# Patient Record
Sex: FEMALE | Race: WHITE | Employment: OTHER | URBAN - METROPOLITAN AREA
[De-identification: names, ages, dates, MRNs, and addresses within clinical notes are randomized per-mention and may not be internally consistent; named-entity substitution may affect disease eponyms.]

---

## 2017-01-16 PROBLEM — J02.9 PHARYNGITIS: Status: ACTIVE | Noted: 2017-01-16

## 2017-01-16 PROBLEM — J06.9 UPPER RESPIRATORY TRACT INFECTION: Status: ACTIVE | Noted: 2017-01-16

## 2017-02-13 PROBLEM — M65.20 CALCIFIC TENDINITIS: Status: ACTIVE | Noted: 2017-02-13

## 2017-02-13 PROBLEM — E11.9 TYPE 2 DIABETES MELLITUS WITHOUT COMPLICATION (HCC): Status: ACTIVE | Noted: 2017-02-13

## 2017-04-18 ENCOUNTER — OFFICE VISIT (OUTPATIENT)
Dept: CARDIOLOGY | Age: 77
End: 2017-04-18
Payer: MEDICARE

## 2017-04-18 VITALS
BODY MASS INDEX: 33.72 KG/M2 | SYSTOLIC BLOOD PRESSURE: 112 MMHG | WEIGHT: 209.8 LBS | HEART RATE: 62 BPM | OXYGEN SATURATION: 97 % | HEIGHT: 66 IN | DIASTOLIC BLOOD PRESSURE: 68 MMHG

## 2017-04-18 DIAGNOSIS — I27.20 PULMONARY HTN (HCC): Chronic | ICD-10-CM

## 2017-04-18 DIAGNOSIS — G47.33 OBSTRUCTIVE SLEEP APNEA: ICD-10-CM

## 2017-04-18 DIAGNOSIS — Z79.01 LONG TERM CURRENT USE OF ANTICOAGULANT THERAPY: ICD-10-CM

## 2017-04-18 DIAGNOSIS — I48.20 CHRONIC ATRIAL FIBRILLATION (HCC): Primary | ICD-10-CM

## 2017-04-18 DIAGNOSIS — E78.5 DYSLIPIDEMIA: ICD-10-CM

## 2017-04-18 DIAGNOSIS — I34.0 NON-RHEUMATIC MITRAL REGURGITATION: Chronic | ICD-10-CM

## 2017-04-18 DIAGNOSIS — I10 ESSENTIAL HYPERTENSION: ICD-10-CM

## 2017-04-18 DIAGNOSIS — I49.5 TACHYCARDIA-BRADYCARDIA SYNDROME (HCC): ICD-10-CM

## 2017-04-18 PROCEDURE — 99214 OFFICE O/P EST MOD 30 MIN: CPT | Performed by: INTERNAL MEDICINE

## 2017-04-24 RX ORDER — CITALOPRAM 20 MG/1
TABLET ORAL
Qty: 90 TABLET | Refills: 3 | Status: SHIPPED | OUTPATIENT
Start: 2017-04-24 | End: 2017-08-04 | Stop reason: ALTCHOICE

## 2017-05-30 ENCOUNTER — APPOINTMENT (OUTPATIENT)
Dept: GENERAL RADIOLOGY | Age: 77
DRG: 683 | End: 2017-05-30
Payer: MEDICARE

## 2017-05-30 ENCOUNTER — HOSPITAL ENCOUNTER (INPATIENT)
Age: 77
LOS: 3 days | Discharge: HOME | DRG: 683 | End: 2017-06-02
Attending: EMERGENCY MEDICINE | Admitting: INTERNAL MEDICINE
Payer: MEDICARE

## 2017-05-30 DIAGNOSIS — I95.1 ORTHOSTATIC HYPOTENSION: Primary | ICD-10-CM

## 2017-05-30 DIAGNOSIS — R55 NEAR SYNCOPE: ICD-10-CM

## 2017-05-30 DIAGNOSIS — I48.20 CHRONIC ATRIAL FIBRILLATION (HCC): ICD-10-CM

## 2017-05-30 DIAGNOSIS — N17.9 ACUTE KIDNEY INJURY (HCC): ICD-10-CM

## 2017-05-30 DIAGNOSIS — R94.31 ABNORMAL EKG: ICD-10-CM

## 2017-05-30 LAB
ABSOLUTE EOS #: 0.1 K/UL (ref 0–0.4)
ABSOLUTE LYMPH #: 1.2 K/UL (ref 1–4.8)
ABSOLUTE MONO #: 0.7 K/UL (ref 0–1)
ALBUMIN SERPL-MCNC: 4.1 G/DL (ref 3.5–5.2)
ALBUMIN/GLOBULIN RATIO: 1.4 (ref 1–2.5)
ALP BLD-CCNC: 85 U/L (ref 35–104)
ALT SERPL-CCNC: 14 U/L (ref 5–33)
ANION GAP SERPL CALCULATED.3IONS-SCNC: 24 MMOL/L (ref 9–17)
AST SERPL-CCNC: 19 U/L
BASOPHILS # BLD: 0 %
BASOPHILS ABSOLUTE: 0 K/UL (ref 0–0.2)
BILIRUB SERPL-MCNC: 0.74 MG/DL (ref 0.3–1.2)
BUN BLDV-MCNC: 21 MG/DL (ref 8–23)
BUN/CREAT BLD: 13 (ref 9–20)
CALCIUM SERPL-MCNC: 8.8 MG/DL (ref 8.6–10.4)
CHLORIDE BLD-SCNC: 96 MMOL/L (ref 98–107)
CO2: 18 MMOL/L (ref 20–31)
CREAT SERPL-MCNC: 1.68 MG/DL (ref 0.5–0.9)
DIFFERENTIAL TYPE: ABNORMAL
EOSINOPHILS RELATIVE PERCENT: 1 %
GFR AFRICAN AMERICAN: 36 ML/MIN
GFR NON-AFRICAN AMERICAN: 30 ML/MIN
GFR SERPL CREATININE-BSD FRML MDRD: ABNORMAL ML/MIN/{1.73_M2}
GFR SERPL CREATININE-BSD FRML MDRD: ABNORMAL ML/MIN/{1.73_M2}
GLUCOSE BLD-MCNC: 144 MG/DL (ref 70–99)
HCT VFR BLD CALC: 45.8 % (ref 36–46)
HEMOGLOBIN: 15 G/DL (ref 12–16)
INR BLD: 1.4 (ref 0.9–1.2)
LYMPHOCYTES # BLD: 18 %
MCH RBC QN AUTO: 29.9 PG (ref 26–34)
MCHC RBC AUTO-ENTMCNC: 32.7 G/DL (ref 31–37)
MCV RBC AUTO: 91.3 FL (ref 80–100)
MONOCYTES # BLD: 10 %
PARTIAL THROMBOPLASTIN TIME: 33.6 SEC (ref 23.2–34.4)
PDW BLD-RTO: 15.4 % (ref 12.1–15.2)
PLATELET # BLD: 184 K/UL (ref 140–450)
PLATELET ESTIMATE: ABNORMAL
PMV BLD AUTO: ABNORMAL FL (ref 6–12)
POTASSIUM SERPL-SCNC: 4.1 MMOL/L (ref 3.7–5.3)
PROTHROMBIN TIME: 14.7 SEC (ref 9.7–12.2)
RBC # BLD: 5.01 M/UL (ref 4–5.2)
RBC # BLD: ABNORMAL 10*6/UL
SEG NEUTROPHILS: 71 %
SEGMENTED NEUTROPHILS ABSOLUTE COUNT: 4.9 K/UL (ref 1.8–7.7)
SODIUM BLD-SCNC: 138 MMOL/L (ref 135–144)
TOTAL PROTEIN: 7.1 G/DL (ref 6.4–8.3)
TROPONIN INTERP: NORMAL
TROPONIN T: <0.03 NG/ML
WBC # BLD: 6.9 K/UL (ref 3.5–11)
WBC # BLD: ABNORMAL 10*3/UL

## 2017-05-30 PROCEDURE — 6370000000 HC RX 637 (ALT 250 FOR IP): Performed by: FAMILY MEDICINE

## 2017-05-30 PROCEDURE — 71020 XR CHEST STANDARD TWO VW: CPT

## 2017-05-30 PROCEDURE — 85610 PROTHROMBIN TIME: CPT

## 2017-05-30 PROCEDURE — 80053 COMPREHEN METABOLIC PANEL: CPT

## 2017-05-30 PROCEDURE — 85730 THROMBOPLASTIN TIME PARTIAL: CPT

## 2017-05-30 PROCEDURE — 36415 COLL VENOUS BLD VENIPUNCTURE: CPT

## 2017-05-30 PROCEDURE — 85025 COMPLETE CBC W/AUTO DIFF WBC: CPT

## 2017-05-30 PROCEDURE — 2580000003 HC RX 258: Performed by: EMERGENCY MEDICINE

## 2017-05-30 PROCEDURE — 87086 URINE CULTURE/COLONY COUNT: CPT

## 2017-05-30 PROCEDURE — 93005 ELECTROCARDIOGRAM TRACING: CPT

## 2017-05-30 PROCEDURE — 2580000003 HC RX 258: Performed by: FAMILY MEDICINE

## 2017-05-30 PROCEDURE — 99284 EMERGENCY DEPT VISIT MOD MDM: CPT

## 2017-05-30 PROCEDURE — 81001 URINALYSIS AUTO W/SCOPE: CPT

## 2017-05-30 PROCEDURE — 1200000000 HC SEMI PRIVATE

## 2017-05-30 PROCEDURE — 99284 EMERGENCY DEPT VISIT MOD MDM: CPT | Performed by: INTERNAL MEDICINE

## 2017-05-30 PROCEDURE — 84484 ASSAY OF TROPONIN QUANT: CPT

## 2017-05-30 RX ORDER — SODIUM CHLORIDE 0.9 % (FLUSH) 0.9 %
10 SYRINGE (ML) INJECTION PRN
Status: DISCONTINUED | OUTPATIENT
Start: 2017-05-30 | End: 2017-06-02 | Stop reason: HOSPADM

## 2017-05-30 RX ORDER — CITALOPRAM 20 MG/1
20 TABLET ORAL DAILY
Status: DISCONTINUED | OUTPATIENT
Start: 2017-05-31 | End: 2017-06-02 | Stop reason: HOSPADM

## 2017-05-30 RX ORDER — 0.9 % SODIUM CHLORIDE 0.9 %
1000 INTRAVENOUS SOLUTION INTRAVENOUS ONCE
Status: COMPLETED | OUTPATIENT
Start: 2017-05-30 | End: 2017-05-30

## 2017-05-30 RX ORDER — ISOSORBIDE MONONITRATE 30 MG/1
30 TABLET, EXTENDED RELEASE ORAL DAILY
Status: DISCONTINUED | OUTPATIENT
Start: 2017-05-31 | End: 2017-06-02 | Stop reason: HOSPADM

## 2017-05-30 RX ORDER — ALLOPURINOL 300 MG/1
300 TABLET ORAL DAILY
Status: DISCONTINUED | OUTPATIENT
Start: 2017-05-31 | End: 2017-06-02 | Stop reason: HOSPADM

## 2017-05-30 RX ORDER — WARFARIN SODIUM 7.5 MG/1
7.5 TABLET ORAL DAILY
Status: DISCONTINUED | OUTPATIENT
Start: 2017-05-31 | End: 2017-05-30

## 2017-05-30 RX ORDER — ATORVASTATIN CALCIUM 40 MG/1
40 TABLET, FILM COATED ORAL DAILY
Status: DISCONTINUED | OUTPATIENT
Start: 2017-05-31 | End: 2017-06-02 | Stop reason: HOSPADM

## 2017-05-30 RX ORDER — ONDANSETRON 2 MG/ML
4 INJECTION INTRAMUSCULAR; INTRAVENOUS EVERY 6 HOURS PRN
Status: DISCONTINUED | OUTPATIENT
Start: 2017-05-30 | End: 2017-06-02 | Stop reason: HOSPADM

## 2017-05-30 RX ORDER — SODIUM CHLORIDE 0.9 % (FLUSH) 0.9 %
10 SYRINGE (ML) INJECTION EVERY 12 HOURS SCHEDULED
Status: DISCONTINUED | OUTPATIENT
Start: 2017-05-30 | End: 2017-06-02 | Stop reason: HOSPADM

## 2017-05-30 RX ORDER — ACETAMINOPHEN 325 MG/1
650 TABLET ORAL EVERY 4 HOURS PRN
Status: DISCONTINUED | OUTPATIENT
Start: 2017-05-30 | End: 2017-06-02 | Stop reason: HOSPADM

## 2017-05-30 RX ORDER — RANOLAZINE 500 MG/1
500 TABLET, EXTENDED RELEASE ORAL 2 TIMES DAILY
Status: DISCONTINUED | OUTPATIENT
Start: 2017-05-30 | End: 2017-06-02 | Stop reason: HOSPADM

## 2017-05-30 RX ORDER — WARFARIN SODIUM 7.5 MG/1
7.5 TABLET ORAL DAILY
Status: DISCONTINUED | OUTPATIENT
Start: 2017-05-30 | End: 2017-05-31 | Stop reason: DRUGHIGH

## 2017-05-30 RX ORDER — SODIUM CHLORIDE 9 MG/ML
INJECTION, SOLUTION INTRAVENOUS CONTINUOUS
Status: DISCONTINUED | OUTPATIENT
Start: 2017-05-30 | End: 2017-05-31

## 2017-05-30 RX ADMIN — SODIUM CHLORIDE: 9 INJECTION, SOLUTION INTRAVENOUS at 19:58

## 2017-05-30 RX ADMIN — METOPROLOL TARTRATE 25 MG: 25 TABLET ORAL at 21:11

## 2017-05-30 RX ADMIN — SODIUM CHLORIDE 1000 ML: 9 INJECTION, SOLUTION INTRAVENOUS at 16:07

## 2017-05-30 RX ADMIN — WARFARIN SODIUM 7.5 MG: 7.5 TABLET ORAL at 21:11

## 2017-05-30 ASSESSMENT — ENCOUNTER SYMPTOMS
TROUBLE SWALLOWING: 0
PHOTOPHOBIA: 0
BACK PAIN: 0
BLOOD IN STOOL: 0
VOICE CHANGE: 0
COUGH: 1
DIARRHEA: 0
WHEEZING: 0
NAUSEA: 0
FACIAL SWELLING: 0
SHORTNESS OF BREATH: 0
VOMITING: 0
ABDOMINAL PAIN: 0
SORE THROAT: 0
CHEST TIGHTNESS: 0

## 2017-05-30 ASSESSMENT — PAIN DESCRIPTION - LOCATION: LOCATION: NECK

## 2017-05-30 ASSESSMENT — PAIN DESCRIPTION - PAIN TYPE: TYPE: CHRONIC PAIN

## 2017-05-30 ASSESSMENT — PAIN DESCRIPTION - DESCRIPTORS: DESCRIPTORS: ACHING

## 2017-05-30 ASSESSMENT — PAIN SCALES - GENERAL: PAINLEVEL_OUTOF10: 1

## 2017-05-31 PROBLEM — I95.9 HYPOTENSION: Status: ACTIVE | Noted: 2017-05-31

## 2017-05-31 PROBLEM — N30.00 ACUTE CYSTITIS: Status: ACTIVE | Noted: 2017-05-31

## 2017-05-31 LAB
-: ABNORMAL
ABSOLUTE EOS #: 0.1 K/UL (ref 0–0.4)
ABSOLUTE LYMPH #: 1.4 K/UL (ref 1–4.8)
ABSOLUTE MONO #: 0.5 K/UL (ref 0–1)
ALBUMIN SERPL-MCNC: 3.9 G/DL (ref 3.5–5.2)
ALBUMIN/GLOBULIN RATIO: 1.9 (ref 1–2.5)
ALP BLD-CCNC: 70 U/L (ref 35–104)
ALT SERPL-CCNC: 12 U/L (ref 5–33)
AMORPHOUS: ABNORMAL
ANION GAP SERPL CALCULATED.3IONS-SCNC: 13 MMOL/L (ref 9–17)
AST SERPL-CCNC: 20 U/L
BACTERIA: ABNORMAL
BASOPHILS # BLD: 0 %
BASOPHILS ABSOLUTE: 0 K/UL (ref 0–0.2)
BILIRUB SERPL-MCNC: 0.74 MG/DL (ref 0.3–1.2)
BILIRUBIN URINE: NEGATIVE
BUN BLDV-MCNC: 16 MG/DL (ref 8–23)
BUN/CREAT BLD: 20 (ref 9–20)
CALCIUM SERPL-MCNC: 8.4 MG/DL (ref 8.6–10.4)
CASTS UA: ABNORMAL /LPF
CHLORIDE BLD-SCNC: 104 MMOL/L (ref 98–107)
CO2: 23 MMOL/L (ref 20–31)
COLOR: YELLOW
COMMENT UA: ABNORMAL
CREAT SERPL-MCNC: 0.82 MG/DL (ref 0.5–0.9)
CRYSTALS, UA: ABNORMAL /HPF
DIFFERENTIAL TYPE: ABNORMAL
EOSINOPHILS RELATIVE PERCENT: 2 %
EPITHELIAL CELLS UA: ABNORMAL /HPF (ref 0–25)
GFR AFRICAN AMERICAN: >60 ML/MIN
GFR NON-AFRICAN AMERICAN: >60 ML/MIN
GFR SERPL CREATININE-BSD FRML MDRD: ABNORMAL ML/MIN/{1.73_M2}
GFR SERPL CREATININE-BSD FRML MDRD: ABNORMAL ML/MIN/{1.73_M2}
GLUCOSE BLD-MCNC: 122 MG/DL (ref 74–100)
GLUCOSE BLD-MCNC: 132 MG/DL (ref 74–100)
GLUCOSE BLD-MCNC: 213 MG/DL (ref 74–100)
GLUCOSE BLD-MCNC: 98 MG/DL (ref 70–99)
GLUCOSE URINE: NEGATIVE
HCT VFR BLD CALC: 39.9 % (ref 36–46)
HEMOGLOBIN: 13.3 G/DL (ref 12–16)
INR BLD: 1.4 (ref 0.9–1.2)
KETONES, URINE: NEGATIVE
LEUKOCYTE ESTERASE, URINE: ABNORMAL
LYMPHOCYTES # BLD: 23 %
MCH RBC QN AUTO: 30.1 PG (ref 26–34)
MCHC RBC AUTO-ENTMCNC: 33.2 G/DL (ref 31–37)
MCV RBC AUTO: 90.7 FL (ref 80–100)
MONOCYTES # BLD: 8 %
MUCUS: ABNORMAL
NITRITE, URINE: NEGATIVE
OTHER OBSERVATIONS UA: ABNORMAL
PDW BLD-RTO: 15.8 % (ref 12.1–15.2)
PH UA: 6 (ref 5–9)
PLATELET # BLD: 168 K/UL (ref 140–450)
PLATELET ESTIMATE: ABNORMAL
PMV BLD AUTO: ABNORMAL FL (ref 6–12)
POTASSIUM SERPL-SCNC: 3.9 MMOL/L (ref 3.7–5.3)
PROTEIN UA: NEGATIVE
PROTHROMBIN TIME: 15.2 SEC (ref 9.7–12.2)
RBC # BLD: 4.41 M/UL (ref 4–5.2)
RBC # BLD: ABNORMAL 10*6/UL
RBC UA: ABNORMAL /HPF (ref 0–2)
RENAL EPITHELIAL, UA: ABNORMAL /HPF
SEG NEUTROPHILS: 67 %
SEGMENTED NEUTROPHILS ABSOLUTE COUNT: 4.2 K/UL (ref 1.8–7.7)
SODIUM BLD-SCNC: 140 MMOL/L (ref 135–144)
SPECIFIC GRAVITY UA: 1.01 (ref 1.01–1.02)
TOTAL PROTEIN: 6 G/DL (ref 6.4–8.3)
TRICHOMONAS: ABNORMAL
TURBIDITY: CLEAR
URINE HGB: NEGATIVE
UROBILINOGEN, URINE: NORMAL
WBC # BLD: 6.3 K/UL (ref 3.5–11)
WBC # BLD: ABNORMAL 10*3/UL
WBC UA: ABNORMAL /HPF (ref 0–5)
YEAST: ABNORMAL

## 2017-05-31 PROCEDURE — 2580000003 HC RX 258: Performed by: FAMILY MEDICINE

## 2017-05-31 PROCEDURE — 36415 COLL VENOUS BLD VENIPUNCTURE: CPT

## 2017-05-31 PROCEDURE — 6360000002 HC RX W HCPCS: Performed by: PHYSICIAN ASSISTANT

## 2017-05-31 PROCEDURE — 85610 PROTHROMBIN TIME: CPT

## 2017-05-31 PROCEDURE — 94664 DEMO&/EVAL PT USE INHALER: CPT

## 2017-05-31 PROCEDURE — 6370000000 HC RX 637 (ALT 250 FOR IP): Performed by: INTERNAL MEDICINE

## 2017-05-31 PROCEDURE — 80053 COMPREHEN METABOLIC PANEL: CPT

## 2017-05-31 PROCEDURE — 82947 ASSAY GLUCOSE BLOOD QUANT: CPT

## 2017-05-31 PROCEDURE — 94667 MNPJ CHEST WALL 1ST: CPT

## 2017-05-31 PROCEDURE — 6360000002 HC RX W HCPCS: Performed by: FAMILY MEDICINE

## 2017-05-31 PROCEDURE — 6370000000 HC RX 637 (ALT 250 FOR IP): Performed by: PHYSICIAN ASSISTANT

## 2017-05-31 PROCEDURE — 99232 SBSQ HOSP IP/OBS MODERATE 35: CPT | Performed by: INTERNAL MEDICINE

## 2017-05-31 PROCEDURE — 94640 AIRWAY INHALATION TREATMENT: CPT

## 2017-05-31 PROCEDURE — 6370000000 HC RX 637 (ALT 250 FOR IP): Performed by: FAMILY MEDICINE

## 2017-05-31 PROCEDURE — 85025 COMPLETE CBC W/AUTO DIFF WBC: CPT

## 2017-05-31 PROCEDURE — 1200000000 HC SEMI PRIVATE

## 2017-05-31 RX ORDER — METOPROLOL TARTRATE 50 MG/1
50 TABLET, FILM COATED ORAL 2 TIMES DAILY
Status: DISCONTINUED | OUTPATIENT
Start: 2017-05-31 | End: 2017-06-01

## 2017-05-31 RX ORDER — DEXTROSE MONOHYDRATE 25 G/50ML
12.5 INJECTION, SOLUTION INTRAVENOUS PRN
Status: DISCONTINUED | OUTPATIENT
Start: 2017-05-31 | End: 2017-06-02 | Stop reason: HOSPADM

## 2017-05-31 RX ORDER — NICOTINE POLACRILEX 4 MG
15 LOZENGE BUCCAL PRN
Status: DISCONTINUED | OUTPATIENT
Start: 2017-05-31 | End: 2017-06-02 | Stop reason: HOSPADM

## 2017-05-31 RX ORDER — CEPHALEXIN 250 MG/1
250 CAPSULE ORAL EVERY 6 HOURS SCHEDULED
Status: DISCONTINUED | OUTPATIENT
Start: 2017-05-31 | End: 2017-06-02 | Stop reason: HOSPADM

## 2017-05-31 RX ORDER — LEVALBUTEROL INHALATION SOLUTION 0.63 MG/3ML
0.63 SOLUTION RESPIRATORY (INHALATION) EVERY 8 HOURS PRN
Status: DISCONTINUED | OUTPATIENT
Start: 2017-05-31 | End: 2017-05-31

## 2017-05-31 RX ORDER — DEXTROSE MONOHYDRATE 50 MG/ML
100 INJECTION, SOLUTION INTRAVENOUS PRN
Status: DISCONTINUED | OUTPATIENT
Start: 2017-05-31 | End: 2017-06-02 | Stop reason: HOSPADM

## 2017-05-31 RX ORDER — DIGOXIN 250 MCG
0.25 TABLET ORAL ONCE
Status: COMPLETED | OUTPATIENT
Start: 2017-05-31 | End: 2017-05-31

## 2017-05-31 RX ORDER — LEVALBUTEROL INHALATION SOLUTION 0.63 MG/3ML
0.63 SOLUTION RESPIRATORY (INHALATION) 3 TIMES DAILY
Status: DISCONTINUED | OUTPATIENT
Start: 2017-05-31 | End: 2017-06-02 | Stop reason: HOSPADM

## 2017-05-31 RX ORDER — METHYLPREDNISOLONE SODIUM SUCCINATE 40 MG/ML
40 INJECTION, POWDER, LYOPHILIZED, FOR SOLUTION INTRAMUSCULAR; INTRAVENOUS EVERY 12 HOURS
Status: DISCONTINUED | OUTPATIENT
Start: 2017-05-31 | End: 2017-06-02

## 2017-05-31 RX ORDER — BENZONATATE 100 MG/1
100 CAPSULE ORAL 3 TIMES DAILY PRN
Status: DISCONTINUED | OUTPATIENT
Start: 2017-05-31 | End: 2017-06-02 | Stop reason: HOSPADM

## 2017-05-31 RX ORDER — WARFARIN SODIUM 5 MG/1
10 TABLET ORAL
Status: COMPLETED | OUTPATIENT
Start: 2017-05-31 | End: 2017-05-31

## 2017-05-31 RX ADMIN — METOPROLOL TARTRATE 25 MG: 25 TABLET ORAL at 16:41

## 2017-05-31 RX ADMIN — CEPHALEXIN 250 MG: 250 CAPSULE ORAL at 09:42

## 2017-05-31 RX ADMIN — Medication 10 ML: at 20:25

## 2017-05-31 RX ADMIN — ALLOPURINOL 300 MG: 300 TABLET ORAL at 09:42

## 2017-05-31 RX ADMIN — CEPHALEXIN 250 MG: 250 CAPSULE ORAL at 17:10

## 2017-05-31 RX ADMIN — SODIUM CHLORIDE: 9 INJECTION, SOLUTION INTRAVENOUS at 05:42

## 2017-05-31 RX ADMIN — LEVALBUTEROL HYDROCHLORIDE 0.63 MG: 0.63 SOLUTION RESPIRATORY (INHALATION) at 08:26

## 2017-05-31 RX ADMIN — DIGOXIN 0.25 MG: 250 TABLET ORAL at 22:54

## 2017-05-31 RX ADMIN — METHYLPREDNISOLONE SODIUM SUCCINATE 40 MG: 40 INJECTION, POWDER, FOR SOLUTION INTRAMUSCULAR; INTRAVENOUS at 09:43

## 2017-05-31 RX ADMIN — BENZONATATE 100 MG: 100 CAPSULE ORAL at 10:07

## 2017-05-31 RX ADMIN — ISOSORBIDE MONONITRATE 30 MG: 30 TABLET, EXTENDED RELEASE ORAL at 09:42

## 2017-05-31 RX ADMIN — WARFARIN SODIUM 10 MG: 5 TABLET ORAL at 17:10

## 2017-05-31 RX ADMIN — LEVALBUTEROL HYDROCHLORIDE 0.63 MG: 0.63 SOLUTION RESPIRATORY (INHALATION) at 15:28

## 2017-05-31 RX ADMIN — METOPROLOL TARTRATE 50 MG: 50 TABLET ORAL at 20:17

## 2017-05-31 RX ADMIN — INSULIN LISPRO 2 UNITS: 100 INJECTION, SOLUTION INTRAVENOUS; SUBCUTANEOUS at 17:10

## 2017-05-31 RX ADMIN — CEPHALEXIN 250 MG: 250 CAPSULE ORAL at 12:39

## 2017-05-31 RX ADMIN — METOPROLOL TARTRATE 25 MG: 25 TABLET ORAL at 09:43

## 2017-05-31 RX ADMIN — METHYLPREDNISOLONE SODIUM SUCCINATE 40 MG: 40 INJECTION, POWDER, FOR SOLUTION INTRAMUSCULAR; INTRAVENOUS at 20:17

## 2017-05-31 RX ADMIN — LEVALBUTEROL HYDROCHLORIDE 0.63 MG: 0.63 SOLUTION RESPIRATORY (INHALATION) at 22:23

## 2017-05-31 RX ADMIN — BENZONATATE 100 MG: 100 CAPSULE ORAL at 22:54

## 2017-05-31 RX ADMIN — CITALOPRAM HYDROBROMIDE 20 MG: 20 TABLET ORAL at 09:43

## 2017-05-31 RX ADMIN — ATORVASTATIN CALCIUM 40 MG: 40 TABLET, FILM COATED ORAL at 09:42

## 2017-05-31 RX ADMIN — Medication 10 ML: at 09:45

## 2017-05-31 RX ADMIN — DIGOXIN 0.25 MG: 0.25 TABLET ORAL at 18:54

## 2017-05-31 ASSESSMENT — PAIN SCALES - GENERAL
PAINLEVEL_OUTOF10: 0

## 2017-06-01 LAB
ANION GAP SERPL CALCULATED.3IONS-SCNC: 16 MMOL/L (ref 9–17)
BUN BLDV-MCNC: 12 MG/DL (ref 8–23)
BUN/CREAT BLD: 20 (ref 9–20)
CALCIUM SERPL-MCNC: 8.9 MG/DL (ref 8.6–10.4)
CHLORIDE BLD-SCNC: 105 MMOL/L (ref 98–107)
CO2: 19 MMOL/L (ref 20–31)
CREAT SERPL-MCNC: 0.6 MG/DL (ref 0.5–0.9)
CULTURE: NORMAL
CULTURE: NORMAL
GFR AFRICAN AMERICAN: >60 ML/MIN
GFR NON-AFRICAN AMERICAN: >60 ML/MIN
GFR SERPL CREATININE-BSD FRML MDRD: ABNORMAL ML/MIN/{1.73_M2}
GFR SERPL CREATININE-BSD FRML MDRD: ABNORMAL ML/MIN/{1.73_M2}
GLUCOSE BLD-MCNC: 122 MG/DL (ref 74–100)
GLUCOSE BLD-MCNC: 155 MG/DL (ref 70–99)
GLUCOSE BLD-MCNC: 159 MG/DL (ref 74–100)
GLUCOSE BLD-MCNC: 163 MG/DL (ref 74–100)
GLUCOSE BLD-MCNC: 164 MG/DL (ref 74–100)
INR BLD: 1.8 (ref 0.9–1.2)
Lab: NORMAL
Lab: NORMAL
POTASSIUM SERPL-SCNC: 4.2 MMOL/L (ref 3.7–5.3)
PROTHROMBIN TIME: 19.7 SEC (ref 9.7–12.2)
SODIUM BLD-SCNC: 140 MMOL/L (ref 135–144)
SPECIMEN DESCRIPTION: NORMAL
SPECIMEN DESCRIPTION: NORMAL
STATUS: NORMAL

## 2017-06-01 PROCEDURE — 94668 MNPJ CHEST WALL SBSQ: CPT

## 2017-06-01 PROCEDURE — 94640 AIRWAY INHALATION TREATMENT: CPT

## 2017-06-01 PROCEDURE — 97166 OT EVAL MOD COMPLEX 45 MIN: CPT

## 2017-06-01 PROCEDURE — 1200000000 HC SEMI PRIVATE

## 2017-06-01 PROCEDURE — 97116 GAIT TRAINING THERAPY: CPT

## 2017-06-01 PROCEDURE — 97110 THERAPEUTIC EXERCISES: CPT

## 2017-06-01 PROCEDURE — G8978 MOBILITY CURRENT STATUS: HCPCS

## 2017-06-01 PROCEDURE — 6370000000 HC RX 637 (ALT 250 FOR IP): Performed by: FAMILY MEDICINE

## 2017-06-01 PROCEDURE — 6370000000 HC RX 637 (ALT 250 FOR IP)

## 2017-06-01 PROCEDURE — 6360000002 HC RX W HCPCS: Performed by: PHYSICIAN ASSISTANT

## 2017-06-01 PROCEDURE — 97162 PT EVAL MOD COMPLEX 30 MIN: CPT

## 2017-06-01 PROCEDURE — 99232 SBSQ HOSP IP/OBS MODERATE 35: CPT | Performed by: INTERNAL MEDICINE

## 2017-06-01 PROCEDURE — 6360000002 HC RX W HCPCS: Performed by: FAMILY MEDICINE

## 2017-06-01 PROCEDURE — G8987 SELF CARE CURRENT STATUS: HCPCS

## 2017-06-01 PROCEDURE — 2580000003 HC RX 258: Performed by: FAMILY MEDICINE

## 2017-06-01 PROCEDURE — G8979 MOBILITY GOAL STATUS: HCPCS

## 2017-06-01 PROCEDURE — 36415 COLL VENOUS BLD VENIPUNCTURE: CPT

## 2017-06-01 PROCEDURE — 82947 ASSAY GLUCOSE BLOOD QUANT: CPT

## 2017-06-01 PROCEDURE — 6370000000 HC RX 637 (ALT 250 FOR IP): Performed by: PHYSICIAN ASSISTANT

## 2017-06-01 PROCEDURE — G8988 SELF CARE GOAL STATUS: HCPCS

## 2017-06-01 PROCEDURE — 80048 BASIC METABOLIC PNL TOTAL CA: CPT

## 2017-06-01 PROCEDURE — 6370000000 HC RX 637 (ALT 250 FOR IP): Performed by: INTERNAL MEDICINE

## 2017-06-01 PROCEDURE — 85610 PROTHROMBIN TIME: CPT

## 2017-06-01 RX ORDER — DILTIAZEM HYDROCHLORIDE 60 MG/1
60 TABLET, FILM COATED ORAL EVERY 8 HOURS SCHEDULED
Status: DISCONTINUED | OUTPATIENT
Start: 2017-06-01 | End: 2017-06-02

## 2017-06-01 RX ORDER — WARFARIN SODIUM 5 MG/1
10 TABLET ORAL
Status: COMPLETED | OUTPATIENT
Start: 2017-06-01 | End: 2017-06-01

## 2017-06-01 RX ORDER — METOPROLOL TARTRATE 50 MG/1
50 TABLET, FILM COATED ORAL 2 TIMES DAILY
Status: DISCONTINUED | OUTPATIENT
Start: 2017-06-01 | End: 2017-06-02 | Stop reason: HOSPADM

## 2017-06-01 RX ORDER — DIGOXIN 250 MCG
250 TABLET ORAL DAILY
Status: DISCONTINUED | OUTPATIENT
Start: 2017-06-01 | End: 2017-06-02 | Stop reason: HOSPADM

## 2017-06-01 RX ORDER — WARFARIN SODIUM 5 MG/1
10 TABLET ORAL
Status: DISCONTINUED | OUTPATIENT
Start: 2017-06-01 | End: 2017-06-01

## 2017-06-01 RX ORDER — SODIUM CHLORIDE FOR INHALATION 0.9 %
VIAL, NEBULIZER (ML) INHALATION
Status: COMPLETED
Start: 2017-06-01 | End: 2017-06-01

## 2017-06-01 RX ORDER — DILTIAZEM HYDROCHLORIDE 180 MG/1
360 CAPSULE, COATED, EXTENDED RELEASE ORAL DAILY
Status: DISCONTINUED | OUTPATIENT
Start: 2017-06-01 | End: 2017-06-01

## 2017-06-01 RX ADMIN — Medication: at 22:45

## 2017-06-01 RX ADMIN — LEVALBUTEROL HYDROCHLORIDE 0.63 MG: 0.63 SOLUTION RESPIRATORY (INHALATION) at 09:57

## 2017-06-01 RX ADMIN — RANOLAZINE 500 MG: 500 TABLET, EXTENDED RELEASE ORAL at 20:36

## 2017-06-01 RX ADMIN — INSULIN LISPRO 1 UNITS: 100 INJECTION, SOLUTION INTRAVENOUS; SUBCUTANEOUS at 20:36

## 2017-06-01 RX ADMIN — DILTIAZEM HYDROCHLORIDE 60 MG: 60 TABLET, FILM COATED ORAL at 22:57

## 2017-06-01 RX ADMIN — RANOLAZINE 500 MG: 500 TABLET, EXTENDED RELEASE ORAL at 11:25

## 2017-06-01 RX ADMIN — ATORVASTATIN CALCIUM 40 MG: 40 TABLET, FILM COATED ORAL at 09:49

## 2017-06-01 RX ADMIN — CEPHALEXIN 250 MG: 250 CAPSULE ORAL at 00:00

## 2017-06-01 RX ADMIN — CEPHALEXIN 250 MG: 250 CAPSULE ORAL at 17:08

## 2017-06-01 RX ADMIN — CEPHALEXIN 250 MG: 250 CAPSULE ORAL at 22:57

## 2017-06-01 RX ADMIN — Medication 10 ML: at 20:35

## 2017-06-01 RX ADMIN — LEVALBUTEROL HYDROCHLORIDE 0.63 MG: 0.63 SOLUTION RESPIRATORY (INHALATION) at 16:20

## 2017-06-01 RX ADMIN — ALLOPURINOL 300 MG: 300 TABLET ORAL at 09:49

## 2017-06-01 RX ADMIN — METOPROLOL TARTRATE 50 MG: 50 TABLET ORAL at 09:49

## 2017-06-01 RX ADMIN — INSULIN LISPRO 1 UNITS: 100 INJECTION, SOLUTION INTRAVENOUS; SUBCUTANEOUS at 09:54

## 2017-06-01 RX ADMIN — CEPHALEXIN 250 MG: 250 CAPSULE ORAL at 11:30

## 2017-06-01 RX ADMIN — WARFARIN SODIUM 10 MG: 5 TABLET ORAL at 17:33

## 2017-06-01 RX ADMIN — ISOSORBIDE MONONITRATE 30 MG: 30 TABLET, EXTENDED RELEASE ORAL at 09:49

## 2017-06-01 RX ADMIN — CITALOPRAM HYDROBROMIDE 20 MG: 20 TABLET ORAL at 09:49

## 2017-06-01 RX ADMIN — METHYLPREDNISOLONE SODIUM SUCCINATE 40 MG: 40 INJECTION, POWDER, FOR SOLUTION INTRAMUSCULAR; INTRAVENOUS at 20:35

## 2017-06-01 RX ADMIN — DIGOXIN 250 MCG: 0.25 TABLET ORAL at 12:12

## 2017-06-01 RX ADMIN — CEPHALEXIN 250 MG: 250 CAPSULE ORAL at 06:38

## 2017-06-01 RX ADMIN — METOPROLOL TARTRATE 50 MG: 50 TABLET ORAL at 20:35

## 2017-06-01 RX ADMIN — INSULIN LISPRO 1 UNITS: 100 INJECTION, SOLUTION INTRAVENOUS; SUBCUTANEOUS at 17:11

## 2017-06-01 RX ADMIN — DILTIAZEM HYDROCHLORIDE 60 MG: 60 TABLET, FILM COATED ORAL at 14:28

## 2017-06-01 RX ADMIN — Medication 10 ML: at 09:50

## 2017-06-01 RX ADMIN — METHYLPREDNISOLONE SODIUM SUCCINATE 40 MG: 40 INJECTION, POWDER, FOR SOLUTION INTRAMUSCULAR; INTRAVENOUS at 09:49

## 2017-06-01 RX ADMIN — LEVALBUTEROL HYDROCHLORIDE 0.63 MG: 0.63 SOLUTION RESPIRATORY (INHALATION) at 22:45

## 2017-06-01 ASSESSMENT — PAIN SCALES - GENERAL
PAINLEVEL_OUTOF10: 0

## 2017-06-02 ENCOUNTER — APPOINTMENT (OUTPATIENT)
Dept: GENERAL RADIOLOGY | Age: 77
DRG: 683 | End: 2017-06-02
Payer: MEDICARE

## 2017-06-02 VITALS
HEIGHT: 66 IN | RESPIRATION RATE: 16 BRPM | OXYGEN SATURATION: 94 % | TEMPERATURE: 98.1 F | HEART RATE: 69 BPM | WEIGHT: 206.2 LBS | BODY MASS INDEX: 33.14 KG/M2 | DIASTOLIC BLOOD PRESSURE: 75 MMHG | SYSTOLIC BLOOD PRESSURE: 123 MMHG

## 2017-06-02 DIAGNOSIS — I48.20 CHRONIC ATRIAL FIBRILLATION (HCC): Primary | ICD-10-CM

## 2017-06-02 LAB
ANION GAP SERPL CALCULATED.3IONS-SCNC: 17 MMOL/L (ref 9–17)
BUN BLDV-MCNC: 16 MG/DL (ref 8–23)
BUN/CREAT BLD: 25 (ref 9–20)
CALCIUM SERPL-MCNC: 8.9 MG/DL (ref 8.6–10.4)
CHLORIDE BLD-SCNC: 103 MMOL/L (ref 98–107)
CO2: 20 MMOL/L (ref 20–31)
CREAT SERPL-MCNC: 0.64 MG/DL (ref 0.5–0.9)
GFR AFRICAN AMERICAN: >60 ML/MIN
GFR NON-AFRICAN AMERICAN: >60 ML/MIN
GFR SERPL CREATININE-BSD FRML MDRD: ABNORMAL ML/MIN/{1.73_M2}
GFR SERPL CREATININE-BSD FRML MDRD: ABNORMAL ML/MIN/{1.73_M2}
GLUCOSE BLD-MCNC: 105 MG/DL (ref 74–100)
GLUCOSE BLD-MCNC: 146 MG/DL (ref 74–100)
GLUCOSE BLD-MCNC: 155 MG/DL (ref 70–99)
INR BLD: 2.2 (ref 0.9–1.2)
POTASSIUM SERPL-SCNC: 4.4 MMOL/L (ref 3.7–5.3)
PROTHROMBIN TIME: 23.5 SEC (ref 9.7–12.2)
SODIUM BLD-SCNC: 140 MMOL/L (ref 135–144)

## 2017-06-02 PROCEDURE — 82947 ASSAY GLUCOSE BLOOD QUANT: CPT

## 2017-06-02 PROCEDURE — 36415 COLL VENOUS BLD VENIPUNCTURE: CPT

## 2017-06-02 PROCEDURE — 94640 AIRWAY INHALATION TREATMENT: CPT

## 2017-06-02 PROCEDURE — 2580000003 HC RX 258: Performed by: FAMILY MEDICINE

## 2017-06-02 PROCEDURE — 71020 XR CHEST STANDARD TWO VW: CPT

## 2017-06-02 PROCEDURE — 6370000000 HC RX 637 (ALT 250 FOR IP): Performed by: INTERNAL MEDICINE

## 2017-06-02 PROCEDURE — 97110 THERAPEUTIC EXERCISES: CPT

## 2017-06-02 PROCEDURE — 6360000002 HC RX W HCPCS: Performed by: FAMILY MEDICINE

## 2017-06-02 PROCEDURE — 6370000000 HC RX 637 (ALT 250 FOR IP): Performed by: FAMILY MEDICINE

## 2017-06-02 PROCEDURE — 80048 BASIC METABOLIC PNL TOTAL CA: CPT

## 2017-06-02 PROCEDURE — 97535 SELF CARE MNGMENT TRAINING: CPT

## 2017-06-02 PROCEDURE — 85610 PROTHROMBIN TIME: CPT

## 2017-06-02 PROCEDURE — 97116 GAIT TRAINING THERAPY: CPT

## 2017-06-02 PROCEDURE — 6370000000 HC RX 637 (ALT 250 FOR IP): Performed by: PHYSICIAN ASSISTANT

## 2017-06-02 PROCEDURE — 99232 SBSQ HOSP IP/OBS MODERATE 35: CPT | Performed by: INTERNAL MEDICINE

## 2017-06-02 RX ORDER — PREDNISONE 10 MG/1
TABLET ORAL
Qty: 15 TABLET | Refills: 0 | Status: ON HOLD | OUTPATIENT
Start: 2017-06-02 | End: 2017-06-09 | Stop reason: HOSPADM

## 2017-06-02 RX ORDER — PREDNISONE 10 MG/1
10 TABLET ORAL 2 TIMES DAILY
Status: DISCONTINUED | OUTPATIENT
Start: 2017-06-02 | End: 2017-06-02 | Stop reason: HOSPADM

## 2017-06-02 RX ORDER — CEPHALEXIN 250 MG/1
250 CAPSULE ORAL 4 TIMES DAILY
Qty: 20 CAPSULE | Refills: 0 | Status: ON HOLD | OUTPATIENT
Start: 2017-06-02 | End: 2017-06-09 | Stop reason: HOSPADM

## 2017-06-02 RX ORDER — DILTIAZEM HYDROCHLORIDE 180 MG/1
180 CAPSULE, COATED, EXTENDED RELEASE ORAL DAILY
Qty: 30 CAPSULE | Refills: 0 | Status: ON HOLD | OUTPATIENT
Start: 2017-06-02 | End: 2017-06-15 | Stop reason: HOSPADM

## 2017-06-02 RX ORDER — DIGOXIN 250 MCG
250 TABLET ORAL DAILY
Qty: 30 TABLET | Refills: 0 | Status: ON HOLD | OUTPATIENT
Start: 2017-06-02 | End: 2017-06-15 | Stop reason: HOSPADM

## 2017-06-02 RX ORDER — ALBUTEROL SULFATE 90 UG/1
2 AEROSOL, METERED RESPIRATORY (INHALATION) EVERY 6 HOURS PRN
Qty: 1 INHALER | Refills: 0 | Status: SHIPPED | OUTPATIENT
Start: 2017-06-02 | End: 2017-06-19 | Stop reason: ALTCHOICE

## 2017-06-02 RX ORDER — BENZONATATE 100 MG/1
100 CAPSULE ORAL 3 TIMES DAILY PRN
Qty: 60 CAPSULE | Refills: 0 | Status: ON HOLD | OUTPATIENT
Start: 2017-06-02 | End: 2017-06-15 | Stop reason: HOSPADM

## 2017-06-02 RX ORDER — WARFARIN SODIUM 5 MG/1
5 TABLET ORAL
Status: DISCONTINUED | OUTPATIENT
Start: 2017-06-02 | End: 2017-06-02 | Stop reason: HOSPADM

## 2017-06-02 RX ORDER — DILTIAZEM HYDROCHLORIDE 180 MG/1
180 CAPSULE, COATED, EXTENDED RELEASE ORAL DAILY
Status: DISCONTINUED | OUTPATIENT
Start: 2017-06-02 | End: 2017-06-02 | Stop reason: HOSPADM

## 2017-06-02 RX ADMIN — CEPHALEXIN 250 MG: 250 CAPSULE ORAL at 12:04

## 2017-06-02 RX ADMIN — DILTIAZEM HYDROCHLORIDE 60 MG: 60 TABLET, FILM COATED ORAL at 05:18

## 2017-06-02 RX ADMIN — METOPROLOL TARTRATE 50 MG: 50 TABLET ORAL at 08:27

## 2017-06-02 RX ADMIN — ISOSORBIDE MONONITRATE 30 MG: 30 TABLET, EXTENDED RELEASE ORAL at 08:27

## 2017-06-02 RX ADMIN — PREDNISONE 10 MG: 10 TABLET ORAL at 08:27

## 2017-06-02 RX ADMIN — DILTIAZEM HYDROCHLORIDE 180 MG: 180 CAPSULE, COATED, EXTENDED RELEASE ORAL at 13:06

## 2017-06-02 RX ADMIN — Medication 10 ML: at 08:29

## 2017-06-02 RX ADMIN — CEPHALEXIN 250 MG: 250 CAPSULE ORAL at 05:18

## 2017-06-02 RX ADMIN — BENZONATATE 100 MG: 100 CAPSULE ORAL at 13:51

## 2017-06-02 RX ADMIN — ALLOPURINOL 300 MG: 300 TABLET ORAL at 08:27

## 2017-06-02 RX ADMIN — ATORVASTATIN CALCIUM 40 MG: 40 TABLET, FILM COATED ORAL at 08:27

## 2017-06-02 RX ADMIN — RANOLAZINE 500 MG: 500 TABLET, EXTENDED RELEASE ORAL at 08:28

## 2017-06-02 RX ADMIN — CITALOPRAM HYDROBROMIDE 20 MG: 20 TABLET ORAL at 08:27

## 2017-06-02 RX ADMIN — INSULIN LISPRO 1 UNITS: 100 INJECTION, SOLUTION INTRAVENOUS; SUBCUTANEOUS at 08:18

## 2017-06-02 RX ADMIN — LEVALBUTEROL HYDROCHLORIDE 0.63 MG: 0.63 SOLUTION RESPIRATORY (INHALATION) at 09:40

## 2017-06-02 RX ADMIN — DIGOXIN 250 MCG: 0.25 TABLET ORAL at 08:27

## 2017-06-02 ASSESSMENT — PAIN SCALES - GENERAL
PAINLEVEL_OUTOF10: 0

## 2017-06-03 ENCOUNTER — HOSPITAL ENCOUNTER (EMERGENCY)
Age: 77
Discharge: OTHER INSTITUTION | End: 2017-06-03
Attending: EMERGENCY MEDICINE
Payer: MEDICARE

## 2017-06-03 ENCOUNTER — HOSPITAL ENCOUNTER (INPATIENT)
Age: 77
LOS: 12 days | Discharge: HOME HEALTH | DRG: 163 | End: 2017-06-15
Attending: INTERNAL MEDICINE | Admitting: INTERNAL MEDICINE
Payer: MEDICARE

## 2017-06-03 ENCOUNTER — APPOINTMENT (OUTPATIENT)
Dept: GENERAL RADIOLOGY | Age: 77
End: 2017-06-03
Payer: MEDICARE

## 2017-06-03 VITALS
HEART RATE: 108 BPM | BODY MASS INDEX: 33.1 KG/M2 | OXYGEN SATURATION: 96 % | DIASTOLIC BLOOD PRESSURE: 59 MMHG | SYSTOLIC BLOOD PRESSURE: 107 MMHG | RESPIRATION RATE: 28 BRPM | TEMPERATURE: 100.7 F | WEIGHT: 202 LBS

## 2017-06-03 DIAGNOSIS — J96.01 ACUTE RESPIRATORY FAILURE WITH HYPOXIA (HCC): ICD-10-CM

## 2017-06-03 DIAGNOSIS — I50.21 ACUTE SYSTOLIC CONGESTIVE HEART FAILURE (HCC): Primary | ICD-10-CM

## 2017-06-03 DIAGNOSIS — J11.1 INFLUENZA WITH RESPIRATORY MANIFESTATION OTHER THAN PNEUMONIA: ICD-10-CM

## 2017-06-03 LAB
-: ABNORMAL
ABSOLUTE EOS #: 0 K/UL (ref 0–0.4)
ABSOLUTE EOS #: 0 K/UL (ref 0–0.4)
ABSOLUTE LYMPH #: 0.9 K/UL (ref 1–4.8)
ABSOLUTE LYMPH #: 1.1 K/UL (ref 1–4.8)
ABSOLUTE MONO #: 0.2 K/UL (ref 0.1–1.2)
ABSOLUTE MONO #: 0.2 K/UL (ref 0–1)
ALBUMIN SERPL-MCNC: 3.3 G/DL (ref 3.5–5.2)
ALBUMIN SERPL-MCNC: 3.8 G/DL (ref 3.5–5.2)
ALBUMIN/GLOBULIN RATIO: 1.2 (ref 1–2.5)
ALBUMIN/GLOBULIN RATIO: 1.3 (ref 1–2.5)
ALLEN TEST: ABNORMAL
ALP BLD-CCNC: 58 U/L (ref 35–104)
ALP BLD-CCNC: 74 U/L (ref 35–104)
ALT SERPL-CCNC: 25 U/L (ref 5–33)
ALT SERPL-CCNC: 31 U/L (ref 5–33)
AMORPHOUS: ABNORMAL
ANION GAP SERPL CALCULATED.3IONS-SCNC: 16 MMOL/L (ref 9–17)
ANION GAP SERPL CALCULATED.3IONS-SCNC: 19 MMOL/L (ref 9–17)
AST SERPL-CCNC: 22 U/L
AST SERPL-CCNC: 34 U/L
BACTERIA: ABNORMAL
BASOPHILS # BLD: 0 %
BASOPHILS # BLD: 0 %
BASOPHILS ABSOLUTE: 0 K/UL (ref 0–0.2)
BASOPHILS ABSOLUTE: 0 K/UL (ref 0–0.2)
BILIRUB SERPL-MCNC: 0.75 MG/DL (ref 0.3–1.2)
BILIRUB SERPL-MCNC: 1 MG/DL (ref 0.3–1.2)
BILIRUBIN URINE: NEGATIVE
BNP INTERPRETATION: ABNORMAL
BNP INTERPRETATION: ABNORMAL
BUN BLDV-MCNC: 14 MG/DL (ref 8–23)
BUN BLDV-MCNC: 15 MG/DL (ref 8–23)
BUN/CREAT BLD: 16 (ref 9–20)
BUN/CREAT BLD: ABNORMAL (ref 9–20)
CALCIUM SERPL-MCNC: 8.3 MG/DL (ref 8.6–10.4)
CALCIUM SERPL-MCNC: 8.5 MG/DL (ref 8.6–10.4)
CARBOXYHEMOGLOBIN: ABNORMAL % (ref 0–5)
CASTS UA: ABNORMAL /LPF
CHLORIDE BLD-SCNC: 96 MMOL/L (ref 98–107)
CHLORIDE BLD-SCNC: 96 MMOL/L (ref 98–107)
CK MB: 1.7 NG/ML
CO2: 18 MMOL/L (ref 20–31)
CO2: 22 MMOL/L (ref 20–31)
COLOR: YELLOW
COMMENT UA: ABNORMAL
CREAT SERPL-MCNC: 0.85 MG/DL (ref 0.5–0.9)
CREAT SERPL-MCNC: 0.86 MG/DL (ref 0.5–0.9)
CRYSTALS, UA: ABNORMAL /HPF
DIFFERENTIAL TYPE: ABNORMAL
DIFFERENTIAL TYPE: ABNORMAL
EOSINOPHILS RELATIVE PERCENT: 0 %
EOSINOPHILS RELATIVE PERCENT: 0 %
EPITHELIAL CELLS UA: ABNORMAL /HPF (ref 0–25)
FIO2: ABNORMAL
GFR AFRICAN AMERICAN: >60 ML/MIN
GFR AFRICAN AMERICAN: >60 ML/MIN
GFR NON-AFRICAN AMERICAN: >60 ML/MIN
GFR NON-AFRICAN AMERICAN: >60 ML/MIN
GFR SERPL CREATININE-BSD FRML MDRD: ABNORMAL ML/MIN/{1.73_M2}
GLUCOSE BLD-MCNC: 141 MG/DL (ref 70–99)
GLUCOSE BLD-MCNC: 191 MG/DL (ref 70–99)
GLUCOSE URINE: NEGATIVE
HCO3 ARTERIAL: 20.4 MMOL/L (ref 22–26)
HCT VFR BLD CALC: 39.3 % (ref 36–46)
HCT VFR BLD CALC: 44 % (ref 36–46)
HEMOGLOBIN: 13.1 G/DL (ref 12–16)
HEMOGLOBIN: 14.6 G/DL (ref 12–16)
INR BLD: 2.9
KETONES, URINE: NEGATIVE
LACTIC ACID, WHOLE BLOOD: 1.6 MMOL/L (ref 0.7–2.1)
LEUKOCYTE ESTERASE, URINE: NEGATIVE
LYMPHOCYTES # BLD: 10 %
LYMPHOCYTES # BLD: 13 %
MAGNESIUM: 2.1 MG/DL (ref 1.6–2.6)
MCH RBC QN AUTO: 29.9 PG (ref 26–34)
MCH RBC QN AUTO: 30.1 PG (ref 26–34)
MCHC RBC AUTO-ENTMCNC: 33.1 G/DL (ref 31–37)
MCHC RBC AUTO-ENTMCNC: 33.2 G/DL (ref 31–37)
MCV RBC AUTO: 90.1 FL (ref 80–100)
MCV RBC AUTO: 90.8 FL (ref 80–100)
METHEMOGLOBIN: ABNORMAL % (ref 0–1.9)
MODE: ABNORMAL
MONOCYTES # BLD: 3 %
MONOCYTES # BLD: 3 %
MUCUS: ABNORMAL
MYOGLOBIN: 74 NG/ML (ref 25–58)
NEGATIVE BASE EXCESS, ART: 2.1 MMOL/L (ref 0–2)
NITRITE, URINE: NEGATIVE
NOTIFICATION TIME: ABNORMAL
NOTIFICATION: ABNORMAL
O2 DEVICE/FLOW/%: ABNORMAL
O2 SAT, ARTERIAL: 90.1 % (ref 95–98)
OTHER OBSERVATIONS UA: ABNORMAL
OXYHEMOGLOBIN: ABNORMAL % (ref 95–98)
PATIENT TEMP: ABNORMAL
PCO2 ARTERIAL: 29.5 MMHG (ref 35–45)
PCO2, ART, TEMP ADJ: ABNORMAL
PDW BLD-RTO: 15.6 % (ref 12.1–15.2)
PDW BLD-RTO: 16.4 % (ref 12.5–15.4)
PEEP/CPAP: ABNORMAL
PH ARTERIAL: 7.46 (ref 7.35–7.45)
PH UA: 6 (ref 5–9)
PH, ART, TEMP ADJ: ABNORMAL (ref 7.35–7.45)
PLATELET # BLD: 203 K/UL (ref 140–450)
PLATELET # BLD: 288 K/UL (ref 140–450)
PLATELET ESTIMATE: ABNORMAL
PLATELET ESTIMATE: ABNORMAL
PMV BLD AUTO: 8 FL (ref 6–12)
PMV BLD AUTO: ABNORMAL FL (ref 6–12)
PO2 ARTERIAL: 53.9 MMHG (ref 80–100)
PO2, ART, TEMP ADJ: ABNORMAL MMHG (ref 80–100)
POSITIVE BASE EXCESS, ART: ABNORMAL MMOL/L (ref 0–2)
POTASSIUM SERPL-SCNC: 3.6 MMOL/L (ref 3.7–5.3)
POTASSIUM SERPL-SCNC: 4.2 MMOL/L (ref 3.7–5.3)
PRO-BNP: 4169 PG/ML
PRO-BNP: 5532 PG/ML
PROTEIN UA: ABNORMAL
PROTHROMBIN TIME: 31.4 SEC (ref 9.4–12.6)
PSV: ABNORMAL
PT. POSITION: ABNORMAL
RBC # BLD: 4.36 M/UL (ref 4–5.2)
RBC # BLD: 4.84 M/UL (ref 4–5.2)
RBC # BLD: ABNORMAL 10*6/UL
RBC # BLD: ABNORMAL 10*6/UL
RBC UA: ABNORMAL /HPF (ref 0–2)
RENAL EPITHELIAL, UA: ABNORMAL /HPF
RESPIRATORY RATE: ABNORMAL
SAMPLE SITE: ABNORMAL
SEG NEUTROPHILS: 84 %
SEG NEUTROPHILS: 87 %
SEGMENTED NEUTROPHILS ABSOLUTE COUNT: 7.3 K/UL (ref 1.8–7.7)
SEGMENTED NEUTROPHILS ABSOLUTE COUNT: 7.4 K/UL (ref 1.8–7.7)
SET RATE: ABNORMAL
SODIUM BLD-SCNC: 133 MMOL/L (ref 135–144)
SODIUM BLD-SCNC: 134 MMOL/L (ref 135–144)
SPECIFIC GRAVITY UA: 1.02 (ref 1.01–1.02)
TEXT FOR RESPIRATORY: ABNORMAL
TOTAL HB: ABNORMAL G/DL (ref 12–16)
TOTAL PROTEIN: 5.9 G/DL (ref 6.4–8.3)
TOTAL PROTEIN: 6.9 G/DL (ref 6.4–8.3)
TOTAL RATE: ABNORMAL
TRICHOMONAS: ABNORMAL
TROPONIN INTERP: NORMAL
TROPONIN INTERP: NORMAL
TROPONIN T: <0.03 NG/ML
TROPONIN T: <0.03 NG/ML
TSH SERPL DL<=0.05 MIU/L-ACNC: 0.99 MIU/L (ref 0.3–5)
TURBIDITY: CLEAR
URINE HGB: ABNORMAL
UROBILINOGEN, URINE: NORMAL
VT: ABNORMAL
WBC # BLD: 8.4 K/UL (ref 3.5–11)
WBC # BLD: 8.8 K/UL (ref 3.5–11)
WBC # BLD: ABNORMAL 10*3/UL
WBC # BLD: ABNORMAL 10*3/UL
WBC UA: ABNORMAL /HPF (ref 0–5)
YEAST: ABNORMAL

## 2017-06-03 PROCEDURE — 84484 ASSAY OF TROPONIN QUANT: CPT

## 2017-06-03 PROCEDURE — 99285 EMERGENCY DEPT VISIT HI MDM: CPT

## 2017-06-03 PROCEDURE — 87040 BLOOD CULTURE FOR BACTERIA: CPT

## 2017-06-03 PROCEDURE — 2060000000 HC ICU INTERMEDIATE R&B

## 2017-06-03 PROCEDURE — 2580000003 HC RX 258: Performed by: INTERNAL MEDICINE

## 2017-06-03 PROCEDURE — 6370000000 HC RX 637 (ALT 250 FOR IP): Performed by: INTERNAL MEDICINE

## 2017-06-03 PROCEDURE — 85610 PROTHROMBIN TIME: CPT

## 2017-06-03 PROCEDURE — 96375 TX/PRO/DX INJ NEW DRUG ADDON: CPT

## 2017-06-03 PROCEDURE — 6360000002 HC RX W HCPCS: Performed by: INTERNAL MEDICINE

## 2017-06-03 PROCEDURE — 84443 ASSAY THYROID STIM HORMONE: CPT

## 2017-06-03 PROCEDURE — 87205 SMEAR GRAM STAIN: CPT

## 2017-06-03 PROCEDURE — 83735 ASSAY OF MAGNESIUM: CPT

## 2017-06-03 PROCEDURE — 81001 URINALYSIS AUTO W/SCOPE: CPT

## 2017-06-03 PROCEDURE — 80053 COMPREHEN METABOLIC PANEL: CPT

## 2017-06-03 PROCEDURE — 96365 THER/PROPH/DIAG IV INF INIT: CPT

## 2017-06-03 PROCEDURE — 87070 CULTURE OTHR SPECIMN AEROBIC: CPT

## 2017-06-03 PROCEDURE — 87631 RESP VIRUS 3-5 TARGETS: CPT

## 2017-06-03 PROCEDURE — 83036 HEMOGLOBIN GLYCOSYLATED A1C: CPT

## 2017-06-03 PROCEDURE — 83874 ASSAY OF MYOGLOBIN: CPT

## 2017-06-03 PROCEDURE — 83880 ASSAY OF NATRIURETIC PEPTIDE: CPT

## 2017-06-03 PROCEDURE — 94762 N-INVAS EAR/PLS OXIMTRY CONT: CPT

## 2017-06-03 PROCEDURE — 36415 COLL VENOUS BLD VENIPUNCTURE: CPT

## 2017-06-03 PROCEDURE — 99223 1ST HOSP IP/OBS HIGH 75: CPT | Performed by: INTERNAL MEDICINE

## 2017-06-03 PROCEDURE — 36600 WITHDRAWAL OF ARTERIAL BLOOD: CPT

## 2017-06-03 PROCEDURE — 82947 ASSAY GLUCOSE BLOOD QUANT: CPT

## 2017-06-03 PROCEDURE — 71010 XR CHEST LIMITED ONE VIEW: CPT

## 2017-06-03 PROCEDURE — 82553 CREATINE MB FRACTION: CPT

## 2017-06-03 PROCEDURE — 93005 ELECTROCARDIOGRAM TRACING: CPT

## 2017-06-03 PROCEDURE — 82805 BLOOD GASES W/O2 SATURATION: CPT

## 2017-06-03 PROCEDURE — 94660 CPAP INITIATION&MGMT: CPT

## 2017-06-03 PROCEDURE — 83605 ASSAY OF LACTIC ACID: CPT

## 2017-06-03 PROCEDURE — 85025 COMPLETE CBC W/AUTO DIFF WBC: CPT

## 2017-06-03 PROCEDURE — 87086 URINE CULTURE/COLONY COUNT: CPT

## 2017-06-03 RX ORDER — MORPHINE SULFATE 4 MG/ML
4 INJECTION, SOLUTION INTRAMUSCULAR; INTRAVENOUS ONCE
Status: COMPLETED | OUTPATIENT
Start: 2017-06-03 | End: 2017-06-03

## 2017-06-03 RX ORDER — POTASSIUM CHLORIDE 20MEQ/15ML
40 LIQUID (ML) ORAL PRN
Status: DISCONTINUED | OUTPATIENT
Start: 2017-06-03 | End: 2017-06-14

## 2017-06-03 RX ORDER — WARFARIN SODIUM 7.5 MG/1
7.5 TABLET ORAL DAILY
Status: DISCONTINUED | OUTPATIENT
Start: 2017-06-03 | End: 2017-06-04 | Stop reason: ALTCHOICE

## 2017-06-03 RX ORDER — ONDANSETRON 2 MG/ML
4 INJECTION INTRAMUSCULAR; INTRAVENOUS EVERY 6 HOURS PRN
Status: DISCONTINUED | OUTPATIENT
Start: 2017-06-03 | End: 2017-06-14

## 2017-06-03 RX ORDER — FUROSEMIDE 10 MG/ML
60 INJECTION INTRAMUSCULAR; INTRAVENOUS ONCE
Status: COMPLETED | OUTPATIENT
Start: 2017-06-03 | End: 2017-06-03

## 2017-06-03 RX ORDER — CITALOPRAM 20 MG/1
20 TABLET ORAL DAILY
Status: DISCONTINUED | OUTPATIENT
Start: 2017-06-03 | End: 2017-06-15 | Stop reason: HOSPADM

## 2017-06-03 RX ORDER — DIPHENHYDRAMINE HYDROCHLORIDE 50 MG/ML
25 INJECTION INTRAMUSCULAR; INTRAVENOUS EVERY 6 HOURS PRN
Status: DISCONTINUED | OUTPATIENT
Start: 2017-06-03 | End: 2017-06-09

## 2017-06-03 RX ORDER — DEXTROSE MONOHYDRATE 25 G/50ML
12.5 INJECTION, SOLUTION INTRAVENOUS PRN
Status: DISCONTINUED | OUTPATIENT
Start: 2017-06-03 | End: 2017-06-14

## 2017-06-03 RX ORDER — RANOLAZINE 500 MG/1
500 TABLET, EXTENDED RELEASE ORAL 2 TIMES DAILY
Status: DISCONTINUED | OUTPATIENT
Start: 2017-06-03 | End: 2017-06-15 | Stop reason: HOSPADM

## 2017-06-03 RX ORDER — ISOSORBIDE MONONITRATE 30 MG/1
30 TABLET, EXTENDED RELEASE ORAL DAILY
Status: DISCONTINUED | OUTPATIENT
Start: 2017-06-03 | End: 2017-06-15 | Stop reason: HOSPADM

## 2017-06-03 RX ORDER — FUROSEMIDE 10 MG/ML
20 INJECTION INTRAMUSCULAR; INTRAVENOUS ONCE
Status: COMPLETED | OUTPATIENT
Start: 2017-06-03 | End: 2017-06-03

## 2017-06-03 RX ORDER — ALBUTEROL SULFATE 90 UG/1
2 AEROSOL, METERED RESPIRATORY (INHALATION) EVERY 6 HOURS PRN
Status: DISCONTINUED | OUTPATIENT
Start: 2017-06-03 | End: 2017-06-14

## 2017-06-03 RX ORDER — DIGOXIN 250 MCG
250 TABLET ORAL DAILY
Status: DISCONTINUED | OUTPATIENT
Start: 2017-06-03 | End: 2017-06-11

## 2017-06-03 RX ORDER — ATORVASTATIN CALCIUM 40 MG/1
40 TABLET, FILM COATED ORAL DAILY
Status: DISCONTINUED | OUTPATIENT
Start: 2017-06-03 | End: 2017-06-03

## 2017-06-03 RX ORDER — POTASSIUM CHLORIDE 7.45 MG/ML
10 INJECTION INTRAVENOUS PRN
Status: DISCONTINUED | OUTPATIENT
Start: 2017-06-03 | End: 2017-06-14

## 2017-06-03 RX ORDER — BENZONATATE 100 MG/1
100 CAPSULE ORAL 3 TIMES DAILY PRN
Status: DISCONTINUED | OUTPATIENT
Start: 2017-06-03 | End: 2017-06-14

## 2017-06-03 RX ORDER — METOPROLOL TARTRATE 50 MG/1
50 TABLET, FILM COATED ORAL 2 TIMES DAILY
Status: DISCONTINUED | OUTPATIENT
Start: 2017-06-03 | End: 2017-06-11

## 2017-06-03 RX ORDER — ONDANSETRON 2 MG/ML
4 INJECTION INTRAMUSCULAR; INTRAVENOUS ONCE
Status: COMPLETED | OUTPATIENT
Start: 2017-06-03 | End: 2017-06-03

## 2017-06-03 RX ORDER — ASPIRIN 81 MG/1
81 TABLET, CHEWABLE ORAL DAILY
Status: DISCONTINUED | OUTPATIENT
Start: 2017-06-04 | End: 2017-06-15

## 2017-06-03 RX ORDER — ATORVASTATIN CALCIUM 40 MG/1
40 TABLET, FILM COATED ORAL NIGHTLY
Status: DISCONTINUED | OUTPATIENT
Start: 2017-06-04 | End: 2017-06-15 | Stop reason: HOSPADM

## 2017-06-03 RX ORDER — DEXTROSE MONOHYDRATE 50 MG/ML
100 INJECTION, SOLUTION INTRAVENOUS PRN
Status: DISCONTINUED | OUTPATIENT
Start: 2017-06-03 | End: 2017-06-14

## 2017-06-03 RX ORDER — SODIUM CHLORIDE 0.9 % (FLUSH) 0.9 %
10 SYRINGE (ML) INJECTION EVERY 12 HOURS SCHEDULED
Status: DISCONTINUED | OUTPATIENT
Start: 2017-06-03 | End: 2017-06-14

## 2017-06-03 RX ORDER — NICOTINE POLACRILEX 4 MG
15 LOZENGE BUCCAL PRN
Status: DISCONTINUED | OUTPATIENT
Start: 2017-06-03 | End: 2017-06-14

## 2017-06-03 RX ORDER — POTASSIUM CHLORIDE 20 MEQ/1
40 TABLET, EXTENDED RELEASE ORAL PRN
Status: DISCONTINUED | OUTPATIENT
Start: 2017-06-03 | End: 2017-06-14

## 2017-06-03 RX ORDER — SODIUM CHLORIDE 0.9 % (FLUSH) 0.9 %
10 SYRINGE (ML) INJECTION PRN
Status: DISCONTINUED | OUTPATIENT
Start: 2017-06-03 | End: 2017-06-14

## 2017-06-03 RX ORDER — VANCOMYCIN HYDROCHLORIDE 1 G/200ML
1000 INJECTION, SOLUTION INTRAVENOUS ONCE
Status: COMPLETED | OUTPATIENT
Start: 2017-06-03 | End: 2017-06-03

## 2017-06-03 RX ORDER — DILTIAZEM HYDROCHLORIDE 180 MG/1
180 CAPSULE, COATED, EXTENDED RELEASE ORAL DAILY
Status: DISCONTINUED | OUTPATIENT
Start: 2017-06-03 | End: 2017-06-12

## 2017-06-03 RX ORDER — ACETAMINOPHEN 325 MG/1
650 TABLET ORAL EVERY 4 HOURS PRN
Status: DISCONTINUED | OUTPATIENT
Start: 2017-06-03 | End: 2017-06-14

## 2017-06-03 RX ORDER — ATORVASTATIN CALCIUM 80 MG/1
80 TABLET, FILM COATED ORAL NIGHTLY
Status: DISCONTINUED | OUTPATIENT
Start: 2017-06-03 | End: 2017-06-03

## 2017-06-03 RX ORDER — PREDNISONE 20 MG/1
20 TABLET ORAL 2 TIMES DAILY
Status: DISCONTINUED | OUTPATIENT
Start: 2017-06-03 | End: 2017-06-04

## 2017-06-03 RX ADMIN — RANOLAZINE 500 MG: 500 TABLET, FILM COATED, EXTENDED RELEASE ORAL at 23:00

## 2017-06-03 RX ADMIN — WARFARIN SODIUM 7.5 MG: 7.5 TABLET ORAL at 23:00

## 2017-06-03 RX ADMIN — CEFTRIAXONE 2 G: 2 INJECTION, POWDER, FOR SOLUTION INTRAMUSCULAR; INTRAVENOUS at 16:22

## 2017-06-03 RX ADMIN — ONDANSETRON 4 MG: 2 INJECTION INTRAMUSCULAR; INTRAVENOUS at 14:48

## 2017-06-03 RX ADMIN — Medication 10 ML: at 23:02

## 2017-06-03 RX ADMIN — ACETAMINOPHEN 650 MG: 325 TABLET ORAL at 23:00

## 2017-06-03 RX ADMIN — ENOXAPARIN SODIUM 40 MG: 40 INJECTION SUBCUTANEOUS at 23:01

## 2017-06-03 RX ADMIN — VANCOMYCIN HYDROCHLORIDE 1000 MG: 1 INJECTION, SOLUTION INTRAVENOUS at 22:52

## 2017-06-03 RX ADMIN — FUROSEMIDE 60 MG: 10 INJECTION, SOLUTION INTRAMUSCULAR; INTRAVENOUS at 14:52

## 2017-06-03 RX ADMIN — ATORVASTATIN CALCIUM 80 MG: 80 TABLET, FILM COATED ORAL at 23:01

## 2017-06-03 RX ADMIN — PREDNISONE 20 MG: 20 TABLET ORAL at 23:01

## 2017-06-03 RX ADMIN — FUROSEMIDE 20 MG: 10 INJECTION, SOLUTION INTRAMUSCULAR; INTRAVENOUS at 23:06

## 2017-06-03 RX ADMIN — MORPHINE SULFATE 4 MG: 4 INJECTION, SOLUTION INTRAMUSCULAR; INTRAVENOUS at 14:50

## 2017-06-03 ASSESSMENT — ENCOUNTER SYMPTOMS
DIARRHEA: 0
EYE DISCHARGE: 0
BACK PAIN: 0
NAUSEA: 0
VOMITING: 0
EYE PAIN: 0
ABDOMINAL PAIN: 0
SHORTNESS OF BREATH: 1
WHEEZING: 1
CHEST TIGHTNESS: 0
COUGH: 0
SORE THROAT: 0

## 2017-06-03 ASSESSMENT — PAIN SCALES - GENERAL: PAINLEVEL_OUTOF10: 0

## 2017-06-03 NOTE — IP AVS SNAPSHOT
Patient Information     Patient Name Kimberly [address] A 1940      WARFARIN INFORMATION       What is the most important information you should know about warfarin? Warfarin is a medicine that you take to prevent blood clots. It is often called a blood thinner. Doctors give warfarin (such as Coumadin) to reduce the risk of blood clots. Warfarin/Coumadin Instructions:  ? It is important to take your anticoagulant medication as instructed, and notify your physician if you are unable to for any reason. ? Complete any blood tests ordered for monitoring your medication; such as PT/INR, so your physician can adjust the dose if necessary. ? Eat a consistent amount of foods with Vitamin K, such as leafy greens. ? Avoid major changes in your dietary habits, or notify your health professional before changing habits. ? Follow up with your health care provider or clinic as directed by your physician for monitoring your anticoagulant medication. ? Diet and medication can affect your anticoagulant and PT/INR blood test.   ? Do not take or discontinue any medication or over-the-counter medication except on the advice of your physician or pharmacist.   ? Anticoagulants can increase the risk of bleeding.

## 2017-06-03 NOTE — IP AVS SNAPSHOT
Commonly known as:  LOTRISONE   Apply topically 2 times daily. What changed:  additional instructions                                         isosorbide mononitrate 30 MG extended release tablet   Commonly known as:  IMDUR   Take 1 tablet by mouth daily   What changed:  See the new instructions. 30 mg on 6/15/2017  9:32 AM                            metoprolol tartrate 25 MG tablet   Commonly known as:  LOPRESSOR   Take 0.5 tablets by mouth nightly   What changed:    - how much to take  - when to take this                12.5 mg on 6/15/2017  9:32 AM                            metoprolol tartrate 25 MG tablet   Commonly known as:  LOPRESSOR   Take 1 tablet by mouth every morning   What changed: You were already taking a medication with the same name, and this prescription was added. Make sure you understand how and when to take each.                12.5 mg on 6/15/2017  9:32 AM                            predniSONE 20 MG tablet   Commonly known as:  DELTASONE   Take 2 tablets by mouth daily   What changed:    - medication strength  - how much to take  - how to take this  - when to take this  - additional instructions                40 mg on 6/15/2017  9:33 AM                              These are medications you told us you were taking at home, CONTINUE taking them after you leave the hospital        Last Dose    Next Dose Due AM NOON PM NIGHT    albuterol sulfate  (90 Base) MCG/ACT inhaler   Commonly known as:  PROAIR HFA   Inhale 2 puffs into the lungs every 6 hours as needed for Wheezing                                         allopurinol 300 MG tablet   Commonly known as:  ZYLOPRIM   TAKE ONE TABLET BY MOUTH ONCE DAILY                                         betamethasone dipropionate 0.05 % cream   Commonly known as:  DIPROLENE   Apply  topically 2 times daily.  Apply topically 2 times daily prn                                         citalopram 20 MG tablet Commonly known as:  CELEXA   TAKE ONE TABLET BY MOUTH ONCE DAILY                20 mg on 6/15/2017  9:31 AM                            ranolazine 500 MG extended release tablet   Commonly known as:  RANEXA   Take 1 tablet by mouth 2 times daily                500 mg on 6/15/2017  9:33 AM                            warfarin 7.5 MG tablet   Commonly known as:  COUMADIN   TAKE ONE TABLET BY MOUTH ONCE DAILY AS DIRECTED PER PHYSICIAN                7.5 mg on 6/14/2017  6:28 PM                              These are the medications you have told us you were taking at home, STOP taking them after you leave the hospital     benzonatate 100 MG capsule   Commonly known as:  TESSALON       cephALEXin 250 MG capsule   Commonly known as:  KEFLEX       digoxin 250 MCG tablet   Commonly known as:  LANOXIN       diltiazem 180 MG extended release capsule   Commonly known as:  CARDIZEM CD            Where to Get Your Medications      These medications were sent to [address]  [address] [facility], [place] [address]     Phone:  [phone]     aspirin 81 MG chewable tablet    atorvastatin 40 MG tablet    docusate 100 MG Caps    isosorbide mononitrate 30 MG extended release tablet    melatonin 1 MG tablet    metoprolol tartrate 25 MG tablet    metoprolol tartrate 25 MG tablet    mometasone-formoterol 200-5 MCG/ACT inhaler    predniSONE 20 MG tablet         You can get these medications from any pharmacy     Bring a paper prescription for each of these medications     HYDROcodone-acetaminophen 5-325 MG per tablet               Allergies as of 6/15/2017        Reactions    Adhesive Tape     Aspercreme [Trolamine Salicylate]     Augmentin [Amoxicillin-pot Clavulanate]     Erythromycin Other (See Comments)    \"whole in stomach\"    Guaifenesin & Derivatives     Niacin And Related       Immunizations as of 6/15/2017     Name Date Dose VIS Date Route your visit. Please take this sheet with you when you visit your doctor or other health care provider in the future. It will help determine the best possible medical care for you at that time. If you have any questions once you leave the hospital, please call the department phone number listed below. Why you were here     Your primary diagnosis was:  Hcap (Healthcare-Associated Pneumonia)    Your diagnoses also included:  Upper Respiratory Tract Infection, Type 2 Diabetes Mellitus Without Complication (Hcc), Pulmonary Htn (Hcc), Obstructive Sleep Apnea, Non-Rheumatic Mitral Regurgitation, Long Term Current Use Of Anticoagulant Therapy, Gout, Essential Hypertension, Atrial Fibrillation (Hcc), Coronary Artery Disease Involving Native Coronary Artery, Ischemic Cardiomyopathy, Hypokalemia, Acute Pulmonary Edema (Hcc), Ground Glass Opacity Present On Imaging Of Lung      Visit Information     Date & Time Department Dept. Phone    6/3/2017 JANET CAR 1 [phone]       Follow-up Appointments    Below is a list of your follow-up and future appointments. This may not be a complete list as you may have made appointments directly with providers that we are not aware of or your providers may have made some for you. Please call your providers to confirm appointments. It is important to keep your appointments. Please bring your current insurance card, photo ID, co-pay, and all medication bottles to your appointment. If self-pay, payment is expected at the time of service. Follow-up Information     Schedule an appointment as soon as possible for a visit with Haritha López MD.    Specialty:  Family Medicine    Why:  COUMADIN MGMT    Contact information:    [facility]   [address] [facility]  [phone]          Follow up with Tammi Bustos DO. Schedule an appointment as soon as possible for a visit in 2 weeks.     Specialties:  Pulmonology, Pulmonary Disease    Why:  hospital follow up · Wash the area daily with warm, soapy water, and pat it dry. Don't use hydrogen peroxide or alcohol, which can slow healing. You may cover the area with a gauze bandage if it weeps or rubs against clothing. Change the bandage every day. · Keep the area clean and dry. Follow-up care is a key part of your treatment and safety. Be sure to make and go to all appointments, and call your doctor if you are having problems. It's also a good idea to know your test results and keep a list of the medicines you take. When should you call for help? Call 911 anytime you think you may need emergency care. For example, call if:  · You passed out (lost consciousness). · You have severe trouble breathing. · You have sudden chest pain and shortness of breath, or you cough up blood. Call your doctor now or seek immediate medical care if:  · You are sick to your stomach or cannot keep fluids down. · You have pain that does not get better after you take pain medicine. · You have a fever over 100°F.  · You have signs of infection, such as:  ¨ Increased pain, swelling, warmth, or redness. ¨ Red streaks leading from the incision. ¨ Pus draining from the incision. ¨ Swollen lymph nodes in your neck, armpits, or groin. ¨ A fever. · You have loose stitches, or your incisions come open. · Bright red blood has soaked through the bandage over your incision. Watch closely for changes in your health, and be sure to contact your doctor if you have any problems. Where can you learn more? Go to [email or web address]://AnimailgradyTubaloo.Rapid Pathogen Screening. org and sign in to your Nubefy account. Enter B523 in the [facility] box to learn more about \"Video-Assisted Thoracoscopic Surgery (VATS): What to Expect at Home. \"     If you do not have an account, please click on the \"Sign Up Now\" link. Current as of: May 23, 2016  Content Version: 11.2  © 7161-1153 [employer].  Care instructions adapted under account. Enter D336 in the [facility] box to learn more about \"Pneumonia: Care Instructions. \"     If you do not have an account, please click on the \"Sign Up Now\" link. Current as of: May 23, 2016  Content Version: 11.2  © 3727-8674 [employer]. Care instructions adapted under license by [facility]). If you have questions about a medical condition or this instruction, always ask your healthcare professional. [place] [address] any warranty or liability for your use of this information.     [facility] ([facility]) Continuity of Care Form    Patient Name: Olamide Garcia   :  1940  MRN:  [id number]    Admit date:  6/3/2017  Discharge date:      Code Status Order: Full Code   Advance Directives: No    Admitting Physician:  Jade Blue MD  PCP: Chance Bass MD    Discharging Nurse: [facility] Unit/Room#: 8228/6005-91      Emergency Contact:   Contact 1: Name: Je Ying  Contact 1: Number: [phone]  Contact 1: Relationship:     Past Surgical History:  Past Surgical History:   Procedure Laterality Date    BRONCHOSCOPY  2017    BRONCHOSCOPY BRUSHINGS performed by Magalys Elizabeth DO at [place] [address]  2017    BRONCHOSCOPY/TRANSBRONCHIAL LUNG BIOPSY performed by Magalys Elizabeth DO at [place] [address]  2017    BRONCHOSCOPY FLUOROSCOPY performed by Magalys Elizabeth DO at [facility]   [address] Right 2015    COLONOSCOPY  2005    DIAGNOSTIC CARDIAC CATH LAB PROCEDURE  06/17/15    EYE SURGERY      FRACTURE SURGERY  2004    Distal Radius Ulna    OTHER SURGICAL HISTORY  2years old    VOLVAR-ULCERATIVE LESION-CAUTERIZED    SKIN BIOPSY      TONSILLECTOMY AND ADENOIDECTOMY         Immunization History:   Immunization History   Administered Date(s) Administered    Influenza Vaccine, unspecified formulation 2012, 2013, 2014  Influenza Virus Vaccine 10/05/2015    Influenza, High dose, IM, Preservative-free 10/10/2016    Pneumococcal 13-valent Conjugate ([id number]) 11/16/2015    Pneumococcal Conjugate 7-valent 01/09/2012    Zoster 06/20/2016       Active Problems:  Patient Active Problem List   Diagnosis    Pulmonary HTN ([facility]    Mitral regurgitation    Atrial fibrillation ([facility]    Long term current use of anticoagulant therapy    Essential hypertension    Obstructive sleep apnea    Gout    Upper respiratory tract infection    Type 2 diabetes mellitus without complication ([facility]    Coronary artery disease involving native coronary artery    Ischemic cardiomyopathy    HCAP (healthcare-associated pneumonia)    Hypokalemia    Acute pulmonary edema (HCC)    Ground glass opacity present on imaging of lung       Isolation/Infection:   Isolation     No Isolation            Nurse Assessment:  Last Vital Signs: BP (!) 135/54  Pulse 72  Temp 98 °F (36.7 °C) (Oral)   Resp 17  Ht 5' 5\" (1.651 m)  Wt 199 lb (90.3 kg)  SpO2 96%  BMI 33.12 kg/m2    Last documented pain score (0-10 scale): Pain Level: 0  Last Weight:   Wt Readings from Last 1 Encounters:   06/08/17 199 lb (90.3 kg)     Mental Status:  oriented and alert     IV Access:  - None    Nursing Mobility/ADLs:  Walking   Independent  Transfer  Independent  Bathing  Independent  Dressing  Independent  Toileting  Independent  Feeding  Independent  Med Admin  Independent  Med Delivery   whole    Wound Care Documentation and Therapy:        Elimination:  Continence:   · Bowel: Yes  · Bladder: Yes  Urinary Catheter: None   Colostomy/Ileostomy/Ileal Conduit: No    Intake/Output Summary (Last 24 hours) at 06/08/17 1537  Last data filed at 06/08/17 0940   Gross per 24 hour   Intake              770 ml   Output              665 ml   Net              105 ml     I/O last 3 completed shifts: In: 200 [P.O.:320;  I.V.:450]  Out: 665 [Urine:650; Blood:15]    Safety Concerns:     None

## 2017-06-03 NOTE — IP AVS SNAPSHOT
Patient Information     Patient Name Kimberly [address] A 1940         This is your updated medication list to keep with you all times      TAKE these medications     albuterol sulfate  (90 Base) MCG/ACT inhaler   Commonly known as:  PROAIR HFA   Inhale 2 puffs into the lungs every 6 hours as needed for Wheezing       allopurinol 300 MG tablet   Commonly known as:  ZYLOPRIM   TAKE ONE TABLET BY MOUTH ONCE DAILY       aspirin 81 MG chewable tablet   Take 1 tablet by mouth daily       atorvastatin 40 MG tablet   Commonly known as:  LIPITOR   Take 1 tablet by mouth nightly       betamethasone dipropionate 0.05 % cream   Commonly known as:  DIPROLENE       citalopram 20 MG tablet   Commonly known as:  CELEXA   TAKE ONE TABLET BY MOUTH ONCE DAILY       clotrimazole-betamethasone 1-0.05 % cream   Commonly known as:  LOTRISONE   Apply topically 2 times daily. docusate 100 MG Caps   Commonly known as:  COLACE, DULCOLAX   Take 100 mg by mouth 2 times daily       HYDROcodone-acetaminophen 5-325 MG per tablet   Commonly known as:  NORCO   Take 1 tablet by mouth every 4 hours as needed for Pain .  Earliest Fill Date: 6/15/17       isosorbide mononitrate 30 MG extended release tablet   Commonly known as:  IMDUR   Take 1 tablet by mouth daily       melatonin 1 MG tablet   Take 3 tablets by mouth nightly       * metoprolol tartrate 25 MG tablet   Commonly known as:  LOPRESSOR   Take 0.5 tablets by mouth nightly       * metoprolol tartrate 25 MG tablet   Commonly known as:  LOPRESSOR   Take 1 tablet by mouth every morning       mometasone-formoterol 200-5 MCG/ACT inhaler   Commonly known as:  DULERA   Inhale 2 puffs into the lungs 2 times daily       predniSONE 20 MG tablet   Commonly known as:  DELTASONE   Take 2 tablets by mouth daily       ranolazine 500 MG extended release tablet   Commonly known as:  RANEXA   Take 1 tablet by mouth 2 times daily       warfarin 7.5 MG tablet Commonly known as:  COUMADIN   TAKE ONE TABLET BY MOUTH ONCE DAILY AS DIRECTED PER PHYSICIAN       * Notice: This list has 2 medication(s) that are the same as other medications prescribed for you. Read the directions carefully, and ask your doctor or other care provider to review them with you.

## 2017-06-04 ENCOUNTER — APPOINTMENT (OUTPATIENT)
Dept: GENERAL RADIOLOGY | Age: 77
DRG: 163 | End: 2017-06-04
Attending: INTERNAL MEDICINE
Payer: MEDICARE

## 2017-06-04 PROBLEM — N17.9 ACUTE RENAL FAILURE (ARF) (HCC): Status: RESOLVED | Noted: 2017-05-30 | Resolved: 2017-06-04

## 2017-06-04 PROBLEM — I95.9 HYPOTENSION: Status: RESOLVED | Noted: 2017-05-31 | Resolved: 2017-06-04

## 2017-06-04 PROBLEM — I25.10 CORONARY ARTERY DISEASE INVOLVING NATIVE CORONARY ARTERY: Chronic | Status: ACTIVE | Noted: 2017-06-04

## 2017-06-04 PROBLEM — I25.5 ISCHEMIC CARDIOMYOPATHY: Chronic | Status: ACTIVE | Noted: 2017-06-04

## 2017-06-04 PROBLEM — J02.9 PHARYNGITIS: Status: RESOLVED | Noted: 2017-01-16 | Resolved: 2017-06-04

## 2017-06-04 PROBLEM — M65.20 CALCIFIC TENDINITIS: Status: RESOLVED | Noted: 2017-02-13 | Resolved: 2017-06-04

## 2017-06-04 PROBLEM — J18.9 HCAP (HEALTHCARE-ASSOCIATED PNEUMONIA): Status: ACTIVE | Noted: 2017-06-04

## 2017-06-04 PROBLEM — N30.00 ACUTE CYSTITIS: Status: RESOLVED | Noted: 2017-05-31 | Resolved: 2017-06-04

## 2017-06-04 PROBLEM — E87.6 HYPOKALEMIA: Status: ACTIVE | Noted: 2017-06-04

## 2017-06-04 LAB
C-REACTIVE PROTEIN: 248.9 MG/L (ref 0–5)
CULTURE: NO GROWTH
CULTURE: NORMAL
DIGOXIN DATE LAST DOSE: NORMAL
DIGOXIN DOSE AMOUNT: NORMAL
DIGOXIN DOSE TIME: NORMAL
DIGOXIN LEVEL: 1.2 NG/ML (ref 0.5–2)
DIRECT EXAM: NORMAL
ESTIMATED AVERAGE GLUCOSE: 140 MG/DL
GLUCOSE BLD-MCNC: 145 MG/DL (ref 65–105)
GLUCOSE BLD-MCNC: 161 MG/DL (ref 65–105)
GLUCOSE BLD-MCNC: 167 MG/DL (ref 65–105)
HBA1C MFR BLD: 6.5 % (ref 4–6)
INR BLD: 3
Lab: NORMAL
PROCALCITONIN: 1.44 NG/ML
PROTHROMBIN TIME: 32 SEC (ref 9.4–12.6)
SEDIMENTATION RATE, ERYTHROCYTE: 46 MM (ref 0–20)
SPECIMEN DESCRIPTION: NORMAL
STATUS: NORMAL
TROPONIN INTERP: NORMAL
TROPONIN INTERP: NORMAL
TROPONIN T: <0.03 NG/ML
TROPONIN T: <0.03 NG/ML

## 2017-06-04 PROCEDURE — 87899 AGENT NOS ASSAY W/OPTIC: CPT

## 2017-06-04 PROCEDURE — 6360000002 HC RX W HCPCS: Performed by: INTERNAL MEDICINE

## 2017-06-04 PROCEDURE — 1200000000 HC SEMI PRIVATE

## 2017-06-04 PROCEDURE — 6370000000 HC RX 637 (ALT 250 FOR IP): Performed by: INTERNAL MEDICINE

## 2017-06-04 PROCEDURE — 87449 NOS EACH ORGANISM AG IA: CPT

## 2017-06-04 PROCEDURE — 6370000000 HC RX 637 (ALT 250 FOR IP): Performed by: HOSPITALIST

## 2017-06-04 PROCEDURE — 84145 PROCALCITONIN (PCT): CPT

## 2017-06-04 PROCEDURE — 84484 ASSAY OF TROPONIN QUANT: CPT

## 2017-06-04 PROCEDURE — 86738 MYCOPLASMA ANTIBODY: CPT

## 2017-06-04 PROCEDURE — 99233 SBSQ HOSP IP/OBS HIGH 50: CPT | Performed by: INTERNAL MEDICINE

## 2017-06-04 PROCEDURE — 6360000002 HC RX W HCPCS: Performed by: HOSPITALIST

## 2017-06-04 PROCEDURE — 36415 COLL VENOUS BLD VENIPUNCTURE: CPT

## 2017-06-04 PROCEDURE — 2580000003 HC RX 258: Performed by: INTERNAL MEDICINE

## 2017-06-04 PROCEDURE — 2580000003 HC RX 258: Performed by: HOSPITALIST

## 2017-06-04 PROCEDURE — 82947 ASSAY GLUCOSE BLOOD QUANT: CPT

## 2017-06-04 PROCEDURE — 94640 AIRWAY INHALATION TREATMENT: CPT

## 2017-06-04 PROCEDURE — 85610 PROTHROMBIN TIME: CPT

## 2017-06-04 PROCEDURE — 71020 XR CHEST STANDARD TWO VW: CPT

## 2017-06-04 PROCEDURE — 80162 ASSAY OF DIGOXIN TOTAL: CPT

## 2017-06-04 PROCEDURE — 86140 C-REACTIVE PROTEIN: CPT

## 2017-06-04 PROCEDURE — 85651 RBC SED RATE NONAUTOMATED: CPT

## 2017-06-04 RX ORDER — ALBUTEROL SULFATE 2.5 MG/3ML
2.5 SOLUTION RESPIRATORY (INHALATION) EVERY 6 HOURS PRN
Status: DISCONTINUED | OUTPATIENT
Start: 2017-06-04 | End: 2017-06-14

## 2017-06-04 RX ORDER — ALBUTEROL SULFATE 2.5 MG/3ML
2.5 SOLUTION RESPIRATORY (INHALATION) 3 TIMES DAILY
Status: DISCONTINUED | OUTPATIENT
Start: 2017-06-04 | End: 2017-06-08

## 2017-06-04 RX ORDER — ALBUTEROL SULFATE 2.5 MG/3ML
2.5 SOLUTION RESPIRATORY (INHALATION)
Status: DISCONTINUED | OUTPATIENT
Start: 2017-06-04 | End: 2017-06-04

## 2017-06-04 RX ORDER — DEXTROMETHORPHAN POLISTIREX 30 MG/5ML
30 SUSPENSION ORAL EVERY 12 HOURS SCHEDULED
Status: DISCONTINUED | OUTPATIENT
Start: 2017-06-04 | End: 2017-06-09

## 2017-06-04 RX ORDER — VANCOMYCIN HYDROCHLORIDE 1 G/200ML
1000 INJECTION, SOLUTION INTRAVENOUS ONCE
Status: DISCONTINUED | OUTPATIENT
Start: 2017-06-04 | End: 2017-06-04 | Stop reason: ALTCHOICE

## 2017-06-04 RX ORDER — SODIUM CHLORIDE 9 MG/ML
INJECTION, SOLUTION INTRAVENOUS CONTINUOUS
Status: DISCONTINUED | OUTPATIENT
Start: 2017-06-04 | End: 2017-06-04

## 2017-06-04 RX ORDER — WARFARIN SODIUM 1 MG/1
4 TABLET ORAL ONCE
Status: COMPLETED | OUTPATIENT
Start: 2017-06-04 | End: 2017-06-04

## 2017-06-04 RX ORDER — BENZONATATE 100 MG/1
100 CAPSULE ORAL 3 TIMES DAILY PRN
Status: DISCONTINUED | OUTPATIENT
Start: 2017-06-04 | End: 2017-06-04 | Stop reason: SDUPTHER

## 2017-06-04 RX ADMIN — CEFEPIME 1 G: 1 INJECTION, POWDER, FOR SOLUTION INTRAMUSCULAR; INTRAVENOUS at 00:03

## 2017-06-04 RX ADMIN — ASPIRIN 81 MG: 81 TABLET, CHEWABLE ORAL at 09:29

## 2017-06-04 RX ADMIN — DIGOXIN 250 MCG: 0.25 TABLET ORAL at 12:50

## 2017-06-04 RX ADMIN — PIPERACILLIN SODIUM,TAZOBACTAM SODIUM 3.38 G: 3; .375 INJECTION, POWDER, FOR SOLUTION INTRAVENOUS at 01:12

## 2017-06-04 RX ADMIN — Medication 30 MG: at 23:05

## 2017-06-04 RX ADMIN — RANOLAZINE 500 MG: 500 TABLET, FILM COATED, EXTENDED RELEASE ORAL at 20:12

## 2017-06-04 RX ADMIN — VANCOMYCIN HYDROCHLORIDE 1250 MG: 10 INJECTION, POWDER, LYOPHILIZED, FOR SOLUTION INTRAVENOUS at 20:12

## 2017-06-04 RX ADMIN — PREDNISONE 20 MG: 20 TABLET ORAL at 09:29

## 2017-06-04 RX ADMIN — RANOLAZINE 500 MG: 500 TABLET, FILM COATED, EXTENDED RELEASE ORAL at 09:29

## 2017-06-04 RX ADMIN — CITALOPRAM HYDROBROMIDE 20 MG: 20 TABLET ORAL at 09:29

## 2017-06-04 RX ADMIN — MOMETASONE FUROATE AND FORMOTEROL FUMARATE DIHYDRATE 2 PUFF: 200; 5 AEROSOL RESPIRATORY (INHALATION) at 20:52

## 2017-06-04 RX ADMIN — ALBUTEROL SULFATE 2.5 MG: 2.5 SOLUTION RESPIRATORY (INHALATION) at 20:51

## 2017-06-04 RX ADMIN — BENZONATATE 100 MG: 100 CAPSULE ORAL at 01:35

## 2017-06-04 RX ADMIN — METOPROLOL TARTRATE 50 MG: 50 TABLET, FILM COATED ORAL at 09:28

## 2017-06-04 RX ADMIN — METOPROLOL TARTRATE 50 MG: 50 TABLET, FILM COATED ORAL at 20:12

## 2017-06-04 RX ADMIN — Medication 30 MG: at 09:27

## 2017-06-04 RX ADMIN — DILTIAZEM HYDROCHLORIDE 180 MG: 180 CAPSULE, COATED, EXTENDED RELEASE ORAL at 09:27

## 2017-06-04 RX ADMIN — PIPERACILLIN SODIUM,TAZOBACTAM SODIUM 3.38 G: 3; .375 INJECTION, POWDER, FOR SOLUTION INTRAVENOUS at 23:06

## 2017-06-04 RX ADMIN — ATORVASTATIN CALCIUM 40 MG: 40 TABLET, FILM COATED ORAL at 20:12

## 2017-06-04 RX ADMIN — CEFEPIME 2 G: 2 INJECTION, POWDER, FOR SOLUTION INTRAVENOUS at 14:30

## 2017-06-04 RX ADMIN — INSULIN LISPRO 1 UNITS: 100 INJECTION, SOLUTION INTRAVENOUS; SUBCUTANEOUS at 21:15

## 2017-06-04 RX ADMIN — ENOXAPARIN SODIUM 40 MG: 40 INJECTION SUBCUTANEOUS at 09:27

## 2017-06-04 RX ADMIN — WARFARIN SODIUM 4 MG: 1 TABLET ORAL at 18:21

## 2017-06-04 RX ADMIN — Medication 10 ML: at 12:53

## 2017-06-04 RX ADMIN — PIPERACILLIN SODIUM,TAZOBACTAM SODIUM 3.38 G: 3; .375 INJECTION, POWDER, FOR SOLUTION INTRAVENOUS at 15:51

## 2017-06-04 RX ADMIN — SODIUM CHLORIDE: 9 INJECTION, SOLUTION INTRAVENOUS at 14:13

## 2017-06-04 RX ADMIN — ISOSORBIDE MONONITRATE 30 MG: 30 TABLET ORAL at 12:51

## 2017-06-04 NOTE — H&P
tablet, Take 2 tabs po bid X 5 days then 1 tab daily X 5 days  benzonatate (TESSALON) 100 MG capsule, Take 1 capsule by mouth 3 times daily as needed for Cough  albuterol sulfate HFA (PROAIR HFA) 108 (90 BASE) MCG/ACT inhaler, Inhale 2 puffs into the lungs every 6 hours as needed for Wheezing  citalopram (CELEXA) 20 MG tablet, TAKE ONE TABLET BY MOUTH ONCE DAILY  atorvastatin (LIPITOR) 80 MG tablet, Take 0.5 tablets by mouth daily  warfarin (COUMADIN) 7.5 MG tablet, TAKE ONE TABLET BY MOUTH ONCE DAILY AS DIRECTED PER PHYSICIAN  ranolazine (RANEXA) 500 MG extended release tablet, Take 1 tablet by mouth 2 times daily  isosorbide mononitrate (IMDUR) 30 MG CR tablet, TAKE ONE TABLET BY MOUTH ONCE DAILY  allopurinol (ZYLOPRIM) 300 MG tablet, TAKE ONE TABLET BY MOUTH ONCE DAILY  clotrimazole-betamethasone (LOTRISONE) 1-0.05 % cream, Apply topically 2 times daily. (Patient taking differently: Apply topically prn)  betamethasone dipropionate (DIPROLENE) 0.05 % cream, Apply  topically 2 times daily. Apply topically 2 times daily prn    Allergies:  Adhesive tape; Aspercreme [trolamine salicylate]; Augmentin [amoxicillin-pot clavulanate]; Carrot [daucus carota]; Erythromycin; Guaifenesin & derivatives; Milk-related compounds; and Niacin and related    SOCIAL HISTORY:   TOBACCO:   reports that she has never smoked. She has never used smokeless tobacco.  ETOH:   reports that she does not drink alcohol. DRUGS:   reports that she does not use illicit drugs.     FAMILY HISTORY:       Problem Relation Age of Onset    Heart Disease Mother     Stroke Mother     High Blood Pressure Mother     Cancer Father      lung    Stroke Brother     Heart Disease Maternal Grandmother        REVIEW OF SYSTEMS:  Shortness of breath and fatigue    PHYSICAL EXAM:  BP 99/61  Pulse 95  Temp 101 °F (38.3 °C) (Axillary)   Resp 20  Ht 5' 5\" (1.651 m)  Wt 199 lb 1.6 oz (90.3 kg)  BMI 33.13 kg/m2    Physical Exam   Constitutional: She is oriented to person, place, and time. She appears well-developed and well-nourished. HENT:   Head: Normocephalic and atraumatic. Eyes: EOM are normal. Pupils are equal, round, and reactive to light. Neck: Normal range of motion. Neck supple. Cardiovascular: Exam reveals no gallop and no friction rub. No murmur heard. Pulse and heartbeat irregularly irregular   Pulmonary/Chest: She has wheezes. She has rales. She exhibits no tenderness. Abdominal: Soft. Bowel sounds are normal. She exhibits no distension. There is no tenderness. Musculoskeletal: Normal range of motion. Neurological: She is alert and oriented to person, place, and time. Skin: Skin is warm and dry. No rash noted. No erythema. No pallor. Psychiatric: She has a normal mood and affect. Her behavior is normal. Judgment and thought content normal.   Nursing note and vitals reviewed. DATA:  Xr Chest Standard Two Vw    Result Date: 6/2/2017  REPORT: Chest PA and lateral INDICATION: Shortness of breath FINDINGS: Compared to 5/30/2017. Stable chronic changes in both lungs. No focal consolidation, pleural effusion or pneumothorax seen. Stable marked cardiomegaly. No free intraperitoneal air. Hypertrophic degenerative changes thoracic spine. Final report electronically signed by Radha Boothe on 6/2/2017 10:18 AM    Stable chest    Xr Chest Standard Two Vw    Result Date: 5/30/2017  FINAL REPORT EXAM:  XR CHEST STANDARD TWO VW HISTORY:  cough  near syncope TECHNIQUE:  Frontal and lateral views of the chest PRIORS:  05/18/2010. FINDINGS:  Moderate cardiomegaly. Pulmonary vasculature within normal limits. Atheromatous calcifications of the aorta. Lung parenchyma is grossly clear. No visible pneumothorax or pleural effusion. Degenerate change of the spine. Impression:  Moderate cardiomegaly without radiographic evidence of pulmonary edema. No focal consolidation, pneumothorax or pleural effusion.  Electronically Signed By: Rashaun Gonzalez   on troponins, resume home 80 mg Lipitor, start 81 mg ASA  9. PT/OT  10. DVT prophylaxis - Lovenox 40 mg  11. Discharge planning    Jim Martinez MD   PGY-3, Internal Medicine Resident  [facility]  6/4/2017, 1:30 AM  Attending Physician Statement  I have discussed the care of [place] , including pertinent history and exam findings,  with the resident. I have reviewed the key elements of all parts of the encounter with the resident. I agree with the assessment, plan and orders as documented by the resident.       Electronically signed by Kathy López MD

## 2017-06-04 NOTE — CARE COORDINATION
Case Management Initial Discharge Plan  Yina Kirk,         Readmission Risk              Readmission Risk:        9.5       Age 71 or Greater:  1    Admitted from SNF or Requires Paid or Family Care:  0    Currently has CHF,COPD,ARF,CRI,or is on dialysis:  0    Takes more than 5 Prescription Medications:  4    Takes Digoxin,Insulin,Anticoagulants,Narcotics or ASA/Plavix:  [address] in Past 12 Months:  0    On Disability:  0    Patient Considers own Health:  2.5            Met with:patient to discuss discharge plans.    Information verified: address, contacts, phone number, , insurance Yes  PCP: Juana Martinez MD  Date of last visit: May 2017    Insurance Provider: [employer]    Discharge Planning  Current Residence:  Private Residence  Living Arrangements:  Spouse/Significant Other, Family Members, Children   Home has 3 stories  Support Systems:  Spouse/Significant Other, Children, Family Members  Current Services PTA:  Durable Medical Equipment, C-pap   Patient able to perform ADL's:Independent  DME used to aid ambulation prior to admission: walker    Potential Assistance Needed:  N/A    Pharmacy: [facility] in [address] Medications:  No  Does patient want to participate in local refill/ meds to beds program?  No    Patient agreeable to home care: No  [place] of choice provided:  n/a      Type of Home Care Services:  None  Patient expects to be discharged to:  home    Prior SNF/Rehab Placement and Facility: N/A  Agreeable to SNF/Rehab: No  [place] of choice provided: n/a   Evaluation: n/a    Expected Discharge date:  17  Follow Up Appointment: Best Day/ Time:      Transportation provider: daughter  Transportation arrangements needed for discharge: No    Discharge Plan: home        Electronically signed by Kenneth Kc RN on 17 at 10:01 AM

## 2017-06-04 NOTE — PROGRESS NOTES
Pharmacy Note  Warfarin Consult    Emperatriz Arambula is a 67 y.o. female for whom pharmacy has been consulted to manage warfarin therapy. Consulting Physician: Dr. Maeve Burden  Reason for Admission: Shortness of Breath    Warfarin dose prior to admission: 7.5 mg daily  Warfarin indication: Afib  Target INR range: 2-3     Past Medical History:   Diagnosis Date    Allergic rhinitis 10/1994    Asthma     Atrial fibrillation ([address] 12/2008    CAD (coronary artery disease)     Clotting disorder (HCC)     plebitis in L leg    DDD (degenerative disc disease), cervical     Degeneration of cervical intervertebral disc     Diverticulosis 2005    Gout     Gout, unspecified     H/O cardiac catheterization 6/17/15    LMCA: Normal 0% stenosis. LAD: Lesion on 1st diag: Proximal subsection. 80% stenosis. Lesion plaque is ruptured. Monae German LCx: Lesion on 1st Ob Leslie: Mid subsection. 85% stenosis. RCA:Lesion on R PDA: Mid subsection. 85% stenosis. Lesion on R PDA: Distal subsection. 70% stenosis. Lesion on Prox RCA: Mid subsection. 50% stenosis. EF 50%.  H/O echocardiogram 11/09/2016    EF 40-45%. Mild LV hypertrophy normal LV cavity size. Sigmoid interventricular septum without evidence of outflow tract obstruction. Left atrium is moderately dilated (34-39) left atrial volume index of 36 ml/m2. Mild mitral regurg. Diastology cannot be assessed due to A-fib.  History of Holter monitoring 3/24/16    Atrial Fibrillation throughout,fairly controlled, HR  bpm 79% of the time. Occasional high ventricular rate episodes and multiple pauses suggestive of tachycardia/bradycardia syndrome  Msximum R-R interval 2.68 seconds.    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

## 2017-06-04 NOTE — PROGRESS NOTES
Apply topically prn 6/8/15   Nohemi Cabrera MD   betamethasone dipropionate (DIPROLENE) 0.05 % cream Apply  topically 2 times daily.  Apply topically 2 times daily prn    Donald Uriostegui MD   .  Recent Surgical History: None = 0     Assessment     Peak Flow (asthma only)    Predicted: 0  Personal Best: 0  PEF 0  % Predicted 0  Peak Flow : not applicable = 0    OPY0/OFH    FEV1 Predicted 0      FEV1 0    FEV1 % Predicted 0  FVC 0  IS volume 0  IBW 0    RR 18  Breath Sounds: rhonchi, scatt whzs      · Bronchodilator assessment at level  2  · Hyperinflation assessment at level   · Secretion Management assessment at level    ·   · [x]    Bronchodilator Assessment  BRONCHODILATOR ASSESSMENT SCORE  Score 0 1 2 3 4 5   Breath Sounds   []  Patient Baseline []  No Wheeze good aeration [x]  Faint, scattered wheezing, good aeration []  Expiratory Wheezing and or moderately diminished []  Insp/Exp wheeze and/or very diminished []  Insp/Exp and/ or marked distress   Respiratory Rate   []  Patient Baseline [x]  Less than 20 [x]  Less than 20 []  20-25 []  Greater than 25 []  Greater than 25   Peak flow % of Pred or PB [x]  NA   []  Greater than 90%  []  81-90% []  71-80% []  Less than or equal to 70%  or unable to perform []  Unable due to Respiratory Distress   Dyspnea re []  Patient Baseline [x]  No SOB [x]  No SOB []  SOB on exertion []  SOB min activity []  At rest/acute   e FEV% Predicted       [x]  NA []  Above 69%  []  Unable []  Above 60-69%  []  Unable []  Above 50-59%  []  Unable []  Above 35-49%  []  Unable []  Less than 35%  []  Unable                 []  Hyperinflation Assessment  Score 1 2 3   CXR and Breath Sounds   []  Clear []  No atelectasis  Basilar aeration []  Atelectasis or absent basilar breath sounds   Incentive Spirometry Volume  (Per IBW)   []  Greater than or equal to 15ml/Kg []  less than 15ml/Kg []  less than 15ml/Kg   Surgery within last 2 weeks []  None or general   []  Abdominal or thoracic

## 2017-06-04 NOTE — PROGRESS NOTES
Physical Therapy  DATE: 2017    NAME: Eric Joe  MRN: [id number]   : 1940    Patient not seen this date for Physical Therapy due to:  [] Blood transfusion in progress  [] Hemodialysis  []  Patient Declined  [] Spine Precautions   [] Strict Bedrest  [] Surgery/ Procedure  [x] Testing- pt preparing to leave with transport for chest x-ray. PT will check back as time allows. [] Other        [] PT being discontinued at this time. Patient independent. No further needs. [] PT being discontinued at this time as the patient has been transferred to palliative care. No further needs.     Alex Ramos, PT

## 2017-06-04 NOTE — PROGRESS NOTES
BRONCHOSPASM/BRONCHOCONSTRICTION   [x]  IMPROVE AERATION/BREATH SOUNDS  [x]   ADMINISTER BRONCHODILATOR THERAPY AS APPROPRIATE  [x]   ASSESS BREATH SOUNDS  []   IMPLEMENT AEROSOL/MDI PROTOCOL  [x]   PATIENT EDUCATION AS NEEDED    NON INVASIVE VENTILATION  PROVIDE OPTIMAL VENTILATION/ACCEPTABLE SP02  IMPLEMENT NON INVASIVE VENTILATION PROTOCOL  ASSESSMENT SKIN INTEGRITY  PATIENT EDUCATION AS NEEDED  BIPAP AS NEEDED

## 2017-06-04 NOTE — PROGRESS NOTES
Cardiac Testing     ECHO 11/9/16: EF 40-45%, mild LVH/MR. CATH 6/17/15: Moderate to severe three vessel disease involving fairly small branches of the LCx, LCx and RCA. Normal LVEDP. EF 50%. STRESS 2/16/15: Small-moderate perfusion defect of moderate intensity in the anteroseptal region during stress imaging, which is most consistent with ischemia, but may be artifact. EF 56%. TILT 7/9/10: Tachycardia response with underlying AFib. No change in BP. Carotid massage failed to elicit any significant pause. 12/16/2014 - [facility]   1. Syncope, suspected vasovagal mechanism. Tilt table testing showed no vasodepressor responses with 30 minutes of upright tilt, however gradual rise in heart rate during atrial fibrillation to 115 bpm. Negative response to carotid-sinus massage. Nitroglycerin was not used. 2. Chronic atrial fibrillation with history of paroxysmal atrial fibrillation, brief hospitalization at [facility] in May 2010 with metoprolol added to digoxin and Cardizem. Coumadin started in May 2010. 3. Preserved ventricular function by 2D echo at [facility]. 4. History of hypertension. 5. History of hyperlipidemia. 6. History of gestational diabetes. 7. History of osteoarthritis. 8. Multiple allergies to erythromycin, azithromycin and Biaxin. 9. 24-hour Holter monitor from [facility] on 5/24/10 showed average heart rate during atrial fibrillation of 74 bpm, minimum rate of 55 and maximum heart rate 119 bpm. All pauses were noted in the overnight hours with the longest pause of 2.3 seconds. No significant ventricular ectopy was noted.

## 2017-06-04 NOTE — PROGRESS NOTES
Pharmacy Note  Vancomycin Consult    Rukhsana Sanderson is a 67 y.o. female started on Vancomycin for pneumonia; consult received from Dr. Aren Hodgson to manage therapy. Also receiving the following antibiotics: Zosyn. Patient Active Problem List   Diagnosis    Pulmonary HTN ([facility] [address] [facility]    Mitral regurgitation    Atrial fibrillation ([facility]    Long term current use of anticoagulant therapy    Essential hypertension    Obstructive sleep apnea    Gout    Upper respiratory tract infection    Type 2 diabetes mellitus without complication ([facility]    Coronary artery disease involving native coronary artery    Ischemic cardiomyopathy    HCAP (healthcare-associated pneumonia)    Hypokalemia       Allergies:  Adhesive tape; Aspercreme [trolamine salicylate]; Augmentin [amoxicillin-pot clavulanate]; Carrot [daucus carota]; Erythromycin; Guaifenesin & derivatives; Milk-related compounds; and Niacin and related     Temp max: 101    Recent Labs      06/03/17   1400  06/03/17   2120   BUN  14  15       Recent Labs      06/03/17   1400  06/03/17   2120   CREATININE  0.85  0.86       Recent Labs      06/03/17   1400  06/03/17   2120   WBC  8.8  8.4         Intake/Output Summary (Last 24 hours) at 06/04/17 1317  Last data filed at 06/04/17 0616   Gross per 24 hour   Intake 400 ml   Output 700 ml   Net -300 ml       Culture Date      Source                       Results  6/3                       Urine                          NGTD  6/3                       Sputum                      NGTD  6/3                       Blood                         NGTD    Ht Readings from Last 1 Encounters:   06/03/17 5' 5\" (1.651 m)        Wt Readings from Last 1 Encounters:   06/03/17 199 lb 1.6 oz (90.3 kg)         Body mass index is 33.13 kg/(m^2). Estimated Creatinine Clearance: 52 mL/min (based on Cr of 0.86). Assessment/Plan:  Will initiate vancomycin 1250 mg IV every 24 hours.  Patient received a dose of 1000mg last night at 2300 so will

## 2017-06-05 ENCOUNTER — APPOINTMENT (OUTPATIENT)
Dept: CT IMAGING | Age: 77
DRG: 163 | End: 2017-06-05
Attending: INTERNAL MEDICINE
Payer: MEDICARE

## 2017-06-05 LAB
ABSOLUTE EOS #: 0 K/UL (ref 0–0.4)
ABSOLUTE LYMPH #: 1 K/UL (ref 1–4.8)
ABSOLUTE MONO #: 0 K/UL (ref 0.1–1.2)
ANION GAP SERPL CALCULATED.3IONS-SCNC: 13 MMOL/L (ref 9–17)
BASOPHILS # BLD: 0 %
BASOPHILS ABSOLUTE: 0 K/UL (ref 0–0.2)
BUN BLDV-MCNC: 15 MG/DL (ref 8–23)
BUN/CREAT BLD: ABNORMAL (ref 9–20)
CALCIUM SERPL-MCNC: 8.3 MG/DL (ref 8.6–10.4)
CHLORIDE BLD-SCNC: 100 MMOL/L (ref 98–107)
CO2: 26 MMOL/L (ref 20–31)
CREAT SERPL-MCNC: 0.73 MG/DL (ref 0.5–0.9)
DIFFERENTIAL TYPE: ABNORMAL
EOSINOPHILS RELATIVE PERCENT: 0 %
GFR AFRICAN AMERICAN: >60 ML/MIN
GFR NON-AFRICAN AMERICAN: >60 ML/MIN
GFR SERPL CREATININE-BSD FRML MDRD: ABNORMAL ML/MIN/{1.73_M2}
GFR SERPL CREATININE-BSD FRML MDRD: ABNORMAL ML/MIN/{1.73_M2}
GLUCOSE BLD-MCNC: 107 MG/DL (ref 65–105)
GLUCOSE BLD-MCNC: 107 MG/DL (ref 70–99)
GLUCOSE BLD-MCNC: 108 MG/DL (ref 65–105)
GLUCOSE BLD-MCNC: 139 MG/DL (ref 65–105)
GLUCOSE BLD-MCNC: 166 MG/DL (ref 65–105)
GLUCOSE BLD-MCNC: 170 MG/DL (ref 65–105)
HCT VFR BLD CALC: 36.6 % (ref 36–46)
HEMOGLOBIN: 12.2 G/DL (ref 12–16)
INR BLD: 3.7
LV EF: 45 %
LVEF MODALITY: NORMAL
LYMPHOCYTES # BLD: 9 %
MCH RBC QN AUTO: 29.8 PG (ref 26–34)
MCHC RBC AUTO-ENTMCNC: 33.3 G/DL (ref 31–37)
MCV RBC AUTO: 89.7 FL (ref 80–100)
MONOCYTES # BLD: 0 %
MYCOPLASMA PNEUMONIAE IGG: 0.9
MYCOPLASMA PNEUMONIAE IGM: 0.1
PDW BLD-RTO: 15.8 % (ref 12.5–15.4)
PLATELET # BLD: 244 K/UL (ref 140–450)
PLATELET ESTIMATE: ABNORMAL
PMV BLD AUTO: 8 FL (ref 6–12)
POTASSIUM SERPL-SCNC: 3.8 MMOL/L (ref 3.7–5.3)
PROTHROMBIN TIME: 40 SEC (ref 9.4–12.6)
RBC # BLD: 4.08 M/UL (ref 4–5.2)
RBC # BLD: ABNORMAL 10*6/UL
SEG NEUTROPHILS: 91 %
SEGMENTED NEUTROPHILS ABSOLUTE COUNT: 9.8 K/UL (ref 1.8–7.7)
SODIUM BLD-SCNC: 139 MMOL/L (ref 135–144)
WBC # BLD: 10.9 K/UL (ref 3.5–11)
WBC # BLD: ABNORMAL 10*3/UL

## 2017-06-05 PROCEDURE — 97110 THERAPEUTIC EXERCISES: CPT

## 2017-06-05 PROCEDURE — 99233 SBSQ HOSP IP/OBS HIGH 50: CPT | Performed by: INTERNAL MEDICINE

## 2017-06-05 PROCEDURE — 6370000000 HC RX 637 (ALT 250 FOR IP): Performed by: INTERNAL MEDICINE

## 2017-06-05 PROCEDURE — 2580000003 HC RX 258: Performed by: HOSPITALIST

## 2017-06-05 PROCEDURE — 85610 PROTHROMBIN TIME: CPT

## 2017-06-05 PROCEDURE — 6360000002 HC RX W HCPCS: Performed by: HOSPITALIST

## 2017-06-05 PROCEDURE — 6360000004 HC RX CONTRAST MEDICATION: Performed by: INTERNAL MEDICINE

## 2017-06-05 PROCEDURE — 2580000003 HC RX 258: Performed by: INTERNAL MEDICINE

## 2017-06-05 PROCEDURE — 36415 COLL VENOUS BLD VENIPUNCTURE: CPT

## 2017-06-05 PROCEDURE — 85025 COMPLETE CBC W/AUTO DIFF WBC: CPT

## 2017-06-05 PROCEDURE — 71260 CT THORAX DX C+: CPT

## 2017-06-05 PROCEDURE — 97116 GAIT TRAINING THERAPY: CPT

## 2017-06-05 PROCEDURE — 94762 N-INVAS EAR/PLS OXIMTRY CONT: CPT

## 2017-06-05 PROCEDURE — 82947 ASSAY GLUCOSE BLOOD QUANT: CPT

## 2017-06-05 PROCEDURE — 94640 AIRWAY INHALATION TREATMENT: CPT

## 2017-06-05 PROCEDURE — 93306 TTE W/DOPPLER COMPLETE: CPT

## 2017-06-05 PROCEDURE — 94660 CPAP INITIATION&MGMT: CPT

## 2017-06-05 PROCEDURE — 97162 PT EVAL MOD COMPLEX 30 MIN: CPT

## 2017-06-05 PROCEDURE — 99223 1ST HOSP IP/OBS HIGH 75: CPT | Performed by: INTERNAL MEDICINE

## 2017-06-05 PROCEDURE — 80048 BASIC METABOLIC PNL TOTAL CA: CPT

## 2017-06-05 PROCEDURE — 1200000000 HC SEMI PRIVATE

## 2017-06-05 RX ADMIN — ISOSORBIDE MONONITRATE 30 MG: 30 TABLET ORAL at 08:20

## 2017-06-05 RX ADMIN — IOVERSOL 75 ML: 741 INJECTION INTRA-ARTERIAL; INTRAVENOUS at 10:08

## 2017-06-05 RX ADMIN — Medication 10 ML: at 08:19

## 2017-06-05 RX ADMIN — BENZONATATE 100 MG: 100 CAPSULE ORAL at 01:53

## 2017-06-05 RX ADMIN — Medication 30 MG: at 19:46

## 2017-06-05 RX ADMIN — DILTIAZEM HYDROCHLORIDE 180 MG: 180 CAPSULE, COATED, EXTENDED RELEASE ORAL at 08:19

## 2017-06-05 RX ADMIN — CEFEPIME 2 G: 2 INJECTION, POWDER, FOR SOLUTION INTRAVENOUS at 14:39

## 2017-06-05 RX ADMIN — ALBUTEROL SULFATE 2.5 MG: 2.5 SOLUTION RESPIRATORY (INHALATION) at 08:27

## 2017-06-05 RX ADMIN — CEFEPIME 2 G: 2 INJECTION, POWDER, FOR SOLUTION INTRAVENOUS at 01:53

## 2017-06-05 RX ADMIN — MOMETASONE FUROATE AND FORMOTEROL FUMARATE DIHYDRATE 2 PUFF: 200; 5 AEROSOL RESPIRATORY (INHALATION) at 08:29

## 2017-06-05 RX ADMIN — INSULIN LISPRO 1 UNITS: 100 INJECTION, SOLUTION INTRAVENOUS; SUBCUTANEOUS at 13:07

## 2017-06-05 RX ADMIN — Medication 30 MG: at 08:19

## 2017-06-05 RX ADMIN — METOPROLOL TARTRATE 50 MG: 50 TABLET, FILM COATED ORAL at 19:46

## 2017-06-05 RX ADMIN — PIPERACILLIN SODIUM,TAZOBACTAM SODIUM 3.38 G: 3; .375 INJECTION, POWDER, FOR SOLUTION INTRAVENOUS at 04:43

## 2017-06-05 RX ADMIN — RANOLAZINE 500 MG: 500 TABLET, FILM COATED, EXTENDED RELEASE ORAL at 19:46

## 2017-06-05 RX ADMIN — METOPROLOL TARTRATE 50 MG: 50 TABLET, FILM COATED ORAL at 08:18

## 2017-06-05 RX ADMIN — RANOLAZINE 500 MG: 500 TABLET, FILM COATED, EXTENDED RELEASE ORAL at 08:19

## 2017-06-05 RX ADMIN — ALBUTEROL SULFATE 2.5 MG: 2.5 SOLUTION RESPIRATORY (INHALATION) at 22:45

## 2017-06-05 RX ADMIN — CITALOPRAM HYDROBROMIDE 20 MG: 20 TABLET ORAL at 08:19

## 2017-06-05 RX ADMIN — MOMETASONE FUROATE AND FORMOTEROL FUMARATE DIHYDRATE 2 PUFF: 200; 5 AEROSOL RESPIRATORY (INHALATION) at 22:45

## 2017-06-05 RX ADMIN — ASPIRIN 81 MG: 81 TABLET, CHEWABLE ORAL at 08:18

## 2017-06-05 RX ADMIN — BENZONATATE 100 MG: 100 CAPSULE ORAL at 18:07

## 2017-06-05 RX ADMIN — DIGOXIN 250 MCG: 0.25 TABLET ORAL at 08:19

## 2017-06-05 RX ADMIN — ATORVASTATIN CALCIUM 40 MG: 40 TABLET, FILM COATED ORAL at 19:46

## 2017-06-05 RX ADMIN — VANCOMYCIN HYDROCHLORIDE 1250 MG: 10 INJECTION, POWDER, LYOPHILIZED, FOR SOLUTION INTRAVENOUS at 19:50

## 2017-06-05 NOTE — PROGRESS NOTES
Pharmacy Note  Warfarin Consult follow-up      Recent Labs      06/05/17   0700   INR  3.7     Recent Labs      06/03/17   1400  06/03/17   2120  06/05/17   0700   HGB  14.6  13.1  12.2   HCT  44.0  39.3  36.6   PLT  288  203  244       Significant Drug-Drug Interactions:  New warfarin drug-drug interactions: cefepime, Celexa, Zosyn  Discontinued drug-drug interactions: Lovenox      Notes:         Plan to hold warfarin for supra-therapeutic inr today. Daily PT/INR while inpatient. [address] [place] [address]  Ph., Clinical Pharmacist  Anticoagulation [facility], [facility]  6/5/2017  8:31 AM

## 2017-06-05 NOTE — PROGRESS NOTES
[address]    Date:   6/5/2017  Patient name:  Samantha Ramirez  Date of admission:  6/3/2017  8:05 PM  MRN:   [id number]  YOB: 1940    CC- SOB     SUBJECTIVE  Afebrile  No hypotension. She didn't wear bipap overnight. Reports improved breathing. No increased WOB on 4L,   She has mostly dry cough and ocassionally brownish productive phlegm. I/O is inacurate. She got the ECHO and CT chest done his am    REVIEW OF SYSTEMS:    · General----negative for fatigue, weight loss  · Cardio---negative for chest pain, orthopnea and PND  · Resp  Cough and wheeze   · GI negative for nausea and vomiting, no dysphagia         Past Medical History:   Diagnosis Date    Allergic rhinitis 10/1994    Asthma     Atrial fibrillation ([facility] 12/2008    CAD (coronary artery disease)     Clotting disorder (HCC)     plebitis in L leg    DDD (degenerative disc disease), cervical     Degeneration of cervical intervertebral disc     Diverticulosis 2005    Gout     Gout, unspecified     H/O cardiac catheterization 6/17/15    LMCA: Normal 0% stenosis. LAD: Lesion on 1st diag: Proximal subsection. 80% stenosis. Lesion plaque is ruptured. Mickeal Rink LCx: Lesion on 1st Ob Leslie: Mid subsection. 85% stenosis. RCA:Lesion on R PDA: Mid subsection. 85% stenosis. Lesion on R PDA: Distal subsection. 70% stenosis. Lesion on Prox RCA: Mid subsection. 50% stenosis. EF 50%.  H/O echocardiogram 11/09/2016    EF 40-45%. Mild LV hypertrophy normal LV cavity size. Sigmoid interventricular septum without evidence of outflow tract obstruction. Left atrium is moderately dilated (34-39) left atrial volume index of 36 ml/m2. Mild mitral regurg. Diastology cannot be assessed due to A-fib.  History of Holter monitoring 3/24/16    Atrial Fibrillation throughout,fairly controlled, HR  bpm 79% of the time.  Occasional high ventricular rate episodes and multiple 06/05/17   0700   NA  133*  134*  139   K  3.6*  4.2  3.8   CL  96*  96*  100   CO2  18*  22  26   BUN  14  15  15   CREATININE  0.85  0.86  0.73   GLUCOSE  191*  141*  107*         ASSESSMENT:    Patient Active Problem List   Diagnosis    Pulmonary HTN ([facility] [facility]    Mitral regurgitation    Atrial fibrillation (HCC)    Long term current use of anticoagulant therapy    Essential hypertension    Obstructive sleep apnea    Gout    Upper respiratory tract infection    Type 2 diabetes mellitus without complication (HCC)    Coronary artery disease involving native coronary artery    Ischemic cardiomyopathy    HCAP (healthcare-associated pneumonia)    Hypokalemia       PLAN:  1. Check procalcitonin - 1.4 ,   2. On vanc and cefipime   3. urine legionella, mycoplasma and strep - negative   4. CT with contrast done   5. Pulm consult  6. On dulera for asthma  7. DVT ppx on coumadin      MD SONYA Bowens[address], [address] [place] [address]. Phone [phone]   Fax: [phone]  Answering Service: [phone]    Attending Physician Statement  I have discussed the care of Jordan Ferguson , including pertinent history and exam findings,  with the resident. I have reviewed the key elements of all parts of the encounter with the resident. I agree with the assessment, plan and orders as documented by the resident.   ana cristina soft infilatrea  And basal hard vs masses  pulm conslt  May need bronch     Electronically signed by Bee Harris MD on 6/5/2017 at 1:23 PM

## 2017-06-05 NOTE — PROGRESS NOTES
Smoking Cessation - topics covered   []  Health Risks  []  Benefits of Quitting   []  Smoking Cessation  [x]  Patient has no history of tobacco use  []  Patient is former smoker. [x]  No need for tobacco cessation education. []  Booklet given  []  Patient verbalizes understanding. []  Patient denies need for tobacco cessation education.   Kt Willams [address]  10:00 AM

## 2017-06-05 NOTE — PROGRESS NOTES
BRONCHOSPASM/BRONCHOCONSTRICTION   [x]  IMPROVE AERATION/BREATH SOUNDS  [x]   ADMINISTER BRONCHODILATOR THERAPY AS APPROPRIATE  [x]   ASSESS BREATH SOUNDS  [x]   IMPLEMENT AEROSOL/MDI PROTOCOL  [x]   PATIENT EDUCATION AS NEEDED    PATIENT REFUSES TO WEAR BIPAP     [x] Risks and benefits explained to patient   [x] Patient refuses to wear Bipap stating I dont want that machine tonight  [x] Patient verbalizes understanding of information presented.

## 2017-06-05 NOTE — CONSULTS
of cervical intervertebral disc     Diverticulosis 2005    Gout     Gout, unspecified     H/O cardiac catheterization 6/17/15    LMCA: Normal 0% stenosis. LAD: Lesion on 1st diag: Proximal subsection. 80% stenosis. Lesion plaque is ruptured. Linda Yuen LCx: Lesion on 1st Ob Leslie: Mid subsection. 85% stenosis. RCA:Lesion on R PDA: Mid subsection. 85% stenosis. Lesion on R PDA: Distal subsection. 70% stenosis. Lesion on Prox RCA: Mid subsection. 50% stenosis. EF 50%.  H/O echocardiogram 11/09/2016    EF 40-45%. Mild LV hypertrophy normal LV cavity size. Sigmoid interventricular septum without evidence of outflow tract obstruction. Left atrium is moderately dilated (34-39) left atrial volume index of 36 ml/m2. Mild mitral regurg. Diastology cannot be assessed due to A-fib.  History of Holter monitoring 3/24/16    Atrial Fibrillation throughout,fairly controlled, HR  bpm 79% of the time. Occasional high ventricular rate episodes and multiple pauses suggestive of tachycardia/bradycardia syndrome  Msximum R-R interval 2.68 seconds.    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    Long term (current) use of anticoagulants 8/31/2015    Other abnormal glucose 2004    Phlebitis and thrombophlebitis of lower extremities, unspecified ([facility] [address] 1961    L leg    Senile osteoporosis 2006    L/S    Symptomatic menopausal or female climacteric states 06/1995    Syncope and collapse     Type II or unspecified type diabetes mellitus without mention of complication, not stated as uncontrolled 03/2009    Unspecified hereditary and idiopathic peripheral neuropathy 2012    Unspecified sleep apnea 11/2009         Family History:       Problem Relation Age of Onset    Heart Disease Mother     Stroke Mother     High Blood Pressure Mother     Cancer Father      lung    Stroke Brother     Heart Disease Maternal Grandmother        SURGICAL HISTORY:   Past Surgical History:   Procedure Laterality Date    CARDIAC CATHETERIZATION Right 06/17/2015    COLONOSCOPY  1/2005    DIAGNOSTIC CARDIAC CATH LAB PROCEDURE  06/17/15    EYE SURGERY      FRACTURE SURGERY  2004    Distal Radius Ulna    OTHER SURGICAL HISTORY  2years old    VOLVAR-ULCERATIVE LESION-CAUTERIZED    SKIN BIOPSY      TONSILLECTOMY AND ADENOIDECTOMY             SOCIAL AND OCCUPATIONAL HEALTH:  Never smoked. 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 She has never used smokeless tobacco.  ETOH:   reports that she does not drink alcohol. ALLERGIES:    Allergies   Allergen Reactions    Adhesive Tape     Aspercreme [Trolamine Salicylate]     Augmentin [Amoxicillin-Pot Clavulanate]     Carrot [Daucus Carota]     Erythromycin Other (See Comments)     \"whole in stomach\"    Guaifenesin & Derivatives     Milk-Related Compounds     Niacin And Related        Home Meds:   Prior to Admission medications    Medication Sig Start Date End Date Taking?  Authorizing Provider   metoprolol tartrate (LOPRESSOR) 25 MG tablet Take 2 tablets by mouth 2 times daily 6/2/17   Sharon Disla PA-C   diltiazem (CARDIZEM CD) 180 MG extended release capsule Take 1 capsule by mouth daily 6/2/17   Jose Disla PA-C   digoxin (LANOXIN) 250 MCG tablet Take 1 tablet by mouth daily 6/2/17   Sharon Disla PA-C   cephALEXin (KEFLEX) 250 MG capsule Take 1 capsule by mouth 4 times daily for 5 days 6/2/17 6/7/17  Sharon Disla PA-C   predniSONE (DELTASONE) 10 MG tablet Take 2 tabs po bid X 5 days then 1 tab daily X 5 days 6/2/17   Jose Disla PA-C   benzonatate (TESSALON) 100 MG capsule Take 1 capsule by mouth 3 times daily as needed for Cough 6/2/17 6/9/17  Sharon Disla PA-C   albuterol sulfate HFA (PROAIR HFA) 108 (90 BASE) MCG/ACT inhaler Inhale 2 puffs into the lungs every 6 hours as needed for Wheezing 6/2/17   Jose Disla PA-C   citalopram normal, no drainage        or sinus tenderness   Throat:   Lips, mucosa, and tongue normal; teeth and gums normal   Neck:   Supple, symmetrical, trachea midline, no adenopathy;     thyroid:  no enlargement/tenderness/nodules;    Back:     Symmetric, no curvature, ROM normal   Lungs:      Coarse breath sounds bilaterally, no wheezing   Chest Wall:    No tenderness or deformity      Heart:    Regular rate and rhythm, S1 and S2 normal, no murmur, rub        or gallop no rvh                           Abdomen:                                                 Pulses:                              Skin:                  Lymph nodes:                    Neurologic:                  Soft, non-tender, bowel sounds active all four quadrants,     no masses, no organomegaly         2+ and symmetric all extremities     Skin color, texture, turgor normal, no rashes or lesions       Cervical, supraclavicular not enlarged or matted or tender      No focal neurological deficits or movement disorder     Clubbing No  Lower ext edema No1+   [] , 2 +  [] , 3+   []  Upper ext edema No       Musculoskeletal - no joint swelling or tenderness or synovitis          CBC:   Recent Labs      06/03/17   1400  06/03/17 2120 06/05/17   0700   WBC  8.8  8.4  10.9   HGB  14.6  13.1  12.2   PLT  288  203  244     BMP:    Recent Labs      06/03/17   1400  06/03/17 2120 06/05/17   0700   NA  133*  134*  139   K  3.6*  4.2  3.8   CL  96*  96*  100   CO2  18*  22  26   BUN  14  15  15   CREATININE  0.85  0.86  0.73   GLUCOSE  191*  141*  107*     Hepatic:   Recent Labs      06/03/17   1400  06/03/17 2120   AST  34*  22   ALT  31  25   BILITOT  1.00  0.75   ALKPHOS  74  58     Amylase: No results found for: AMYLASE  Lipase: No results found for: LIPASE  Troponin: No results for input(s): TROPONINI in the last 72 hours. BNP: No results for input(s): BNP in the last 72 hours. Lipids: No results for input(s): CHOL, HDL in the last 72 hours.     Invalid

## 2017-06-06 PROBLEM — R91.8 GROUND GLASS OPACITY PRESENT ON IMAGING OF LUNG: Status: ACTIVE | Noted: 2017-06-06

## 2017-06-06 PROBLEM — J81.0 ACUTE PULMONARY EDEMA (HCC): Status: ACTIVE | Noted: 2017-06-06

## 2017-06-06 LAB
CULTURE: NORMAL
CULTURE: NORMAL
DIRECT EXAM: NORMAL
GLUCOSE BLD-MCNC: 101 MG/DL (ref 65–105)
GLUCOSE BLD-MCNC: 106 MG/DL (ref 65–105)
GLUCOSE BLD-MCNC: 108 MG/DL (ref 65–105)
GLUCOSE BLD-MCNC: 183 MG/DL (ref 65–105)
INR BLD: 3.3
Lab: NORMAL
PROTHROMBIN TIME: 35.7 SEC (ref 9.4–12.6)
SPECIMEN DESCRIPTION: NORMAL
STATUS: NORMAL

## 2017-06-06 PROCEDURE — G8978 MOBILITY CURRENT STATUS: HCPCS

## 2017-06-06 PROCEDURE — G8979 MOBILITY GOAL STATUS: HCPCS

## 2017-06-06 PROCEDURE — 86606 ASPERGILLUS ANTIBODY: CPT

## 2017-06-06 PROCEDURE — 94762 N-INVAS EAR/PLS OXIMTRY CONT: CPT

## 2017-06-06 PROCEDURE — 2580000003 HC RX 258: Performed by: INTERNAL MEDICINE

## 2017-06-06 PROCEDURE — 36415 COLL VENOUS BLD VENIPUNCTURE: CPT

## 2017-06-06 PROCEDURE — 99233 SBSQ HOSP IP/OBS HIGH 50: CPT | Performed by: INTERNAL MEDICINE

## 2017-06-06 PROCEDURE — 82947 ASSAY GLUCOSE BLOOD QUANT: CPT

## 2017-06-06 PROCEDURE — 87205 SMEAR GRAM STAIN: CPT

## 2017-06-06 PROCEDURE — 97162 PT EVAL MOD COMPLEX 30 MIN: CPT

## 2017-06-06 PROCEDURE — 6360000002 HC RX W HCPCS: Performed by: HOSPITALIST

## 2017-06-06 PROCEDURE — 83516 IMMUNOASSAY NONANTIBODY: CPT

## 2017-06-06 PROCEDURE — 2580000003 HC RX 258: Performed by: HOSPITALIST

## 2017-06-06 PROCEDURE — 94640 AIRWAY INHALATION TREATMENT: CPT

## 2017-06-06 PROCEDURE — 6370000000 HC RX 637 (ALT 250 FOR IP): Performed by: HOSPITALIST

## 2017-06-06 PROCEDURE — 6370000000 HC RX 637 (ALT 250 FOR IP): Performed by: INTERNAL MEDICINE

## 2017-06-06 PROCEDURE — 99232 SBSQ HOSP IP/OBS MODERATE 35: CPT | Performed by: INTERNAL MEDICINE

## 2017-06-06 PROCEDURE — 86698 HISTOPLASMA ANTIBODY: CPT

## 2017-06-06 PROCEDURE — 1200000000 HC SEMI PRIVATE

## 2017-06-06 PROCEDURE — 87385 HISTOPLASMA CAPSUL AG IA: CPT

## 2017-06-06 PROCEDURE — 97530 THERAPEUTIC ACTIVITIES: CPT

## 2017-06-06 PROCEDURE — 85610 PROTHROMBIN TIME: CPT

## 2017-06-06 PROCEDURE — 87070 CULTURE OTHR SPECIMN AEROBIC: CPT

## 2017-06-06 RX ORDER — WARFARIN SODIUM 2 MG/1
2 TABLET ORAL
Status: COMPLETED | OUTPATIENT
Start: 2017-06-06 | End: 2017-06-06

## 2017-06-06 RX ORDER — FUROSEMIDE 10 MG/ML
20 INJECTION INTRAMUSCULAR; INTRAVENOUS ONCE
Status: DISCONTINUED | OUTPATIENT
Start: 2017-06-06 | End: 2017-06-06

## 2017-06-06 RX ORDER — PHYTONADIONE 5 MG/1
5 TABLET ORAL ONCE
Status: COMPLETED | OUTPATIENT
Start: 2017-06-06 | End: 2017-06-06

## 2017-06-06 RX ADMIN — CEFEPIME 2 G: 2 INJECTION, POWDER, FOR SOLUTION INTRAVENOUS at 12:37

## 2017-06-06 RX ADMIN — RANOLAZINE 500 MG: 500 TABLET, FILM COATED, EXTENDED RELEASE ORAL at 20:43

## 2017-06-06 RX ADMIN — Medication 10 ML: at 20:43

## 2017-06-06 RX ADMIN — ISOSORBIDE MONONITRATE 30 MG: 30 TABLET ORAL at 09:41

## 2017-06-06 RX ADMIN — RANOLAZINE 500 MG: 500 TABLET, FILM COATED, EXTENDED RELEASE ORAL at 09:41

## 2017-06-06 RX ADMIN — ASPIRIN 81 MG: 81 TABLET, CHEWABLE ORAL at 09:41

## 2017-06-06 RX ADMIN — Medication 30 MG: at 20:43

## 2017-06-06 RX ADMIN — PHYTONADIONE 5 MG: 5 TABLET ORAL at 20:43

## 2017-06-06 RX ADMIN — METOPROLOL TARTRATE 50 MG: 50 TABLET, FILM COATED ORAL at 20:43

## 2017-06-06 RX ADMIN — DIGOXIN 250 MCG: 0.25 TABLET ORAL at 09:41

## 2017-06-06 RX ADMIN — ALBUTEROL SULFATE 2.5 MG: 2.5 SOLUTION RESPIRATORY (INHALATION) at 09:11

## 2017-06-06 RX ADMIN — ALBUTEROL SULFATE 2.5 MG: 2.5 SOLUTION RESPIRATORY (INHALATION) at 15:17

## 2017-06-06 RX ADMIN — DILTIAZEM HYDROCHLORIDE 180 MG: 180 CAPSULE, COATED, EXTENDED RELEASE ORAL at 09:41

## 2017-06-06 RX ADMIN — CEFEPIME 2 G: 2 INJECTION, POWDER, FOR SOLUTION INTRAVENOUS at 02:43

## 2017-06-06 RX ADMIN — WARFARIN SODIUM 2 MG: 2 TABLET ORAL at 17:35

## 2017-06-06 RX ADMIN — Medication 10 ML: at 09:42

## 2017-06-06 RX ADMIN — ATORVASTATIN CALCIUM 40 MG: 40 TABLET, FILM COATED ORAL at 20:43

## 2017-06-06 RX ADMIN — METOPROLOL TARTRATE 50 MG: 50 TABLET, FILM COATED ORAL at 09:41

## 2017-06-06 RX ADMIN — INSULIN LISPRO 1 UNITS: 100 INJECTION, SOLUTION INTRAVENOUS; SUBCUTANEOUS at 17:35

## 2017-06-06 RX ADMIN — CITALOPRAM HYDROBROMIDE 20 MG: 20 TABLET ORAL at 09:41

## 2017-06-06 RX ADMIN — MOMETASONE FUROATE AND FORMOTEROL FUMARATE DIHYDRATE 2 PUFF: 200; 5 AEROSOL RESPIRATORY (INHALATION) at 09:11

## 2017-06-06 NOTE — PROGRESS NOTES
[address] [facility].    Date:   6/6/2017  Patient name:  Kristal Lacey  Date of admission:  6/3/2017  8:05 PM  MRN:   [id number]  YOB: 1940    CC- SOB     SUBJECTIVE  T max of 99.1 overnight. BP stable. REVIEW OF SYSTEMS:    · General----negative for fatigue, weight loss  · Cardio---negative for chest pain, orthopnea and PND  · Resp  Cough and wheeze   · GI negative for nausea and vomiting, no dysphagia         Past Medical History:   Diagnosis Date    Allergic rhinitis 10/1994    Asthma     Atrial fibrillation ([facility] [facility] 12/2008    CAD (coronary artery disease)     Clotting disorder (HCC)     plebitis in L leg    DDD (degenerative disc disease), cervical     Degeneration of cervical intervertebral disc     Diverticulosis 2005    Gout     Gout, unspecified     H/O cardiac catheterization 6/17/15    LMCA: Normal 0% stenosis. LAD: Lesion on 1st diag: Proximal subsection. 80% stenosis. Lesion plaque is ruptured. Clerance Riis LCx: Lesion on 1st Ob Leslie: Mid subsection. 85% stenosis. RCA:Lesion on R PDA: Mid subsection. 85% stenosis. Lesion on R PDA: Distal subsection. 70% stenosis. Lesion on Prox RCA: Mid subsection. 50% stenosis. EF 50%.  H/O echocardiogram 11/09/2016    EF 40-45%. Mild LV hypertrophy normal LV cavity size. Sigmoid interventricular septum without evidence of outflow tract obstruction. Left atrium is moderately dilated (34-39) left atrial volume index of 36 ml/m2. Mild mitral regurg. Diastology cannot be assessed due to A-fib.  History of Holter monitoring 3/24/16    Atrial Fibrillation throughout,fairly controlled, HR  bpm 79% of the time. Occasional high ventricular rate episodes and multiple pauses suggestive of tachycardia/bradycardia syndrome  Msximum R-R interval 2.68 seconds.    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

## 2017-06-06 NOTE — PROGRESS NOTES
Exam     < 10 EPITHELIAL CELLS/LPF     Direct Exam     >25 NEUTROPHILS/LPF     Direct Exam     NO BACTERIA SEEN     Direct Exam     [facility] [address] [facility], [address] [place] [address] [phone]     Culture     Pending     Status     Pending         TUR      RADIOLOGY  [employer]     (See actual reports for details)    ASSESSMENT  Patient Active Problem List   Diagnosis    Pulmonary HTN ([address]    Mitral regurgitation    Atrial fibrillation ([address]    Long term current use of anticoagulant therapy    Essential hypertension    Obstructive sleep apnea    Gout    Upper respiratory tract infection    Type 2 diabetes mellitus without complication ([address]    Coronary artery disease involving native coronary artery    Ischemic cardiomyopathy    HCAP (healthcare-associated pneumonia)    Hypokalemia    Acute pulmonary edema (HCC)    Ground glass opacity present on imaging of lung       PLAN  1. Bacterial pneumonia not likely. Would d/c antibiotics and observe. 2. Hold coumadin and reverse with small dose of Vit K  3. Schedule for bronch and transbronchial biopsy. History suggests  but CT not diagnostic. Discussed with patient incl risks benefits and rationale and she accepts. Proceed tomorrow.        Electronically signed by Kenneth Almazan DO on 6/6/2017 at 6:39 PM

## 2017-06-06 NOTE — PROGRESS NOTES
PATIENT REFUSES TO WEAR BIPAP     [x] Risks and benefits explained to patient   [x] Patient refuses to wear Bipap stating no. [x] Patient verbalizes understanding of information presented.

## 2017-06-06 NOTE — PROGRESS NOTES
Pharmacy Note  Warfarin Consult follow-up      Recent Labs      06/06/17   0544   INR  3.3     Recent Labs      06/03/17   1400  06/03/17   2120  06/05/17   0700   HGB  14.6  13.1  12.2   HCT  44.0  39.3  36.6   PLT  288  203  244       Significant Drug-Drug Interactions:  New warfarin drug-drug interactions: none  Discontinued drug-drug interactions: Zosyn    Date:     INR:     Dose:  6/4/17     3.0       4 mg  6/5/17      3.7      Hold   6/6/17      3.3      2 mg      Notes:        Plan to order a reduced dose of warfarin 2 mg for this evening to prevent the inr from dropping below therapeutic. Daily PT/INR while inpatient. [address] [place] [address]  Ph., Clinical Pharmacist  Anticoagulation Services, [facility]  6/6/2017  8:19 AM

## 2017-06-06 NOTE — PROGRESS NOTES
Jossy  Occupational Therapy Not Seen Note    Patient not available for Occupational Therapy due to:    [x] Testing: Pt. Off the unit at Echo lab. [] Hemodialysis    [] Blood Transfusion in Progress    []Refusal by Patient:    [] Surgery/Procedure:    [] Strict Bedrest    [] Sedation    [] Spine Precautions     [] Pt being transferred to palliative care at this time. Spoke with pt/family and OT services to be defered. [] Pt independent with functional mobility and functional tasks. Pt with no OT acute care needs at this time, will defer OT eval.    [] Other    Next Scheduled Treatment: Attempt eval again 6/6/17    Signature:  Bambi Garcia OTPATRICA/STEVIE

## 2017-06-07 ENCOUNTER — APPOINTMENT (OUTPATIENT)
Dept: GENERAL RADIOLOGY | Age: 77
DRG: 163 | End: 2017-06-07
Attending: INTERNAL MEDICINE
Payer: MEDICARE

## 2017-06-07 ENCOUNTER — ANESTHESIA EVENT (OUTPATIENT)
Dept: ENDOSCOPY | Age: 77
DRG: 163 | End: 2017-06-07
Payer: MEDICARE

## 2017-06-07 ENCOUNTER — ANESTHESIA (OUTPATIENT)
Dept: ENDOSCOPY | Age: 77
DRG: 163 | End: 2017-06-07
Payer: MEDICARE

## 2017-06-07 VITALS
DIASTOLIC BLOOD PRESSURE: 73 MMHG | RESPIRATION RATE: 21 BRPM | SYSTOLIC BLOOD PRESSURE: 95 MMHG | OXYGEN SATURATION: 95 %

## 2017-06-07 LAB
ABSOLUTE EOS #: 0.34 K/UL (ref 0–0.4)
ABSOLUTE LYMPH #: 1.89 K/UL (ref 1–4.8)
ABSOLUTE MONO #: 1.03 K/UL (ref 0.1–0.8)
ANION GAP SERPL CALCULATED.3IONS-SCNC: 15 MMOL/L (ref 9–17)
BASOPHILS # BLD: 0 %
BASOPHILS ABSOLUTE: 0 K/UL (ref 0–0.2)
BUN BLDV-MCNC: 9 MG/DL (ref 8–23)
BUN/CREAT BLD: ABNORMAL (ref 9–20)
C-REACTIVE PROTEIN: 212.3 MG/L (ref 0–5)
CALCIUM SERPL-MCNC: 8.2 MG/DL (ref 8.6–10.4)
CHLORIDE BLD-SCNC: 97 MMOL/L (ref 98–107)
CO2: 25 MMOL/L (ref 20–31)
CREAT SERPL-MCNC: 0.56 MG/DL (ref 0.5–0.9)
CULTURE: NORMAL
DIFFERENTIAL TYPE: ABNORMAL
DIRECT EXAM: NORMAL
EOSINOPHILS RELATIVE PERCENT: 2 %
GFR AFRICAN AMERICAN: >60 ML/MIN
GFR NON-AFRICAN AMERICAN: >60 ML/MIN
GFR SERPL CREATININE-BSD FRML MDRD: ABNORMAL ML/MIN/{1.73_M2}
GFR SERPL CREATININE-BSD FRML MDRD: ABNORMAL ML/MIN/{1.73_M2}
GLUCOSE BLD-MCNC: 108 MG/DL (ref 65–105)
GLUCOSE BLD-MCNC: 111 MG/DL (ref 65–105)
GLUCOSE BLD-MCNC: 114 MG/DL (ref 70–99)
GLUCOSE BLD-MCNC: 98 MG/DL (ref 65–105)
HCT VFR BLD CALC: 36.7 % (ref 36–46)
HEMOGLOBIN: 12.2 G/DL (ref 12–16)
HISTOPLASMA AG, S: <2 U/ML
HISTOPLASMA ANTIGEN, SERUM: NEGATIVE
INR BLD: 1.4
INR BLD: 1.8
LYMPHOCYTES # BLD: 11 %
Lab: NORMAL
MCH RBC QN AUTO: 29.9 PG (ref 26–34)
MCHC RBC AUTO-ENTMCNC: 33.2 G/DL (ref 31–37)
MCV RBC AUTO: 90 FL (ref 80–100)
MONOCYTES # BLD: 6 %
MORPHOLOGY: ABNORMAL
PDW BLD-RTO: 16.3 % (ref 12.5–15.4)
PLATELET # BLD: 234 K/UL (ref 140–450)
PLATELET ESTIMATE: ABNORMAL
PMV BLD AUTO: 8.2 FL (ref 6–12)
POTASSIUM SERPL-SCNC: 3.8 MMOL/L (ref 3.7–5.3)
PROTHROMBIN TIME: 14.9 SEC (ref 9.4–12.6)
PROTHROMBIN TIME: 19 SEC (ref 9.4–12.6)
RBC # BLD: 4.08 M/UL (ref 4–5.2)
RBC # BLD: ABNORMAL 10*6/UL
SEG NEUTROPHILS: 81 %
SEGMENTED NEUTROPHILS ABSOLUTE COUNT: 13.94 K/UL (ref 1.8–7.7)
SODIUM BLD-SCNC: 137 MMOL/L (ref 135–144)
SPECIMEN DESCRIPTION: NORMAL
STATUS: NORMAL
WBC # BLD: 17.2 K/UL (ref 3.5–11)
WBC # BLD: ABNORMAL 10*3/UL

## 2017-06-07 PROCEDURE — 3609011800 HC BRONCHOSCOPY/TRANSBRONCHIAL LUNG BIOPSY: Performed by: INTERNAL MEDICINE

## 2017-06-07 PROCEDURE — 94640 AIRWAY INHALATION TREATMENT: CPT

## 2017-06-07 PROCEDURE — 31628 BRONCHOSCOPY/LUNG BX EACH: CPT | Performed by: INTERNAL MEDICINE

## 2017-06-07 PROCEDURE — 85610 PROTHROMBIN TIME: CPT

## 2017-06-07 PROCEDURE — 3609011100 HC BRONCHOSCOPY BRUSHINGS: Performed by: INTERNAL MEDICINE

## 2017-06-07 PROCEDURE — 2500000003 HC RX 250 WO HCPCS: Performed by: SPECIALIST

## 2017-06-07 PROCEDURE — 87106 FUNGI IDENTIFICATION YEAST: CPT

## 2017-06-07 PROCEDURE — 88305 TISSUE EXAM BY PATHOLOGIST: CPT

## 2017-06-07 PROCEDURE — 2580000003 HC RX 258: Performed by: INTERNAL MEDICINE

## 2017-06-07 PROCEDURE — 3609027000 HC BRONCHOSCOPY FLUOROSCOPY: Performed by: INTERNAL MEDICINE

## 2017-06-07 PROCEDURE — 6370000000 HC RX 637 (ALT 250 FOR IP): Performed by: INTERNAL MEDICINE

## 2017-06-07 PROCEDURE — 36415 COLL VENOUS BLD VENIPUNCTURE: CPT

## 2017-06-07 PROCEDURE — 7100000011 HC PHASE II RECOVERY - ADDTL 15 MIN: Performed by: INTERNAL MEDICINE

## 2017-06-07 PROCEDURE — 3700000000 HC ANESTHESIA ATTENDED CARE: Performed by: INTERNAL MEDICINE

## 2017-06-07 PROCEDURE — 87075 CULTR BACTERIA EXCEPT BLOOD: CPT

## 2017-06-07 PROCEDURE — 82947 ASSAY GLUCOSE BLOOD QUANT: CPT

## 2017-06-07 PROCEDURE — 85025 COMPLETE CBC W/AUTO DIFF WBC: CPT

## 2017-06-07 PROCEDURE — 2500000003 HC RX 250 WO HCPCS: Performed by: INTERNAL MEDICINE

## 2017-06-07 PROCEDURE — 31622 DX BRONCHOSCOPE/WASH: CPT

## 2017-06-07 PROCEDURE — 87070 CULTURE OTHR SPECIMN AEROBIC: CPT

## 2017-06-07 PROCEDURE — 0BBF8ZX EXCISION OF RIGHT LOWER LUNG LOBE, VIA NATURAL OR ARTIFICIAL OPENING ENDOSCOPIC, DIAGNOSTIC: ICD-10-PCS | Performed by: INTERNAL MEDICINE

## 2017-06-07 PROCEDURE — 2580000003 HC RX 258: Performed by: SPECIALIST

## 2017-06-07 PROCEDURE — 87015 SPECIMEN INFECT AGNT CONCNTJ: CPT

## 2017-06-07 PROCEDURE — 87116 MYCOBACTERIA CULTURE: CPT

## 2017-06-07 PROCEDURE — 6360000002 HC RX W HCPCS: Performed by: SPECIALIST

## 2017-06-07 PROCEDURE — 3700000001 HC ADD 15 MINUTES (ANESTHESIA): Performed by: INTERNAL MEDICINE

## 2017-06-07 PROCEDURE — 87102 FUNGUS ISOLATION CULTURE: CPT

## 2017-06-07 PROCEDURE — 7100000010 HC PHASE II RECOVERY - FIRST 15 MIN: Performed by: INTERNAL MEDICINE

## 2017-06-07 PROCEDURE — 94762 N-INVAS EAR/PLS OXIMTRY CONT: CPT

## 2017-06-07 PROCEDURE — 99233 SBSQ HOSP IP/OBS HIGH 50: CPT | Performed by: INTERNAL MEDICINE

## 2017-06-07 PROCEDURE — 6360000002 HC RX W HCPCS: Performed by: HOSPITALIST

## 2017-06-07 PROCEDURE — 0BB68ZX EXCISION OF RIGHT LOWER LOBE BRONCHUS, VIA NATURAL OR ARTIFICIAL OPENING ENDOSCOPIC, DIAGNOSTIC: ICD-10-PCS | Performed by: INTERNAL MEDICINE

## 2017-06-07 PROCEDURE — 86140 C-REACTIVE PROTEIN: CPT

## 2017-06-07 PROCEDURE — 88112 CYTOPATH CELL ENHANCE TECH: CPT

## 2017-06-07 PROCEDURE — 71020 XR CHEST STANDARD TWO VW: CPT

## 2017-06-07 PROCEDURE — 80048 BASIC METABOLIC PNL TOTAL CA: CPT

## 2017-06-07 PROCEDURE — 87205 SMEAR GRAM STAIN: CPT

## 2017-06-07 PROCEDURE — 87206 SMEAR FLUORESCENT/ACID STAI: CPT

## 2017-06-07 PROCEDURE — 1200000000 HC SEMI PRIVATE

## 2017-06-07 PROCEDURE — G8978 MOBILITY CURRENT STATUS: HCPCS

## 2017-06-07 PROCEDURE — G8979 MOBILITY GOAL STATUS: HCPCS

## 2017-06-07 RX ORDER — PROPOFOL 10 MG/ML
INJECTION, EMULSION INTRAVENOUS CONTINUOUS PRN
Status: DISCONTINUED | OUTPATIENT
Start: 2017-06-07 | End: 2017-06-07 | Stop reason: SDUPTHER

## 2017-06-07 RX ORDER — LIDOCAINE HYDROCHLORIDE 10 MG/ML
INJECTION, SOLUTION EPIDURAL; INFILTRATION; INTRACAUDAL; PERINEURAL PRN
Status: DISCONTINUED | OUTPATIENT
Start: 2017-06-07 | End: 2017-06-07 | Stop reason: SDUPTHER

## 2017-06-07 RX ORDER — SODIUM CHLORIDE 9 MG/ML
INJECTION, SOLUTION INTRAVENOUS CONTINUOUS PRN
Status: DISCONTINUED | OUTPATIENT
Start: 2017-06-07 | End: 2017-06-07 | Stop reason: SDUPTHER

## 2017-06-07 RX ORDER — LIDOCAINE HYDROCHLORIDE 20 MG/ML
8 INJECTION, SOLUTION EPIDURAL; INFILTRATION; INTRACAUDAL; PERINEURAL ONCE
Status: COMPLETED | OUTPATIENT
Start: 2017-06-07 | End: 2017-06-07

## 2017-06-07 RX ORDER — GLYCOPYRROLATE 0.2 MG/ML
INJECTION INTRAMUSCULAR; INTRAVENOUS PRN
Status: DISCONTINUED | OUTPATIENT
Start: 2017-06-07 | End: 2017-06-07 | Stop reason: SDUPTHER

## 2017-06-07 RX ORDER — FENTANYL CITRATE 50 UG/ML
25 INJECTION, SOLUTION INTRAMUSCULAR; INTRAVENOUS EVERY 5 MIN PRN
Status: DISCONTINUED | OUTPATIENT
Start: 2017-06-07 | End: 2017-06-07 | Stop reason: HOSPADM

## 2017-06-07 RX ORDER — ONDANSETRON 2 MG/ML
4 INJECTION INTRAMUSCULAR; INTRAVENOUS
Status: DISCONTINUED | OUTPATIENT
Start: 2017-06-07 | End: 2017-06-07 | Stop reason: HOSPADM

## 2017-06-07 RX ORDER — SODIUM CHLORIDE 9 MG/ML
INJECTION, SOLUTION INTRAVENOUS CONTINUOUS
Status: DISCONTINUED | OUTPATIENT
Start: 2017-06-07 | End: 2017-06-14

## 2017-06-07 RX ORDER — LABETALOL HYDROCHLORIDE 5 MG/ML
5 INJECTION, SOLUTION INTRAVENOUS EVERY 10 MIN PRN
Status: DISCONTINUED | OUTPATIENT
Start: 2017-06-07 | End: 2017-06-07 | Stop reason: HOSPADM

## 2017-06-07 RX ORDER — LIDOCAINE HYDROCHLORIDE 10 MG/ML
INJECTION, SOLUTION INFILTRATION; PERINEURAL PRN
Status: DISCONTINUED | OUTPATIENT
Start: 2017-06-07 | End: 2017-06-07 | Stop reason: HOSPADM

## 2017-06-07 RX ORDER — PROPOFOL 10 MG/ML
INJECTION, EMULSION INTRAVENOUS PRN
Status: DISCONTINUED | OUTPATIENT
Start: 2017-06-07 | End: 2017-06-07 | Stop reason: SDUPTHER

## 2017-06-07 RX ADMIN — GLYCOPYRROLATE 0.2 MG: 0.2 INJECTION INTRAMUSCULAR; INTRAVENOUS at 16:51

## 2017-06-07 RX ADMIN — METOPROLOL TARTRATE 50 MG: 50 TABLET, FILM COATED ORAL at 21:01

## 2017-06-07 RX ADMIN — DILTIAZEM HYDROCHLORIDE 180 MG: 180 CAPSULE, COATED, EXTENDED RELEASE ORAL at 08:41

## 2017-06-07 RX ADMIN — Medication 10 ML: at 21:01

## 2017-06-07 RX ADMIN — SODIUM CHLORIDE: 9 INJECTION, SOLUTION INTRAVENOUS at 16:22

## 2017-06-07 RX ADMIN — RANOLAZINE 500 MG: 500 TABLET, FILM COATED, EXTENDED RELEASE ORAL at 21:01

## 2017-06-07 RX ADMIN — LIDOCAINE HYDROCHLORIDE 40 MG: 10 INJECTION, SOLUTION EPIDURAL; INFILTRATION; INTRACAUDAL; PERINEURAL at 16:51

## 2017-06-07 RX ADMIN — DIGOXIN 250 MCG: 0.25 TABLET ORAL at 08:41

## 2017-06-07 RX ADMIN — Medication 10 ML: at 08:41

## 2017-06-07 RX ADMIN — ATORVASTATIN CALCIUM 40 MG: 40 TABLET, FILM COATED ORAL at 21:01

## 2017-06-07 RX ADMIN — MOMETASONE FUROATE AND FORMOTEROL FUMARATE DIHYDRATE 2 PUFF: 200; 5 AEROSOL RESPIRATORY (INHALATION) at 08:06

## 2017-06-07 RX ADMIN — ALBUTEROL SULFATE 2.5 MG: 2.5 SOLUTION RESPIRATORY (INHALATION) at 08:06

## 2017-06-07 RX ADMIN — PROPOFOL 75 MCG/KG/MIN: 10 INJECTION, EMULSION INTRAVENOUS at 17:16

## 2017-06-07 RX ADMIN — Medication 30 MG: at 08:41

## 2017-06-07 RX ADMIN — CITALOPRAM HYDROBROMIDE 20 MG: 20 TABLET ORAL at 08:41

## 2017-06-07 RX ADMIN — LIDOCAINE HYDROCHLORIDE 8 ML: 20 INJECTION, SOLUTION INFILTRATION; PERINEURAL at 16:22

## 2017-06-07 RX ADMIN — METOPROLOL TARTRATE 50 MG: 50 TABLET, FILM COATED ORAL at 08:41

## 2017-06-07 RX ADMIN — ALBUTEROL SULFATE 2.5 MG: 2.5 SOLUTION RESPIRATORY (INHALATION) at 14:13

## 2017-06-07 RX ADMIN — Medication 30 MG: at 21:01

## 2017-06-07 RX ADMIN — ISOSORBIDE MONONITRATE 30 MG: 30 TABLET ORAL at 08:41

## 2017-06-07 RX ADMIN — PROPOFOL 30 MG: 10 INJECTION, EMULSION INTRAVENOUS at 17:17

## 2017-06-07 RX ADMIN — RANOLAZINE 500 MG: 500 TABLET, FILM COATED, EXTENDED RELEASE ORAL at 08:41

## 2017-06-07 RX ADMIN — SODIUM CHLORIDE: 9 INJECTION, SOLUTION INTRAVENOUS at 16:44

## 2017-06-07 ASSESSMENT — PAIN SCALES - GENERAL
PAINLEVEL_OUTOF10: 0

## 2017-06-07 ASSESSMENT — PAIN SCALES - WONG BAKER: WONGBAKER_NUMERICALRESPONSE: 0

## 2017-06-07 ASSESSMENT — ENCOUNTER SYMPTOMS: SHORTNESS OF BREATH: 1

## 2017-06-07 NOTE — PROGRESS NOTES
obstruction. Left atrium is moderately dilated (34-39) left atrial volume index of 36 ml/m2. Mild mitral regurg. Diastology cannot be assessed due to A-fib.  History of Holter monitoring 3/24/16    Atrial Fibrillation throughout,fairly controlled, HR  bpm 79% of the time. Occasional high ventricular rate episodes and multiple pauses suggestive of tachycardia/bradycardia syndrome  Msximum R-R interval 2.68 seconds. 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    Long term (current) use of anticoagulants 8/31/2015    Other abnormal glucose 2004    Phlebitis and thrombophlebitis of lower extremities, unspecified ([facility] [address] 1961    L leg    Senile osteoporosis 2006    L/S    Symptomatic menopausal or female climacteric states 06/1995    Syncope and collapse     Type II or unspecified type diabetes mellitus without mention of complication, not stated as uncontrolled 03/2009    Unspecified hereditary and idiopathic peripheral neuropathy 2012    Unspecified sleep apnea 11/2009       Social History     Social History    Marital status:      Spouse name: N/A    Number of children: N/A    Years of education: N/A     Occupational History    Not on file. Social History Main Topics    Smoking status: Never Smoker    Smokeless tobacco: Never Used    Alcohol use No    Drug use: No    Sexual activity: Not on file     Other Topics Concern    Not on file     Social History Narrative       REVIEW OF SYSTEMS:    · Constitutional: Negative for weight loss  · Eyes: Negative for visual changes, diplopia, scleral icterus. · ENT: Negative for Headaches, hearing loss, vertigo, mouth sores, sore throat. · Cardiovascular: Negative for lightheadedness/orthostatic symptoms ,chest pain, positive fr dyspnea on exertion, negative for palpitations or loss of consciousness.    · Respiratory: Positive for , sob with exertion, cough and 3.8   CL  100  97*   CO2  26  25   BUN  15  9   CREATININE  0.73  0.56   GLUCOSE  107*  114*     CT Scans  Impression   1. Extensive patchy airspace opacity throughout the lungs bilaterally with   confluent segmental pulmonary consolidation in both lower lobes.  Correlate   clinically for pneumonia.        2. Cardiomegaly.        3. Atherosclerotic disease including coronary arterial disease.        4. Precarinal lymphadenopathy, possibly reactive.            Echo     11/09/2016     CONCLUSIONS    Summary  Global left ventricular function is difficult to assess but appears mildly  reduced with an estimated EF of 40-45%. Mild left ventricular hypertrophy and with normal left ventricular cavity  size. Unable to assess specific wall motion abnormalities due to image quality. The patient has a sigmoid interventricular septum without evidence of  outflow tract obstruction. The left atrium is moderately dilated (34-39) with a left atrial volume  index of 36 ml/m2. Mild mitral regurgitation. Diastology cannot be assessed due to atrial fibrillation. Compared to the previous study of 2/12/2015, EF appear to be reduced from 55  to about 45%. Ventricular rate appear to be uncontrolled on ECG gating. Clinical correlation indicated.       Assessment:  Patient Active Problem List   Diagnosis    Pulmonary HTN ([address]    Mitral regurgitation    Atrial fibrillation ([address]    Long term current use of anticoagulant therapy    Essential hypertension    Obstructive sleep apnea    Gout    Upper respiratory tract infection    Type 2 diabetes mellitus without complication ([address]    Coronary artery disease involving native coronary artery    Ischemic cardiomyopathy    HCAP (healthcare-associated pneumonia)    Hypokalemia    Acute pulmonary edema (HCC)    Ground glass opacity present on imaging of lung       ASSESSMENT/PLAN:    1. Pulmonology consulted: Bronchoscopy and transbronchial biopsy scheduled for today at 1430  2. Coumadin held and reversed with small dose of Vit K  3. Continue BIPAP  4. Antibiotics D/C; Bacterial pneumonia unlikely  5. Sputum Cx: No bacteria seen on 6/6  6. Histoplasma, ANCA, Aspergillus AB pending  7. Legionella antigen negative  8. Mycoplasma AB negative  9. Blood Cx negative to date  8. Respiratory: Continue albuterol nebulizer, dulera, delsym  11. ECHO: 40-45%, WNL  12. Cardiovascular: Continue digoxin 250mcg daily, cardizem 180mg QD, lopressor 50mg BID  13. Type 2 DM: ISS low dose  14. CXR pending this AM    Peggy Rawls, DO  OB Resident on Service of IM            Attending Physician Statement  I have discussed the care of [place] , including pertinent history and exam findings,  with the resident. I have reviewed the key elements of all parts of the encounter with the resident. I agree with the assessment, plan and orders as documented by the resident.       Electronically signed by Kayla Robles MD on 6/7/2017 at 5:37 PM

## 2017-06-07 NOTE — PROGRESS NOTES
[address]  Occupational Therapy Not Seen Note    Patient not available for Occupational Therapy due to:    [] Testing:    [] Hemodialysis    [] Blood Transfusion in Progress    []Refusal by Patient:    [] Surgery/Procedure:    [] Strict Bedrest    [] Sedation    [] Spine Precautions     [] Pt being transferred to palliative care at this time. Spoke with pt/family and OT services to be defered. [] Pt independent with functional mobility and functional tasks. Pt with no OT acute care needs at this time, will defer OT eval.     [x] Other RN C. Pt. Waiting to go to bronchoscopy soon this pm.     Next Scheduled Treatment: 6/8/17    Signature:  Bambi MEMBRENO/STEVIE

## 2017-06-07 NOTE — PROGRESS NOTES
stenosis. Lesion plaque is ruptured. Allen Boucher LCx: Lesion on 1st Ob Leslie: Mid subsection. 85% stenosis. RCA:Lesion on R PDA: Mid subsection. 85% stenosis. Lesion on R PDA: Distal subsection. 70% stenosis. Lesion on Prox RCA: Mid subsection. 50% stenosis. EF 50%.  H/O echocardiogram 11/09/2016    EF 40-45%. Mild LV hypertrophy normal LV cavity size. Sigmoid interventricular septum without evidence of outflow tract obstruction. Left atrium is moderately dilated (34-39) left atrial volume index of 36 ml/m2. Mild mitral regurg. Diastology cannot be assessed due to A-fib.  History of Holter monitoring 3/24/16    Atrial Fibrillation throughout,fairly controlled, HR  bpm 79% of the time. Occasional high ventricular rate episodes and multiple pauses suggestive of tachycardia/bradycardia syndrome  Msximum R-R interval 2.68 seconds.    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    Long term (current) use of anticoagulants 8/31/2015    Other abnormal glucose 2004    Phlebitis and thrombophlebitis of lower extremities, unspecified ([facility] [address] 1961    L leg    Senile osteoporosis 2006    L/S    Symptomatic menopausal or female climacteric states 06/1995    Syncope and collapse     Type II or unspecified type diabetes mellitus without mention of complication, not stated as uncontrolled 03/2009    Unspecified hereditary and idiopathic peripheral neuropathy 2012    Unspecified sleep apnea 11/2009     Past Surgical History:   Procedure Laterality Date    CARDIAC CATHETERIZATION Right 06/17/2015    COLONOSCOPY  1/2005    DIAGNOSTIC CARDIAC CATH LAB PROCEDURE  06/17/15    EYE SURGERY      FRACTURE SURGERY  2004    Distal Radius Ulna    OTHER SURGICAL HISTORY  2years old    VOLVAR-ULCERATIVE LESION-CAUTERIZED    SKIN BIOPSY      TONSILLECTOMY AND ADENOIDECTOMY           Restrictions  Restrictions/Precautions  Restrictions/Precautions: General Precautions, Fall Risk  Position Activity Restriction  Other position/activity restrictions:  Up with assist     Vision/Hearing  Vision: Within Functional Limits  Hearing: Within functional limits       Subjective  General  Patient assessed for rehabilitation services?: Yes  Response To Previous Treatment: Not applicable  Family / Caregiver Present: No  Follows Commands: Within Functional Limits         Orientation  Orientation  Overall Orientation Status: Within Functional Limits    Social/Functional History  Social/Functional History  Lives With: Family (spouse, daughter and grandaughter)  Type of Home: House  Home Layout: Two level, Able to Live on Main level with bedroom/bathroom  Home Access: Stairs to enter without rails  Entrance Stairs - Number of Steps: 2 (\"small steps\")  Bathroom Shower/Tub: Tub/Shower unit  Bathroom Equipment:  (only towel rack in shower)  Bathroom Accessibility: Accessible  Home Equipment:  (rollator, pt reports using it for grocery shopping but not at home.)  Receives Help From: Family  ADL Assistance: Independent  Homemaking Assistance: Needs assistance  Homemaking Responsibilities: Yes  Ambulation Assistance: Independent  Transfer Assistance: Independent  Active : No  Patient's  Info: , daughter     Objective   AROM RLE (degrees)  RLE AROM: WFL  AROM LLE (degrees)  LLE AROM : WFL  AROM RUE (degrees)  RUE AROM : WFL  AROM LUE (degrees)  LUE AROM : WFL  Strength RLE  Strength RLE: WFL  Comment: 4/5  Strength LLE  Strength LLE: WFL  Comment: 4/5  Strength RUE  Strength RUE: WFL  Comment: 4/5  Strength LUE  Strength LUE: WFL  Comment: 4/5  Tone RLE  RLE Tone: Normotonic  Tone LLE  LLE Tone: Normotonic  Motor Control  Gross Motor?: WFL  Sensation  Overall Sensation Status: WFL     Bed mobility  Supine to Sit: Moderate assistance  Sit to Supine: Minimal assistance  Scooting: Minimal assistance  Transfers  Sit to Stand: Minimal Assistance  Stand to sit: Minimal

## 2017-06-07 NOTE — OP NOTE
Bronchoscopy Outpatient Procedure Note    Date of Procedure: 6/7/2017    Pre-op Diagnosis: Interstitial Pneumonitis    Post-op Diagnosis: No diagnostic abnormality    Bronchoscopist[de-identified] Wale Pennington    Anesthesia: MAC per CRNA    Procedure: Flexible fiberoptic bronchoscopy + brush, washings and TBB RLL with fluro    Estimated Blood Loss: 50ml. Controlled with iced saline    Complications: Bleeding    Indications and History  The patient is a 67 y.o. female with a history of progressive SOB and recurrent cough, low grade fever and progress pulm infil (patchy GGO bilat). Not responsive to mult courses of Ab. Bronch deemed necessary to exclude ILD. Coumadin held and reversed with vit K. PT INR pre-op 1.4    ASA Grade: ASA 3 - Patient with moderate systemic disease with functional limitations    PMH:  Past Medical History:   Diagnosis Date    Allergic rhinitis 10/1994    Asthma     Atrial fibrillation ([facility] [address] 12/2008    CAD (coronary artery disease)     Clotting disorder (HCC)     plebitis in L leg    DDD (degenerative disc disease), cervical     Degeneration of cervical intervertebral disc     Diverticulosis 2005    Gout     Gout, unspecified     H/O cardiac catheterization 6/17/15    LMCA: Normal 0% stenosis. LAD: Lesion on 1st diag: Proximal subsection. 80% stenosis. Lesion plaque is ruptured. Ophelia Centeno LCx: Lesion on 1st Ob Leslie: Mid subsection. 85% stenosis. RCA:Lesion on R PDA: Mid subsection. 85% stenosis. Lesion on R PDA: Distal subsection. 70% stenosis. Lesion on Prox RCA: Mid subsection. 50% stenosis. EF 50%.  H/O echocardiogram 11/09/2016    EF 40-45%. Mild LV hypertrophy normal LV cavity size. Sigmoid interventricular septum without evidence of outflow tract obstruction. Left atrium is moderately dilated (34-39) left atrial volume index of 36 ml/m2. Mild mitral regurg. Diastology cannot be assessed due to A-fib.     History of Holter monitoring 3/24/16    Atrial Fibrillation throughout,fairly controlled, HR  bpm 79% of the time. Occasional high ventricular rate episodes and multiple pauses suggestive of tachycardia/bradycardia syndrome  Msximum R-R interval 2.68 seconds.    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    Long term (current) use of anticoagulants 8/31/2015    Other abnormal glucose 2004    Phlebitis and thrombophlebitis of lower extremities, unspecified ([facility] [address] 1961    L leg    Senile osteoporosis 2006    L/S    Symptomatic menopausal or female climacteric states 06/1995    Syncope and collapse     Type II or unspecified type diabetes mellitus without mention of complication, not stated as uncontrolled 03/2009    Unspecified hereditary and idiopathic peripheral neuropathy 2012    Unspecified sleep apnea 11/2009     SURGICAL HISTORY:  Past Surgical History:   Procedure Laterality Date    CARDIAC CATHETERIZATION Right 06/17/2015    COLONOSCOPY  1/2005    DIAGNOSTIC CARDIAC CATH LAB PROCEDURE  06/17/15    EYE SURGERY      FRACTURE SURGERY  2004    Distal Radius Ulna    OTHER SURGICAL HISTORY  2years old    VOLVAR-ULCERATIVE LESION-CAUTERIZED    SKIN BIOPSY      TONSILLECTOMY AND ADENOIDECTOMY       SOCIAL HISTORY:  Social History   Substance Use Topics    Smoking status: Never Smoker    Smokeless tobacco: Never Used    Alcohol use No     ALLERGIES:  Allergies   Allergen Reactions    Adhesive Tape     Aspercreme [Trolamine Salicylate]     Augmentin [Amoxicillin-Pot Clavulanate]     Carrot [Daucus Carota]     Erythromycin Other (See Comments)     \"whole in stomach\"    Guaifenesin & Derivatives     Milk-Related Compounds     Niacin And Related      FAMILY HISTORY:  Family History   Problem Relation Age of Onset    Heart Disease Mother     Stroke Mother     High Blood Pressure Mother     Cancer Father      lung    Stroke Brother     Heart Disease Maternal Grandmother      CURRENT symmetrical, trachea midline and Soft, trachea midline and straight  Lungs - positive findings: rales bibasilar, Chest expands equally bilaterally upon respiration, no accessory muscle used. Ausculation reveals no wheezes, rales or rhonchi. Cardiovascular - Heart sounds are normal.  Regular rhythm normal rate without murmur, gallop or rub. Abdomen - Soft, nontender, nondistended, no masses or organomegaly  Neurologic - CN II-XII are grossly intact. There are no focal motor or sensory deficits  Skin - No bruising or bleeding  Extremities - No cyanosis, clubbing or edema    Consent to Procedure  The risks, benefits, complications, treatment options and expected outcomes were discussed with the patient. The possibilities of reaction to medication, pulmonary aspiration, perforation of a viscus, bleeding, failure to diagnose a condition and creating a complication requiring transfusion or operation were discussed with the patient who freely signed the consent. Description of Procedure  The patient was brought to the endoscopy suite, identified as [place] and the procedure verified as Flexible Fiberoptic Bronchoscopy. A Time Out was held and the above information confirmed. The patient was monitored with non-invasive blood pressure monitoring, pulse oximetry, and continuoius ECG. After induction of topical nasopharyngeal anesthesia using a 2% Lidocaine solution gargle and Afrin nasal spray, the patient was placed in appropriate position. When the patient was properly sedated using MAC per CRNA the bronchoscope was passed through the  right after lubrication with 1% lidocaine gel. The larynx and glottis were visualized and anesthetized using 2% Lidocaine solution topically placed onto the vocal cords. The bronchoscope was then passed into the trachea. Lidocaine 2% solution 2 ml at a time was applied topically to the sandra.  After careful inspection of the tracheal, the bronchoscope was sequentially passed into all segments of the left and right endobronchial trees to the second and/or third divisions. Endobronchial findings    Vocal Cords Normal  Trachea  Normal mucosa  Daysi  Normal mucosa    Right Main Stem Bronchus  Normal mucosa  Right Upper Lobe Bronchi Normal mucosa  Right Middle Lobe Bronchi  Normal mucosa  Right Lower Lobe Bronchi (including the Superior segment)  Normal mucosa    Left Main Stem Bronchus Normal mucosa  Left Upper Lobe Bronchus, Upper Division Normal mucosa  Left Upper Lobe Bronchus, Lingula  Normal mucosa  Left Lower Lobe Bronchus (including the Superior segment)  Normal mucosa  Generalized erythema and copious, thick yellowish secretions throughout. Moderate exp airway collapse. No endobronchial lesion. Mult TBB under fluro RLL. Moderate bleeding encountered controlled with scope tamponade and iced saline. Spont resolved. No PTX per fluro at end of case  The patient was taken to the Endoscopy Recovery area in satisfactory condition. Specimens Taken  Sent for C&S, AFB,Fungi, cytology, histopath    Washings -  Amount - 40   Location - bronchial  Brushings - Location - RLL  Transbronchial Biopsies - Number of Biopsies -  9  Location(s)  - RLL      Recommendations  Resume coumadin tomorrow.      Electronically signed by Haley White DO on 6/7/2017 at 5:46 PM

## 2017-06-07 NOTE — PLAN OF CARE
BRONCHOSPASM/BRONCHOCONSTRICTION   [x]  IMPROVE AERATION/BREATH SOUNDS  [x]   ADMINISTER BRONCHODILATOR THERAPY AS APPROPRIATE  [x]   ASSESS BREATH SOUNDS  [x]   IMPLEMENT AEROSOL/MDI PROTOCOL  [x]   PATIENT EDUCATION AS NEEDED

## 2017-06-07 NOTE — PROGRESS NOTES
Physical Therapy  DATE: 2017    NAME: Gordon Freedman  MRN: [id number]   : 1940    Patient not seen this date for Physical Therapy due to:  [] Blood transfusion in progress  [] Hemodialysis  []  Patient Declined  [] Spine Precautions   [] Strict Bedrest  [] Surgery/ Procedure  [x] Testing: PT going for testing this afternoon, RN advised to hold PT for now and check back 17. [] Other        [] PT being discontinued at this time. Patient independent. No further needs. [] PT being discontinued at this time as the patient has been transferred to palliative care. No further needs.     Gely Carl, PTA

## 2017-06-08 LAB
ANCA MYELOPEROXIDASE: 2 AU/ML
ANCA PROTEINASE 3: 3 AU/ML
CASE NUMBER:: NORMAL
CULTURE: ABNORMAL
CULTURE: NO GROWTH
CULTURE: NORMAL
DIRECT EXAM: NORMAL
EKG ATRIAL RATE: 97 BPM
EKG Q-T INTERVAL: 312 MS
EKG QRS DURATION: 142 MS
EKG QTC CALCULATION (BAZETT): 424 MS
EKG R AXIS: -4 DEGREES
EKG T AXIS: 143 DEGREES
EKG VENTRICULAR RATE: 111 BPM
GLUCOSE BLD-MCNC: 120 MG/DL (ref 65–105)
GLUCOSE BLD-MCNC: 124 MG/DL (ref 65–105)
GLUCOSE BLD-MCNC: 85 MG/DL (ref 65–105)
Lab: ABNORMAL
Lab: ABNORMAL
Lab: NORMAL
Lab: NORMAL
SPECIMEN DESCRIPTION: ABNORMAL
SPECIMEN DESCRIPTION: NORMAL
STATUS: ABNORMAL
STATUS: NORMAL
STATUS: NORMAL

## 2017-06-08 PROCEDURE — G8988 SELF CARE GOAL STATUS: HCPCS

## 2017-06-08 PROCEDURE — 99232 SBSQ HOSP IP/OBS MODERATE 35: CPT | Performed by: INTERNAL MEDICINE

## 2017-06-08 PROCEDURE — 97165 OT EVAL LOW COMPLEX 30 MIN: CPT

## 2017-06-08 PROCEDURE — G8987 SELF CARE CURRENT STATUS: HCPCS

## 2017-06-08 PROCEDURE — 94762 N-INVAS EAR/PLS OXIMTRY CONT: CPT

## 2017-06-08 PROCEDURE — 2580000003 HC RX 258: Performed by: INTERNAL MEDICINE

## 2017-06-08 PROCEDURE — 82947 ASSAY GLUCOSE BLOOD QUANT: CPT

## 2017-06-08 PROCEDURE — 1200000000 HC SEMI PRIVATE

## 2017-06-08 PROCEDURE — 94640 AIRWAY INHALATION TREATMENT: CPT

## 2017-06-08 PROCEDURE — 94620 HC 6-MINUTE WALK TEST/PULM STRESS TEST SIMPLE: CPT

## 2017-06-08 PROCEDURE — 6370000000 HC RX 637 (ALT 250 FOR IP): Performed by: HOSPITALIST

## 2017-06-08 PROCEDURE — 97116 GAIT TRAINING THERAPY: CPT

## 2017-06-08 PROCEDURE — 97535 SELF CARE MNGMENT TRAINING: CPT

## 2017-06-08 PROCEDURE — 6360000002 HC RX W HCPCS: Performed by: HOSPITALIST

## 2017-06-08 PROCEDURE — 6370000000 HC RX 637 (ALT 250 FOR IP): Performed by: INTERNAL MEDICINE

## 2017-06-08 PROCEDURE — 97110 THERAPEUTIC EXERCISES: CPT

## 2017-06-08 RX ORDER — WARFARIN SODIUM 2 MG/1
4 TABLET ORAL
Status: COMPLETED | OUTPATIENT
Start: 2017-06-08 | End: 2017-06-08

## 2017-06-08 RX ADMIN — Medication 30 MG: at 20:16

## 2017-06-08 RX ADMIN — MOMETASONE FUROATE AND FORMOTEROL FUMARATE DIHYDRATE 2 PUFF: 200; 5 AEROSOL RESPIRATORY (INHALATION) at 08:28

## 2017-06-08 RX ADMIN — RANOLAZINE 500 MG: 500 TABLET, FILM COATED, EXTENDED RELEASE ORAL at 20:16

## 2017-06-08 RX ADMIN — Medication 10 ML: at 08:54

## 2017-06-08 RX ADMIN — RANOLAZINE 500 MG: 500 TABLET, FILM COATED, EXTENDED RELEASE ORAL at 08:48

## 2017-06-08 RX ADMIN — ALBUTEROL SULFATE 2.5 MG: 2.5 SOLUTION RESPIRATORY (INHALATION) at 08:28

## 2017-06-08 RX ADMIN — ATORVASTATIN CALCIUM 40 MG: 40 TABLET, FILM COATED ORAL at 20:16

## 2017-06-08 RX ADMIN — METOPROLOL TARTRATE 50 MG: 50 TABLET, FILM COATED ORAL at 20:16

## 2017-06-08 RX ADMIN — WARFARIN SODIUM 4 MG: 2 TABLET ORAL at 20:16

## 2017-06-08 RX ADMIN — Medication 30 MG: at 08:49

## 2017-06-08 RX ADMIN — INSULIN LISPRO 1 UNITS: 100 INJECTION, SOLUTION INTRAVENOUS; SUBCUTANEOUS at 20:25

## 2017-06-08 RX ADMIN — Medication 10 ML: at 20:16

## 2017-06-08 RX ADMIN — DILTIAZEM HYDROCHLORIDE 180 MG: 180 CAPSULE, COATED, EXTENDED RELEASE ORAL at 08:48

## 2017-06-08 RX ADMIN — MOMETASONE FUROATE AND FORMOTEROL FUMARATE DIHYDRATE 2 PUFF: 200; 5 AEROSOL RESPIRATORY (INHALATION) at 21:02

## 2017-06-08 RX ADMIN — DIGOXIN 250 MCG: 0.25 TABLET ORAL at 08:48

## 2017-06-08 RX ADMIN — ISOSORBIDE MONONITRATE 30 MG: 30 TABLET ORAL at 08:48

## 2017-06-08 RX ADMIN — ASPIRIN 81 MG: 81 TABLET, CHEWABLE ORAL at 08:48

## 2017-06-08 RX ADMIN — CITALOPRAM HYDROBROMIDE 20 MG: 20 TABLET ORAL at 08:48

## 2017-06-08 RX ADMIN — METOPROLOL TARTRATE 50 MG: 50 TABLET, FILM COATED ORAL at 08:48

## 2017-06-08 NOTE — PLAN OF CARE
Problem: Falls - Risk of  Goal: Absence of falls  Outcome: Ongoing    Problem: Risk for Impaired Skin Integrity  Goal: Tissue integrity - skin and mucous membranes  Structural intactness and normal physiological function of skin and  mucous membranes.    Outcome: Ongoing    Problem: Musculor/Skeletal Functional Status  Goal: Highest potential functional level  Outcome: Ongoing

## 2017-06-08 NOTE — FLOWSHEET NOTE
06/08/17 0930   Encounter Summary   Services provided to: Patient   Referral/Consult From: Rounding   Support System Spouse; Children   Place of [facility] pr[facility]   Continue Visiting (6/8/17)   Complexity of Encounter Low   Spiritual Assessment Completed Yes   Routine   Type Initial   Assessment Approachable;Calm   Intervention Active listening;Explored feelings, thoughts, concerns;Explored coping resources;Sustaining presence/ Ministry of presence;Prayer   Outcome Expressed gratitude;Comfort     · Facts:  visited patient while rounding on the unit. Patient was awake and alert and responded to my knock. Patient is recovering from pneumonia. · Feelings: Patient denied any emotional concerns, but did seem to appreciate the visit. I noticed a tear after prayer. · Family: Patient has family support with spouse and children. Patient spoke fondly of her family. · Oriana: Patient attends [employer] [id number] in [place], [place]. Patient states that her  is a Sunday , and she is active in [facility] [employer] activities. Patient's  has called her every day. Patient states that she believes in prayer and accepted 's offer to pray. · Follow-up: Chaplains will be available for further emotional or spiritual concerns.

## 2017-06-08 NOTE — PROGRESS NOTES
Occupational Therapy   Occupational Therapy Initial Assessment  Date: 2017   Patient Name: Carmen Neal  MRN: [id number]     : 1940  No chief complaint on file. Past Medical History:   Diagnosis Date    Allergic rhinitis 10/1994    Asthma     Atrial fibrillation ([facility] [address] 2008    CAD (coronary artery disease)     Clotting disorder (HCC)     plebitis in L leg    DDD (degenerative disc disease), cervical     Degeneration of cervical intervertebral disc     Diverticulosis     Gout     Gout, unspecified     H/O cardiac catheterization 6/17/15    LMCA: Normal 0% stenosis. LAD: Lesion on 1st diag: Proximal subsection. 80% stenosis. Lesion plaque is ruptured. [facility] LCx: Lesion on 1st Ob Leslie: Mid subsection. 85% stenosis. RCA:Lesion on R PDA: Mid subsection. 85% stenosis. Lesion on R PDA: Distal subsection. 70% stenosis. Lesion on Prox RCA: Mid subsection. 50% stenosis. EF 50%.  H/O echocardiogram 2016    EF 40-45%. Mild LV hypertrophy normal LV cavity size. Sigmoid interventricular septum without evidence of outflow tract obstruction. Left atrium is moderately dilated (34-39) left atrial volume index of 36 ml/m2. Mild mitral regurg. Diastology cannot be assessed due to A-fib.  History of Holter monitoring 3/24/16    Atrial Fibrillation throughout,fairly controlled, HR  bpm 79% of the time. Occasional high ventricular rate episodes and multiple pauses suggestive of tachycardia/bradycardia syndrome  Msximum R-R interval 2.68 seconds.    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    Long term (current) use of anticoagulants 2015    Other abnormal glucose 2004    Phlebitis and thrombophlebitis of lower extremities, unspecified ([facility] [address] 196    L leg    Senile osteoporosis 2006    L/S    Symptomatic menopausal or female climacteric states 1995    Syncope and collapse     Type II or unspecified type diabetes mellitus without mention of complication, not stated as uncontrolled 03/2009    Unspecified hereditary and idiopathic peripheral neuropathy 2012    Unspecified sleep apnea 11/2009     Past Surgical History:   Procedure Laterality Date    BRONCHOSCOPY  6/7/2017    BRONCHOSCOPY BRUSHINGS performed by Bryan Schmitz DO at [place] [id number]  6/7/2017    BRONCHOSCOPY/TRANSBRONCHIAL LUNG BIOPSY performed by Bryan Schmitz DO at [place] [address]  6/7/2017    BRONCHOSCOPY FLUOROSCOPY performed by Bryan Schmitz DO at [address] [facility] [address] 06/17/2015    COLONOSCOPY  1/2005    DIAGNOSTIC CARDIAC CATH LAB PROCEDURE  06/17/15    EYE SURGERY      FRACTURE SURGERY  2004    Distal Radius Ulna    OTHER SURGICAL HISTORY  2years old    VOLVAR-ULCERATIVE LESION-CAUTERIZED    SKIN BIOPSY      TONSILLECTOMY AND ADENOIDECTOMY       Restrictions  Restrictions/Precautions  Restrictions/Precautions: General Precautions, Fall Risk  Required Braces or Orthoses?: No  Position Activity Restriction  Other position/activity restrictions: Ambulate pt    Subjective   General  Chart Reviewed: No  Patient assessed for rehabilitation services?: Yes  Family / Caregiver Present: No  Diagnosis: SOB, PNA  Pain Assessment  Patient Currently in Pain: Denies     Social/Functional History  Social/Functional History  Lives With: Family (Spouse, dtr, granddtr)  Type of Home: House  Home Layout: Two level, Able to Live on Main level with bedroom/bathroom (Freezer in basement,  can assist)  Home Access: Stairs to enter without rails  Entrance Stairs - Number of Steps: 2 (small steps)  Bathroom Shower/Tub: Tub/Shower unit  Bathroom Toilet: Handicap height  Bathroom Equipment: Grab bars around toilet, Toilet raiser (Pt states she can fit a commode into shower to use as a shower chair however doesnt own an actual shower chair or real grab bars)  Bathroom Accessibility: AROM : WFL  LUE Strength  Gross LUE Strength: WFL  L Hand Grasp: 5/5  L Hand Release: 5/5  RUE Strength  Gross RUE Strength: WFL  R Hand Grasp: 5/5  R Hand Release: 5/5     Assessment   Performance deficits / Impairments: Decreased functional mobility ; Decreased endurance;Decreased balance;Decreased high-level IADLs  Prognosis: Good  Decision Making: Low Complexity  Patient Education: Safety awareness, EC/WS tech's, tripping hazards in home, OTPOC, discharge rec  Discharge Recommendations: Home with assist PRN (Pt recommended to return home with support of family, good support system)  REQUIRES OT FOLLOW UP: Yes  Activity Tolerance  Activity Tolerance: Patient Tolerated treatment well  Safety Devices  Safety Devices in place: Yes  Type of devices: Left in chair;Nurse notified; All fall risk precautions in place;Gait belt;Call light within reach  Restraints  Initially in place: No  OT Equipment Recommendations  Equipment Needed: Yes  Mobility Devices: ADL Assistive Devices  ADL Assistive Devices: Grab Bars - shower; Shower Chair with back        Discharge Recommendations:  Home with assist PRN (Pt recommended to return home with support of family, good support system)     Plan   Plan  Times per week: 4x  Times per day: Daily  Current Treatment Recommendations: Balance Training, Functional Mobility Training, Safety Education & Training, Equipment Evaluation, Education, & procurement, Patient/Caregiver Education & Training, Self-Care / ADL, Home Management Training    G-Code  OT G-codes  Functional Assessment Tool Used: Barthel Index  Score: 17/20  Functional Limitation: Self care  Self Care Current Status ():  At least 1 percent but less than 20 percent impaired, limited or restricted  Self Care Goal Status (): 0 percent impaired, limited or restricted     Goals  Short term goals  Time Frame for Short term goals: Pt will by discharge  Short term goal 1: demo standing during ADL completion for 20 min with 1

## 2017-06-08 NOTE — PROGRESS NOTES
Orientation    Overall Orientation Status: Within Functional Limits  Objective     Transfers  Sit to Stand: Minimal Assistance  Stand to sit: Contact guard assistance  Ambulation  Ambulation?: Yes  Ambulation 1  Surface: level tile  Device: Rolling Walker  Assistance: Contact guard assistance  Quality of Gait: very slow, flexed posture, cues to look up. Distance: 48' x 2  Comments: Pt limited by weakness, endurance  02: 2L via NC. Stairs/Curb  Stairs?: No     Balance  Posture: Fair  Sitting - Static: Good  Sitting - Dynamic: Good;-  Standing - Static: Fair;+ (RW)  Standing - Dynamic: Fair (RW)  Exercises    Seated LE exercise program: [employer], heel/toe raises, and marches. Reps: 20 x each with 2 lb wt on B LE's. Assessment     Body structures, Functions, Activity limitations: Decreased functional mobility ; Decreased strength;Decreased endurance  Assessment: amb 50' x 2 RW CGA. Pt limited by faitgue, endurance. SNF  Prognosis: Good  Decision Making: Medium Complexity  REQUIRES PT FOLLOW UP: Yes  Activity Tolerance  Activity Tolerance: Patient Tolerated treatment well;Patient limited by fatigue;Patient limited by endurance      Discharge Recommendations:  Subacute/Skilled Nursing Facility    G-Code     OutComes Score   Goals  Short term goals  Time Frame for Short term goals: 14 visits  Short term goal 1: Patient will be IND with bed mobility to promote pressure relief for back and buttocks  Short term goal 2: Patient will be IND with transfers to promote safe access to bathroom  Short term goal 3: Patient will amb IND on level surfaces with or without device and without LOB  Short term goal 4: Patient will demo 'good' sitting and standing balance  Patient Goals   Patient goals :  To be able to walk more and not be as short of breath    Plan    Plan  Times per week: 5-6 times a week  Times per day: Daily  Current Treatment Recommendations: Strengthening, Balance Training, Transfer Training, Endurance

## 2017-06-08 NOTE — PLAN OF CARE
Problem: Falls - Risk of  Goal: Absence of falls  Outcome: Ongoing    Problem: Risk for Impaired Skin Integrity  Goal: Tissue integrity - skin and mucous membranes  Structural intactness and normal physiological function of skin and  mucous membranes.    Outcome: Ongoing

## 2017-06-08 NOTE — DISCHARGE SUMMARY
[address] [place] [address]     Department of Internal Medicine - Staff Internal Medicine Service    INPATIENT DISCHARGE SUMMARY      PATIENT IDENTIFICATION:  NAME:  Samantha Ramirez   :   1940  MRN:    [id number]     Acct:    [de-identified]   Admit Date:  6/3/2017  Discharge date:  No discharge date for patient encounter. Attending Provider: Madie Toth MD                                     REASON FOR HOSPITALIZATION:   Samantha Ramirez is a 67 y.o. female who presented with SOB and cough    DIAGNOSES:  Primary:   Pleural effusion [J90]    Secondary: Active Hospital Problems    Diagnosis Date Noted    Pulmonary HTN ([facility] [address] [I27.2] 2015     Priority: High    Mitral regurgitation [I34.0] 2015     Priority: High    Acute pulmonary edema ([address] [J81.0] 2017    Ground glass opacity present on imaging of lung [R91.8] 2017    Coronary artery disease involving native coronary artery [I25.10] 2017    Ischemic cardiomyopathy [I25.5] 2017    HCAP (healthcare-associated pneumonia) [J18.9] 2017    Hypokalemia [E87.6] 2017    Type 2 diabetes mellitus without complication ([address] [N94.3] 2017    Upper respiratory tract infection [J06.9] 2017    Obstructive sleep apnea [G47.33] 2015    Long term current use of anticoagulant therapy [Z79.01] 2015    Gout [M10.9] 2015    Essential hypertension [I10] 2015    Atrial fibrillation ([facility] [address] [I48.91] 2015       TREATMENT:  Brief Inpatient Course:     Yuri Iqbal, [place] female, with significant PMH of RAS, DM, HTD, HLD, CAD, A. Fib (on coumadin). She presented to [facility] on 2017 with acute onset SOB especially with activity. She has multiple sick contacts at home and said couple day earlier she had mild URI symptoms including a hacking cough. Her cough is productive of small amount of brownish sputum. She had no chest pain, no palpitation.  She had a low grade

## 2017-06-08 NOTE — PROGRESS NOTES
Home Oxygen Evaluation    Home Oxygen Evaluation completed. Patient is on 2 liters per minute via nasal cannula.   Resting SpO2 = 96%  Resting SpO2 on room air = 89%    SpO2 on room air with exercise = 88%  SpO2 on oxygen as above with exercise = 92%      MAURICE COFFMAN  4:19 PM

## 2017-06-08 NOTE — CONSULTS
Pulmonary Progress Note  [facility]piratory Specialists      Patient - Yusuf Flannery,  Age - 67 y.o.    - 1940      Room Number - 8502/7870-77   MRN -  [id number]   Acct # - [de-identified]  Date of Admission -  6/3/2017  8:05 PM    SUBJECTIVE  Cough unchanged since bronch. Occ hemoptysis related to biopsy. Prelim cult NOF. Many neutrophils. AFB, fungi, cytol and histopath all pending. Reasonably comfortable. OBJECTIVE    VITALS    height is 5' 5\" (1.651 m) and weight is 199 lb (90.3 kg). Her oral temperature is 98 °F (36.7 °C). Her blood pressure is 135/54 (abnormal) and her pulse is 72. Her respiration is 17 and oxygen saturation is 96%. Temperature Range: Temp: 98 °F (36.7 °C) Temp  Av.9 °F (36.6 °C)  Min: 97.8 °F (36.6 °C)  Max: 98.1 °F (36.7 °C)  BP Range:  Systolic (29JVE), GFC:685 , Min:90 , KUT:644     Diastolic (99CKQ), VHU:16, Min:53, Max:102    Pulse Range: Pulse  Av.1  Min: 61  Max: 116  Respiration Range: Resp  Av.4  Min: 14  Max: 24  Current Pulse Ox[de-identified]  SpO2: 96 %  24HR Pulse Ox Range:  SpO2  Av.5 %  Min: 79 %  Max: 97 %  Oxygen Amount and Delivery:  O2 Flow Rate (L/min): 2 L/min      Wt Readings from Last 3 Encounters:   17 199 lb (90.3 kg)   17 202 lb (91.6 kg)   17 206 lb 3.2 oz (93.5 kg)       I/O (24 Hours)    Intake/Output Summary (Last 24 hours) at 17 1522  Last data filed at 17 0940   Gross per 24 hour   Intake              770 ml   Output              665 ml   Net              105 ml       EXAM  General Appearance  Awake, alert, oriented, mild conversational dyspnea  HEENT - Normal, Head is normocephalic, atraumatic. Neck - Supple, symmetrical, trachea midline   Lungs - Chest expands equally bilaterally upon respiration, end-insp crackles bilat  Cardiovascular - Heart sounds are normal.  Regular rate and rhythm without murmur, gallop or rub.   Abdomen - soft, nontender, nondistended, no masses or organomegaly  Neurologic - Awake, alert, oriented to name, place and time. There are no focal motor or sensory deficits  Skin - No bruising or bleeding  Extremities - No cyanosis, clubbing or edema    MEDS   warfarin  4 mg Oral Once    dextromethorphan  30 mg Oral 2 times per day    mometasone-formoterol  2 puff Inhalation BID    citalopram  20 mg Oral Daily    digoxin  250 mcg Oral Daily    diltiazem  180 mg Oral Daily    isosorbide mononitrate  30 mg Oral Daily    metoprolol tartrate  50 mg Oral BID    ranolazine  500 mg Oral BID    sodium chloride flush  10 mL Intravenous 2 times per day    aspirin  81 mg Oral Daily    insulin lispro  0-6 Units Subcutaneous TID WC    insulin lispro  0-3 Units Subcutaneous Nightly    atorvastatin  40 mg Oral Nightly      sodium chloride 30 mL/hr at 06/07/17 1622    dextrose       albuterol, albuterol sulfate HFA, benzonatate, sodium chloride flush, acetaminophen, magnesium hydroxide, ondansetron, potassium chloride **OR** potassium chloride **OR** potassium chloride, diphenhydrAMINE, glucose, dextrose, glucagon (rDNA), dextrose    LABS  CBC   Recent Labs      06/07/17   0700   WBC  17.2*   HGB  12.2   HCT  36.7   MCV  90.0   PLT  234     BMP: Recent Labs      06/07/17   0700   NA  137   K  3.8   CL  97*   CO2  25   BUN  9   CREATININE  0.56   GLUCOSE  114*     No results found for: PH, PCO2, PO2, HCO3, O2SAT  Lab Results   Component Value Date    MODE NOT REPORTED 06/03/2017     LIVER PROFILE No results for input(s): AST, ALT, LIPASE, BILIDIR, BILITOT, ALKPHOS in the last 72 hours. Invalid input(s):   AMYLASE,  ALB  INR   Recent Labs      06/07/17   1656   INR  1.4     PTT   Lab Results   Component Value Date    APTT 33.6 05/30/2017     Results      Component     Specimen Description     .BRONCHIAL WASHINGS     Special Requests     NOT REPORTED     Direct Exam     < 10 EPITHELIAL CELLS/LPF     Direct Exam     >25 NEUTROPHILS/LPF     Direct Exam (Abnormal)     FEW GRAM POSITIVE COCCI IN CLUSTERS

## 2017-06-08 NOTE — PROGRESS NOTES
[address]    Date:   6/8/2017  Patient name:  Scar Dolan  Date of admission:  6/3/2017  8:05 PM  MRN:   [id number]  YOB: 1940    CC- SOB    HPI-  [place], [place] female, with significant PMH of RAS, DM, HTD, HLD, CAD, A. Fib (on coumadin). She presented to [facility] on 06/03/2017 with acute onset SOB especially with activity. She has multiple sick contacts at home and said couple day earlier she had mild URI symptoms including a hacking cough. Her cough is productive of small amount of brownish sputum. She had no chest pain, no palpitation. She had a low grade fever while in the hospital here. She denied any orthopnea, PND, or leg swelling. She does use CPAP at home. Subjective:    Afebrile, VSS   Patient did not wear  BIPAP  overnight  Reports cough and SOB improved since bronchoscopy  Denies fever, chills, CP    Past Medical History:   Diagnosis Date    Allergic rhinitis 10/1994    Asthma     Atrial fibrillation ([facility] [address] 12/2008    CAD (coronary artery disease)     Clotting disorder (HCC)     plebitis in L leg    DDD (degenerative disc disease), cervical     Degeneration of cervical intervertebral disc     Diverticulosis 2005    Gout     Gout, unspecified     H/O cardiac catheterization 6/17/15    LMCA: Normal 0% stenosis. LAD: Lesion on 1st diag: Proximal subsection. 80% stenosis. Lesion plaque is ruptured. [facility] LCx: Lesion on 1st Ob Leslie: Mid subsection. 85% stenosis. RCA:Lesion on R PDA: Mid subsection. 85% stenosis. Lesion on R PDA: Distal subsection. 70% stenosis. Lesion on Prox RCA: Mid subsection. 50% stenosis. EF 50%.  H/O echocardiogram 11/09/2016    EF 40-45%. Mild LV hypertrophy normal LV cavity size. Sigmoid interventricular septum without evidence of outflow tract obstruction. Left atrium is moderately dilated (34-39) left atrial volume index of 36 ml/m2. Mild mitral regurg.  Diastology cannot be assessed due to A-fib.  History of Holter monitoring 3/24/16    Atrial Fibrillation throughout,fairly controlled, HR  bpm 79% of the time. Occasional high ventricular rate episodes and multiple pauses suggestive of tachycardia/bradycardia syndrome  Msximum R-R interval 2.68 seconds. 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    Long term (current) use of anticoagulants 8/31/2015    Other abnormal glucose 2004    Phlebitis and thrombophlebitis of lower extremities, unspecified ([facility] [address] 1961    L leg    Senile osteoporosis 2006    L/S    Symptomatic menopausal or female climacteric states 06/1995    Syncope and collapse     Type II or unspecified type diabetes mellitus without mention of complication, not stated as uncontrolled 03/2009    Unspecified hereditary and idiopathic peripheral neuropathy 2012    Unspecified sleep apnea 11/2009       Social History     Social History    Marital status:      Spouse name: N/A    Number of children: N/A    Years of education: N/A     Occupational History    Not on file. Social History Main Topics    Smoking status: Never Smoker    Smokeless tobacco: Never Used    Alcohol use No    Drug use: No    Sexual activity: Not on file     Other Topics Concern    Not on file     Social History Narrative       REVIEW OF SYSTEMS:    · Constitutional: Negative for weight loss  · Eyes: Negative for visual changes, diplopia, scleral icterus. · ENT: Negative for Headaches, hearing loss, vertigo, mouth sores, sore throat. · Cardiovascular: Negative for lightheadedness/orthostatic symptoms ,chest pain, positive fr dyspnea on exertion, negative for palpitations or loss of consciousness. · Respiratory: Positive for , sob with exertion, cough and sputum production, negative for wheezing, hemoptysis, pleuritic pain.   · Gastrointestinal: Negative for nausea/vomiting, change in bowel habits, abdominal pain, dysphagia/appetite loss, hematemesis, blood in stools. · Genitourinary:Negative for change in bladder habits, dysuria, trouble voiding, hematuria. · Musculoskeletal: Negative for gait disturbance, weakness, joint complaints. · Integumentary: Negative for rash, pruritis. · Neurological: Negative for headache, dizziness, change in muscle strength, numbness/tingling, change in gait, balance, coordination,   · Psychiatric: negative for change in mood, affect, memory, mentation, behavior. · Endocrine: negative for temperature intolerance, excessive thirst, fluid intake, or urination, tremor. · Hematologic/Lymphatic: negative for abnormal bruising or bleeding, blood clots, swollen lymph nodes. · Allergic/Immunologic: negative for nasal congestion, pruritis, hives. EXAM-  BP (!) 135/54  Pulse 72  Temp 98 °F (36.7 °C) (Oral)   Resp 17  Ht 5' 5\" (1.651 m)  Wt 199 lb (90.3 kg)  SpO2 96%  BMI 33.12 kg/m2     · General appearance: well nourished  · HEENT: Head: Normocephalic, no lesions, without obvious abnormality. · Lungs: positive wheezes, rhonchi, rales bilaterally  · Heart: regular rate and rhythm, S1, S2 normal, no murmur, click, rub or gallop  · Abdomen: soft, non-tender; bowel sounds normal; no masses,  no organomegaly  · Extremities: extremities normal, atraumatic, no cyanosis or edema  · Neurological: Gait normal. Reflexes normal and symmetric. Sensation grossly normal  · Skin - no rash, no lump   · Eye no icterus no redness  · Psych-normal affect   · NEURO-no limb weakness  No facial droop  · Lymphatic system-no lymphadenopathy no splenomegaly      Laboratory Testing:  CBC:   Recent Labs      06/07/17   0700   WBC  17.2*   HGB  12.2   PLT  234     BMP:    Recent Labs      06/07/17   0700   NA  137   K  3.8   CL  97*   CO2  25   BUN  9   CREATININE  0.56   GLUCOSE  114*     CT Scans  Impression   1.  Extensive patchy airspace opacity throughout the lungs bilaterally with   confluent segmental pulmonary consolidation in both lower lobes.  Correlate   clinically for pneumonia.        2. Cardiomegaly.        3. Atherosclerotic disease including coronary arterial disease.        4. Precarinal lymphadenopathy, possibly reactive.            Echo     11/09/2016     CONCLUSIONS    Summary  Global left ventricular function is difficult to assess but appears mildly  reduced with an estimated EF of 40-45%. Mild left ventricular hypertrophy and with normal left ventricular cavity  size. Unable to assess specific wall motion abnormalities due to image quality. The patient has a sigmoid interventricular septum without evidence of  outflow tract obstruction. The left atrium is moderately dilated (34-39) with a left atrial volume  index of 36 ml/m2. Mild mitral regurgitation. Diastology cannot be assessed due to atrial fibrillation. Compared to the previous study of 2/12/2015, EF appear to be reduced from 55  to about 45%. Ventricular rate appear to be uncontrolled on ECG gating. Clinical correlation indicated. Assessment:  Patient Active Problem List   Diagnosis    Pulmonary HTN ([address]    Mitral regurgitation    Atrial fibrillation ([address]    Long term current use of anticoagulant therapy    Essential hypertension    Obstructive sleep apnea    Gout    Upper respiratory tract infection    Type 2 diabetes mellitus without complication ([address]    Coronary artery disease involving native coronary artery    Ischemic cardiomyopathy    HCAP (healthcare-associated pneumonia)    Hypokalemia    Acute pulmonary edema (HCC)    Ground glass opacity present on imaging of lung       ASSESSMENT/PLAN:    1. Pulmonology consulted: Bronchoscopy and transbronchial biopsy performed   2. Bronch: C/S, AFB, Fungi, Cytology pending  3. Coumadin restarted  4. Continue BIPAP  5. Antibiotics D/C; Bacterial pneumonia unlikely  6. Sputum Cx: Negative  7.  Histoplasma antigen negative, ANCA,

## 2017-06-09 LAB
C-REACTIVE PROTEIN: 79.7 MG/L (ref 0–5)
CULTURE: ABNORMAL
CULTURE: ABNORMAL
CULTURE: NORMAL
DIRECT EXAM: ABNORMAL
GLUCOSE BLD-MCNC: 122 MG/DL (ref 65–105)
GLUCOSE BLD-MCNC: 122 MG/DL (ref 65–105)
GLUCOSE BLD-MCNC: 131 MG/DL (ref 65–105)
GLUCOSE BLD-MCNC: 163 MG/DL (ref 65–105)
GLUCOSE BLD-MCNC: 235 MG/DL (ref 65–105)
INR BLD: 1.1
Lab: ABNORMAL
Lab: NORMAL
Lab: NORMAL
PROTHROMBIN TIME: 11.8 SEC (ref 9.4–12.6)
SPECIMEN DESCRIPTION: ABNORMAL
SPECIMEN DESCRIPTION: NORMAL
SPECIMEN DESCRIPTION: NORMAL
STATUS: ABNORMAL
STATUS: NORMAL
STATUS: NORMAL
SURGICAL PATHOLOGY REPORT: NORMAL
SURGICAL PATHOLOGY REPORT: NORMAL

## 2017-06-09 PROCEDURE — 36415 COLL VENOUS BLD VENIPUNCTURE: CPT

## 2017-06-09 PROCEDURE — 6370000000 HC RX 637 (ALT 250 FOR IP): Performed by: HOSPITALIST

## 2017-06-09 PROCEDURE — 86140 C-REACTIVE PROTEIN: CPT

## 2017-06-09 PROCEDURE — 85610 PROTHROMBIN TIME: CPT

## 2017-06-09 PROCEDURE — 86038 ANTINUCLEAR ANTIBODIES: CPT

## 2017-06-09 PROCEDURE — 2580000003 HC RX 258: Performed by: INTERNAL MEDICINE

## 2017-06-09 PROCEDURE — 86200 CCP ANTIBODY: CPT

## 2017-06-09 PROCEDURE — 6370000000 HC RX 637 (ALT 250 FOR IP): Performed by: INTERNAL MEDICINE

## 2017-06-09 PROCEDURE — 82947 ASSAY GLUCOSE BLOOD QUANT: CPT

## 2017-06-09 PROCEDURE — 94640 AIRWAY INHALATION TREATMENT: CPT

## 2017-06-09 PROCEDURE — 97116 GAIT TRAINING THERAPY: CPT

## 2017-06-09 PROCEDURE — 97110 THERAPEUTIC EXERCISES: CPT

## 2017-06-09 PROCEDURE — 1200000000 HC SEMI PRIVATE

## 2017-06-09 PROCEDURE — 99232 SBSQ HOSP IP/OBS MODERATE 35: CPT | Performed by: INTERNAL MEDICINE

## 2017-06-09 PROCEDURE — 99233 SBSQ HOSP IP/OBS HIGH 50: CPT | Performed by: INTERNAL MEDICINE

## 2017-06-09 RX ORDER — ASPIRIN 81 MG/1
81 TABLET, CHEWABLE ORAL DAILY
Qty: 30 TABLET | Refills: 3 | Status: SHIPPED | OUTPATIENT
Start: 2017-06-09 | End: 2017-06-15

## 2017-06-09 RX ORDER — GUAIFENESIN DEXTROMETHORPHAN HYDROBROMIDE ORAL SOLUTION 10; 100 MG/5ML; MG/5ML
5 SOLUTION ORAL NIGHTLY PRN
Status: DISCONTINUED | OUTPATIENT
Start: 2017-06-09 | End: 2017-06-09

## 2017-06-09 RX ORDER — PREDNISONE 20 MG/1
40 TABLET ORAL DAILY
Qty: 30 TABLET | Refills: 0 | Status: SHIPPED | OUTPATIENT
Start: 2017-06-09 | End: 2017-06-15

## 2017-06-09 RX ORDER — WARFARIN SODIUM 7.5 MG/1
7.5 TABLET ORAL
Status: COMPLETED | OUTPATIENT
Start: 2017-06-09 | End: 2017-06-09

## 2017-06-09 RX ORDER — PREDNISONE 20 MG/1
40 TABLET ORAL DAILY
Status: DISCONTINUED | OUTPATIENT
Start: 2017-06-09 | End: 2017-06-15 | Stop reason: HOSPADM

## 2017-06-09 RX ORDER — UREA 10 %
3 LOTION (ML) TOPICAL NIGHTLY
Status: DISCONTINUED | OUTPATIENT
Start: 2017-06-09 | End: 2017-06-15 | Stop reason: HOSPADM

## 2017-06-09 RX ORDER — DEXTROMETHORPHAN POLISTIREX 30 MG/5ML
15 SUSPENSION ORAL NIGHTLY PRN
Status: DISCONTINUED | OUTPATIENT
Start: 2017-06-09 | End: 2017-06-14

## 2017-06-09 RX ADMIN — DIGOXIN 250 MCG: 0.25 TABLET ORAL at 10:25

## 2017-06-09 RX ADMIN — MOMETASONE FUROATE AND FORMOTEROL FUMARATE DIHYDRATE 2 PUFF: 200; 5 AEROSOL RESPIRATORY (INHALATION) at 10:03

## 2017-06-09 RX ADMIN — ISOSORBIDE MONONITRATE 30 MG: 30 TABLET ORAL at 10:25

## 2017-06-09 RX ADMIN — METOPROLOL TARTRATE 50 MG: 50 TABLET, FILM COATED ORAL at 21:50

## 2017-06-09 RX ADMIN — MOMETASONE FUROATE AND FORMOTEROL FUMARATE DIHYDRATE 2 PUFF: 200; 5 AEROSOL RESPIRATORY (INHALATION) at 22:05

## 2017-06-09 RX ADMIN — BENZONATATE 100 MG: 100 CAPSULE ORAL at 20:38

## 2017-06-09 RX ADMIN — ASPIRIN 81 MG: 81 TABLET, CHEWABLE ORAL at 10:26

## 2017-06-09 RX ADMIN — METOPROLOL TARTRATE 50 MG: 50 TABLET, FILM COATED ORAL at 11:56

## 2017-06-09 RX ADMIN — Medication 10 ML: at 21:51

## 2017-06-09 RX ADMIN — DILTIAZEM HYDROCHLORIDE 180 MG: 180 CAPSULE, COATED, EXTENDED RELEASE ORAL at 10:26

## 2017-06-09 RX ADMIN — ATORVASTATIN CALCIUM 40 MG: 40 TABLET, FILM COATED ORAL at 21:50

## 2017-06-09 RX ADMIN — Medication 3 MG: at 21:58

## 2017-06-09 RX ADMIN — RANOLAZINE 500 MG: 500 TABLET, FILM COATED, EXTENDED RELEASE ORAL at 10:25

## 2017-06-09 RX ADMIN — CITALOPRAM HYDROBROMIDE 20 MG: 20 TABLET ORAL at 10:25

## 2017-06-09 RX ADMIN — WARFARIN SODIUM 7.5 MG: 7.5 TABLET ORAL at 20:37

## 2017-06-09 RX ADMIN — Medication 30 MG: at 10:25

## 2017-06-09 RX ADMIN — Medication 10 ML: at 10:28

## 2017-06-09 RX ADMIN — RANOLAZINE 500 MG: 500 TABLET, FILM COATED, EXTENDED RELEASE ORAL at 20:37

## 2017-06-09 RX ADMIN — INSULIN LISPRO 1 UNITS: 100 INJECTION, SOLUTION INTRAVENOUS; SUBCUTANEOUS at 21:50

## 2017-06-09 RX ADMIN — PREDNISONE 40 MG: 20 TABLET ORAL at 14:44

## 2017-06-09 ASSESSMENT — PAIN SCALES - GENERAL: PAINLEVEL_OUTOF10: 0

## 2017-06-09 NOTE — CARE COORDINATION
Social work: spoke with pt who would like to consider 1. [facility] 2. [facility]  3. [facility]  Will fax to all three. However pt requires a precert. Not likely to get before Monday.   Moe ram

## 2017-06-09 NOTE — PROGRESS NOTES
BRONCHOSPASM/BRONCHOCONSTRICTION   [x]  IMPROVE AERATION/BREATH SOUNDS  [x]   ADMINISTER BRONCHODILATOR THERAPY AS APPROPRIATE  [x]   ASSESS BREATH SOUNDS  []   IMPLEMENT AEROSOL/MDI PROTOCOL  [x]   PATIENT EDUCATION AS NEEDED

## 2017-06-09 NOTE — PLAN OF CARE
Problem: Falls - Risk of  Goal: Absence of falls  Outcome: Met This Shift  Pt free from falls and injury. Call light and personal belongings within reach. Bed locked in lowest position with 2/4 side rails up.

## 2017-06-09 NOTE — CARE COORDINATION
Pt has now decided to go to SNF after discussion with physician. Cancelled o2 from [facility].   Notified

## 2017-06-09 NOTE — PROGRESS NOTES
bowel habits, abdominal pain, dysphagia/appetite loss, hematemesis, blood in stools. · Genitourinary:Negative for change in bladder habits, dysuria, trouble voiding, hematuria. · Musculoskeletal: Negative for gait disturbance, weakness, joint complaints. · Integumentary: Negative for rash, pruritis. · Neurological: Negative for headache, dizziness, change in muscle strength, numbness/tingling, change in gait, balance, coordination,   · Psychiatric: negative for change in mood, affect, memory, mentation, behavior. · Endocrine: negative for temperature intolerance, excessive thirst, fluid intake, or urination, tremor. · Hematologic/Lymphatic: negative for abnormal bruising or bleeding, blood clots, swollen lymph nodes. · Allergic/Immunologic: negative for nasal congestion, pruritis, hives. EXAM-  BP (!) 140/96  Pulse 67  Temp 97.5 °F (36.4 °C) (Oral)   Resp 18  Ht 5' 5\" (1.651 m)  Wt 200 lb (90.7 kg)  SpO2 96%  BMI 33.28 kg/m2     · General appearance: well nourished  · HEENT: Head: Normocephalic, no lesions, without obvious abnormality. · Lungs: positive rhonchi, rales bilaterally- improved  · Heart: regular rate and rhythm, S1, S2 normal, no murmur, click, rub or gallop  · Abdomen: soft, non-tender; bowel sounds normal; no masses,  no organomegaly  · Extremities: extremities normal, atraumatic, no cyanosis or edema  · Neurological: Gait normal. Reflexes normal and symmetric. Sensation grossly normal  · Skin - no rash, no lump   · Eye no icterus no redness  · Psych-normal affect   · NEURO-no limb weakness  No facial droop  · Lymphatic system-no lymphadenopathy no splenomegaly      Laboratory Testing:  CBC:   Recent Labs      06/07/17   0700   WBC  17.2*   HGB  12.2   PLT  234     BMP:    Recent Labs      06/07/17   0700   NA  137   K  3.8   CL  97*   CO2  25   BUN  9   CREATININE  0.56   GLUCOSE  114*     CT Scans  Impression   1.  Extensive patchy airspace opacity throughout the lungs bilaterally with   confluent segmental pulmonary consolidation in both lower lobes.  Correlate   clinically for pneumonia.        2. Cardiomegaly.        3. Atherosclerotic disease including coronary arterial disease.        4. Precarinal lymphadenopathy, possibly reactive.            Echo     11/09/2016     CONCLUSIONS    Summary  Global left ventricular function is difficult to assess but appears mildly  reduced with an estimated EF of 40-45%. Mild left ventricular hypertrophy and with normal left ventricular cavity  size. Unable to assess specific wall motion abnormalities due to image quality. The patient has a sigmoid interventricular septum without evidence of  outflow tract obstruction. The left atrium is moderately dilated (34-39) with a left atrial volume  index of 36 ml/m2. Mild mitral regurgitation. Diastology cannot be assessed due to atrial fibrillation. Compared to the previous study of 2/12/2015, EF appear to be reduced from 55  to about 45%. Ventricular rate appear to be uncontrolled on ECG gating. Clinical correlation indicated. Assessment:  Patient Active Problem List   Diagnosis    Pulmonary HTN ([address]    Mitral regurgitation    Atrial fibrillation ([address]    Long term current use of anticoagulant therapy    Essential hypertension    Obstructive sleep apnea    Gout    Upper respiratory tract infection    Type 2 diabetes mellitus without complication ([address]    Coronary artery disease involving native coronary artery    Ischemic cardiomyopathy    HCAP (healthcare-associated pneumonia)    Hypokalemia    Acute pulmonary edema (HCC)    Ground glass opacity present on imaging of lung       ASSESSMENT/PLAN:    1. Pulmonology consulted: Bronchoscopy and transbronchial biopsy performed   2. Bronch: Culture: normal respiratory todd, AFB, Fungi, Cytology pending  3. DVT prophylaxis: Coumadin (pt has A.fib) INR 1.1 subtherapeutic; Pharmacy to dose  4.  Continue BIPAP at

## 2017-06-09 NOTE — PROGRESS NOTES
Physical Therapy  Facility/Department: [facility] 3  Daily Treatment Note  NAME: [place]  : 1940  MRN: [id number]    Date of Service: 2017    Patient Diagnosis(es):   Patient Active Problem List    Diagnosis Date Noted    Pulmonary HTN ([facility] 2015     Priority: High    Mitral regurgitation 2015     Priority: High    Acute pulmonary edema ([facility] 2017    Ground glass opacity present on imaging of lung 2017    Coronary artery disease involving native coronary artery 2017    Ischemic cardiomyopathy 2017    HCAP (healthcare-associated pneumonia) 2017    Hypokalemia 2017    Type 2 diabetes mellitus without complication ([facility]     Upper respiratory tract infection 2017    Atrial fibrillation ([facility] 2015    Long term current use of anticoagulant therapy 2015    Essential hypertension 2015    Obstructive sleep apnea 2015    Gout 2015       Past Medical History:   Diagnosis Date    Allergic rhinitis 10/1994    Asthma     Atrial fibrillation ([facility] 2008    CAD (coronary artery disease)     Clotting disorder (HCC)     plebitis in L leg    DDD (degenerative disc disease), cervical     Degeneration of cervical intervertebral disc     Diverticulosis     Gout     Gout, unspecified     H/O cardiac catheterization 6/17/15    LMCA: Normal 0% stenosis. LAD: Lesion on 1st diag: Proximal subsection. 80% stenosis. Lesion plaque is ruptured. Brandon Dowd LCx: Lesion on 1st Ob Leslie: Mid subsection. 85% stenosis. RCA:Lesion on R PDA: Mid subsection. 85% stenosis. Lesion on R PDA: Distal subsection. 70% stenosis. Lesion on Prox RCA: Mid subsection. 50% stenosis. EF 50%.  H/O echocardiogram 2016    EF 40-45%. Mild LV hypertrophy normal LV cavity size. Sigmoid interventricular septum without evidence of outflow tract obstruction. Left atrium is moderately dilated (34-39) left atrial volume index of 36 ml/m2. Yes  Family / Caregiver Present: No  Subjective  Subjective: Pt agreeable to activity  Pain Screening  Patient Currently in Pain: Denies  Vital Signs  Patient Currently in Pain: Denies       Orientation  Orientation  Overall Orientation Status: Within Functional Limits  Objective      Transfers  Sit to Stand: Minimal Assistance  Stand to sit: Minimal Assistance  Bed to Chair: Minimal assistance  Ambulation  Ambulation?: Yes  More Ambulation?: No  Ambulation 1  Surface: level tile  Device: Rolling Walker  Assistance: Contact guard assistance  Quality of Gait: slow gait, standing rest breaks x 2 for SOB  Distance: 60 ft  Comments: Pt limited by weakness, endurance  Stairs/Curb  Stairs?: No     Balance  Posture: Fair  Sitting - Dynamic: Fair;+  Standing - Static: Fair;+ (rw)  Exercises  Comments: B LE AROM x 15 reps, B UE ther ex with yellow tband x 15-20 reps    Educated pt on exercises and continuing AROM on her own as able to promote strength     Assessment   Body structures, Functions, Activity limitations: Decreased functional mobility ; Decreased strength;Decreased endurance;Decreased balance  Assessment: Pt tolerated ambulation with improved distance today, on 3 LO2. continue to recommend SNF prior to home  Prognosis: Good  REQUIRES PT FOLLOW UP: Yes  Activity Tolerance  Activity Tolerance: Patient Tolerated treatment well       Discharge Recommendations:  Subacute/Skilled Nursing Facility           Goals  Short term goals  Time Frame for Short term goals: 14 visits  Short term goal 1: Patient will be IND with bed mobility to promote pressure relief for back and buttocks  Short term goal 2: Patient will be IND with transfers to promote safe access to bathroom  Short term goal 3: Patient will amb IND on level surfaces with or without device and without LOB  Short term goal 4: Patient will demo 'good' sitting and standing balance  Patient Goals   Patient goals :  To be able to walk more and not be as short of breath    Plan    Plan  Times per week: 5-6 times a week  Times per day: Daily  Current Treatment Recommendations: Strengthening, Balance Training, Transfer Training, Endurance Training, Gait Training, Stair training, Safety Education & Training  Safety Devices  Type of devices:  All fall risk precautions in place, Call light within reach, Gait belt, Left in chair, Nurse notified  Restraints  Initially in place: No     Therapy Time   Individual Concurrent Group Co-treatment   Time In 1250         Time Out 1313         Minutes Aryan PT

## 2017-06-09 NOTE — PROGRESS NOTES
Pulmonary Progress Note  Respiratory Specialists      Patient - Ang Haddad,  Age - 67 y.o.    - 1940      Room Number - 8595/6010-12   N -  [id number]   Acct # - [de-identified]  Date of Admission -  6/3/2017  8:05 PM    SUBJECTIVE  Still coughing and on 3L NC with saturation 95%  Off AB and on steroids  Started this am and WBC is elevated   Biopsy result is pending  She is afebrile and hemodynamically stable  Bronchial washing negative for AFB, fungal culture. Serum histoplasma AG and ANCA levels negative and legionella and streptococcal urine AG negative      OBJECTIVE    VITALS    height is 5' 5\" (1.651 m) and weight is 200 lb (90.7 kg). Her oral temperature is 97.5 °F (36.4 °C). Her blood pressure is 140/96 (abnormal) and her pulse is 67. Her respiration is 18 and oxygen saturation is 92%. Temperature Range: Temp: 97.5 °F (36.4 °C) Temp  Av.8 °F (36.6 °C)  Min: 97.5 °F (36.4 °C)  Max: 98.1 °F (36.7 °C)  BP Range:  Systolic (31KRP), ZEI:356 , Min:118 , HWX:360     Diastolic (63CLR), JZW:39, Min:59, Max:96    Pulse Range: Pulse  Av.4  Min: 64  Max: 82  Respiration Range: Resp  Av.4  Min: 18  Max: 22  Current Pulse Ox[de-identified]  SpO2: 92 %  24HR Pulse Ox Range:  SpO2  Av.2 %  Min: 92 %  Max: 96 %  Oxygen Amount and Delivery:  O2 Flow Rate (L/min): 2 L/min      Wt Readings from Last 3 Encounters:   17 200 lb (90.7 kg)   17 202 lb (91.6 kg)   17 206 lb 3.2 oz (93.5 kg)       I/O (24 Hours)    Intake/Output Summary (Last 24 hours) at 17 1236  Last data filed at 17 1044   Gross per 24 hour   Intake              400 ml   Output             3550 ml   Net            -3150 ml       EXAM  General Appearance  Awake, alert, oriented, mild conversational dyspnea  HEENT - Normal, Head is normocephalic, atraumatic.    Neck - Supple, symmetrical, trachea midline   Lungs - Chest expands equally bilaterally upon respiration, end-insp crackles bilat  Cardiovascular - Heart sounds are normal.  Regular rate and rhythm without murmur, gallop or rub. Abdomen - soft, nontender, nondistended, no masses or organomegaly  Neurologic - Awake, alert, oriented to name, place and time. There are no focal motor or sensory deficits  Skin - No bruising or bleeding  Extremities - No cyanosis, clubbing or edema    MEDS   warfarin  7.5 mg Oral Once    warfarin (COUMADIN) daily dosing (placeholder)   Other RX Placeholder    predniSONE  40 mg Oral Daily    mometasone-formoterol  2 puff Inhalation BID    citalopram  20 mg Oral Daily    digoxin  250 mcg Oral Daily    diltiazem  180 mg Oral Daily    isosorbide mononitrate  30 mg Oral Daily    metoprolol tartrate  50 mg Oral BID    ranolazine  500 mg Oral BID    sodium chloride flush  10 mL Intravenous 2 times per day    aspirin  81 mg Oral Daily    insulin lispro  0-6 Units Subcutaneous TID WC    insulin lispro  0-3 Units Subcutaneous Nightly    atorvastatin  40 mg Oral Nightly      sodium chloride 30 mL/hr at 06/07/17 1622    dextrose       albuterol, albuterol sulfate HFA, benzonatate, sodium chloride flush, acetaminophen, magnesium hydroxide, ondansetron, potassium chloride **OR** potassium chloride **OR** potassium chloride, diphenhydrAMINE, glucose, dextrose, glucagon (rDNA), dextrose    LABS  CBC   Recent Labs      06/07/17   0700   WBC  17.2*   HGB  12.2   HCT  36.7   MCV  90.0   PLT  234     BMP:   Recent Labs      06/07/17   0700   NA  137   K  3.8   CL  97*   CO2  25   BUN  9   CREATININE  0.56   GLUCOSE  114*     No results found for: PH, PCO2, PO2, HCO3, O2SAT  Lab Results   Component Value Date    MODE NOT REPORTED 06/03/2017     LIVER PROFILE No results for input(s): AST, ALT, LIPASE, BILIDIR, BILITOT, ALKPHOS in the last 72 hours. Invalid input(s):   AMYLASE,  ALB  INR   Recent Labs      06/09/17   0617   INR  1.1     PTT   Lab Results   Component Value Date    APTT 33.6 05/30/2017     Results      Component     Specimen  Gout    Upper respiratory tract infection    Type 2 diabetes mellitus without complication (HCC)    Coronary artery disease involving native coronary artery    Ischemic cardiomyopathy    HCAP (healthcare-associated pneumonia)    Hypokalemia    Acute pulmonary edema (HCC)    Ground glass opacity present on imaging of lung       PLAN  1. Wean oxygen as tolerated. Keep O2 sat 90-92%  2. Home O2 eval  3. Prednisone 40mg daily resumed today  4. Follow up lung biopsy. May eventually need open biopsy  5. Get ANCA panel     Jinnie Boas, MD on 6/9/2017 at 12:36 PM    Attending Physician Statement  I have discussed the care of [place], including pertinent history and exam findings with the resident. I have reviewed the key elements of all parts of the encounter with the resident. I have seen and examined the patient with the resident. I agree with the assessment and plan and status of the problem list as documented.   Events noted, CXS, labs and CT scan films reviewed  No overall change in symptoms, continue to have cough, afebrile  Last wbc was 17, no thrombocytopenia  Per patient symptoms started few weeks ago and had similar symptoms in march for 3 weeks and resolved, no exposure, no skin rash and no joints pain and no dysphagia  Ct chest showed patchy areas of alveolar and GG infiltrates bilaterally  ANCA negative both  Bronchial washings and brushings negative for malignancy  TBBX pending  Started on warfarin  Prednios     Addiitionally I recommend:  · Follow up biopsy  · Continue O2 and will need home O2 evaluation  · Prednisone started by Dr Perdue Estimable  · If TBBX not diagnostic will need open lung biopsy  · Will get jackelyn profile and crp and ccp ab    Catherine Simmons MD  6/9/2017 1:40 PM

## 2017-06-09 NOTE — CARE COORDINATION
SOCIAL WORK; advised pt is from [place] and wants snf now. Will need danny, prescriptions and discharge med list along with hens at discharge. [facility] is full and does not anticipate any new openings until next week Weds. Will see what other snf's pt is open to considering.   Linda ram

## 2017-06-09 NOTE — PLAN OF CARE
Problem: Respiratory  Intervention: Respiratory assessment  BRONCHOSPASM/BRONCHOCONSTRICTION       [X]  IMPROVE AERATION/BREATH SOUNDS  [X]        ADMINISTER BRONCHODILATOR THERAPY AS APPROPRIATE  [X]        ASSESS BREATH SOUNDS  [X]        IMPLEMENT AEROSOL/MDI PROTOCOL  [X]        PATIENT EDUCATION AS NEEDED

## 2017-06-10 LAB
ABSOLUTE EOS #: 0 K/UL (ref 0–0.4)
ABSOLUTE LYMPH #: 1.2 K/UL (ref 1–4.8)
ABSOLUTE MONO #: 0.6 K/UL (ref 0.1–1.2)
ALBUMIN SERPL-MCNC: 3.1 G/DL (ref 3.5–5.2)
ALBUMIN/GLOBULIN RATIO: 0.9 (ref 1–2.5)
ALP BLD-CCNC: 73 U/L (ref 35–104)
ALT SERPL-CCNC: 45 U/L (ref 5–33)
ANION GAP SERPL CALCULATED.3IONS-SCNC: 12 MMOL/L (ref 9–17)
ANION GAP SERPL CALCULATED.3IONS-SCNC: 18 MMOL/L (ref 9–17)
ASPERGILLUS ANTIBODY BY ID: NORMAL
AST SERPL-CCNC: 27 U/L
BASOPHILS # BLD: 0 %
BASOPHILS ABSOLUTE: 0 K/UL (ref 0–0.2)
BILIRUB SERPL-MCNC: 0.52 MG/DL (ref 0.3–1.2)
BILIRUBIN URINE: NEGATIVE
BUN BLDV-MCNC: 8 MG/DL (ref 8–23)
BUN BLDV-MCNC: 9 MG/DL (ref 8–23)
BUN/CREAT BLD: ABNORMAL (ref 9–20)
BUN/CREAT BLD: ABNORMAL (ref 9–20)
CALCIUM SERPL-MCNC: 8.8 MG/DL (ref 8.6–10.4)
CALCIUM SERPL-MCNC: 9.3 MG/DL (ref 8.6–10.4)
CHLORIDE BLD-SCNC: 97 MMOL/L (ref 98–107)
CHLORIDE BLD-SCNC: 99 MMOL/L (ref 98–107)
CO2: 23 MMOL/L (ref 20–31)
CO2: 26 MMOL/L (ref 20–31)
COLOR: YELLOW
COMMENT UA: NORMAL
CREAT SERPL-MCNC: 0.47 MG/DL (ref 0.5–0.9)
CREAT SERPL-MCNC: 0.63 MG/DL (ref 0.5–0.9)
CULTURE: NORMAL
DIFFERENTIAL TYPE: ABNORMAL
EOSINOPHILS RELATIVE PERCENT: 0 %
GFR AFRICAN AMERICAN: >60 ML/MIN
GFR AFRICAN AMERICAN: >60 ML/MIN
GFR NON-AFRICAN AMERICAN: >60 ML/MIN
GFR NON-AFRICAN AMERICAN: >60 ML/MIN
GFR SERPL CREATININE-BSD FRML MDRD: ABNORMAL ML/MIN/{1.73_M2}
GLUCOSE BLD-MCNC: 120 MG/DL (ref 70–99)
GLUCOSE BLD-MCNC: 136 MG/DL (ref 65–105)
GLUCOSE BLD-MCNC: 138 MG/DL (ref 70–99)
GLUCOSE BLD-MCNC: 141 MG/DL (ref 65–105)
GLUCOSE BLD-MCNC: 165 MG/DL (ref 65–105)
GLUCOSE URINE: NEGATIVE
HCT VFR BLD CALC: 35.8 % (ref 36–46)
HCT VFR BLD CALC: 39.6 % (ref 36–46)
HEMOGLOBIN: 11.9 G/DL (ref 12–16)
HEMOGLOBIN: 12.9 G/DL (ref 12–16)
HISTOPLASMA ABS, ID: NORMAL
HISTOPLASMA ANTIBODY MYCELIAL CF: NORMAL
HISTOPLASMA ANTIBODY YEAST CF: NORMAL
INR BLD: 1.1
INR BLD: 1.3
KETONES, URINE: NEGATIVE
LEUKOCYTE ESTERASE, URINE: NEGATIVE
LYMPHOCYTES # BLD: 9 %
Lab: NORMAL
MCH RBC QN AUTO: 29.6 PG (ref 26–34)
MCH RBC QN AUTO: 29.9 PG (ref 26–34)
MCHC RBC AUTO-ENTMCNC: 32.5 G/DL (ref 31–37)
MCHC RBC AUTO-ENTMCNC: 33.1 G/DL (ref 31–37)
MCV RBC AUTO: 90.1 FL (ref 80–100)
MCV RBC AUTO: 91 FL (ref 80–100)
MONOCYTES # BLD: 4 %
NITRITE, URINE: NEGATIVE
PARTIAL THROMBOPLASTIN TIME: 38.6 SEC (ref 21.3–31.3)
PARTIAL THROMBOPLASTIN TIME: 57.6 SEC (ref 21.3–31.3)
PDW BLD-RTO: 15.8 % (ref 12.5–15.4)
PDW BLD-RTO: 16.2 % (ref 12.5–15.4)
PH UA: 7 (ref 5–8)
PLATELET # BLD: 249 K/UL (ref 140–450)
PLATELET # BLD: 289 K/UL (ref 140–450)
PLATELET ESTIMATE: ABNORMAL
PMV BLD AUTO: 8.7 FL (ref 6–12)
PMV BLD AUTO: 8.9 FL (ref 6–12)
POTASSIUM SERPL-SCNC: 4.5 MMOL/L (ref 3.7–5.3)
POTASSIUM SERPL-SCNC: 4.8 MMOL/L (ref 3.7–5.3)
PROTEIN UA: NEGATIVE
PROTHROMBIN TIME: 11.9 SEC (ref 9.4–12.6)
PROTHROMBIN TIME: 13.7 SEC (ref 9.4–12.6)
RBC # BLD: 3.97 M/UL (ref 4–5.2)
RBC # BLD: 4.35 M/UL (ref 4–5.2)
RBC # BLD: ABNORMAL 10*6/UL
SEG NEUTROPHILS: 87 %
SEGMENTED NEUTROPHILS ABSOLUTE COUNT: 11.4 K/UL (ref 1.8–7.7)
SODIUM BLD-SCNC: 137 MMOL/L (ref 135–144)
SODIUM BLD-SCNC: 138 MMOL/L (ref 135–144)
SPECIFIC GRAVITY UA: 1.01 (ref 1–1.03)
SPECIMEN DESCRIPTION: NORMAL
STATUS: NORMAL
TOTAL PROTEIN: 6.7 G/DL (ref 6.4–8.3)
TURBIDITY: CLEAR
URINE HGB: NEGATIVE
UROBILINOGEN, URINE: NORMAL
WBC # BLD: 13.2 K/UL (ref 3.5–11)
WBC # BLD: 18.9 K/UL (ref 3.5–11)
WBC # BLD: ABNORMAL 10*3/UL

## 2017-06-10 PROCEDURE — 85025 COMPLETE CBC W/AUTO DIFF WBC: CPT

## 2017-06-10 PROCEDURE — 36415 COLL VENOUS BLD VENIPUNCTURE: CPT

## 2017-06-10 PROCEDURE — 85610 PROTHROMBIN TIME: CPT

## 2017-06-10 PROCEDURE — 6370000000 HC RX 637 (ALT 250 FOR IP): Performed by: HOSPITALIST

## 2017-06-10 PROCEDURE — 87081 CULTURE SCREEN ONLY: CPT

## 2017-06-10 PROCEDURE — 99222 1ST HOSP IP/OBS MODERATE 55: CPT | Performed by: THORACIC SURGERY (CARDIOTHORACIC VASCULAR SURGERY)

## 2017-06-10 PROCEDURE — 85027 COMPLETE CBC AUTOMATED: CPT

## 2017-06-10 PROCEDURE — 97110 THERAPEUTIC EXERCISES: CPT

## 2017-06-10 PROCEDURE — 1200000000 HC SEMI PRIVATE

## 2017-06-10 PROCEDURE — 94660 CPAP INITIATION&MGMT: CPT

## 2017-06-10 PROCEDURE — 87641 MR-STAPH DNA AMP PROBE: CPT

## 2017-06-10 PROCEDURE — 6370000000 HC RX 637 (ALT 250 FOR IP): Performed by: INTERNAL MEDICINE

## 2017-06-10 PROCEDURE — 94762 N-INVAS EAR/PLS OXIMTRY CONT: CPT

## 2017-06-10 PROCEDURE — 94640 AIRWAY INHALATION TREATMENT: CPT

## 2017-06-10 PROCEDURE — 2580000003 HC RX 258: Performed by: INTERNAL MEDICINE

## 2017-06-10 PROCEDURE — 87086 URINE CULTURE/COLONY COUNT: CPT

## 2017-06-10 PROCEDURE — 80048 BASIC METABOLIC PNL TOTAL CA: CPT

## 2017-06-10 PROCEDURE — 99233 SBSQ HOSP IP/OBS HIGH 50: CPT | Performed by: INTERNAL MEDICINE

## 2017-06-10 PROCEDURE — 81003 URINALYSIS AUTO W/O SCOPE: CPT

## 2017-06-10 PROCEDURE — 80053 COMPREHEN METABOLIC PANEL: CPT

## 2017-06-10 PROCEDURE — 97116 GAIT TRAINING THERAPY: CPT

## 2017-06-10 PROCEDURE — 6360000002 HC RX W HCPCS: Performed by: NURSE PRACTITIONER

## 2017-06-10 PROCEDURE — 82947 ASSAY GLUCOSE BLOOD QUANT: CPT

## 2017-06-10 PROCEDURE — 85730 THROMBOPLASTIN TIME PARTIAL: CPT

## 2017-06-10 RX ORDER — HEPARIN SODIUM 1000 [USP'U]/ML
4000 INJECTION, SOLUTION INTRAVENOUS; SUBCUTANEOUS ONCE
Status: COMPLETED | OUTPATIENT
Start: 2017-06-10 | End: 2017-06-10

## 2017-06-10 RX ORDER — HEPARIN SODIUM 10000 [USP'U]/100ML
1000 INJECTION, SOLUTION INTRAVENOUS CONTINUOUS
Status: DISCONTINUED | OUTPATIENT
Start: 2017-06-10 | End: 2017-06-14

## 2017-06-10 RX ORDER — WARFARIN SODIUM 7.5 MG/1
7.5 TABLET ORAL
Status: DISCONTINUED | OUTPATIENT
Start: 2017-06-10 | End: 2017-06-10

## 2017-06-10 RX ORDER — HEPARIN SODIUM 1000 [USP'U]/ML
2000 INJECTION, SOLUTION INTRAVENOUS; SUBCUTANEOUS PRN
Status: DISCONTINUED | OUTPATIENT
Start: 2017-06-10 | End: 2017-06-14

## 2017-06-10 RX ORDER — HEPARIN SODIUM 1000 [USP'U]/ML
4000 INJECTION, SOLUTION INTRAVENOUS; SUBCUTANEOUS PRN
Status: DISCONTINUED | OUTPATIENT
Start: 2017-06-10 | End: 2017-06-14

## 2017-06-10 RX ADMIN — ISOSORBIDE MONONITRATE 30 MG: 30 TABLET ORAL at 09:17

## 2017-06-10 RX ADMIN — RANOLAZINE 500 MG: 500 TABLET, FILM COATED, EXTENDED RELEASE ORAL at 22:04

## 2017-06-10 RX ADMIN — Medication 10 ML: at 09:18

## 2017-06-10 RX ADMIN — MOMETASONE FUROATE AND FORMOTEROL FUMARATE DIHYDRATE 2 PUFF: 200; 5 AEROSOL RESPIRATORY (INHALATION) at 20:32

## 2017-06-10 RX ADMIN — INSULIN LISPRO 1 UNITS: 100 INJECTION, SOLUTION INTRAVENOUS; SUBCUTANEOUS at 22:32

## 2017-06-10 RX ADMIN — DIGOXIN 250 MCG: 0.25 TABLET ORAL at 09:17

## 2017-06-10 RX ADMIN — HEPARIN SODIUM 4000 UNITS: 1000 INJECTION, SOLUTION INTRAVENOUS; SUBCUTANEOUS at 11:59

## 2017-06-10 RX ADMIN — HEPARIN SODIUM 2000 UNITS: 1000 INJECTION, SOLUTION INTRAVENOUS; SUBCUTANEOUS at 18:55

## 2017-06-10 RX ADMIN — HEPARIN SODIUM AND DEXTROSE 1000 UNITS/HR: 10000; 5 INJECTION INTRAVENOUS at 12:36

## 2017-06-10 RX ADMIN — CITALOPRAM HYDROBROMIDE 20 MG: 20 TABLET ORAL at 09:17

## 2017-06-10 RX ADMIN — DILTIAZEM HYDROCHLORIDE 180 MG: 180 CAPSULE, COATED, EXTENDED RELEASE ORAL at 09:18

## 2017-06-10 RX ADMIN — MOMETASONE FUROATE AND FORMOTEROL FUMARATE DIHYDRATE 2 PUFF: 200; 5 AEROSOL RESPIRATORY (INHALATION) at 08:53

## 2017-06-10 RX ADMIN — PREDNISONE 40 MG: 20 TABLET ORAL at 09:17

## 2017-06-10 RX ADMIN — METOPROLOL TARTRATE 50 MG: 50 TABLET, FILM COATED ORAL at 09:18

## 2017-06-10 RX ADMIN — BENZONATATE 100 MG: 100 CAPSULE ORAL at 09:17

## 2017-06-10 RX ADMIN — ASPIRIN 81 MG: 81 TABLET, CHEWABLE ORAL at 09:17

## 2017-06-10 RX ADMIN — METOPROLOL TARTRATE 50 MG: 50 TABLET, FILM COATED ORAL at 22:04

## 2017-06-10 RX ADMIN — RANOLAZINE 500 MG: 500 TABLET, FILM COATED, EXTENDED RELEASE ORAL at 09:17

## 2017-06-10 RX ADMIN — ATORVASTATIN CALCIUM 40 MG: 40 TABLET, FILM COATED ORAL at 22:05

## 2017-06-10 RX ADMIN — Medication 3 MG: at 22:05

## 2017-06-10 ASSESSMENT — PAIN SCALES - GENERAL: PAINLEVEL_OUTOF10: 0

## 2017-06-10 NOTE — PROGRESS NOTES
BRONCHOSPASM/BRONCHOCONSTRICTION   [x]  IMPROVE AERATION/BREATH SOUNDS  [x]   ADMINISTER BRONCHODILATOR THERAPY AS APPROPRIATE  [x]   ASSESS BREATH SOUNDS  []   IMPLEMENT AEROSOL/MDI PROTOCOL  [x]   PATIENT EDUCATION AS NEEDED    CHIO NICOLEatient Assessment complete. Pleural effusion [J90] . Vitals:    06/10/17 0728   BP: (!) 138/97   Pulse: 63   Resp: 16   Temp: 97.5 °F (36.4 °C)   SpO2: 96%   . Patients home meds are   Prior to Admission medications    Medication Sig Start Date End Date Taking?  Authorizing Provider   aspirin 81 MG chewable tablet Take 1 tablet by mouth daily 6/9/17  Yes Maeve Burden MD   mometasone-formoterol [facility]) 200-5 MCG/ACT inhaler Inhale 2 puffs into the lungs 2 times daily 6/9/17  Yes Maeve Burden MD   predniSONE (DELTASONE) 20 MG tablet Take 2 tablets by mouth daily for 10 days 6/9/17 6/19/17 Yes Maeve Burden MD   metoprolol tartrate (LOPRESSOR) 25 MG tablet Take 2 tablets by mouth 2 times daily 6/2/17 Shelda January OSMANY Disla   diltiazem (CARDIZEM CD) 180 MG extended release capsule Take 1 capsule by mouth daily 6/2/17 Shelda January OSMANY Disla   digoxin (LANOXIN) 250 MCG tablet Take 1 tablet by mouth daily 6/2/17 Shel January OSMANY Disla   albuterol sulfate HFA (PROAIR HFA) 108 (90 BASE) MCG/ACT inhaler Inhale 2 puffs into the lungs every 6 hours as needed for Wheezing 6/2/17   Randee Disla PA-C   citalopram (CELEXA) 20 MG tablet TAKE ONE TABLET BY MOUTH ONCE DAILY 4/24/17   Kiley Barragan MD   atorvastatin (LIPITOR) 80 MG tablet Take 0.5 tablets by mouth daily 4/15/17   Thomas Naranjo MD   warfarin (COUMADIN) 7.5 MG tablet TAKE ONE TABLET BY MOUTH ONCE DAILY AS DIRECTED PER PHYSICIAN 3/17/17   Thomas Naranjo MD   ranolazine (RANEXA) 500 MG extended release tablet Take 1 tablet by mouth 2 times daily 9/21/16   Kiley Barragan MD   isosorbide mononitrate (IMDUR) 30 MG CR tablet TAKE ONE TABLET BY MOUTH ONCE DAILY 8/1/16   Kiley Barragan MD   allopurinol (ZYLOPRIM) 300

## 2017-06-10 NOTE — PROGRESS NOTES
AST, ALT, LIPASE, BILIDIR, BILITOT, ALKPHOS in the last 72 hours. Invalid input(s): AMYLASE,  ALB  INR   Recent Labs      06/10/17   0805   INR  1.1     PTT   Lab Results   Component Value Date    APTT 33.6 05/30/2017     Results      Component     Specimen Description     .BRONCHIAL WASHINGS     Special Requests     NOT REPORTED     Direct Exam     < 10 EPITHELIAL CELLS/LPF     Direct Exam     >25 NEUTROPHILS/LPF     Direct Exam (Abnormal)     FEW GRAM POSITIVE COCCI IN CLUSTERS     Culture     NORMAL RESPIRATORY NANCY LIGHT GROWTH     Culture   [facility] [id number] [place] [address] [phone]     Status     Pending       CULTURES      RADIOLOGY  Plain Films  COMPARISON:   None.       HISTORY:   ORDERING SYSTEM PROVIDED HISTORY: to differentiate infiltrate vs nodule. s       FINDINGS:   Extensive patchy airspace opacity is present throughout the lungs bilaterally   with confluent segmental pulmonary consolidation in both lower lobes.  No   discrete concerning pulmonary nodule is identified. Penne Kotyk is no pleural   effusion or pneumothorax.       There is global cardiac enlargement without pericardial effusion.  Calcified   atheromatous plaque is present in the coronary arteries.  Scattered calcified   atheromatous plaque is present in the thoracic aorta which is elongated but   not aneurysmal.  No aortic dissection is present.       The enlarged precarinal lymph node measures up to 13 mm in short axis   diameter.       Limited imaging through the upper abdomen reveals no acute abnormality. Multiple left renal cysts are noted.           Impression   1. Extensive patchy airspace opacity throughout the lungs bilaterally with   confluent segmental pulmonary consolidation in both lower lobes.  Correlate   clinically for pneumonia.       2. Cardiomegaly.       3. Atherosclerotic disease including coronary arterial disease.       4. Precarinal lymphadenopathy, possibly reactive.         (See actual

## 2017-06-10 NOTE — PLAN OF CARE
Problem: Falls - Risk of  Goal: Absence of falls  Outcome: Ongoing  No falls this shift. Hourly rounding done. Call light within reach. Patient calls out appropriately. Problem: Risk for Impaired Skin Integrity  Goal: Tissue integrity - skin and mucous membranes  Structural intactness and normal physiological function of skin and  mucous membranes. Outcome: Ongoing  No signs of skin breakdown. Patient repositions self. Will continue to monitor.      Problem: Musculor/Skeletal Functional Status  Goal: Highest potential functional level  Outcome: Ongoing

## 2017-06-10 NOTE — PROGRESS NOTES
Treatment: Patient with no complaints from previous session. Subjective  Subjective: Pt agreeable to activity  Pain Screening  Patient Currently in Pain: Denies  Vital Signs  Patient Currently in Pain: Denies       Orientation  Orientation  Overall Orientation Status: Within Functional Limits  Objective      Transfers  Sit to Stand: Stand by assistance  Stand to sit: Stand by assistance  Ambulation 1  Surface: level tile  Device: Rolling Walker  Assistance: Contact guard assistance  Quality of Gait: slow gait, no rest breaks. 2 coughing spells  Distance: 100 feet  Comments: Pt limited by weakness, endurance (2L O2)        Exercises  Comments: B LE AROM x 15 reps, B UE ther ex with yellow tband x 15-20 reps                        Assessment   Body structures, Functions, Activity limitations: Decreased functional mobility ; Decreased strength;Decreased endurance;Decreased balance  Assessment: Pt tolerated ambulation with improved distance today, on 2 LO2. Prognosis: Good  Activity Tolerance  Activity Tolerance: Patient limited by fatigue  Activity Tolerance: SOB, coughing spells with AMB       Discharge Recommendations:  Subacute/Skilled Nursing Facility    G-Code     OutComes Score                                                      Goals  Short term goals  Time Frame for Short term goals: 14 visits  Short term goal 1: Patient will be IND with bed mobility to promote pressure relief for back and buttocks  Short term goal 2: Patient will be IND with transfers to promote safe access to bathroom  Short term goal 3: Patient will amb IND on level surfaces with or without device and without LOB  Short term goal 4: Patient will demo 'good' sitting and standing balance  Patient Goals   Patient goals :  To be able to walk more and not be as short of breath    Plan    Plan  Times per week: 5-6 times a week  Times per day: Daily  Current Treatment Recommendations: Strengthening, Balance Training, Transfer Training, Endurance

## 2017-06-10 NOTE — CONSULTS
[facility] Cardiothoracic Surgery  consult    Patient's Name/Date of Birth: [place] / 1940 (59 y.o.)    Date: Sally 10, 2017     Chief Complaint: SOB    HPI: [place] is a 67 y.o. who presented to [facility] on 06/03/2017 with acute onset SOB especially with activity. She has multiple sick contacts at home and said couple day earlier she had mild URI symptoms including a hacking cough. Her cough is productive of small amount of brownish sputum. She denied any orthopnea, PND, or leg swelling. She does use CPAP at home. Pulmonology did not think it was bacterial pneumonia, so antibiotics were discontinued. She had bronchoscopy and transbronchial biopsies performed. From bronchoscopy, AFB, fungi, cytol and histopath all pending. ANCA, Histoplasma antigen, Legionella antigen, mycoplasma antigen all negative. Cx grew out normal respiratory todd. Sputum Cx negative. CXR correlated with pneumonia. CT suggestive of a pneumonitis, open biopsy requested     Past Medical History:   Diagnosis Date    Allergic rhinitis 10/1994    Asthma     Atrial fibrillation ([facility] [address] 12/2008    CAD (coronary artery disease)     Clotting disorder (HCC)     plebitis in L leg    DDD (degenerative disc disease), cervical     Degeneration of cervical intervertebral disc     Diverticulosis 2005    Gout     Gout, unspecified     H/O cardiac catheterization 6/17/15    LMCA: Normal 0% stenosis. LAD: Lesion on 1st diag: Proximal subsection. 80% stenosis. Lesion plaque is ruptured. Allen Boucher LCx: Lesion on 1st Ob Leslie: Mid subsection. 85% stenosis. RCA:Lesion on R PDA: Mid subsection. 85% stenosis. Lesion on R PDA: Distal subsection. 70% stenosis. Lesion on Prox RCA: Mid subsection. 50% stenosis. EF 50%.  H/O echocardiogram 11/09/2016    EF 40-45%. Mild LV hypertrophy normal LV cavity size. Sigmoid interventricular septum without evidence of outflow tract obstruction.   Left atrium is moderately dilated (34-39) left atrial volume index of 36 ml/m2. Mild mitral regurg. Diastology cannot be assessed due to A-fib.  History of Holter monitoring 3/24/16    Atrial Fibrillation throughout,fairly controlled, HR  bpm 79% of the time. Occasional high ventricular rate episodes and multiple pauses suggestive of tachycardia/bradycardia syndrome  Msximum R-R interval 2.68 seconds.    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    Long term (current) use of anticoagulants 8/31/2015    Other abnormal glucose 2004    Phlebitis and thrombophlebitis of lower extremities, unspecified ([facility] [address] 1961    L leg    Senile osteoporosis 2006    L/S    Symptomatic menopausal or female climacteric states 06/1995    Syncope and collapse     Type II or unspecified type diabetes mellitus without mention of complication, not stated as uncontrolled 03/2009    Unspecified hereditary and idiopathic peripheral neuropathy 2012    Unspecified sleep apnea 11/2009     Past Surgical History:   Procedure Laterality Date    BRONCHOSCOPY  6/7/2017    BRONCHOSCOPY BRUSHINGS performed by Néstor Tobias DO at [place] [id number]  6/7/2017    BRONCHOSCOPY/TRANSBRONCHIAL LUNG BIOPSY performed by Néstor Tobias DO at [place] [address]  6/7/2017    BRONCHOSCOPY FLUOROSCOPY performed by Néstor Tobias DO at [address] Right 06/17/2015    COLONOSCOPY  1/2005    DIAGNOSTIC CARDIAC CATH LAB PROCEDURE  06/17/15    EYE SURGERY      FRACTURE SURGERY  2004    Distal Radius Ulna    OTHER SURGICAL HISTORY  2years old    VOLVAR-ULCERATIVE LESION-CAUTERIZED    SKIN BIOPSY      TONSILLECTOMY AND ADENOIDECTOMY       Current Facility-Administered Medications   Medication Dose Route Frequency Provider Last Rate Last Dose    warfarin (COUMADIN) daily dosing (placeholder)   Other RX Placeholder Wang Reddy MD        predniSONE (DELTASONE) tablet 40 mg  40 mg Oral Daily Renae Monsalve MD   40 mg at 06/09/17 1444    melatonin tablet 3 mg  3 mg Oral Nightly Renae Monsalve MD   3 mg at 06/09/17 2158    dextromethorphan (DELSYM) 30 MG/5ML extended release liquid 15 mg  15 mg Oral Nightly PRN Renae Monsalve MD        0.9 % sodium chloride infusion   Intravenous Continuous Edwige Bower DO 30 mL/hr at 06/07/17 1622      mometasone-formoterol ([place]) 200-5 MCG/ACT inhaler 2 puff  2 puff Inhalation BID Renae Monsalve MD   2 puff at 06/10/17 0853    albuterol (PROVENTIL) nebulizer solution 2.5 mg  2.5 mg Nebulization Q6H PRN Renae Monsalve MD        albuterol sulfate  (90 BASE) MCG/ACT inhaler 2 puff  2 puff Inhalation Q6H PRN Natty Tipton MD        benzonatate (TESSALON) capsule 100 mg  100 mg Oral TID PRN Natty Tipton MD   100 mg at 06/09/17 2038    citalopram (CELEXA) tablet 20 mg  20 mg Oral Daily Jim Bush MD   20 mg at 06/09/17 1025    digoxin (LANOXIN) tablet 250 mcg  250 mcg Oral Daily Jim Bush MD   250 mcg at 06/09/17 1025    diltiazem (CARDIZEM CD) extended release capsule 180 mg  180 mg Oral Daily Jim Bush MD   180 mg at 06/09/17 1026    isosorbide mononitrate (IMDUR) extended release tablet 30 mg  30 mg Oral Daily Jim Bush MD   30 mg at 06/09/17 1025    metoprolol tartrate (LOPRESSOR) tablet 50 mg  50 mg Oral BID Jim Bush MD   50 mg at 06/09/17 2150    ranolazine (RANEXA) extended release tablet 500 mg  500 mg Oral BID Jim Bush MD   500 mg at 06/09/17 2037    sodium chloride flush 0.9 % injection 10 mL  10 mL Intravenous 2 times per day Natty Tipton MD   10 mL at 06/09/17 2151    sodium chloride flush 0.9 % injection 10 mL  10 mL Intravenous PRN Natty Tipton MD        acetaminophen (TYLENOL) tablet 650 mg  650 mg Oral Q4H PRN Jim Bush MD   650 mg at 06/03/17 2300    magnesium hydroxide (MILK OF MAGNESIA) 400 MG/5ML suspension 30 mL  30 mL Oral Daily PRN Jim Bush MD        ondansetron (ZOFRAN) injection 4 mg  4 mg Intravenous Q6H PRN Zenia Godfrey MD        aspirin chewable tablet 81 mg  81 mg Oral Daily Zenia Godfrey MD   81 mg at 06/09/17 1026    potassium chloride (KLOR-CON M) extended release tablet 40 mEq  40 mEq Oral PRN Zenia Godfrey MD        Or    potassium chloride 20 MEQ/15ML (10%) oral solution 40 mEq  40 mEq Oral PRN Zenia Godfrey MD        Or    potassium chloride 10 mEq/100 mL IVPB (Peripheral Line)  10 mEq Intravenous PRN Zenia Godfrey MD        insulin lispro (HUMALOG) injection vial 0-6 Units  0-6 Units Subcutaneous TID WC Zenia Godfrey MD   1 Units at 06/06/17 1735    insulin lispro (HUMALOG) injection vial 0-3 Units  0-3 Units Subcutaneous Nightly Zenia Godfrey MD   1 Units at 06/09/17 2150    glucose (GLUTOSE) 40 % oral gel 15 g  15 g Oral PRN Jim Bush MD        dextrose 50 % solution 12.5 g  12.5 g Intravenous PRN Zenia Godfrey MD        glucagon (rDNA) injection 1 mg  1 mg Intramuscular PRN Zenia Godfrey MD        dextrose 5 % solution  100 mL/hr Intravenous PRN Zenia Godfrey MD        atorvastatin (LIPITOR) tablet 40 mg  40 mg Oral Nightly Zenia Godfrey MD   40 mg at 06/09/17 2150     Allergies   Allergen Reactions    Adhesive Tape     Aspercreme [Trolamine Salicylate]     Augmentin [Amoxicillin-Pot Clavulanate]     Carrot [Daucus Carota]     Erythromycin Other (See Comments)     \"whole in stomach\"    Guaifenesin & Derivatives     Milk-Related Compounds     Niacin And Related      Family History   Problem Relation Age of Onset    Heart Disease Mother     Stroke Mother     High Blood Pressure Mother     Cancer Father      lung    Stroke Brother     Heart Disease Maternal Grandmother      Social History     Social History    Marital status:      Spouse name: N/A    Number of children: N/A    Years of education: N/A     Occupational History    Not on file.      Social History Main Topics    Smoking status: Never Smoker    Smokeless tobacco: Never Used    Alcohol REPORTED 06/03/2017    BACTERIA RARE 06/03/2017    CLARITYU Clear 12/07/2016    SPECGRAV 1.020 06/03/2017    LEUKOCYTESUR NEGATIVE 06/03/2017    UROBILINOGEN Normal 06/03/2017    BILIRUBINUR NEGATIVE 06/03/2017    BLOODU Positive 12/07/2016    GLUCOSEU NEGATIVE 06/03/2017    AMORPHOUS NOT REPORTED 06/03/2017                Neck: Supple, no JVD, no carotid bruits  Heart: S1 and S2, no murmurs, no rubs  Lungs: no rales, wheezes, or rhonchi  Abdomen: positive bowel sounds, soft, non-tender, non-distended, no bruits, no masses  Extremities: warm and dry, no varicosities, no edema  Neurologic: alert and oriented x 3, moves all extremities, grasps strong bilaterally    Assessment:  Patient Active Problem List   Diagnosis    Pulmonary HTN ([address]    Mitral regurgitation    Atrial fibrillation (HCC)    Long term current use of anticoagulant therapy    Essential hypertension    Obstructive sleep apnea    Gout    Upper respiratory tract infection    Type 2 diabetes mellitus without complication (HCC)    Coronary artery disease involving native coronary artery    Ischemic cardiomyopathy    HCAP (healthcare-associated pneumonia)    Hypokalemia    Acute pulmonary edema (HCC)    Ground glass opacity present on imaging of lung       Plan:  1. Plan for open lung biopsy on Monday  2.  Hold coumadin place on heparin to be held 3 hrs prior to surgery    Electronically by Tricia Sepulveda MD  on 6/10/2017 at 9:11 AM

## 2017-06-10 NOTE — PROGRESS NOTES
[address]    Date:   6/10/2017  Patient name:  Yina Mendes  Date of admission:  6/3/2017  8:05 PM  MRN:   [id number]  YOB: 1940    CC- SOB    HPI-  [place], [place] female, with significant PMH of RAS, DM, HTD, HLD, CAD, A. Fib (on coumadin). She presented to [place] on 06/03/2017 with acute onset SOB especially with activity. She has multiple sick contacts at home and said couple day earlier she had mild URI symptoms including a hacking cough. Her cough is productive of small amount of brownish sputum. She had no chest pain, no palpitation. She had a low grade fever while in the hospital here. She denied any orthopnea, PND, or leg swelling. She does use CPAP at home. Subjective:    Afebrile, VSS   On 3L NC   Reports cough and SOB improved since bronchoscopy  Denies fever, chills, CP  MARGOT and anti CCP pending   CRP trending down from 248-->79  She agreed to go to SNF       Past Medical History:   Diagnosis Date    Allergic rhinitis 10/1994    Asthma     Atrial fibrillation ([facility] [address] 12/2008    CAD (coronary artery disease)     Clotting disorder (HCC)     plebitis in L leg    DDD (degenerative disc disease), cervical     Degeneration of cervical intervertebral disc     Diverticulosis 2005    Gout     Gout, unspecified     H/O cardiac catheterization 6/17/15    LMCA: Normal 0% stenosis. LAD: Lesion on 1st diag: Proximal subsection. 80% stenosis. Lesion plaque is ruptured. Mordecai Sanborn LCx: Lesion on 1st Ob Leslie: Mid subsection. 85% stenosis. RCA:Lesion on R PDA: Mid subsection. 85% stenosis. Lesion on R PDA: Distal subsection. 70% stenosis. Lesion on Prox RCA: Mid subsection. 50% stenosis. EF 50%.  H/O echocardiogram 11/09/2016    EF 40-45%. Mild LV hypertrophy normal LV cavity size. Sigmoid interventricular septum without evidence of outflow tract obstruction.   Left atrium is moderately dilated (34-39) left atrial pain.  · Gastrointestinal: Negative for nausea/vomiting, change in bowel habits, abdominal pain, dysphagia/appetite loss, hematemesis, blood in stools. · Genitourinary:Negative for change in bladder habits, dysuria, trouble voiding, hematuria. · Musculoskeletal: Negative for gait disturbance, weakness, joint complaints. · Integumentary: Negative for rash, pruritis. · Neurological: Negative for headache, dizziness, change in muscle strength, numbness/tingling, change in gait, balance, coordination,   · Psychiatric: negative for change in mood, affect, memory, mentation, behavior. · Endocrine: negative for temperature intolerance, excessive thirst, fluid intake, or urination, tremor. · Hematologic/Lymphatic: negative for abnormal bruising or bleeding, blood clots, swollen lymph nodes. · Allergic/Immunologic: negative for nasal congestion, pruritis, hives. EXAM-  BP (!) 138/97  Pulse 63  Temp 97.5 °F (36.4 °C) (Oral)   Resp 16  Ht 5' 5\" (1.651 m)  Wt 198 lb 1.6 oz (89.9 kg)  SpO2 96%  BMI 32.97 kg/m2     · General appearance: well nourished  · HEENT: Head: Normocephalic, no lesions, without obvious abnormality. · Lungs: positive rhonchi, rales bilaterally- improved  · Heart: regular rate and rhythm, S1, S2 normal, no murmur, click, rub or gallop  · Abdomen: soft, non-tender; bowel sounds normal; no masses,  no organomegaly  · Extremities: extremities normal, atraumatic, no cyanosis or edema  · Neurological: Gait normal. Reflexes normal and symmetric. Sensation grossly normal  · Skin - no rash, no lump   · Eye no icterus no redness  · Psych-normal affect   · NEURO-no limb weakness  No facial droop  · Lymphatic system-no lymphadenopathy no splenomegaly      Laboratory Testing:  CBC:   No results for input(s): WBC, HGB, PLT in the last 72 hours. BMP:    No results for input(s): NA, K, CL, CO2, BUN, CREATININE, GLUCOSE in the last 72 hours. CT Scans  Impression   1.  Extensive patchy airspace opacity throughout the lungs bilaterally with   confluent segmental pulmonary consolidation in both lower lobes.  Correlate   clinically for pneumonia.        2. Cardiomegaly.        3. Atherosclerotic disease including coronary arterial disease.        4. Precarinal lymphadenopathy, possibly reactive.            Echo     11/09/2016     CONCLUSIONS    Summary  Global left ventricular function is difficult to assess but appears mildly  reduced with an estimated EF of 40-45%. Mild left ventricular hypertrophy and with normal left ventricular cavity  size. Unable to assess specific wall motion abnormalities due to image quality. The patient has a sigmoid interventricular septum without evidence of  outflow tract obstruction. The left atrium is moderately dilated (34-39) with a left atrial volume  index of 36 ml/m2. Mild mitral regurgitation. Diastology cannot be assessed due to atrial fibrillation. Compared to the previous study of 2/12/2015, EF appear to be reduced from 55  to about 45%. Ventricular rate appear to be uncontrolled on ECG gating. Clinical correlation indicated. Assessment:  Patient Active Problem List   Diagnosis    Pulmonary HTN ([address]    Mitral regurgitation    Atrial fibrillation ([address]    Long term current use of anticoagulant therapy    Essential hypertension    Obstructive sleep apnea    Gout    Upper respiratory tract infection    Type 2 diabetes mellitus without complication ([address]    Coronary artery disease involving native coronary artery    Ischemic cardiomyopathy    HCAP (healthcare-associated pneumonia)    Hypokalemia    Acute pulmonary edema (HCC)    Ground glass opacity present on imaging of lung       ASSESSMENT/PLAN:    1. Plan for open lung biopsy , CT surgery consulted by pulm  2. Pulmonology consulted: s/p Bronchoscopy and transbronchial biopsy   3. Bronch: Culture: normal respiratory todd, AFB, Fungi,   4.  DVT prophylaxis: Coumadin (pt has A.fib) INR 1.1

## 2017-06-11 LAB
BLOOD BANK SPECIMEN: NORMAL
CULTURE: NO GROWTH
CULTURE: NORMAL
GLUCOSE BLD-MCNC: 119 MG/DL (ref 65–105)
GLUCOSE BLD-MCNC: 120 MG/DL (ref 65–105)
GLUCOSE BLD-MCNC: 130 MG/DL (ref 65–105)
GLUCOSE BLD-MCNC: 134 MG/DL (ref 65–105)
INR BLD: 1.4
Lab: NORMAL
MRSA, DNA, NASAL: NORMAL
PARTIAL THROMBOPLASTIN TIME: 55.4 SEC (ref 21.3–31.3)
PARTIAL THROMBOPLASTIN TIME: 69.3 SEC (ref 21.3–31.3)
PROTHROMBIN TIME: 15.2 SEC (ref 9.4–12.6)
SPECIMEN DESCRIPTION: NORMAL
SPECIMEN DESCRIPTION: NORMAL
STATUS: NORMAL

## 2017-06-11 PROCEDURE — 6370000000 HC RX 637 (ALT 250 FOR IP): Performed by: INTERNAL MEDICINE

## 2017-06-11 PROCEDURE — 86900 BLOOD TYPING SEROLOGIC ABO: CPT

## 2017-06-11 PROCEDURE — 94640 AIRWAY INHALATION TREATMENT: CPT

## 2017-06-11 PROCEDURE — 99233 SBSQ HOSP IP/OBS HIGH 50: CPT | Performed by: INTERNAL MEDICINE

## 2017-06-11 PROCEDURE — 82947 ASSAY GLUCOSE BLOOD QUANT: CPT

## 2017-06-11 PROCEDURE — 99232 SBSQ HOSP IP/OBS MODERATE 35: CPT | Performed by: INTERNAL MEDICINE

## 2017-06-11 PROCEDURE — 6370000000 HC RX 637 (ALT 250 FOR IP): Performed by: HOSPITALIST

## 2017-06-11 PROCEDURE — 94762 N-INVAS EAR/PLS OXIMTRY CONT: CPT

## 2017-06-11 PROCEDURE — 6360000002 HC RX W HCPCS: Performed by: NURSE PRACTITIONER

## 2017-06-11 PROCEDURE — 85730 THROMBOPLASTIN TIME PARTIAL: CPT

## 2017-06-11 PROCEDURE — 86920 COMPATIBILITY TEST SPIN: CPT

## 2017-06-11 PROCEDURE — 94660 CPAP INITIATION&MGMT: CPT

## 2017-06-11 PROCEDURE — 1200000000 HC SEMI PRIVATE

## 2017-06-11 PROCEDURE — 93005 ELECTROCARDIOGRAM TRACING: CPT

## 2017-06-11 PROCEDURE — 85610 PROTHROMBIN TIME: CPT

## 2017-06-11 PROCEDURE — 86850 RBC ANTIBODY SCREEN: CPT

## 2017-06-11 PROCEDURE — 86901 BLOOD TYPING SEROLOGIC RH(D): CPT

## 2017-06-11 PROCEDURE — 36415 COLL VENOUS BLD VENIPUNCTURE: CPT

## 2017-06-11 RX ADMIN — CITALOPRAM HYDROBROMIDE 20 MG: 20 TABLET ORAL at 09:31

## 2017-06-11 RX ADMIN — ASPIRIN 81 MG: 81 TABLET, CHEWABLE ORAL at 09:31

## 2017-06-11 RX ADMIN — HEPARIN SODIUM AND DEXTROSE 14 UNITS/KG/HR: 10000; 5 INJECTION INTRAVENOUS at 07:06

## 2017-06-11 RX ADMIN — Medication 15 MG: at 22:10

## 2017-06-11 RX ADMIN — DIGOXIN 250 MCG: 0.25 TABLET ORAL at 09:30

## 2017-06-11 RX ADMIN — MOMETASONE FUROATE AND FORMOTEROL FUMARATE DIHYDRATE 2 PUFF: 200; 5 AEROSOL RESPIRATORY (INHALATION) at 20:21

## 2017-06-11 RX ADMIN — METOPROLOL TARTRATE 50 MG: 50 TABLET, FILM COATED ORAL at 09:31

## 2017-06-11 RX ADMIN — Medication 3 MG: at 22:10

## 2017-06-11 RX ADMIN — DILTIAZEM HYDROCHLORIDE 180 MG: 180 CAPSULE, COATED, EXTENDED RELEASE ORAL at 09:31

## 2017-06-11 RX ADMIN — PREDNISONE 40 MG: 20 TABLET ORAL at 09:29

## 2017-06-11 RX ADMIN — RANOLAZINE 500 MG: 500 TABLET, FILM COATED, EXTENDED RELEASE ORAL at 22:09

## 2017-06-11 RX ADMIN — MOMETASONE FUROATE AND FORMOTEROL FUMARATE DIHYDRATE 2 PUFF: 200; 5 AEROSOL RESPIRATORY (INHALATION) at 07:50

## 2017-06-11 RX ADMIN — ATORVASTATIN CALCIUM 40 MG: 40 TABLET, FILM COATED ORAL at 22:10

## 2017-06-11 RX ADMIN — ISOSORBIDE MONONITRATE 30 MG: 30 TABLET ORAL at 09:31

## 2017-06-11 RX ADMIN — RANOLAZINE 500 MG: 500 TABLET, FILM COATED, EXTENDED RELEASE ORAL at 09:32

## 2017-06-11 ASSESSMENT — PAIN SCALES - GENERAL
PAINLEVEL_OUTOF10: 0

## 2017-06-11 NOTE — PROGRESS NOTES
clinically for pneumonia.       2. Cardiomegaly.       3. Atherosclerotic disease including coronary arterial disease.       4. Precarinal lymphadenopathy, possibly reactive. (See actual reports for details)    Pathology  6/9/2017     RIGHT LOWER LOBE LUNG, TRANSBRONCHIAL BIOPSIES:   -BRONCHIAL MUCOSA WITH MILD ACUTE INFLAMMATION AND ALVEOLAR LUNG   TISSUE WITH MILD ACUTE INFLAMMATION (SEE MICROSCOPIC DESCRIPTION). ASSESSMENT  Patient Active Problem List   Diagnosis    Pulmonary HTN ([address]    Non-rheumatic mitral regurgitation    Atrial fibrillation ([address]    Long term current use of anticoagulant therapy    Essential hypertension    Obstructive sleep apnea    Gout    Upper respiratory tract infection    Type 2 diabetes mellitus without complication ([address]    Coronary artery disease involving native coronary artery    Ischemic cardiomyopathy    HCAP (healthcare-associated pneumonia)    Hypokalemia    Acute pulmonary edema (HCC)    Ground glass opacity present on imaging of lung       PLAN  1. Wean oxygen as tolerated. Keep O2 sat 90-92%  2. Prednisone 40mg daily for now  3. Follow jackelyn profile ccp ab  4. Open lung biopsy tomorrow  5.  Continue heparin drip and hold 3-4 hours before surgery      Kenneth Noel MD  6/11/2017 9:29 AM

## 2017-06-11 NOTE — PROGRESS NOTES
[address] [facility]    Date:   6/11/2017  Patient name:  Mary Klein  Date of admission:  6/3/2017  8:05 PM  MRN:   [id number]  YOB: 1940    CC-persistent cough and SOB. SUBJECTIVE  Afebrile. Bradycardia down to 30s without symptomatic. She reports weaning NIV overnight. She continues to have productive yellow cough and dyspnea on exertion. She is on 3L without increased work of breathing. Denies any bleeding in stool. REVIEW OF SYSTEMS:    · General----negative for fatigue, weight loss  · Cardio---negative for chest pain, orthopnea and PND  · Resp Positive for productive cough, and CHANG, Negative for wheeze   · GI negative for nausea and vomiting, no dysphagia         Past Medical History:   Diagnosis Date    Allergic rhinitis 10/1994    Asthma     Atrial fibrillation ([facility] [address] 12/2008    CAD (coronary artery disease)     Clotting disorder (HCC)     plebitis in L leg    DDD (degenerative disc disease), cervical     Degeneration of cervical intervertebral disc     Diverticulosis 2005    Gout     Gout, unspecified     H/O cardiac catheterization 6/17/15    LMCA: Normal 0% stenosis. LAD: Lesion on 1st diag: Proximal subsection. 80% stenosis. Lesion plaque is ruptured. Percell Brakeman LCx: Lesion on 1st Ob Leslie: Mid subsection. 85% stenosis. RCA:Lesion on R PDA: Mid subsection. 85% stenosis. Lesion on R PDA: Distal subsection. 70% stenosis. Lesion on Prox RCA: Mid subsection. 50% stenosis. EF 50%.  H/O echocardiogram 11/09/2016    EF 40-45%. Mild LV hypertrophy normal LV cavity size. Sigmoid interventricular septum without evidence of outflow tract obstruction. Left atrium is moderately dilated (34-39) left atrial volume index of 36 ml/m2. Mild mitral regurg. Diastology cannot be assessed due to A-fib.     History of Holter monitoring 3/24/16    Atrial Fibrillation throughout,fairly controlled, HR  bpm 79% of the time. Occasional high ventricular rate episodes and multiple pauses suggestive of tachycardia/bradycardia syndrome  Msximum R-R interval 2.68 seconds. 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    Long term (current) use of anticoagulants 8/31/2015    Other abnormal glucose 2004    Phlebitis and thrombophlebitis of lower extremities, unspecified ([facility] [address] 1961    L leg    Senile osteoporosis 2006    L/S    Symptomatic menopausal or female climacteric states 06/1995    Syncope and collapse     Type II or unspecified type diabetes mellitus without mention of complication, not stated as uncontrolled 03/2009    Unspecified hereditary and idiopathic peripheral neuropathy 2012    Unspecified sleep apnea 11/2009       Social History     Social History    Marital status:      Spouse name: N/A    Number of children: N/A    Years of education: N/A     Occupational History    Not on file. Social History Main Topics    Smoking status: Never Smoker    Smokeless tobacco: Never Used    Alcohol use No    Drug use: No    Sexual activity: Not on file     Other Topics Concern    Not on file     Social History Narrative           EXAM-  /70  Pulse 58  Temp 98 °F (36.7 °C) (Oral)   Resp 18  Ht 5' 5\" (1.651 m)  Wt 198 lb 1.6 oz (89.9 kg)  SpO2 95%  BMI 32.97 kg/m2     · General appearance: NAD conversant  · Neck  supple, no JVP. · Lungs: Rhonchi and insp rales, no wheezing or BBS. · Heart: irregular irregular S1, S2 normal, no murmur, no carotid bruit.   · Abdomen: soft, non-tender; no masses, no organomegaly  · Extremities: no cyanosis, no edema no clubbing no synovitis  · Skin - no rash or ulcers  ·       Laboratory Testing:  CBC:   Recent Labs      06/10/17   1238   WBC  18.9*   HGB  12.9   PLT  289     BMP:    Recent Labs      06/10/17   1021  06/10/17   1238   NA  137  138   K  4.8  4.5   CL  99  97*   CO2  26 and Biaxin. PLAN:  Continue heparin gtt. Plan for open lung biopsy tomorrow. Continue oxygen therapy. Continue dulera and prednisone 40mg   Hold digoxin and lopressor. On Cardizem ER. Bipap overnight and IS. Cirilo Nugent MD  PGY3  [facility], [address] [place] [address]. Phone [phone]   Fax: [phone]  Answering Service: [phone]    Attending Physician Statement  Patient seen and examined  I have discussed the care of the patient, including pertinent history and exam findings,  with the resident. I have reviewed the key elements of all parts of the encounter with the resident. I agree with the assessment, plan and orders as documented by the resident.     Camille Gamboa

## 2017-06-11 NOTE — PLAN OF CARE
Problem: Risk for Impaired Skin Integrity  Goal: Tissue integrity - skin and mucous membranes  Structural intactness and normal physiological function of skin and  mucous membranes. Outcome: Ongoing  Skin checked this shift and as needed. Pt up to restroom, ambulating in room, and changing position often.

## 2017-06-12 ENCOUNTER — TELEPHONE (OUTPATIENT)
Dept: CARDIOTHORACIC SURGERY | Age: 77
End: 2017-06-12

## 2017-06-12 LAB
ANTI-NUCLEAR ANTIBODY (ANA): NEGATIVE
EKG ATRIAL RATE: 357 BPM
EKG Q-T INTERVAL: 464 MS
EKG QRS DURATION: 144 MS
EKG QTC CALCULATION (BAZETT): 427 MS
EKG R AXIS: -30 DEGREES
EKG T AXIS: 142 DEGREES
EKG VENTRICULAR RATE: 51 BPM
GLUCOSE BLD-MCNC: 110 MG/DL (ref 65–105)
GLUCOSE BLD-MCNC: 127 MG/DL (ref 65–105)
GLUCOSE BLD-MCNC: 156 MG/DL (ref 65–105)
INR BLD: 1.2
PARTIAL THROMBOPLASTIN TIME: 60.6 SEC (ref 21.3–31.3)
PLATELET # BLD: 255 K/UL (ref 140–450)
PROTHROMBIN TIME: 12.5 SEC (ref 9.4–12.6)

## 2017-06-12 PROCEDURE — 94727 GAS DIL/WSHOT DETER LNG VOL: CPT

## 2017-06-12 PROCEDURE — 99231 SBSQ HOSP IP/OBS SF/LOW 25: CPT | Performed by: NURSE PRACTITIONER

## 2017-06-12 PROCEDURE — 94010 BREATHING CAPACITY TEST: CPT

## 2017-06-12 PROCEDURE — 85049 AUTOMATED PLATELET COUNT: CPT

## 2017-06-12 PROCEDURE — 6370000000 HC RX 637 (ALT 250 FOR IP): Performed by: NURSE PRACTITIONER

## 2017-06-12 PROCEDURE — 99233 SBSQ HOSP IP/OBS HIGH 50: CPT | Performed by: INTERNAL MEDICINE

## 2017-06-12 PROCEDURE — 94729 DIFFUSING CAPACITY: CPT

## 2017-06-12 PROCEDURE — 85730 THROMBOPLASTIN TIME PARTIAL: CPT

## 2017-06-12 PROCEDURE — 1200000000 HC SEMI PRIVATE

## 2017-06-12 PROCEDURE — 94640 AIRWAY INHALATION TREATMENT: CPT

## 2017-06-12 PROCEDURE — 85610 PROTHROMBIN TIME: CPT

## 2017-06-12 PROCEDURE — 6370000000 HC RX 637 (ALT 250 FOR IP): Performed by: HOSPITALIST

## 2017-06-12 PROCEDURE — 97535 SELF CARE MNGMENT TRAINING: CPT

## 2017-06-12 PROCEDURE — 99232 SBSQ HOSP IP/OBS MODERATE 35: CPT | Performed by: INTERNAL MEDICINE

## 2017-06-12 PROCEDURE — 6370000000 HC RX 637 (ALT 250 FOR IP): Performed by: INTERNAL MEDICINE

## 2017-06-12 PROCEDURE — 36415 COLL VENOUS BLD VENIPUNCTURE: CPT

## 2017-06-12 PROCEDURE — 6360000002 HC RX W HCPCS: Performed by: NURSE PRACTITIONER

## 2017-06-12 PROCEDURE — 97110 THERAPEUTIC EXERCISES: CPT

## 2017-06-12 PROCEDURE — 94726 PLETHYSMOGRAPHY LUNG VOLUMES: CPT

## 2017-06-12 PROCEDURE — 97116 GAIT TRAINING THERAPY: CPT

## 2017-06-12 PROCEDURE — 88738 HGB QUANT TRANSCUTANEOUS: CPT

## 2017-06-12 PROCEDURE — 94728 AIRWY RESIST BY OSCILLOMETRY: CPT

## 2017-06-12 PROCEDURE — 2580000003 HC RX 258: Performed by: NURSE PRACTITIONER

## 2017-06-12 PROCEDURE — 94762 N-INVAS EAR/PLS OXIMTRY CONT: CPT

## 2017-06-12 PROCEDURE — 82947 ASSAY GLUCOSE BLOOD QUANT: CPT

## 2017-06-12 PROCEDURE — 94760 N-INVAS EAR/PLS OXIMETRY 1: CPT

## 2017-06-12 PROCEDURE — 94660 CPAP INITIATION&MGMT: CPT

## 2017-06-12 RX ORDER — SODIUM CHLORIDE 0.9 % (FLUSH) 0.9 %
10 SYRINGE (ML) INJECTION PRN
Status: DISCONTINUED | OUTPATIENT
Start: 2017-06-12 | End: 2017-06-14

## 2017-06-12 RX ORDER — WARFARIN SODIUM 7.5 MG/1
7.5 TABLET ORAL DAILY
Status: DISCONTINUED | OUTPATIENT
Start: 2017-06-12 | End: 2017-06-12

## 2017-06-12 RX ORDER — SODIUM CHLORIDE 0.9 % (FLUSH) 0.9 %
10 SYRINGE (ML) INJECTION EVERY 12 HOURS SCHEDULED
Status: DISCONTINUED | OUTPATIENT
Start: 2017-06-12 | End: 2017-06-14

## 2017-06-12 RX ORDER — SODIUM CHLORIDE 9 MG/ML
INJECTION, SOLUTION INTRAVENOUS CONTINUOUS
Status: DISCONTINUED | OUTPATIENT
Start: 2017-06-12 | End: 2017-06-14

## 2017-06-12 RX ORDER — CHLORHEXIDINE GLUCONATE 0.12 MG/ML
15 RINSE ORAL ONCE
Status: DISCONTINUED | OUTPATIENT
Start: 2017-06-12 | End: 2017-06-14 | Stop reason: HOSPADM

## 2017-06-12 RX ORDER — DILTIAZEM HYDROCHLORIDE 120 MG/1
120 CAPSULE, COATED, EXTENDED RELEASE ORAL DAILY
Status: DISCONTINUED | OUTPATIENT
Start: 2017-06-13 | End: 2017-06-15 | Stop reason: HOSPADM

## 2017-06-12 RX ADMIN — MUPIROCIN: 20 OINTMENT TOPICAL at 09:44

## 2017-06-12 RX ADMIN — PREDNISONE 40 MG: 20 TABLET ORAL at 08:36

## 2017-06-12 RX ADMIN — INSULIN LISPRO 1 UNITS: 100 INJECTION, SOLUTION INTRAVENOUS; SUBCUTANEOUS at 20:55

## 2017-06-12 RX ADMIN — MOMETASONE FUROATE AND FORMOTEROL FUMARATE DIHYDRATE 2 PUFF: 200; 5 AEROSOL RESPIRATORY (INHALATION) at 19:38

## 2017-06-12 RX ADMIN — ISOSORBIDE MONONITRATE 30 MG: 30 TABLET ORAL at 08:36

## 2017-06-12 RX ADMIN — ASPIRIN 81 MG: 81 TABLET, CHEWABLE ORAL at 08:36

## 2017-06-12 RX ADMIN — MOMETASONE FUROATE AND FORMOTEROL FUMARATE DIHYDRATE 2 PUFF: 200; 5 AEROSOL RESPIRATORY (INHALATION) at 07:40

## 2017-06-12 RX ADMIN — INSULIN LISPRO 1 UNITS: 100 INJECTION, SOLUTION INTRAVENOUS; SUBCUTANEOUS at 17:26

## 2017-06-12 RX ADMIN — ATORVASTATIN CALCIUM 40 MG: 40 TABLET, FILM COATED ORAL at 20:48

## 2017-06-12 RX ADMIN — Medication 3 MG: at 20:48

## 2017-06-12 RX ADMIN — CITALOPRAM HYDROBROMIDE 20 MG: 20 TABLET ORAL at 08:36

## 2017-06-12 RX ADMIN — Medication 15 MG: at 20:48

## 2017-06-12 RX ADMIN — Medication 10 ML: at 08:36

## 2017-06-12 RX ADMIN — MUPIROCIN: 20 OINTMENT TOPICAL at 20:48

## 2017-06-12 RX ADMIN — SODIUM CHLORIDE: 9 INJECTION, SOLUTION INTRAVENOUS at 05:55

## 2017-06-12 RX ADMIN — HEPARIN SODIUM AND DEXTROSE 14.02 UNITS/KG/HR: 10000; 5 INJECTION INTRAVENOUS at 05:01

## 2017-06-12 RX ADMIN — RANOLAZINE 500 MG: 500 TABLET, FILM COATED, EXTENDED RELEASE ORAL at 20:48

## 2017-06-12 RX ADMIN — DILTIAZEM HYDROCHLORIDE 180 MG: 180 CAPSULE, COATED, EXTENDED RELEASE ORAL at 08:36

## 2017-06-12 RX ADMIN — RANOLAZINE 500 MG: 500 TABLET, FILM COATED, EXTENDED RELEASE ORAL at 08:36

## 2017-06-12 ASSESSMENT — PAIN SCALES - WONG BAKER
WONGBAKER_NUMERICALRESPONSE: 0

## 2017-06-12 ASSESSMENT — PAIN DESCRIPTION - DESCRIPTORS: DESCRIPTORS: ACHING

## 2017-06-12 ASSESSMENT — PAIN SCALES - GENERAL: PAINLEVEL_OUTOF10: 0

## 2017-06-12 ASSESSMENT — PAIN DESCRIPTION - FREQUENCY: FREQUENCY: CONTINUOUS

## 2017-06-12 ASSESSMENT — PAIN DESCRIPTION - PAIN TYPE: TYPE: SURGICAL PAIN

## 2017-06-12 NOTE — PROGRESS NOTES
NC  Subjective   General  Chart Reviewed: No  Patient assessed for rehabilitation services?: Yes  Family / Caregiver Present: No  Diagnosis: SOB, PNA  Pain Assessment  Patient Currently in Pain: Denies  Objective    ADL  Feeding: Independent;Setup  Grooming: Supervision (Standing at sink)  Toileting: Supervision     Balance  Sitting Balance: Independent  Standing Balance: Supervision  Standing Balance  Time: Pt stood for approx 10 min total for transfers, dyn mob, tressa care after toileting and grooming at sink  Sit to stand: Stand by assistance  Stand to sit: Stand by assistance  Functional Mobility  Functional - Mobility Device: Standard Walker  Activity: To/from bathroom  Assist Level: Supervision  Functional Mobility Comments: Slow gait, no LOB noted  Toilet Transfers  Toilet - Technique: Ambulating  Equipment Used: Standard toilet  Toilet Transfer: Supervision     Transfers  Stand Step Transfers: Supervision  Sit to stand: Stand by assistance  Stand to sit: Stand by assistance      Assessment   Performance deficits / Impairments: Decreased ADL status; Decreased endurance  Treatment Diagnosis: Heart Failure, A-Fib, RLL PNA  Prognosis: Good  Patient Education: Educ OT POC  Discharge Recommendations: Home with assist PRN (Pt recommended to return home with support of family, good support system)  REQUIRES OT FOLLOW UP: Yes  Activity Tolerance  Activity Tolerance: Patient Tolerated treatment well  Safety Devices  Safety Devices in place: Yes  Type of devices: All fall risk precautions in place;Call light within reach; Left in chair;Nurse notified       Discharge Recommendations:  Home with assist PRN (Pt recommended to return home with support of family, good support system)     Plan   Plan  Times per week: 4x  Times per day: Daily  Current Treatment Recommendations: Balance Training, Functional Mobility Training, Safety Education & Training, Equipment Evaluation, Education, & procurement, Patient/Caregiver Education

## 2017-06-12 NOTE — PROGRESS NOTES
[address]    Date:   6/12/2017  Patient name:  Rachael Hill  Date of admission:  6/3/2017  8:05 PM  MRN:   [id number]  YOB: 1940    CC-persistent cough and SOB. SUBJECTIVE  Afebrile. Bradycardic overnight despite holding lopressor. She reports weaning NIV overnight. She continues to have productive yellow cough. She is on 3L. She reports ambulating. REVIEW OF SYSTEMS:    · General----negative for fatigue, weight loss  · Cardio---negative for chest pain, orthopnea and PND  · Resp Positive for productive cough, and CHANG, Negative for wheeze   · GI negative for nausea and vomiting, no dysphagia         Past Medical History:   Diagnosis Date    Allergic rhinitis 10/1994    Asthma     Atrial fibrillation ([facility] [address] 12/2008    CAD (coronary artery disease)     Clotting disorder (HCC)     plebitis in L leg    DDD (degenerative disc disease), cervical     Degeneration of cervical intervertebral disc     Diverticulosis 2005    Gout     Gout, unspecified     H/O cardiac catheterization 6/17/15    LMCA: Normal 0% stenosis. LAD: Lesion on 1st diag: Proximal subsection. 80% stenosis. Lesion plaque is ruptured. [facility] LCx: Lesion on 1st Ob Leslie: Mid subsection. 85% stenosis. RCA:Lesion on R PDA: Mid subsection. 85% stenosis. Lesion on R PDA: Distal subsection. 70% stenosis. Lesion on Prox RCA: Mid subsection. 50% stenosis. EF 50%.  H/O echocardiogram 11/09/2016    EF 40-45%. Mild LV hypertrophy normal LV cavity size. Sigmoid interventricular septum without evidence of outflow tract obstruction. Left atrium is moderately dilated (34-39) left atrial volume index of 36 ml/m2. Mild mitral regurg. Diastology cannot be assessed due to A-fib.  History of Holter monitoring 3/24/16    Atrial Fibrillation throughout,fairly controlled, HR  bpm 79% of the time.  Occasional high ventricular rate episodes and multiple pauses suggestive of tachycardia/bradycardia syndrome  Msximum R-R interval 2.68 seconds. 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    Long term (current) use of anticoagulants 8/31/2015    Other abnormal glucose 2004    Phlebitis and thrombophlebitis of lower extremities, unspecified ([facility] [address] 1961    L leg    Senile osteoporosis 2006    L/S    Symptomatic menopausal or female climacteric states 06/1995    Syncope and collapse     Type II or unspecified type diabetes mellitus without mention of complication, not stated as uncontrolled 03/2009    Unspecified hereditary and idiopathic peripheral neuropathy 2012    Unspecified sleep apnea 11/2009       Social History     Social History    Marital status:      Spouse name: N/A    Number of children: N/A    Years of education: N/A     Occupational History    Not on file. Social History Main Topics    Smoking status: Never Smoker    Smokeless tobacco: Never Used    Alcohol use No    Drug use: No    Sexual activity: Not on file     Other Topics Concern    Not on file     Social History Narrative           EXAM-  /76  Pulse 62  Temp 97.4 °F (36.3 °C) (Oral)   Resp 17  Ht 5' 5\" (1.651 m)  Wt 199 lb 15.3 oz (90.7 kg) Comment: per stand up sacle  SpO2 98%  BMI 33.27 kg/m2     · General appearance: NAD conversant  · Neck  supple, no JVP. · Lungs:  insp rales, no wheezing, equal air entry  · Heart: irregular irregular S1, S2 normal, no murmur, no carotid bruit.   · Abdomen: soft, non-tender; no masses, no organomegaly  · Extremities: no cyanosis, no edema no clubbing no synovitis  · Skin - no rash or ulcers  ·       Laboratory Testing:  CBC:   Recent Labs      06/10/17   1238  06/12/17   0401   WBC  18.9*   --    HGB  12.9   --    PLT  289  255     BMP:    Recent Labs      06/10/17   1021  06/10/17   1238   NA  137  138   K  4.8  4.5   CL  99  97*   CO2

## 2017-06-12 NOTE — PROGRESS NOTES
BRONCHOSPASM/BRONCHOCONSTRICTION   [x]  IMPROVE AERATION/BREATH SOUNDS  [x]   ADMINISTER BRONCHODILATOR THERAPY AS APPROPRIATE  [x]   ASSESS BREATH SOUNDS  [x]   IMPLEMENT AEROSOL/MDI PROTOCOL  [x]   PATIENT EDUCATION AS NEEDED    NON INVASIVE VENTILATION  PROVIDE OPTIMAL VENTILATION/ACCEPTABLE SP02  IMPLEMENT NON INVASIVE VENTILATION PROTOCOL  ASSESSMENT SKIN INTEGRITY  PATIENT EDUCATION AS NEEDED  BIPAP AS NEEDED

## 2017-06-12 NOTE — PLAN OF CARE
Problem: Falls - Risk of  Goal: Absence of falls  Outcome: Ongoing  Pt encouraged to call out for any/all needs. Call light, bedside table within reach.

## 2017-06-12 NOTE — PROGRESS NOTES
parts of the encounter with the resident. I have seen and examined the patient with the resident. I agree with the assessment and plan and status of the problem list as documented.   No change in symptoms, positive cough and rhonci R>L and crackles  MARGOT negative, CCP ab pending, anca negative   Open lung biopsy tomorrow  Continue O2 and continue current dose of prednisone  On heparin drip for a fib  Will get PFTs today      Delon Wood MD  6/12/2017 5:51 PM

## 2017-06-12 NOTE — PROGRESS NOTES
[facility]  Progress Note          Subjective:  Ms. Shalini Bates feels well today with no acute complaints. On 3L oxygen, appears comfortable. NIV overnight. Physical Exam  Vitals:  Vitals:    06/12/17 0940   BP:    Pulse:    Resp:    Temp: 97.4 °F (36.3 °C)   SpO2:      I/O last 3 completed shifts: In: 2662 [P.O.:840; I.V.:445]  Out: 0253 [Urine:4050]  I/O this shift:  In: -   Out: 400 [Urine:400]      General: Alert and Oriented x3. Up in chair. No apparent distress. HEENT:  Normocephalic and atraumatic. PERRL. EOMI. Lips and oral mucosa moist and without lesions. Neck:  Supple. Trachea midline. Chest:  No abnormality. Equal and symmetric expansion with respiration. Cough  Lungs:  Coarse throughout  Cardiac:  Regular rate and rhythm without murmurs, rubs or gallops. Abdomen:  Soft, non-tender, Normoactive bowel sounds. No masses or organomegaly. Extremities:  No edema. Intact pulses in all four extremities. Musculoskeletal:  Intact range of motion of peripheral joints. grossly normal  Neurologic:  Cranial nerves are grossly intact. Non-focal sensory deficits on exam.  Psychiatric: Mood and affect are appropriate.         Current Medications:    Current Facility-Administered Medications:     0.9 % sodium chloride infusion, , Intravenous, Continuous, Addi Ports, CNP, Last Rate: 75 mL/hr at 06/12/17 0555    sodium chloride flush 0.9 % injection 10 mL, 10 mL, Intravenous, 2 times per day, Addi Ports, CNP, 10 mL at 06/12/17 0836    sodium chloride flush 0.9 % injection 10 mL, 10 mL, Intravenous, PRN, Addi Ports, CNP    chlorhexidine (PERIDEX) 0.12 % solution 15 mL, 15 mL, Mouth/Throat, Once, Addi Ports, CNP    ceFAZolin (ANCEF) 2 g in dextrose 5 % 50 mL IVPB, 2 g, Intravenous, On Call to OR, Addi Ports, CNP    mupirocin (BACTROBAN) 2 % ointment, , Nasal, BID, Addi Ports, CNP    Chlorhexidine Gluconate 4 % SOLN, , Topical, See Admin Instructions, Addi Ports,

## 2017-06-12 NOTE — PROGRESS NOTES
of 36 ml/m2. Mild mitral regurg. Diastology cannot be assessed due to A-fib.  History of Holter monitoring 3/24/16    Atrial Fibrillation throughout,fairly controlled, HR  bpm 79% of the time. Occasional high ventricular rate episodes and multiple pauses suggestive of tachycardia/bradycardia syndrome  Msximum R-R interval 2.68 seconds.    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    Long term (current) use of anticoagulants 8/31/2015    Other abnormal glucose 2004    Phlebitis and thrombophlebitis of lower extremities, unspecified ([facility] [address] 1961    L leg    Senile osteoporosis 2006    L/S    Symptomatic menopausal or female climacteric states 06/1995    Syncope and collapse     Type II or unspecified type diabetes mellitus without mention of complication, not stated as uncontrolled 03/2009    Unspecified hereditary and idiopathic peripheral neuropathy 2012    Unspecified sleep apnea 11/2009     Past Surgical History:   Procedure Laterality Date    BRONCHOSCOPY  6/7/2017    [place]ON[place]OS[place] BRUSHINGS [place] [facility] [place] at [facility]  6/7/2017    BRONCHOSCOPY/TRANSBRONCHIAL LUNG BIOPSY performed by [facility] [place] [place] at [facility]  6/7/2017    BRONCHOSCOPY FLUOROSCOPY performed by [facility] [place]note 2[place] DO at [address] Right 06/17/2015    COLONOSCOPY  1/2005    DIAGNOSTIC CARDIAC CATH LAB PROCEDURE  06/17/15    EYE SURGERY      FRACTURE SURGERY  2004    Distal Radius Ulna    OTHER SURGICAL HISTORY  2years old    VOLVAR-ULCERATIVE LESION-CAUTERIZED    SKIN BIOPSY      TONSILLECTOMY AND ADENOIDECTOMY         Restrictions  Restrictions/Precautions  Restrictions/Precautions: Cardiac, General Precautions, Fall Risk  Required Braces or Orthoses?: No  Position Activity Restriction  Other position/activity restrictions: up with assistance, 3L  Subjective walk more and not be as short of breath    Plan    Plan  Times per week: 5-6 times a week  Times per day: Daily  Current Treatment Recommendations: Strengthening, Balance Training, Transfer Training, Endurance Training, Gait Training, Stair training, Safety Education & Training  Safety Devices  Type of devices: Call light within reach, Nurse notified, Left in chair  Restraints  Initially in place: No     Therapy Time   Individual Concurrent Group Co-treatment   Time In 0857         Time Out 0924         Minutes Justin Red PTA

## 2017-06-13 LAB
ABSOLUTE EOS #: 0 K/UL (ref 0–0.4)
ABSOLUTE LYMPH #: 2.5 K/UL (ref 1–4.8)
ABSOLUTE MONO #: 0.5 K/UL (ref 0.1–1.2)
ANION GAP SERPL CALCULATED.3IONS-SCNC: 12 MMOL/L (ref 9–17)
BASOPHILS # BLD: 1 %
BASOPHILS ABSOLUTE: 0.1 K/UL (ref 0–0.2)
BUN BLDV-MCNC: 10 MG/DL (ref 8–23)
BUN/CREAT BLD: ABNORMAL (ref 9–20)
C-REACTIVE PROTEIN: 11.2 MG/L (ref 0–5)
CALCIUM SERPL-MCNC: 8.7 MG/DL (ref 8.6–10.4)
CCP IGG ANTIBODIES: <1.5 U/ML
CHLORIDE BLD-SCNC: 102 MMOL/L (ref 98–107)
CO2: 27 MMOL/L (ref 20–31)
CREAT SERPL-MCNC: 0.65 MG/DL (ref 0.5–0.9)
DIFFERENTIAL TYPE: ABNORMAL
EOSINOPHILS RELATIVE PERCENT: 0 %
GFR AFRICAN AMERICAN: >60 ML/MIN
GFR NON-AFRICAN AMERICAN: >60 ML/MIN
GFR SERPL CREATININE-BSD FRML MDRD: ABNORMAL ML/MIN/{1.73_M2}
GFR SERPL CREATININE-BSD FRML MDRD: ABNORMAL ML/MIN/{1.73_M2}
GLUCOSE BLD-MCNC: 111 MG/DL (ref 65–105)
GLUCOSE BLD-MCNC: 121 MG/DL (ref 70–99)
GLUCOSE BLD-MCNC: 171 MG/DL (ref 65–105)
GLUCOSE BLD-MCNC: 186 MG/DL (ref 65–105)
GLUCOSE BLD-MCNC: 187 MG/DL (ref 65–105)
HCT VFR BLD CALC: 35.1 % (ref 36–46)
HEMOGLOBIN: 11.5 G/DL (ref 12–16)
INR BLD: 1.1
LYMPHOCYTES # BLD: 17 %
MCH RBC QN AUTO: 29.7 PG (ref 26–34)
MCHC RBC AUTO-ENTMCNC: 32.8 G/DL (ref 31–37)
MCV RBC AUTO: 90.5 FL (ref 80–100)
MONOCYTES # BLD: 4 %
PARTIAL THROMBOPLASTIN TIME: 67.9 SEC (ref 21.3–31.3)
PDW BLD-RTO: 16.5 % (ref 12.5–15.4)
PLATELET # BLD: 267 K/UL (ref 140–450)
PLATELET ESTIMATE: ABNORMAL
PMV BLD AUTO: 8.8 FL (ref 6–12)
POTASSIUM SERPL-SCNC: 4.9 MMOL/L (ref 3.7–5.3)
PROTHROMBIN TIME: 12.1 SEC (ref 9.4–12.6)
RBC # BLD: 3.88 M/UL (ref 4–5.2)
RBC # BLD: ABNORMAL 10*6/UL
SEG NEUTROPHILS: 78 %
SEGMENTED NEUTROPHILS ABSOLUTE COUNT: 11.7 K/UL (ref 1.8–7.7)
SODIUM BLD-SCNC: 141 MMOL/L (ref 135–144)
WBC # BLD: 14.8 K/UL (ref 3.5–11)
WBC # BLD: ABNORMAL 10*3/UL

## 2017-06-13 PROCEDURE — 6360000002 HC RX W HCPCS: Performed by: NURSE PRACTITIONER

## 2017-06-13 PROCEDURE — 94762 N-INVAS EAR/PLS OXIMTRY CONT: CPT

## 2017-06-13 PROCEDURE — 99233 SBSQ HOSP IP/OBS HIGH 50: CPT | Performed by: INTERNAL MEDICINE

## 2017-06-13 PROCEDURE — 86140 C-REACTIVE PROTEIN: CPT

## 2017-06-13 PROCEDURE — 97116 GAIT TRAINING THERAPY: CPT

## 2017-06-13 PROCEDURE — 85025 COMPLETE CBC W/AUTO DIFF WBC: CPT

## 2017-06-13 PROCEDURE — 6370000000 HC RX 637 (ALT 250 FOR IP): Performed by: INTERNAL MEDICINE

## 2017-06-13 PROCEDURE — 97110 THERAPEUTIC EXERCISES: CPT

## 2017-06-13 PROCEDURE — 85610 PROTHROMBIN TIME: CPT

## 2017-06-13 PROCEDURE — 82947 ASSAY GLUCOSE BLOOD QUANT: CPT

## 2017-06-13 PROCEDURE — 1200000000 HC SEMI PRIVATE

## 2017-06-13 PROCEDURE — 36415 COLL VENOUS BLD VENIPUNCTURE: CPT

## 2017-06-13 PROCEDURE — 6370000000 HC RX 637 (ALT 250 FOR IP): Performed by: HOSPITALIST

## 2017-06-13 PROCEDURE — 94660 CPAP INITIATION&MGMT: CPT

## 2017-06-13 PROCEDURE — 80048 BASIC METABOLIC PNL TOTAL CA: CPT

## 2017-06-13 PROCEDURE — 94640 AIRWAY INHALATION TREATMENT: CPT

## 2017-06-13 PROCEDURE — 85730 THROMBOPLASTIN TIME PARTIAL: CPT

## 2017-06-13 RX ADMIN — Medication 3 MG: at 22:36

## 2017-06-13 RX ADMIN — METOPROLOL TARTRATE 12.5 MG: 25 TABLET ORAL at 12:44

## 2017-06-13 RX ADMIN — DILTIAZEM HYDROCHLORIDE 120 MG: 120 CAPSULE, COATED, EXTENDED RELEASE ORAL at 09:59

## 2017-06-13 RX ADMIN — METOPROLOL TARTRATE 12.5 MG: 25 TABLET ORAL at 20:59

## 2017-06-13 RX ADMIN — ASPIRIN 81 MG: 81 TABLET, CHEWABLE ORAL at 09:59

## 2017-06-13 RX ADMIN — ATORVASTATIN CALCIUM 40 MG: 40 TABLET, FILM COATED ORAL at 20:59

## 2017-06-13 RX ADMIN — HEPARIN SODIUM AND DEXTROSE 14.02 UNITS/KG/HR: 10000; 5 INJECTION INTRAVENOUS at 03:30

## 2017-06-13 RX ADMIN — INSULIN LISPRO 1 UNITS: 100 INJECTION, SOLUTION INTRAVENOUS; SUBCUTANEOUS at 12:44

## 2017-06-13 RX ADMIN — MUPIROCIN: 20 OINTMENT TOPICAL at 20:59

## 2017-06-13 RX ADMIN — PREDNISONE 40 MG: 20 TABLET ORAL at 09:59

## 2017-06-13 RX ADMIN — CITALOPRAM HYDROBROMIDE 20 MG: 20 TABLET ORAL at 09:59

## 2017-06-13 RX ADMIN — INSULIN LISPRO 1 UNITS: 100 INJECTION, SOLUTION INTRAVENOUS; SUBCUTANEOUS at 16:17

## 2017-06-13 RX ADMIN — RANOLAZINE 500 MG: 500 TABLET, FILM COATED, EXTENDED RELEASE ORAL at 09:59

## 2017-06-13 RX ADMIN — MOMETASONE FUROATE AND FORMOTEROL FUMARATE DIHYDRATE 2 PUFF: 200; 5 AEROSOL RESPIRATORY (INHALATION) at 08:07

## 2017-06-13 RX ADMIN — RANOLAZINE 500 MG: 500 TABLET, FILM COATED, EXTENDED RELEASE ORAL at 20:59

## 2017-06-13 RX ADMIN — ISOSORBIDE MONONITRATE 30 MG: 30 TABLET ORAL at 09:59

## 2017-06-13 ASSESSMENT — PAIN SCALES - GENERAL
PAINLEVEL_OUTOF10: 0
PAINLEVEL_OUTOF10: 0
PAINLEVEL_OUTOF10: 1
PAINLEVEL_OUTOF10: 0
PAINLEVEL_OUTOF10: 0

## 2017-06-13 ASSESSMENT — PAIN SCALES - WONG BAKER

## 2017-06-13 NOTE — PROGRESS NOTES
Nutrition Assessment    Type and Reason for Visit:  (Pt is seed for LOS)    Malnutrition Assessment:  · Malnutrition Status:      Nutrition Diagnosis:   · Problem: No nutrition diagnosis at this time  · Etiology:      Signs and symptoms:      Nutrition Assessment:  · Subjective Assessment: Pt states she is eating well. Appetite is normal, does not like some of the hospital foods. No assessment is needed at this time. Will continue to monitor for nutrition risk. Nutrition Risk Level   Risk Level: Low    Nutrition Intervention  Food and/or Delivery:    Nutrition Education/Counseling/Coordination of Care:       Patient assessed for nutrition risk. Deemed to be at low risk at this time. Will continue to follow patient.       Electronically signed by Elaine Vail RD, LD on 6/13/17 at 3:21 PM    Contact Number: [phone]

## 2017-06-13 NOTE — PROGRESS NOTES
[address]    Date:   6/13/2017  Patient name:  Scar Dolan  Date of admission:  6/3/2017  8:05 PM  MRN:   [id number]  YOB: 1940    CC-persistent cough and SOB. SUBJECTIVE  Afebrile. No bradycardia overnight  She reports wearing Bipap overnight. She continues to have productive yellow cough. She is on 2L and sat is 100%  She reports ambulating. REVIEW OF SYSTEMS:    · General----negative for fatigue, weight loss  · Cardio---negative for chest pain, orthopnea and PND  · Resp Positive for productive cough, and CHANG, Negative for wheeze   · GI negative for nausea and vomiting, no dysphagia         Past Medical History:   Diagnosis Date    Allergic rhinitis 10/1994    Asthma     Atrial fibrillation ([facility] [address] 12/2008    CAD (coronary artery disease)     Clotting disorder (HCC)     plebitis in L leg    DDD (degenerative disc disease), cervical     Degeneration of cervical intervertebral disc     Diverticulosis 2005    Gout     Gout, unspecified     H/O cardiac catheterization 6/17/15    LMCA: Normal 0% stenosis. LAD: Lesion on 1st diag: Proximal subsection. 80% stenosis. Lesion plaque is ruptured. [facility] LCx: Lesion on 1st Ob Leslie: Mid subsection. 85% stenosis. RCA:Lesion on R PDA: Mid subsection. 85% stenosis. Lesion on R PDA: Distal subsection. 70% stenosis. Lesion on Prox RCA: Mid subsection. 50% stenosis. EF 50%.  H/O echocardiogram 11/09/2016    EF 40-45%. Mild LV hypertrophy normal LV cavity size. Sigmoid interventricular septum without evidence of outflow tract obstruction. Left atrium is moderately dilated (34-39) left atrial volume index of 36 ml/m2. Mild mitral regurg. Diastology cannot be assessed due to A-fib.  History of Holter monitoring 3/24/16    Atrial Fibrillation throughout,fairly controlled, HR  bpm 79% of the time.  Occasional high ventricular rate episodes and multiple CO2  26  23   BUN  9  8   CREATININE  0.47*  0.63   GLUCOSE  120*  138*     ECHO 11/9/16: EF 40-45%, mild LVH/MR.      CATH 6/17/15: Moderate to severe three vessel disease involving fairly small branches of the LCx, LCx and RCA. Normal LVEDP. EF 50%.      STRESS 2/16/15: Small-moderate perfusion defect of moderate intensity in the anteroseptal region during stress imaging, which is most consistent with ischemia, but may be artifact. EF 56%.      TILT 7/9/10: Tachycardia response with underlying AFib. No change in BP. Carotid massage failed to elicit any significant pause.        ASSESSMENT:    Patient Active Problem List   Diagnosis    Pulmonary HTN ([address]    Non-rheumatic mitral regurgitation    Atrial fibrillation ([address]    Long term current use of anticoagulant therapy    Essential hypertension    Obstructive sleep apnea    Gout    Upper respiratory tract infection    Type 2 diabetes mellitus without complication ([address]    Coronary artery disease involving native coronary artery    Ischemic cardiomyopathy    HCAP (healthcare-associated pneumonia)    Hypokalemia    Acute pulmonary edema (HCC)    Ground glass opacity present on imaging of lung     1. Persistent cough, dyspnea on exertion - GGO and patchy infiltrate on CT chest, Transbronchial biopsy is + for mild acute inflammation in bronchial mucosa and alveolar. MARGOT, ANCA, fungal (histo and aspergillus) AFB and bacterial  (legionella, mycoplasma and strep are negative. ) negative for malignancy. ccp is negative. PFT is done. She is improving on prednisone. 2. Leukocytosis: likely reactive. CRP is trending down. 3. Chronic atrial fibrillation- on diltiazem and lopressor. On heparin gtt  4. tachycardiabradycardia syndrome  5. CAD -distal coronary artery disease not good candidate for intervention  6. Systolic dysfunction with EF of 45%   7. Hypertension   8. hyperlipidemia. 9. History of osteoarthritis.    10. Multiple allergies to erythromycin, azithromycin and Biaxin. PLAN:  Continue heparin gtt. Open lung biopsy planned for Thurs. Continue oxygen therapy and wean as tolerated. Continue dulera and prednisone 40mg   Lopressor 12.5 mg BID per cardiology. Continue Cardizem ER. Continue Bipap overnight and IS. Vega Johns MD  [id number]  [address], [address] [place] [address]. Phone [phone]   Fax: [phone]  Answering Service: [phone]  Attending Physician Statement  I have discussed the care of Jordan Ferguson , including pertinent history and exam findings,  with the resident. I have reviewed the key elements of all parts of the encounter with the resident. I agree with the assessment, plan and orders as documented by the resident.       Electronically signed by Eusebio Kate MD on 6/13/2017 at 1:59 PM

## 2017-06-13 NOTE — CARE COORDINATION
SW spoke with Steven Lynch at [facility]. Updates faxed.   PT/OT notes advise that continuing to follow but now recommending Home with assist.  Hevero Yesi FISHER

## 2017-06-13 NOTE — PROGRESS NOTES
Cardiac Testing      ECHO 06/05/17: EF 45%. No significant valvular regurg or stenosis     CATH 6/17/15: Moderate to severe three vessel disease involving fairly small branches of the LCx, LCx and RCA. Normal LVEDP. EF 50%.      STRESS 2/16/15: Small-moderate perfusion defect of moderate intensity in the anteroseptal region during stress imaging, which is most consistent with ischemia, but may be artifact. EF 56%.      TILT 7/9/10: Tachycardia response with underlying AFib. No change in BP. Carotid massage failed to elicit any significant pause.      12[place]16/2014 - [facility] [place]   1. Syncope, suspected vasovagal mechanism. Tilt table testing showed no vasodepressor responses with 30 minutes of upright tilt, however gradual rise in heart rate during atrial fibrillation to 115 bpm. Negative response to carotid-sinus massage. Nitroglycerin was not used. 2. Chronic atrial fibrillation with history of paroxysmal atrial fibrillation, brief hospitalization at [facility] in May 2010 with metoprolol added to digoxin and Cardizem. Coumadin started in May 2010. 3. Preserved ventricular function by 2D echo at [facility]. 4. History of hypertension. 5. History of hyperlipidemia. 6. History of gestational diabetes. 7. History of osteoarthritis. 8. Multiple allergies to erythromycin, azithromycin and Biaxin. 9. 24-hour Holter monitor from [facility] on 5/24/10 showed average heart rate during atrial fibrillation of 74 bpm, minimum rate of 55 and maximum heart rate 119 bpm. All pauses were noted in the overnight hours with the longest pause of 2.3 seconds. No significant ventricular ectopy was noted.

## 2017-06-13 NOTE — CONSULTS
Holter monitor showing tahybrady syndrome with longest R-R 2.68 sec. Past Medical History:   has a past medical history of Allergic rhinitis; Asthma; Atrial fibrillation ([address] CAD (coronary artery disease); Clotting disorder ([address] DDD (degenerative disc disease), cervical; Degeneration of cervical intervertebral disc; Diverticulosis; Gout; Gout, unspecified; H/O cardiac catheterization; H/O echocardiogram; History of Holter monitoring; Hx of blood clots; Hyperlipidemia; Hypertension; Infectious hepatitis; Long term (current) use of anticoagulants; Other abnormal glucose; Phlebitis and thrombophlebitis of lower extremities, unspecified ([address] Senile osteoporosis; Symptomatic menopausal or female climacteric states; Syncope and collapse; Type II or unspecified type diabetes mellitus without mention of complication, not stated as uncontrolled; Unspecified hereditary and idiopathic peripheral neuropathy; and Unspecified sleep apnea. Past Surgical History:   has a past surgical history that includes Tonsillectomy and adenoidectomy; eye surgery; skin biopsy; Cardiac catheterization (Right, 06/17/2015); Diagnostic Cardiac Cath Lab Procedure (06/17/15); fracture surgery (2004); Colonoscopy (1/2005); other surgical history (2years old); bronchoscopy (6/7/2017); bronchoscopy (6/7/2017); and bronchoscopy (6/7/2017). Home Medications:    Prior to Admission medications    Medication Sig Start Date End Date Taking?  Authorizing Provider   aspirin 81 MG chewable tablet Take 1 tablet by mouth daily 6/9/17  Yes Manav Pagan MD   mometasone-formoterol [facility]) 200-5 MCG/ACT inhaler Inhale 2 puffs into the lungs 2 times daily 6/9/17  Yes Manav Pagan MD   predniSONE (DELTASONE) 20 MG tablet Take 2 tablets by mouth daily for 10 days 6/9/17 6/19/17 Yes Manav Pagan MD   metoprolol tartrate (LOPRESSOR) 25 MG tablet Take 2 tablets by mouth 2 times daily 6/2/17   Calvo Fermin Disla PA-C   diltiazem (CARDIZEM CD) 180 MG extended

## 2017-06-14 ENCOUNTER — ANESTHESIA EVENT (OUTPATIENT)
Dept: OPERATING ROOM | Age: 77
DRG: 163 | End: 2017-06-14
Payer: MEDICARE

## 2017-06-14 ENCOUNTER — APPOINTMENT (OUTPATIENT)
Dept: GENERAL RADIOLOGY | Age: 77
DRG: 163 | End: 2017-06-14
Attending: INTERNAL MEDICINE
Payer: MEDICARE

## 2017-06-14 ENCOUNTER — ANESTHESIA (OUTPATIENT)
Dept: OPERATING ROOM | Age: 77
DRG: 163 | End: 2017-06-14
Payer: MEDICARE

## 2017-06-14 VITALS — DIASTOLIC BLOOD PRESSURE: 134 MMHG | SYSTOLIC BLOOD PRESSURE: 156 MMHG | OXYGEN SATURATION: 92 %

## 2017-06-14 LAB
ABO/RH: NORMAL
ABSOLUTE EOS #: 0 K/UL (ref 0–0.4)
ABSOLUTE LYMPH #: 2.3 K/UL (ref 1–4.8)
ABSOLUTE MONO #: 0.5 K/UL (ref 0.1–1.2)
ANION GAP SERPL CALCULATED.3IONS-SCNC: 13 MMOL/L (ref 9–17)
ANTIBODY SCREEN: NEGATIVE
ARM BAND NUMBER: NORMAL
BASOPHILS # BLD: 1 %
BASOPHILS ABSOLUTE: 0.2 K/UL (ref 0–0.2)
BLD PROD TYP BPU: NORMAL
BUN BLDV-MCNC: 9 MG/DL (ref 8–23)
BUN/CREAT BLD: ABNORMAL (ref 9–20)
CALCIUM SERPL-MCNC: 9.1 MG/DL (ref 8.6–10.4)
CHLORIDE BLD-SCNC: 97 MMOL/L (ref 98–107)
CO2: 27 MMOL/L (ref 20–31)
CREAT SERPL-MCNC: 0.63 MG/DL (ref 0.5–0.9)
CROSSMATCH RESULT: NORMAL
CULTURE: NORMAL
DIFFERENTIAL TYPE: ABNORMAL
DIRECT EXAM: NORMAL
DIRECT EXAM: NORMAL
DISPENSE STATUS BLOOD BANK: NORMAL
EOSINOPHILS RELATIVE PERCENT: 0 %
EXPIRATION DATE: NORMAL
GFR AFRICAN AMERICAN: >60 ML/MIN
GFR NON-AFRICAN AMERICAN: >60 ML/MIN
GFR SERPL CREATININE-BSD FRML MDRD: ABNORMAL ML/MIN/{1.73_M2}
GFR SERPL CREATININE-BSD FRML MDRD: ABNORMAL ML/MIN/{1.73_M2}
GLUCOSE BLD-MCNC: 110 MG/DL (ref 65–105)
GLUCOSE BLD-MCNC: 123 MG/DL (ref 70–99)
GLUCOSE BLD-MCNC: 140 MG/DL (ref 65–105)
GLUCOSE BLD-MCNC: 230 MG/DL (ref 65–105)
GLUCOSE BLD-MCNC: 264 MG/DL (ref 65–105)
HCT VFR BLD CALC: 37.7 % (ref 36–46)
HEMOGLOBIN: 12.6 G/DL (ref 12–16)
INR BLD: 1
LYMPHOCYTES # BLD: 15 %
Lab: NORMAL
Lab: NORMAL
MCH RBC QN AUTO: 29.6 PG (ref 26–34)
MCHC RBC AUTO-ENTMCNC: 33.5 G/DL (ref 31–37)
MCV RBC AUTO: 88.4 FL (ref 80–100)
MONOCYTES # BLD: 3 %
PARTIAL THROMBOPLASTIN TIME: 45.2 SEC (ref 21.3–31.3)
PDW BLD-RTO: 16.6 % (ref 12.5–15.4)
PLATELET # BLD: 236 K/UL (ref 140–450)
PLATELET ESTIMATE: ABNORMAL
PMV BLD AUTO: 8.7 FL (ref 6–12)
POTASSIUM SERPL-SCNC: 4.3 MMOL/L (ref 3.7–5.3)
PROTHROMBIN TIME: 11.2 SEC (ref 9.4–12.6)
RBC # BLD: 4.26 M/UL (ref 4–5.2)
RBC # BLD: ABNORMAL 10*6/UL
SEG NEUTROPHILS: 81 %
SEGMENTED NEUTROPHILS ABSOLUTE COUNT: 12.6 K/UL (ref 1.8–7.7)
SODIUM BLD-SCNC: 137 MMOL/L (ref 135–144)
SPECIMEN DESCRIPTION: NORMAL
SPECIMEN DESCRIPTION: NORMAL
STATUS: NORMAL
STATUS: NORMAL
TRANSFUSION STATUS: NORMAL
UNIT DIVISION: 0
UNIT NUMBER: NORMAL
WBC # BLD: 15.6 K/UL (ref 3.5–11)
WBC # BLD: ABNORMAL 10*3/UL

## 2017-06-14 PROCEDURE — 94660 CPAP INITIATION&MGMT: CPT

## 2017-06-14 PROCEDURE — 85610 PROTHROMBIN TIME: CPT

## 2017-06-14 PROCEDURE — 6370000000 HC RX 637 (ALT 250 FOR IP): Performed by: PHYSICIAN ASSISTANT

## 2017-06-14 PROCEDURE — 6370000000 HC RX 637 (ALT 250 FOR IP): Performed by: INTERNAL MEDICINE

## 2017-06-14 PROCEDURE — 2140000001 HC CVICU INTERMEDIATE R&B

## 2017-06-14 PROCEDURE — 2500000003 HC RX 250 WO HCPCS: Performed by: THORACIC SURGERY (CARDIOTHORACIC VASCULAR SURGERY)

## 2017-06-14 PROCEDURE — 97535 SELF CARE MNGMENT TRAINING: CPT

## 2017-06-14 PROCEDURE — 99232 SBSQ HOSP IP/OBS MODERATE 35: CPT | Performed by: INTERNAL MEDICINE

## 2017-06-14 PROCEDURE — 85730 THROMBOPLASTIN TIME PARTIAL: CPT

## 2017-06-14 PROCEDURE — 0BBJ0ZX EXCISION OF LEFT LOWER LUNG LOBE, OPEN APPROACH, DIAGNOSTIC: ICD-10-PCS | Performed by: THORACIC SURGERY (CARDIOTHORACIC VASCULAR SURGERY)

## 2017-06-14 PROCEDURE — 88313 SPECIAL STAINS GROUP 2: CPT

## 2017-06-14 PROCEDURE — 32484 SEGMENTECTOMY: CPT | Performed by: THORACIC SURGERY (CARDIOTHORACIC VASCULAR SURGERY)

## 2017-06-14 PROCEDURE — 3600000013 HC SURGERY LEVEL 3 ADDTL 15MIN: Performed by: THORACIC SURGERY (CARDIOTHORACIC VASCULAR SURGERY)

## 2017-06-14 PROCEDURE — 2580000003 HC RX 258: Performed by: NURSE ANESTHETIST, CERTIFIED REGISTERED

## 2017-06-14 PROCEDURE — 7100000000 HC PACU RECOVERY - FIRST 15 MIN

## 2017-06-14 PROCEDURE — 6360000002 HC RX W HCPCS: Performed by: THORACIC SURGERY (CARDIOTHORACIC VASCULAR SURGERY)

## 2017-06-14 PROCEDURE — 6360000002 HC RX W HCPCS: Performed by: NURSE PRACTITIONER

## 2017-06-14 PROCEDURE — 94640 AIRWAY INHALATION TREATMENT: CPT

## 2017-06-14 PROCEDURE — 6370000000 HC RX 637 (ALT 250 FOR IP): Performed by: THORACIC SURGERY (CARDIOTHORACIC VASCULAR SURGERY)

## 2017-06-14 PROCEDURE — 94762 N-INVAS EAR/PLS OXIMTRY CONT: CPT

## 2017-06-14 PROCEDURE — 88307 TISSUE EXAM BY PATHOLOGIST: CPT

## 2017-06-14 PROCEDURE — 3700000001 HC ADD 15 MINUTES (ANESTHESIA): Performed by: THORACIC SURGERY (CARDIOTHORACIC VASCULAR SURGERY)

## 2017-06-14 PROCEDURE — 80048 BASIC METABOLIC PNL TOTAL CA: CPT

## 2017-06-14 PROCEDURE — 2720000003 HC MISC SUTURE/STAPLES/RELOADS/ETC: Performed by: THORACIC SURGERY (CARDIOTHORACIC VASCULAR SURGERY)

## 2017-06-14 PROCEDURE — 71010 XR CHEST PORTABLE: CPT

## 2017-06-14 PROCEDURE — 6360000002 HC RX W HCPCS: Performed by: NURSE ANESTHETIST, CERTIFIED REGISTERED

## 2017-06-14 PROCEDURE — 87075 CULTR BACTERIA EXCEPT BLOOD: CPT

## 2017-06-14 PROCEDURE — C1729 CATH, DRAINAGE: HCPCS | Performed by: THORACIC SURGERY (CARDIOTHORACIC VASCULAR SURGERY)

## 2017-06-14 PROCEDURE — 85025 COMPLETE CBC W/AUTO DIFF WBC: CPT

## 2017-06-14 PROCEDURE — 87205 SMEAR GRAM STAIN: CPT

## 2017-06-14 PROCEDURE — 2580000003 HC RX 258: Performed by: THORACIC SURGERY (CARDIOTHORACIC VASCULAR SURGERY)

## 2017-06-14 PROCEDURE — 87176 TISSUE HOMOGENIZATION CULTR: CPT

## 2017-06-14 PROCEDURE — 2500000003 HC RX 250 WO HCPCS: Performed by: NURSE ANESTHETIST, CERTIFIED REGISTERED

## 2017-06-14 PROCEDURE — 7100000011 HC PHASE II RECOVERY - ADDTL 15 MIN

## 2017-06-14 PROCEDURE — 99233 SBSQ HOSP IP/OBS HIGH 50: CPT | Performed by: INTERNAL MEDICINE

## 2017-06-14 PROCEDURE — 3700000000 HC ANESTHESIA ATTENDED CARE: Performed by: THORACIC SURGERY (CARDIOTHORACIC VASCULAR SURGERY)

## 2017-06-14 PROCEDURE — 87070 CULTURE OTHR SPECIMN AEROBIC: CPT

## 2017-06-14 PROCEDURE — 36415 COLL VENOUS BLD VENIPUNCTURE: CPT

## 2017-06-14 PROCEDURE — 82947 ASSAY GLUCOSE BLOOD QUANT: CPT

## 2017-06-14 PROCEDURE — 6360000002 HC RX W HCPCS

## 2017-06-14 PROCEDURE — C9290 INJ, BUPIVACAINE LIPOSOME: HCPCS | Performed by: THORACIC SURGERY (CARDIOTHORACIC VASCULAR SURGERY)

## 2017-06-14 PROCEDURE — 3600000003 HC SURGERY LEVEL 3 BASE: Performed by: THORACIC SURGERY (CARDIOTHORACIC VASCULAR SURGERY)

## 2017-06-14 PROCEDURE — 7100000010 HC PHASE II RECOVERY - FIRST 15 MIN

## 2017-06-14 RX ORDER — ONDANSETRON 2 MG/ML
INJECTION INTRAMUSCULAR; INTRAVENOUS PRN
Status: DISCONTINUED | OUTPATIENT
Start: 2017-06-14 | End: 2017-06-14 | Stop reason: SDUPTHER

## 2017-06-14 RX ORDER — NEOSTIGMINE METHYLSULFATE 1 MG/ML
INJECTION, SOLUTION INTRAVENOUS PRN
Status: DISCONTINUED | OUTPATIENT
Start: 2017-06-14 | End: 2017-06-14 | Stop reason: SDUPTHER

## 2017-06-14 RX ORDER — HYDROCODONE BITARTRATE AND ACETAMINOPHEN 5; 325 MG/1; MG/1
1 TABLET ORAL EVERY 4 HOURS PRN
Status: DISCONTINUED | OUTPATIENT
Start: 2017-06-14 | End: 2017-06-15 | Stop reason: HOSPADM

## 2017-06-14 RX ORDER — WARFARIN SODIUM 7.5 MG/1
7.5 TABLET ORAL DAILY
Status: DISCONTINUED | OUTPATIENT
Start: 2017-06-14 | End: 2017-06-15

## 2017-06-14 RX ORDER — MORPHINE SULFATE 4 MG/ML
4 INJECTION, SOLUTION INTRAMUSCULAR; INTRAVENOUS
Status: DISCONTINUED | OUTPATIENT
Start: 2017-06-14 | End: 2017-06-15 | Stop reason: HOSPADM

## 2017-06-14 RX ORDER — ACETAMINOPHEN 325 MG/1
650 TABLET ORAL EVERY 4 HOURS PRN
Status: DISCONTINUED | OUTPATIENT
Start: 2017-06-14 | End: 2017-06-15 | Stop reason: HOSPADM

## 2017-06-14 RX ORDER — DEXAMETHASONE SODIUM PHOSPHATE 10 MG/ML
INJECTION, SOLUTION INTRAMUSCULAR; INTRAVENOUS PRN
Status: DISCONTINUED | OUTPATIENT
Start: 2017-06-14 | End: 2017-06-14 | Stop reason: SDUPTHER

## 2017-06-14 RX ORDER — SODIUM CHLORIDE 0.9 % (FLUSH) 0.9 %
10 SYRINGE (ML) INJECTION EVERY 12 HOURS SCHEDULED
Status: DISCONTINUED | OUTPATIENT
Start: 2017-06-14 | End: 2017-06-15 | Stop reason: HOSPADM

## 2017-06-14 RX ORDER — FAMOTIDINE 20 MG/1
20 TABLET, FILM COATED ORAL 2 TIMES DAILY
Status: DISCONTINUED | OUTPATIENT
Start: 2017-06-14 | End: 2017-06-15 | Stop reason: HOSPADM

## 2017-06-14 RX ORDER — MORPHINE SULFATE 2 MG/ML
2 INJECTION, SOLUTION INTRAMUSCULAR; INTRAVENOUS
Status: DISCONTINUED | OUTPATIENT
Start: 2017-06-14 | End: 2017-06-15 | Stop reason: HOSPADM

## 2017-06-14 RX ORDER — PROPOFOL 10 MG/ML
INJECTION, EMULSION INTRAVENOUS PRN
Status: DISCONTINUED | OUTPATIENT
Start: 2017-06-14 | End: 2017-06-14 | Stop reason: SDUPTHER

## 2017-06-14 RX ORDER — KETOROLAC TROMETHAMINE 30 MG/ML
30 INJECTION, SOLUTION INTRAMUSCULAR; INTRAVENOUS ONCE
Status: COMPLETED | OUTPATIENT
Start: 2017-06-14 | End: 2017-06-14

## 2017-06-14 RX ORDER — SODIUM CHLORIDE, SODIUM LACTATE, POTASSIUM CHLORIDE, CALCIUM CHLORIDE 600; 310; 30; 20 MG/100ML; MG/100ML; MG/100ML; MG/100ML
INJECTION, SOLUTION INTRAVENOUS CONTINUOUS PRN
Status: DISCONTINUED | OUTPATIENT
Start: 2017-06-14 | End: 2017-06-14 | Stop reason: SDUPTHER

## 2017-06-14 RX ORDER — DOCUSATE SODIUM 100 MG/1
100 CAPSULE, LIQUID FILLED ORAL 2 TIMES DAILY
Status: DISCONTINUED | OUTPATIENT
Start: 2017-06-14 | End: 2017-06-15 | Stop reason: HOSPADM

## 2017-06-14 RX ORDER — ROCURONIUM BROMIDE 10 MG/ML
INJECTION, SOLUTION INTRAVENOUS PRN
Status: DISCONTINUED | OUTPATIENT
Start: 2017-06-14 | End: 2017-06-14 | Stop reason: SDUPTHER

## 2017-06-14 RX ORDER — ATORVASTATIN CALCIUM 40 MG/1
40 TABLET, FILM COATED ORAL ONCE
Status: COMPLETED | OUTPATIENT
Start: 2017-06-14 | End: 2017-06-14

## 2017-06-14 RX ORDER — GLYCOPYRROLATE 0.2 MG/ML
INJECTION INTRAMUSCULAR; INTRAVENOUS PRN
Status: DISCONTINUED | OUTPATIENT
Start: 2017-06-14 | End: 2017-06-14 | Stop reason: SDUPTHER

## 2017-06-14 RX ORDER — MEPERIDINE HYDROCHLORIDE 50 MG/ML
12.5 INJECTION INTRAMUSCULAR; INTRAVENOUS; SUBCUTANEOUS EVERY 5 MIN PRN
Status: DISCONTINUED | OUTPATIENT
Start: 2017-06-14 | End: 2017-06-14 | Stop reason: HOSPADM

## 2017-06-14 RX ORDER — HYDROCODONE BITARTRATE AND ACETAMINOPHEN 5; 325 MG/1; MG/1
2 TABLET ORAL EVERY 4 HOURS PRN
Status: DISCONTINUED | OUTPATIENT
Start: 2017-06-14 | End: 2017-06-15 | Stop reason: HOSPADM

## 2017-06-14 RX ORDER — ONDANSETRON 2 MG/ML
4 INJECTION INTRAMUSCULAR; INTRAVENOUS EVERY 6 HOURS PRN
Status: DISCONTINUED | OUTPATIENT
Start: 2017-06-14 | End: 2017-06-15 | Stop reason: HOSPADM

## 2017-06-14 RX ORDER — ESMOLOL HYDROCHLORIDE 10 MG/ML
INJECTION INTRAVENOUS PRN
Status: DISCONTINUED | OUTPATIENT
Start: 2017-06-14 | End: 2017-06-14 | Stop reason: SDUPTHER

## 2017-06-14 RX ORDER — UREA 10 %
3 LOTION (ML) TOPICAL ONCE
Status: COMPLETED | OUTPATIENT
Start: 2017-06-14 | End: 2017-06-14

## 2017-06-14 RX ORDER — VANCOMYCIN HYDROCHLORIDE 1 G/200ML
INJECTION, SOLUTION INTRAVENOUS PRN
Status: DISCONTINUED | OUTPATIENT
Start: 2017-06-14 | End: 2017-06-14 | Stop reason: SDUPTHER

## 2017-06-14 RX ORDER — LIDOCAINE HYDROCHLORIDE 10 MG/ML
INJECTION, SOLUTION EPIDURAL; INFILTRATION; INTRACAUDAL; PERINEURAL PRN
Status: DISCONTINUED | OUTPATIENT
Start: 2017-06-14 | End: 2017-06-15 | Stop reason: HOSPADM

## 2017-06-14 RX ORDER — MIDAZOLAM HYDROCHLORIDE 1 MG/ML
INJECTION INTRAMUSCULAR; INTRAVENOUS PRN
Status: DISCONTINUED | OUTPATIENT
Start: 2017-06-14 | End: 2017-06-14 | Stop reason: SDUPTHER

## 2017-06-14 RX ORDER — FENTANYL CITRATE 50 UG/ML
25 INJECTION, SOLUTION INTRAMUSCULAR; INTRAVENOUS EVERY 5 MIN PRN
Status: DISCONTINUED | OUTPATIENT
Start: 2017-06-14 | End: 2017-06-14 | Stop reason: HOSPADM

## 2017-06-14 RX ORDER — SODIUM CHLORIDE 9 MG/ML
INJECTION, SOLUTION INTRAVENOUS CONTINUOUS PRN
Status: DISCONTINUED | OUTPATIENT
Start: 2017-06-14 | End: 2017-06-14 | Stop reason: SDUPTHER

## 2017-06-14 RX ORDER — FENTANYL CITRATE 50 UG/ML
INJECTION, SOLUTION INTRAMUSCULAR; INTRAVENOUS PRN
Status: DISCONTINUED | OUTPATIENT
Start: 2017-06-14 | End: 2017-06-14 | Stop reason: SDUPTHER

## 2017-06-14 RX ORDER — SODIUM CHLORIDE 0.9 % (FLUSH) 0.9 %
10 SYRINGE (ML) INJECTION PRN
Status: DISCONTINUED | OUTPATIENT
Start: 2017-06-14 | End: 2017-06-15 | Stop reason: HOSPADM

## 2017-06-14 RX ORDER — KETOROLAC TROMETHAMINE 30 MG/ML
INJECTION, SOLUTION INTRAMUSCULAR; INTRAVENOUS
Status: COMPLETED
Start: 2017-06-14 | End: 2017-06-14

## 2017-06-14 RX ORDER — FENTANYL CITRATE 50 UG/ML
INJECTION, SOLUTION INTRAMUSCULAR; INTRAVENOUS
Status: COMPLETED
Start: 2017-06-14 | End: 2017-06-14

## 2017-06-14 RX ORDER — LIDOCAINE HYDROCHLORIDE 10 MG/ML
INJECTION, SOLUTION INFILTRATION; PERINEURAL PRN
Status: DISCONTINUED | OUTPATIENT
Start: 2017-06-14 | End: 2017-06-14 | Stop reason: SDUPTHER

## 2017-06-14 RX ORDER — RANOLAZINE 500 MG/1
500 TABLET, EXTENDED RELEASE ORAL ONCE
Status: COMPLETED | OUTPATIENT
Start: 2017-06-14 | End: 2017-06-14

## 2017-06-14 RX ADMIN — ENOXAPARIN SODIUM 40 MG: 40 INJECTION SUBCUTANEOUS at 13:57

## 2017-06-14 RX ADMIN — HEPARIN SODIUM AND DEXTROSE 14 UNITS/KG/HR: 10000; 5 INJECTION INTRAVENOUS at 01:41

## 2017-06-14 RX ADMIN — MOMETASONE FUROATE AND FORMOTEROL FUMARATE DIHYDRATE 2 PUFF: 200; 5 AEROSOL RESPIRATORY (INHALATION) at 07:35

## 2017-06-14 RX ADMIN — VANCOMYCIN HYDROCHLORIDE 1000 MG: 1 INJECTION, SOLUTION INTRAVENOUS at 10:41

## 2017-06-14 RX ADMIN — FAMOTIDINE 20 MG: 20 TABLET, FILM COATED ORAL at 13:58

## 2017-06-14 RX ADMIN — Medication 10 ML: at 21:22

## 2017-06-14 RX ADMIN — HYDROCODONE BITARTRATE AND ACETAMINOPHEN 1 TABLET: 5; 325 TABLET ORAL at 22:41

## 2017-06-14 RX ADMIN — ROCURONIUM BROMIDE 40 MG: 10 INJECTION INTRAVENOUS at 10:01

## 2017-06-14 RX ADMIN — CEFAZOLIN SODIUM 1 G: 1 INJECTION, SOLUTION INTRAVENOUS at 14:02

## 2017-06-14 RX ADMIN — SODIUM CHLORIDE, POTASSIUM CHLORIDE, SODIUM LACTATE AND CALCIUM CHLORIDE: 600; 310; 30; 20 INJECTION, SOLUTION INTRAVENOUS at 09:53

## 2017-06-14 RX ADMIN — LIDOCAINE HYDROCHLORIDE 50 MG: 10 INJECTION, SOLUTION INFILTRATION; PERINEURAL at 10:01

## 2017-06-14 RX ADMIN — KETOROLAC TROMETHAMINE 30 MG: 30 INJECTION, SOLUTION INTRAMUSCULAR; INTRAVENOUS at 12:41

## 2017-06-14 RX ADMIN — FENTANYL CITRATE 25 MCG: 50 INJECTION, SOLUTION INTRAMUSCULAR; INTRAVENOUS at 11:58

## 2017-06-14 RX ADMIN — DOCUSATE SODIUM 100 MG: 100 CAPSULE ORAL at 13:57

## 2017-06-14 RX ADMIN — ROCURONIUM BROMIDE 10 MG: 10 INJECTION INTRAVENOUS at 10:44

## 2017-06-14 RX ADMIN — INSULIN LISPRO 2 UNITS: 100 INJECTION, SOLUTION INTRAVENOUS; SUBCUTANEOUS at 21:01

## 2017-06-14 RX ADMIN — WARFARIN SODIUM 7.5 MG: 7.5 TABLET ORAL at 18:28

## 2017-06-14 RX ADMIN — SODIUM CHLORIDE: 900 INJECTION, SOLUTION INTRAVENOUS at 09:53

## 2017-06-14 RX ADMIN — GLYCOPYRROLATE 0.6 MG: 0.2 INJECTION INTRAMUSCULAR; INTRAVENOUS at 11:06

## 2017-06-14 RX ADMIN — ATORVASTATIN CALCIUM 40 MG: 40 TABLET, FILM COATED ORAL at 20:59

## 2017-06-14 RX ADMIN — FENTANYL CITRATE 100 MCG: 50 INJECTION INTRAMUSCULAR; INTRAVENOUS at 10:01

## 2017-06-14 RX ADMIN — MORPHINE SULFATE 4 MG: 4 INJECTION, SOLUTION INTRAMUSCULAR; INTRAVENOUS at 14:49

## 2017-06-14 RX ADMIN — METOPROLOL TARTRATE 12.5 MG: 25 TABLET ORAL at 20:58

## 2017-06-14 RX ADMIN — ONDANSETRON 4 MG: 2 INJECTION INTRAMUSCULAR; INTRAVENOUS at 10:53

## 2017-06-14 RX ADMIN — Medication 3 MG: at 20:59

## 2017-06-14 RX ADMIN — HYDROCODONE BITARTRATE AND ACETAMINOPHEN 2 TABLET: 5; 325 TABLET ORAL at 18:32

## 2017-06-14 RX ADMIN — DEXAMETHASONE SODIUM PHOSPHATE 10 MG: 10 INJECTION, SOLUTION INTRAMUSCULAR; INTRAVENOUS at 10:53

## 2017-06-14 RX ADMIN — HYDROCODONE BITARTRATE AND ACETAMINOPHEN 2 TABLET: 5; 325 TABLET ORAL at 14:49

## 2017-06-14 RX ADMIN — MIDAZOLAM 1 MG: 1 INJECTION INTRAMUSCULAR; INTRAVENOUS at 09:53

## 2017-06-14 RX ADMIN — FENTANYL CITRATE 50 MCG: 50 INJECTION INTRAMUSCULAR; INTRAVENOUS at 10:44

## 2017-06-14 RX ADMIN — KETOROLAC TROMETHAMINE 30 MG: 30 INJECTION, SOLUTION INTRAMUSCULAR at 12:41

## 2017-06-14 RX ADMIN — RANOLAZINE 500 MG: 500 TABLET, FILM COATED, EXTENDED RELEASE ORAL at 20:58

## 2017-06-14 RX ADMIN — FAMOTIDINE 20 MG: 20 TABLET, FILM COATED ORAL at 20:59

## 2017-06-14 RX ADMIN — PROPOFOL 150 MG: 10 INJECTION, EMULSION INTRAVENOUS at 10:01

## 2017-06-14 RX ADMIN — NEOSTIGMINE METHYLSULFATE 4 MG: 1 INJECTION INTRAVENOUS at 11:06

## 2017-06-14 RX ADMIN — DOCUSATE SODIUM 100 MG: 100 CAPSULE ORAL at 20:59

## 2017-06-14 RX ADMIN — ESMOLOL HYDROCHLORIDE 30 MG: 10 INJECTION, SOLUTION INTRAVENOUS at 11:08

## 2017-06-14 ASSESSMENT — PAIN SCALES - WONG BAKER
WONGBAKER_NUMERICALRESPONSE: 0

## 2017-06-14 ASSESSMENT — PAIN SCALES - GENERAL
PAINLEVEL_OUTOF10: 5
PAINLEVEL_OUTOF10: 3
PAINLEVEL_OUTOF10: 9
PAINLEVEL_OUTOF10: 7
PAINLEVEL_OUTOF10: 0
PAINLEVEL_OUTOF10: 4
PAINLEVEL_OUTOF10: 4

## 2017-06-14 ASSESSMENT — PAIN DESCRIPTION - PAIN TYPE: TYPE: SURGICAL PAIN

## 2017-06-14 ASSESSMENT — PAIN - FUNCTIONAL ASSESSMENT: PAIN_FUNCTIONAL_ASSESSMENT: 0-10

## 2017-06-14 ASSESSMENT — PAIN DESCRIPTION - FREQUENCY: FREQUENCY: CONTINUOUS

## 2017-06-14 ASSESSMENT — PAIN DESCRIPTION - ORIENTATION: ORIENTATION: LEFT;POSTERIOR

## 2017-06-14 ASSESSMENT — PAIN DESCRIPTION - DESCRIPTORS: DESCRIPTORS: SHARP

## 2017-06-14 ASSESSMENT — PAIN DESCRIPTION - LOCATION: LOCATION: CHEST;BACK

## 2017-06-14 NOTE — PROGRESS NOTES
141  137   K  4.9  4.3   CL  102  97*   CO2  27  27   BUN  10  9   CREATININE  0.65  0.63   GLUCOSE  121*  123*     ECHO 11/9/16: EF 40-45%, mild LVH/MR.      CATH 6/17/15: Moderate to severe three vessel disease involving fairly small branches of the LCx, LCx and RCA. Normal LVEDP. EF 50%.      STRESS 2/16/15: Small-moderate perfusion defect of moderate intensity in the anteroseptal region during stress imaging, which is most consistent with ischemia, but may be artifact. EF 56%.      TILT 7/9/10: Tachycardia response with underlying AFib. No change in BP. Carotid massage failed to elicit any significant pause.        ASSESSMENT:    Patient Active Problem List   Diagnosis    Pulmonary HTN ([address]    Non-rheumatic mitral regurgitation    Atrial fibrillation ([address]    Long term current use of anticoagulant therapy    Essential hypertension    Obstructive sleep apnea    Gout    Upper respiratory tract infection    Type 2 diabetes mellitus without complication ([address]    Coronary artery disease involving native coronary artery    Ischemic cardiomyopathy    HCAP (healthcare-associated pneumonia)    Hypokalemia    Acute pulmonary edema (HCC)    Ground glass opacity present on imaging of lung     1. Persistent cough, dyspnea on exertion - GGO and patchy infiltrate on CT chest, Transbronchial biopsy is + for mild acute inflammation in bronchial mucosa and alveolar. MARGOT, ANCA, fungal (histo and aspergillus) AFB and bacterial  (legionella, mycoplasma and strep are negative. ) negative for malignancy. ccp is negative. PFT is done. She is improving on prednisone. 2. Leukocytosis: likely reactive. CRP is trending down. 3. Chronic atrial fibrillation- on diltiazem and lopressor. On heparin gtt  4. tachycardiabradycardia syndrome  5. CAD -distal coronary artery disease not good candidate for intervention  6. Systolic dysfunction with EF of 45%   7. Hypertension   8. hyperlipidemia. 9. History of osteoarthritis.

## 2017-06-14 NOTE — PROGRESS NOTES
Called Dr. Jose Rahman to clarify restarting coumadin. He is okay with anticoagulation and transfer care back to primary service.

## 2017-06-14 NOTE — PROGRESS NOTES
Physical Therapy  DATE: 2017    NAME: Annie Machado  MRN: [id number]   : 1940    Patient not seen this date for Physical Therapy due to:  [] Blood transfusion in progress  [] Hemodialysis  []  Patient Declined  [] Spine Precautions   [] Strict Bedrest  [x] Surgery/ Procedure -having a open biopsy. [] Testing      [] Other        [] PT being discontinued at this time. Patient independent. No further needs. [] PT being discontinued at this time as the patient has been transferred to palliative care. No further needs.     [facility]

## 2017-06-14 NOTE — PROGRESS NOTES
Pulmonary Progress Note  [facility]      Patient - Brionna Virk,  Age - 67 y.o.    - 1940      Room Number - 1010/1010-01   N -  [id number]   Acct # - [de-identified]  Date of Admission -  6/3/2017  8:05 PM    SUBJECTIVE    She is returned from left OLBx . Comfortable extubated , hd stable , has pain but controlled   D/w rn         Review of Systems -  General ROS: negative for - chills, +fatigue, -fever   ENT ROS: negative for - headaches, oral lesions or sore throat  Cardiovascular ROS: no  , orthopnea or pnd   Gastrointestinal ROS: no abdominal pain, change in bowel habits, or black or bloody stools  Skin - no rash   Neuro - no blurry vision , no loc . No focal weakness   msk - no jt tenderness or swelling    Vascular - no claudication , rest completed and negative   Lymphatic - complete and negative   Hematology - oncology - complete and negative   Allergy immunology - complete and negative    no burning or hematuria    OBJECTIVE    VITALS    height is 5' 5\" (1.651 m) and weight is 195 lb 12.3 oz (88.8 kg). Her oral temperature is 97.5 °F (36.4 °C). Her blood pressure is 125/67 and her pulse is 65. Her respiration is 12 and oxygen saturation is 99%.        Temperature Range: Temp: 97.5 °F (36.4 °C) Temp  Av.3 °F (36.3 °C)  Min: 96.8 °F (36 °C)  Max: 97.8 °F (36.6 °C)  BP Range:  Systolic (42HKU), OA , Min:125 , CVH:595     Diastolic (22UGC), HLT:95, Min:61, Max:134    Pulse Range: Pulse  Av.4  Min: 55  Max: 104  Respiration Range: Resp  Avg: 10.2  Min: 0  Max: 24  Current Pulse Ox[de-identified]  SpO2: 99 %  24HR Pulse Ox Range:  SpO2  Av.6 %  Min: 65 %  Max: 100 %  Oxygen Amount and Delivery:  O2 Flow Rate (L/min): 6 L/min      Wt Readings from Last 3 Encounters:   17 195 lb 12.3 oz (88.8 kg)   17 202 lb (91.6 kg)   17 206 lb 3.2 oz (93.5 kg)       I/O (24 Hours)    Intake/Output Summary (Last 24 hours) at 17 1243  Last data filed at 17 1151   Gross per 24 hour   Intake             2348 ml   Output             2170 ml   Net              178 ml       EXAM  General Appearance:    Alert, cooperative, no distress, appears stated age post OLB   Head:    Normocephalic, without obvious abnormality, atraumatic                  :    Neck:    no JVP , no HJR   Back:     Symmetric, no curvature, ROM normal, no CVA tenderness   Lungs:       Rales rt and left infrascapular and mammary region - velcro type . Post thoracotomy for olbx no chest tubes    Chest Wall:    No tenderness or deformity      Heart:    Regular rate and rhythm, S1 and S2 normal, systolic  murmur,no rub        or gallop no rvh                           Abdomen:                                                 Pulses:                                            Lymph nodes:                    Neurologic:                  Soft, non-tender, bowel sounds active all four quadrants,     no masses, no organomegaly         2+ and symmetric all extremities            Cervical, supraclavicular not enlarged or matted or tender      CNII-XII intact, normal strength 5/5 .   Grossly      Clubbing No  Lower ext edema No1+   [] , 2 +  [] , 3+   []  Upper ext edema No       Rt radial a line              MEDS   ceFAZolin  1 g Intravenous Once    sodium chloride flush  10 mL Intravenous 2 times per day    docusate sodium  100 mg Oral BID    enoxaparin  40 mg Subcutaneous Daily    famotidine  20 mg Oral BID    [MAR Hold] metoprolol tartrate  12.5 mg Oral BID    [MAR Hold] diltiazem  120 mg Oral Daily    [MAR Hold] predniSONE  40 mg Oral Daily    [MAR Hold] melatonin  3 mg Oral Nightly    [MAR Hold] mometasone-formoterol  2 puff Inhalation BID    [MAR Hold] citalopram  20 mg Oral Daily    [MAR Hold] isosorbide mononitrate  30 mg Oral Daily    [MAR Hold] ranolazine  500 mg Oral BID    [MAR Hold] aspirin  81 mg Oral Daily    [MAR Hold] insulin lispro  0-6 Units Subcutaneous TID WC    [MAR Hold] insulin lispro  0-3 Units Subcutaneous Nightly    [MAR Hold] atorvastatin  40 mg Oral Nightly        sodium chloride flush, acetaminophen, ondansetron, HYDROcodone 5 mg - acetaminophen **OR** HYDROcodone 5 mg - acetaminophen, morphine **OR** morphine, bupivacaine liposome, lidocaine PF    LABS  CBC   Recent Labs      06/14/17   0531   WBC  15.6*   HGB  12.6   HCT  37.7   MCV  88.4   PLT  236     BMP:   Recent Labs      06/14/17   0531   NA  137   K  4.3   CL  97*   CO2  27   BUN  9   CREATININE  0.63   GLUCOSE  123*     No results found for: PH, PCO2, PO2, HCO3, O2SAT  Lab Results   Component Value Date    MODE NOT REPORTED 06/03/2017     LIVER PROFILE   No results for input(s): AST, ALT, LIPASE, BILIDIR, BILITOT, ALKPHOS in the last 72 hours. Invalid input(s): AMYLASE,  ALB  INR   Recent Labs      06/14/17   0531   INR  1.0     PTT   Lab Results   Component Value Date    APTT 45.2 (H) 06/14/2017       CULTURES  Pending         Pathology  6/9/2017     RIGHT LOWER LOBE LUNG, TRANSBRONCHIAL BIOPSIES:   -BRONCHIAL MUCOSA WITH MILD ACUTE INFLAMMATION AND ALVEOLAR LUNG   TISSUE WITH MILD ACUTE INFLAMMATION (SEE MICROSCOPIC DESCRIPTION). 6/14/17   Procedure:   Mini port access   1 biopsy apical segment of left lower lobe lobe  2.  Biopsy of lingular segment of upper lobe     ASSESSMENT  Pneumonitis b/l - jackelyn , anca , anti ccp negative - TBBX inconclusive   Post OLB day 0 ( left )   afib   htn essential   Ischemic cardiomyopathy     PLAN  Follow biopsy results   Cont prednisone         Jacklyn Coyle MD  6/14/2017  12:43 PM

## 2017-06-14 NOTE — OP NOTE
and draped in  standard sterile fashion. Then 10 mL of lidocaine was injected in the  inferior scapular border and a small curvilinear incision was made, sparing  the lats and traps. These were mobilized inferiorly and superiorly. The 5th  intercostal space was opened and the pediatric chest wall retractor was placed  and a single port developed. The patient then had identification anatomically  of the left upper lobe and lower lobe. They grossly appeared normal.  We were  able to mobilize the lingular segment where an endovascular stapler was used  with thick load to fire across and to transect and excise that specimen as  well as the superior segment of the lower lobe. Both specimens measured 1 cm  x 2 cm. We instilled Progel over the staple sites. A single 20 chest tube  was placed in the incision. The layers were closed with 3-0 and 4-0 Monocryl. The patient was given Valsalva and a breath at each time we advanced the tube  out. Upon completion, the tube was removed and the staples were applied. The  patient was returned to supine position. Chest x-ray demonstrated full lung  expansion, no pneumothorax. There was no obvious air leak on observing the  chest tube prior to its removal.  The patient was extubated in the OR and  transferred to ICU. Needle, sponge counts were correct at the end of the  case. Ronen Looney.  Jeanmarie Andrew MD      D: 06/14/2017 11:24:55  T: 06/14/2017 19:45:20  [facility]/fauzia  Job#: [id number]  Doc#: [id number]

## 2017-06-14 NOTE — PROGRESS NOTES
Occupational Therapy  Facility/Department: [facility] CAR 1  Daily Treatment Note  NAME: [place]  : 1940  MRN: [id number]    Date of Service: 2017    Patient Diagnosis(es):   Patient Active Problem List    Diagnosis Date Noted    Pulmonary HTN ([facility] 2015     Priority: High    Non-rheumatic mitral regurgitation 2015     Priority: High    Acute pulmonary edema ([facility] 2017    Ground glass opacity present on imaging of lung 2017    Coronary artery disease involving native coronary artery 2017    Ischemic cardiomyopathy 2017    HCAP (healthcare-associated pneumonia) 2017    Hypokalemia 2017    [facility]     Upper respiratory tract infection 2017    Atrial fibrillation ([facility] 2015    Long term current use of anticoagulant therapy 2015    Essential hypertension 2015    Obstructive sleep apnea 2015    Gout 2015     Past Medical History:   Diagnosis Date    Allergic rhinitis 10/1994    Asthma     Atrial fibrillation ([facility] 2008    CAD (coronary artery disease)     Clotting disorder (HCC)     plebitis in L leg    DDD (degenerative disc disease), cervical     Degeneration of cervical intervertebral disc     Diverticulosis     Gout     Gout, unspecified     H/O cardiac catheterization 6/17/15    LMCA: Normal 0% stenosis. LAD: Lesion on 1st diag: Proximal subsection. 80% stenosis. Lesion plaque is ruptured. Jossue Cast LCx: Lesion on 1st Ob Leslie: Mid subsection. 85% stenosis. RCA:Lesion on R PDA: Mid subsection. 85% stenosis. Lesion on R PDA: Distal subsection. 70% stenosis. Lesion on Prox RCA: Mid subsection. 50% stenosis. EF 50%.  H/O echocardiogram 2016    EF 40-45%. Mild LV hypertrophy normal LV cavity size. Sigmoid interventricular septum without evidence of outflow tract obstruction.   Left atrium is moderately dilated (34-39) left atrial volume index position/activity restrictions: Up with assistance, 3L O2 NC  Subjective   General  Chart Reviewed: No  Patient assessed for rehabilitation services?: Yes  Family / Caregiver Present: No  Diagnosis: SOB, PNA  Pain Assessment  Patient Currently in Pain: Denies  Pain Assessment: 0-10  Pain Level: 0  Balance  Sitting Balance: Independent  Standing Balance: Supervision  Standing Balance  Time: Pt stood for approx 5 mintues for transfers and functional mobility to and from restroom  Sit to stand: Stand by assistance  Stand to sit: Stand by assistance  Functional Mobility  Functional - Mobility Device: Standard Walker  Activity: To/from bathroom  Assist Level: Supervision  Functional Mobility Comments: No LOB  Toilet Transfers  Toilet - Technique: Ambulating  Equipment Used: Standard toilet  Toilet Transfer: Supervision     Transfers  Stand Step Transfers: Supervision  Sit to stand: Stand by assistance  Stand to sit: Stand by assistance   Assessment   Performance deficits / Impairments: Decreased ADL status; Decreased endurance  Treatment Diagnosis: Heart Failure, A-Fib, RLL PNA  Prognosis: Good  Patient Education: IS accepella EC/WS Fall prevention  Discharge Recommendations: Subacute/Skilled Nursing Facility  REQUIRES OT FOLLOW UP: Yes     Safety: Patient at risk for falls, Call light in reach, RN notified, Left in chair    Discharge Recommendations:  [facility]  Times per week: 4x  Times per day: Daily  Current Treatment Recommendations: Functional Mobility Training, Balance Training, Endurance Training, Self-Care / ADL  Goals  Short term goals  Time Frame for Short term goals: Pt will by discharge  Short term goal 1: demo standing during ADL completion for 20 min with 1 seated rest break PRN and least restrictive AE  Short term goal 2: demo EC/WS techniques during ADL completion with 1 vc  Short term goal 3: demo good safety awareness during func mob around floor with least restrictive AE and mod I  Short term goal 4: demo sit<>stand transfer (I)  Short term goal 5: demo (I) with bed mob     Therapy Time   Individual Concurrent Group Co-treatment   Time In  840         Time Out  903         Minutes            Pt in recliner upon arrival. Functional mobility completed to restroom for toileting (see above for LOF) Education provided (see above). Lung Biopsy scheduled this date at 65AM. Retired to chair with call light and phone in reach. RN notified.      Mary SHAFFER/KAYY CARSON/STEVIE

## 2017-06-15 VITALS
RESPIRATION RATE: 16 BRPM | DIASTOLIC BLOOD PRESSURE: 59 MMHG | HEIGHT: 65 IN | TEMPERATURE: 97.7 F | SYSTOLIC BLOOD PRESSURE: 145 MMHG | BODY MASS INDEX: 32.76 KG/M2 | HEART RATE: 67 BPM | OXYGEN SATURATION: 95 % | WEIGHT: 196.65 LBS

## 2017-06-15 LAB
ABSOLUTE EOS #: 0 K/UL (ref 0–0.4)
ABSOLUTE LYMPH #: 1.2 K/UL (ref 1–4.8)
ABSOLUTE MONO #: 0.6 K/UL (ref 0.1–1.2)
ANION GAP SERPL CALCULATED.3IONS-SCNC: 12 MMOL/L (ref 9–17)
BASOPHILS # BLD: 0 %
BASOPHILS ABSOLUTE: 0 K/UL (ref 0–0.2)
BUN BLDV-MCNC: 10 MG/DL (ref 8–23)
BUN/CREAT BLD: ABNORMAL (ref 9–20)
CALCIUM SERPL-MCNC: 9.1 MG/DL (ref 8.6–10.4)
CHLORIDE BLD-SCNC: 97 MMOL/L (ref 98–107)
CO2: 30 MMOL/L (ref 20–31)
CREAT SERPL-MCNC: 0.59 MG/DL (ref 0.5–0.9)
DIFFERENTIAL TYPE: ABNORMAL
EOSINOPHILS RELATIVE PERCENT: 0 %
GFR AFRICAN AMERICAN: >60 ML/MIN
GFR NON-AFRICAN AMERICAN: >60 ML/MIN
GFR SERPL CREATININE-BSD FRML MDRD: ABNORMAL ML/MIN/{1.73_M2}
GFR SERPL CREATININE-BSD FRML MDRD: ABNORMAL ML/MIN/{1.73_M2}
GLUCOSE BLD-MCNC: 102 MG/DL (ref 65–105)
GLUCOSE BLD-MCNC: 111 MG/DL (ref 65–105)
GLUCOSE BLD-MCNC: 137 MG/DL (ref 70–99)
HCT VFR BLD CALC: 39.7 % (ref 36–46)
HEMOGLOBIN: 13.1 G/DL (ref 12–16)
INR BLD: 1
LYMPHOCYTES # BLD: 8 %
MAGNESIUM: 2.3 MG/DL (ref 1.6–2.6)
MCH RBC QN AUTO: 29.9 PG (ref 26–34)
MCHC RBC AUTO-ENTMCNC: 32.9 G/DL (ref 31–37)
MCV RBC AUTO: 90.9 FL (ref 80–100)
MONOCYTES # BLD: 4 %
PDW BLD-RTO: 16.2 % (ref 12.5–15.4)
PLATELET # BLD: 244 K/UL (ref 140–450)
PLATELET ESTIMATE: ABNORMAL
PMV BLD AUTO: 8.7 FL (ref 6–12)
POTASSIUM SERPL-SCNC: 5.1 MMOL/L (ref 3.7–5.3)
PROTHROMBIN TIME: 10.8 SEC (ref 9.4–12.6)
RBC # BLD: 4.37 M/UL (ref 4–5.2)
RBC # BLD: ABNORMAL 10*6/UL
SEG NEUTROPHILS: 88 %
SEGMENTED NEUTROPHILS ABSOLUTE COUNT: 13.8 K/UL (ref 1.8–7.7)
SODIUM BLD-SCNC: 139 MMOL/L (ref 135–144)
WBC # BLD: 15.6 K/UL (ref 3.5–11)
WBC # BLD: ABNORMAL 10*3/UL

## 2017-06-15 PROCEDURE — 94640 AIRWAY INHALATION TREATMENT: CPT

## 2017-06-15 PROCEDURE — 36415 COLL VENOUS BLD VENIPUNCTURE: CPT

## 2017-06-15 PROCEDURE — 83735 ASSAY OF MAGNESIUM: CPT

## 2017-06-15 PROCEDURE — 6370000000 HC RX 637 (ALT 250 FOR IP): Performed by: THORACIC SURGERY (CARDIOTHORACIC VASCULAR SURGERY)

## 2017-06-15 PROCEDURE — 94762 N-INVAS EAR/PLS OXIMTRY CONT: CPT

## 2017-06-15 PROCEDURE — 99239 HOSP IP/OBS DSCHRG MGMT >30: CPT | Performed by: INTERNAL MEDICINE

## 2017-06-15 PROCEDURE — 94660 CPAP INITIATION&MGMT: CPT

## 2017-06-15 PROCEDURE — 1800000000 HC LEAVE OF ABSENCE

## 2017-06-15 PROCEDURE — 2580000003 HC RX 258: Performed by: THORACIC SURGERY (CARDIOTHORACIC VASCULAR SURGERY)

## 2017-06-15 PROCEDURE — 6370000000 HC RX 637 (ALT 250 FOR IP): Performed by: INTERNAL MEDICINE

## 2017-06-15 PROCEDURE — 6360000002 HC RX W HCPCS: Performed by: STUDENT IN AN ORGANIZED HEALTH CARE EDUCATION/TRAINING PROGRAM

## 2017-06-15 PROCEDURE — 94620 HC 6-MINUTE WALK TEST/PULM STRESS TEST SIMPLE: CPT

## 2017-06-15 PROCEDURE — 99232 SBSQ HOSP IP/OBS MODERATE 35: CPT | Performed by: INTERNAL MEDICINE

## 2017-06-15 PROCEDURE — 82947 ASSAY GLUCOSE BLOOD QUANT: CPT

## 2017-06-15 PROCEDURE — 85610 PROTHROMBIN TIME: CPT

## 2017-06-15 PROCEDURE — 6370000000 HC RX 637 (ALT 250 FOR IP): Performed by: PHYSICIAN ASSISTANT

## 2017-06-15 PROCEDURE — 97116 GAIT TRAINING THERAPY: CPT

## 2017-06-15 PROCEDURE — 80048 BASIC METABOLIC PNL TOTAL CA: CPT

## 2017-06-15 PROCEDURE — 97110 THERAPEUTIC EXERCISES: CPT

## 2017-06-15 PROCEDURE — 85025 COMPLETE CBC W/AUTO DIFF WBC: CPT

## 2017-06-15 RX ORDER — ISOSORBIDE MONONITRATE 30 MG/1
30 TABLET, EXTENDED RELEASE ORAL DAILY
Qty: 30 TABLET | Refills: 3 | Status: SHIPPED | OUTPATIENT
Start: 2017-06-15 | End: 2018-02-09 | Stop reason: ALTCHOICE

## 2017-06-15 RX ORDER — WARFARIN SODIUM 10 MG/1
10 TABLET ORAL DAILY
Status: DISCONTINUED | OUTPATIENT
Start: 2017-06-15 | End: 2017-06-15 | Stop reason: HOSPADM

## 2017-06-15 RX ORDER — ASPIRIN 81 MG/1
81 TABLET, CHEWABLE ORAL DAILY
Qty: 30 TABLET | Refills: 3 | Status: SHIPPED | OUTPATIENT
Start: 2017-06-15 | End: 2017-11-13 | Stop reason: SDUPTHER

## 2017-06-15 RX ORDER — ASPIRIN 81 MG/1
81 TABLET, CHEWABLE ORAL DAILY
Status: DISCONTINUED | OUTPATIENT
Start: 2017-06-15 | End: 2017-06-15 | Stop reason: HOSPADM

## 2017-06-15 RX ORDER — UREA 10 %
3 LOTION (ML) TOPICAL NIGHTLY
Qty: 30 TABLET | Refills: 1 | Status: SHIPPED | OUTPATIENT
Start: 2017-06-15 | End: 2017-06-19 | Stop reason: DRUGHIGH

## 2017-06-15 RX ORDER — PREDNISONE 20 MG/1
40 TABLET ORAL DAILY
Qty: 60 TABLET | Refills: 2 | Status: ON HOLD | OUTPATIENT
Start: 2017-06-15 | End: 2017-07-02 | Stop reason: HOSPADM

## 2017-06-15 RX ORDER — HYDROCODONE BITARTRATE AND ACETAMINOPHEN 5; 325 MG/1; MG/1
1 TABLET ORAL EVERY 4 HOURS PRN
Qty: 20 TABLET | Refills: 0 | Status: SHIPPED | OUTPATIENT
Start: 2017-06-15 | End: 2017-06-19

## 2017-06-15 RX ORDER — ATORVASTATIN CALCIUM 40 MG/1
40 TABLET, FILM COATED ORAL NIGHTLY
Qty: 30 TABLET | Refills: 3 | Status: SHIPPED | OUTPATIENT
Start: 2017-06-15 | End: 2017-11-10 | Stop reason: SDUPTHER

## 2017-06-15 RX ORDER — PSEUDOEPHEDRINE HCL 30 MG
100 TABLET ORAL 2 TIMES DAILY
Qty: 60 CAPSULE | Refills: 0 | Status: SHIPPED | OUTPATIENT
Start: 2017-06-15 | End: 2017-06-19

## 2017-06-15 RX ADMIN — PREDNISONE 40 MG: 20 TABLET ORAL at 09:33

## 2017-06-15 RX ADMIN — HYDROCODONE BITARTRATE AND ACETAMINOPHEN 2 TABLET: 5; 325 TABLET ORAL at 13:06

## 2017-06-15 RX ADMIN — ASPIRIN 81 MG 81 MG: 81 TABLET ORAL at 09:30

## 2017-06-15 RX ADMIN — DOCUSATE SODIUM 100 MG: 100 CAPSULE ORAL at 09:31

## 2017-06-15 RX ADMIN — MOMETASONE FUROATE AND FORMOTEROL FUMARATE DIHYDRATE 2 PUFF: 200; 5 AEROSOL RESPIRATORY (INHALATION) at 09:10

## 2017-06-15 RX ADMIN — RANOLAZINE 500 MG: 500 TABLET, FILM COATED, EXTENDED RELEASE ORAL at 09:33

## 2017-06-15 RX ADMIN — FAMOTIDINE 20 MG: 20 TABLET, FILM COATED ORAL at 09:31

## 2017-06-15 RX ADMIN — METOPROLOL TARTRATE 12.5 MG: 25 TABLET ORAL at 09:32

## 2017-06-15 RX ADMIN — Medication 10 ML: at 09:33

## 2017-06-15 RX ADMIN — CITALOPRAM HYDROBROMIDE 20 MG: 20 TABLET ORAL at 09:31

## 2017-06-15 RX ADMIN — ISOSORBIDE MONONITRATE 30 MG: 30 TABLET ORAL at 09:32

## 2017-06-15 RX ADMIN — HYDROCODONE BITARTRATE AND ACETAMINOPHEN 1 TABLET: 5; 325 TABLET ORAL at 09:26

## 2017-06-15 RX ADMIN — DILTIAZEM HYDROCHLORIDE 120 MG: 120 CAPSULE, COATED, EXTENDED RELEASE ORAL at 09:31

## 2017-06-15 ASSESSMENT — PAIN SCALES - GENERAL
PAINLEVEL_OUTOF10: 0
PAINLEVEL_OUTOF10: 1
PAINLEVEL_OUTOF10: 5
PAINLEVEL_OUTOF10: 0
PAINLEVEL_OUTOF10: 2

## 2017-06-15 ASSESSMENT — PAIN DESCRIPTION - LOCATION: LOCATION: BACK

## 2017-06-15 ASSESSMENT — PAIN DESCRIPTION - ORIENTATION: ORIENTATION: LEFT;POSTERIOR

## 2017-06-15 ASSESSMENT — PAIN DESCRIPTION - PAIN TYPE: TYPE: SURGICAL PAIN

## 2017-06-15 NOTE — PROGRESS NOTES
Physical Therapy  Facility[facility]Department[facility] CAR [facility]  Daily Treatment Note  NAME: [place]  : 1940  MRN: [id number]    Date of Service: 6/15/2017    Patient Diagnosis(es):   Patient Active Problem List    Diagnosis Date Noted    Pulmonary HTN ([facility] 2015     Priority: High    Non-rheumatic mitral regurgitation 2015     Priority: High    Acute pulmonary edema ([facility] 2017    Ground glass opacity present on imaging of lung 2017    Coronary artery disease involving native coronary artery 2017    Ischemic cardiomyopathy 2017    HCAP (healthcare-associated pneumonia) 2017    Hypokalemia 2017    [facility]     Upper respiratory tract infection 2017    [facility] fibrillation ([facility] 2015    Long term current use of anticoagulant therapy 2015    Essential hypertension 2015    Obstructive sleep apnea 2015    Gout 2015       Past Medical History:   Diagnosis Date    Allergic rhinitis 10/1994    Asthma     Atrial fibrillation ([facility] 2008    CAD (coronary artery disease)     Clotting disorder (HCC)     plebitis in L leg    DDD (degenerative disc disease), cervical     Degeneration of cervical intervertebral disc     Diverticulosis     Gout     Gout, unspecified     H/O cardiac catheterization 6/17/15    LMCA: Normal 0% stenosis. LAD: Lesion on 1st diag: Proximal subsection. 80% stenosis. Lesion plaque is ruptured. Viri Wiley LCx: Lesion on 1st Ob Leslie: Mid subsection. 85% stenosis. RCA:Lesion on R PDA: Mid subsection. 85% stenosis. Lesion on R PDA: Distal subsection. 70% stenosis. Lesion on Prox RCA: Mid subsection. 50% stenosis. EF 50%.  H/O echocardiogram 2016    EF 40-45%. Mild LV hypertrophy normal LV cavity size. Sigmoid interventricular septum without evidence of outflow tract obstruction.   Left atrium is moderately dilated (34-39) left atrial volume index of 36 ml/m2. Mild mitral regurg. Diastology cannot be assessed due to A-fib.  History of Holter monitoring 3/24/16    Atrial Fibrillation throughout,fairly controlled, HR  bpm 79% of the time. Occasional high ventricular rate episodes and multiple pauses suggestive of tachycardia/bradycardia syndrome  Msximum R-R interval 2.68 seconds.    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    Long term (current) use of anticoagulants 8/31/2015    Other abnormal glucose 2004    Phlebitis and thrombophlebitis of lower extremities, unspecified ([facility] 1961    L leg    Senile osteoporosis 2006    L/S    Symptomatic menopausal or female climacteric states 06/1995    Syncope and collapse     Type II or unspecified type diabetes mellitus without mention of complication, not stated as uncontrolled 03/2009    Unspecified hereditary and idiopathic peripheral neuropathy 2012    Unspecified sleep apnea 11/2009     Past Surgical History:   Procedure Laterality Date    BRONCHOSCOPY  6/7/2017    BRONCHOSCOPY BRUSHINGS performed by Néstor Tobias DO at [place] [id number]  6/7/2017    BRONCHOSCOPY/TRANSBRONCHIAL LUNG BIOPSY performed by Néstor Tobias DO at [place] [address]  6/7/2017    BRONCHOSCOPY FLUOROSCOPY performed by Néstor Tobias DO at [address] Right 06/17/2015    COLONOSCOPY  1/2005    DIAGNOSTIC CARDIAC CATH LAB PROCEDURE  06/17/15    EYE SURGERY      FRACTURE SURGERY  2004    Distal Radius Ulna    OTHER SURGICAL HISTORY  2years old    VOLVAR-ULCERATIVE LESION-CAUTERIZED    SKIN BIOPSY      TONSILLECTOMY AND ADENOIDECTOMY         Restrictions  Restrictions/Precautions  Restrictions/Precautions: Cardiac  Required Braces or Orthoses?: No  Position Activity Restriction  Sternal Precautions: No Pushing, No Pulling, 5# Lifting Restrictions  Other position/activity restrictions: Goals  Short term goals  Time Frame for Short term goals: 14 visits  Short term goal 1: Patient will be IND with bed mobility to promote pressure relief for back and buttocks  Short term goal 2: Patient will be IND with transfers to promote safe access to bathroom  Short term goal 3: Patient will amb IND on level surfaces with or without device and without LOB  Short term goal 4: Patient will demo 'good' sitting and standing balance  Patient Goals   Patient goals :  To be able to walk more and not be as short of breath    Plan    Plan  Times per week: 5-6 times a week  Times per day: Daily  Current Treatment Recommendations: Strengthening, Balance Training, Transfer Training, Endurance Training, Gait Training, Stair training, Safety Education & Training  Safety Devices  Type of devices: Call light within reach, Left in chair, Nurse notified  Restraints  Initially in place: No     Therapy Time   Individual Concurrent Group Co-treatment   Time In  903         Time Out  950         Minutes  Martha Camacho 1721, PTA

## 2017-06-15 NOTE — PROGRESS NOTES
Nightly    [MAR Hold] atorvastatin  40 mg Oral Nightly     Continuous Infusions:   CBC:   Recent Labs      17   0458  17   0531  06/15/17   0504   WBC  14.8*  15.6*  15.6*   HGB  11.5*  12.6  13.1   PLT  267  236  244     BMP:  Recent Labs      17   0458  17   0531  06/15/17   0504   NA  141  137  139   K  4.9  4.3  5.1   CL  102  97*  97*   CO2  27  27  30   BUN  10  9  10   CREATININE  0.65  0.63  0.59   GLUCOSE  121*  123*  137*     Hepatic: No results for input(s): AST, ALT, ALB, BILITOT, ALKPHOS in the last 72 hours. Troponin: No results for input(s): TROPONINI in the last 72 hours. No results for input(s): TROPONINT in the last 72 hours. BNP: No results for input(s): PROBNP in the last 72 hours. No results for input(s): BNP in the last 72 hours. Lipids: No results for input(s): CHOL, HDL in the last 72 hours. Invalid input(s): LDLCALCU  INR:   Recent Labs      17   0458  17   0531  06/15/17   0504   INR  1.1  1.0  1.0       Objective:   Vitals: /82  Pulse 67  Temp 97.4 °F (36.3 °C) (Oral)   Resp 16  Ht 5' 5\" (1.651 m)  Wt 196 lb 10.4 oz (89.2 kg)  SpO2 94%  BMI 32.72 kg/m2  General appearance: awake, alert, in no apparent distress. HEENT: Head: Normocephalic, atraumatic without any obvious abnormalities. Neck: JVD absent   Lungs:good bilateral equal air entry. Left posterior chest biopsy site no drainage. B/l crackles   Heart: irregular  rhythm, S1, S2 normal, no murmur, click, rub or gallop. Abdomen: soft, nontender with bowel sounds present in all 4 quadrants. Extremities: Lower extremity edema is trace  bilaterally. Peripheral pulses in b/l LE present  Integumentum:Intact with no rashes noted.       Diagnostic Studies:   EK2017  Atrial Fibrillation . HR 51, LBBB. ECHO 17: EF 45%. No significant valvular regurg or stenosis. RVSP 44 mmHg.        CATH 6/17/15: Moderate to severe three vessel disease involving fairly small branches of the LCx, LCx and RCA. Normal LVEDP. EF 50%. LMCA: Normal 0% stenosis. LAD: Lesion on 1st diag: Proximal subsection. 80% stenosis. LCx: Lesion on 1st Ob Leslie: Mid subsection. 85% stenosis. RCA:Lesion on R PDA: Mid subsection. 85% stenosis. Lesion on R PDA: Distal subsection. 70% stenosis. Lesion on Prox RCA: Mid subsection. 50% stenosis. EF 50%     STRESS 2/16/15: Small-moderate perfusion defect of moderate intensity in the anteroseptal region during stress imaging, which is most consistent with ischemia, but may be artifact. EF 56%. TILT 7/9/10: Tachycardia response with underlying AFib. No change in BP. Carotid massage failed to elicit any significant pause. Prior Holter suggested tachycardia-bradycardial syndrome but the maximum R-R interval is 2.68 sec. (Dr. Coleen Hansoned note)     Office Problem List  1. Syncope, suspected vasovagal mechanism. Tilt table testing showed no vasodepressor responses with 30 minutes of upright tilt, however gradual rise in heart rate during atrial fibrillation to 115 bpm. Negative response to carotid-sinus massage. Nitroglycerin was not used. 2. Chronic atrial fibrillation with history of paroxysmal atrial fibrillation, brief hospitalization at [facility] in May 2010 with metoprolol added to digoxin and Cardizem. Coumadin started in May 2010. 3. Preserved ventricular function by 2D echo at [facility]. 4. History of hypertension. 5. History of hyperlipidemia. 6. History of gestational diabetes. 7. History of osteoarthritis. 8. Multiple allergies to erythromycin, azithromycin and Biaxin. 9. 24-hour Holter monitor from [facility] on 5/24/10 showed average heart rate during atrial fibrillation of 74 bpm, minimum rate of 55 and maximum heart rate 119 bpm. All pauses were noted in the overnight hours with the longest pause of 2.3 seconds. No significant ventricular ectopy was noted.           Principal Problem:    HCAP (healthcare-associated

## 2017-06-15 NOTE — PROGRESS NOTES
[address] [facility] [address] [address]    Date:   6/15/2017  Patient name:  Rosie Rosario  Date of admission:  6/3/2017  8:05 PM  MRN:   [id number]  YOB: 1940    CC-persistent cough and SOB. SUBJECTIVE  Afebrile. No bradycardia overnight  She reports wearing Bipap overnight. She continues to have productive yellow cough. She was weaned from Oxygen, sat 94-99% on RA  She reports ambulating. Denies CP, SOB, Abdominal pain, fever, chills, cough          REVIEW OF SYSTEMS:    · General----negative for fatigue, weight loss  · Cardio---negative for chest pain, orthopnea and PND  · Resp Negative for productive cough, Negative for wheeze   · GI negative for nausea and vomiting, no dysphagia         Past Medical History:   Diagnosis Date    Allergic rhinitis 10/1994    Asthma     Atrial fibrillation ([facility] 12/2008    CAD (coronary artery disease)     Clotting disorder (HCC)     plebitis in L leg    DDD (degenerative disc disease), cervical     Degeneration of cervical intervertebral disc     Diverticulosis 2005    Gout     Gout, unspecified     H/O cardiac catheterization 6/17/15    LMCA: Normal 0% stenosis. LAD: Lesion on 1st diag: Proximal subsection. 80% stenosis. Lesion plaque is ruptured. Timbo Counter LCx: Lesion on 1st Ob Leslie: Mid subsection. 85% stenosis. RCA:Lesion on R PDA: Mid subsection. 85% stenosis. Lesion on R PDA: Distal subsection. 70% stenosis. Lesion on Prox RCA: Mid subsection. 50% stenosis. EF 50%.  H/O echocardiogram 11/09/2016    EF 40-45%. Mild LV hypertrophy normal LV cavity size. Sigmoid interventricular septum without evidence of outflow tract obstruction. Left atrium is moderately dilated (34-39) left atrial volume index of 36 ml/m2. Mild mitral regurg. Diastology cannot be assessed due to A-fib.  History of Holter monitoring 3/24/16    Atrial Fibrillation throughout,fairly controlled, HR  bpm 79% of the time.

## 2017-06-15 NOTE — PLAN OF CARE
Problem: Falls - Risk of  Goal: Absence of falls  Outcome: Met This Shift  Patient absent of falls. Fall preventative measures in place.

## 2017-06-15 NOTE — PROGRESS NOTES
Home Oxygen Evaluation    Home Oxygen Evaluation completed. Patient is on room air upon my arrival.  Resting SpO2 on room air = 94%    SpO2 on room air with exercise = 89%  SpO2 on oxygen as above with exercise = n/a    Nocturnal Oximetry with patient on room air is recommended is SpO2 is between 89% and 95% (requires additional order).     Iris Carrillo  10:47 AM

## 2017-06-15 NOTE — PROGRESS NOTES
ranolazine  500 mg Oral BID    insulin lispro  0-6 Units Subcutaneous TID WC    insulin lispro  0-3 Units Subcutaneous Nightly    atorvastatin  40 mg Oral Nightly        sodium chloride flush, acetaminophen, ondansetron, HYDROcodone 5 mg - acetaminophen **OR** HYDROcodone 5 mg - acetaminophen, morphine **OR** morphine, bupivacaine liposome, lidocaine PF    LABS  CBC   Recent Labs      06/15/17   0504   WBC  15.6*   HGB  13.1   HCT  39.7   MCV  90.9   PLT  244     BMP:   Recent Labs      06/15/17   0504   NA  139   K  5.1   CL  97*   CO2  30   BUN  10   CREATININE  0.59   GLUCOSE  137*   MG  2.3     No results found for: PH, PCO2, PO2, HCO3, O2SAT  Lab Results   Component Value Date    MODE NOT REPORTED 06/03/2017     LIVER PROFILE   No results for input(s): AST, ALT, LIPASE, BILIDIR, BILITOT, ALKPHOS in the last 72 hours. Invalid input(s): AMYLASE,  ALB  INR   Recent Labs      06/15/17   0504   INR  1.0     PTT   Lab Results   Component Value Date    APTT 45.2 (H) 06/14/2017       CULTURES  Pending         Pathology  6/9/2017     RIGHT LOWER LOBE LUNG, TRANSBRONCHIAL BIOPSIES:   -BRONCHIAL MUCOSA WITH MILD ACUTE INFLAMMATION AND ALVEOLAR LUNG   TISSUE WITH MILD ACUTE INFLAMMATION (SEE MICROSCOPIC DESCRIPTION). 6/14/17   Procedure:   Mini port access   1 biopsy apical segment of left lower lobe lobe  2. Biopsy of lingular segment of upper lobe     ASSESSMENT  Pneumonitis b/l - jackelyn , anca , anti ccp negative - TBBX inconclusive   Post OLB day 0 ( left )   afib   htn essential   Ischemic cardiomyopathy     PLAN  Follow up with biopsy results   Continue prednisone 40 mg daily    Home O2 evaluation  Okay to D/C from pulmonology stand point with outpatient pulmonology follow up in 2 weeks        Mariano Rincon MD  6/15/2017  1:38 PM      Attending Physician Statement  I have discussed the care of Doroteo Guaman, including pertinent history and exam findings,  with the resident.  I have seen and examined the

## 2017-06-15 NOTE — PROGRESS NOTES
Dolly Ramsey is a 67 y.o. female patient. No diagnosis found. Past Medical History:   Diagnosis Date    Allergic rhinitis 10/1994    Asthma     Atrial fibrillation ([facility] [address] 12/2008    CAD (coronary artery disease)     Clotting disorder (HCC)     plebitis in L leg    DDD (degenerative disc disease), cervical     Degeneration of cervical intervertebral disc     Diverticulosis 2005    Gout     Gout, unspecified     H/O cardiac catheterization 6/17/15    LMCA: Normal 0% stenosis. LAD: Lesion on 1st diag: Proximal subsection. 80% stenosis. Lesion plaque is ruptured. Ophelia Centeno LCx: Lesion on 1st Ob Leslie: Mid subsection. 85% stenosis. RCA:Lesion on R PDA: Mid subsection. 85% stenosis. Lesion on R PDA: Distal subsection. 70% stenosis. Lesion on Prox RCA: Mid subsection. 50% stenosis. EF 50%.  H/O echocardiogram 11/09/2016    EF 40-45%. Mild LV hypertrophy normal LV cavity size. Sigmoid interventricular septum without evidence of outflow tract obstruction. Left atrium is moderately dilated (34-39) left atrial volume index of 36 ml/m2. Mild mitral regurg. Diastology cannot be assessed due to A-fib.  History of Holter monitoring 3/24/16    Atrial Fibrillation throughout,fairly controlled, HR  bpm 79% of the time. Occasional high ventricular rate episodes and multiple pauses suggestive of tachycardia/bradycardia syndrome  Msximum R-R interval 2.68 seconds.    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    Long term (current) use of anticoagulants 8/31/2015    Other abnormal glucose 2004    Phlebitis and thrombophlebitis of lower extremities, unspecified ([facility] [address] 1961    L leg    Senile osteoporosis 2006    L/S    Symptomatic menopausal or female climacteric states 06/1995    Syncope and collapse     Type II or unspecified type diabetes mellitus without mention of complication, not stated as uncontrolled 03/2009    Unspecified hereditary and idiopathic peripheral neuropathy 2012    Unspecified sleep apnea 11/2009     No past surgical history pertinent negatives on file.   Scheduled Meds:   warfarin  10 mg Oral Daily    enoxaparin  1 mg/kg Subcutaneous BID    aspirin  81 mg Oral Daily    sodium chloride flush  10 mL Intravenous 2 times per day    docusate sodium  100 mg Oral BID    famotidine  20 mg Oral BID    warfarin  7.5 mg Oral Daily    warfarin (COUMADIN) daily dosing (placeholder)   Other RX Placeholder    metoprolol tartrate  12.5 mg Oral BID    diltiazem  120 mg Oral Daily    predniSONE  40 mg Oral Daily    melatonin  3 mg Oral Nightly    mometasone-formoterol  2 puff Inhalation BID    citalopram  20 mg Oral Daily    isosorbide mononitrate  30 mg Oral Daily    ranolazine  500 mg Oral BID    insulin lispro  0-6 Units Subcutaneous TID WC    insulin lispro  0-3 Units Subcutaneous Nightly    atorvastatin  40 mg Oral Nightly     Continuous Infusions:   PRN Meds:sodium chloride flush, acetaminophen, ondansetron, HYDROcodone 5 mg - acetaminophen **OR** HYDROcodone 5 mg - acetaminophen, morphine **OR** morphine, bupivacaine liposome, lidocaine PF    Allergies   Allergen Reactions    Adhesive Tape     Aspercreme [Trolamine Salicylate]     Augmentin [Amoxicillin-Pot Clavulanate]     Erythromycin Other (See Comments)     \"whole in stomach\"    Guaifenesin & Derivatives     Niacin And Related      Principal Problem:    HCAP (healthcare-associated pneumonia)  Active Problems:    Pulmonary HTN ([address]    Non-rheumatic mitral regurgitation    Atrial fibrillation ([address]    Long term current use of anticoagulant therapy    Essential hypertension    Obstructive sleep apnea    Gout    Upper respiratory tract infection    Type 2 diabetes mellitus without complication (HCC)    Coronary artery disease involving native coronary artery    Ischemic cardiomyopathy    Hypokalemia    Acute pulmonary edema (HCC)    Ground glass opacity

## 2017-06-16 LAB
CULTURE: ABNORMAL
Lab: ABNORMAL
SPECIMEN DESCRIPTION: ABNORMAL
STATUS: ABNORMAL

## 2017-06-16 PROCEDURE — 1800000000 HC LEAVE OF ABSENCE

## 2017-06-17 PROCEDURE — 1800000000 HC LEAVE OF ABSENCE

## 2017-06-18 PROCEDURE — 1800000000 HC LEAVE OF ABSENCE

## 2017-06-19 ENCOUNTER — OFFICE VISIT (OUTPATIENT)
Dept: CARDIOLOGY | Age: 77
End: 2017-06-19
Payer: MEDICARE

## 2017-06-19 VITALS
WEIGHT: 196.8 LBS | SYSTOLIC BLOOD PRESSURE: 105 MMHG | DIASTOLIC BLOOD PRESSURE: 68 MMHG | OXYGEN SATURATION: 95 % | BODY MASS INDEX: 31.63 KG/M2 | HEIGHT: 66 IN | HEART RATE: 58 BPM

## 2017-06-19 DIAGNOSIS — I25.10 ASHD (ARTERIOSCLEROTIC HEART DISEASE): Primary | ICD-10-CM

## 2017-06-19 DIAGNOSIS — I49.5 TACHYCARDIA-BRADYCARDIA SYNDROME (HCC): ICD-10-CM

## 2017-06-19 DIAGNOSIS — Z79.01 CHRONIC ANTICOAGULATION: ICD-10-CM

## 2017-06-19 DIAGNOSIS — I48.20 CHRONIC ATRIAL FIBRILLATION (HCC): ICD-10-CM

## 2017-06-19 PROBLEM — J84.10 PULMONARY FIBROSIS (HCC): Status: ACTIVE | Noted: 2017-06-19

## 2017-06-19 PROBLEM — J45.909 UNCOMPLICATED ASTHMA: Status: ACTIVE | Noted: 2017-06-19

## 2017-06-19 LAB
CULTURE: ABNORMAL
DIRECT EXAM: ABNORMAL
Lab: ABNORMAL
Lab: ABNORMAL
SPECIMEN DESCRIPTION: ABNORMAL
SPECIMEN DESCRIPTION: ABNORMAL
STATUS: ABNORMAL
STATUS: ABNORMAL

## 2017-06-19 PROCEDURE — 1800000000 HC LEAVE OF ABSENCE

## 2017-06-19 PROCEDURE — 99214 OFFICE O/P EST MOD 30 MIN: CPT | Performed by: INTERNAL MEDICINE

## 2017-06-19 RX ORDER — LANOLIN ALCOHOL/MO/W.PET/CERES
3 CREAM (GRAM) TOPICAL NIGHTLY PRN
COMMUNITY
End: 2019-05-20

## 2017-06-20 LAB — SURGICAL PATHOLOGY REPORT: NORMAL

## 2017-06-20 PROCEDURE — 1800000000 HC LEAVE OF ABSENCE

## 2017-06-21 PROCEDURE — 1800000000 HC LEAVE OF ABSENCE

## 2017-06-22 PROCEDURE — 1800000000 HC LEAVE OF ABSENCE

## 2017-06-23 DIAGNOSIS — Z09 S/P LUNG SURGERY, FOLLOW-UP EXAM: Primary | ICD-10-CM

## 2017-06-23 PROCEDURE — 1800000000 HC LEAVE OF ABSENCE

## 2017-06-24 PROCEDURE — 1800000000 HC LEAVE OF ABSENCE

## 2017-06-25 PROCEDURE — 1800000000 HC LEAVE OF ABSENCE

## 2017-06-26 ENCOUNTER — OFFICE VISIT (OUTPATIENT)
Dept: CARDIOTHORACIC SURGERY | Age: 77
End: 2017-06-26

## 2017-06-26 ENCOUNTER — HOSPITAL ENCOUNTER (OUTPATIENT)
Age: 77
Discharge: HOME | DRG: 163 | End: 2017-06-26
Payer: MEDICARE

## 2017-06-26 ENCOUNTER — HOSPITAL ENCOUNTER (OUTPATIENT)
Dept: GENERAL RADIOLOGY | Age: 77
Discharge: HOME | DRG: 163 | End: 2017-06-26
Payer: MEDICARE

## 2017-06-26 VITALS
OXYGEN SATURATION: 95 % | TEMPERATURE: 97 F | DIASTOLIC BLOOD PRESSURE: 68 MMHG | RESPIRATION RATE: 18 BRPM | SYSTOLIC BLOOD PRESSURE: 112 MMHG | HEIGHT: 65 IN | BODY MASS INDEX: 33.15 KG/M2 | WEIGHT: 199 LBS | HEART RATE: 114 BPM

## 2017-06-26 DIAGNOSIS — J84.10 PULMONARY FIBROSIS (HCC): Primary | ICD-10-CM

## 2017-06-26 DIAGNOSIS — Z09 S/P LUNG SURGERY, FOLLOW-UP EXAM: ICD-10-CM

## 2017-06-26 PROCEDURE — 71020 XR CHEST STANDARD TWO VW: CPT

## 2017-06-26 PROCEDURE — 99024 POSTOP FOLLOW-UP VISIT: CPT | Performed by: NURSE PRACTITIONER

## 2017-06-26 PROCEDURE — 1800000000 HC LEAVE OF ABSENCE

## 2017-06-26 ASSESSMENT — ENCOUNTER SYMPTOMS
ABDOMINAL PAIN: 0
WHEEZING: 0
COUGH: 0
CHEST TIGHTNESS: 0
SHORTNESS OF BREATH: 0
SINUS PRESSURE: 0
PHOTOPHOBIA: 0

## 2017-06-27 PROCEDURE — 1800000000 HC LEAVE OF ABSENCE

## 2017-06-28 PROCEDURE — 1800000000 HC LEAVE OF ABSENCE

## 2017-06-29 ENCOUNTER — HOSPITAL ENCOUNTER (EMERGENCY)
Age: 77
Discharge: OTHER INSTITUTION | End: 2017-06-29
Attending: FAMILY MEDICINE
Payer: MEDICARE

## 2017-06-29 ENCOUNTER — APPOINTMENT (OUTPATIENT)
Dept: GENERAL RADIOLOGY | Age: 77
End: 2017-06-29
Payer: MEDICARE

## 2017-06-29 ENCOUNTER — HOSPITAL ENCOUNTER (INPATIENT)
Age: 77
LOS: 7 days | Discharge: HOME HEALTH | DRG: 163 | End: 2017-07-06
Attending: INTERNAL MEDICINE | Admitting: INTERNAL MEDICINE
Payer: MEDICARE

## 2017-06-29 VITALS
BODY MASS INDEX: 33.12 KG/M2 | SYSTOLIC BLOOD PRESSURE: 112 MMHG | TEMPERATURE: 100.2 F | RESPIRATION RATE: 25 BRPM | OXYGEN SATURATION: 98 % | HEART RATE: 148 BPM | WEIGHT: 199 LBS | DIASTOLIC BLOOD PRESSURE: 81 MMHG

## 2017-06-29 DIAGNOSIS — R79.89 ELEVATED LACTIC ACID LEVEL: ICD-10-CM

## 2017-06-29 DIAGNOSIS — I95.89 OTHER SPECIFIED HYPOTENSION: ICD-10-CM

## 2017-06-29 DIAGNOSIS — I48.0 PAROXYSMAL ATRIAL FIBRILLATION WITH RVR (HCC): Primary | ICD-10-CM

## 2017-06-29 DIAGNOSIS — R79.89 ELEVATED BRAIN NATRIURETIC PEPTIDE (BNP) LEVEL: ICD-10-CM

## 2017-06-29 DIAGNOSIS — R79.1 SUPRATHERAPEUTIC INR: ICD-10-CM

## 2017-06-29 PROBLEM — E11.65 HYPERGLYCEMIA DUE TO TYPE 2 DIABETES MELLITUS (HCC): Status: ACTIVE | Noted: 2017-06-29

## 2017-06-29 PROBLEM — E87.20 LACTIC ACIDOSIS: Status: ACTIVE | Noted: 2017-06-29

## 2017-06-29 PROBLEM — I50.23 ACUTE ON CHRONIC SYSTOLIC CONGESTIVE HEART FAILURE (HCC): Status: ACTIVE | Noted: 2017-06-29

## 2017-06-29 PROBLEM — D72.829 LEUCOCYTOSIS: Status: ACTIVE | Noted: 2017-06-29

## 2017-06-29 PROBLEM — E87.29 METABOLIC ACIDOSIS, INCREASED ANION GAP (IAG): Status: ACTIVE | Noted: 2017-06-29

## 2017-06-29 LAB
ABSOLUTE EOS #: 0 K/UL (ref 0–0.4)
ABSOLUTE LYMPH #: 3.04 K/UL (ref 1–4.8)
ABSOLUTE MONO #: 0.57 K/UL (ref 0–1)
ANION GAP SERPL CALCULATED.3IONS-SCNC: 22 MMOL/L (ref 9–17)
BASOPHILS # BLD: 0 %
BASOPHILS ABSOLUTE: 0 K/UL (ref 0–0.2)
BNP INTERPRETATION: ABNORMAL
BUN BLDV-MCNC: 15 MG/DL (ref 8–23)
BUN/CREAT BLD: 19 (ref 9–20)
CALCIUM IONIZED: 1.12 MMOL/L (ref 1.13–1.33)
CALCIUM SERPL-MCNC: 8.2 MG/DL (ref 8.6–10.4)
CHLORIDE BLD-SCNC: 94 MMOL/L (ref 98–107)
CO2: 16 MMOL/L (ref 20–31)
CREAT SERPL-MCNC: 0.77 MG/DL (ref 0.5–0.9)
DIFFERENTIAL TYPE: ABNORMAL
EOSINOPHILS RELATIVE PERCENT: 0 %
GFR AFRICAN AMERICAN: >60 ML/MIN
GFR NON-AFRICAN AMERICAN: >60 ML/MIN
GFR SERPL CREATININE-BSD FRML MDRD: ABNORMAL ML/MIN/{1.73_M2}
GFR SERPL CREATININE-BSD FRML MDRD: ABNORMAL ML/MIN/{1.73_M2}
GLUCOSE BLD-MCNC: 135 MG/DL (ref 65–105)
GLUCOSE BLD-MCNC: 139 MG/DL (ref 65–105)
GLUCOSE BLD-MCNC: 325 MG/DL (ref 70–99)
HCT VFR BLD CALC: 39.8 % (ref 36–46)
HEMOGLOBIN: 13.1 G/DL (ref 12–16)
INR BLD: 4.7 (ref 0.9–1.2)
LACTIC ACID, WHOLE BLOOD: 1.5 MMOL/L (ref 0.7–2.1)
LACTIC ACID, WHOLE BLOOD: 2.6 MMOL/L (ref 0.7–2.1)
LACTIC ACID, WHOLE BLOOD: ABNORMAL MMOL/L (ref 0.7–2.1)
LACTIC ACID: 5.1 MMOL/L (ref 0.5–2.2)
LYMPHOCYTES # BLD: 16 %
MAGNESIUM: 1.8 MG/DL (ref 1.6–2.6)
MCH RBC QN AUTO: 30 PG (ref 26–34)
MCHC RBC AUTO-ENTMCNC: 32.9 G/DL (ref 31–37)
MCV RBC AUTO: 91.3 FL (ref 80–100)
MONOCYTES # BLD: 3 %
MORPHOLOGY: NORMAL
MRSA, DNA, NASAL: NORMAL
PDW BLD-RTO: 18.5 % (ref 12.1–15.2)
PHOSPHORUS: 3.8 MG/DL (ref 2.6–4.5)
PLATELET # BLD: 226 K/UL (ref 140–450)
PLATELET ESTIMATE: ABNORMAL
PMV BLD AUTO: ABNORMAL FL (ref 6–12)
POTASSIUM SERPL-SCNC: 4.3 MMOL/L (ref 3.7–5.3)
PRO-BNP: 5026 PG/ML
PROTHROMBIN TIME: 54 SEC (ref 9.7–12.2)
RBC # BLD: 4.36 M/UL (ref 4–5.2)
RBC # BLD: ABNORMAL 10*6/UL
SEG NEUTROPHILS: 81 %
SEGMENTED NEUTROPHILS ABSOLUTE COUNT: 15.39 K/UL (ref 1.8–7.7)
SODIUM BLD-SCNC: 132 MMOL/L (ref 135–144)
SPECIMEN DESCRIPTION: NORMAL
TROPONIN INTERP: ABNORMAL
TROPONIN INTERP: NORMAL
TROPONIN T: 0.03 NG/ML
TROPONIN T: <0.03 NG/ML
WBC # BLD: 19 K/UL (ref 3.5–11)
WBC # BLD: ABNORMAL 10*3/UL

## 2017-06-29 PROCEDURE — 83735 ASSAY OF MAGNESIUM: CPT

## 2017-06-29 PROCEDURE — 84484 ASSAY OF TROPONIN QUANT: CPT

## 2017-06-29 PROCEDURE — 87205 SMEAR GRAM STAIN: CPT

## 2017-06-29 PROCEDURE — 99291 CRITICAL CARE FIRST HOUR: CPT | Performed by: INTERNAL MEDICINE

## 2017-06-29 PROCEDURE — 94664 DEMO&/EVAL PT USE INHALER: CPT

## 2017-06-29 PROCEDURE — 99285 EMERGENCY DEPT VISIT HI MDM: CPT

## 2017-06-29 PROCEDURE — 2500000003 HC RX 250 WO HCPCS: Performed by: FAMILY MEDICINE

## 2017-06-29 PROCEDURE — 87641 MR-STAPH DNA AMP PROBE: CPT

## 2017-06-29 PROCEDURE — 93005 ELECTROCARDIOGRAM TRACING: CPT

## 2017-06-29 PROCEDURE — 94660 CPAP INITIATION&MGMT: CPT

## 2017-06-29 PROCEDURE — 5A2204Z RESTORATION OF CARDIAC RHYTHM, SINGLE: ICD-10-PCS | Performed by: INTERNAL MEDICINE

## 2017-06-29 PROCEDURE — 2580000003 HC RX 258: Performed by: STUDENT IN AN ORGANIZED HEALTH CARE EDUCATION/TRAINING PROGRAM

## 2017-06-29 PROCEDURE — 87086 URINE CULTURE/COLONY COUNT: CPT

## 2017-06-29 PROCEDURE — 84100 ASSAY OF PHOSPHORUS: CPT

## 2017-06-29 PROCEDURE — 2500000003 HC RX 250 WO HCPCS: Performed by: STUDENT IN AN ORGANIZED HEALTH CARE EDUCATION/TRAINING PROGRAM

## 2017-06-29 PROCEDURE — 6360000002 HC RX W HCPCS: Performed by: STUDENT IN AN ORGANIZED HEALTH CARE EDUCATION/TRAINING PROGRAM

## 2017-06-29 PROCEDURE — 36415 COLL VENOUS BLD VENIPUNCTURE: CPT

## 2017-06-29 PROCEDURE — 6360000002 HC RX W HCPCS: Performed by: FAMILY MEDICINE

## 2017-06-29 PROCEDURE — 83605 ASSAY OF LACTIC ACID: CPT

## 2017-06-29 PROCEDURE — 6370000000 HC RX 637 (ALT 250 FOR IP): Performed by: FAMILY MEDICINE

## 2017-06-29 PROCEDURE — 6360000002 HC RX W HCPCS: Performed by: EMERGENCY MEDICINE

## 2017-06-29 PROCEDURE — 82330 ASSAY OF CALCIUM: CPT

## 2017-06-29 PROCEDURE — 82947 ASSAY GLUCOSE BLOOD QUANT: CPT

## 2017-06-29 PROCEDURE — S0028 INJECTION, FAMOTIDINE, 20 MG: HCPCS | Performed by: STUDENT IN AN ORGANIZED HEALTH CARE EDUCATION/TRAINING PROGRAM

## 2017-06-29 PROCEDURE — 2000000000 HC ICU R&B

## 2017-06-29 PROCEDURE — 83880 ASSAY OF NATRIURETIC PEPTIDE: CPT

## 2017-06-29 PROCEDURE — 85610 PROTHROMBIN TIME: CPT

## 2017-06-29 PROCEDURE — 71010 XR CHEST LIMITED ONE VIEW: CPT

## 2017-06-29 PROCEDURE — 87070 CULTURE OTHR SPECIMN AEROBIC: CPT

## 2017-06-29 PROCEDURE — 85025 COMPLETE CBC W/AUTO DIFF WBC: CPT

## 2017-06-29 PROCEDURE — 87040 BLOOD CULTURE FOR BACTERIA: CPT

## 2017-06-29 PROCEDURE — 80048 BASIC METABOLIC PNL TOTAL CA: CPT

## 2017-06-29 RX ORDER — DIGOXIN 0.25 MG/ML
250 INJECTION INTRAMUSCULAR; INTRAVENOUS ONCE
Status: COMPLETED | OUTPATIENT
Start: 2017-06-29 | End: 2017-06-29

## 2017-06-29 RX ORDER — DIGOXIN 0.25 MG/ML
500 INJECTION INTRAMUSCULAR; INTRAVENOUS ONCE
Status: COMPLETED | OUTPATIENT
Start: 2017-06-29 | End: 2017-06-29

## 2017-06-29 RX ORDER — INSULIN GLARGINE 100 [IU]/ML
20 INJECTION, SOLUTION SUBCUTANEOUS NIGHTLY
Status: DISCONTINUED | OUTPATIENT
Start: 2017-06-29 | End: 2017-07-02

## 2017-06-29 RX ORDER — FAMOTIDINE 20 MG/1
20 TABLET, FILM COATED ORAL 2 TIMES DAILY
Status: DISCONTINUED | OUTPATIENT
Start: 2017-06-29 | End: 2017-06-29

## 2017-06-29 RX ORDER — SODIUM CHLORIDE 0.9 % (FLUSH) 0.9 %
10 SYRINGE (ML) INJECTION EVERY 12 HOURS SCHEDULED
Status: DISCONTINUED | OUTPATIENT
Start: 2017-06-29 | End: 2017-07-06 | Stop reason: HOSPADM

## 2017-06-29 RX ORDER — MIDAZOLAM HYDROCHLORIDE 1 MG/ML
INJECTION INTRAMUSCULAR; INTRAVENOUS
Status: DISCONTINUED
Start: 2017-06-29 | End: 2017-06-29

## 2017-06-29 RX ORDER — ACETAMINOPHEN 325 MG/1
650 TABLET ORAL EVERY 4 HOURS PRN
Status: DISCONTINUED | OUTPATIENT
Start: 2017-06-29 | End: 2017-07-06 | Stop reason: HOSPADM

## 2017-06-29 RX ORDER — FENTANYL CITRATE 50 UG/ML
INJECTION, SOLUTION INTRAMUSCULAR; INTRAVENOUS
Status: DISCONTINUED
Start: 2017-06-29 | End: 2017-06-29

## 2017-06-29 RX ORDER — IPRATROPIUM BROMIDE AND ALBUTEROL SULFATE 2.5; .5 MG/3ML; MG/3ML
1 SOLUTION RESPIRATORY (INHALATION) ONCE
Status: COMPLETED | OUTPATIENT
Start: 2017-06-29 | End: 2017-06-29

## 2017-06-29 RX ORDER — DEXTROSE MONOHYDRATE 25 G/50ML
12.5 INJECTION, SOLUTION INTRAVENOUS PRN
Status: DISCONTINUED | OUTPATIENT
Start: 2017-06-29 | End: 2017-07-06 | Stop reason: HOSPADM

## 2017-06-29 RX ORDER — SODIUM CHLORIDE 0.9 % (FLUSH) 0.9 %
10 SYRINGE (ML) INJECTION PRN
Status: DISCONTINUED | OUTPATIENT
Start: 2017-06-29 | End: 2017-07-06 | Stop reason: HOSPADM

## 2017-06-29 RX ORDER — ESMOLOL HYDROCHLORIDE 10 MG/ML
50 INJECTION, SOLUTION INTRAVENOUS CONTINUOUS
Status: DISCONTINUED | OUTPATIENT
Start: 2017-06-29 | End: 2017-06-29 | Stop reason: HOSPADM

## 2017-06-29 RX ORDER — FENTANYL CITRATE 50 UG/ML
50 INJECTION, SOLUTION INTRAMUSCULAR; INTRAVENOUS ONCE
Status: COMPLETED | OUTPATIENT
Start: 2017-06-29 | End: 2017-06-29

## 2017-06-29 RX ORDER — METOPROLOL TARTRATE 5 MG/5ML
5 INJECTION INTRAVENOUS ONCE
Status: COMPLETED | OUTPATIENT
Start: 2017-06-29 | End: 2017-06-29

## 2017-06-29 RX ORDER — NICOTINE POLACRILEX 4 MG
15 LOZENGE BUCCAL PRN
Status: DISCONTINUED | OUTPATIENT
Start: 2017-06-29 | End: 2017-07-06 | Stop reason: HOSPADM

## 2017-06-29 RX ORDER — IPRATROPIUM BROMIDE AND ALBUTEROL SULFATE 2.5; .5 MG/3ML; MG/3ML
1 SOLUTION RESPIRATORY (INHALATION) EVERY 6 HOURS PRN
Status: DISCONTINUED | OUTPATIENT
Start: 2017-06-29 | End: 2017-07-06 | Stop reason: HOSPADM

## 2017-06-29 RX ORDER — MIDAZOLAM HYDROCHLORIDE 1 MG/ML
4 INJECTION INTRAMUSCULAR; INTRAVENOUS ONCE
Status: COMPLETED | OUTPATIENT
Start: 2017-06-29 | End: 2017-06-29

## 2017-06-29 RX ORDER — DEXTROSE MONOHYDRATE 50 MG/ML
100 INJECTION, SOLUTION INTRAVENOUS PRN
Status: DISCONTINUED | OUTPATIENT
Start: 2017-06-29 | End: 2017-07-06 | Stop reason: HOSPADM

## 2017-06-29 RX ORDER — IPRATROPIUM BROMIDE AND ALBUTEROL SULFATE 2.5; .5 MG/3ML; MG/3ML
1 SOLUTION RESPIRATORY (INHALATION) 4 TIMES DAILY
Status: DISCONTINUED | OUTPATIENT
Start: 2017-06-29 | End: 2017-06-29

## 2017-06-29 RX ORDER — ONDANSETRON 2 MG/ML
4 INJECTION INTRAMUSCULAR; INTRAVENOUS EVERY 6 HOURS PRN
Status: DISCONTINUED | OUTPATIENT
Start: 2017-06-29 | End: 2017-07-06 | Stop reason: HOSPADM

## 2017-06-29 RX ADMIN — HYDROCORTISONE SODIUM SUCCINATE 100 MG: 100 INJECTION, POWDER, FOR SOLUTION INTRAMUSCULAR; INTRAVENOUS at 16:18

## 2017-06-29 RX ADMIN — METOPROLOL TARTRATE 5 MG: 5 INJECTION INTRAVENOUS at 22:22

## 2017-06-29 RX ADMIN — METOPROLOL TARTRATE 2.5 MG: 5 INJECTION INTRAVENOUS at 10:26

## 2017-06-29 RX ADMIN — Medication 10 ML: at 20:25

## 2017-06-29 RX ADMIN — Medication 10 ML: at 21:26

## 2017-06-29 RX ADMIN — MIDAZOLAM HYDROCHLORIDE 4 MG: 1 INJECTION, SOLUTION INTRAMUSCULAR; INTRAVENOUS at 14:01

## 2017-06-29 RX ADMIN — DIGOXIN 500 MCG: 250 INJECTION, SOLUTION INTRAMUSCULAR; INTRAVENOUS; PARENTERAL at 10:53

## 2017-06-29 RX ADMIN — Medication 10 ML: at 22:26

## 2017-06-29 RX ADMIN — Medication 3 MCG/MIN: at 15:42

## 2017-06-29 RX ADMIN — CALCIUM GLUCONATE 2 G: 94 INJECTION, SOLUTION INTRAVENOUS at 17:12

## 2017-06-29 RX ADMIN — DIGOXIN 500 MCG: 0.25 INJECTION INTRAMUSCULAR; INTRAVENOUS at 16:10

## 2017-06-29 RX ADMIN — IPRATROPIUM BROMIDE AND ALBUTEROL SULFATE 1 AMPULE: .5; 3 SOLUTION RESPIRATORY (INHALATION) at 09:27

## 2017-06-29 RX ADMIN — ESMOLOL HYDROCHLORIDE 25 MCG/KG/MIN: 10 INJECTION INTRAVENOUS at 11:26

## 2017-06-29 RX ADMIN — FAMOTIDINE 20 MG: 10 INJECTION, SOLUTION INTRAVENOUS at 21:22

## 2017-06-29 RX ADMIN — FENTANYL CITRATE 50 MCG: 50 INJECTION, SOLUTION INTRAMUSCULAR; INTRAVENOUS at 13:58

## 2017-06-29 RX ADMIN — HYDROCORTISONE SODIUM SUCCINATE 100 MG: 100 INJECTION, POWDER, FOR SOLUTION INTRAMUSCULAR; INTRAVENOUS at 22:26

## 2017-06-29 ASSESSMENT — PAIN SCALES - GENERAL
PAINLEVEL_OUTOF10: 0

## 2017-06-29 ASSESSMENT — PULMONARY FUNCTION TESTS
PIF_VALUE: 9
PIF_VALUE: 12
PIF_VALUE: 10
PIF_VALUE: 15

## 2017-06-29 NOTE — IP AVS SNAPSHOT
Liquids: No Restrictions  Daily Fluid Restriction: no  Last Modified Barium Swallow with Video (Video Swallowing Test): not done    Treatments at the Time of Hospital Discharge:   Respiratory Treatments:  Oxygen Therapy:  is on oxygen at 2 L/min per nasal cannula. Ventilator:    - BiPAP  only when sleeping    Rehab Therapies: Physical Therapy and Occupational Therapy  Weight Bearing Status/Restrictions: No weight bearing restirctions  Other Medical Equipment (for information only, NOT a DME order):  bedside commode  Other Treatments: None    Patient's personal belongings (please select all that are sent with patient):  None    RN SIGNATURE:  Electronically signed by Daquan Hung RN on 7/6/17 at 12:47 PM    PHYSICIAN SECTION    Prognosis: Fair    Condition at Discharge: Stable    Rehab Potential (if transferring to Rehab): Fair    Recommended Labs or Other Treatments After Discharge:     Physician Certification: I certify the above information and transfer of Jordan Ferguson  is necessary for the continuing treatment of the diagnosis listed and that she requires Home Care for greater 30 days.      Update Admission H&P: No change in H&P    PHYSICIAN SIGNATURE:  Electronically signed by Rafael Santos MD on 7/3/17 at 11:09 AM    CASE MANAGEMENT/SOCIAL WORK SECTION    Inpatient Status Date: 6/29    [facility] Readmission Risk Assessment Score:  Risk Score: 23.5   (Score > 14= high risk for readmission)    Discharging to Facility/ Agency   · Name: [address] [place] [address]  · Address:  · Phone:[phone]  · Fax: [phone]    Dialysis Facility (if applicable)   · Name:  · Address:  · Dialysis Schedule:  · Phone:  · Fax:    ****Please evaluate for PT/OT skilled nursing  / signature: Electronically signed by Francie Gomez RN on 7/2/17 at 12:13 PM          Heart Failure: Care Instructions  Your Care Instructions    Heart failure occurs when your heart does not pump as much blood as the body needs. Failure does not mean that the heart has stopped pumping but rather that it is not pumping as well as it should. Over time, this causes fluid buildup in your lungs and other parts of your body. Fluid buildup can cause shortness of breath, fatigue, swollen ankles, and other problems. By taking medicines regularly, reducing sodium (salt) in your diet, checking your weight every day, and making lifestyle changes, you can feel better and live longer. Follow-up care is a key part of your treatment and safety. Be sure to make and go to all appointments, and call your doctor if you are having problems. It's also a good idea to know your test results and keep a list of the medicines you take. How can you care for yourself at home? Medicines  · Be safe with medicines. Take your medicines exactly as prescribed. Call your doctor if you think you are having a problem with your medicine. · Do not take any vitamins, over-the-counter medicine, or herbal products without talking to your doctor first. Arneta Porch not take ibuprofen (Advil or Motrin) and naproxen (Aleve) without talking to your doctor first. They could make your heart failure worse. · You may be taking some of the following medicine. ¨ Beta-blockers can slow heart rate, decrease blood pressure, and improve your condition. Taking a beta-blocker may lower your chance of needing to be hospitalized. ¨ Angiotensin-converting enzyme inhibitors (ACEIs) reduce the heart's workload, lower blood pressure, and reduce swelling. Taking an ACEI may lower your chance of needing to be hospitalized again. ¨ Angiotensin II receptor blockers (ARBs) work like ACEIs. Your doctor may prescribe them instead of ACEIs. ¨ Diuretics, also called water pills, reduce swelling. ¨ Potassium supplements replace this important mineral, which is sometimes lost with diuretics. ¨ Aspirin and other blood thinners prevent blood clots, which can cause a stroke or heart attack. 2. Click on the Sign Up Now link in the Sign In box. You will see the New Member Sign Up page. 3. Enter your [employer] Access Code exactly as it appears below. You will not need to use this code after youve completed the sign-up process. If you do not sign up before the expiration date, you must request a new code. [employer] Access Code: [email or web address]  Expires: 8/14/2017  4:54 AM    4. Enter your Social Security Number ([id number]) and Date of Birth (mm/dd/yyyy) as indicated and click Submit. You will be taken to the next sign-up page. 5. Create a [employer] ID. This will be your [employer] login ID and cannot be changed, so think of one that is secure and easy to remember. 6. Create a [employer] password. You can change your password at any time. 7. Enter your Password Reset Question and Answer. This can be used at a later time if you forget your password. 8. Enter your e-mail address. You will receive e-mail notification when new information is available in [address] 9. Click Sign Up. You can now view your medical record. Additional Information  If you have questions, please contact the physician practice where you receive care. Remember, [employer] is NOT to be used for urgent needs. For medical emergencies, dial 911. For questions regarding your [employer] account call [phone]. If you have a clinical question, please call your doctor's office. View your information online  ? Review your current list of  medications, immunization, and allergies. ? Review your future test results online . ? Review your discharge instructions provided by your caregivers at discharge    Certain functionality such as prescription refills, scheduling appointments or sending messages to your provider are not activated if your provider does not use [employer] in his/her office    For questions regarding your [employer] account call [phone]. If you have a clinical question, please call your doctor's office.

## 2017-06-29 NOTE — PROGRESS NOTES
Cardiac Testing      ECHO 6/5/17: EF 45%, no regurg/stenosis.       CATH 6/17/15: Moderate to severe three vessel disease involving fairly small branches of the LCx, LCx and RCA. Normal LVEDP. EF 50%.      STRESS 2/16/15: Small-moderate perfusion defect of moderate intensity in the anteroseptal region during stress imaging, which is most consistent with ischemia, but may be artifact. EF 56%.      TILT 7/9/10: Tachycardia response with underlying AFib. No change in BP. Carotid massage failed to elicit any significant pause.      12/16/2014 - [facility]   1. Syncope, suspected vasovagal mechanism. Tilt table testing showed no vasodepressor responses with 30 minutes of upright tilt, however gradual rise in heart rate during atrial fibrillation to 115 bpm. Negative response to carotid-sinus massage. Nitroglycerin was not used. 2. Chronic atrial fibrillation with history of paroxysmal atrial fibrillation, brief hospitalization at [facility] in May 2010 with metoprolol added to digoxin and Cardizem. Coumadin started in May 2010. 3. Preserved ventricular function by 2D echo at [facility]. 4. History of hypertension. 5. History of hyperlipidemia. 6. History of gestational diabetes. 7. History of osteoarthritis. 8. Multiple allergies to erythromycin, azithromycin and Biaxin. 9. 24-hour Holter monitor from [facility] on 5/24/10 showed average heart rate during atrial fibrillation of 74 bpm, minimum rate of 55 and maximum heart rate 119 bpm. All pauses were noted in the overnight hours with the longest pause of 2.3 seconds. No significant ventricular ectopy was noted.

## 2017-06-29 NOTE — PLAN OF CARE
[facility] Assessment complete. acute respiratory failure secondary to pulmonary edema and chf .   Vitals:    06/29/17 1500   BP: (!) 118/93   Pulse: 130   Resp: 25   Temp:    . Patients home meds are   Prior to Admission medications    Medication Sig Start Date End Date Taking?  Authorizing Provider   melatonin 3 MG TABS tablet Take 3 mg by mouth daily    Historical Provider, MD   aspirin 81 MG chewable tablet Take 1 tablet by mouth daily 6/15/17   [facility], DO   metoprolol tartrate (LOPRESSOR) 25 MG tablet Take 0.5 tablets by mouth nightly 6/15/17   [facility], DO   metoprolol tartrate (LOPRESSOR) 25 MG tablet Take 1 tablet by mouth every morning 6/15/17   [facility], DO   isosorbide mononitrate (IMDUR) 30 MG extended release tablet Take 1 tablet by mouth daily 6/15/17   [facility], DO   atorvastatin (LIPITOR) 40 MG tablet Take 1 tablet by mouth nightly 6/15/17   [facility], DO   predniSONE (DELTASONE) 20 MG tablet Take 2 tablets by mouth daily 6/15/17   [facility], DO   mometasone-formoterol [facility]) 200-5 MCG/ACT inhaler Inhale 2 puffs into the lungs 2 times daily 6/9/17   Salud Joseph MD   citalopram (CELEXA) 20 MG tablet TAKE ONE TABLET BY MOUTH ONCE DAILY 4/24/17   Kwasi Mack MD   warfarin (COUMADIN) 7.5 MG tablet TAKE ONE TABLET BY MOUTH ONCE DAILY AS DIRECTED PER PHYSICIAN 3/17/17   Tarun Mcgrath MD   ranolazine (RANEXA) 500 MG extended release tablet Take 1 tablet by mouth 2 times daily 9/21/16   Kwasi Mack MD   allopurinol (ZYLOPRIM) 300 MG tablet TAKE ONE TABLET BY MOUTH ONCE DAILY 6/28/16   Tarun Mcgrath MD   .      RR 23  Breath Sounds: clear      · Bronchodilator assessment at level  1  · Hyperinflation assessment at level   · Secretion Management assessment at level    ·   · []    Bronchodilator Assessment  BRONCHODILATOR ASSESSMENT SCORE  Score 0 1 2 3 4 5   Breath Sounds   []  Patient Baseline [x]  No Wheeze good aeration []  Faint, scattered wheezing, good aeration []  Expiratory Wheezing and or moderately diminished []  Insp/Exp wheeze and/or very diminished []  Insp/Exp and/ or marked distress   Respiratory Rate   []  Patient Baseline []  Less than 20 []  Less than 20 [x]  20-25 []  Greater than 25 []  Greater than 25   Peak flow % of Pred or PB []  NA   []  Greater than 90%  []  81-90% []  71-80% []  Less than or equal to 70%  or unable to perform []  Unable due to Respiratory Distress   Dyspnea re []  Patient Baseline [x]  No SOB [x]  No SOB []  SOB on exertion []  SOB min activity []  At rest/acute   e FEV% Predicted       []  NA []  Above 69%  []  Unable []  Above 60-69%  []  Unable []  Above 50-59%  []  Unable []  Above 35-49%  []  Unable []  Less than 35%  []  Unable                 []  Hyperinflation Assessment  Score 1 2 3   CXR and Breath Sounds   []  Clear []  No atelectasis  Basilar aeration []  Atelectasis or absent basilar breath sounds   Incentive Spirometry Volume  (Per IBW)   []  Greater than or equal to 15ml/Kg []  less than 15ml/Kg []  less than 15ml/Kg   Surgery within last 2 weeks []  None or general   []  Abdominal or thoracic surgery  []  Abdominal or thoracic   Chronic Pulmonary Historyre []  No []  Yes []  Yes     []  Secretion Management Assessment  Score 1 2 3   Bilateral Breath Sounds   []  Occasional Rhonchi []  Scattered Rhonchi []  Course Rhonchi and/or poor aeration   Sputum    []  Small amount of thin secretions []  Moderate amount of viscous secretions []  Copius, Viscious Yellow/ Secretions   CXR as reported by physician []  clear  []  Unavailable []  Infiltrates and/or consolidation  []  Unavailable []  Mucus Plugging and or lobar consolidation  []  Unavailable   Cough []  Strong, productive cough []  Weak productive cough []  No cough or weak non-productive cough   GILA BAEZ YOUNG  4:31 PM                            FEMALE                                  MALE                            FEV1 Predicted Normal Values 366 381 65 363 392 421 449 478 [phone]   70 269 284 299 314 329 344 359 374 70 353 382 410 439 468 496 [phone]   75 261 274 289 305 319 334 348 364 75 344 372 400 429 458 487 [phone]   80 253 266 282 296 312 327 342 356 80 335 362 390 419 448 [phone]

## 2017-06-29 NOTE — PROGRESS NOTES
Ok to cardiovert patient per Dr. Angelique Andrade. Recommendations to follow up cardioversion with consultation to cardiology for further recommendations. Marylou Soni D.O.   Critical Care Resident

## 2017-06-29 NOTE — SIGNIFICANT EVENT
Full consultation and further recommendations to follow. Hazel Ayon M.D.   Fellow, [facility] includes the [facility]

## 2017-06-29 NOTE — PROCEDURES
External Cardioversion    PROCEDURE: Direct current cardioversion. REASON FOR PROCEDURE: Atrial fibrillation. PROCEDURE IN DETAIL: The procedure was explained to the patient with risks and benefits including risk of stroke. The patient understands as well. The patient had therapeutic/supratherpeutic INR or was therapeutic on appropriate anticoagulation. Patient received already received a total of 4 mg of versed and 50 mcg of fentanyl. The pads applied in the anterior and posterior approach. With synchronized biphasic waveform at 100 J and 150 J, 2 shocks delivered with no restoration in NSR. The patient had some occasional PACs noticed with occasional sinus tachycardia. The patient had no immediate post-procedure complications. The rhythm was maintained and 12-lead EKG was requested. IMPRESSION: unsuccessful direct current cardioversion with restoration of sinus rhythm from atrial fibrillation with no immediate complication.

## 2017-06-29 NOTE — H&P
fibrillation ([facility] 12/2008    CAD (coronary artery disease)     Clotting disorder (HCC)     plebitis in L leg    COPD (chronic obstructive pulmonary disease) ([facility]     DDD (degenerative disc disease), cervical     Degeneration of cervical intervertebral disc     Diverticulosis 2005    Gout     Gout, unspecified     H/O cardiac catheterization 6/17/15    LMCA: Normal 0% stenosis. LAD: Lesion on 1st diag: Proximal subsection. 80% stenosis. Lesion plaque is ruptured. Thompson Monrovia LCx: Lesion on 1st Ob Leslie: Mid subsection. 85% stenosis. RCA:Lesion on R PDA: Mid subsection. 85% stenosis. Lesion on R PDA: Distal subsection. 70% stenosis. Lesion on Prox RCA: Mid subsection. 50% stenosis. EF 50%.  H/O echocardiogram 11/09/2016    EF 40-45%. Mild LV hypertrophy normal LV cavity size. Sigmoid interventricular septum without evidence of outflow tract obstruction. Left atrium is moderately dilated (34-39) left atrial volume index of 36 ml/m2. Mild mitral regurg. Diastology cannot be assessed due to A-fib.  History of Holter monitoring 3/24/16    Atrial Fibrillation throughout,fairly controlled, HR  bpm 79% of the time. Occasional high ventricular rate episodes and multiple pauses suggestive of tachycardia/bradycardia syndrome  Msximum R-R interval 2.68 seconds.    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    Long term (current) use of anticoagulants 8/31/2015    Other abnormal glucose 2004    Phlebitis and thrombophlebitis of lower extremities, unspecified ([facility] 1961    L leg    Senile osteoporosis 2006    L/S    Symptomatic menopausal or female climacteric states 06/1995    Syncope and collapse     Type II or unspecified type diabetes mellitus without mention of complication, not stated as uncontrolled 03/2009    Unspecified hereditary and idiopathic peripheral neuropathy 2012    Unspecified sleep apnea 11/2009       Past Surgical MCG/ACT inhaler Inhale 2 puffs into the lungs 2 times daily 6/9/17   Vega Johns MD   citalopram (CELEXA) 20 MG tablet TAKE ONE TABLET BY MOUTH ONCE DAILY 4/24/17   Fifi Bernard MD   warfarin (COUMADIN) 7.5 MG tablet TAKE ONE TABLET BY MOUTH ONCE DAILY AS DIRECTED PER PHYSICIAN 3/17/17   Mike Smyth MD   ranolazine (RANEXA) 500 MG extended release tablet Take 1 tablet by mouth 2 times daily 9/21/16   Fifi Bernard MD   allopurinol (ZYLOPRIM) 300 MG tablet TAKE ONE TABLET BY MOUTH ONCE DAILY 6/28/16   Mike Smyth MD       Social History:   TOBACCO:   reports that she has never smoked. She has never used smokeless tobacco.  ETOH:   reports that she does not drink alcohol. DRUGS:  reports that she does not use illicit drugs. OCCUPATION:      Family History:       Problem Relation Age of Onset    Heart Disease Mother     Stroke Mother     High Blood Pressure Mother     Cancer Father      lung    Stroke Brother     Heart Disease Maternal Grandmother            REVIEW OF SYSTEMS (ROS):  Review of Systems - Negative except mentioned in HPI   General ROS: negative  Psychological ROS: negative  Ophthalmic ROS: negative  ENT: negative  Hematological and Lymphatic ROS: negative  Breast ROS: negative  Respiratory ROS: as mentioned above  Cardiovascular ROS: no chest pain or dyspnea on exertion  Gastrointestinal ROS:negative  Genito-Urinary ROS: blood in urine  Musculoskeletal ROS: negative  Neurological ROS: negative  Dermatological ROS: negative      Physical Exam:    Vitals: /84  Pulse 147  Resp 27    Body weight:   Wt Readings from Last 3 Encounters:   06/29/17 199 lb (90.3 kg)   06/26/17 199 lb (90.3 kg)   06/19/17 197 lb (89.4 kg)       Body Mass Index : There is no height or weight on file to calculate BMI.           PHYSICAL EXAMINATION :  Constitutional: Appears well, in no distress  EENT: PERRLA, EOMI, sclera clear, anicteric, oropharynx clear, no lesions, neck supple with midline trachea. Neck: Supple, symmetrical, trachea midline, no adenopathy, thyroid symmetric, no jvd skin normal  Respiratory: b/l crackles, on bipap  Cardiovascular: regular rate and rhythm, normal S1, S2, apical systolic murmur noted and 2+ pulses throughout  Abdomen: soft, nontender, nondistended, no masses or organomegaly  Neurological: alert oriented x 3, motor and sensation intact  Extremities:  peripheral pulses normal, no pedal edema, no clubbing or cyanosis      Laboratory findings:-    CBC:   Recent Labs      06/29/17 0927   WBC  19.0*   HGB  13.1   PLT  226     BMP:    Recent Labs      06/29/17 0927   NA  132*   K  4.3   CL  94*   CO2  16*   BUN  15   CREATININE  0.77   GLUCOSE  325*     S. Calcium:  Recent Labs      06/29/17 0927   CALCIUM  8.2*     S. Ionized Calcium:No results for input(s): IONCA in the last 72 hours. S. Magnesium:No results for input(s): MG in the last 72 hours. S. Phosphorus:No results for input(s): PHOS in the last 72 hours. S. Glucose:No results for input(s): POCGLU in the last 72 hours. Glycosylated hemoglobin A1C: No results for input(s): LABA1C in the last 72 hours. INR:   Recent Labs      06/29/17 0927   INR  4.7*     Hepatic functions: No results for input(s): ALKPHOS, ALT, AST, PROT, BILITOT, BILIDIR, LABALBU in the last 72 hours. Pancreatic functions:  Recent Labs      06/29/17 0927   LACTA  5.1*     S. Lactic Acid:   Recent Labs      06/29/17 0927   LACTA  5.1*     Cardiac enzymes:No results for input(s): CKTOTAL, CKMB, CKMBINDEX, TROPONINI in the last 72 hours. BNP:No results for input(s): BNP in the last 72 hours. Lipid profile: No results for input(s): CHOL, TRIG, HDL, LDLCALC in the last 72 hours.     Invalid input(s): LDL  Blood Gases: No results found for: PH, PCO2, PO2, HCO3, O2SAT  Thyroid functions:   Lab Results   Component Value Date    TSH 0.99 06/03/2017        Urinalysis:     Microbiology:  Cultures during this admission:     Blood cultures: [] None drawn      [] Negative             []  Positive (Details:  )  Urine Culture:                   [] None drawn      [] Negative             []  Positive (Details:  )  Sputum Culture:               [] None drawn       [] Negative             []  Positive (Details:  )   Endotracheal aspirate:     [] None drawn       [] Negative             []  Positive (Details:  )         -----------------------------------------------------------------  Radiological reports:    CXR: suggestive of CHF    Electrocardiogram: A fib with RVR    Last Echocardiogram findings:  6/5/17  Summary  Left ventricular systolic function appears to be mildly reduced. Ejection fraction is estimated at 45%. No significant valvular regurgitation or stenosis seen.     Cath   Moderate to severe tripple vessel CAD     Assessment and Plan     Principal Problem:    Acute on chronic systolic congestive heart failure (HCC)  Active Problems:    Atrial fibrillation with rapid ventricular response ([address]    Long term current use of anticoagulant therapy    Obstructive sleep apnea    Type 2 diabetes mellitus without complication (HCC)    Coronary artery disease involving native coronary artery    Ischemic cardiomyopathy    Lactic acidosis    Metabolic acidosis, increased anion gap (IAG)    Leucocytosis    Supratherapeutic INR      Plan:    bipap continuous  duoneb neb  resp eval  Continuous pulse oxymetery  On levophed gtt for hypotension  Cardio reccs noted - suggested starting digoxin with 500 ug loading and 2 doses of 250 ug 2 doses 4 hrs apart each  Follow dig level gini  panculture sent - follow  Echo   foleys cath - strict I/o  Daily weights  Hydrocortisone 100 mg q8h  lantus 20 U with HDISSS  Follow morning labs  Daily PT/INR  trens lactic acid  Trop x3 q6h  pepcid BID for GI prophylaxis  NO AC/AP  Hold coumadin            Electronically signed by Emily Parker MD on 6/29/2017 at 2:48 PM

## 2017-06-30 PROBLEM — E87.29 METABOLIC ACIDOSIS, INCREASED ANION GAP (IAG): Status: RESOLVED | Noted: 2017-06-29 | Resolved: 2017-06-30

## 2017-06-30 PROBLEM — D63.8 ANEMIA OF CHRONIC DISEASE: Status: ACTIVE | Noted: 2017-06-30

## 2017-06-30 PROBLEM — E87.20 LACTIC ACIDOSIS: Status: RESOLVED | Noted: 2017-06-29 | Resolved: 2017-06-30

## 2017-06-30 PROBLEM — N39.0 URINARY TRACT INFECTION: Status: ACTIVE | Noted: 2017-06-30

## 2017-06-30 PROBLEM — N30.01 ACUTE CYSTITIS WITH HEMATURIA: Status: ACTIVE | Noted: 2017-05-31

## 2017-06-30 PROBLEM — D72.829 LEUCOCYTOSIS: Status: RESOLVED | Noted: 2017-06-29 | Resolved: 2017-06-30

## 2017-06-30 LAB
-: ABNORMAL
ABSOLUTE EOS #: 0 K/UL (ref 0–0.4)
ABSOLUTE LYMPH #: 0.7 K/UL (ref 1–4.8)
ABSOLUTE MONO #: 0.2 K/UL (ref 0.1–1.2)
ALBUMIN SERPL-MCNC: 3 G/DL (ref 3.5–5.2)
ALBUMIN/GLOBULIN RATIO: 1.2 (ref 1–2.5)
ALP BLD-CCNC: 49 U/L (ref 35–104)
ALT SERPL-CCNC: 21 U/L (ref 5–33)
AMORPHOUS: ABNORMAL
ANION GAP SERPL CALCULATED.3IONS-SCNC: 17 MMOL/L (ref 9–17)
AST SERPL-CCNC: 20 U/L
BACTERIA: ABNORMAL
BASOPHILS # BLD: 0 %
BASOPHILS ABSOLUTE: 0 K/UL (ref 0–0.2)
BILIRUB SERPL-MCNC: 1.58 MG/DL (ref 0.3–1.2)
BILIRUBIN DIRECT: 0.35 MG/DL
BILIRUBIN URINE: NEGATIVE
BILIRUBIN, INDIRECT: 1.23 MG/DL (ref 0–1)
BUN BLDV-MCNC: 12 MG/DL (ref 8–23)
BUN/CREAT BLD: ABNORMAL (ref 9–20)
CALCIUM IONIZED: 1.14 MMOL/L (ref 1.13–1.33)
CALCIUM SERPL-MCNC: 8.4 MG/DL (ref 8.6–10.4)
CASTS UA: ABNORMAL /LPF (ref 0–8)
CHLORIDE BLD-SCNC: 101 MMOL/L (ref 98–107)
CO2: 20 MMOL/L (ref 20–31)
COLOR: YELLOW
COMMENT UA: ABNORMAL
CREAT SERPL-MCNC: 0.55 MG/DL (ref 0.5–0.9)
CRYSTALS, UA: ABNORMAL /HPF
DIFFERENTIAL TYPE: ABNORMAL
DIGOXIN DATE LAST DOSE: NORMAL
DIGOXIN DOSE AMOUNT: NORMAL
DIGOXIN DOSE TIME: NORMAL
DIGOXIN LEVEL: 1.3 NG/ML (ref 0.5–2)
EKG ATRIAL RATE: 147 BPM
EKG ATRIAL RATE: 159 BPM
EKG Q-T INTERVAL: 268 MS
EKG Q-T INTERVAL: 312 MS
EKG QRS DURATION: 128 MS
EKG QRS DURATION: 130 MS
EKG QTC CALCULATION (BAZETT): 426 MS
EKG QTC CALCULATION (BAZETT): 489 MS
EKG R AXIS: 34 DEGREES
EKG R AXIS: 55 DEGREES
EKG T AXIS: -165 DEGREES
EKG T AXIS: 175 DEGREES
EKG VENTRICULAR RATE: 148 BPM
EKG VENTRICULAR RATE: 152 BPM
EOSINOPHILS RELATIVE PERCENT: 0 %
EPITHELIAL CELLS UA: ABNORMAL /HPF (ref 0–5)
GFR AFRICAN AMERICAN: >60 ML/MIN
GFR NON-AFRICAN AMERICAN: >60 ML/MIN
GFR SERPL CREATININE-BSD FRML MDRD: ABNORMAL ML/MIN/{1.73_M2}
GFR SERPL CREATININE-BSD FRML MDRD: ABNORMAL ML/MIN/{1.73_M2}
GLOBULIN: ABNORMAL G/DL (ref 1.5–3.8)
GLUCOSE BLD-MCNC: 102 MG/DL (ref 65–105)
GLUCOSE BLD-MCNC: 125 MG/DL (ref 70–99)
GLUCOSE BLD-MCNC: 140 MG/DL (ref 65–105)
GLUCOSE BLD-MCNC: 167 MG/DL (ref 65–105)
GLUCOSE BLD-MCNC: 182 MG/DL (ref 65–105)
GLUCOSE URINE: NEGATIVE
HCT VFR BLD CALC: 34.9 % (ref 36–46)
HEMOGLOBIN: 11.5 G/DL (ref 12–16)
INR BLD: 3.2
KETONES, URINE: NEGATIVE
LACTIC ACID, WHOLE BLOOD: 1.4 MMOL/L (ref 0.7–2.1)
LACTIC ACID, WHOLE BLOOD: 1.5 MMOL/L (ref 0.7–2.1)
LACTIC ACID: NORMAL MMOL/L
LEUKOCYTE ESTERASE, URINE: ABNORMAL
LYMPHOCYTES # BLD: 7 %
MAGNESIUM: 2.1 MG/DL (ref 1.6–2.6)
MCH RBC QN AUTO: 30.3 PG (ref 26–34)
MCHC RBC AUTO-ENTMCNC: 33 G/DL (ref 31–37)
MCV RBC AUTO: 91.7 FL (ref 80–100)
MONOCYTES # BLD: 2 %
MUCUS: ABNORMAL
NITRITE, URINE: POSITIVE
OTHER OBSERVATIONS UA: ABNORMAL
PDW BLD-RTO: 18.6 % (ref 12.5–15.4)
PH UA: 6.5 (ref 5–8)
PHOSPHORUS: 3.7 MG/DL (ref 2.6–4.5)
PLATELET # BLD: 148 K/UL (ref 140–450)
PLATELET ESTIMATE: ABNORMAL
PMV BLD AUTO: 8.1 FL (ref 6–12)
POTASSIUM SERPL-SCNC: 4.8 MMOL/L (ref 3.7–5.3)
PROTEIN UA: ABNORMAL
PROTHROMBIN TIME: 34.7 SEC (ref 9.4–12.6)
RBC # BLD: 3.81 M/UL (ref 4–5.2)
RBC # BLD: ABNORMAL 10*6/UL
RBC UA: ABNORMAL /HPF (ref 0–4)
RENAL EPITHELIAL, UA: ABNORMAL /HPF
SEG NEUTROPHILS: 91 %
SEGMENTED NEUTROPHILS ABSOLUTE COUNT: 9.3 K/UL (ref 1.8–7.7)
SODIUM BLD-SCNC: 138 MMOL/L (ref 135–144)
SPECIFIC GRAVITY UA: 1.02 (ref 1–1.03)
TOTAL PROTEIN: 5.5 G/DL (ref 6.4–8.3)
TRICHOMONAS: ABNORMAL
TROPONIN INTERP: ABNORMAL
TROPONIN INTERP: NORMAL
TROPONIN T: 0.03 NG/ML
TROPONIN T: <0.03 NG/ML
TURBIDITY: ABNORMAL
URINE HGB: ABNORMAL
UROBILINOGEN, URINE: NORMAL
WBC # BLD: 10.1 K/UL (ref 3.5–11)
WBC # BLD: ABNORMAL 10*3/UL
WBC UA: ABNORMAL /HPF (ref 0–5)
YEAST: ABNORMAL

## 2017-06-30 PROCEDURE — 99291 CRITICAL CARE FIRST HOUR: CPT | Performed by: INTERNAL MEDICINE

## 2017-06-30 PROCEDURE — 80162 ASSAY OF DIGOXIN TOTAL: CPT

## 2017-06-30 PROCEDURE — 82947 ASSAY GLUCOSE BLOOD QUANT: CPT

## 2017-06-30 PROCEDURE — 97162 PT EVAL MOD COMPLEX 30 MIN: CPT

## 2017-06-30 PROCEDURE — 83605 ASSAY OF LACTIC ACID: CPT

## 2017-06-30 PROCEDURE — 51701 INSERT BLADDER CATHETER: CPT

## 2017-06-30 PROCEDURE — 94762 N-INVAS EAR/PLS OXIMTRY CONT: CPT

## 2017-06-30 PROCEDURE — 87186 SC STD MICRODIL/AGAR DIL: CPT

## 2017-06-30 PROCEDURE — G8979 MOBILITY GOAL STATUS: HCPCS

## 2017-06-30 PROCEDURE — G8978 MOBILITY CURRENT STATUS: HCPCS

## 2017-06-30 PROCEDURE — 36415 COLL VENOUS BLD VENIPUNCTURE: CPT

## 2017-06-30 PROCEDURE — 87077 CULTURE AEROBIC IDENTIFY: CPT

## 2017-06-30 PROCEDURE — 85610 PROTHROMBIN TIME: CPT

## 2017-06-30 PROCEDURE — 83735 ASSAY OF MAGNESIUM: CPT

## 2017-06-30 PROCEDURE — 2580000003 HC RX 258: Performed by: STUDENT IN AN ORGANIZED HEALTH CARE EDUCATION/TRAINING PROGRAM

## 2017-06-30 PROCEDURE — 6370000000 HC RX 637 (ALT 250 FOR IP): Performed by: STUDENT IN AN ORGANIZED HEALTH CARE EDUCATION/TRAINING PROGRAM

## 2017-06-30 PROCEDURE — 2060000000 HC ICU INTERMEDIATE R&B

## 2017-06-30 PROCEDURE — 80048 BASIC METABOLIC PNL TOTAL CA: CPT

## 2017-06-30 PROCEDURE — 6370000000 HC RX 637 (ALT 250 FOR IP): Performed by: INTERNAL MEDICINE

## 2017-06-30 PROCEDURE — 82330 ASSAY OF CALCIUM: CPT

## 2017-06-30 PROCEDURE — 97530 THERAPEUTIC ACTIVITIES: CPT

## 2017-06-30 PROCEDURE — 80076 HEPATIC FUNCTION PANEL: CPT

## 2017-06-30 PROCEDURE — 85025 COMPLETE CBC W/AUTO DIFF WBC: CPT

## 2017-06-30 PROCEDURE — 84484 ASSAY OF TROPONIN QUANT: CPT

## 2017-06-30 PROCEDURE — 81001 URINALYSIS AUTO W/SCOPE: CPT

## 2017-06-30 PROCEDURE — 2500000003 HC RX 250 WO HCPCS: Performed by: INTERNAL MEDICINE

## 2017-06-30 PROCEDURE — 84100 ASSAY OF PHOSPHORUS: CPT

## 2017-06-30 PROCEDURE — 6360000002 HC RX W HCPCS: Performed by: STUDENT IN AN ORGANIZED HEALTH CARE EDUCATION/TRAINING PROGRAM

## 2017-06-30 RX ORDER — ASPIRIN 81 MG/1
81 TABLET, CHEWABLE ORAL DAILY
Status: DISCONTINUED | OUTPATIENT
Start: 2017-06-30 | End: 2017-07-06 | Stop reason: HOSPADM

## 2017-06-30 RX ORDER — ATORVASTATIN CALCIUM 40 MG/1
40 TABLET, FILM COATED ORAL NIGHTLY
Status: DISCONTINUED | OUTPATIENT
Start: 2017-06-30 | End: 2017-07-06 | Stop reason: HOSPADM

## 2017-06-30 RX ORDER — FUROSEMIDE 10 MG/ML
40 INJECTION INTRAMUSCULAR; INTRAVENOUS ONCE
Status: COMPLETED | OUTPATIENT
Start: 2017-06-30 | End: 2017-06-30

## 2017-06-30 RX ORDER — UREA 10 %
3 LOTION (ML) TOPICAL NIGHTLY
Status: DISCONTINUED | OUTPATIENT
Start: 2017-06-30 | End: 2017-07-06 | Stop reason: HOSPADM

## 2017-06-30 RX ORDER — WARFARIN SODIUM 5 MG/1
5 TABLET ORAL DAILY
Status: DISCONTINUED | OUTPATIENT
Start: 2017-06-30 | End: 2017-07-01 | Stop reason: ALTCHOICE

## 2017-06-30 RX ORDER — VERAPAMIL HYDROCHLORIDE 40 MG/1
20 TABLET ORAL EVERY 8 HOURS SCHEDULED
Status: DISCONTINUED | OUTPATIENT
Start: 2017-06-30 | End: 2017-07-01

## 2017-06-30 RX ORDER — VERAPAMIL HYDROCHLORIDE 2.5 MG/ML
5 INJECTION, SOLUTION INTRAVENOUS
Status: COMPLETED | OUTPATIENT
Start: 2017-06-30 | End: 2017-06-30

## 2017-06-30 RX ORDER — PREDNISONE 20 MG/1
20 TABLET ORAL 3 TIMES DAILY
Status: DISCONTINUED | OUTPATIENT
Start: 2017-06-30 | End: 2017-07-01

## 2017-06-30 RX ADMIN — METOPROLOL TARTRATE 12.5 MG: 25 TABLET ORAL at 20:41

## 2017-06-30 RX ADMIN — VERAPAMIL HYDROCHLORIDE 5 MG: 2.5 INJECTION, SOLUTION INTRAVENOUS at 12:14

## 2017-06-30 RX ADMIN — PREDNISONE 20 MG: 20 TABLET ORAL at 20:41

## 2017-06-30 RX ADMIN — INSULIN LISPRO 2 UNITS: 100 INJECTION, SOLUTION INTRAVENOUS; SUBCUTANEOUS at 21:16

## 2017-06-30 RX ADMIN — ASPIRIN 81 MG: 81 TABLET, CHEWABLE ORAL at 09:03

## 2017-06-30 RX ADMIN — FUROSEMIDE 40 MG: 10 INJECTION, SOLUTION INTRAMUSCULAR; INTRAVENOUS at 09:12

## 2017-06-30 RX ADMIN — Medication 10 ML: at 20:42

## 2017-06-30 RX ADMIN — HYDROCORTISONE SODIUM SUCCINATE 100 MG: 100 INJECTION, POWDER, FOR SOLUTION INTRAMUSCULAR; INTRAVENOUS at 06:47

## 2017-06-30 RX ADMIN — VERAPAMIL HYDROCHLORIDE 20 MG: 40 TABLET ORAL at 16:03

## 2017-06-30 RX ADMIN — INSULIN LISPRO 3 UNITS: 100 INJECTION, SOLUTION INTRAVENOUS; SUBCUTANEOUS at 12:28

## 2017-06-30 RX ADMIN — Medication 3 MG: at 22:42

## 2017-06-30 RX ADMIN — METOPROLOL TARTRATE 25 MG: 25 TABLET ORAL at 15:27

## 2017-06-30 RX ADMIN — VERAPAMIL HYDROCHLORIDE 20 MG: 40 TABLET ORAL at 21:43

## 2017-06-30 RX ADMIN — PREDNISONE 20 MG: 20 TABLET ORAL at 09:03

## 2017-06-30 RX ADMIN — CEFTRIAXONE SODIUM 1 G: 1 INJECTION, POWDER, FOR SOLUTION INTRAMUSCULAR; INTRAVENOUS at 09:42

## 2017-06-30 RX ADMIN — Medication 10 ML: at 09:03

## 2017-06-30 RX ADMIN — INSULIN GLARGINE 20 UNITS: 100 INJECTION, SOLUTION SUBCUTANEOUS at 21:15

## 2017-06-30 RX ADMIN — INSULIN LISPRO 3 UNITS: 100 INJECTION, SOLUTION INTRAVENOUS; SUBCUTANEOUS at 16:23

## 2017-06-30 RX ADMIN — ATORVASTATIN CALCIUM 40 MG: 40 TABLET, FILM COATED ORAL at 20:41

## 2017-06-30 RX ADMIN — VERAPAMIL HYDROCHLORIDE 5 MG: 2.5 INJECTION, SOLUTION INTRAVENOUS at 12:24

## 2017-06-30 RX ADMIN — WARFARIN SODIUM 5 MG: 5 TABLET ORAL at 17:07

## 2017-06-30 RX ADMIN — PREDNISONE 20 MG: 20 TABLET ORAL at 14:00

## 2017-06-30 ASSESSMENT — PAIN SCALES - GENERAL
PAINLEVEL_OUTOF10: 0

## 2017-06-30 ASSESSMENT — PULMONARY FUNCTION TESTS: PIF_VALUE: 12

## 2017-06-30 NOTE — PROGRESS NOTES
EXAMINATION:  Constitutional: Appears well, in no distress  EENT: PERRLA, EOMI, sclera clear, anicteric, oropharynx clear, no lesions, neck supple with midline trachea.   Neck: Supple, symmetrical, trachea midline, no adenopathy, thyroid symmetric, no jvd skin normal  Respiratory: CTA b/l, on bipap  Cardiovascular: regular rate and rhythm, normal S1, S2, apical systolic murmur noted and 2+ pulses throughout  Abdomen: soft, nontender, nondistended, no masses or organomegaly  Neurological: alert oriented x 3, motor and sensation intact  Extremities:  peripheral pulses normal, no pedal edema, no clubbing or cyanosis       MEDICATIONS:    Scheduled Meds:   furosemide  40 mg Intravenous Once    sodium chloride flush  10 mL Intravenous 2 times per day    hydrocortisone sodium succinate PF  100 mg Intravenous Q8H    insulin glargine  20 Units Subcutaneous Nightly    insulin lispro  0-18 Units Subcutaneous TID WC    insulin lispro  0-9 Units Subcutaneous Nightly    famotidine (PEPCID) injection  20 mg Intravenous BID     Continuous Infusions:   dextrose       PRN Meds:     sodium chloride flush 10 mL PRN   acetaminophen 650 mg Q4H PRN   magnesium hydroxide 30 mL Daily PRN   ondansetron 4 mg Q6H PRN   glucose 15 g PRN   dextrose 12.5 g PRN   glucagon (rDNA) 1 mg PRN   dextrose 100 mL/hr PRN   ipratropium-albuterol 1 ampule Q6H PRN         VENT SETTINGS (Comprehensive) (if applicable):  Vent Information  Vent Type: Servo i  Vent Mode: NIVPC  Vt Exhaled: 575 mL  Spont VT: 740 mL  Pressure Ordered: 5  Rate Set: 10 bmp  Rate Measured: 23 br/min  Minute Volume: 13.7 Liters  FiO2 : 50 %  Peak Inspiratory Pressure: 12 cmH2O  I:E Ratio: 1:2.78  PEEP/CPAP: 6  I Time/ I Time %: 0.9 s  High Pressure Alarm: 40 cmH2O  Low Minute Volume Alarm: 4 L/min  High Respiratory Rate: 40 br/min  Mean Airway Pressure: 7.5 cmH20  Additional Respiratory  Assessments  Pulse: 84  Resp: 14  SpO2: 99 %  Oral Care: Mouthwash, Lip moisturizer - rate controlled    Long term current use of anticoagulant therapy    Obstructive sleep apnea - on bipap    Type 2 diabetes mellitus without complication (HCC)    Coronary artery disease involving native coronary artery - stable    Ischemic cardiomyopathy - stable    Lactic acidosis - resolved    Metabolic acidosis, increased anion gap (IAG) - resolved    Leucocytosis - resolved    Supratherapeutic INR - improved    Hyperglycemia due to type 2 diabetes mellitus ([address] - resolved    Acute respiratory failure with hypoxemia (HCC) - stable  UTI - on ceftriaxone      PLAN:     WEAN PER PROTOCOL:  [] No   [] Yes  [x] N/A    DISCONTINUE ANY LABS:   [x] No   [] Yes    ICU PROPHYLAXIS:  Stress ulcer:  [] PPI Agent  [x] C1Csexo [] Sucralfate  [] Other:  VTE:   [] Enoxaparin  [] Unfract. Heparin Subcut  [x] EPC Cuffs    NUTRITION:  [] NPO [] Tube Feeding (Specify: ) [] TPN  [] PO (Diet: DIET CARB CONTROL; Carb Control: 4 carbs/meal (approximate 1800 kcals/day); Low Sodium (2 GM); Daily Fluid Restriction: 1500 ml)    HOME MEDICATIONS RECONCILED: [] No  [x] Yes    INSULIN DRIP:   [x] No   [] Yes    CONSULTATION NEEDED:  [x] No   [] Yes    FAMILY UPDATED:    [x] No   [] Yes    TRANSFER OUT OF ICU:   [] No   [x] Yes    ADDITIONAL PLAN:    Start ceftriaxone for UTI  Dc lantus continue HDISSS  Start diet - cardiac low sodium and fluid restriction to 1500 ml  Lasix 40 mg IV once  Digoxin per cardio   Dc solucortef and start Prednisone 20 mg q8h  Daily PT /INR  Start coumadin 5 mg  Aspirin and statin resumed from home meds  Follow echo  Follow cardio reccs  Follow and replace electrolytes  Transfer out of ICU      Annalise De La Garza M.D.              Critical care resident,  Department of Internal Medicine/ Critical care  [facility])             6/30/2017, 7:56 AM

## 2017-06-30 NOTE — PROGRESS NOTES
Physical Therapy    [address]  Initial Assessment    NAME: [place]  : 1940  MRN: [id number]    Date of Service: 2017  [place] is a 67 y.o. significant past medical history of -   Chronic A fib on metoprolol and coumadin at home  CAD   Tachy- nazanin syndrome with considerations of pacemaker in future  Pulmonary fibrosis on prednisone 40 mg and dulera - in FU with Dr Chay Turcios  H/o mediastinoscopy with biopsy of UL and superior segment of LL on  - bx cw pulmonary fibrosis     Presented as transfer from [place] for further management. Initially complained of  SOB coughing up blood and pinkish sputum, also noted blood in her urine. Chest x ray showed CHF and was in Afib with RVR and supratherapeutic INR also he BP dropped to 80s and required BP support with levophed gtt. Cardio was consulted there and was started on esmolol gtt and was transferred here. She was requiring levophed for pressure support and and was decided to cardiovert her. After giving versed and fentanyl patient was cardioverted twice once with 100 J and second with 150 J but no immediate rhythm reversal seen. Cardiology consulted and recommended to start digoxin after loading.   On labs - leucocytosis - ?steroid use / infection - source unclear - pneumonia/UTI  Patient Diagnosis(es):   Patient Active Problem List    Diagnosis Date Noted    Pulmonary HTN ([facility] 2015     Priority: High    Non-rheumatic mitral regurgitation 2015     Priority: High    Acute on chronic systolic congestive heart failure ([address] 2017    Lactic acidosis     Metabolic acidosis, increased anion gap (IAG) 2017    Leucocytosis 2017    Supratherapeutic INR 2017    Hyperglycemia due to type 2 diabetes mellitus ([address] 2017    Acute respiratory failure with hypoxemia (HCC)     Pulmonary fibrosis ([facility] 2017    ASHD (arteriosclerotic heart disease) 2017    Uncomplicated asthma 70/47/6288    Acute pulmonary edema (HCC) 06/06/2017    Ground glass opacity present on imaging of lung 06/06/2017    Coronary artery disease involving native coronary artery 06/04/2017    Ischemic cardiomyopathy 06/04/2017    HCAP (healthcare-associated pneumonia) 06/04/2017    Hypokalemia 06/04/2017    Acute cystitis with hematuria 05/31/2017    Type 2 diabetes mellitus without complication ([facility] 74/61/7834    Upper respiratory tract infection 01/16/2017    Atrial fibrillation with rapid ventricular response ([facility] 08/31/2015    Long term current use of anticoagulant therapy 08/31/2015    Essential hypertension 08/31/2015    Obstructive sleep apnea 08/31/2015    Gout 08/31/2015       Past Medical History:   Diagnosis Date    Allergic rhinitis 10/1994    Asthma     Atrial fibrillation ([facility] 12/2008    CAD (coronary artery disease)     Clotting disorder (HCC)     plebitis in L leg    COPD (chronic obstructive pulmonary disease) ([facility]     DDD (degenerative disc disease), cervical     Degeneration of cervical intervertebral disc     Diverticulosis 2005    Gout     Gout, unspecified     H/O cardiac catheterization 6/17/15    LMCA: Normal 0% stenosis. LAD: Lesion on 1st diag: Proximal subsection. 80% stenosis. Lesion plaque is ruptured. Theadore Denzel LCx: Lesion on 1st Ob Leslie: Mid subsection. 85% stenosis. RCA:Lesion on R PDA: Mid subsection. 85% stenosis. Lesion on R PDA: Distal subsection. 70% stenosis. Lesion on Prox RCA: Mid subsection. 50% stenosis. EF 50%.  H/O echocardiogram 11/09/2016    EF 40-45%. Mild LV hypertrophy normal LV cavity size. Sigmoid interventricular septum without evidence of outflow tract obstruction. Left atrium is moderately dilated (34-39) left atrial volume index of 36 ml/m2. Mild mitral regurg. Diastology cannot be assessed due to A-fib.     History of Holter monitoring 3/24/16    Atrial Fibrillation throughout,fairly controlled, HR  bpm 79% of the time. Occasional high ventricular rate episodes and multiple pauses suggestive of tachycardia/bradycardia syndrome  Msximum R-R interval 2.68 seconds. 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    Long term (current) use of anticoagulants 8/31/2015    Other abnormal glucose 2004    Phlebitis and thrombophlebitis of lower extremities, unspecified ([address] 1961    L leg    Senile osteoporosis 2006    L/S    Symptomatic menopausal or female climacteric states 06/1995    Syncope and collapse     Type II or unspecified type diabetes mellitus without mention of complication, not stated as uncontrolled 03/2009    Unspecified hereditary and idiopathic peripheral neuropathy 2012    Unspecified sleep apnea 11/2009     Past Surgical History:   Procedure Laterality Date    BRONCHOSCOPY  6/7/2017    BRONCHOSCOPY BRUSHINGS performed by Juan Miguel Knight DO at [place] [address]  6/7/2017    BRONCHOSCOPY/TRANSBRONCHIAL LUNG BIOPSY performed by Juan Miguel Knight DO at [place] [address]  6/7/2017    BRONCHOSCOPY FLUOROSCOPY performed by Juan Miguel [facility] DO at [facility] 06/17/2015    COLONOSCOPY  1/2005    DIAGNOSTIC CARDIAC CATH LAB PROCEDURE  06/17/15    EYE SURGERY      FRACTURE SURGERY  2004    Distal Radius Ulna    LOBECTOMY Left 6/14/2017    MINI PORT ACCESS LEFT CHEST, X2 SPECIMENS.  performed by Ivone Armstrong MD at [address] [place] [address]  2years old    VOLVAR-ULCERATIVE LESION-CAUTERIZED    SKIN BIOPSY      TONSILLECTOMY AND ADENOIDECTOMY           Restrictions  Restrictions/Precautions  Restrictions/Precautions: Fall Risk  Required Braces or Orthoses?: No  Vision/Hearing  Vision: Within Functional Limits  Hearing: Within functional limits     Subjective  General  Patient assessed for rehabilitation services?: Yes  Family / Caregiver Present: No  Follows

## 2017-06-30 NOTE — PROGRESS NOTES
[address] [place] [address]     Department of Internal Medicine - Staff Internal Medicine Service   ICU PATIENT TRANSFER NOTE        Patient:  Rosie Rosario  YOB: 1940  MRN: [id number]     Acct: [de-identified]     Admit date: 6/29/2017    Code Status:-  Full code     Reason for ICU Admission:-   Afib with RVR and hemo dynamical instability requiring presser support    SUPPORT DEVICES: [] Ventilator [x] BIPAP  [] Nasal Cannula [] Room Air    Consultations:- [x] Cardiology [] Nephrology  [] Hemo onco  [] GI                               [] ID [] ENT  [] Rheum [] Endo   []Physiotherapy                                 Others:-     NUTRITION:  [] NPO [] Tube Feeding (Specify: ) [] TPN  [x] PO    Central Lines:- [x] No   [] Yes           If yes - Days/Date of Insertion. Pt seen,examined and Chart reviewed. ICU COURSE:    The patient is a 67 y.o. female with past medical history significant for chronic atrial fibrillation on coumadin and metoprolol, pulmonary fibrosis on chronic prednisone therapy, DM, HTN, HLD, CAD, and bradycardic/tachycardic episodes who was initially admitted on 6/29/2017 as a transfer from [place] with Atrial fibrillation with RVR ([facility] [I48.91] and presented with cardiogenic shock secondary to the afib with RVR, pulmonary edema, and acute hypoxic respiratory failure. Patient had recently been hospitalized for 12 days in early June, 2017, for HCAP pneumonia. Pulmonology was consulted and worked patient up for HCAP. Patient underwent bronchoscopy and lung biopsy during that admission diagnosing pulmonary fibrosis, and she was started on prednisone therapy and dulera. Patient became acutely short of breath and was evaluated at [facility] on 6/29. She was sent to [facility] for higher level of care. In the ICU, patient was admitted directly to the ICU.  She was requiring levophed support on presentation due to cardiogenic shock, [facility]    cefTRIAXone (ROCEPHIN) IV  1 g Intravenous Q24H    metoprolol tartrate  25 mg Oral Daily    metoprolol tartrate  12.5 mg Oral Nightly    verapamil  20 mg Oral 3 times per day    sodium chloride flush  10 mL Intravenous 2 times per day    insulin glargine  20 Units Subcutaneous Nightly    insulin lispro  0-18 Units Subcutaneous TID WC    insulin lispro  0-9 Units Subcutaneous Nightly     Continuous Infusions:   dextrose       PRN Meds:sodium chloride flush, acetaminophen, magnesium hydroxide, ondansetron, glucose, dextrose, glucagon (rDNA), dextrose, ipratropium-albuterol    Objective:    CBC:   Recent Labs      06/29/17   0927 06/30/17   0630   WBC  19.0*  10.1   HGB  13.1  11.5*   PLT  226  148     BMP:    Recent Labs      06/29/17   0927 06/30/17   0630   NA  132*  138   K  4.3  4.8   CL  94*  101   CO2  16*  20   BUN  15  12   CREATININE  0.77  0.55   GLUCOSE  325*  125*     Calcium:  Recent Labs      06/30/17   0630   CALCIUM  8.4*     Ionized Calcium:No results for input(s): IONCA in the last 72 hours. Magnesium:  Recent Labs      06/30/17   0824   MG  2.1     Phosphorus:  Recent Labs      06/30/17   0824   PHOS  3.7     BNP:No results for input(s): BNP in the last 72 hours. Glucose:  Recent Labs      06/30/17   0857  06/30/17   1227  06/30/17   1621   POCGLU  102  167*  140*     HgbA1C: No results for input(s): LABA1C in the last 72 hours. INR:   Recent Labs      06/30/17   0630   INR  3.2     Hepatic:   Recent Labs      06/30/17   0630   ALKPHOS  49   ALT  21   AST  20   PROT  5.5*   BILITOT  1.58*   BILIDIR  0.35*   LABALBU  3.0*     Amylase and Lipase:  Recent Labs      06/30/17   0104   LACTA  NOT REPORTED     Lactic Acid:   Recent Labs      06/30/17   0104   LACTA  NOT REPORTED     CARDIAC ENZYMES:No results for input(s): CKTOTAL, CKMB, CKMBINDEX, TROPONINI in the last 72 hours. BNP: No results for input(s): BNP in the last 72 hours.   Lipids: No results for input(s): CHOL,

## 2017-06-30 NOTE — PLAN OF CARE
Problem: Skin Integrity:  Goal: Will show no infection signs and symptoms  Will show no infection signs and symptoms   Outcome: Ongoing  Goal: Absence of new skin breakdown  Absence of new skin breakdown   Outcome: Ongoing    Problem: Falls - Risk of:  Goal: Will remain free from falls  Will remain free from falls   Outcome: Ongoing  Goal: Absence of physical injury  Absence of physical injury   Outcome: Ongoing    Problem: Gas Exchange - Impaired:  Goal: Levels of oxygenation will improve  Levels of oxygenation will improve   Outcome: Ongoing    Problem: Risk for Impaired Skin Integrity  Goal: Tissue integrity - skin and mucous membranes  Structural intactness and normal physiological function of skin and  mucous membranes.    Outcome: Ongoing

## 2017-06-30 NOTE — PROGRESS NOTES
Pharmacy Note  Warfarin Consult    Phoebe Ivy is a 67 y.o. female for whom pharmacy has been consulted to manage warfarin therapy. Consulting Physician: Dr Cristian Ontiveros  Reason for Admission: cardiogenic shock    Warfarin dose prior to admission: 7.5 mg daily  Warfarin indication: atrial fibrillation  Target INR range: 2-3     Past Medical History:   Diagnosis Date    Allergic rhinitis 10/1994    Asthma     Atrial fibrillation ([facility] [address] 12/2008    CAD (coronary artery disease)     Clotting disorder (HCC)     plebitis in L leg    COPD (chronic obstructive pulmonary disease) ([facility] [address]     DDD (degenerative disc disease), cervical     Degeneration of cervical intervertebral disc     Diverticulosis 2005    Gout     Gout, unspecified     H/O cardiac catheterization 6/17/15    LMCA: Normal 0% stenosis. LAD: Lesion on 1st diag: Proximal subsection. 80% stenosis. Lesion plaque is ruptured. Eros Armstrong LCx: Lesion on 1st Ob Leslie: Mid subsection. 85% stenosis. RCA:Lesion on R PDA: Mid subsection. 85% stenosis. Lesion on R PDA: Distal subsection. 70% stenosis. Lesion on Prox RCA: Mid subsection. 50% stenosis. EF 50%.  H/O echocardiogram 11/09/2016    EF 40-45%. Mild LV hypertrophy normal LV cavity size. Sigmoid interventricular septum without evidence of outflow tract obstruction. Left atrium is moderately dilated (34-39) left atrial volume index of 36 ml/m2. Mild mitral regurg. Diastology cannot be assessed due to A-fib.  History of Holter monitoring 3/24/16    Atrial Fibrillation throughout,fairly controlled, HR  bpm 79% of the time. Occasional high ventricular rate episodes and multiple pauses suggestive of tachycardia/bradycardia syndrome  Msximum R-R interval 2.68 seconds.    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    Long term (current) use of anticoagulants 8/31/2015    Other abnormal glucose 2004    Phlebitis and

## 2017-06-30 NOTE — CONSULTS
coordination, mood, affect, memory, mentation, behavior. · Psychiatric: No anxiety, or depression. · Endocrine: No temperature intolerance. No excessive thirst, fluid intake, or urination. No tremor. · Hematologic/Lymphatic: No abnormal bruising or bleeding, blood clots or swollen lymph nodes. · Allergic/Immunologic: No nasal congestion or hives. PHYSICAL EXAM:      /78  Pulse 95  Temp 98.8 °F (37.1 °C) (Axillary)   Resp 17  Ht 5' 5\" (1.651 m)  Wt 197 lb 1.5 oz (89.4 kg)  SpO2 97%  BMI 32.8 kg/m2       Intake/Output Summary (Last 24 hours) at 06/30/17 1348  Last data filed at 06/30/17 1237   Gross per 24 hour   Intake            559.5 ml   Output             2040 ml   Net          -1480.5 ml       Constitutional and General Appearance: alert, cooperative, no distress and appears stated age  HEENT: PERRL, no cervical lymphadenopathy. No masses palpable. Normal oral mucosa  Respiratory:  · Normal excursion and expansion without use of accessory muscles  · Resp Auscultation: Good respiratory effort. No for increased work of breathing. On auscultation: decreased at the basis with crackles. Cardiovascular:  · The apical impulse is not displaced  · Heart tones are crisp and normal. irregular S1 and S2.  · Jugular venous pulsation Normal  · The carotid upstroke is normal in amplitude and contour without delay or bruit  · Peripheral pulses are symmetrical and full  Abdomen:  · No masses or tenderness  · Bowel sounds present  Extremities:  ·  No Cyanosis or Clubbing  ·  Lower extremity edema: Yes +1 b/l   · Skin: Warm and dry  Neurological:  · Alert and oriented. · Moves all extremities well  · No abnormalities of mood, affect, memory, mentation, or behavior are noted        DATA:    Diagnostics:    EKG: Atrial Fibrillation with RVR. ECHO 6/5/17: EF 45%, no regurg/stenosis. CATH 6/17/15: Moderate to severe three vessel disease involving fairly small branches of the LCx, LCx and RCA.  Normal on steroid therapy 40 mg Prednisone daily   7. Essential HTN  8. Elevated INR  9. Hyperlipidemia    RECOMMENDATIONS:    1. Verapamil 10 mg in divided doses was given at bedside with HR down to 80-90. Will continue Lopressor 25 mg in AM and 12.5 mg in the evening. Add Verapamil PO and titrate dose as BP/HR tolerate. Would hold digoxin for now. 2. Monitor her response to diuresis today and re-assess fluid status tomorrow. 3. Resume Coumadin to keep INR goal of 2.5   4. Will follow with you.    ** The above plan was made in conjunction with the rounding attending, Dr. Pippa Kraft, at the bedside and was discussed with the patient. Cyrus Ruth M.D. Fellow, [address]      I have reviewed the case with resident / fellow  I have examined the patient personally  Agree with treatment plan, correction innotes was made as appropriate, and discussed final arrangement based on  my evaluation and exam  Risk and benefit of procedure explained     Procedure was performed by me, with all aspect of the procedure being done using standard protocol.     Joanie Vasques MD  [facility]

## 2017-06-30 NOTE — CARE COORDINATION
Case Management Initial Discharge Plan  Jeremiah Larios,         Readmission Risk              Readmission Risk:        23.5       Age 71 or Greater:  1    Admitted from SNF or Requires Paid or Family Care:  0    Currently has CHF,COPD,ARF,CRI,or is on dialysis:  4    Takes more than 5 Prescription Medications:  4    Takes Digoxin,Insulin,Anticoagulants,Narcotics or ASA/Plavix:  2    [address] in Past 12 Months:  10    On Disability:  0    Patient Considers own Health:  2.5            Met with:patient to discuss discharge plans.    Information verified: address, contacts, phone number, , insurance Yes  PCP: Irlanda De Luna MD  Date of last visit: last week    Insurance Provider: Jes Mirza    Discharge Planning  Current Residence: Private residence    Living Arrangements:  Pt lives with husb, dtr and grdtr in [address] in [facility], bed/bath downstairs    Support Systems:  Spouse/Significant Other, Children, Family Members  Current Services PTA: CPAP   Supplier: [employer]  Patient able to perform ADL's:Independent  DME used to aid ambulation prior to admission: seated walker    Potential Assistance Needed:  N/A    Pharmacy: [facility]   Potential Assistance Purchasing Medications: No    Does patient want to participate in local refill/ meds to beds program? No      Patient agreeable to home care: Yes - current with [facility]  [place] of choice provided:  yes      Type of Home Care Services:  RN, PT  Patient expects to be discharged to:  home    Prior SNF/Rehab Placement and Facility: No  Agreeable to SNF/Rehab: Yes  [place] of choice provided: yes      Expected Discharge date:  17  Follow Up Appointment: Best Day/ Time:      Transportation provider:   Transportation arrangements needed for discharge: No    Discharge Plan:Pt wishes to return home and cont with [facility]  Stated has RN, PT    [facility] Autumn [facility]wan  They were aware pt was here  Will notify [facility] at discharge        Electronically signed by WILLI Ramon LISW on 6/30/17 at 10:25 AM

## 2017-07-01 LAB
ABSOLUTE EOS #: 0 K/UL (ref 0–0.4)
ABSOLUTE LYMPH #: 0.7 K/UL (ref 1–4.8)
ABSOLUTE MONO #: 0.2 K/UL (ref 0.1–1.2)
ANION GAP SERPL CALCULATED.3IONS-SCNC: 18 MMOL/L (ref 9–17)
BASOPHILS # BLD: 0 %
BASOPHILS ABSOLUTE: 0 K/UL (ref 0–0.2)
BUN BLDV-MCNC: 22 MG/DL (ref 8–23)
BUN/CREAT BLD: ABNORMAL (ref 9–20)
CALCIUM SERPL-MCNC: 9 MG/DL (ref 8.6–10.4)
CHLORIDE BLD-SCNC: 102 MMOL/L (ref 98–107)
CO2: 23 MMOL/L (ref 20–31)
CREAT SERPL-MCNC: 0.5 MG/DL (ref 0.5–0.9)
DIFFERENTIAL TYPE: ABNORMAL
EOSINOPHILS RELATIVE PERCENT: 0 %
GFR AFRICAN AMERICAN: >60 ML/MIN
GFR NON-AFRICAN AMERICAN: >60 ML/MIN
GFR SERPL CREATININE-BSD FRML MDRD: ABNORMAL ML/MIN/{1.73_M2}
GFR SERPL CREATININE-BSD FRML MDRD: ABNORMAL ML/MIN/{1.73_M2}
GLUCOSE BLD-MCNC: 119 MG/DL (ref 65–105)
GLUCOSE BLD-MCNC: 131 MG/DL (ref 65–105)
GLUCOSE BLD-MCNC: 138 MG/DL (ref 70–99)
GLUCOSE BLD-MCNC: 177 MG/DL (ref 65–105)
GLUCOSE BLD-MCNC: 197 MG/DL (ref 65–105)
HCT VFR BLD CALC: 36.6 % (ref 36–46)
HEMOGLOBIN: 12.2 G/DL (ref 12–16)
INR BLD: 2.6
LYMPHOCYTES # BLD: 7 %
MCH RBC QN AUTO: 30.4 PG (ref 26–34)
MCHC RBC AUTO-ENTMCNC: 33.4 G/DL (ref 31–37)
MCV RBC AUTO: 91.1 FL (ref 80–100)
MONOCYTES # BLD: 2 %
PDW BLD-RTO: 18.9 % (ref 12.5–15.4)
PLATELET # BLD: 176 K/UL (ref 140–450)
PLATELET ESTIMATE: ABNORMAL
PMV BLD AUTO: 8.3 FL (ref 6–12)
POTASSIUM SERPL-SCNC: 4.4 MMOL/L (ref 3.7–5.3)
PROTHROMBIN TIME: 27.6 SEC (ref 9.4–12.6)
RBC # BLD: 4.01 M/UL (ref 4–5.2)
RBC # BLD: ABNORMAL 10*6/UL
SEG NEUTROPHILS: 91 %
SEGMENTED NEUTROPHILS ABSOLUTE COUNT: 9.7 K/UL (ref 1.8–7.7)
SODIUM BLD-SCNC: 143 MMOL/L (ref 135–144)
WBC # BLD: 10.6 K/UL (ref 3.5–11)
WBC # BLD: ABNORMAL 10*3/UL

## 2017-07-01 PROCEDURE — 99233 SBSQ HOSP IP/OBS HIGH 50: CPT | Performed by: INTERNAL MEDICINE

## 2017-07-01 PROCEDURE — 2580000003 HC RX 258: Performed by: STUDENT IN AN ORGANIZED HEALTH CARE EDUCATION/TRAINING PROGRAM

## 2017-07-01 PROCEDURE — 6370000000 HC RX 637 (ALT 250 FOR IP): Performed by: INTERNAL MEDICINE

## 2017-07-01 PROCEDURE — 6370000000 HC RX 637 (ALT 250 FOR IP): Performed by: HOSPITALIST

## 2017-07-01 PROCEDURE — 99232 SBSQ HOSP IP/OBS MODERATE 35: CPT | Performed by: INTERNAL MEDICINE

## 2017-07-01 PROCEDURE — 82947 ASSAY GLUCOSE BLOOD QUANT: CPT

## 2017-07-01 PROCEDURE — 6360000002 HC RX W HCPCS: Performed by: STUDENT IN AN ORGANIZED HEALTH CARE EDUCATION/TRAINING PROGRAM

## 2017-07-01 PROCEDURE — 97110 THERAPEUTIC EXERCISES: CPT

## 2017-07-01 PROCEDURE — 85610 PROTHROMBIN TIME: CPT

## 2017-07-01 PROCEDURE — 97116 GAIT TRAINING THERAPY: CPT

## 2017-07-01 PROCEDURE — 6370000000 HC RX 637 (ALT 250 FOR IP): Performed by: STUDENT IN AN ORGANIZED HEALTH CARE EDUCATION/TRAINING PROGRAM

## 2017-07-01 PROCEDURE — 80048 BASIC METABOLIC PNL TOTAL CA: CPT

## 2017-07-01 PROCEDURE — 85025 COMPLETE CBC W/AUTO DIFF WBC: CPT

## 2017-07-01 PROCEDURE — 36415 COLL VENOUS BLD VENIPUNCTURE: CPT

## 2017-07-01 PROCEDURE — 2060000000 HC ICU INTERMEDIATE R&B

## 2017-07-01 PROCEDURE — 94762 N-INVAS EAR/PLS OXIMTRY CONT: CPT

## 2017-07-01 RX ORDER — DEXTROMETHORPHAN POLISTIREX 30 MG/5ML
30 SUSPENSION ORAL 2 TIMES DAILY PRN
Status: DISCONTINUED | OUTPATIENT
Start: 2017-07-01 | End: 2017-07-06 | Stop reason: HOSPADM

## 2017-07-01 RX ORDER — PREDNISONE 50 MG/1
50 TABLET ORAL DAILY
Status: DISCONTINUED | OUTPATIENT
Start: 2017-07-02 | End: 2017-07-06 | Stop reason: HOSPADM

## 2017-07-01 RX ORDER — VERAPAMIL HYDROCHLORIDE 40 MG/1
20 TABLET ORAL ONCE
Status: COMPLETED | OUTPATIENT
Start: 2017-07-01 | End: 2017-07-01

## 2017-07-01 RX ORDER — WARFARIN SODIUM 7.5 MG/1
7.5 TABLET ORAL ONCE
Status: COMPLETED | OUTPATIENT
Start: 2017-07-01 | End: 2017-07-01

## 2017-07-01 RX ORDER — VERAPAMIL HYDROCHLORIDE 40 MG/1
40 TABLET ORAL EVERY 8 HOURS SCHEDULED
Status: DISCONTINUED | OUTPATIENT
Start: 2017-07-01 | End: 2017-07-02

## 2017-07-01 RX ADMIN — Medication 30 MG: at 16:56

## 2017-07-01 RX ADMIN — PREDNISONE 20 MG: 20 TABLET ORAL at 14:14

## 2017-07-01 RX ADMIN — INSULIN LISPRO 3 UNITS: 100 INJECTION, SOLUTION INTRAVENOUS; SUBCUTANEOUS at 12:39

## 2017-07-01 RX ADMIN — PREDNISONE 20 MG: 20 TABLET ORAL at 08:03

## 2017-07-01 RX ADMIN — METOPROLOL TARTRATE 25 MG: 25 TABLET ORAL at 08:03

## 2017-07-01 RX ADMIN — VERAPAMIL HYDROCHLORIDE 40 MG: 40 TABLET ORAL at 21:44

## 2017-07-01 RX ADMIN — VERAPAMIL HYDROCHLORIDE 20 MG: 40 TABLET ORAL at 14:14

## 2017-07-01 RX ADMIN — ASPIRIN 81 MG: 81 TABLET, CHEWABLE ORAL at 08:03

## 2017-07-01 RX ADMIN — ATORVASTATIN CALCIUM 40 MG: 40 TABLET, FILM COATED ORAL at 20:02

## 2017-07-01 RX ADMIN — INSULIN LISPRO 2 UNITS: 100 INJECTION, SOLUTION INTRAVENOUS; SUBCUTANEOUS at 21:45

## 2017-07-01 RX ADMIN — CEFTRIAXONE SODIUM 1 G: 1 INJECTION, POWDER, FOR SOLUTION INTRAMUSCULAR; INTRAVENOUS at 09:43

## 2017-07-01 RX ADMIN — PREDNISONE 20 MG: 20 TABLET ORAL at 20:02

## 2017-07-01 RX ADMIN — METOPROLOL TARTRATE 12.5 MG: 25 TABLET ORAL at 20:02

## 2017-07-01 RX ADMIN — Medication 10 ML: at 08:03

## 2017-07-01 RX ADMIN — VERAPAMIL HYDROCHLORIDE 20 MG: 40 TABLET ORAL at 06:17

## 2017-07-01 RX ADMIN — Medication 3 MG: at 21:42

## 2017-07-01 RX ADMIN — WARFARIN SODIUM 7.5 MG: 7.5 TABLET ORAL at 16:56

## 2017-07-01 RX ADMIN — VERAPAMIL HYDROCHLORIDE 20 MG: 40 TABLET ORAL at 17:50

## 2017-07-01 RX ADMIN — INSULIN GLARGINE 20 UNITS: 100 INJECTION, SOLUTION SUBCUTANEOUS at 21:45

## 2017-07-01 ASSESSMENT — PAIN SCALES - GENERAL
PAINLEVEL_OUTOF10: 0

## 2017-07-01 NOTE — PLAN OF CARE
Problem: MECHANICAL VENTILATION  Intervention: Biphasic positive airway pressure (BIPAP)  NONINVASIVE VENTILATION     PROVIDE OPTIMAL VENTILATION/ACCEPTABLE SPO2              IMPLEMENT NONINVASIVE VENTILATION PROTOCOL              MAINTAIN ACCEPTABLE SPO2              ASSESS SKIN INTEGRITY/BREAKDOWN SCORE              PATIENT EDUCATION AS NEEDED              BIPAP AS NEEDED

## 2017-07-01 NOTE — PROGRESS NOTES
Vitals: BP (!) 133/93  Pulse 91  Temp 98.1 °F (36.7 °C) (Oral)   Resp 16  Ht 5' 5\" (1.651 m)  Wt 204 lb 8 oz (92.8 kg)  SpO2 95%  BMI 34.03 kg/m2  General appearance: alert and cooperative with exam  HEENT: Head: Normocephalic, no lesions, without obvious abnormality. Neck: no adenopathy, no carotid bruit, no JVD, supple, symmetrical, trachea midline and thyroid not enlarged, symmetric, no tenderness/mass/nodules  Lungs: Decresaed BS left base  Heart: regular rate and rhythm, S1, S2 normal, no murmur, click, rub or gallop  Abdomen: soft, non-tender; bowel sounds normal; no masses,  no organomegaly  Extremities: extremities normal, atraumatic, no cyanosis or edema  Neurologic: Mental status: Alert, oriented, thought content appropriate            Assessment / Acute Cardiac Problems:     1. Permament Atrial Fibrillation with RVR: Improved   2. Acute Cystitis / UTI, on Antibiotics. 3. Acute Exacerbation of systolic and diastolic CHF could be tachycardia-mediated. 4. Reported Hemoptysis   5. Multivessel CAD, not amenable to revascularitzation  6. Pneumonitis ?etiology on steroid therapy 40 mg Prednisone daily   7. Essential HTN  8. Elevated INR  9.  Hyperlipidemia       Patient Active Problem List:     Pulmonary HTN ([address]     Non-rheumatic mitral regurgitation     Atrial fibrillation with rapid ventricular response ([address]     Long term current use of anticoagulant therapy     Essential hypertension     Obstructive sleep apnea     Gout     Upper respiratory tract infection     Type 2 diabetes mellitus without complication ([address]     Acute cystitis with hematuria     Coronary artery disease involving native coronary artery     Ischemic cardiomyopathy     HCAP (healthcare-associated pneumonia)     Hypokalemia     Acute pulmonary edema (HCC)     Ground glass opacity present on imaging of lung     Pulmonary fibrosis (HCC)     ASHD (arteriosclerotic heart disease)     Uncomplicated asthma     Acute on chronic systolic congestive heart failure (HCC)     Supratherapeutic INR     Hyperglycemia due to type 2 diabetes mellitus ([facility] [address]     Acute respiratory failure with hypoxemia (HCC)     Urinary tract infection     Anemia of chronic disease      Plan of Treatment:     1. Verapamil PO to continue. Will continue Lopressor 25 mg in AM and 12.5 mg in the evening. Add Verapamil PO and titrate dose as BP/HR tolerate. Would hold digoxin for now. 2. Monitor her response to diuresis today and re-assess fluid status tomorrow.   3. Resume Coumadin to keep INR goal of 2.5.  4. Plan for DC cardiacwise      Electronically signed by Guanaco Garcia MD on 7/1/2017 at 10:08 [address] [facility]  [phone]

## 2017-07-01 NOTE — PLAN OF CARE
Problem: Falls - Risk of:  Goal: Will remain free from falls  Will remain free from falls   Outcome: Met This Shift  Pt remained free from falls per my shift. Bed is low and locked with siderails up x2. Bed alarm in use. Patient calls out appropriately. Will continue to monitor.

## 2017-07-01 NOTE — PROGRESS NOTES
Congestive Heart Failure Education completed and charted. CHF booklet given. Patient was receptive to education. Discussed 2000 mg sodium restricted daily in diet. Patient instructed to limit fluid intake to  1.5 to 2 liters per day. Patient instructed to weigh self at the same time of each day each morning, reinforced teaching to monitor for 3-5 lb weight increase over 1-2 days notify physician if change noted. Signs and symptoms of CHF discussed with patient, such as feeling more tired than normal, feeling short of breath, coughing that increases when you lie down, sudden weight gain, swelling of your feet, legs or belly. Patient verbalized understanding to notify physician office if these symptoms occur.

## 2017-07-01 NOTE — PROGRESS NOTES
IM RESIDENT PROGRESS NOTE        Patient:  Gordon Freedman  YOB: 1940    MRN: [id number]     Acct: [de-identified]     Admit date: 6/29/2017    Pt seen and Chart reviewed. Subjective:   No acute overnight issues   Rate controlled   BP stable   No fever   Urine cx pending        Diet:  DIET CARB CONTROL; Carb Control: 4 carbs/meal (approximate 1800 kcals/day); Low Sodium (2 GM); Daily Fluid Restriction: 1500 ml  Dietary Nutrition Supplements: Diabetic Oral Supplement      Medications:Current Inpatient    Scheduled Meds:   predniSONE  20 mg Oral TID    aspirin  81 mg Oral Daily    atorvastatin  40 mg Oral Nightly    warfarin  5 mg Oral Daily    warfarin (COUMADIN) daily dosing (placeholder)   Other RX Placeholder    cefTRIAXone (ROCEPHIN) IV  1 g Intravenous Q24H    metoprolol tartrate  25 mg Oral Daily    metoprolol tartrate  12.5 mg Oral Nightly    verapamil  20 mg Oral 3 times per day    melatonin  3 mg Oral Nightly    sodium chloride flush  10 mL Intravenous 2 times per day    insulin glargine  20 Units Subcutaneous Nightly    insulin lispro  0-18 Units Subcutaneous TID WC    insulin lispro  0-9 Units Subcutaneous Nightly     Continuous Infusions:   dextrose       PRN Meds:sodium chloride flush, acetaminophen, magnesium hydroxide, ondansetron, glucose, dextrose, glucagon (rDNA), dextrose, ipratropium-albuterol    Objective:    Physical Exam:  Vitals: /67  Pulse 93  Temp 97.3 °F (36.3 °C) (Axillary)   Resp 16  Ht 5' 5\" (1.651 m)  Wt 204 lb 8 oz (92.8 kg)  SpO2 99%  BMI 34.03 kg/m2  24 hour intake/output:  Intake/Output Summary (Last 24 hours) at 07/01/17 0911  Last data filed at 07/01/17 0616   Gross per 24 hour   Intake              810 ml   Output             2075 ml   Net            -1265 ml     Last 3 weights:   Wt Readings from Last 3 Encounters:   07/01/17 204 lb 8 oz (92.8 kg)   06/29/17 199 lb

## 2017-07-01 NOTE — PLAN OF CARE
Problem: Falls - Risk of  Goal: Absence of falls  Outcome: Met This Shift  Pt free from falls this shift. Steady when up oob and uses call light appropriately. Bed locked and in lowest position and side rails upx2. Nonskid footwear in place. continue to monitor safety.

## 2017-07-01 NOTE — PROGRESS NOTES
Physical Therapy  Facility/Department: [address] STEPDOWN  Daily Treatment Note  NAME: [place]  : 1940  MRN: [id number]    Date of Service: 2017    Patient Diagnosis(es):   Patient Active Problem List    Diagnosis Date Noted    Pulmonary HTN ([facility] [address] 2015     Priority: High    Non-rheumatic mitral regurgitation 2015     Priority: High    Urinary tract infection 2017    Anemia of chronic disease 2017    Acute on chronic systolic congestive heart failure ([address] 2017    Supratherapeutic INR 2017    Hyperglycemia due to type 2 diabetes mellitus ([address] 2017    Acute respiratory failure with hypoxemia (HCC)     Pulmonary fibrosis ([address] 2017    ASHD (arteriosclerotic heart disease)     Uncomplicated asthma     Acute pulmonary edema (HCC) 2017    Ground glass opacity present on imaging of lung 2017    Coronary artery disease involving native coronary artery 2017    Ischemic cardiomyopathy 2017    HCAP (healthcare-associated pneumonia) 2017    Hypokalemia 2017    Acute cystitis with hematuria 2017    Type 2 diabetes mellitus without complication ([address]     Upper respiratory tract infection 2017    Atrial fibrillation with rapid ventricular response ([address] 2015    Long term current use of anticoagulant therapy 2015    Essential hypertension 2015    Obstructive sleep apnea 2015    Gout 2015       Past Medical History:   Diagnosis Date    Allergic rhinitis 10/1994    Asthma     Atrial fibrillation ([address] 2008    CAD (coronary artery disease)     Clotting disorder (HCC)     plebitis in L leg    COPD (chronic obstructive pulmonary disease) ([address]     DDD (degenerative disc disease), cervical     Degeneration of cervical intervertebral disc     Diverticulosis     Gout     Gout, unspecified     H/O cardiac catheterization 6/17/15    LMCA: Normal 0% stenosis. LAD: Lesion on 1st diag: Proximal subsection. 80% stenosis. Lesion plaque is ruptured. Jimmie Krishna LCx: Lesion on 1st Ob Leslie: Mid subsection. 85% stenosis. RCA:Lesion on R PDA: Mid subsection. 85% stenosis. Lesion on R PDA: Distal subsection. 70% stenosis. Lesion on Prox RCA: Mid subsection. 50% stenosis. EF 50%.  H/O echocardiogram 11/09/2016    EF 40-45%. Mild LV hypertrophy normal LV cavity size. Sigmoid interventricular septum without evidence of outflow tract obstruction. Left atrium is moderately dilated (34-39) left atrial volume index of 36 ml/m2. Mild mitral regurg. Diastology cannot be assessed due to A-fib.  History of Holter monitoring 3/24/16    Atrial Fibrillation throughout,fairly controlled, HR  bpm 79% of the time. Occasional high ventricular rate episodes and multiple pauses suggestive of tachycardia/bradycardia syndrome  Msximum R-R interval 2.68 seconds.    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    Long term (current) use of anticoagulants 8/31/2015    Other abnormal glucose 2004    Phlebitis and thrombophlebitis of lower extremities, unspecified ([facility] [address] 1961    L leg    Senile osteoporosis 2006    L/S    Symptomatic menopausal or female climacteric states 06/1995    Syncope and collapse     Type II or unspecified type diabetes mellitus without mention of complication, not stated as uncontrolled 03/2009    Unspecified hereditary and idiopathic peripheral neuropathy 2012    Unspecified sleep apnea 11/2009     Past Surgical History:   Procedure Laterality Date    BRONCHOSCOPY  6/7/2017    BRONCHOSCOPY BRUSHINGS performed by Cathalene Seen, DO at [place] [id number]  6/7/2017    BRONCHOSCOPY/TRANSBRONCHIAL LUNG BIOPSY performed by Cathalene Seen, DO at [place] [id number]  6/7/2017    BRONCHOSCOPY FLUOROSCOPY performed [facility]ene Seen[facility] DO at [facility]  CARDIAC CATHETERIZATION Right 06/17/2015    COLONOSCOPY  1/2005    DIAGNOSTIC CARDIAC CATH LAB PROCEDURE  06/17/15    EYE SURGERY      FRACTURE SURGERY  2004    Distal Radius Ulna    LOBECTOMY Left 6/14/2017    MINI PORT ACCESS LEFT CHEST, X2 SPECIMENS. performed by Rose Mendieta MD at [address] [place] [address] Drive  2years old    VOLVAR-ULCERATIVE LESION-CAUTERIZED    SKIN BIOPSY      TONSILLECTOMY AND ADENOIDECTOMY         Restrictions  Restrictions/Precautions  Restrictions/Precautions: Fall Risk  Required Braces or Orthoses?: No  Subjective   General  Response To Previous Treatment: Patient with no complaints from previous session. Family / Caregiver Present: No  Subjective  Subjective: Patient arrives to therapy gym, noting  no pain complaints. HR is better now, seen doctor this morning. Pain Screening  Patient Currently in Pain: No  Pain Assessment  Pain Assessment: 0-10  Pain Level: 0  Orientation  Orientation  Overall Orientation Status: Within Normal Limits  Objective   Transfers  Sit to Stand: Contact guard assistance  Stand to sit: Contact guard assistance  Ambulation 1  Surface: level tile  Device: Rolling Walker  Other Apparatus: O2;Wheelchair follow (2L)  Assistance: Contact guard assistance  Quality of Gait: Slow tan. Steady with rolling walker  Distance: 100 ft x 2     Exercises:  Seated UBE 8 minutes to increase endurance  Seated LE exercise program: Long Arc Quads, hip abduction/adduction, heel/toe raises, and marches. 1# Reps: 10  Upper extremity exercises: Bicep curl, shoulder flexion/extension, punches, tricep curl, shoulder abduction/adduction. 1# Reps: 10    SpO2 96% throughout treatment  HR prior to treatment 's  HR after ambulation 105-120's    Patient has a-fib     Assessment   Body structures, Functions, Activity limitations: Decreased functional mobility ; Decreased endurance  Prognosis: Good  Patient Education: PT POC, safety with rolling

## 2017-07-01 NOTE — PROGRESS NOTES
[phone]   25 033 237 680 804 445 804 169 387 36 [phone] 681 184 [phone]   74 289 955 824 213 643 651 950 429 21 254 306 675 082 726 [phone]   46 405 166 700 635 068 748 149 668 64 495 628 743 863 489 [phone] 605   65 277 292 306 321 336 351 366 381 65 363 392 421 449 478 507 [phone]   70 269 284 299 314 329 344 359 374 70 353 382 410 439 468 [phone]   75 261 274 289 305 319 334 348 364 75 344 372 400 429 458 487 [phone]   80 253 266 282 296 312 327 342 356 80 335 362 390 419 448 [phone]

## 2017-07-02 LAB
ABSOLUTE EOS #: 0 K/UL (ref 0–0.4)
ABSOLUTE LYMPH #: 0.8 K/UL (ref 1–4.8)
ABSOLUTE MONO #: 0.4 K/UL (ref 0.1–1.2)
ANION GAP SERPL CALCULATED.3IONS-SCNC: 15 MMOL/L (ref 9–17)
BASOPHILS # BLD: 0 %
BASOPHILS ABSOLUTE: 0 K/UL (ref 0–0.2)
BUN BLDV-MCNC: 18 MG/DL (ref 8–23)
BUN/CREAT BLD: ABNORMAL (ref 9–20)
CALCIUM SERPL-MCNC: 8.9 MG/DL (ref 8.6–10.4)
CHLORIDE BLD-SCNC: 101 MMOL/L (ref 98–107)
CO2: 25 MMOL/L (ref 20–31)
CREAT SERPL-MCNC: 0.72 MG/DL (ref 0.5–0.9)
CULTURE: ABNORMAL
DIFFERENTIAL TYPE: ABNORMAL
EOSINOPHILS RELATIVE PERCENT: 0 %
GFR AFRICAN AMERICAN: >60 ML/MIN
GFR NON-AFRICAN AMERICAN: >60 ML/MIN
GFR SERPL CREATININE-BSD FRML MDRD: ABNORMAL ML/MIN/{1.73_M2}
GFR SERPL CREATININE-BSD FRML MDRD: ABNORMAL ML/MIN/{1.73_M2}
GLUCOSE BLD-MCNC: 101 MG/DL (ref 65–105)
GLUCOSE BLD-MCNC: 116 MG/DL (ref 70–99)
GLUCOSE BLD-MCNC: 143 MG/DL (ref 65–105)
GLUCOSE BLD-MCNC: 178 MG/DL (ref 65–105)
GLUCOSE BLD-MCNC: 189 MG/DL (ref 65–105)
GLUCOSE BLD-MCNC: 99 MG/DL (ref 65–105)
HCT VFR BLD CALC: 36.2 % (ref 36–46)
HEMOGLOBIN: 12 G/DL (ref 12–16)
INR BLD: 2.6
LYMPHOCYTES # BLD: 9 %
Lab: ABNORMAL
MCH RBC QN AUTO: 30.3 PG (ref 26–34)
MCHC RBC AUTO-ENTMCNC: 33.1 G/DL (ref 31–37)
MCV RBC AUTO: 91.7 FL (ref 80–100)
MONOCYTES # BLD: 5 %
ORGANISM: ABNORMAL
ORGANISM: ABNORMAL
PDW BLD-RTO: 18.8 % (ref 12.5–15.4)
PLATELET # BLD: 176 K/UL (ref 140–450)
PLATELET ESTIMATE: ABNORMAL
PMV BLD AUTO: 8.3 FL (ref 6–12)
POTASSIUM SERPL-SCNC: 4.9 MMOL/L (ref 3.7–5.3)
PROTHROMBIN TIME: 27.8 SEC (ref 9.4–12.6)
RBC # BLD: 3.95 M/UL (ref 4–5.2)
RBC # BLD: ABNORMAL 10*6/UL
SEG NEUTROPHILS: 86 %
SEGMENTED NEUTROPHILS ABSOLUTE COUNT: 7.3 K/UL (ref 1.8–7.7)
SODIUM BLD-SCNC: 141 MMOL/L (ref 135–144)
SPECIMEN DESCRIPTION: ABNORMAL
STATUS: ABNORMAL
WBC # BLD: 8.4 K/UL (ref 3.5–11)
WBC # BLD: ABNORMAL 10*3/UL

## 2017-07-02 PROCEDURE — 85610 PROTHROMBIN TIME: CPT

## 2017-07-02 PROCEDURE — 6370000000 HC RX 637 (ALT 250 FOR IP): Performed by: STUDENT IN AN ORGANIZED HEALTH CARE EDUCATION/TRAINING PROGRAM

## 2017-07-02 PROCEDURE — 82947 ASSAY GLUCOSE BLOOD QUANT: CPT

## 2017-07-02 PROCEDURE — 2060000000 HC ICU INTERMEDIATE R&B

## 2017-07-02 PROCEDURE — 99233 SBSQ HOSP IP/OBS HIGH 50: CPT | Performed by: INTERNAL MEDICINE

## 2017-07-02 PROCEDURE — 6370000000 HC RX 637 (ALT 250 FOR IP): Performed by: HOSPITALIST

## 2017-07-02 PROCEDURE — 85025 COMPLETE CBC W/AUTO DIFF WBC: CPT

## 2017-07-02 PROCEDURE — 36415 COLL VENOUS BLD VENIPUNCTURE: CPT

## 2017-07-02 PROCEDURE — 6370000000 HC RX 637 (ALT 250 FOR IP): Performed by: INTERNAL MEDICINE

## 2017-07-02 PROCEDURE — 94762 N-INVAS EAR/PLS OXIMTRY CONT: CPT

## 2017-07-02 PROCEDURE — 99232 SBSQ HOSP IP/OBS MODERATE 35: CPT | Performed by: INTERNAL MEDICINE

## 2017-07-02 PROCEDURE — 2580000003 HC RX 258: Performed by: STUDENT IN AN ORGANIZED HEALTH CARE EDUCATION/TRAINING PROGRAM

## 2017-07-02 PROCEDURE — 6360000002 HC RX W HCPCS: Performed by: STUDENT IN AN ORGANIZED HEALTH CARE EDUCATION/TRAINING PROGRAM

## 2017-07-02 PROCEDURE — 80048 BASIC METABOLIC PNL TOTAL CA: CPT

## 2017-07-02 RX ORDER — DEXTROMETHORPHAN POLISTIREX 30 MG/5ML
30 SUSPENSION ORAL 2 TIMES DAILY PRN
Qty: 1 BOTTLE | Refills: 1 | Status: ON HOLD | OUTPATIENT
Start: 2017-07-02 | End: 2017-07-13 | Stop reason: HOSPADM

## 2017-07-02 RX ORDER — SYRING-NEEDL,DISP,INSUL,0.3 ML 30 GX5/16"
1 SYRINGE, EMPTY DISPOSABLE MISCELLANEOUS ONCE
Qty: 100 DEVICE | Refills: 0 | Status: SHIPPED | OUTPATIENT
Start: 2017-07-02 | End: 2017-07-03 | Stop reason: HOSPADM

## 2017-07-02 RX ORDER — GLUCOSAMINE HCL/CHONDROITIN SU 500-400 MG
CAPSULE ORAL
Qty: 100 STRIP | Refills: 11 | Status: SHIPPED | OUTPATIENT
Start: 2017-07-02 | End: 2018-10-26

## 2017-07-02 RX ORDER — BLOOD-GLUCOSE METER
KIT MISCELLANEOUS
Qty: 1 KIT | Refills: 0 | Status: SHIPPED | OUTPATIENT
Start: 2017-07-02 | End: 2017-07-03 | Stop reason: HOSPADM

## 2017-07-02 RX ORDER — METOPROLOL TARTRATE 50 MG/1
50 TABLET, FILM COATED ORAL DAILY
Status: DISCONTINUED | OUTPATIENT
Start: 2017-07-02 | End: 2017-07-02

## 2017-07-02 RX ORDER — FUROSEMIDE 10 MG/ML
20 INJECTION INTRAMUSCULAR; INTRAVENOUS 2 TIMES DAILY
Status: DISCONTINUED | OUTPATIENT
Start: 2017-07-02 | End: 2017-07-03

## 2017-07-02 RX ORDER — VERAPAMIL HYDROCHLORIDE 120 MG/1
60 TABLET, FILM COATED ORAL EVERY 8 HOURS SCHEDULED
Status: DISCONTINUED | OUTPATIENT
Start: 2017-07-02 | End: 2017-07-03

## 2017-07-02 RX ORDER — CIPROFLOXACIN 250 MG/1
250 TABLET, FILM COATED ORAL EVERY 12 HOURS SCHEDULED
Status: DISCONTINUED | OUTPATIENT
Start: 2017-07-02 | End: 2017-07-06

## 2017-07-02 RX ORDER — INSULIN GLARGINE 100 [IU]/ML
10 INJECTION, SOLUTION SUBCUTANEOUS NIGHTLY
Status: DISCONTINUED | OUTPATIENT
Start: 2017-07-02 | End: 2017-07-03

## 2017-07-02 RX ORDER — METOPROLOL TARTRATE 50 MG/1
50 TABLET, FILM COATED ORAL DAILY
Qty: 60 TABLET | Refills: 3 | Status: SHIPPED | OUTPATIENT
Start: 2017-07-02 | End: 2017-10-24 | Stop reason: SDUPTHER

## 2017-07-02 RX ORDER — LANCETS 28 GAUGE
1 EACH MISCELLANEOUS DAILY
Qty: 100 EACH | Refills: 3 | Status: SHIPPED | OUTPATIENT
Start: 2017-07-02 | End: 2017-07-03 | Stop reason: HOSPADM

## 2017-07-02 RX ORDER — PREDNISONE 50 MG/1
50 TABLET ORAL DAILY
Qty: 60 TABLET | Refills: 2 | Status: SHIPPED | OUTPATIENT
Start: 2017-07-02 | End: 2017-08-04 | Stop reason: DRUGHIGH

## 2017-07-02 RX ORDER — WARFARIN SODIUM 5 MG/1
5 TABLET ORAL ONCE
Status: COMPLETED | OUTPATIENT
Start: 2017-07-02 | End: 2017-07-02

## 2017-07-02 RX ORDER — VERAPAMIL HYDROCHLORIDE 40 MG/1
40 TABLET ORAL EVERY 8 HOURS SCHEDULED
Qty: 90 TABLET | Refills: 3 | Status: SHIPPED | OUTPATIENT
Start: 2017-07-02 | End: 2017-07-03 | Stop reason: HOSPADM

## 2017-07-02 RX ORDER — CIPROFLOXACIN 250 MG/1
250 TABLET, FILM COATED ORAL EVERY 12 HOURS SCHEDULED
Qty: 10 TABLET | Refills: 0 | Status: SHIPPED | OUTPATIENT
Start: 2017-07-02 | End: 2017-07-06 | Stop reason: HOSPADM

## 2017-07-02 RX ORDER — UBIQUINOL 100 MG
CAPSULE ORAL
Qty: 100 EACH | Refills: 3 | Status: SHIPPED | OUTPATIENT
Start: 2017-07-02 | End: 2017-07-03 | Stop reason: HOSPADM

## 2017-07-02 RX ADMIN — Medication 10 ML: at 07:57

## 2017-07-02 RX ADMIN — VERAPAMIL HYDROCHLORIDE 40 MG: 40 TABLET ORAL at 14:18

## 2017-07-02 RX ADMIN — METOPROLOL TARTRATE 12.5 MG: 25 TABLET ORAL at 20:43

## 2017-07-02 RX ADMIN — PREDNISONE 50 MG: 50 TABLET ORAL at 07:56

## 2017-07-02 RX ADMIN — INSULIN LISPRO 3 UNITS: 100 INJECTION, SOLUTION INTRAVENOUS; SUBCUTANEOUS at 17:49

## 2017-07-02 RX ADMIN — FUROSEMIDE 20 MG: 10 INJECTION, SOLUTION INTRAMUSCULAR; INTRAVENOUS at 15:50

## 2017-07-02 RX ADMIN — ATORVASTATIN CALCIUM 40 MG: 40 TABLET, FILM COATED ORAL at 20:43

## 2017-07-02 RX ADMIN — LIDOCAINE HYDROCHLORIDE 15 ML: 20 SOLUTION ORAL; TOPICAL at 09:50

## 2017-07-02 RX ADMIN — ASPIRIN 81 MG: 81 TABLET, CHEWABLE ORAL at 07:57

## 2017-07-02 RX ADMIN — Medication 3 MG: at 22:02

## 2017-07-02 RX ADMIN — ACETAMINOPHEN 650 MG: 325 TABLET ORAL at 10:59

## 2017-07-02 RX ADMIN — INSULIN GLARGINE 10 UNITS: 100 INJECTION, SOLUTION SUBCUTANEOUS at 23:30

## 2017-07-02 RX ADMIN — VERAPAMIL HYDROCHLORIDE 60 MG: 120 TABLET, FILM COATED ORAL at 22:02

## 2017-07-02 RX ADMIN — Medication 30 MG: at 03:44

## 2017-07-02 RX ADMIN — METOPROLOL TARTRATE 50 MG: 25 TABLET ORAL at 07:56

## 2017-07-02 RX ADMIN — WARFARIN SODIUM 5 MG: 5 TABLET ORAL at 17:49

## 2017-07-02 RX ADMIN — CIPROFLOXACIN 250 MG: 250 TABLET, FILM COATED ORAL at 10:59

## 2017-07-02 RX ADMIN — INSULIN LISPRO 3 UNITS: 100 INJECTION, SOLUTION INTRAVENOUS; SUBCUTANEOUS at 12:30

## 2017-07-02 RX ADMIN — CIPROFLOXACIN 250 MG: 250 TABLET, FILM COATED ORAL at 20:43

## 2017-07-02 RX ADMIN — VERAPAMIL HYDROCHLORIDE 40 MG: 40 TABLET ORAL at 07:28

## 2017-07-02 ASSESSMENT — PAIN DESCRIPTION - PROGRESSION

## 2017-07-02 ASSESSMENT — PAIN SCALES - GENERAL
PAINLEVEL_OUTOF10: 0
PAINLEVEL_OUTOF10: 4
PAINLEVEL_OUTOF10: 3
PAINLEVEL_OUTOF10: 2

## 2017-07-02 ASSESSMENT — PAIN DESCRIPTION - ONSET: ONSET: ON-GOING

## 2017-07-02 ASSESSMENT — PAIN DESCRIPTION - LOCATION: LOCATION: THROAT

## 2017-07-02 ASSESSMENT — PAIN DESCRIPTION - FREQUENCY: FREQUENCY: CONTINUOUS

## 2017-07-02 ASSESSMENT — PAIN DESCRIPTION - DESCRIPTORS: DESCRIPTORS: ACHING;DISCOMFORT

## 2017-07-02 ASSESSMENT — PAIN DESCRIPTION - PAIN TYPE: TYPE: ACUTE PAIN

## 2017-07-02 NOTE — PROGRESS NOTES
[facility]   Progress Note                   Date:   7/2/2017  Patient name: Olamide Garcia  Date of admission:  6/29/2017  1:15 PM  MRN:   [id number]  YOB: 1940  PCP: Chance Bass MD    Reason for Admission:     Subjective:       Clinical Changes / Abnormalities:    Feeling better with good improvement in HR and BP  She stated some cough and phlegm which is addressed by primary      Medications:   Scheduled Meds:   ciprofloxacin  250 mg Oral 2 times per day    furosemide  20 mg Intravenous BID    [START ON 7/3/2017] metoprolol tartrate  25 mg Oral Daily    verapamil  60 mg Oral 3 times per day    predniSONE  50 mg Oral Daily    aspirin  81 mg Oral Daily    atorvastatin  40 mg Oral Nightly    warfarin (COUMADIN) daily dosing (placeholder)   Other RX Placeholder    metoprolol tartrate  12.5 mg Oral Nightly    melatonin  3 mg Oral Nightly    sodium chloride flush  10 mL Intravenous 2 times per day    insulin glargine  20 Units Subcutaneous Nightly    insulin lispro  0-18 Units Subcutaneous TID WC    insulin lispro  0-9 Units Subcutaneous Nightly     Continuous Infusions:   dextrose       CBC:   Recent Labs      06/30/17   0630  07/01/17   0645  07/02/17   0716   WBC  10.1  10.6  8.4   HGB  11.5*  12.2  12.0   PLT  148  176  176     BMP:    Recent Labs      06/30/17   0630  07/01/17   0645  07/02/17   0716   NA  138  143  141   K  4.8  4.4  4.9   CL  101  102  101   CO2  20  23  25   BUN  12  22  18   CREATININE  0.55  0.50  0.72   GLUCOSE  125*  138*  116*     Hepatic:   Recent Labs      06/30/17   0630   AST  20   ALT  21   BILITOT  1.58*   ALKPHOS  49     Troponin: No results for input(s): TROPONINI in the last 72 hours. BNP: No results for input(s): BNP in the last 72 hours. Lipids: No results for input(s): CHOL, HDL in the last 72 hours.     Invalid input(s): LDLCALCU  INR:   Recent Labs      06/30/17   0630  07/01/17   0930  07/02/17   1008   INR  3.2  2.6

## 2017-07-02 NOTE — PLAN OF CARE
Problem: Skin Integrity:  Goal: Will show no infection signs and symptoms  Will show no infection signs and symptoms   Outcome: Ongoing

## 2017-07-02 NOTE — PROGRESS NOTES
Mid subsection. 85% stenosis. Lesion on R PDA: Distal subsection. 70% stenosis. Lesion on Prox RCA: Mid subsection. 50% stenosis. EF 50%.  H/O echocardiogram 11/09/2016    EF 40-45%. Mild LV hypertrophy normal LV cavity size. Sigmoid interventricular septum without evidence of outflow tract obstruction. Left atrium is moderately dilated (34-39) left atrial volume index of 36 ml/m2. Mild mitral regurg. Diastology cannot be assessed due to A-fib.  History of Holter monitoring 3/24/16    Atrial Fibrillation throughout,fairly controlled, HR  bpm 79% of the time. Occasional high ventricular rate episodes and multiple pauses suggestive of tachycardia/bradycardia syndrome  Msximum R-R interval 2.68 seconds.    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    Long term (current) use of anticoagulants 8/31/2015    Other abnormal glucose 2004    Phlebitis and thrombophlebitis of lower extremities, unspecified ([address] 1961    L leg    Senile osteoporosis 2006    L/S    Symptomatic menopausal or female climacteric states 06/1995    Syncope and collapse     Type II or unspecified type diabetes mellitus without mention of complication, not stated as uncontrolled 03/2009    Unspecified hereditary and idiopathic peripheral neuropathy 2012    Unspecified sleep apnea 11/2009         Family History:       Problem Relation Age of Onset    Heart Disease Mother     Stroke Mother     High Blood Pressure Mother     Cancer Father      lung    Stroke Brother     Heart Disease Maternal Grandmother        SURGICAL HISTORY:   Past Surgical History:   Procedure Laterality Date    BRONCHOSCOPY  6/7/2017    BRONCHOSCOPY BRUSHINGS performed by Megan Silva DO at [place] [address]  6/7/2017    BRONCHOSCOPY/TRANSBRONCHIAL LUNG BIOPSY performed by Megan Silva DO at [place] [address]  6/7/2017    BRONCHOSCOPY FLUOROSCOPY 204 lb 8 oz (92.8 kg)   06/29/17 1330 197 lb 1.5 oz (89.4 kg)          PHYSICAL EXAMINATION:  General Appearance:    Alert, cooperative, no distress, appears stated age   Head:    Normocephalic, without obvious abnormality, atraumatic                  :    Neck:   Supple, symmetrical, trachea midline, no adenopathy;     thyroid:  no enlargement/tenderness/nodules; no carotid    bruit no JVP , no HJR   Back:     Symmetric, no curvature, ROM normal, no CVA tenderness   Lungs:       Fibrotic rales    Chest Wall:    No tenderness or deformity      Heart:    Regular rate and rhythm, S1 and S2 normal, no murmur, rub        or gallop no rvh                           Abdomen:                                                 Pulses:                                            Lymph nodes:                    Neurologic:                  Soft, non-tender, bowel sounds active all four quadrants,     no masses, no organomegaly         2+ and symmetric all extremities            Cervical, supraclavicular not enlarged or matted or tender      CNII-XII intact, normal strength 5/5 .   Sensation grossly normal  and reflexes normal 2+  throughout     Clubbing No  Lower ext edema No1+   [] , 2 +  [] , 3+   []  Upper ext edema No            Medications:    Scheduled Meds:   verapamil  40 mg Oral 3 times per day    [START ON 7/2/2017] predniSONE  50 mg Oral Daily    aspirin  81 mg Oral Daily    atorvastatin  40 mg Oral Nightly    warfarin (COUMADIN) daily dosing (placeholder)   Other RX Placeholder    cefTRIAXone (ROCEPHIN) IV  1 g Intravenous Q24H    metoprolol tartrate  25 mg Oral Daily    metoprolol tartrate  12.5 mg Oral Nightly    melatonin  3 mg Oral Nightly    sodium chloride flush  10 mL Intravenous 2 times per day    insulin glargine  20 Units Subcutaneous Nightly    insulin lispro  0-18 Units Subcutaneous TID     insulin lispro  0-9 Units Subcutaneous Nightly       Continuous Infusions:   dextrose         PRN Acute respiratory failure with hypoxemia (HCC) [J96.01]     Coronary artery disease involving native coronary artery [I25.10] 06/04/2017    Ischemic cardiomyopathy [I25.5] 06/04/2017    Type 2 diabetes mellitus without complication ([facility] [B34.1] 02/13/2017    Upper respiratory tract infection [J06.9] 01/16/2017    Atrial fibrillation with rapid ventricular response ([facility] [I48.91] 08/31/2015    Obstructive sleep apnea [G47.33] 08/31/2015    Long term current use of anticoagulant therapy [Z79.01] 08/31/2015    ild      Plan:  I have reviewed her olbx reports - dd is hypersensitivity pneumonia leading to ild / UIP - IPF - finding do not conform to either . She did have parakeets for 2 years as a child [place] .  Had wood burning stove for 10 years till 1998   No obvious exposure that will cause hypersensitivity and usually it is difficult to elicit a correlation   I believe this is UIP - IPF- but it is  not without mike to tt with steroids for 6 months if no improvement in PFT and xray then confirms IPF  For which steroids are not helpful   D/w pt in detail       Renetta Maya MD

## 2017-07-02 NOTE — PLAN OF CARE
Problem: Falls - Risk of:  Goal: Will remain free from falls  Will remain free from falls   Outcome: Ongoing  Goal: Absence of physical injury  Absence of physical injury   Outcome: Ongoing

## 2017-07-02 NOTE — PROGRESS NOTES
0-9 Units Subcutaneous Nightly       Diagnostic Labs and Imaging    CBC: Recent Labs      06/30/17   0630  07/01/17   0645  07/02/17   0716   WBC  10.1  10.6  8.4   RBC  3.81*  4.01  3.95*   HGB  11.5*  12.2  12.0   HCT  34.9*  36.6  36.2   MCV  91.7  91.1  91.7   RDW  18.6*  18.9*  18.8*   PLT  148  176  176     BMP: Recent Labs      06/29/17   1514  06/30/17   0630  06/30/17   0824  07/01/17   0645  07/02/17   0716   NA   --   138   --   143  141   K   --   4.8   --   4.4  4.9   CL   --   101   --   102  101   CO2   --   20   --   23  25   PHOS  3.8   --   3.7   --    --    BUN   --   12   --   22  18   CREATININE   --   0.55   --   0.50  0.72     BNP: No results for input(s): BNP in the last 72 hours. PT/INR:   Recent Labs      06/29/17   0927  06/30/17   0630  07/01/17   0930   PROTIME  54.0*  34.7*  27.6*   INR  4.7*  3.2  2.6     APTT: No results for input(s): APTT in the last 72 hours. CARDIAC ENZYMES: No results for input(s): CKMB, CKMBINDEX, TROPONINI in the last 72 hours.     Invalid input(s): CKTOTAL;3  FASTING LIPID PANEL:  Lab Results   Component Value Date    CHOL 149 02/06/2017    HDL 40 (L) 02/06/2017    TRIG 272 (H) 02/06/2017     LIVER PROFILE: Recent Labs      06/30/17   0630   AST  20   ALT  21   BILIDIR  0.35*   BILITOT  1.58*   ALKPHOS  49        Assessment and Plan:   Principal Problem:    Acute on chronic systolic congestive heart failure (HCC)  Active Problems:    Atrial fibrillation with rapid ventricular response ([facility]    Long term current use of anticoagulant therapy    Obstructive sleep apnea    Upper respiratory tract infection    Type 2 diabetes mellitus without complication (HCC)    Coronary artery disease involving native coronary artery    Ischemic cardiomyopathy    Supratherapeutic INR    Hyperglycemia due to type 2 diabetes mellitus ([facility]    Acute respiratory failure with hypoxemia (HCC)    Urinary tract infection    Anemia of chronic disease    ILD (interstitial lung disease) [facility])      Plan:  Atrial fibrillation with RVR; stable                        - Cardiology following                        - Lopressor 50 mg daily and 12.5 mg nightly                        - Verapamil 40mg TID                        - Warfarin restarted 6/30 - follow up daily INR - supratherapeutic on admission      Urinary tract infection                        - D.C Rocephin and start Ciprofloxacin       Pulmonary Fibrosis; stable                        - BiPAP prn                        - Prednisone 50 daily       Diabetes                        - Lantus 20 units nightly                        - Humalog - high dose sliding scale      Diet: Carb controlled diet      DVT: Coumadin      D/c planning: PT/OT evaluation - originally came from home with home health and home PT  Sueanne Cabot, PGY-1, Internal Medicine Residency Resident.   [facility], [facility]

## 2017-07-03 LAB
ABSOLUTE EOS #: 0.1 K/UL (ref 0–0.4)
ABSOLUTE LYMPH #: 1.6 K/UL (ref 1–4.8)
ABSOLUTE MONO #: 0.2 K/UL (ref 0.1–1.2)
ANION GAP SERPL CALCULATED.3IONS-SCNC: 16 MMOL/L (ref 9–17)
BASOPHILS # BLD: 0 %
BASOPHILS ABSOLUTE: 0 K/UL (ref 0–0.2)
BUN BLDV-MCNC: 23 MG/DL (ref 8–23)
BUN/CREAT BLD: ABNORMAL (ref 9–20)
CALCIUM SERPL-MCNC: 8.6 MG/DL (ref 8.6–10.4)
CHLORIDE BLD-SCNC: 96 MMOL/L (ref 98–107)
CO2: 26 MMOL/L (ref 20–31)
CREAT SERPL-MCNC: 0.57 MG/DL (ref 0.5–0.9)
CULTURE: ABNORMAL
CULTURE: ABNORMAL
DIFFERENTIAL TYPE: ABNORMAL
DIRECT EXAM: ABNORMAL
EOSINOPHILS RELATIVE PERCENT: 1 %
GFR AFRICAN AMERICAN: >60 ML/MIN
GFR NON-AFRICAN AMERICAN: >60 ML/MIN
GFR SERPL CREATININE-BSD FRML MDRD: ABNORMAL ML/MIN/{1.73_M2}
GFR SERPL CREATININE-BSD FRML MDRD: ABNORMAL ML/MIN/{1.73_M2}
GLUCOSE BLD-MCNC: 101 MG/DL (ref 65–105)
GLUCOSE BLD-MCNC: 153 MG/DL (ref 65–105)
GLUCOSE BLD-MCNC: 195 MG/DL (ref 65–105)
GLUCOSE BLD-MCNC: 227 MG/DL (ref 65–105)
GLUCOSE BLD-MCNC: 86 MG/DL (ref 70–99)
GLUCOSE BLD-MCNC: 96 MG/DL (ref 65–105)
HCT VFR BLD CALC: 38.4 % (ref 36–46)
HEMOGLOBIN: 12.7 G/DL (ref 12–16)
INR BLD: 3.1
LYMPHOCYTES # BLD: 24 %
Lab: ABNORMAL
MCH RBC QN AUTO: 30.1 PG (ref 26–34)
MCHC RBC AUTO-ENTMCNC: 33.1 G/DL (ref 31–37)
MCV RBC AUTO: 90.9 FL (ref 80–100)
MONOCYTES # BLD: 3 %
PDW BLD-RTO: 18.8 % (ref 12.5–15.4)
PLATELET # BLD: 160 K/UL (ref 140–450)
PLATELET ESTIMATE: ABNORMAL
PMV BLD AUTO: 8.7 FL (ref 6–12)
POTASSIUM SERPL-SCNC: 4 MMOL/L (ref 3.7–5.3)
PROTHROMBIN TIME: 33.9 SEC (ref 9.4–12.6)
RBC # BLD: 4.23 M/UL (ref 4–5.2)
RBC # BLD: ABNORMAL 10*6/UL
SEG NEUTROPHILS: 72 %
SEGMENTED NEUTROPHILS ABSOLUTE COUNT: 4.8 K/UL (ref 1.8–7.7)
SODIUM BLD-SCNC: 138 MMOL/L (ref 135–144)
SPECIMEN DESCRIPTION: ABNORMAL
STATUS: ABNORMAL
WBC # BLD: 6.6 K/UL (ref 3.5–11)
WBC # BLD: ABNORMAL 10*3/UL

## 2017-07-03 PROCEDURE — 6370000000 HC RX 637 (ALT 250 FOR IP): Performed by: HOSPITALIST

## 2017-07-03 PROCEDURE — 85025 COMPLETE CBC W/AUTO DIFF WBC: CPT

## 2017-07-03 PROCEDURE — 80048 BASIC METABOLIC PNL TOTAL CA: CPT

## 2017-07-03 PROCEDURE — 6370000000 HC RX 637 (ALT 250 FOR IP): Performed by: INTERNAL MEDICINE

## 2017-07-03 PROCEDURE — 36415 COLL VENOUS BLD VENIPUNCTURE: CPT

## 2017-07-03 PROCEDURE — 6370000000 HC RX 637 (ALT 250 FOR IP): Performed by: STUDENT IN AN ORGANIZED HEALTH CARE EDUCATION/TRAINING PROGRAM

## 2017-07-03 PROCEDURE — 2060000000 HC ICU INTERMEDIATE R&B

## 2017-07-03 PROCEDURE — 82947 ASSAY GLUCOSE BLOOD QUANT: CPT

## 2017-07-03 PROCEDURE — 99232 SBSQ HOSP IP/OBS MODERATE 35: CPT | Performed by: INTERNAL MEDICINE

## 2017-07-03 PROCEDURE — 99233 SBSQ HOSP IP/OBS HIGH 50: CPT | Performed by: INTERNAL MEDICINE

## 2017-07-03 PROCEDURE — 2580000003 HC RX 258: Performed by: STUDENT IN AN ORGANIZED HEALTH CARE EDUCATION/TRAINING PROGRAM

## 2017-07-03 PROCEDURE — 6370000000 HC RX 637 (ALT 250 FOR IP): Performed by: NURSE PRACTITIONER

## 2017-07-03 PROCEDURE — 85610 PROTHROMBIN TIME: CPT

## 2017-07-03 RX ORDER — METOPROLOL TARTRATE 50 MG/1
50 TABLET, FILM COATED ORAL DAILY
Status: DISCONTINUED | OUTPATIENT
Start: 2017-07-04 | End: 2017-07-06 | Stop reason: HOSPADM

## 2017-07-03 RX ORDER — SULFAMETHOXAZOLE AND TRIMETHOPRIM 400; 80 MG/1; MG/1
1 TABLET ORAL
Qty: 12 TABLET | Refills: 5 | Status: ON HOLD | OUTPATIENT
Start: 2017-07-03 | End: 2017-07-13 | Stop reason: HOSPADM

## 2017-07-03 RX ORDER — FUROSEMIDE 20 MG/1
20 TABLET ORAL DAILY
Status: DISCONTINUED | OUTPATIENT
Start: 2017-07-03 | End: 2017-07-06 | Stop reason: HOSPADM

## 2017-07-03 RX ORDER — VERAPAMIL HYDROCHLORIDE 80 MG/1
80 TABLET ORAL EVERY 8 HOURS SCHEDULED
Status: DISCONTINUED | OUTPATIENT
Start: 2017-07-03 | End: 2017-07-04

## 2017-07-03 RX ORDER — DIGOXIN 125 MCG
125 TABLET ORAL DAILY
Qty: 30 TABLET | Refills: 3 | Status: SHIPPED | OUTPATIENT
Start: 2017-07-03 | End: 2017-07-06 | Stop reason: HOSPADM

## 2017-07-03 RX ORDER — DIGOXIN 125 MCG
125 TABLET ORAL DAILY
Status: DISCONTINUED | OUTPATIENT
Start: 2017-07-03 | End: 2017-07-04

## 2017-07-03 RX ORDER — SULFAMETHOXAZOLE AND TRIMETHOPRIM 400; 80 MG/1; MG/1
1 TABLET ORAL
Status: DISCONTINUED | OUTPATIENT
Start: 2017-07-03 | End: 2017-07-06 | Stop reason: HOSPADM

## 2017-07-03 RX ORDER — WARFARIN SODIUM 5 MG/1
5 TABLET ORAL
Status: COMPLETED | OUTPATIENT
Start: 2017-07-03 | End: 2017-07-03

## 2017-07-03 RX ORDER — FUROSEMIDE 20 MG/1
20 TABLET ORAL DAILY
Qty: 30 TABLET | Refills: 0 | Status: SHIPPED | OUTPATIENT
Start: 2017-07-03 | End: 2017-10-16 | Stop reason: SDUPTHER

## 2017-07-03 RX ORDER — VERAPAMIL HYDROCHLORIDE 120 MG/1
60 TABLET, FILM COATED ORAL EVERY 8 HOURS SCHEDULED
Qty: 30 TABLET | Refills: 3 | Status: SHIPPED | OUTPATIENT
Start: 2017-07-03 | End: 2017-07-07 | Stop reason: ALTCHOICE

## 2017-07-03 RX ADMIN — INSULIN LISPRO 3 UNITS: 100 INJECTION, SOLUTION INTRAVENOUS; SUBCUTANEOUS at 20:30

## 2017-07-03 RX ADMIN — VERAPAMIL HYDROCHLORIDE 60 MG: 120 TABLET, FILM COATED ORAL at 06:07

## 2017-07-03 RX ADMIN — Medication 10 ML: at 09:54

## 2017-07-03 RX ADMIN — VERAPAMIL HYDROCHLORIDE 80 MG: 80 TABLET, FILM COATED ORAL at 22:09

## 2017-07-03 RX ADMIN — METFORMIN HYDROCHLORIDE 500 MG: 500 TABLET ORAL at 17:07

## 2017-07-03 RX ADMIN — METOPROLOL TARTRATE 25 MG: 25 TABLET ORAL at 20:24

## 2017-07-03 RX ADMIN — VERAPAMIL HYDROCHLORIDE 80 MG: 80 TABLET, FILM COATED ORAL at 15:07

## 2017-07-03 RX ADMIN — CIPROFLOXACIN 250 MG: 250 TABLET, FILM COATED ORAL at 09:53

## 2017-07-03 RX ADMIN — WARFARIN SODIUM 5 MG: 5 TABLET ORAL at 17:07

## 2017-07-03 RX ADMIN — ASPIRIN 81 MG: 81 TABLET, CHEWABLE ORAL at 09:53

## 2017-07-03 RX ADMIN — CIPROFLOXACIN 250 MG: 250 TABLET, FILM COATED ORAL at 20:24

## 2017-07-03 RX ADMIN — METFORMIN HYDROCHLORIDE 500 MG: 500 TABLET ORAL at 10:40

## 2017-07-03 RX ADMIN — Medication 10 ML: at 20:24

## 2017-07-03 RX ADMIN — METOPROLOL TARTRATE 25 MG: 25 TABLET ORAL at 09:52

## 2017-07-03 RX ADMIN — PREDNISONE 50 MG: 50 TABLET ORAL at 09:53

## 2017-07-03 RX ADMIN — SULFAMETHOXAZOLE AND TRIMETHOPRIM 1 TABLET: 400; 80 TABLET ORAL at 23:28

## 2017-07-03 RX ADMIN — ATORVASTATIN CALCIUM 40 MG: 40 TABLET, FILM COATED ORAL at 20:24

## 2017-07-03 RX ADMIN — INSULIN LISPRO 3 UNITS: 100 INJECTION, SOLUTION INTRAVENOUS; SUBCUTANEOUS at 17:08

## 2017-07-03 RX ADMIN — Medication 3 MG: at 22:10

## 2017-07-03 RX ADMIN — DIGOXIN 125 MCG: 0.12 TABLET ORAL at 14:34

## 2017-07-03 RX ADMIN — FUROSEMIDE 20 MG: 20 TABLET ORAL at 10:40

## 2017-07-03 ASSESSMENT — PAIN DESCRIPTION - PROGRESSION

## 2017-07-03 NOTE — PROGRESS NOTES
07/01/17   0930  07/02/17   1008  07/03/17   0839   INR  2.6  2.6  3.1       Objective:   Vitals: /66  Pulse 131  Temp 97.9 °F (36.6 °C) (Oral)   Resp 25  Ht 5' 5\" (1.651 m)  Wt 204 lb 8 oz (92.8 kg)  SpO2 97%  BMI 34.03 kg/m2  General appearance: alert and cooperative with exam  HEENT: Head: Normocephalic, no lesions, without obvious abnormality. Neck: no JVD, trachea midline, no adenopathy  Lungs: Clear to auscultation  Heart: irregular rate and rhythm, afib, s1/s2 auscultated, no murmurs  Abdomen: soft, non-tender, bowel sounds active  Extremities: bilateral trace lower extremity edema  Neurologic: not done        Assessment / Acute Cardiac Problems:   1. Permament Atrial Fibrillation with RVR: Improved   2. Acute Cystitis / UTI, on Antibiotics. 3. Acute Exacerbation of systolic and diastolic CHF could be tachycardia-mediated. 4. Reported Hemoptysis   5. Multivessel CAD, not amenable to revascularitzation  6. Pneumonitis ?etiology on steroid therapy 40 mg Prednisone daily   7. Essential HTN  8. Elevated INR  9.  Hyperlipidemia    Patient Active Problem List:     Pulmonary HTN ([address]     Non-rheumatic mitral regurgitation     Atrial fibrillation with rapid ventricular response ([address]     Long term current use of anticoagulant therapy     Essential hypertension     Obstructive sleep apnea     Gout     Upper respiratory tract infection     Type 2 diabetes mellitus without complication ([address]     Acute cystitis with hematuria     Coronary artery disease involving native coronary artery     Ischemic cardiomyopathy     HCAP (healthcare-associated pneumonia)     Hypokalemia     Acute pulmonary edema (HCC)     Ground glass opacity present on imaging of lung     Pulmonary fibrosis (HCC)     ASHD (arteriosclerotic heart disease)     Uncomplicated asthma     Acute on chronic systolic congestive heart failure (HCC)     Supratherapeutic INR     Hyperglycemia due to type 2 diabetes mellitus ([address]     Acute

## 2017-07-03 NOTE — PROGRESS NOTES
Physical Therapy  DATE: 7/3/2017    NAME: Dolly Ramsey  MRN: [id number]   : 1940    Patient not seen this date for Physical Therapy due to:  [] Blood transfusion in progress  [] Hemodialysis  []  Patient Declined  [] Spine Precautions   [] Strict Bedrest  [] Surgery/ Procedure  [] Testing      [x] Other: RN cx d/t jennifer in am and pm.         [] PT being discontinued at this time. Patient independent. No further needs. [] PT being discontinued at this time as the patient has been transferred to palliative care. No further needs.     Todd Hernandez, PT

## 2017-07-03 NOTE — PROGRESS NOTES
Pulmonary Progress Note    CC:  ild  Subjective:   no acute events   In afib rvr   Review of Systems -  General ROS: negative for - chills, fatigue, fever or weight loss  ENT ROS: negative for - headaches, oral lesions or sore throat  Cardiovascular ROS: no chest pain , orthopnea or pnd   Gastrointestinal ROS: no abdominal pain, change in bowel habits, or black or bloody stools  Skin - no rash   Neuro - no blurry vision , no loc . No focal weakness   msk - no jt tenderness or swelling    Vascular - no claudication , rest completed and negative   Lymphatic - complete and negative   Hematology - oncology - complete and negative   Allergy immunology - complete and negative    no burning or hematuria      Immunization   Immunization History   Administered Date(s) Administered    Influenza Vaccine, unspecified formulation 11/05/2012, 11/04/2013, 11/11/2014    Influenza Virus Vaccine 10/05/2015    Influenza, High dose, IM, Preservative-free 10/10/2016    Pneumococcal 13-valent Conjugate ([id number]) 11/16/2015    Pneumococcal Conjugate 7-valent 01/09/2012    Zoster 06/20/2016        Pneumococcal Vaccine     [x] Up to date    [] Indicated   [] Refused  [] Contraindicated       Influenza Vaccine   [] Up to date    [x] Indicated   [] Refused  [] Contraindicated     PAST MEDICAL HISTORY:       Diagnosis Date    Allergic rhinitis 10/1994    Asthma     Atrial fibrillation ([facility] 12/2008    CAD (coronary artery disease)     Clotting disorder (HCC)     plebitis in L leg    COPD (chronic obstructive pulmonary disease) ([facility]     DDD (degenerative disc disease), cervical     Degeneration of cervical intervertebral disc     Diverticulosis 2005    Gout     Gout, unspecified     H/O cardiac catheterization 6/17/15    LMCA: Normal 0% stenosis. LAD: Lesion on 1st diag: Proximal subsection. 80% stenosis. Lesion plaque is ruptured. Jossue Cast LCx: Lesion on 1st Ob Leslie: Mid subsection. 85% stenosis.  RCA:Lesion on R PDA: Mid subsection. 85% stenosis. Lesion on R PDA: Distal subsection. 70% stenosis. Lesion on Prox RCA: Mid subsection. 50% stenosis. EF 50%.  H/O echocardiogram 11/09/2016    EF 40-45%. Mild LV hypertrophy normal LV cavity size. Sigmoid interventricular septum without evidence of outflow tract obstruction. Left atrium is moderately dilated (34-39) left atrial volume index of 36 ml/m2. Mild mitral regurg. Diastology cannot be assessed due to A-fib.  History of Holter monitoring 3/24/16    Atrial Fibrillation throughout,fairly controlled, HR  bpm 79% of the time. Occasional high ventricular rate episodes and multiple pauses suggestive of tachycardia/bradycardia syndrome  Msximum R-R interval 2.68 seconds.    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    Long term (current) use of anticoagulants 8/31/2015    Other abnormal glucose 2004    Phlebitis and thrombophlebitis of lower extremities, unspecified ([facility] 1961    L leg    Senile osteoporosis 2006    L/S    Symptomatic menopausal or female climacteric states 06/1995    Syncope and collapse     Type II or unspecified type diabetes mellitus without mention of complication, not stated as uncontrolled 03/2009    Unspecified hereditary and idiopathic peripheral neuropathy 2012    Unspecified sleep apnea 11/2009         Family History:       Problem Relation Age of Onset    Heart Disease Mother     Stroke Mother     High Blood Pressure Mother     Cancer Father      lung    Stroke Brother     Heart Disease Maternal Grandmother        SURGICAL HISTORY:   Past Surgical History:   Procedure Laterality Date    BRONCHOSCOPY  6/7/2017    BRONCHOSCOPY BRUSHINGS performed by Tammi Bustos DO at [place] [address]  6/7/2017    BRONCHOSCOPY/TRANSBRONCHIAL LUNG BIOPSY performed by Tammi Bustos DO at [place] [address]  6/7/2017    BRONCHOSCOPY FLUOROSCOPY Hours)    Patient Vitals for the past 8 hrs:   BP Temp Temp src Pulse Resp SpO2   07/02/17 2202 138/78 - - 113 20 -   07/02/17 2115 114/64 97.7 °F (36.5 °C) Oral 93 20 93 %   07/02/17 2043 114/64 - - 115 - -   07/02/17 1615 116/60 97.4 °F (36.3 °C) Oral 90 18 94 %       Intake/Output Summary (Last 24 hours) at 07/02/17 2355  Last data filed at 07/02/17 2205   Gross per 24 hour   Intake              910 ml   Output             1650 ml   Net             -740 ml     I/O last 3 completed shifts:   In: 910 [P.O.:910]  Out: 1650 [Urine:1650]     Date 07/02/17 0000 - 07/02/17 2359   Shift 3208-8382 0099-7436 8997-4338 24 Hour Total   I  N  T  A  K  E   P.O.  (mL/kg/hr)   910 910    Shift Total  (mL/kg)   910  (9.8) 910  (9.8)   O  U  T  P  U  T   Urine  (mL/kg/hr)   1650 1650    Shift Total  (mL/kg)   1650  (17.8) 1650  (17.8)   Weight (kg) 92.8 92.8 92.8 92.8     Patient Vitals for the past 96 hrs (Last 3 readings):   Weight   07/01/17 0616 204 lb 8 oz (92.8 kg)   06/29/17 1330 197 lb 1.5 oz (89.4 kg)          PHYSICAL EXAMINATION:  General Appearance:    Alert, cooperative, no distress, appears stated age   Head:    Normocephalic, without obvious abnormality, atraumatic                  :    Neck:   Supple, symmetrical, trachea midline, no adenopathy;     thyroid:  no enlargement/tenderness/nodules; no carotid    bruit no JVP , no HJR   Back:     Symmetric, no curvature, ROM normal, no CVA tenderness   Lungs:      Rales b/l    Chest Wall:    No tenderness or deformity      Heart:    Ir Regular rate and rhythm, S1 and S2 normal,                           Abdomen:                                                 Pulses:                                            Lymph nodes:                    Neurologic:                  Soft, non-tender, bowel sounds active all four quadrants,     no masses, no organomegaly         2+ and symmetric all extremities            Cervical, supraclavicular not enlarged or matted or tender CNII-XII intact, normal strength 5/5 . Sensation grossly normal  and reflexes normal 2+  throughout     Clubbing No  Lower ext edema No1+   [] , 2 +  [] , 3+   []  Upper ext edema No                  Medications:    Scheduled Meds:   ciprofloxacin  250 mg Oral 2 times per day    furosemide  20 mg Intravenous BID    [START ON 7/3/2017] metoprolol tartrate  25 mg Oral Daily    verapamil  60 mg Oral 3 times per day    insulin glargine  10 Units Subcutaneous Nightly    predniSONE  50 mg Oral Daily    aspirin  81 mg Oral Daily    atorvastatin  40 mg Oral Nightly    warfarin (COUMADIN) daily dosing (placeholder)   Other RX Placeholder    metoprolol tartrate  12.5 mg Oral Nightly    melatonin  3 mg Oral Nightly    sodium chloride flush  10 mL Intravenous 2 times per day    insulin lispro  0-18 Units Subcutaneous TID WC    insulin lispro  0-9 Units Subcutaneous Nightly       Continuous Infusions:   dextrose         PRN Meds:  dextromethorphan, sodium chloride flush, acetaminophen, magnesium hydroxide, ondansetron, glucose, dextrose, glucagon (rDNA), dextrose, ipratropium-albuterol    Labs:  CBC:   Recent Labs      06/30/17   0630  07/01/17   0645  07/02/17   0716   WBC  10.1  10.6  8.4   HGB  11.5*  12.2  12.0   HCT  34.9*  36.6  36.2   MCV  91.7  91.1  91.7   PLT  148  176  176     BMP:   Recent Labs      06/30/17   0630  06/30/17   0824  07/01/17   0645  07/02/17   0716   NA  138   --   143  141   K  4.8   --   4.4  4.9   CL  101   --   102  101   CO2  20   --   23  25   PHOS   --   3.7   --    --    BUN  12   --   22  18   CREATININE  0.55   --   0.50  0.72     LIVER PROFILE:   Recent Labs      06/30/17   0630   AST  20   ALT  21   BILIDIR  0.35*   BILITOT  1.58*   ALKPHOS  49     PT/INR:   Recent Labs      06/30/17   0630  07/01/17   0930  07/02/17   1008   PROTIME  34.7*  27.6*  27.8*   INR  3.2  2.6  2.6     APTT: No results for input(s): APTT in the last 72 hours.   UA:  Recent Labs      06/30/17 0626   COLORU  YELLOW   PHUR  6.5   WBCUA  TOO NUMEROUS TO COUNT   RBCUA  0 TO 2   MUCUS  NOT REPORTED   TRICHOMONAS  NOT REPORTED   YEAST  NOT REPORTED   BACTERIA  MANY*   SPECGRAV  1.022   LEUKOCYTESUR  LARGE*   UROBILINOGEN  Normal   BILIRUBINUR  NEGATIVE   GLUCOSEU  NEGATIVE   AMORPHOUS  NOT REPORTED     No results for input(s): PHART, RSI0YJA, PO2ART in the last 72 hours. ABG   No results found for: PH, PCO2, PO2, HCO3, O2SAT  Lab Results   Component Value Date    MODE NOT REPORTED 06/03/2017             Assessment:   Active Hospital Problems    Diagnosis Date Noted    ILD (interstitial lung disease) ([address] [J84.9]     Urinary tract infection [N39.0] 06/30/2017    Anemia of chronic disease [D63.8] 06/30/2017    Acute on chronic systolic congestive heart failure (HCC) [I50.23] 06/29/2017    Supratherapeutic INR [R79.1] 06/29/2017    Hyperglycemia due to type 2 diabetes mellitus ([address] [E11.65] 06/29/2017    Acute respiratory failure with hypoxemia ([address] [J96.01]     Coronary artery disease involving native coronary artery [I25.10] 06/04/2017    Ischemic cardiomyopathy [I25.5] 06/04/2017    Type 2 diabetes mellitus without complication ([address] [K10.6] 02/13/2017    Upper respiratory tract infection [J06.9] 01/16/2017    Atrial fibrillation with rapid ventricular response ([address] [I48.91] 08/31/2015    Obstructive sleep apnea [G47.33] 08/31/2015    Long term current use of anticoagulant therapy [Z79.01] 08/31/2015    ild  I have reviewed her olbx reports - dd is hypersensitivity pneumonia leading to ild / UIP - IPF - finding do not conform to either . She did have parakeets for 2 years as a child [place] .  Had wood burning stove for 10 years till 1998   No obvious exposure that will cause hypersensitivity and usually it is difficult to elicit a correlation   I believe this is UIP - IPF- but it is  not without mike to tt with steroids for 6 months if no improvement in PFT and xray then confirms IPF  For which steroids are not helpful    Plan:  Cont prednisone          Selina López MD

## 2017-07-03 NOTE — CARE COORDINATION
Faxed DME order/progress notes signed by attending to [facility]. [place] and spoke to Madi Cooper. Pt was current with pt prior to hospitalization. They can accept her back.

## 2017-07-03 NOTE — PROGRESS NOTES
Pt has decided to go to a facility at discharge. Pt would like 2-3 weeks of rehab before going home. CM will go discuss options with this patient.

## 2017-07-03 NOTE — PROGRESS NOTES
Physical Therapy  DATE: 7/3/2017    NAME: Dolly Ramsey  MRN: [id number]   : 1940    Patient not seen this date for Physical Therapy due to:  [] Blood transfusion in progress  [] Hemodialysis  []  Patient Declined  [] Spine Precautions   [] Strict Bedrest  [] Surgery/ Procedure  [] Testing      [x] Other   RN cancelled. Pt jennifer. [] PT being discontinued at this time. Patient independent. No further needs. [] PT being discontinued at this time as the patient has been transferred to palliative care. No further needs.     Collette Macias, PT

## 2017-07-03 NOTE — PROGRESS NOTES
Pulmonary Progress Note    CC:  ILD    Subjective:  No acute events   Hr better post dig   Review of Systems -  General ROS: negative for - chills, fatigue, fever or weight loss  ENT ROS: negative for - headaches, oral lesions or sore throat  Cardiovascular ROS: no chest pain , + pal   Gastrointestinal ROS: no abdominal pain, change in bowel habits, or black or bloody stools  Skin - no rash   Neuro - no blurry vision , no loc . No focal weakness   msk - no jt tenderness or swelling    Vascular - no claudication , rest completed and negative   Lymphatic - complete and negative   Hematology - oncology - complete and negative   Allergy immunology - complete and negative    no burning or hematuria      Immunization   Immunization History   Administered Date(s) Administered    Influenza Vaccine, unspecified formulation 11/05/2012, 11/04/2013, 11/11/2014    Influenza Virus Vaccine 10/05/2015    Influenza, High dose, IM, Preservative-free 10/10/2016    Pneumococcal 13-valent Conjugate ([id number]) 11/16/2015    Pneumococcal Conjugate 7-valent 01/09/2012    Zoster 06/20/2016        Pneumococcal Vaccine     [x] Up to date    [] Indicated   [] Refused  [] Contraindicated       Influenza Vaccine   [] Up to date    [x] Indicated   [] Refused  [] Contraindicated     PAST MEDICAL HISTORY:       Diagnosis Date    Allergic rhinitis 10/1994    Asthma     Atrial fibrillation ([facility] [address] 12/2008    CAD (coronary artery disease)     Clotting disorder (HCC)     plebitis in L leg    COPD (chronic obstructive pulmonary disease) ([facility] [address] [facility]     DDD (degenerative disc disease), cervical     Degeneration of cervical intervertebral disc     Diverticulosis 2005    Gout     Gout, unspecified     H/O cardiac catheterization 6/17/15    LMCA: Normal 0% stenosis. LAD: Lesion on 1st diag: Proximal subsection. 80% stenosis. Lesion plaque is ruptured. Viri Wiley LCx: Lesion on 1st Ob Leslie: Mid subsection. 85% stenosis. RCA:Lesion on R PDA: Mid subsection. DO Aditi at [facility]    CARDIAC CATHETERIZATION Right 06/17/2015    COLONOSCOPY  1/2005    DIAGNOSTIC CARDIAC CATH LAB PROCEDURE  06/17/15    EYE SURGERY      FRACTURE SURGERY  2004    Distal Radius Ulna    LOBECTOMY Left 6/14/2017    MINI PORT ACCESS LEFT CHEST, X2 SPECIMENS. performed by Rafaela De Oliveira MD at [email or web address] [facility]  2years old    VOLVAR-ULCERATIVE LESION-CAUTERIZED    SKIN BIOPSY      TONSILLECTOMY AND ADENOIDECTOMY                TOBACCO:   reports that she has never smoked. She has never used smokeless tobacco.  ETOH:   reports that she does not drink alcohol. ALLERGIES:    Allergies   Allergen Reactions    Adhesive Tape     Aspercreme [Trolamine Salicylate]     Augmentin [Amoxicillin-Pot Clavulanate]     Erythromycin Other (See Comments)     \"whole in stomach\"    Guaifenesin & Derivatives     Niacin And Related        Home Meds:   Prior to Admission medications    Medication Sig Start Date End Date Taking?  Authorizing Provider   metoprolol tartrate (LOPRESSOR) 25 MG tablet Take 0.5 tablets by mouth nightly 7/2/17  Yes Cole Moreno MD   metoprolol tartrate (LOPRESSOR) 50 MG tablet Take 1 tablet by mouth daily 7/2/17  Yes Cole Moreno MD   verapamil (CALAN) 40 MG tablet Take 1 tablet by mouth every 8 hours 7/2/17  Yes Cole Moreno MD   predniSONE (DELTASONE) 50 MG tablet Take 1 tablet by mouth daily 7/2/17 12/29/17 Yes Cole Moreno MD   dextromethorphan (DELSYM) 30 MG/5ML extended release liquid Take 5 mLs by mouth 2 times daily as needed for Cough 7/2/17 7/12/17 Yes Cole Moreno MD   ciprofloxacin (CIPRO) 250 MG tablet Take 1 tablet by mouth every 12 hours for 5 days 7/2/17 7/7/17 Yes Cole Moreno MD   glucose monitoring kit (FREESTYLE) monitoring kit 4 times daily 7/2/17  Yes Cole Moreno MD   Glucose Blood (BLOOD GLUCOSE TEST STRIPS) STRP Test 4 times daily Diagnosis 7/2/17  Yes Cole Moreno MD   Alcohol Swabs (ALCOHOL PREP) 70 % PADS 4 time daily 7/2/17  Yes Marc Strickland MD   Lancet Device MISC 1 Device by Does not apply route once for 1 dose 7/2/17 7/2/17 Yes Marc Strickland MD   FREESTYLE LANCETS MISC 1 each by Does not apply route daily 7/2/17  Yes Marc Strickland MD   insulin glargine (LANTUS SOLOSTAR) 100 UNIT/ML injection pen 20 units every morning 7/2/17  Yes Marc Strickland MD   Insulin Pen Needle 31G X 4 MM MISC 4 times daily 7/2/17  Yes Marc Strickland MD   melatonin 3 MG TABS tablet Take 3 mg by mouth daily    Historical Provider, MD   aspirin 81 MG chewable tablet Take 1 tablet by mouth daily 6/15/17   Assunta [place], DO   isosorbide mononitrate (IMDUR) 30 MG extended release tablet Take 1 tablet by mouth daily 6/15/17   Assunta [place], DO   atorvastatin (LIPITOR) 40 MG tablet Take 1 tablet by mouth nightly 6/15/17   Assunta [place], DO   mometasone-formoterol [facility]) 200-5 MCG/ACT inhaler Inhale 2 puffs into the lungs 2 times daily 6/9/17   Maeve Burden MD   citalopram (CELEXA) 20 MG tablet TAKE ONE TABLET BY MOUTH ONCE DAILY 4/24/17   Kiley Barragan MD   warfarin (COUMADIN) 7.5 MG tablet TAKE ONE TABLET BY MOUTH ONCE DAILY AS DIRECTED PER PHYSICIAN 3/17/17   Thomas Naranjo MD   ranolazine (RANEXA) 500 MG extended release tablet Take 1 tablet by mouth 2 times daily 9/21/16   Kiley Barragan MD         Intake/Output Summary (Last 24 hours) at 07/03/17 1011  Last data filed at 07/03/17 6765   Gross per 24 hour   Intake              910 ml   Output             2350 ml   Net            -1440 ml       Diet/Nutrition   DIET CARB CONTROL; Carb Control: 4 carbs/meal (approximate 1800 kcals/day); Low Sodium (2 GM);  Daily Fluid Restriction: 1500 ml  Dietary Nutrition Supplements: Diabetic Oral Supplement    Vitals:   BP (!) 143/96  Pulse 102  Temp 98.2 °F (36.8 °C) (Oral)   Resp 20  Ht 5' 5\" (1.651 m)  Wt 204 lb 8 oz (92.8 kg)  SpO2 93%  BMI 34.03 kg/m2 on     I/O   I/O (24 Hours)    Patient Vitals for the

## 2017-07-04 LAB
ABSOLUTE EOS #: 0.1 K/UL (ref 0–0.4)
ABSOLUTE LYMPH #: 1.9 K/UL (ref 1–4.8)
ABSOLUTE MONO #: 0.6 K/UL (ref 0.1–1.2)
ANION GAP SERPL CALCULATED.3IONS-SCNC: 14 MMOL/L (ref 9–17)
BASOPHILS # BLD: 0 %
BASOPHILS ABSOLUTE: 0 K/UL (ref 0–0.2)
BUN BLDV-MCNC: 23 MG/DL (ref 8–23)
BUN/CREAT BLD: ABNORMAL (ref 9–20)
CALCIUM SERPL-MCNC: 9.1 MG/DL (ref 8.6–10.4)
CHLORIDE BLD-SCNC: 97 MMOL/L (ref 98–107)
CO2: 26 MMOL/L (ref 20–31)
CREAT SERPL-MCNC: 0.47 MG/DL (ref 0.5–0.9)
CULTURE: ABNORMAL
CULTURE: NORMAL
DIFFERENTIAL TYPE: ABNORMAL
EOSINOPHILS RELATIVE PERCENT: 1 %
GFR AFRICAN AMERICAN: >60 ML/MIN
GFR NON-AFRICAN AMERICAN: >60 ML/MIN
GFR SERPL CREATININE-BSD FRML MDRD: ABNORMAL ML/MIN/{1.73_M2}
GFR SERPL CREATININE-BSD FRML MDRD: ABNORMAL ML/MIN/{1.73_M2}
GLUCOSE BLD-MCNC: 101 MG/DL (ref 70–99)
GLUCOSE BLD-MCNC: 170 MG/DL (ref 65–105)
GLUCOSE BLD-MCNC: 204 MG/DL (ref 65–105)
GLUCOSE BLD-MCNC: 86 MG/DL (ref 65–105)
HCT VFR BLD CALC: 38.1 % (ref 36–46)
HEMOGLOBIN: 12.5 G/DL (ref 12–16)
INR BLD: 2
LYMPHOCYTES # BLD: 24 %
Lab: ABNORMAL
Lab: ABNORMAL
Lab: NORMAL
MCH RBC QN AUTO: 29.8 PG (ref 26–34)
MCHC RBC AUTO-ENTMCNC: 32.8 G/DL (ref 31–37)
MCV RBC AUTO: 91.1 FL (ref 80–100)
MONOCYTES # BLD: 7 %
PDW BLD-RTO: 18.6 % (ref 12.5–15.4)
PLATELET # BLD: 151 K/UL (ref 140–450)
PLATELET ESTIMATE: ABNORMAL
PMV BLD AUTO: 8.4 FL (ref 6–12)
POTASSIUM SERPL-SCNC: 4.4 MMOL/L (ref 3.7–5.3)
PROTHROMBIN TIME: 21.6 SEC (ref 9.4–12.6)
RBC # BLD: 4.18 M/UL (ref 4–5.2)
RBC # BLD: ABNORMAL 10*6/UL
SEG NEUTROPHILS: 68 %
SEGMENTED NEUTROPHILS ABSOLUTE COUNT: 5.3 K/UL (ref 1.8–7.7)
SODIUM BLD-SCNC: 137 MMOL/L (ref 135–144)
SPECIMEN DESCRIPTION: ABNORMAL
SPECIMEN DESCRIPTION: NORMAL
STATUS: ABNORMAL
STATUS: NORMAL
WBC # BLD: 7.9 K/UL (ref 3.5–11)
WBC # BLD: ABNORMAL 10*3/UL

## 2017-07-04 PROCEDURE — 94762 N-INVAS EAR/PLS OXIMTRY CONT: CPT

## 2017-07-04 PROCEDURE — 99233 SBSQ HOSP IP/OBS HIGH 50: CPT | Performed by: INTERNAL MEDICINE

## 2017-07-04 PROCEDURE — 2060000000 HC ICU INTERMEDIATE R&B

## 2017-07-04 PROCEDURE — 6370000000 HC RX 637 (ALT 250 FOR IP): Performed by: INTERNAL MEDICINE

## 2017-07-04 PROCEDURE — 82947 ASSAY GLUCOSE BLOOD QUANT: CPT

## 2017-07-04 PROCEDURE — 6370000000 HC RX 637 (ALT 250 FOR IP): Performed by: HOSPITALIST

## 2017-07-04 PROCEDURE — 6370000000 HC RX 637 (ALT 250 FOR IP): Performed by: STUDENT IN AN ORGANIZED HEALTH CARE EDUCATION/TRAINING PROGRAM

## 2017-07-04 PROCEDURE — 94660 CPAP INITIATION&MGMT: CPT

## 2017-07-04 PROCEDURE — 85025 COMPLETE CBC W/AUTO DIFF WBC: CPT

## 2017-07-04 PROCEDURE — 85610 PROTHROMBIN TIME: CPT

## 2017-07-04 PROCEDURE — 6360000002 HC RX W HCPCS: Performed by: INTERNAL MEDICINE

## 2017-07-04 PROCEDURE — 2580000003 HC RX 258: Performed by: STUDENT IN AN ORGANIZED HEALTH CARE EDUCATION/TRAINING PROGRAM

## 2017-07-04 PROCEDURE — 80048 BASIC METABOLIC PNL TOTAL CA: CPT

## 2017-07-04 PROCEDURE — 2580000003 HC RX 258: Performed by: INTERNAL MEDICINE

## 2017-07-04 PROCEDURE — 36415 COLL VENOUS BLD VENIPUNCTURE: CPT

## 2017-07-04 PROCEDURE — 6370000000 HC RX 637 (ALT 250 FOR IP): Performed by: NURSE PRACTITIONER

## 2017-07-04 RX ORDER — WARFARIN SODIUM 5 MG/1
5 TABLET ORAL DAILY
Status: DISCONTINUED | OUTPATIENT
Start: 2017-07-04 | End: 2017-07-06 | Stop reason: HOSPADM

## 2017-07-04 RX ADMIN — AMIODARONE HYDROCHLORIDE 300 MG: 50 INJECTION, SOLUTION INTRAVENOUS at 10:00

## 2017-07-04 RX ADMIN — WARFARIN SODIUM 5 MG: 5 TABLET ORAL at 17:18

## 2017-07-04 RX ADMIN — Medication 10 ML: at 09:08

## 2017-07-04 RX ADMIN — DIGOXIN 125 MCG: 0.12 TABLET ORAL at 09:09

## 2017-07-04 RX ADMIN — METFORMIN HYDROCHLORIDE 500 MG: 500 TABLET ORAL at 09:09

## 2017-07-04 RX ADMIN — METOPROLOL TARTRATE 25 MG: 25 TABLET ORAL at 21:08

## 2017-07-04 RX ADMIN — AMIODARONE HYDROCHLORIDE 1 MG/MIN: 50 INJECTION, SOLUTION INTRAVENOUS at 10:37

## 2017-07-04 RX ADMIN — CIPROFLOXACIN 250 MG: 250 TABLET, FILM COATED ORAL at 09:09

## 2017-07-04 RX ADMIN — ASPIRIN 81 MG: 81 TABLET, CHEWABLE ORAL at 09:09

## 2017-07-04 RX ADMIN — METOPROLOL TARTRATE 50 MG: 50 TABLET, FILM COATED ORAL at 09:08

## 2017-07-04 RX ADMIN — AMIODARONE HYDROCHLORIDE 1 MG/MIN: 50 INJECTION, SOLUTION INTRAVENOUS at 17:18

## 2017-07-04 RX ADMIN — PREDNISONE 50 MG: 50 TABLET ORAL at 09:09

## 2017-07-04 RX ADMIN — ATORVASTATIN CALCIUM 40 MG: 40 TABLET, FILM COATED ORAL at 21:08

## 2017-07-04 RX ADMIN — METFORMIN HYDROCHLORIDE 500 MG: 500 TABLET ORAL at 17:18

## 2017-07-04 RX ADMIN — INSULIN LISPRO 6 UNITS: 100 INJECTION, SOLUTION INTRAVENOUS; SUBCUTANEOUS at 18:27

## 2017-07-04 RX ADMIN — CIPROFLOXACIN 250 MG: 250 TABLET, FILM COATED ORAL at 21:08

## 2017-07-04 RX ADMIN — FUROSEMIDE 20 MG: 20 TABLET ORAL at 09:09

## 2017-07-04 RX ADMIN — Medication 3 MG: at 21:09

## 2017-07-04 RX ADMIN — INSULIN LISPRO 3 UNITS: 100 INJECTION, SOLUTION INTRAVENOUS; SUBCUTANEOUS at 12:02

## 2017-07-04 NOTE — PROGRESS NOTES
mg Oral Nightly    warfarin (COUMADIN) daily dosing (placeholder)   Other RX Placeholder    melatonin  3 mg Oral Nightly    sodium chloride flush  10 mL Intravenous 2 times per day    insulin lispro  0-18 Units Subcutaneous TID WC    insulin lispro  0-9 Units Subcutaneous Nightly       Diagnostic Labs and Imaging    CBC:   Recent Labs      07/02/17   0716  07/03/17   0606  07/04/17   0550   WBC  8.4  6.6  7.9   RBC  3.95*  4.23  4.18   HGB  12.0  12.7  12.5   HCT  36.2  38.4  38.1   MCV  91.7  90.9  91.1   RDW  18.8*  18.8*  18.6*   PLT  176  160  151     BMP:   Recent Labs      07/02/17   0716  07/03/17   0606  07/04/17   0550   NA  141  138  137   K  4.9  4.0  4.4   CL  101  96*  97*   CO2  25  26  26   BUN  18  23  23   CREATININE  0.72  0.57  0.47*     BNP: No results for input(s): BNP in the last 72 hours. PT/INR:   Recent Labs      07/02/17   1008  07/03/17   0839  07/04/17   0550   PROTIME  27.8*  33.9*  21.6*   INR  2.6  3.1  2.0     APTT: No results for input(s): APTT in the last 72 hours. CARDIAC ENZYMES: No results for input(s): CKMB, CKMBINDEX, TROPONINI in the last 72 hours. Invalid input(s): CKTOTAL;3  FASTING LIPID PANEL:  Lab Results   Component Value Date    CHOL 149 02/06/2017    HDL 40 (L) 02/06/2017    TRIG 272 (H) 02/06/2017     LIVER PROFILE: No results for input(s): AST, ALT, ALB, BILIDIR, BILITOT, ALKPHOS in the last 72 hours.      Assessment and Plan:   Principal Problem:    Acute on chronic systolic congestive heart failure (HCC)  Active Problems:    Atrial fibrillation with rapid ventricular response ([address]    Long term current use of anticoagulant therapy    Obstructive sleep apnea    Upper respiratory tract infection    Type 2 diabetes mellitus without complication (HCC)    Coronary artery disease involving native coronary artery    Ischemic cardiomyopathy    Supratherapeutic INR    Hyperglycemia due to type 2 diabetes mellitus ([address]    Acute respiratory failure with hypoxemia [facility])    Urinary tract infection    Anemia of chronic disease    ILD (interstitial lung disease) ([facility] [address]      Plan:  Atrial fibrillation with RVR; stable                        - Cardiology following                        - Lopressor 50 mg daily and 25mg nightly                        - Verapamil 80mg TID             - Digoxin 125 mcg daily                         - Warfarin restarted 6/30 - follow up daily INR - supratherapeutic on admission      Urinary tract infection                        -Continue Ciprofloxacin       Pulmonary Fibrosis; stable                        - BiPAP prn                        - Prednisone 50 daiily                        - home oxygen       Hyperglycemia    - Metformin 500 BID and ISS       Diet: Carb controlled diet      DVT: Coumadin      Patient to go to SNF referral sent to [facility] in [facility] [address], PGY-2, Internal Medicine Residency Resident.   [facility]

## 2017-07-04 NOTE — FLOWSHEET NOTE
At 1355 brevibloc gtt  At 25mcq stopped.  brevibloc restarted at [phone] after cardioversion at 25mcq/kg/ml and continued till stopped at 1534
practices/gabby;Discussed illness/injury and it's impact   Outcome Comfort;Expressed gratitude;Expressed feelings of david, peace, and/or awe;Engaged in conversation; Shared reminiscences; Receptive   Spiritual/Sabianism   Type Spiritual support   Intervention Prayer     Electronically signed by Benjy Barillas on 7/4/2017 at 6:50 PM

## 2017-07-04 NOTE — PROGRESS NOTES
tachycardia-mediated. 4. Reported Hemoptysis   5. Multivessel CAD, not amenable to revascularitzation  6. Pneumonitis ?etiology on steroid therapy 40 mg Prednisone daily   7. Essential HTN  8. Elevated INR- resolved  9. Hyperlipidemia    Plan of Treatment:   1. Afib- Rate 130-140's with activity. DC digoxin and verapamil. Digoxin not indicated in Afib with normal ejection fraction. Switch to amiodarone. Keep BB same. INR 2.6 today   2. HTN- Stable. Continue current medications  3.  Continue lasix daily       Electronically signed by Jared Atkins DO on 7/4/2017 at 10:11 AM  [address]  [phone]

## 2017-07-04 NOTE — PLAN OF CARE
Problem: Falls - Risk of  Goal: Absence of falls  Outcome: Ongoing  Pt remains free from falls. Pt bed locked in lowest position, fall sign posted, and call light within reach. Will continue to monitor.

## 2017-07-05 LAB
ABSOLUTE EOS #: 0.1 K/UL (ref 0–0.4)
ABSOLUTE LYMPH #: 2.8 K/UL (ref 1–4.8)
ABSOLUTE MONO #: 0.5 K/UL (ref 0.1–1.2)
ANION GAP SERPL CALCULATED.3IONS-SCNC: 15 MMOL/L (ref 9–17)
BASOPHILS # BLD: 0 %
BASOPHILS ABSOLUTE: 0 K/UL (ref 0–0.2)
BUN BLDV-MCNC: 22 MG/DL (ref 8–23)
BUN/CREAT BLD: ABNORMAL (ref 9–20)
CALCIUM SERPL-MCNC: 8.6 MG/DL (ref 8.6–10.4)
CHLORIDE BLD-SCNC: 99 MMOL/L (ref 98–107)
CO2: 25 MMOL/L (ref 20–31)
CREAT SERPL-MCNC: 0.57 MG/DL (ref 0.5–0.9)
DIFFERENTIAL TYPE: ABNORMAL
EOSINOPHILS RELATIVE PERCENT: 1 %
GFR AFRICAN AMERICAN: >60 ML/MIN
GFR NON-AFRICAN AMERICAN: >60 ML/MIN
GFR SERPL CREATININE-BSD FRML MDRD: ABNORMAL ML/MIN/{1.73_M2}
GFR SERPL CREATININE-BSD FRML MDRD: ABNORMAL ML/MIN/{1.73_M2}
GLUCOSE BLD-MCNC: 102 MG/DL (ref 65–105)
GLUCOSE BLD-MCNC: 104 MG/DL (ref 70–99)
GLUCOSE BLD-MCNC: 153 MG/DL (ref 65–105)
GLUCOSE BLD-MCNC: 179 MG/DL (ref 65–105)
GLUCOSE BLD-MCNC: 189 MG/DL (ref 65–105)
HCT VFR BLD CALC: 39 % (ref 36–46)
HEMOGLOBIN: 12.9 G/DL (ref 12–16)
INR BLD: 2.7
LYMPHOCYTES # BLD: 25 %
MCH RBC QN AUTO: 30 PG (ref 26–34)
MCHC RBC AUTO-ENTMCNC: 33.2 G/DL (ref 31–37)
MCV RBC AUTO: 90.6 FL (ref 80–100)
MONOCYTES # BLD: 4 %
PDW BLD-RTO: 18.4 % (ref 12.5–15.4)
PLATELET # BLD: 172 K/UL (ref 140–450)
PLATELET ESTIMATE: ABNORMAL
PMV BLD AUTO: 8.8 FL (ref 6–12)
POTASSIUM SERPL-SCNC: 4.2 MMOL/L (ref 3.7–5.3)
PROTHROMBIN TIME: 28.8 SEC (ref 9.4–12.6)
RBC # BLD: 4.3 M/UL (ref 4–5.2)
RBC # BLD: ABNORMAL 10*6/UL
SEG NEUTROPHILS: 70 %
SEGMENTED NEUTROPHILS ABSOLUTE COUNT: 8 K/UL (ref 1.8–7.7)
SODIUM BLD-SCNC: 139 MMOL/L (ref 135–144)
WBC # BLD: 11.5 K/UL (ref 3.5–11)
WBC # BLD: ABNORMAL 10*3/UL

## 2017-07-05 PROCEDURE — 6370000000 HC RX 637 (ALT 250 FOR IP): Performed by: INTERNAL MEDICINE

## 2017-07-05 PROCEDURE — 99233 SBSQ HOSP IP/OBS HIGH 50: CPT | Performed by: INTERNAL MEDICINE

## 2017-07-05 PROCEDURE — 6370000000 HC RX 637 (ALT 250 FOR IP): Performed by: NURSE PRACTITIONER

## 2017-07-05 PROCEDURE — 2580000003 HC RX 258: Performed by: INTERNAL MEDICINE

## 2017-07-05 PROCEDURE — 97110 THERAPEUTIC EXERCISES: CPT

## 2017-07-05 PROCEDURE — 2580000003 HC RX 258: Performed by: STUDENT IN AN ORGANIZED HEALTH CARE EDUCATION/TRAINING PROGRAM

## 2017-07-05 PROCEDURE — 36415 COLL VENOUS BLD VENIPUNCTURE: CPT

## 2017-07-05 PROCEDURE — 6370000000 HC RX 637 (ALT 250 FOR IP): Performed by: HOSPITALIST

## 2017-07-05 PROCEDURE — 97116 GAIT TRAINING THERAPY: CPT

## 2017-07-05 PROCEDURE — 82947 ASSAY GLUCOSE BLOOD QUANT: CPT

## 2017-07-05 PROCEDURE — 97166 OT EVAL MOD COMPLEX 45 MIN: CPT

## 2017-07-05 PROCEDURE — 80048 BASIC METABOLIC PNL TOTAL CA: CPT

## 2017-07-05 PROCEDURE — 94660 CPAP INITIATION&MGMT: CPT

## 2017-07-05 PROCEDURE — 85025 COMPLETE CBC W/AUTO DIFF WBC: CPT

## 2017-07-05 PROCEDURE — 6370000000 HC RX 637 (ALT 250 FOR IP): Performed by: STUDENT IN AN ORGANIZED HEALTH CARE EDUCATION/TRAINING PROGRAM

## 2017-07-05 PROCEDURE — 85610 PROTHROMBIN TIME: CPT

## 2017-07-05 PROCEDURE — 94762 N-INVAS EAR/PLS OXIMTRY CONT: CPT

## 2017-07-05 PROCEDURE — 6360000002 HC RX W HCPCS: Performed by: INTERNAL MEDICINE

## 2017-07-05 PROCEDURE — G8987 SELF CARE CURRENT STATUS: HCPCS

## 2017-07-05 PROCEDURE — 2060000000 HC ICU INTERMEDIATE R&B

## 2017-07-05 PROCEDURE — 97535 SELF CARE MNGMENT TRAINING: CPT

## 2017-07-05 PROCEDURE — G8988 SELF CARE GOAL STATUS: HCPCS

## 2017-07-05 RX ORDER — AMIODARONE HYDROCHLORIDE 200 MG/1
200 TABLET ORAL 2 TIMES DAILY
Status: DISCONTINUED | OUTPATIENT
Start: 2017-07-05 | End: 2017-07-06 | Stop reason: HOSPADM

## 2017-07-05 RX ORDER — AMIODARONE HYDROCHLORIDE 200 MG/1
200 TABLET ORAL DAILY
Status: DISCONTINUED | OUTPATIENT
Start: 2017-07-12 | End: 2017-07-06 | Stop reason: HOSPADM

## 2017-07-05 RX ADMIN — METOPROLOL TARTRATE 25 MG: 25 TABLET ORAL at 20:56

## 2017-07-05 RX ADMIN — METFORMIN HYDROCHLORIDE 500 MG: 500 TABLET ORAL at 08:17

## 2017-07-05 RX ADMIN — AMIODARONE HYDROCHLORIDE 1 MG/MIN: 50 INJECTION, SOLUTION INTRAVENOUS at 01:34

## 2017-07-05 RX ADMIN — FUROSEMIDE 20 MG: 20 TABLET ORAL at 08:17

## 2017-07-05 RX ADMIN — INSULIN LISPRO 2 UNITS: 100 INJECTION, SOLUTION INTRAVENOUS; SUBCUTANEOUS at 20:57

## 2017-07-05 RX ADMIN — Medication 10 ML: at 23:01

## 2017-07-05 RX ADMIN — METFORMIN HYDROCHLORIDE 500 MG: 500 TABLET ORAL at 17:07

## 2017-07-05 RX ADMIN — ATORVASTATIN CALCIUM 40 MG: 40 TABLET, FILM COATED ORAL at 20:56

## 2017-07-05 RX ADMIN — SULFAMETHOXAZOLE AND TRIMETHOPRIM 1 TABLET: 400; 80 TABLET ORAL at 08:17

## 2017-07-05 RX ADMIN — ASPIRIN 81 MG: 81 TABLET, CHEWABLE ORAL at 08:17

## 2017-07-05 RX ADMIN — INSULIN LISPRO 3 UNITS: 100 INJECTION, SOLUTION INTRAVENOUS; SUBCUTANEOUS at 12:36

## 2017-07-05 RX ADMIN — INSULIN LISPRO 3 UNITS: 100 INJECTION, SOLUTION INTRAVENOUS; SUBCUTANEOUS at 17:07

## 2017-07-05 RX ADMIN — PREDNISONE 50 MG: 50 TABLET ORAL at 08:17

## 2017-07-05 RX ADMIN — METOPROLOL TARTRATE 50 MG: 50 TABLET, FILM COATED ORAL at 08:17

## 2017-07-05 RX ADMIN — CIPROFLOXACIN 250 MG: 250 TABLET, FILM COATED ORAL at 08:17

## 2017-07-05 RX ADMIN — CIPROFLOXACIN 250 MG: 250 TABLET, FILM COATED ORAL at 20:56

## 2017-07-05 RX ADMIN — WARFARIN SODIUM 5 MG: 5 TABLET ORAL at 17:07

## 2017-07-05 RX ADMIN — AMIODARONE HYDROCHLORIDE 200 MG: 200 TABLET ORAL at 20:56

## 2017-07-05 RX ADMIN — Medication 3 MG: at 20:57

## 2017-07-05 RX ADMIN — AMIODARONE HYDROCHLORIDE 1 MG/MIN: 50 INJECTION, SOLUTION INTRAVENOUS at 09:31

## 2017-07-05 RX ADMIN — AMIODARONE HYDROCHLORIDE 200 MG: 200 TABLET ORAL at 12:36

## 2017-07-05 ASSESSMENT — PAIN SCALES - GENERAL
PAINLEVEL_OUTOF10: 0
PAINLEVEL_OUTOF10: 0

## 2017-07-05 NOTE — PROGRESS NOTES
warfarin (COUMADIN) daily dosing (placeholder)   Other RX Placeholder    melatonin  3 mg Oral Nightly    sodium chloride flush  10 mL Intravenous 2 times per day    insulin lispro  0-18 Units Subcutaneous TID WC    insulin lispro  0-9 Units Subcutaneous Nightly       Diagnostic Labs and Imaging    CBC:   Recent Labs      07/03/17   0606  07/04/17   0550  07/05/17 0617   WBC  6.6  7.9  11.5*   RBC  4.23  4.18  4.30   HGB  12.7  12.5  12.9   HCT  38.4  38.1  39.0   MCV  90.9  91.1  90.6   RDW  18.8*  18.6*  18.4*   PLT  160  151  172     BMP:   Recent Labs      07/03/17   0606  07/04/17   0550  07/05/17 0617   NA  138  137  139   K  4.0  4.4  4.2   CL  96*  97*  99   CO2  26  26  25   BUN  23  23  22   CREATININE  0.57  0.47*  0.57     BNP: No results for input(s): BNP in the last 72 hours. PT/INR:   Recent Labs      07/03/17   0839  07/04/17   0550  07/05/17 0617   PROTIME  33.9*  21.6*  28.8*   INR  3.1  2.0  2.7     APTT: No results for input(s): APTT in the last 72 hours. CARDIAC ENZYMES: No results for input(s): CKMB, CKMBINDEX, TROPONINI in the last 72 hours. Invalid input(s): CKTOTAL;3  FASTING LIPID PANEL:  Lab Results   Component Value Date    CHOL 149 02/06/2017    HDL 40 (L) 02/06/2017    TRIG 272 (H) 02/06/2017     LIVER PROFILE: No results for input(s): AST, ALT, ALB, BILIDIR, BILITOT, ALKPHOS in the last 72 hours.      Assessment and Plan:   Principal Problem:    Acute on chronic systolic congestive heart failure (HCC)  Active Problems:    Atrial fibrillation with rapid ventricular response ([address]    Long term current use of anticoagulant therapy    Obstructive sleep apnea    Upper respiratory tract infection    Type 2 diabetes mellitus without complication (HCC)    Coronary artery disease involving native coronary artery    Ischemic cardiomyopathy    Supratherapeutic INR    Hyperglycemia due to type 2 diabetes mellitus ([address]    Acute respiratory failure with hypoxemia ([address]    Urinary tract infection    Anemia of chronic disease    ILD (interstitial lung disease) ([facility]      Plan:  Atrial fibrillation with RVR; stable    Patient started on Amidarone infusion and beta blocker     remains in AFIB    INR 2.7  Urinary tract infection                        -Continue Ciprofloxacin       Pulmonary Fibrosis; stable                        - BiPAP prn                        - Prednisone 50 daiily                        - home oxygen       Hyperglycemia    - Metformin 500 BID and ISS       Diet: Carb controlled diet      DVT: Coumadin INR 2.7       Patient to go to SNF referral sent to [facility] Polly Orantes in [address] [place] [address], PGY-2, Internal Medicine Residency Resident.   [id number] [place]

## 2017-07-05 NOTE — PLAN OF CARE
Problem: Falls - Risk of:  Goal: Will remain free from falls  Will remain free from falls   Outcome: Ongoing  Patient remains free from falls this shift. Patient calls out appropriately. Call  Light within reach. Bed in lowest position. Problem: Risk for Impaired Skin Integrity  Goal: Tissue integrity - skin and mucous membranes  Structural intactness and normal physiological function of skin and  mucous membranes. Outcome: Ongoing  Patient's skin integrity remains free from new signs of breakdown. Skin assessments are done throughout shift.

## 2017-07-05 NOTE — PROGRESS NOTES
of ambulation and coughing to 123  Stairs/Curb  Stairs?: No     Balance  Posture: Fair  Sitting - Static: Good  Sitting - Dynamic: Good  Standing - Static: Good;-  Standing - Dynamic: Fair           Seated LE exercise program: Long Arc Quads, hip abduction/adduction, heel/toe raises, and marches. Reps: 15    Upper extremity exercises: Bicep curl, shoulder flexion/extension, punches, tricep curl, shoulder abduction/adduction. Reps: 15              Assessment   Body structures, Functions, Activity limitations: Decreased functional mobility ; Decreased endurance;Decreased balance  Assessment: Pt with good overall tolerance to therapy- pt left on HR monitor throughout treatment. Pt to return home with home PT and family assist at discharge. Prognosis: Good  Patient Education: Educated on importance of mobility  Barriers to Learning: None  REQUIRES PT FOLLOW UP: Yes  Activity Tolerance  Activity Tolerance: Patient limited by endurance  PT Equipment Recommendations  Equipment Needed: No       Discharge Recommendations:  Continue to assess pending progress, Home with Home health PT        Goals  Short term goals  Time Frame for Short term goals: 14 visits  Short term goal 1: Independent bed mobility  Short term goal 2: Independent transfers with rolling walker  Short term goal 3:  Independent ambulation with rolling walker 150 ft  Short term goal 4: Navigate 3 stairs with 1 rail SBA  Short term goal 5: Pt to tolerate 45 minutes of PT treatment to improve endurance  Patient Goals   Patient goals : Go home soon    Plan    Plan  Times per week: 6x/week  Current Treatment Recommendations: Functional Mobility Training, Transfer Training, Gait Training, Stair training, Endurance Training, Safety Education & Training, Strengthening, Balance Training, Home Exercise Program, Patient/Caregiver Education & Training  Safety Devices  Type of devices: Call light within reach, Gait belt, Left in bed, Nurse notified  Restraints  Initially

## 2017-07-05 NOTE — PROGRESS NOTES
subsection. 85% stenosis. Lesion on R PDA: Distal subsection. 70% stenosis. Lesion on Prox RCA: Mid subsection. 50% stenosis. EF 50%.  H/O echocardiogram 11/09/2016    EF 40-45%. Mild LV hypertrophy normal LV cavity size. Sigmoid interventricular septum without evidence of outflow tract obstruction. Left atrium is moderately dilated (34-39) left atrial volume index of 36 ml/m2. Mild mitral regurg. Diastology cannot be assessed due to A-fib.  History of Holter monitoring 3/24/16    Atrial Fibrillation throughout,fairly controlled, HR  bpm 79% of the time. Occasional high ventricular rate episodes and multiple pauses suggestive of tachycardia/bradycardia syndrome  Msximum R-R interval 2.68 seconds.    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    Long term (current) use of anticoagulants 8/31/2015    Other abnormal glucose 2004    Phlebitis and thrombophlebitis of lower extremities, unspecified ([facility] [address] 1961    L leg    Senile osteoporosis 2006    L/S    Symptomatic menopausal or female climacteric states 06/1995    Syncope and collapse     Type II or unspecified type diabetes mellitus without mention of complication, not stated as uncontrolled 03/2009    Unspecified hereditary and idiopathic peripheral neuropathy 2012    Unspecified sleep apnea 11/2009     Past Surgical History:   Procedure Laterality Date    BRONCHOSCOPY  6/7/2017    BRONCHOSCOPY BRUSHINGS performed by Tammi Bustos DO at [place] [address]  6/7/2017    BRONCHOSCOPY/TRANSBRONCHIAL LUNG BIOPSY performed by Tammi Bustos DO at [place] [address]  6/7/2017    BRONCHOSCOPY FLUOROSCOPY performed by Tammi Bustos DO at [address] Right 06/17/2015    COLONOSCOPY  1/2005    DIAGNOSTIC CARDIAC CATH LAB PROCEDURE  06/17/15    EYE SURGERY      FRACTURE SURGERY  2004    Distal Radius Ulna    LOBECTOMY Left 6/14/2017    MINI PORT ACCESS LEFT CHEST, X2 SPECIMENS. performed by Fei Nails MD at [address]  2years old    VOLVAR-ULCERATIVE LESION-CAUTERIZED    SKIN BIOPSY      TONSILLECTOMY AND ADENOIDECTOMY           Restrictions  Restrictions/Precautions  Restrictions/Precautions: Fall Risk, General Precautions  Required Braces or Orthoses?: No  Position Activity Restriction  Other position/activity restrictions: up as tolerated. Pt cannot be removed from monitor per RN. Subjective   General  Chart Reviewed: No  Patient assessed for rehabilitation services?: Yes  General Comment  Comments: RN agreeable to therapy, pt pleasant and cooperative throughout. Pain Assessment  Patient Currently in Pain: Denies     Social/Functional History  Social/Functional History  Lives With:  ( 54year old daughter and 25year old granddaughter)  Type of Home: House  Home Layout: Two level, Able to Live on Main level with bedroom/bathroom  Home Access: Stairs to enter without rails  Entrance Stairs - Number of Steps: 3  Bathroom Shower/Tub: Tub/Shower unit  Bathroom Equipment: Grab bars in shower, Shower chair  Bathroom Accessibility: Accessible  Home Equipment: 4 wheeled walker  Receives Help From: Family  ADL Assistance: Independent  Homemaking Responsibilities: No  Ambulation Assistance: Independent  Transfer Assistance: Independent  Active : No  Patient's  Info: , daughter  Mode of Transportation: [facility]  Occupation: Retired  Type of occupation: stay at [address] [place] [address],  [address]: [facility] and enjoys attending and volunteering at [employer]. 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 Daughter and granddaughter work full time and are available prn. Pt reports able to call Mandae[employer] members or  if assistance is needed when family is gone.      Objective   Vision: Within Functional Limits  Hearing: Within functional limits    Orientation  Overall Orientation Status: Within Functional Limits     Balance  Sitting Balance: Independent  Standing Balance: Supervision  Standing Balance  Time: ~3 min  Activity: static/dynamic standing tolerance with use of RW. To/from bathroom for ADL completion. Sit to stand: Supervision  Stand to sit: Supervision  Functional Mobility  Functional - Mobility Device: Rolling Walker  Activity: To/from bathroom  Assist Level: Supervision  ADL  Feeding: Independent;Setup  Grooming: Independent;Setup  UE Bathing: Supervision;Setup  UE Dressing: Supervision;Setup  LE Dressing: Supervision;Setup  Toileting: Supervision;Setup  Additional Comments: supervision and set up not due to functional/biomechanical status, due to tachycardia. Tone RUE  RUE Tone: Normotonic  Tone LUE  LUE Tone: Normotonic  Coordination  Movements Are Fluid And Coordinated: No  Coordination and Movement description: Gross motor impairments;Decreased speed  Quality of Movement Other  Comment: Pt demo decreased GM coordination and speed during functional mobility/transfers for ADL completion. Bed mobility  Supine to Sit: Supervision  Sit to Supine: Supervision  Scooting: Supervision  Transfers  Sit to stand: Supervision  Stand to sit: Supervision     Cognition  Overall Cognitive Status: WFL  Sensation  Overall Sensation Status: WFL      LUE AROM (degrees)  LUE AROM : WFL  RUE AROM (degrees)  RUE AROM : WFL  LUE Strength  LUE Strength Comment: 4/5 grossly  RUE Strength  RUE Strength Comment: 4/5 grossly    Assessment   Performance deficits / Impairments: Decreased functional mobility ; Decreased ADL status; Decreased safe awareness;Decreased endurance  Assessment: Pt would benefit from OT intervention to improve functional mobility, endurance, and safety awareness to increase independence with ADLs.   Prognosis: Good  Decision Making: Medium Complexity  Patient Education: OT POC and discharge recommendation, ECWS technique, safety awareness with RW with good return. Discharge Recommendations: Home with assist PRN;Home with Home health OT  REQUIRES OT FOLLOW UP: Yes  Activity Tolerance  Activity Tolerance: Patient Tolerated treatment well  Safety Devices  Safety Devices in place: Yes  Type of devices: All fall risk precautions in place; Bed alarm in place; Left in bed;Call light within reach;Gait belt;Nurse notified  Restraints  Initially in place: No        Discharge Recommendations:  Home with assist PRN, Home with [address]  Times per week: 1-2 additional interventions. Times per day: Daily  Current Treatment Recommendations: Functional Mobility Training, Endurance Training, Self-Care / ADL, Safety Education & Training, Equipment Evaluation, Education, & procurement    G-Code  OT G-codes  Functional Assessment Tool Used: Barthel Index  Score: 17/20  Functional Limitation: Self care  Self Care Current Status (): At least 1 percent but less than 20 percent impaired, limited or restricted  Self Care Goal Status (): 0 percent impaired, limited or restricted    Goals  Short term goals  Time Frame for Short term goals: Upon discharge, pt will:  Short term goal 1: IND demo safety awareness with use of RW. Short term goal 2: IND perform UB/LB bathing tasks utilizing shower chair. Short term goal 3: IND perform grooming tasks standing at sink. Short term goal 4: IND perform UB/LB dressing tasks seated. Short term goal 5: IND demo ECWS techniques during ADL completion. Therapy Time   Individual Concurrent Group Co-treatment   Time In 1115         Time Out 4952         Minutes 30            Discharge recommendations discussed with patient during initial evaluation.   Demond Ward, NNEKA Nina, OTR/L

## 2017-07-05 NOTE — PROGRESS NOTES
[facility]   Progress Note                   Date:   7/5/2017  Patient name: Mary Jo Ford  Date of admission:  6/29/2017  1:15 PM  MRN:   [id number]  YOB: 1940  PCP: Shreyas Maria MD    Reason for Admission: Atrial fibrillation with RVR ([facility] [I48.91]    Subjective:       Clinical Changes / Abnormalities: Patient seen and examined. Resting in bed. States she is feeling much better today. Denies angina, shortness of breath, dizziness, palpitations. Current HR AFib 80-90s. Medications:   Scheduled Meds:   warfarin  5 mg Oral Daily    furosemide  20 mg Oral Daily    metFORMIN  500 mg Oral BID WC    metoprolol tartrate  50 mg Oral Daily    metoprolol tartrate  25 mg Oral Nightly    sulfamethoxazole-trimethoprim  1 tablet Oral Once per day on Mon Wed Fri    ciprofloxacin  250 mg Oral 2 times per day    predniSONE  50 mg Oral Daily    aspirin  81 mg Oral Daily    atorvastatin  40 mg Oral Nightly    warfarin (COUMADIN) daily dosing (placeholder)   Other RX Placeholder    melatonin  3 mg Oral Nightly    sodium chloride flush  10 mL Intravenous 2 times per day    insulin lispro  0-18 Units Subcutaneous TID     insulin lispro  0-9 Units Subcutaneous Nightly     Continuous Infusions:   amiodarone 450mg/250ml D5W infusion 1 mg/min (07/05/17 0931)    dextrose       CBC:   Recent Labs      07/03/17   0606  07/04/17   0550  07/05/17 0617   WBC  6.6  7.9  11.5*   HGB  12.7  12.5  12.9   PLT  160  151  172     BMP:    Recent Labs      07/03/17   0606  07/04/17   0550  07/05/17 0617   NA  138  137  139   K  4.0  4.4  4.2   CL  96*  97*  99   CO2  26  26  25   BUN  23  23  22   CREATININE  0.57  0.47*  0.57   GLUCOSE  86  101*  104*     Hepatic: No results for input(s): AST, ALT, ALB, BILITOT, ALKPHOS in the last 72 hours. Troponin: No results for input(s): TROPONINI in the last 72 hours. BNP: No results for input(s): BNP in the last 72 hours.   Lipids: No results for Hyperglycemia due to type 2 diabetes mellitus (HCC)     Acute respiratory failure with hypoxemia (HCC)     Urinary tract infection     Anemia of chronic disease     ILD (interstitial lung disease) ([facility] [address]      Plan of Treatment:   1. AFib-continue amiodarone, BB, Coumadin. HR in 90s today. INR 2.7 today. Change amiodarone to PO.  2. Hypertension-on BB. 3. Acute systolic and diastolic chf-on lasix. 4. Hyperlipidemia-on statin  5. Okay with discharge if HR remains controlled.       Electronically signed by Magaly Juarez CNP on 7/5/2017 at 11:57 [address].  [phone]

## 2017-07-05 NOTE — PLAN OF CARE
Problem: Skin Integrity:  Goal: Will show no infection signs and symptoms  Will show no infection signs and symptoms   Outcome: Met This Shift  Pts skin maintains structural intactness and physiologic function. Pt able to reposition independently in bed/ Assisting pt with turns every 2 hours and heels elevated off bed. Linens remain clean and dry. Will continue to monitor. Problem: Falls - Risk of:  Goal: Will remain free from falls  Will remain free from falls   Outcome: Met This Shift  Pt remains free of falls at this time. Bed locked in lowest position, siderails x2, call light in reach. Non-skid footwear applied. Pt ambulates in room with steady gait. Encouraged pt to call for assistance as needed for safety. Falling star posted outside of room. Will continue to monitor.

## 2017-07-06 VITALS
SYSTOLIC BLOOD PRESSURE: 117 MMHG | DIASTOLIC BLOOD PRESSURE: 60 MMHG | TEMPERATURE: 98.2 F | RESPIRATION RATE: 18 BRPM | OXYGEN SATURATION: 98 % | HEART RATE: 94 BPM | BODY MASS INDEX: 31.47 KG/M2 | WEIGHT: 188.9 LBS | HEIGHT: 65 IN

## 2017-07-06 LAB
ABSOLUTE EOS #: 0.1 K/UL (ref 0–0.4)
ABSOLUTE LYMPH #: 3.3 K/UL (ref 1–4.8)
ABSOLUTE MONO #: 0.7 K/UL (ref 0.1–1.2)
ANION GAP SERPL CALCULATED.3IONS-SCNC: 15 MMOL/L (ref 9–17)
BASOPHILS # BLD: 0 %
BASOPHILS ABSOLUTE: 0 K/UL (ref 0–0.2)
BUN BLDV-MCNC: 20 MG/DL (ref 8–23)
BUN/CREAT BLD: NORMAL (ref 9–20)
CALCIUM SERPL-MCNC: 8.7 MG/DL (ref 8.6–10.4)
CHLORIDE BLD-SCNC: 99 MMOL/L (ref 98–107)
CO2: 27 MMOL/L (ref 20–31)
CREAT SERPL-MCNC: 0.63 MG/DL (ref 0.5–0.9)
DIFFERENTIAL TYPE: ABNORMAL
EOSINOPHILS RELATIVE PERCENT: 1 %
GFR AFRICAN AMERICAN: >60 ML/MIN
GFR NON-AFRICAN AMERICAN: >60 ML/MIN
GFR SERPL CREATININE-BSD FRML MDRD: NORMAL ML/MIN/{1.73_M2}
GFR SERPL CREATININE-BSD FRML MDRD: NORMAL ML/MIN/{1.73_M2}
GLUCOSE BLD-MCNC: 92 MG/DL (ref 70–99)
GLUCOSE BLD-MCNC: 95 MG/DL (ref 65–105)
HCT VFR BLD CALC: 39.7 % (ref 36–46)
HEMOGLOBIN: 13.2 G/DL (ref 12–16)
INR BLD: 2.5
LYMPHOCYTES # BLD: 27 %
MCH RBC QN AUTO: 30 PG (ref 26–34)
MCHC RBC AUTO-ENTMCNC: 33.2 G/DL (ref 31–37)
MCV RBC AUTO: 90.1 FL (ref 80–100)
MONOCYTES # BLD: 6 %
PDW BLD-RTO: 18 % (ref 12.5–15.4)
PLATELET # BLD: 197 K/UL (ref 140–450)
PLATELET ESTIMATE: ABNORMAL
PMV BLD AUTO: 8.4 FL (ref 6–12)
POTASSIUM SERPL-SCNC: 4.6 MMOL/L (ref 3.7–5.3)
PROTHROMBIN TIME: 26.5 SEC (ref 9.4–12.6)
RBC # BLD: 4.4 M/UL (ref 4–5.2)
RBC # BLD: ABNORMAL 10*6/UL
SEG NEUTROPHILS: 66 %
SEGMENTED NEUTROPHILS ABSOLUTE COUNT: 8 K/UL (ref 1.8–7.7)
SODIUM BLD-SCNC: 141 MMOL/L (ref 135–144)
WBC # BLD: 12.2 K/UL (ref 3.5–11)
WBC # BLD: ABNORMAL 10*3/UL

## 2017-07-06 PROCEDURE — 6370000000 HC RX 637 (ALT 250 FOR IP): Performed by: INTERNAL MEDICINE

## 2017-07-06 PROCEDURE — 99233 SBSQ HOSP IP/OBS HIGH 50: CPT | Performed by: INTERNAL MEDICINE

## 2017-07-06 PROCEDURE — 85025 COMPLETE CBC W/AUTO DIFF WBC: CPT

## 2017-07-06 PROCEDURE — 94762 N-INVAS EAR/PLS OXIMTRY CONT: CPT

## 2017-07-06 PROCEDURE — 6370000000 HC RX 637 (ALT 250 FOR IP): Performed by: NURSE PRACTITIONER

## 2017-07-06 PROCEDURE — 94620 HC 6-MINUTE WALK TEST/PULM STRESS TEST SIMPLE: CPT

## 2017-07-06 PROCEDURE — 80048 BASIC METABOLIC PNL TOTAL CA: CPT

## 2017-07-06 PROCEDURE — 6370000000 HC RX 637 (ALT 250 FOR IP): Performed by: STUDENT IN AN ORGANIZED HEALTH CARE EDUCATION/TRAINING PROGRAM

## 2017-07-06 PROCEDURE — 97110 THERAPEUTIC EXERCISES: CPT

## 2017-07-06 PROCEDURE — 94660 CPAP INITIATION&MGMT: CPT

## 2017-07-06 PROCEDURE — 97116 GAIT TRAINING THERAPY: CPT

## 2017-07-06 PROCEDURE — 85610 PROTHROMBIN TIME: CPT

## 2017-07-06 PROCEDURE — 2580000003 HC RX 258: Performed by: STUDENT IN AN ORGANIZED HEALTH CARE EDUCATION/TRAINING PROGRAM

## 2017-07-06 PROCEDURE — 82947 ASSAY GLUCOSE BLOOD QUANT: CPT

## 2017-07-06 PROCEDURE — 36415 COLL VENOUS BLD VENIPUNCTURE: CPT

## 2017-07-06 RX ORDER — AMIODARONE HYDROCHLORIDE 200 MG/1
200 TABLET ORAL 2 TIMES DAILY
Qty: 30 TABLET | Refills: 3 | Status: SHIPPED | OUTPATIENT
Start: 2017-07-06 | End: 2017-09-08 | Stop reason: SDUPTHER

## 2017-07-06 RX ADMIN — PREDNISONE 50 MG: 50 TABLET ORAL at 10:34

## 2017-07-06 RX ADMIN — AMIODARONE HYDROCHLORIDE 200 MG: 200 TABLET ORAL at 10:32

## 2017-07-06 RX ADMIN — ASPIRIN 81 MG: 81 TABLET, CHEWABLE ORAL at 10:34

## 2017-07-06 RX ADMIN — METFORMIN HYDROCHLORIDE 500 MG: 500 TABLET ORAL at 10:34

## 2017-07-06 RX ADMIN — METFORMIN HYDROCHLORIDE 500 MG: 500 TABLET ORAL at 17:56

## 2017-07-06 RX ADMIN — METOPROLOL TARTRATE 50 MG: 50 TABLET, FILM COATED ORAL at 10:34

## 2017-07-06 RX ADMIN — WARFARIN SODIUM 5 MG: 5 TABLET ORAL at 17:58

## 2017-07-06 RX ADMIN — FUROSEMIDE 20 MG: 20 TABLET ORAL at 10:34

## 2017-07-06 RX ADMIN — Medication 10 ML: at 10:35

## 2017-07-06 ASSESSMENT — PAIN SCALES - GENERAL
PAINLEVEL_OUTOF10: 0
PAINLEVEL_OUTOF10: 0

## 2017-07-06 NOTE — PROGRESS NOTES
fibrillation with RVR; rate controlled , INR : 2.5          Urinary tract infection                        - completed course of abx Ciprofloxacin       Pulmonary Fibrosis; stable                        - BiPAP prn                        - Prednisone 50 daiily                        - home oxygen       Hyperglycemia    - Metformin 500 BID and ISS       Diet: Carb controlled diet      DVT: Coumadin INR 2.5      Patient to go to SNF referral sent to [facility] in [address] [place], [place] [address]d, PGY-3, Internal Medicine Residency Resident.   [address] [facility]

## 2017-07-06 NOTE — CARE COORDINATION
Discharge [facility] Case Management Department  Written by: Mary Villegas RN    Patient Name: Scar Dolan  Attending Provider: Rae Frankel, MD  Admit Date: 2017  1:15 PM  MRN: [id number]  Account: [de-identified]                     : 1940  Discharge Date:       Disposition: home with [address] and new O2 from [facility]. Pt aware she needs to call [facility] at discharge so they can setup home O2. Phone number provided.     Mary Villegas RN

## 2017-07-06 NOTE — PROGRESS NOTES
Nutrition Assessment    Type and Reason for Visit: Initial (LOS)    Malnutrition Assessment:  · Malnutrition Status: No malnutrition    Nutrition Diagnosis:   · Problem: No nutrition diagnosis at this time    Nutrition Assessment:  · Subjective Assessment: Pt seen based on length of stay. Admitted d/t coughing up blood and some SOB. Pt currently tolerating her current diet without issues and consuming more than 75% of meals provided. Nutrition Risk Level   Risk Level: Low    Nutrition Intervention  Food and/or Delivery: Continue current diet, Continue current ONS  Nutrition Education/Counseling/Coordination of Care:  Continued Inpatient Monitoring, Education Not Indicated, Discharge Planning    Patient assessed for nutrition risk. Deemed to be at low risk at this time. Will continue to follow patient.       Electronically signed by Dejuan Oleary RD, PRASHANT on 7/6/17 at 2:36 PM    Contact Number: [phone]

## 2017-07-06 NOTE — PROGRESS NOTES
Physical Therapy  Facility/Department: [address] STEPDOWN  Daily Treatment Note  NAME: [place]  : 1940  MRN: [id number]    Date of Service: 2017    Patient Diagnosis(es):   Patient Active Problem List    Diagnosis Date Noted    Pulmonary HTN ([address] 2015     Priority: High    Non-rheumatic mitral regurgitation 2015     Priority: High    ILD (interstitial lung disease) ([address]     Urinary tract infection 2017    Anemia of chronic disease 2017    Acute on chronic systolic congestive heart failure ([address] 2017    Supratherapeutic INR 2017    Hyperglycemia due to type 2 diabetes mellitus ([address] 2017    Acute respiratory failure with hypoxemia (HCC)     Pulmonary fibrosis ([address] 2017    ASHD (arteriosclerotic heart disease) 7552    Uncomplicated asthma     Acute pulmonary edema (HCC) 2017    Ground glass opacity present on imaging of lung 2017    Coronary artery disease involving native coronary artery 2017    Ischemic cardiomyopathy 2017    HCAP (healthcare-associated pneumonia) 2017    Hypokalemia 2017    Acute cystitis with hematuria 2017    Type 2 diabetes mellitus without complication ([address]     Upper respiratory tract infection 2017    Atrial fibrillation with rapid ventricular response ([address] 2015    Long term current use of anticoagulant therapy 2015    Essential hypertension 2015    Obstructive sleep apnea 2015    Gout 2015       Past Medical History:   Diagnosis Date    Allergic rhinitis 10/1994    Asthma     Atrial fibrillation ([address] 2008    CAD (coronary artery disease)     Clotting disorder (HCC)     plebitis in L leg    COPD (chronic obstructive pulmonary disease) ([address]     DDD (degenerative disc disease), cervical     Degeneration of cervical intervertebral disc     Diverticulosis     Gout     Gout, performed by Luevenia Schirmer, DO at [address] Right 06/17/2015    COLONOSCOPY  1/2005    DIAGNOSTIC CARDIAC CATH LAB PROCEDURE  06/17/15    EYE SURGERY      FRACTURE SURGERY  2004    Distal Radius Ulna    LOBECTOMY Left 6/14/2017    MINI PORT ACCESS LEFT CHEST, X2 SPECIMENS. performed by Carroll Allen MD at [facility]  2years old    VOLVAR-ULCERATIVE LESION-CAUTERIZED    SKIN BIOPSY      TONSILLECTOMY AND ADENOIDECTOMY         Restrictions  Restrictions/Precautions  Restrictions/Precautions: Fall Risk, General Precautions  Required Braces or Orthoses?: No  Position Activity Restriction  Other position/activity restrictions: up as tolerated. Subjective   General  Chart Reviewed: Yes  Response To Previous Treatment: Patient with no complaints from previous session. Family / Caregiver Present: No  Subjective  Subjective: pt arrived to gym; states she feels better today. Denies pain. States buttocks is numb from lying in bed and is eager to ambulate.    General Comment  Comments: on 2L O2; SPO2 100% at rest; 97% post ambulation  Pain Screening  Patient Currently in Pain: Denies  Vital Signs  Patient Currently in Pain: Denies       Orientation  Orientation  Overall Orientation Status: Within Normal Limits  Objective      Transfers  Sit to Stand: Contact guard assistance  Stand to sit: Contact guard assistance  Ambulation  Ambulation?: Yes  Ambulation 1  Surface: level tile  Device: Rolling Walker  Other Apparatus: O2  Assistance: Stand by assistance  Quality of Gait: decreased tan, no LOB noted, 1 standing rest break  Distance: 50ft  Comments: limited by SOB, provided pt with cues for pursed lip breathing  Stairs/Curb  Stairs?: Yes  Stairs  # Steps : 10  Stairs Height: 6\"  Rails: Right ascending  Device: No Device  Assistance: Stand by assistance  Comment: required assist with O2 tank     Balance  Posture: Fair  Sitting - Static: Good  Sitting - Dynamic: Good  Standing - Static: Good;-  Standing - Dynamic: Fair       Exercises:  Seated LE exercise program: Long Arc Quads, hip abduction/adduction, heel/toe raises, and marches. Reps: 20x with 2#  UBE x 6 minutes, seated, for endurance  Upper extremity exercises: Bicep curl, shoulder flexion/extension, punches, tricep curl, shoulder abduction/adduction. Reps: 20 with 2#    Assessment   Body structures, Functions, Activity limitations: Decreased functional mobility ; Decreased balance;Decreased endurance  Assessment: Easily SOB with minimum fatigue. Required several rest breaks. Pt ambulating functional distances and safely navigating stairs with CGA. Prognosis: Good  Patient Education: energy conservation, breathing technique  REQUIRES PT FOLLOW UP: Yes  Activity Tolerance  Activity Tolerance: Patient limited by endurance  Activity Tolerance: SOB       Discharge Recommendations:  Home with assist PRN, Home with Home health PT                        Goals  Short term goals  Time Frame for Short term goals: 14 visits  Short term goal 1: Independent bed mobility  Short term goal 2: Independent transfers with rolling walker  Short term goal 3: Independent ambulation with rolling walker 150 ft  Short term goal 4: Navigate 3 stairs with 1 rail SBA  Short term goal 5: Pt to tolerate 45 minutes of PT treatment to improve endurance  Patient Goals   Patient goals : Go home soon    Plan    Plan  Times per week: 6x/week  Current Treatment Recommendations: Functional Mobility Training, Transfer Training, Gait Training, Stair training, Endurance Training, Safety Education & Training, Strengthening, Balance Training, Home Exercise Program, Patient/Caregiver Education & Training  Safety Devices  Type of devices:  All fall risk precautions in place, Gait belt  Restraints  Initially in place: No     Therapy Time   Individual Concurrent Group Co-treatment   Time In 0933         Time Out 1022         Minutes 49 Rebecca Must, PTA

## 2017-07-07 ENCOUNTER — TELEPHONE (OUTPATIENT)
Dept: PHARMACY | Facility: CLINIC | Age: 77
End: 2017-07-07

## 2017-07-07 ENCOUNTER — CARE COORDINATION (OUTPATIENT)
Dept: CASE MANAGEMENT | Age: 77
End: 2017-07-07

## 2017-07-11 ENCOUNTER — CARE COORDINATION (OUTPATIENT)
Dept: CASE MANAGEMENT | Age: 77
End: 2017-07-11

## 2017-07-11 ENCOUNTER — HOSPITAL ENCOUNTER (OUTPATIENT)
Age: 77
Setting detail: OBSERVATION
LOS: 1 days | Discharge: NURSING, REHAB OR LONG-TERM CARE | End: 2017-07-14
Attending: FAMILY MEDICINE | Admitting: FAMILY MEDICINE
Payer: MEDICARE

## 2017-07-11 ENCOUNTER — HOSPITAL ENCOUNTER (OUTPATIENT)
Dept: CT IMAGING | Age: 77
Discharge: HOME | End: 2017-07-11
Payer: MEDICARE

## 2017-07-11 DIAGNOSIS — I48.91 ATRIAL FIBRILLATION WITH RAPID VENTRICULAR RESPONSE (HCC): Primary | ICD-10-CM

## 2017-07-11 DIAGNOSIS — Z91.81 STATUS POST FALL: ICD-10-CM

## 2017-07-11 DIAGNOSIS — Z79.01 LONG TERM CURRENT USE OF ANTICOAGULANT THERAPY: ICD-10-CM

## 2017-07-11 DIAGNOSIS — S09.90XS HEAD INJURY, SEQUELA: ICD-10-CM

## 2017-07-11 DIAGNOSIS — R79.1 SUPRATHERAPEUTIC INR: ICD-10-CM

## 2017-07-11 PROBLEM — S06.9XAA CLOSED TBI (TRAUMATIC BRAIN INJURY) (HCC): Status: ACTIVE | Noted: 2017-07-11

## 2017-07-11 PROCEDURE — 2580000003 HC RX 258: Performed by: FAMILY MEDICINE

## 2017-07-11 PROCEDURE — 93005 ELECTROCARDIOGRAM TRACING: CPT

## 2017-07-11 PROCEDURE — G0378 HOSPITAL OBSERVATION PER HR: HCPCS

## 2017-07-11 PROCEDURE — 94664 DEMO&/EVAL PT USE INHALER: CPT

## 2017-07-11 PROCEDURE — 6370000000 HC RX 637 (ALT 250 FOR IP): Performed by: FAMILY MEDICINE

## 2017-07-11 PROCEDURE — 70450 CT HEAD/BRAIN W/O DYE: CPT

## 2017-07-11 RX ORDER — AMIODARONE HYDROCHLORIDE 200 MG/1
200 TABLET ORAL 2 TIMES DAILY
Status: DISCONTINUED | OUTPATIENT
Start: 2017-07-11 | End: 2017-07-12

## 2017-07-11 RX ORDER — SULFAMETHOXAZOLE AND TRIMETHOPRIM 400; 80 MG/1; MG/1
1 TABLET ORAL
Status: DISCONTINUED | OUTPATIENT
Start: 2017-07-12 | End: 2017-07-13

## 2017-07-11 RX ORDER — SODIUM CHLORIDE 0.9 % (FLUSH) 0.9 %
10 SYRINGE (ML) INJECTION EVERY 12 HOURS SCHEDULED
Status: DISCONTINUED | OUTPATIENT
Start: 2017-07-11 | End: 2017-07-14 | Stop reason: HOSPADM

## 2017-07-11 RX ORDER — ASPIRIN 81 MG/1
81 TABLET, CHEWABLE ORAL DAILY
Status: DISCONTINUED | OUTPATIENT
Start: 2017-07-11 | End: 2017-07-12

## 2017-07-11 RX ORDER — FUROSEMIDE 20 MG/1
20 TABLET ORAL DAILY
Status: DISCONTINUED | OUTPATIENT
Start: 2017-07-11 | End: 2017-07-12

## 2017-07-11 RX ORDER — PHYTONADIONE 5 MG/1
5 TABLET ORAL ONCE
Status: COMPLETED | OUTPATIENT
Start: 2017-07-11 | End: 2017-07-11

## 2017-07-11 RX ORDER — ACETAMINOPHEN 325 MG/1
650 TABLET ORAL EVERY 4 HOURS PRN
Status: DISCONTINUED | OUTPATIENT
Start: 2017-07-11 | End: 2017-07-14 | Stop reason: HOSPADM

## 2017-07-11 RX ORDER — CITALOPRAM 20 MG/1
20 TABLET ORAL DAILY
Status: DISCONTINUED | OUTPATIENT
Start: 2017-07-11 | End: 2017-07-12

## 2017-07-11 RX ORDER — ISOSORBIDE MONONITRATE 30 MG/1
30 TABLET, EXTENDED RELEASE ORAL DAILY
Status: DISCONTINUED | OUTPATIENT
Start: 2017-07-11 | End: 2017-07-12

## 2017-07-11 RX ORDER — FAMOTIDINE 20 MG/1
20 TABLET, FILM COATED ORAL 2 TIMES DAILY
Status: DISCONTINUED | OUTPATIENT
Start: 2017-07-11 | End: 2017-07-14 | Stop reason: HOSPADM

## 2017-07-11 RX ORDER — LANOLIN ALCOHOL/MO/W.PET/CERES
3 CREAM (GRAM) TOPICAL NIGHTLY
Status: DISCONTINUED | OUTPATIENT
Start: 2017-07-11 | End: 2017-07-12 | Stop reason: CLARIF

## 2017-07-11 RX ORDER — ATORVASTATIN CALCIUM 40 MG/1
40 TABLET, FILM COATED ORAL NIGHTLY
Status: DISCONTINUED | OUTPATIENT
Start: 2017-07-11 | End: 2017-07-12

## 2017-07-11 RX ORDER — ONDANSETRON 2 MG/ML
4 INJECTION INTRAMUSCULAR; INTRAVENOUS EVERY 6 HOURS PRN
Status: DISCONTINUED | OUTPATIENT
Start: 2017-07-11 | End: 2017-07-14 | Stop reason: HOSPADM

## 2017-07-11 RX ORDER — SODIUM CHLORIDE 0.9 % (FLUSH) 0.9 %
10 SYRINGE (ML) INJECTION PRN
Status: DISCONTINUED | OUTPATIENT
Start: 2017-07-11 | End: 2017-07-14 | Stop reason: HOSPADM

## 2017-07-11 RX ORDER — RANOLAZINE 500 MG/1
500 TABLET, EXTENDED RELEASE ORAL 2 TIMES DAILY
Status: DISCONTINUED | OUTPATIENT
Start: 2017-07-11 | End: 2017-07-14 | Stop reason: HOSPADM

## 2017-07-11 RX ADMIN — METOPROLOL TARTRATE 25 MG: 25 TABLET, FILM COATED ORAL at 21:02

## 2017-07-11 RX ADMIN — ATORVASTATIN CALCIUM 40 MG: 40 TABLET, FILM COATED ORAL at 21:02

## 2017-07-11 RX ADMIN — RANOLAZINE 500 MG: 500 TABLET, EXTENDED RELEASE ORAL at 21:05

## 2017-07-11 RX ADMIN — METFORMIN HYDROCHLORIDE 500 MG: 500 TABLET, FILM COATED ORAL at 21:02

## 2017-07-11 RX ADMIN — Medication 10 ML: at 21:05

## 2017-07-11 RX ADMIN — MOMETASONE FUROATE AND FORMOTEROL FUMARATE DIHYDRATE 2 PUFF: 200; 5 AEROSOL RESPIRATORY (INHALATION) at 22:18

## 2017-07-11 RX ADMIN — PHYTONADIONE 5 MG: 5 TABLET ORAL at 20:07

## 2017-07-11 RX ADMIN — AMIODARONE HYDROCHLORIDE 200 MG: 200 TABLET ORAL at 21:00

## 2017-07-11 ASSESSMENT — PAIN SCALES - GENERAL: PAINLEVEL_OUTOF10: 0

## 2017-07-12 ENCOUNTER — APPOINTMENT (OUTPATIENT)
Dept: MRI IMAGING | Age: 77
End: 2017-07-12
Attending: FAMILY MEDICINE
Payer: MEDICARE

## 2017-07-12 PROBLEM — E11.65 HYPERGLYCEMIA DUE TO TYPE 2 DIABETES MELLITUS (HCC): Status: RESOLVED | Noted: 2017-06-29 | Resolved: 2017-07-12

## 2017-07-12 LAB
ABSOLUTE EOS #: 0 K/UL (ref 0–0.4)
ABSOLUTE LYMPH #: 4.38 K/UL (ref 1–4.8)
ABSOLUTE MONO #: 0.6 K/UL (ref 0–1)
ALBUMIN SERPL-MCNC: 3.8 G/DL (ref 3.5–5.2)
ALBUMIN/GLOBULIN RATIO: 1.7 (ref 1–2.5)
ALP BLD-CCNC: 53 U/L (ref 35–104)
ALT SERPL-CCNC: 30 U/L (ref 5–33)
ANION GAP SERPL CALCULATED.3IONS-SCNC: 17 MMOL/L (ref 9–17)
AST SERPL-CCNC: 20 U/L
BASOPHILS # BLD: 0 %
BASOPHILS ABSOLUTE: 0 K/UL (ref 0–0.2)
BILIRUB SERPL-MCNC: 0.69 MG/DL (ref 0.3–1.2)
BUN BLDV-MCNC: 29 MG/DL (ref 8–23)
BUN/CREAT BLD: 34 (ref 9–20)
CALCIUM SERPL-MCNC: 9.2 MG/DL (ref 8.6–10.4)
CHLORIDE BLD-SCNC: 93 MMOL/L (ref 98–107)
CO2: 25 MMOL/L (ref 20–31)
CREAT SERPL-MCNC: 0.86 MG/DL (ref 0.5–0.9)
DIFFERENTIAL TYPE: ABNORMAL
EOSINOPHILS RELATIVE PERCENT: 0 %
GFR AFRICAN AMERICAN: >60 ML/MIN
GFR NON-AFRICAN AMERICAN: >60 ML/MIN
GFR SERPL CREATININE-BSD FRML MDRD: ABNORMAL ML/MIN/{1.73_M2}
GFR SERPL CREATININE-BSD FRML MDRD: ABNORMAL ML/MIN/{1.73_M2}
GLUCOSE BLD-MCNC: 91 MG/DL (ref 70–99)
HCT VFR BLD CALC: 40.3 % (ref 36–46)
HEMOGLOBIN: 13.3 G/DL (ref 12–16)
INR BLD: 3.2 (ref 0.9–1.2)
LYMPHOCYTES # BLD: 29 %
MCH RBC QN AUTO: 29.9 PG (ref 26–34)
MCHC RBC AUTO-ENTMCNC: 33 G/DL (ref 31–37)
MCV RBC AUTO: 90.4 FL (ref 80–100)
MONOCYTES # BLD: 4 %
MORPHOLOGY: NORMAL
PDW BLD-RTO: 17.9 % (ref 12.1–15.2)
PLATELET # BLD: 303 K/UL (ref 140–450)
PLATELET ESTIMATE: ABNORMAL
PMV BLD AUTO: 8 FL (ref 6–12)
POTASSIUM SERPL-SCNC: 4.4 MMOL/L (ref 3.7–5.3)
PROTHROMBIN TIME: 36 SEC (ref 9.7–12.2)
RBC # BLD: 4.46 M/UL (ref 4–5.2)
RBC # BLD: ABNORMAL 10*6/UL
SEG NEUTROPHILS: 67 %
SEGMENTED NEUTROPHILS ABSOLUTE COUNT: 10.12 K/UL (ref 1.8–7.7)
SODIUM BLD-SCNC: 135 MMOL/L (ref 135–144)
TOTAL PROTEIN: 6.1 G/DL (ref 6.4–8.3)
WBC # BLD: 15.1 K/UL (ref 3.5–11)
WBC # BLD: ABNORMAL 10*3/UL

## 2017-07-12 PROCEDURE — 97162 PT EVAL MOD COMPLEX 30 MIN: CPT

## 2017-07-12 PROCEDURE — G8988 SELF CARE GOAL STATUS: HCPCS

## 2017-07-12 PROCEDURE — G8978 MOBILITY CURRENT STATUS: HCPCS

## 2017-07-12 PROCEDURE — 6370000000 HC RX 637 (ALT 250 FOR IP): Performed by: FAMILY MEDICINE

## 2017-07-12 PROCEDURE — 97110 THERAPEUTIC EXERCISES: CPT

## 2017-07-12 PROCEDURE — 80053 COMPREHEN METABOLIC PANEL: CPT

## 2017-07-12 PROCEDURE — G8987 SELF CARE CURRENT STATUS: HCPCS

## 2017-07-12 PROCEDURE — 36415 COLL VENOUS BLD VENIPUNCTURE: CPT

## 2017-07-12 PROCEDURE — G8979 MOBILITY GOAL STATUS: HCPCS

## 2017-07-12 PROCEDURE — 85610 PROTHROMBIN TIME: CPT

## 2017-07-12 PROCEDURE — 97116 GAIT TRAINING THERAPY: CPT

## 2017-07-12 PROCEDURE — 70551 MRI BRAIN STEM W/O DYE: CPT

## 2017-07-12 PROCEDURE — 2580000003 HC RX 258: Performed by: FAMILY MEDICINE

## 2017-07-12 PROCEDURE — G0378 HOSPITAL OBSERVATION PER HR: HCPCS

## 2017-07-12 PROCEDURE — 97166 OT EVAL MOD COMPLEX 45 MIN: CPT

## 2017-07-12 PROCEDURE — 85025 COMPLETE CBC W/AUTO DIFF WBC: CPT

## 2017-07-12 RX ORDER — ISOSORBIDE MONONITRATE 30 MG/1
30 TABLET, EXTENDED RELEASE ORAL DAILY
Status: DISCONTINUED | OUTPATIENT
Start: 2017-07-12 | End: 2017-07-14 | Stop reason: HOSPADM

## 2017-07-12 RX ORDER — FUROSEMIDE 20 MG/1
20 TABLET ORAL DAILY
Status: DISCONTINUED | OUTPATIENT
Start: 2017-07-12 | End: 2017-07-14 | Stop reason: HOSPADM

## 2017-07-12 RX ORDER — PREDNISONE 20 MG/1
40 TABLET ORAL DAILY
Status: DISCONTINUED | OUTPATIENT
Start: 2017-07-12 | End: 2017-07-14 | Stop reason: HOSPADM

## 2017-07-12 RX ORDER — ASPIRIN 81 MG/1
81 TABLET ORAL DAILY
Status: DISCONTINUED | OUTPATIENT
Start: 2017-07-12 | End: 2017-07-14 | Stop reason: HOSPADM

## 2017-07-12 RX ORDER — PREDNISONE 10 MG/1
10 TABLET ORAL DAILY
Status: DISCONTINUED | OUTPATIENT
Start: 2017-07-12 | End: 2017-07-14 | Stop reason: HOSPADM

## 2017-07-12 RX ORDER — ATORVASTATIN CALCIUM 80 MG/1
40 TABLET, FILM COATED ORAL NIGHTLY
Status: DISCONTINUED | OUTPATIENT
Start: 2017-07-12 | End: 2017-07-14 | Stop reason: HOSPADM

## 2017-07-12 RX ORDER — CITALOPRAM 20 MG/1
20 TABLET ORAL DAILY
Status: DISCONTINUED | OUTPATIENT
Start: 2017-07-12 | End: 2017-07-14 | Stop reason: HOSPADM

## 2017-07-12 RX ORDER — AMIODARONE HYDROCHLORIDE 200 MG/1
200 TABLET ORAL 2 TIMES DAILY
Status: DISCONTINUED | OUTPATIENT
Start: 2017-07-12 | End: 2017-07-14 | Stop reason: HOSPADM

## 2017-07-12 RX ADMIN — PREDNISONE 40 MG: 20 TABLET ORAL at 08:27

## 2017-07-12 RX ADMIN — Medication 500 MG: at 18:07

## 2017-07-12 RX ADMIN — Medication 10 ML: at 08:27

## 2017-07-12 RX ADMIN — ACETAMINOPHEN 650 MG: 325 TABLET, FILM COATED ORAL at 14:51

## 2017-07-12 RX ADMIN — Medication 50 MG: at 08:27

## 2017-07-12 RX ADMIN — ISOSORBIDE MONONITRATE 30 MG: 30 TABLET, EXTENDED RELEASE ORAL at 08:27

## 2017-07-12 RX ADMIN — RANOLAZINE 500 MG: 500 TABLET, EXTENDED RELEASE ORAL at 08:27

## 2017-07-12 RX ADMIN — AMIODARONE HYDROCHLORIDE 200 MG: 200 TABLET ORAL at 08:26

## 2017-07-12 RX ADMIN — CITALOPRAM 20 MG: 20 TABLET ORAL at 08:26

## 2017-07-12 RX ADMIN — FAMOTIDINE 20 MG: 20 TABLET ORAL at 08:26

## 2017-07-12 RX ADMIN — Medication 10 ML: at 23:00

## 2017-07-12 RX ADMIN — Medication 500 MG: at 08:27

## 2017-07-12 RX ADMIN — PREDNISONE 10 MG: 10 TABLET ORAL at 08:29

## 2017-07-12 RX ADMIN — RANOLAZINE 500 MG: 500 TABLET, EXTENDED RELEASE ORAL at 22:30

## 2017-07-12 RX ADMIN — ASPIRIN 81 MG: 81 TABLET ORAL at 08:26

## 2017-07-12 RX ADMIN — SULFAMETHOXAZOLE AND TRIMETHOPRIM 1 TABLET: 400; 80 TABLET ORAL at 08:27

## 2017-07-12 RX ADMIN — ATORVASTATIN CALCIUM 40 MG: 80 TABLET, FILM COATED ORAL at 22:31

## 2017-07-12 RX ADMIN — Medication 25 MG: at 22:31

## 2017-07-12 RX ADMIN — AMIODARONE HYDROCHLORIDE 200 MG: 200 TABLET ORAL at 22:30

## 2017-07-12 RX ADMIN — FUROSEMIDE 20 MG: 20 TABLET ORAL at 08:27

## 2017-07-12 ASSESSMENT — PAIN SCALES - GENERAL
PAINLEVEL_OUTOF10: 2
PAINLEVEL_OUTOF10: 0

## 2017-07-12 ASSESSMENT — PAIN DESCRIPTION - LOCATION: LOCATION: COCCYX

## 2017-07-12 ASSESSMENT — PAIN DESCRIPTION - PAIN TYPE: TYPE: ACUTE PAIN

## 2017-07-13 LAB
EKG ATRIAL RATE: 110 BPM
EKG Q-T INTERVAL: 336 MS
EKG QRS DURATION: 150 MS
EKG QTC CALCULATION (BAZETT): 446 MS
EKG R AXIS: -28 DEGREES
EKG T AXIS: 135 DEGREES
EKG VENTRICULAR RATE: 106 BPM
INR BLD: 1.3 (ref 0.9–1.2)
PROTHROMBIN TIME: 13.4 SEC (ref 9.7–12.2)

## 2017-07-13 PROCEDURE — 2580000003 HC RX 258: Performed by: FAMILY MEDICINE

## 2017-07-13 PROCEDURE — G0378 HOSPITAL OBSERVATION PER HR: HCPCS

## 2017-07-13 PROCEDURE — 36415 COLL VENOUS BLD VENIPUNCTURE: CPT

## 2017-07-13 PROCEDURE — 97110 THERAPEUTIC EXERCISES: CPT

## 2017-07-13 PROCEDURE — 94640 AIRWAY INHALATION TREATMENT: CPT

## 2017-07-13 PROCEDURE — 6370000000 HC RX 637 (ALT 250 FOR IP): Performed by: FAMILY MEDICINE

## 2017-07-13 PROCEDURE — 6370000000 HC RX 637 (ALT 250 FOR IP): Performed by: PHYSICIAN ASSISTANT

## 2017-07-13 PROCEDURE — 85610 PROTHROMBIN TIME: CPT

## 2017-07-13 PROCEDURE — 97116 GAIT TRAINING THERAPY: CPT

## 2017-07-13 PROCEDURE — 97535 SELF CARE MNGMENT TRAINING: CPT

## 2017-07-13 RX ORDER — POLYETHYLENE GLYCOL 3350 17 G/17G
17 POWDER, FOR SOLUTION ORAL DAILY
Qty: 527 G | Refills: 1 | DISCHARGE
Start: 2017-07-13 | End: 2017-08-12

## 2017-07-13 RX ORDER — POLYETHYLENE GLYCOL 3350 17 G/17G
17 POWDER, FOR SOLUTION ORAL DAILY
Status: DISCONTINUED | OUTPATIENT
Start: 2017-07-13 | End: 2017-07-14

## 2017-07-13 RX ORDER — WARFARIN SODIUM 7.5 MG/1
7.5 TABLET ORAL
Status: COMPLETED | OUTPATIENT
Start: 2017-07-13 | End: 2017-07-13

## 2017-07-13 RX ORDER — AZITHROMYCIN 250 MG/1
500 TABLET, FILM COATED ORAL DAILY
Status: COMPLETED | OUTPATIENT
Start: 2017-07-13 | End: 2017-07-13

## 2017-07-13 RX ORDER — AZITHROMYCIN 250 MG/1
250 TABLET, FILM COATED ORAL DAILY
Status: DISCONTINUED | OUTPATIENT
Start: 2017-07-14 | End: 2017-07-14 | Stop reason: HOSPADM

## 2017-07-13 RX ORDER — AZITHROMYCIN 250 MG/1
250 TABLET, FILM COATED ORAL DAILY
DISCHARGE
Start: 2017-07-14 | End: 2017-07-18

## 2017-07-13 RX ADMIN — WARFARIN SODIUM 7.5 MG: 7.5 TABLET ORAL at 17:23

## 2017-07-13 RX ADMIN — Medication 500 MG: at 08:23

## 2017-07-13 RX ADMIN — ISOSORBIDE MONONITRATE 30 MG: 30 TABLET, EXTENDED RELEASE ORAL at 08:24

## 2017-07-13 RX ADMIN — FUROSEMIDE 20 MG: 20 TABLET ORAL at 08:24

## 2017-07-13 RX ADMIN — PREDNISONE 10 MG: 10 TABLET ORAL at 08:30

## 2017-07-13 RX ADMIN — Medication 50 MG: at 08:24

## 2017-07-13 RX ADMIN — RANOLAZINE 500 MG: 500 TABLET, EXTENDED RELEASE ORAL at 20:20

## 2017-07-13 RX ADMIN — Medication 10 ML: at 20:23

## 2017-07-13 RX ADMIN — AMIODARONE HYDROCHLORIDE 200 MG: 200 TABLET ORAL at 20:21

## 2017-07-13 RX ADMIN — CITALOPRAM 20 MG: 20 TABLET ORAL at 08:24

## 2017-07-13 RX ADMIN — Medication 10 ML: at 08:31

## 2017-07-13 RX ADMIN — POLYETHYLENE GLYCOL (3350) 17 G: 17 POWDER, FOR SOLUTION ORAL at 14:06

## 2017-07-13 RX ADMIN — Medication 500 MG: at 17:22

## 2017-07-13 RX ADMIN — ASPIRIN 81 MG: 81 TABLET ORAL at 08:24

## 2017-07-13 RX ADMIN — AMIODARONE HYDROCHLORIDE 200 MG: 200 TABLET ORAL at 08:24

## 2017-07-13 RX ADMIN — ATORVASTATIN CALCIUM 40 MG: 80 TABLET, FILM COATED ORAL at 20:20

## 2017-07-13 RX ADMIN — PREDNISONE 40 MG: 20 TABLET ORAL at 08:30

## 2017-07-13 RX ADMIN — FAMOTIDINE 20 MG: 20 TABLET ORAL at 08:25

## 2017-07-13 RX ADMIN — RANOLAZINE 500 MG: 500 TABLET, EXTENDED RELEASE ORAL at 08:24

## 2017-07-13 RX ADMIN — Medication 25 MG: at 20:20

## 2017-07-13 RX ADMIN — AZITHROMYCIN 500 MG: 250 TABLET, FILM COATED ORAL at 09:18

## 2017-07-13 ASSESSMENT — PAIN DESCRIPTION - PAIN TYPE
TYPE: ACUTE PAIN
TYPE: ACUTE PAIN

## 2017-07-13 ASSESSMENT — PAIN SCALES - GENERAL
PAINLEVEL_OUTOF10: 2
PAINLEVEL_OUTOF10: 2

## 2017-07-13 ASSESSMENT — PAIN DESCRIPTION - LOCATION
LOCATION: COCCYX
LOCATION: COCCYX

## 2017-07-14 ENCOUNTER — TELEPHONE (OUTPATIENT)
Dept: PULMONOLOGY | Age: 77
End: 2017-07-14

## 2017-07-14 VITALS
OXYGEN SATURATION: 97 % | HEART RATE: 78 BPM | BODY MASS INDEX: 31.16 KG/M2 | HEIGHT: 65 IN | RESPIRATION RATE: 18 BRPM | DIASTOLIC BLOOD PRESSURE: 77 MMHG | TEMPERATURE: 97.9 F | SYSTOLIC BLOOD PRESSURE: 120 MMHG | WEIGHT: 187 LBS

## 2017-07-14 LAB
EKG ATRIAL RATE: 76 BPM
EKG Q-T INTERVAL: 402 MS
EKG QRS DURATION: 146 MS
EKG QTC CALCULATION (BAZETT): 481 MS
EKG R AXIS: -31 DEGREES
EKG T AXIS: 130 DEGREES
EKG VENTRICULAR RATE: 86 BPM
INR BLD: 1.1 (ref 0.9–1.2)
PROTHROMBIN TIME: 12 SEC (ref 9.7–12.2)

## 2017-07-14 PROCEDURE — G0378 HOSPITAL OBSERVATION PER HR: HCPCS

## 2017-07-14 PROCEDURE — 97110 THERAPEUTIC EXERCISES: CPT

## 2017-07-14 PROCEDURE — 85610 PROTHROMBIN TIME: CPT

## 2017-07-14 PROCEDURE — 97116 GAIT TRAINING THERAPY: CPT

## 2017-07-14 PROCEDURE — 6370000000 HC RX 637 (ALT 250 FOR IP): Performed by: FAMILY MEDICINE

## 2017-07-14 PROCEDURE — 93005 ELECTROCARDIOGRAM TRACING: CPT

## 2017-07-14 PROCEDURE — 2580000003 HC RX 258: Performed by: FAMILY MEDICINE

## 2017-07-14 PROCEDURE — 6370000000 HC RX 637 (ALT 250 FOR IP): Performed by: PHYSICIAN ASSISTANT

## 2017-07-14 PROCEDURE — 36415 COLL VENOUS BLD VENIPUNCTURE: CPT

## 2017-07-14 RX ORDER — POLYETHYLENE GLYCOL 3350 17 G/17G
17 POWDER, FOR SOLUTION ORAL 2 TIMES DAILY
Status: DISCONTINUED | OUTPATIENT
Start: 2017-07-14 | End: 2017-07-14 | Stop reason: HOSPADM

## 2017-07-14 RX ORDER — WARFARIN SODIUM 5 MG/1
10 TABLET ORAL
Status: DISCONTINUED | OUTPATIENT
Start: 2017-07-14 | End: 2017-07-14 | Stop reason: HOSPADM

## 2017-07-14 RX ADMIN — PREDNISONE 10 MG: 10 TABLET ORAL at 08:10

## 2017-07-14 RX ADMIN — RANOLAZINE 500 MG: 500 TABLET, EXTENDED RELEASE ORAL at 08:07

## 2017-07-14 RX ADMIN — ISOSORBIDE MONONITRATE 30 MG: 30 TABLET, EXTENDED RELEASE ORAL at 08:08

## 2017-07-14 RX ADMIN — Medication 500 MG: at 08:11

## 2017-07-14 RX ADMIN — CITALOPRAM 20 MG: 20 TABLET ORAL at 08:13

## 2017-07-14 RX ADMIN — FAMOTIDINE 20 MG: 20 TABLET ORAL at 08:22

## 2017-07-14 RX ADMIN — FUROSEMIDE 20 MG: 20 TABLET ORAL at 08:12

## 2017-07-14 RX ADMIN — Medication 50 MG: at 08:12

## 2017-07-14 RX ADMIN — Medication 10 ML: at 08:07

## 2017-07-14 RX ADMIN — POLYETHYLENE GLYCOL 3350 17 G: 17 POWDER, FOR SOLUTION ORAL at 08:22

## 2017-07-14 RX ADMIN — PREDNISONE 40 MG: 20 TABLET ORAL at 08:06

## 2017-07-14 RX ADMIN — AMIODARONE HYDROCHLORIDE 200 MG: 200 TABLET ORAL at 08:14

## 2017-07-14 RX ADMIN — AZITHROMYCIN 250 MG: 250 TABLET, FILM COATED ORAL at 08:22

## 2017-07-14 RX ADMIN — ASPIRIN 81 MG: 81 TABLET ORAL at 08:13

## 2017-07-14 RX ADMIN — GLYCERIN 2.1 G: 2.1 SUPPOSITORY RECTAL at 11:20

## 2017-07-14 ASSESSMENT — PAIN DESCRIPTION - LOCATION
LOCATION: COCCYX
LOCATION: COCCYX

## 2017-07-14 ASSESSMENT — PAIN SCALES - GENERAL
PAINLEVEL_OUTOF10: 0
PAINLEVEL_OUTOF10: 1
PAINLEVEL_OUTOF10: 0
PAINLEVEL_OUTOF10: 1

## 2017-07-14 ASSESSMENT — PAIN DESCRIPTION - DESCRIPTORS
DESCRIPTORS: ACHING
DESCRIPTORS: ACHING

## 2017-07-14 ASSESSMENT — PAIN DESCRIPTION - ONSET: ONSET: ON-GOING

## 2017-07-14 ASSESSMENT — PAIN DESCRIPTION - PAIN TYPE
TYPE: ACUTE PAIN
TYPE: ACUTE PAIN

## 2017-07-14 ASSESSMENT — PAIN DESCRIPTION - FREQUENCY
FREQUENCY: INTERMITTENT
FREQUENCY: INTERMITTENT

## 2017-07-14 ASSESSMENT — PAIN DESCRIPTION - PROGRESSION
CLINICAL_PROGRESSION: NOT CHANGED
CLINICAL_PROGRESSION: NOT CHANGED

## 2017-07-14 ASSESSMENT — PAIN DESCRIPTION - ORIENTATION
ORIENTATION: POSTERIOR
ORIENTATION: POSTERIOR

## 2017-07-19 ENCOUNTER — OFFICE VISIT (OUTPATIENT)
Dept: CARDIOLOGY | Age: 77
End: 2017-07-19
Payer: MEDICARE

## 2017-07-19 ENCOUNTER — TELEPHONE (OUTPATIENT)
Dept: CARDIOLOGY CLINIC | Age: 77
End: 2017-07-19

## 2017-07-19 VITALS
SYSTOLIC BLOOD PRESSURE: 112 MMHG | HEIGHT: 66 IN | DIASTOLIC BLOOD PRESSURE: 68 MMHG | BODY MASS INDEX: 30.73 KG/M2 | HEART RATE: 76 BPM | WEIGHT: 191.2 LBS | OXYGEN SATURATION: 98 %

## 2017-07-19 DIAGNOSIS — I25.10 ASHD (ARTERIOSCLEROTIC HEART DISEASE): ICD-10-CM

## 2017-07-19 DIAGNOSIS — I50.32 CHRONIC DIASTOLIC CHF (CONGESTIVE HEART FAILURE), NYHA CLASS 3 (HCC): ICD-10-CM

## 2017-07-19 DIAGNOSIS — I48.20 CHRONIC ATRIAL FIBRILLATION (HCC): ICD-10-CM

## 2017-07-19 DIAGNOSIS — E78.5 DYSLIPIDEMIA: ICD-10-CM

## 2017-07-19 DIAGNOSIS — I49.5 TACHYCARDIA-BRADYCARDIA SYNDROME (HCC): Primary | ICD-10-CM

## 2017-07-19 DIAGNOSIS — I10 ESSENTIAL HYPERTENSION: ICD-10-CM

## 2017-07-19 DIAGNOSIS — R29.6 RECURRENT FALLS: ICD-10-CM

## 2017-07-19 PROCEDURE — 99214 OFFICE O/P EST MOD 30 MIN: CPT | Performed by: INTERNAL MEDICINE

## 2017-07-20 ENCOUNTER — OFFICE VISIT (OUTPATIENT)
Dept: CARDIOLOGY CLINIC | Age: 77
End: 2017-07-20
Payer: MEDICARE

## 2017-07-20 VITALS
BODY MASS INDEX: 30.7 KG/M2 | HEIGHT: 66 IN | DIASTOLIC BLOOD PRESSURE: 58 MMHG | WEIGHT: 191 LBS | SYSTOLIC BLOOD PRESSURE: 84 MMHG | HEART RATE: 82 BPM

## 2017-07-20 DIAGNOSIS — I25.10 CORONARY ARTERY DISEASE INVOLVING NATIVE CORONARY ARTERY OF NATIVE HEART, ANGINA PRESENCE UNSPECIFIED: Chronic | ICD-10-CM

## 2017-07-20 DIAGNOSIS — I27.20 PULMONARY HTN (HCC): Primary | Chronic | ICD-10-CM

## 2017-07-20 DIAGNOSIS — I48.91 ATRIAL FIBRILLATION WITH RAPID VENTRICULAR RESPONSE (HCC): ICD-10-CM

## 2017-07-20 DIAGNOSIS — I34.0 NON-RHEUMATIC MITRAL REGURGITATION: Chronic | ICD-10-CM

## 2017-07-20 DIAGNOSIS — I25.5 ISCHEMIC CARDIOMYOPATHY: Chronic | ICD-10-CM

## 2017-07-20 DIAGNOSIS — Z79.01 LONG TERM CURRENT USE OF ANTICOAGULANT THERAPY: ICD-10-CM

## 2017-07-20 PROCEDURE — 99205 OFFICE O/P NEW HI 60 MIN: CPT | Performed by: INTERNAL MEDICINE

## 2017-07-21 ASSESSMENT — ENCOUNTER SYMPTOMS
VOMITING: 0
RIGHT EYE: 0
ABDOMINAL PAIN: 0
DOUBLE VISION: 0
BLURRED VISION: 0
CONSTIPATION: 0
SHORTNESS OF BREATH: 0
NAUSEA: 0
LEFT EYE: 0
BACK PAIN: 0
DIARRHEA: 0
SORE THROAT: 0
COUGH: 0
WHEEZING: 0

## 2017-07-24 ENCOUNTER — HOSPITAL ENCOUNTER (OUTPATIENT)
Dept: NON INVASIVE DIAGNOSTICS | Age: 77
Discharge: HOME | End: 2017-07-24
Payer: MEDICARE

## 2017-07-24 LAB
CULTURE: NORMAL
CULTURE: NORMAL
DIRECT EXAM: NORMAL
Lab: NORMAL
SPECIMEN DESCRIPTION: NORMAL
STATUS: NORMAL

## 2017-07-24 PROCEDURE — 93225 XTRNL ECG REC<48 HRS REC: CPT

## 2017-07-24 NOTE — DISCHARGE SUMMARY
[address] [place] [address]     Department of Internal Medicine - Staff Internal Medicine Service    INPATIENT DISCHARGE SUMMARY      PATIENT IDENTIFICATION:  NAME:  Kristal Lacey   :   1940  MRN:    [id number]     Acct:    [de-identified]   Admit Date:  2017  Discharge date:  2017  8:33 PM   Attending Provider: No att. providers found                                       DIAGNOSES:  Primary:   Atrial fibrillation with RVR ([facility] [address] [I48.91]    Secondary: Active Hospital Problems    Diagnosis Date Noted    ILD (interstitial lung disease) ([address] [J84.9]     Urinary tract infection [N39.0] 2017    Anemia of chronic disease [D63.8] 2017    Acute on chronic systolic congestive heart failure (HCC) [I50.23] 2017    Supratherapeutic INR [R79.1] 2017    Acute respiratory failure with hypoxemia (HCC) [J96.01]     Coronary artery disease involving native coronary artery [I25.10] 2017    Ischemic cardiomyopathy [I25.5] 2017    Type 2 diabetes mellitus without complication ([facility] [address] [X89.7] 2017    Upper respiratory tract infection [J06.9] 2017    Atrial fibrillation with rapid ventricular response ([facility] [address] [I48.91] 2015    Obstructive sleep apnea [G47.33] 2015    Long term current use of anticoagulant therapy [Z79.01] 2015       TREATMENT:  Brief Inpatient Course: The patient is a 67 y.o. female with past medical history significant for chronic atrial fibrillation on coumadin and metoprolol, pulmonary fibrosis on chronic prednisone therapy, DM, HTN, HLD, CAD, and bradycardic/tachycardic episodes who was initially admitted on 2017 as a transfer from [facility] with Atrial fibrillation with RVR ([facility] [address] [I48.91] and presented with cardiogenic shock secondary to the afib with RVR, pulmonary edema, and acute hypoxic respiratory failure. Patient had recently been hospitalized for 12 days in early , for HCAP pneumonia.  Pulmonology was consulted and worked patient up for HCAP. Patient underwent bronchoscopy and lung biopsy during that admission diagnosing pulmonary fibrosis, and she was started on prednisone therapy and dulera. Patient became acutely short of breath and was evaluated at [facility] on 6/29. She was sent to [facility] for higher level of care.     In the ICU, patient was admitted directly to the ICU. She was requiring levophed support on presentation due to cardiogenic shock, and was on esmolol drip from outlying facility. Due to shock, external cardioversion was attempted x 2 but was unsuccessful. Cardiology was consulted and recommended Digoxin IV pushes, discontinued esmolol drip, and lasix. Overnight, her heart rate improved into the 80s and levophed was able to be weaned. Rocephin was started for UTI.  Due to her hemodynamic status improvement, patient was ready for transfer to the floor on 6/30/17.     Pt qualified for home oxygen , started on amiodarone by cardiology   Pt was discharged to SNF in stable condition     Consults:   cardiology          PATIENT'S DISCHARGE CONDITION:    Stable     Discharge Medication List as of 7/6/2017  5:51 PM      START taking these medications    Details   amiodarone (CORDARONE) 200 MG tablet Take 1 tablet by mouth 2 times daily, Disp-30 tablet, R-3Normal      metFORMIN (GLUCOPHAGE) 500 MG tablet Take 1 tablet by mouth 2 times daily (with meals), Disp-60 tablet, R-3Normal      furosemide (LASIX) 20 MG tablet Take 1 tablet by mouth daily, Disp-30 tablet, R-0Normal      sulfamethoxazole-trimethoprim (BACTRIM;SEPTRA) 400-80 MG per tablet Take 1 tablet by mouth three times a week, Disp-12 tablet, R-5Normal      Glucose Blood (BLOOD GLUCOSE TEST STRIPS) STRP Test 4 times daily Diagnosis, Disp-100 strip, R-11Print      dextromethorphan (DELSYM) 30 MG/5ML extended release liquid Take 5 mLs by mouth 2 times daily as needed for Cough, Disp-1 Bottle, R-1Print         CONTINUE these counseling and review of medications and discharge plan      Norma Patel MD  PGY-3 Internal Medicine Resident   [address]

## 2017-07-28 ENCOUNTER — TELEPHONE (OUTPATIENT)
Dept: CARDIOLOGY CLINIC | Age: 77
End: 2017-07-28

## 2017-08-03 ENCOUNTER — HOSPITAL ENCOUNTER (OUTPATIENT)
Age: 77
Discharge: HOME | End: 2017-08-03
Payer: MEDICARE

## 2017-08-03 ENCOUNTER — OFFICE VISIT (OUTPATIENT)
Dept: PULMONOLOGY | Age: 77
End: 2017-08-03
Payer: MEDICARE

## 2017-08-03 ENCOUNTER — HOSPITAL ENCOUNTER (OUTPATIENT)
Dept: GENERAL RADIOLOGY | Age: 77
Discharge: HOME | End: 2017-08-03
Payer: MEDICARE

## 2017-08-03 VITALS
TEMPERATURE: 98.3 F | HEART RATE: 50 BPM | BODY MASS INDEX: 31.5 KG/M2 | RESPIRATION RATE: 16 BRPM | SYSTOLIC BLOOD PRESSURE: 113 MMHG | OXYGEN SATURATION: 99 % | DIASTOLIC BLOOD PRESSURE: 59 MMHG | HEIGHT: 66 IN | WEIGHT: 196 LBS

## 2017-08-03 DIAGNOSIS — J84.10 PULMONARY FIBROSIS (HCC): ICD-10-CM

## 2017-08-03 DIAGNOSIS — R06.09 DYSPNEA ON EXERTION: ICD-10-CM

## 2017-08-03 DIAGNOSIS — J67.9 HYPERSENSITIVITY PNEUMONITIS (HCC): ICD-10-CM

## 2017-08-03 DIAGNOSIS — J96.11 CHRONIC RESPIRATORY FAILURE WITH HYPOXIA (HCC): ICD-10-CM

## 2017-08-03 DIAGNOSIS — J67.9 HYPERSENSITIVITY PNEUMONITIS (HCC): Primary | ICD-10-CM

## 2017-08-03 PROCEDURE — 86331 IMMUNODIFFUSION OUCHTERLONY: CPT

## 2017-08-03 PROCEDURE — 86005 ALLG SPEC IGE MULTIALLG SCR: CPT

## 2017-08-03 PROCEDURE — 86003 ALLG SPEC IGE CRUDE XTRC EA: CPT

## 2017-08-03 PROCEDURE — 71020 XR CHEST STANDARD TWO VW: CPT

## 2017-08-03 PROCEDURE — 86606 ASPERGILLUS ANTIBODY: CPT

## 2017-08-03 PROCEDURE — 36415 COLL VENOUS BLD VENIPUNCTURE: CPT

## 2017-08-03 PROCEDURE — 99215 OFFICE O/P EST HI 40 MIN: CPT | Performed by: INTERNAL MEDICINE

## 2017-08-03 RX ORDER — SULFAMETHOXAZOLE AND TRIMETHOPRIM 800; 160 MG/1; MG/1
TABLET ORAL
Qty: 30 TABLET | Refills: 5 | Status: SHIPPED | OUTPATIENT
Start: 2017-08-03 | End: 2018-02-07 | Stop reason: ALTCHOICE

## 2017-08-04 ENCOUNTER — HOSPITAL ENCOUNTER (OUTPATIENT)
Age: 77
Setting detail: SPECIMEN
Discharge: HOME | End: 2017-08-04
Payer: MEDICARE

## 2017-08-04 ENCOUNTER — TELEPHONE (OUTPATIENT)
Dept: CARDIOLOGY CLINIC | Age: 77
End: 2017-08-04

## 2017-08-04 LAB
ABSOLUTE EOS #: 0.1 K/UL (ref 0–0.4)
ABSOLUTE LYMPH #: 3.1 K/UL (ref 1–4.8)
ABSOLUTE MONO #: 0.29 K/UL (ref 0–1)
ANION GAP SERPL CALCULATED.3IONS-SCNC: 14 MMOL/L (ref 9–17)
BASOPHILS # BLD: 0 %
BASOPHILS ABSOLUTE: 0 K/UL (ref 0–0.2)
BUN BLDV-MCNC: 17 MG/DL (ref 8–23)
BUN/CREAT BLD: 21 (ref 9–20)
CALCIUM SERPL-MCNC: 9.4 MG/DL (ref 8.6–10.4)
CHLORIDE BLD-SCNC: 95 MMOL/L (ref 98–107)
CO2: 27 MMOL/L (ref 20–31)
CREAT SERPL-MCNC: 0.81 MG/DL (ref 0.5–0.9)
DIFFERENTIAL TYPE: ABNORMAL
EOSINOPHILS RELATIVE PERCENT: 1 %
GFR AFRICAN AMERICAN: >60 ML/MIN
GFR NON-AFRICAN AMERICAN: >60 ML/MIN
GFR SERPL CREATININE-BSD FRML MDRD: ABNORMAL ML/MIN/{1.73_M2}
GFR SERPL CREATININE-BSD FRML MDRD: ABNORMAL ML/MIN/{1.73_M2}
GLUCOSE BLD-MCNC: 107 MG/DL (ref 70–99)
HCT VFR BLD CALC: 37.6 % (ref 36–46)
HEMOGLOBIN: 12.4 G/DL (ref 12–16)
LYMPHOCYTES # BLD: 32 %
MCH RBC QN AUTO: 30.6 PG (ref 26–34)
MCHC RBC AUTO-ENTMCNC: 32.9 G/DL (ref 31–37)
MCV RBC AUTO: 93.1 FL (ref 80–100)
MONOCYTES # BLD: 3 %
MORPHOLOGY: ABNORMAL
PDW BLD-RTO: 21.8 % (ref 12.1–15.2)
PLATELET # BLD: 192 K/UL (ref 140–450)
PLATELET ESTIMATE: ABNORMAL
PMV BLD AUTO: 8.5 FL (ref 6–12)
POTASSIUM SERPL-SCNC: 4.3 MMOL/L (ref 3.7–5.3)
RBC # BLD: 4.04 M/UL (ref 4–5.2)
RBC # BLD: ABNORMAL 10*6/UL
SEG NEUTROPHILS: 64 %
SEGMENTED NEUTROPHILS ABSOLUTE COUNT: 6.21 K/UL (ref 1.8–7.7)
SODIUM BLD-SCNC: 136 MMOL/L (ref 135–144)
WBC # BLD: 9.7 K/UL (ref 3.5–11)
WBC # BLD: ABNORMAL 10*3/UL

## 2017-08-04 PROCEDURE — 85025 COMPLETE CBC W/AUTO DIFF WBC: CPT

## 2017-08-04 PROCEDURE — 80048 BASIC METABOLIC PNL TOTAL CA: CPT

## 2017-08-04 PROCEDURE — 36415 COLL VENOUS BLD VENIPUNCTURE: CPT

## 2017-08-04 PROCEDURE — P9604 ONE-WAY ALLOW PRORATED TRIP: HCPCS

## 2017-08-04 RX ORDER — PREDNISONE 10 MG/1
40 TABLET ORAL DAILY
COMMUNITY
End: 2017-09-25 | Stop reason: DRUGHIGH

## 2017-08-04 RX ORDER — ACETAMINOPHEN 325 MG/1
650 TABLET ORAL EVERY 4 HOURS PRN
COMMUNITY
End: 2017-09-14 | Stop reason: ALTCHOICE

## 2017-08-08 LAB
A. PULLULANS ABS: NORMAL
ALLERGEN BEEF: <0.1 KU/L
ALLERGEN PHOMA BETAE: <0.1 KU/L
ALLERGEN PORK: <0.1 KU/L
ALLERGEN, FEATHER MIX: NEGATIVE KU/L
ASPERGILLUS FLAVUS: NORMAL
ASPERGILLUS FUMIGATUS #1: NORMAL
ASPERGILLUS FUMIGATUS #2: NORMAL
ASPERGILLUS FUMIGATUS #3: NORMAL
ASPERGILLUS FUMIGATUS #6: NORMAL
IMMUNOCAP SCORE: NORMAL
MICROPOLYSPORA FAENI: NORMAL
PIGEON SERUM: NORMAL
SACCHAROMONOSPORA VIRIDIS: NORMAL
THERMOACTINOMYCES CANDIDUS: NORMAL
THERMOACTINOMYCES SACCHARI: NORMAL
THERMOACTINOMYCES VULGARIS #1: NORMAL

## 2017-08-10 ENCOUNTER — HOSPITAL ENCOUNTER (OUTPATIENT)
Dept: INPATIENT UNIT | Age: 77
Discharge: HOME HEALTH | End: 2017-08-11
Attending: INTERNAL MEDICINE | Admitting: INTERNAL MEDICINE
Payer: MEDICARE

## 2017-08-10 ENCOUNTER — ANESTHESIA (OUTPATIENT)
Dept: CARDIAC CATH/INVASIVE PROCEDURES | Age: 77
End: 2017-08-10
Payer: MEDICARE

## 2017-08-10 ENCOUNTER — ANESTHESIA EVENT (OUTPATIENT)
Dept: CARDIAC CATH/INVASIVE PROCEDURES | Age: 77
End: 2017-08-10
Payer: MEDICARE

## 2017-08-10 ENCOUNTER — APPOINTMENT (OUTPATIENT)
Dept: CARDIAC CATH/INVASIVE PROCEDURES | Age: 77
End: 2017-08-10
Payer: MEDICARE

## 2017-08-10 ENCOUNTER — APPOINTMENT (OUTPATIENT)
Dept: GENERAL RADIOLOGY | Age: 77
End: 2017-08-10
Attending: INTERNAL MEDICINE
Payer: MEDICARE

## 2017-08-10 VITALS
TEMPERATURE: 98.6 F | SYSTOLIC BLOOD PRESSURE: 127 MMHG | DIASTOLIC BLOOD PRESSURE: 68 MMHG | OXYGEN SATURATION: 95 % | RESPIRATION RATE: 15 BRPM

## 2017-08-10 LAB
GLUCOSE BLD-MCNC: 178 MG/DL (ref 70–108)
GLUCOSE BLD-MCNC: 246 MG/DL (ref 70–108)
LV EF: 33 %
LVEF MODALITY: NORMAL

## 2017-08-10 PROCEDURE — 33225 L VENTRIC PACING LEAD ADD-ON: CPT

## 2017-08-10 PROCEDURE — C1733 CATH, EP, OTHR THAN COOL-TIP: HCPCS

## 2017-08-10 PROCEDURE — 7100000000 HC PACU RECOVERY - FIRST 15 MIN

## 2017-08-10 PROCEDURE — 93312 ECHO TRANSESOPHAGEAL: CPT

## 2017-08-10 PROCEDURE — 6360000002 HC RX W HCPCS: Performed by: INTERNAL MEDICINE

## 2017-08-10 PROCEDURE — 6360000002 HC RX W HCPCS: Performed by: ANESTHESIOLOGY

## 2017-08-10 PROCEDURE — 7100000001 HC PACU RECOVERY - ADDTL 15 MIN

## 2017-08-10 PROCEDURE — 6360000002 HC RX W HCPCS

## 2017-08-10 PROCEDURE — 6360000002 HC RX W HCPCS: Performed by: NUCLEAR MEDICINE

## 2017-08-10 PROCEDURE — C1769 GUIDE WIRE: HCPCS

## 2017-08-10 PROCEDURE — C1773 RET DEV, INSERTABLE: HCPCS

## 2017-08-10 PROCEDURE — 93650 ICAR CATH ABLTJ AV NODE FUNC: CPT | Performed by: INTERNAL MEDICINE

## 2017-08-10 PROCEDURE — 93650 ICAR CATH ABLTJ AV NODE FUNC: CPT

## 2017-08-10 PROCEDURE — 6360000002 HC RX W HCPCS: Performed by: NURSE ANESTHETIST, CERTIFIED REGISTERED

## 2017-08-10 PROCEDURE — A4565 SLINGS: HCPCS

## 2017-08-10 PROCEDURE — 2580000003 HC RX 258: Performed by: NURSE ANESTHETIST, CERTIFIED REGISTERED

## 2017-08-10 PROCEDURE — 3700000001 HC ADD 15 MINUTES (ANESTHESIA)

## 2017-08-10 PROCEDURE — 2580000003 HC RX 258: Performed by: NUCLEAR MEDICINE

## 2017-08-10 PROCEDURE — C1900 LEAD, CORONARY VENOUS: HCPCS

## 2017-08-10 PROCEDURE — 33225 L VENTRIC PACING LEAD ADD-ON: CPT | Performed by: INTERNAL MEDICINE

## 2017-08-10 PROCEDURE — 2720000010 HC SURG SUPPLY STERILE

## 2017-08-10 PROCEDURE — C2630 CATH, EP, COOL-TIP: HCPCS

## 2017-08-10 PROCEDURE — 93325 DOPPLER ECHO COLOR FLOW MAPG: CPT

## 2017-08-10 PROCEDURE — 93641 EP EVL 1/2CHMB PAC CVDFB TST: CPT | Performed by: INTERNAL MEDICINE

## 2017-08-10 PROCEDURE — 93320 DOPPLER ECHO COMPLETE: CPT

## 2017-08-10 PROCEDURE — 71010 XR CHEST PORTABLE: CPT

## 2017-08-10 PROCEDURE — 82948 REAGENT STRIP/BLOOD GLUCOSE: CPT

## 2017-08-10 PROCEDURE — C1893 INTRO/SHEATH, FIXED,NON-PEEL: HCPCS

## 2017-08-10 PROCEDURE — 33249 INSJ/RPLCMT DEFIB W/LEAD(S): CPT | Performed by: INTERNAL MEDICINE

## 2017-08-10 PROCEDURE — C1894 INTRO/SHEATH, NON-LASER: HCPCS

## 2017-08-10 PROCEDURE — C1882 AICD, OTHER THAN SING/DUAL: HCPCS

## 2017-08-10 PROCEDURE — A6258 TRANSPARENT FILM >16<=48 IN: HCPCS

## 2017-08-10 PROCEDURE — 33249 INSJ/RPLCMT DEFIB W/LEAD(S): CPT

## 2017-08-10 PROCEDURE — C1895 LEAD, AICD, ENDO DUAL COIL: HCPCS

## 2017-08-10 PROCEDURE — 6370000000 HC RX 637 (ALT 250 FOR IP): Performed by: INTERNAL MEDICINE

## 2017-08-10 PROCEDURE — 3700000000 HC ANESTHESIA ATTENDED CARE

## 2017-08-10 PROCEDURE — 2500000003 HC RX 250 WO HCPCS

## 2017-08-10 PROCEDURE — 6370000000 HC RX 637 (ALT 250 FOR IP)

## 2017-08-10 RX ORDER — ACETAMINOPHEN 325 MG/1
650 TABLET ORAL EVERY 4 HOURS PRN
Status: DISCONTINUED | OUTPATIENT
Start: 2017-08-10 | End: 2017-08-11 | Stop reason: HOSPADM

## 2017-08-10 RX ORDER — DEXTROSE MONOHYDRATE 50 MG/ML
100 INJECTION, SOLUTION INTRAVENOUS PRN
Status: DISCONTINUED | OUTPATIENT
Start: 2017-08-10 | End: 2017-08-11 | Stop reason: HOSPADM

## 2017-08-10 RX ORDER — SODIUM CHLORIDE 9 MG/ML
INJECTION, SOLUTION INTRAVENOUS CONTINUOUS PRN
Status: DISCONTINUED | OUTPATIENT
Start: 2017-08-10 | End: 2017-08-10 | Stop reason: SDUPTHER

## 2017-08-10 RX ORDER — RANOLAZINE 500 MG/1
500 TABLET, EXTENDED RELEASE ORAL 2 TIMES DAILY
Status: DISCONTINUED | OUTPATIENT
Start: 2017-08-10 | End: 2017-08-11 | Stop reason: HOSPADM

## 2017-08-10 RX ORDER — HYDROCODONE BITARTRATE AND ACETAMINOPHEN 5; 325 MG/1; MG/1
1 TABLET ORAL EVERY 4 HOURS PRN
Status: DISCONTINUED | OUTPATIENT
Start: 2017-08-10 | End: 2017-08-11 | Stop reason: HOSPADM

## 2017-08-10 RX ORDER — SODIUM CHLORIDE 9 MG/ML
INJECTION, SOLUTION INTRAVENOUS CONTINUOUS
Status: DISCONTINUED | OUTPATIENT
Start: 2017-08-10 | End: 2017-08-11 | Stop reason: HOSPADM

## 2017-08-10 RX ORDER — ACETAMINOPHEN 325 MG/1
650 TABLET ORAL EVERY 4 HOURS PRN
Status: DISCONTINUED | OUTPATIENT
Start: 2017-08-10 | End: 2017-08-10 | Stop reason: SDUPTHER

## 2017-08-10 RX ORDER — POLYETHYLENE GLYCOL 3350 17 G/17G
17 POWDER, FOR SOLUTION ORAL DAILY
Status: DISCONTINUED | OUTPATIENT
Start: 2017-08-10 | End: 2017-08-11 | Stop reason: HOSPADM

## 2017-08-10 RX ORDER — SODIUM CHLORIDE 9 MG/ML
INJECTION, SOLUTION INTRAVENOUS CONTINUOUS
Status: DISCONTINUED | OUTPATIENT
Start: 2017-08-10 | End: 2017-08-10

## 2017-08-10 RX ORDER — FENTANYL CITRATE 50 UG/ML
50 INJECTION, SOLUTION INTRAMUSCULAR; INTRAVENOUS EVERY 5 MIN PRN
Status: DISCONTINUED | OUTPATIENT
Start: 2017-08-10 | End: 2017-08-10

## 2017-08-10 RX ORDER — FENTANYL CITRATE 50 UG/ML
25 INJECTION, SOLUTION INTRAMUSCULAR; INTRAVENOUS EVERY 5 MIN PRN
Status: DISCONTINUED | OUTPATIENT
Start: 2017-08-10 | End: 2017-08-10

## 2017-08-10 RX ORDER — ASPIRIN 81 MG/1
81 TABLET, CHEWABLE ORAL DAILY
Status: DISCONTINUED | OUTPATIENT
Start: 2017-08-11 | End: 2017-08-11 | Stop reason: HOSPADM

## 2017-08-10 RX ORDER — DEXTROSE MONOHYDRATE 25 G/50ML
12.5 INJECTION, SOLUTION INTRAVENOUS PRN
Status: DISCONTINUED | OUTPATIENT
Start: 2017-08-10 | End: 2017-08-11 | Stop reason: HOSPADM

## 2017-08-10 RX ORDER — HYDROCODONE BITARTRATE AND ACETAMINOPHEN 5; 325 MG/1; MG/1
2 TABLET ORAL EVERY 4 HOURS PRN
Status: DISCONTINUED | OUTPATIENT
Start: 2017-08-10 | End: 2017-08-11 | Stop reason: HOSPADM

## 2017-08-10 RX ORDER — FLUMAZENIL 0.1 MG/ML
0.5 INJECTION, SOLUTION INTRAVENOUS PRN
Status: DISCONTINUED | OUTPATIENT
Start: 2017-08-10 | End: 2017-08-11 | Stop reason: HOSPADM

## 2017-08-10 RX ORDER — ONDANSETRON 2 MG/ML
4 INJECTION INTRAMUSCULAR; INTRAVENOUS EVERY 6 HOURS PRN
Status: DISCONTINUED | OUTPATIENT
Start: 2017-08-10 | End: 2017-08-11 | Stop reason: HOSPADM

## 2017-08-10 RX ORDER — NALOXONE HYDROCHLORIDE 0.4 MG/ML
0.4 INJECTION, SOLUTION INTRAMUSCULAR; INTRAVENOUS; SUBCUTANEOUS PRN
Status: DISCONTINUED | OUTPATIENT
Start: 2017-08-10 | End: 2017-08-11 | Stop reason: HOSPADM

## 2017-08-10 RX ORDER — LANOLIN ALCOHOL/MO/W.PET/CERES
3 CREAM (GRAM) TOPICAL DAILY
Status: DISCONTINUED | OUTPATIENT
Start: 2017-08-10 | End: 2017-08-11 | Stop reason: HOSPADM

## 2017-08-10 RX ORDER — DEXAMETHASONE SODIUM PHOSPHATE 4 MG/ML
INJECTION, SOLUTION INTRA-ARTICULAR; INTRALESIONAL; INTRAMUSCULAR; INTRAVENOUS; SOFT TISSUE PRN
Status: DISCONTINUED | OUTPATIENT
Start: 2017-08-10 | End: 2017-08-10 | Stop reason: SDUPTHER

## 2017-08-10 RX ORDER — FENTANYL CITRATE 50 UG/ML
100 INJECTION, SOLUTION INTRAMUSCULAR; INTRAVENOUS
Status: DISCONTINUED | OUTPATIENT
Start: 2017-08-10 | End: 2017-08-11 | Stop reason: HOSPADM

## 2017-08-10 RX ORDER — LABETALOL HYDROCHLORIDE 5 MG/ML
10 INJECTION, SOLUTION INTRAVENOUS EVERY 10 MIN PRN
Status: DISCONTINUED | OUTPATIENT
Start: 2017-08-10 | End: 2017-08-10

## 2017-08-10 RX ORDER — METOPROLOL TARTRATE 50 MG/1
50 TABLET, FILM COATED ORAL DAILY
Status: DISCONTINUED | OUTPATIENT
Start: 2017-08-10 | End: 2017-08-11 | Stop reason: HOSPADM

## 2017-08-10 RX ORDER — MIDAZOLAM HYDROCHLORIDE 1 MG/ML
10 INJECTION INTRAMUSCULAR; INTRAVENOUS PRN
Status: DISCONTINUED | OUTPATIENT
Start: 2017-08-10 | End: 2017-08-11 | Stop reason: HOSPADM

## 2017-08-10 RX ORDER — PREDNISONE 20 MG/1
40 TABLET ORAL DAILY
Status: DISCONTINUED | OUTPATIENT
Start: 2017-08-10 | End: 2017-08-11 | Stop reason: HOSPADM

## 2017-08-10 RX ORDER — FUROSEMIDE 20 MG/1
20 TABLET ORAL DAILY
Status: DISCONTINUED | OUTPATIENT
Start: 2017-08-10 | End: 2017-08-11 | Stop reason: HOSPADM

## 2017-08-10 RX ORDER — ISOSORBIDE MONONITRATE 30 MG/1
30 TABLET, EXTENDED RELEASE ORAL DAILY
Status: DISCONTINUED | OUTPATIENT
Start: 2017-08-10 | End: 2017-08-11 | Stop reason: HOSPADM

## 2017-08-10 RX ORDER — PROMETHAZINE HYDROCHLORIDE 25 MG/ML
12.5 INJECTION, SOLUTION INTRAMUSCULAR; INTRAVENOUS
Status: DISCONTINUED | OUTPATIENT
Start: 2017-08-10 | End: 2017-08-10

## 2017-08-10 RX ORDER — NICOTINE POLACRILEX 4 MG
15 LOZENGE BUCCAL PRN
Status: DISCONTINUED | OUTPATIENT
Start: 2017-08-10 | End: 2017-08-11 | Stop reason: HOSPADM

## 2017-08-10 RX ORDER — FENTANYL CITRATE 50 UG/ML
INJECTION, SOLUTION INTRAMUSCULAR; INTRAVENOUS PRN
Status: DISCONTINUED | OUTPATIENT
Start: 2017-08-10 | End: 2017-08-10 | Stop reason: SDUPTHER

## 2017-08-10 RX ADMIN — POLYETHYLENE GLYCOL 3350 17 G: 17 POWDER, FOR SOLUTION ORAL at 16:23

## 2017-08-10 RX ADMIN — ACETAMINOPHEN 650 MG: 325 TABLET ORAL at 21:29

## 2017-08-10 RX ADMIN — PROPOFOL 100 MG: 10 INJECTION, EMULSION INTRAVENOUS at 09:52

## 2017-08-10 RX ADMIN — FENTANYL CITRATE 50 MCG: 50 INJECTION INTRAMUSCULAR; INTRAVENOUS at 14:15

## 2017-08-10 RX ADMIN — SODIUM CHLORIDE: 9 INJECTION, SOLUTION INTRAVENOUS at 09:00

## 2017-08-10 RX ADMIN — PHENYLEPHRINE HYDROCHLORIDE 200 MCG: 10 INJECTION INTRAMUSCULAR; INTRAVENOUS; SUBCUTANEOUS at 09:57

## 2017-08-10 RX ADMIN — PHENYLEPHRINE HYDROCHLORIDE 200 MCG: 10 INJECTION INTRAMUSCULAR; INTRAVENOUS; SUBCUTANEOUS at 11:25

## 2017-08-10 RX ADMIN — PHENYLEPHRINE HYDROCHLORIDE 200 MCG: 10 INJECTION INTRAMUSCULAR; INTRAVENOUS; SUBCUTANEOUS at 10:00

## 2017-08-10 RX ADMIN — SODIUM CHLORIDE: 9 INJECTION, SOLUTION INTRAVENOUS at 06:55

## 2017-08-10 RX ADMIN — VANCOMYCIN HYDROCHLORIDE 1500 MG: 1 INJECTION, POWDER, LYOPHILIZED, FOR SOLUTION INTRAVENOUS at 09:39

## 2017-08-10 RX ADMIN — PREDNISONE 40 MG: 20 TABLET ORAL at 16:23

## 2017-08-10 RX ADMIN — PHENYLEPHRINE HYDROCHLORIDE 200 MCG: 10 INJECTION INTRAMUSCULAR; INTRAVENOUS; SUBCUTANEOUS at 12:45

## 2017-08-10 RX ADMIN — PHENYLEPHRINE HYDROCHLORIDE 200 MCG: 10 INJECTION INTRAMUSCULAR; INTRAVENOUS; SUBCUTANEOUS at 10:10

## 2017-08-10 RX ADMIN — FENTANYL CITRATE 25 MCG: 50 INJECTION INTRAMUSCULAR; INTRAVENOUS at 12:10

## 2017-08-10 RX ADMIN — Medication 3 MG: at 21:25

## 2017-08-10 RX ADMIN — FENTANYL CITRATE 25 MCG: 50 INJECTION INTRAMUSCULAR; INTRAVENOUS at 08:23

## 2017-08-10 RX ADMIN — PHENYLEPHRINE HYDROCHLORIDE 200 MCG: 10 INJECTION INTRAMUSCULAR; INTRAVENOUS; SUBCUTANEOUS at 12:30

## 2017-08-10 RX ADMIN — SODIUM CHLORIDE: 9 INJECTION, SOLUTION INTRAVENOUS at 09:35

## 2017-08-10 RX ADMIN — METOPROLOL TARTRATE 50 MG: 50 TABLET, FILM COATED ORAL at 16:22

## 2017-08-10 RX ADMIN — INSULIN LISPRO 2 UNITS: 100 INJECTION, SOLUTION INTRAVENOUS; SUBCUTANEOUS at 21:35

## 2017-08-10 RX ADMIN — PHENYLEPHRINE HYDROCHLORIDE 200 MCG: 10 INJECTION INTRAMUSCULAR; INTRAVENOUS; SUBCUTANEOUS at 11:45

## 2017-08-10 RX ADMIN — FUROSEMIDE 20 MG: 20 TABLET ORAL at 16:22

## 2017-08-10 RX ADMIN — PHENYLEPHRINE HYDROCHLORIDE 100 MCG: 10 INJECTION INTRAMUSCULAR; INTRAVENOUS; SUBCUTANEOUS at 11:18

## 2017-08-10 RX ADMIN — CEFAZOLIN SODIUM 2 G: 2 SOLUTION INTRAVENOUS at 21:25

## 2017-08-10 RX ADMIN — FENTANYL CITRATE 50 MCG: 50 INJECTION INTRAMUSCULAR; INTRAVENOUS at 09:51

## 2017-08-10 RX ADMIN — PHENYLEPHRINE HYDROCHLORIDE 200 MCG: 10 INJECTION INTRAMUSCULAR; INTRAVENOUS; SUBCUTANEOUS at 10:20

## 2017-08-10 RX ADMIN — FENTANYL CITRATE 25 MCG: 50 INJECTION INTRAMUSCULAR; INTRAVENOUS at 11:58

## 2017-08-10 RX ADMIN — MIDAZOLAM 1 MG: 1 INJECTION INTRAMUSCULAR; INTRAVENOUS at 08:23

## 2017-08-10 RX ADMIN — ISOSORBIDE MONONITRATE 30 MG: 30 TABLET ORAL at 16:23

## 2017-08-10 RX ADMIN — DEXAMETHASONE SODIUM PHOSPHATE 6 MG: 4 INJECTION, SOLUTION INTRAMUSCULAR; INTRAVENOUS at 10:28

## 2017-08-10 RX ADMIN — RANOLAZINE 500 MG: 500 TABLET, FILM COATED, EXTENDED RELEASE ORAL at 21:24

## 2017-08-10 ASSESSMENT — PULMONARY FUNCTION TESTS
PIF_VALUE: 7
PIF_VALUE: 7
PIF_VALUE: 6
PIF_VALUE: 9
PIF_VALUE: 18
PIF_VALUE: 8
PIF_VALUE: 7
PIF_VALUE: 6
PIF_VALUE: 6
PIF_VALUE: 8
PIF_VALUE: 8
PIF_VALUE: 7
PIF_VALUE: 6
PIF_VALUE: 7
PIF_VALUE: 7
PIF_VALUE: 6
PIF_VALUE: 6
PIF_VALUE: 7
PIF_VALUE: 1
PIF_VALUE: 6
PIF_VALUE: 8
PIF_VALUE: 7
PIF_VALUE: 1
PIF_VALUE: 7
PIF_VALUE: 18
PIF_VALUE: 7
PIF_VALUE: 19
PIF_VALUE: 6
PIF_VALUE: 7
PIF_VALUE: 7
PIF_VALUE: 6
PIF_VALUE: 7
PIF_VALUE: 4
PIF_VALUE: 8
PIF_VALUE: 6
PIF_VALUE: 7
PIF_VALUE: 6
PIF_VALUE: 7
PIF_VALUE: 6
PIF_VALUE: 5
PIF_VALUE: 6
PIF_VALUE: 19
PIF_VALUE: 8
PIF_VALUE: 8
PIF_VALUE: 1
PIF_VALUE: 6
PIF_VALUE: 7
PIF_VALUE: 7
PIF_VALUE: 3
PIF_VALUE: 7
PIF_VALUE: 9
PIF_VALUE: 7
PIF_VALUE: 6
PIF_VALUE: 6
PIF_VALUE: 19
PIF_VALUE: 19
PIF_VALUE: 7
PIF_VALUE: 1
PIF_VALUE: 7
PIF_VALUE: 6
PIF_VALUE: 7
PIF_VALUE: 1
PIF_VALUE: 18
PIF_VALUE: 7
PIF_VALUE: 17
PIF_VALUE: 7
PIF_VALUE: 6
PIF_VALUE: 7
PIF_VALUE: 6
PIF_VALUE: 7
PIF_VALUE: 7
PIF_VALUE: 6
PIF_VALUE: 1
PIF_VALUE: 7
PIF_VALUE: 8
PIF_VALUE: 5
PIF_VALUE: 7
PIF_VALUE: 6
PIF_VALUE: 6
PIF_VALUE: 19
PIF_VALUE: 8
PIF_VALUE: 1
PIF_VALUE: 7
PIF_VALUE: 7
PIF_VALUE: 6
PIF_VALUE: 19
PIF_VALUE: 7
PIF_VALUE: 7
PIF_VALUE: 1
PIF_VALUE: 7
PIF_VALUE: 5
PIF_VALUE: 20
PIF_VALUE: 8
PIF_VALUE: 7
PIF_VALUE: 6
PIF_VALUE: 6
PIF_VALUE: 18
PIF_VALUE: 17
PIF_VALUE: 7
PIF_VALUE: 1
PIF_VALUE: 7
PIF_VALUE: 6
PIF_VALUE: 7
PIF_VALUE: 1
PIF_VALUE: 7
PIF_VALUE: 6
PIF_VALUE: 7
PIF_VALUE: 6
PIF_VALUE: 7
PIF_VALUE: 4
PIF_VALUE: 7
PIF_VALUE: 6
PIF_VALUE: 6
PIF_VALUE: 7
PIF_VALUE: 8
PIF_VALUE: 1
PIF_VALUE: 15
PIF_VALUE: 6
PIF_VALUE: 5
PIF_VALUE: 6
PIF_VALUE: 5
PIF_VALUE: 7
PIF_VALUE: 6
PIF_VALUE: 18
PIF_VALUE: 4
PIF_VALUE: 7
PIF_VALUE: 5
PIF_VALUE: 7
PIF_VALUE: 5
PIF_VALUE: 7
PIF_VALUE: 6
PIF_VALUE: 5
PIF_VALUE: 7
PIF_VALUE: 6
PIF_VALUE: 1
PIF_VALUE: 6
PIF_VALUE: 1
PIF_VALUE: 7
PIF_VALUE: 8
PIF_VALUE: 5
PIF_VALUE: 7
PIF_VALUE: 7
PIF_VALUE: 8
PIF_VALUE: 5
PIF_VALUE: 8
PIF_VALUE: 6
PIF_VALUE: 7
PIF_VALUE: 7
PIF_VALUE: 5
PIF_VALUE: 7
PIF_VALUE: 6
PIF_VALUE: 19
PIF_VALUE: 7
PIF_VALUE: 6
PIF_VALUE: 7
PIF_VALUE: 8
PIF_VALUE: 7
PIF_VALUE: 6
PIF_VALUE: 7
PIF_VALUE: 6
PIF_VALUE: 7
PIF_VALUE: 6
PIF_VALUE: 18
PIF_VALUE: 8
PIF_VALUE: 8
PIF_VALUE: 5
PIF_VALUE: 7
PIF_VALUE: 7
PIF_VALUE: 18
PIF_VALUE: 6
PIF_VALUE: 3
PIF_VALUE: 1
PIF_VALUE: 18
PIF_VALUE: 5
PIF_VALUE: 6
PIF_VALUE: 6
PIF_VALUE: 8
PIF_VALUE: 6
PIF_VALUE: 7
PIF_VALUE: 6
PIF_VALUE: 6
PIF_VALUE: 1
PIF_VALUE: 6
PIF_VALUE: 1
PIF_VALUE: 6
PIF_VALUE: 6
PIF_VALUE: 7
PIF_VALUE: 6
PIF_VALUE: 7
PIF_VALUE: 6
PIF_VALUE: 7
PIF_VALUE: 6
PIF_VALUE: 6
PIF_VALUE: 7
PIF_VALUE: 6
PIF_VALUE: 8
PIF_VALUE: 7
PIF_VALUE: 6
PIF_VALUE: 23
PIF_VALUE: 6
PIF_VALUE: 4
PIF_VALUE: 18
PIF_VALUE: 19
PIF_VALUE: 1
PIF_VALUE: 18
PIF_VALUE: 8
PIF_VALUE: 6
PIF_VALUE: 5
PIF_VALUE: 6
PIF_VALUE: 7
PIF_VALUE: 1
PIF_VALUE: 6
PIF_VALUE: 18
PIF_VALUE: 8
PIF_VALUE: 9
PIF_VALUE: 1

## 2017-08-10 ASSESSMENT — PAIN DESCRIPTION - DESCRIPTORS
DESCRIPTORS: SHARP
DESCRIPTORS: SORE
DESCRIPTORS: SORE

## 2017-08-10 ASSESSMENT — PAIN DESCRIPTION - LOCATION
LOCATION: CHEST

## 2017-08-10 ASSESSMENT — PAIN DESCRIPTION - ORIENTATION
ORIENTATION: ANTERIOR;UPPER
ORIENTATION: LEFT;ANTERIOR;UPPER
ORIENTATION: LEFT;ANTERIOR;UPPER
ORIENTATION: ANTERIOR;UPPER
ORIENTATION: LEFT

## 2017-08-10 ASSESSMENT — PAIN DESCRIPTION - PAIN TYPE
TYPE: SURGICAL PAIN

## 2017-08-10 ASSESSMENT — PAIN SCALES - GENERAL
PAINLEVEL_OUTOF10: 0
PAINLEVEL_OUTOF10: 2
PAINLEVEL_OUTOF10: 1
PAINLEVEL_OUTOF10: 7
PAINLEVEL_OUTOF10: 2
PAINLEVEL_OUTOF10: 0

## 2017-08-10 ASSESSMENT — PAIN DESCRIPTION - FREQUENCY: FREQUENCY: INTERMITTENT

## 2017-08-10 ASSESSMENT — PAIN DESCRIPTION - PROGRESSION: CLINICAL_PROGRESSION: NOT CHANGED

## 2017-08-10 ASSESSMENT — PAIN DESCRIPTION - ONSET: ONSET: GRADUAL

## 2017-08-10 NOTE — BRIEF OP NOTE
Brief Postoperative Note    [place]  YOB: 1940  [id number]    Pre-operative Diagnosis: Atrial fibrillation                                           Nonischemic Cardiomyopathy    Post-operative Diagnosis: Same    Procedure:  Insertion of a biventricular pacemaker/ ICD                     AVN ablation    Anesthesia: General    Surgeons/Assistants: Ary    Estimated Blood Loss: less than 50     Complications: None    Specimens: Was Not Obtained    Findings: Successful insertion of a biventricular pacemaker/ ICD                  Successful AVN ablation    Electronically signed by Wing Mary MD on 8/10/2017 at 1:41 PM

## 2017-08-10 NOTE — H&P
obstruction. Left atrium is moderately dilated (34-39) left atrial volume index of 36 ml/m2. Mild mitral regurg. Diastology cannot be assessed due to A-fib.  History of Holter monitoring 3/24/16     Atrial Fibrillation throughout,fairly controlled, HR  bpm 79% of the time. Occasional high ventricular rate episodes and multiple pauses suggestive of tachycardia/bradycardia syndrome  Msximum R-R interval 2.68 seconds. 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    Long term (current) use of anticoagulants 8/31/2015    Other abnormal glucose 2004    Phlebitis and thrombophlebitis of lower extremities, unspecified ([address] 1961     L leg    Senile osteoporosis 2006     L/S    Symptomatic menopausal or female climacteric states 06/1995    Syncope and collapse      Type II or unspecified type diabetes mellitus without mention of complication, not stated as uncontrolled 03/2009    Unspecified hereditary and idiopathic peripheral neuropathy 2012    Unspecified sleep apnea 11/2009            PSH:   Past Surgical History          Past Surgical History:   Procedure Laterality Date    BRONCHOSCOPY   6/7/2017     BRONCHOSCOPY BRUSHINGS performed by Royce Gandhi DO at [place] [id number]   6/7/2017     BRONCHOSCOPY/TRANSBRONCHIAL LUNG BIOPSY performed by Royce Gandhi DO at [facility]   6/7/2017     BRONCHOSCOPY FLUOROSCOPY performed by Royce Gandhi DO at [address] Right 06/17/2015    COLONOSCOPY   1/2005    DIAGNOSTIC CARDIAC CATH LAB PROCEDURE   06/17/15    EYE SURGERY        FRACTURE SURGERY   2004     Distal Radius Ulna    LOBECTOMY Left 6/14/2017     MINI PORT ACCESS LEFT CHEST, X2 SPECIMENS.  performed by Gilford Eisenmenger, MD at [address] [place] [address] 2years old     VOLVAR-ULCERATIVE LESION-CAUTERIZED    SKIN BIOPSY        kg)  BMI 31.3 kg/m2     Physical Exam   Constitutional: She is oriented to person, place, and time. No distress. HENT:   Head: Normocephalic. Head is with right periorbital erythema. Head is without contusion. Right Ear: Hearing and external ear normal. No decreased hearing is noted. Left Ear: Hearing and external ear normal. No decreased hearing is noted. Nose: Nose normal. No sinus tenderness. No epistaxis. Mouth/Throat: Oropharynx is clear and moist. Mucous membranes are not dry. No oropharyngeal exudate. Eyes: Conjunctivae and EOM are normal. Pupils are equal, round, and reactive to light. Right eye exhibits no discharge. Left eye exhibits no discharge. Neck: Trachea normal. Neck supple. No JVD present. No edema present. No thyroid mass present. Cardiovascular: Normal rate, normal heart sounds and normal pulses. An irregularly irregular rhythm present. No extrasystoles are present. Pulmonary/Chest: Effort normal and breath sounds normal. She exhibits no tenderness. Breasts are symmetrical.   Abdominal: Soft. Normal appearance and bowel sounds are normal. She exhibits no mass. There is no hepatosplenomegaly. There is no tenderness. No hernia. Musculoskeletal: Normal range of motion. Right shoulder: Normal. She exhibits normal range of motion and no swelling. Left shoulder: Normal. She exhibits normal range of motion and no swelling. Right hip: Normal. She exhibits normal range of motion and no swelling. Left hip: Normal. She exhibits normal range of motion and no swelling. Right knee: Normal. She exhibits normal range of motion and no swelling. Left knee: Normal. She exhibits normal range of motion and no swelling.         Right upper arm: Normal.        Left upper arm: Normal.        Right upper leg: Normal.        Left upper leg: Normal.        Right lower leg: Normal.        Left lower leg: Normal.   Lymphadenopathy:        Head (right side): No submandibular adenopathy present. Head (left side): No submandibular adenopathy present. Neurological: She is alert and oriented to person, place, and time. She has normal strength. She displays normal reflexes. No cranial nerve deficit or sensory deficit. Coordination and gait normal.   Skin: Skin is warm and dry. No lesion and no rash noted. She is not diaphoretic. No cyanosis. Nails show no clubbing. Psychiatric: Her speech is normal and behavior is normal. Judgment and thought content normal. Her mood appears not anxious. Her affect is not angry. Cognition and memory are normal. She does not exhibit a depressed mood. Nursing note and vitals reviewed.        DIAGNOSTIC STUDIES & LABORATORY DATA:     I have personally reviewed and interpreted the results of the following diagnostic testing  CARDIAC HISTORY:  CATH: 6/17/2015  Angiographic Findings       Cardiac Arteries and Lesion Findings      LMCA: Normal 0% stenosis.      LAD:        Lesion on 1st Diag: Proximal subsection. 80% stenosis. Lesion plaque is    ruptured.        Comments: This was a mild to moderate sized vessel at about 1.5 mm.      LCx:        Lesion on 1st Ob Leslie: Mid subsection. 85% stenosis.       Comments: This was a small, 1mm vessel.      RCA:        Lesion on R PDA: Mid subsection. 85% stenosis.       Comments: This was about a 1.5 mm vessel.        Lesion on R PDA: Distal subsection. 70% stenosis.       Lesion on Prox RCA: Mid subsection. 50% stenosis.      Coronary Tree      ECHO: 6/5/2017  FINDINGS  Left Atrium  Left atrium is mildly dilated. Left Ventricle  Left ventricular systolic function appears to be mildly reduced. Ejection fraction is estimated at 45%. Right Atrium  Right atrium is normal in size. Right Ventricle  Normal right ventricular size and function. Mitral Valve  Mild mitral valve thickening with no restriction in motion and mild  regurgitation.   Aortic Valve  Aortic valve sclerosis without stenosis and no

## 2017-08-10 NOTE — IP AVS SNAPSHOT
Patient Information     Patient Name Kimberly [address] A 1940      Important Information for Heart Failure       If your condition worsens or if you have any concerns, call your doctor or seek emergency medical services (dial 9-1-1) as needed. If you have any of the following symptoms/conditions, call your doctor. Call your primary care physician to obtain results of outstanding lab tests, cultures, x-rays, or other tests. If you have a current diagnosis or history of any of the following, please review the information carefully. HEART FAILURE PATIENTS:   DISCHARGE WEIGHT: Weight: 190 lb 6.4 oz (86.4 kg)  Record daily weights. Notify your doctor if you have a weight gain 2 pounds in a day, or 3-5 pounds in 1 week. Notify your doctor for increased shortness of breath, or swelling in legs or feet. Follow a low sodium diet. Resume normal activity unless otherwise instructed by your doctor.

## 2017-08-10 NOTE — CONSULTS
surgical history that includes Tonsillectomy and adenoidectomy; eye surgery; skin biopsy; Cardiac catheterization (Right, 06/17/2015); Diagnostic Cardiac Cath Lab Procedure (06/17/15); fracture surgery (2004); Colonoscopy (1/2005); other surgical history (2years old); bronchoscopy (6/7/2017); bronchoscopy (6/7/2017); bronchoscopy (6/7/2017); and lobectomy (Left, 6/14/2017). Additional information:       ALLERGIES   Allergies as of 08/10/2017 - Review Complete 08/10/2017   Allergen Reaction Noted    Adhesive tape  04/14/2015    Aspercreme [trolamine salicylate]  35/07/2635    Augmentin [amoxicillin-pot clavulanate]  04/14/2015    Erythromycin Other (See Comments) 02/13/2015    Guaifenesin & derivatives  08/31/2015    Niacin and related  04/14/2015     Additional information:       MEDICATIONS   Aspirin  [x] 81 mg  [] 325 mg  [] None  Antiplatelet drug therapy use last 5 days  []No []Yes  Coumadin Use Last 5 Days []No []Yes  Other anticoagulant use last 5 days  []No []Yes    Current Facility-Administered Medications:     0.9 % sodium chloride infusion, , Intravenous, Continuous, Los Queen MD, Last Rate: 25 mL/hr at 08/10/17 0655    midazolam (VERSED) injection 10 mg, 10 mg, Intravenous, PRN, Los Queen MD    naloxone (NARCAN) injection 0.4 mg, 0.4 mg, Intravenous, PRN, Bronson Goldmann, MD    flumazenil (ROMAZICON) injection 0.5 mg, 0.5 mg, Intravenous, PRN, Bronson Goldmann, MD    fentaNYL (SUBLIMAZE) injection 100 mcg, 100 mcg, Intravenous, Q1H PRN, Bronson Goldmann, MD  Prior to Admission medications    Medication Sig Start Date End Date Taking?  Authorizing Provider   predniSONE (DELTASONE) 10 MG tablet Take 40 mg by mouth daily   Yes Historical Provider, MD   sulfamethoxazole-trimethoprim (BACTRIM DS) 800-160 MG per tablet 1 tab once a day on Monday, Wednesday and friday 8/3/17  Yes Kendal Oliveira MD   OXYGEN Inhale into the lungs continuous   Yes Historical Provider, MD polyethylene glycol (GLYCOLAX) packet Take 17 g by mouth daily 7/13/17 8/12/17 Yes Doroteo Disla PA-C   amiodarone (CORDARONE) 200 MG tablet Take 1 tablet by mouth 2 times daily 7/6/17  Yes Christopher Lemons MD   metoprolol tartrate (LOPRESSOR) 25 MG tablet Take 1 tablet by mouth nightly 7/6/17  Yes Christopher Lemons MD   metFORMIN (GLUCOPHAGE) 500 MG tablet Take 1 tablet by mouth 2 times daily (with meals) 7/3/17  Yes Christopher Lemons MD   furosemide (LASIX) 20 MG tablet Take 1 tablet by mouth daily 7/3/17  Yes Christopher Lemons MD   metoprolol tartrate (LOPRESSOR) 50 MG tablet Take 1 tablet by mouth daily 7/2/17  Yes Uma Weinstein MD   melatonin 3 MG TABS tablet Take 3 mg by mouth daily   Yes Historical Provider, MD   aspirin 81 MG chewable tablet Take 1 tablet by mouth daily 6/15/17  Yes Melania Lott DO   isosorbide mononitrate (IMDUR) 30 MG extended release tablet Take 1 tablet by mouth daily 6/15/17  Yes Melania Lott DO   atorvastatin (LIPITOR) 40 MG tablet Take 1 tablet by mouth nightly 6/15/17  Yes Melania Lott DO   warfarin (COUMADIN) 7.5 MG tablet TAKE ONE TABLET BY MOUTH ONCE DAILY AS DIRECTED PER PHYSICIAN 3/17/17  Yes Maikel Rondon MD   ranolazine (RANEXA) 500 MG extended release tablet Take 1 tablet by mouth 2 times daily 9/21/16  Yes Wendy Villela MD   acetaminophen (TYLENOL) 325 MG tablet Take 650 mg by mouth every 4 hours as needed for Pain    Historical Provider, MD   magnesium hydroxide (MILK OF MAGNESIA) 400 MG/5ML suspension Take 30 mLs by mouth daily as needed for Constipation 7/13/17   Deborra Juan Disla PA-C   Glucose Blood (BLOOD GLUCOSE TEST STRIPS) STRP Test 4 times daily Diagnosis 7/2/17   Uma Weinstein MD     Additional information:       VITAL SIGNS   Vitals:    08/10/17 0605   BP: (!) 111/91   Pulse: 113   Resp: 19   Temp:    SpO2:        PHYSICAL:   General: No acute distress  HEENT:  Unremarkable for age  Neck: without increased JVD, carotid pulses 2+ bilaterally without bruits  Heart: RRR, S1 & S2 WNL, S4 gallop, without murmurs or rubs    Lungs: Clear to auscultation    Abdomen: BS present, without HSM, masses, or tenderness    Extremities: without C,C,E.  Pulses 2+ bilaterally  Mental Status: Alert & Oriented        PLANNED PROCEDURE   []Cath  []PCI                []Pacemaker/AICD  [x]NASIMA             []Cardioversion []Peripheral angiography/PTA  []Other:      SEDATION  Planned agent:[x]Midazolam []Meperidine []Sublimaze []Morphine  []Diazepam  []Other:     ASA Classification:  []1 [x]2 []3 []4 []5  Class 1: A normal healthy patient  Class 2: Pt with mild to moderate systemic disease  Class 3: Severe systemic disease or disturbance  Class 4: Severe systemic disorders that are already life threatening. Class 5: Moribund pt with little chances of survival, for more than 24 hours. Mallampati I Airway Classification:   []1 [x]2 []3 []4    [x]Pre-procedure diagnostic studies complete and results available. Comment:    [x]Previous sedation/anesthesia experiences assessed. Comment:    [x]The patient is an appropriate candidate to undergo the planned procedure sedation and anesthesia. (Refer to nursing sedation/analgesia documentation record)  [x]Formulation and discussion of sedation/procedure plan, risks, and expectations with patient and/or responsible adult completed. [x]Patient examined immediately prior to the procedure.  (Refer to nursing sedation/analgesia documentation record)    Adrianne Garcia MD [facility]  Electronically signed 8/10/2017 at 8:15 AM

## 2017-08-10 NOTE — FLOWSHEET NOTE
Pt admitted to  2E09 ambulatory for NASIMA, AV node ablation, and Bi-V pacemaker. Pt NPO. Patient accompanied by family. Vital signs obtained. Assessment and data collection initiated. Oriented to room. Policies and procedures for 2E explained   All questions answered with no further questions at this time. Fall prevention and safety precautions discussed with patient.

## 2017-08-10 NOTE — IP AVS SNAPSHOT
After Visit Summary  (Discharge Instructions)    Medication List for Home    Based on the information you provided to us as well as any changes during this visit, the following is your updated medication list.  Compare this with your prescription bottles at home. If you have any questions or concerns, contact your primary care physician's office.              Daily Medication List (This medication list can be shared with any healthcare provider who is helping you manage your medications)      These are medications you told us you were taking at home, CONTINUE taking them after you leave the hospital        Last Dose    Next Dose Due AM NOON PM NIGHT    acetaminophen 325 MG tablet   Commonly known as:  TYLENOL   Take 650 mg by mouth every 4 hours as needed for Pain                650 mg on 8/10/2017  9:29 PM     As needed for pain                       amiodarone 200 MG tablet   Commonly known as:  CORDARONE   Take 1 tablet by mouth 2 times daily                  tonight                             aspirin 81 MG chewable tablet   Take 1 tablet by mouth daily                81 mg on 8/11/2017  8:37 AM     tomorrow                          atorvastatin 40 MG tablet   Commonly known as:  LIPITOR   Take 1 tablet by mouth nightly                  tonight                          BLOOD GLUCOSE TEST STRIPS Strp   Test 4 times daily Diagnosis                                         furosemide 20 MG tablet   Commonly known as:  LASIX   Take 1 tablet by mouth daily                20 mg on 8/11/2017  8:34 AM     tomorrow                          isosorbide mononitrate 30 MG extended release tablet   Commonly known as:  IMDUR   Take 1 tablet by mouth daily                30 mg on 8/11/2017  8:34 AM     tomorrow                          magnesium hydroxide 400 MG/5ML suspension   Commonly known as:  MILK OF MAGNESIA   Take 30 mLs by mouth daily as needed for Constipation                  As needed for constipation Name Date Dose VIS Date Route    Influenza Vaccine, unspecified formulation 11/11/2014 -- -- --    External: Patient reported    Influenza Vaccine, unspecified formulation 11/4/2013 -- -- --    External: Patient reported    Influenza Vaccine, unspecified formulation 11/5/2012 -- -- --    External: Patient reported    Influenza Virus Vaccine 10/5/2015 0.5 mL 8/7/2015 Intramuscular    Influenza, High Dose 10/10/2016 0.5 mL 8/7/2015 Intramuscular    Pneumococcal 13-valent Conjugate ([id number]) 11/16/2015 0.5 mL 11/5/2015 Intramuscular    Pneumococcal Conjugate 7-valent 1/9/2012 -- -- --    External: Patient reported    Zoster 6/20/2016 -- -- --    Comment: [facility]. Last Vitals          Most Recent Value    Temp  97.4 °F (36.3 °C)    Pulse  80    Resp  16    BP  126/69         After Visit Summary    This summary was created for you. Thank you for entrusting your care to us. The following information includes details about your hospital/visit stay along with steps you should take to help with your recovery once you leave the hospital.  In this packet, you will find information about the topics listed below:    · Instructions about your medications including a list of your home medications  · A summary of your hospital visit  · Follow-up appointments once you have left the hospital  · Your care plan at home      You may receive a survey regarding the care you received during your stay. Your input is valuable to us. We encourage you to complete and return your survey in the envelope provided. We hope you will choose us in the future for your healthcare needs.           Patient Information     Patient Name [id number] A 1940      Care Provided at:     Name Address Phone       [address] [phone]            Your Visit    Here you will find information about your visit, including the reason for Appointment with Heather Eckert MD at [address] [place] [address] ([phone])   Please arrive 15 minutes prior to appointment time, bring insurance card and photo ID. [address]  [facility] [phone]       9/21/2017 12:15 PM     Appointment with Dina Hermosillo DO at [facility] ([phone])   Please arrive 15 minutes prior to appointment time, bring insurance card and photo ID. [id number]  [place] [address] [phone]       9/27/2017 11:00 AM     Appointment with Heather Eckert MD at [facility] ([phone])   Please arrive 15 minutes prior to appointment time, bring insurance card and photo ID. [address]  [facility] [phone]       10/23/2017 9:00 AM     Appointment with [facility]KG MONITOR RM at [facility] ([phone])   Patient is to have clean, dry skin - do not use oils, lotions or powders. Patient cannot shower while the monitor is on. Please bring list of medications. [facility]         Preventive Care        Date Due    Tetanus Combination Vaccine (1 - Tdap) 10/27/1959    Pneumococcal Vaccines (two) for all adults aged 72 and over (2 of 2 - PPSV23) 11/16/2016    Yearly Flu Vaccine (1) 8/1/2017    Cholesterol Screening 2/6/2022                 Care Plan Once You Return Home    This section includes instructions you will need to follow once you leave the hospital.  Your care team will discuss these with you, so you and those caring for you know how to best care for your health needs at home. This section may also include educational information about certain health topics that may be of help to you. Discharge Instructions       [facility]  Discharge Instructions    GENERAL INSTRUCTIONS   1. While at home, observe your incision site for signs and symptoms of infection. Examples of these are:    A. Increased redness  B.  Drainage from incision site C. Unexplained fever  D. Increased tenderness or swelling     2. DO NOT STRETCH YOUR ARM MORE THAN 90 DEGREES ON THE SIDE     OF THE DEVICE!!!   Do not lift any objects heavier than ten pounds for 3 weeks. And no driving for 3  weeks, as these movements could cause dislodgement of your device leads. 3. Carry your device identification card with you at all times, as this will assist  any person dealing with your device. You will receive a permanent card from the  company within 8 weeks. 4. A Medic-Alert bracelet is strongly advised. 5. You may take a shower in 4 days after your device was implanted. For at  least 2 weeks, gently wash your incision site and pat dry. No tub baths for 7  days. 6. Do not attempt to remove the strips over your incision site. These strips will  dry up and fall off by themselves. WHAT TO AVOID   1. Your device may cause metal detectors such as those found in airports and  libraries to alarm. Show the security person your ID card and allow them to  screen hand you. 2. These are a few environmental objects that produce large magnetic fields,  which include:   · MRI Machines  · Arc welding equipment  · High voltage wires  · Ham radios  · Certain cellular phones (use your cell phone on the opposite site of your device)    3. These are household items that WILL NOT AFFECT your device and are all right to be around. · Microwave ovens  · Washers and dryers  · Hair dryers  · Power Tools  · Televisions  · Electric blankets  · Heating pads    MEDICAL FOLLOW-UP   1. Call the office upon arrival at home to make a 7-10 day follow up appointment  and a 6 week follow-up appointment will be made for a device check. 2. When visiting any hospital or physician, inform them that you have a device    [facility] at [facility].         MyChart Signup     [employer] allows you to send messages to your doctor, view your test your provider does not use Ole in his/her office    For questions regarding your MyChart account call [phone]. If you have a clinical question, please call your doctor's office. The information on all pages of the After Visit Summary has been reviewed with me, the patient and/or responsible adult, by my health care provider(s). I had the opportunity to ask questions regarding this information. I understand I should dispose of my armband safely at home to protect my health information. A complete copy of the After Visit Summary has been given to me, the patient and/or responsible adult.            Patient Signature/Responsible Adult:____________________    Clinician Signature:_____________________    Date:_____________________    Time:_____________________

## 2017-08-10 NOTE — IP AVS SNAPSHOT
Patient Information     Patient Name Kimberly [address] A 1940         This is your updated medication list to keep with you all times      TAKE these medications     acetaminophen 325 MG tablet   Commonly known as:  TYLENOL       amiodarone 200 MG tablet   Commonly known as:  CORDARONE   Take 1 tablet by mouth 2 times daily       aspirin 81 MG chewable tablet   Take 1 tablet by mouth daily       atorvastatin 40 MG tablet   Commonly known as:  LIPITOR   Take 1 tablet by mouth nightly       BLOOD GLUCOSE TEST STRIPS Strp   Test 4 times daily Diagnosis       furosemide 20 MG tablet   Commonly known as:  LASIX   Take 1 tablet by mouth daily       isosorbide mononitrate 30 MG extended release tablet   Commonly known as:  IMDUR   Take 1 tablet by mouth daily       magnesium hydroxide 400 MG/5ML suspension   Commonly known as:  MILK OF MAGNESIA   Take 30 mLs by mouth daily as needed for Constipation       melatonin 3 MG Tabs tablet       metFORMIN 500 MG tablet   Commonly known as:  GLUCOPHAGE   Take 1 tablet by mouth 2 times daily (with meals)       * metoprolol tartrate 50 MG tablet   Commonly known as:  LOPRESSOR   Take 1 tablet by mouth daily       * metoprolol tartrate 25 MG tablet   Commonly known as:  LOPRESSOR   Take 1 tablet by mouth nightly       OXYGEN       polyethylene glycol packet   Commonly known as:  GLYCOLAX   Take 17 g by mouth daily       predniSONE 10 MG tablet   Commonly known as:  DELTASONE       ranolazine 500 MG extended release tablet   Commonly known as:  RANEXA   Take 1 tablet by mouth 2 times daily       sulfamethoxazole-trimethoprim 800-160 MG per tablet   Commonly known as:  BACTRIM DS   1 tab once a day on Monday, Wednesday and friday       warfarin 7.5 MG tablet   Commonly known as:  COUMADIN   TAKE ONE TABLET BY MOUTH ONCE DAILY AS DIRECTED PER PHYSICIAN       * Notice:   This list has 2 medication(s) that are the same as other

## 2017-08-11 ENCOUNTER — APPOINTMENT (OUTPATIENT)
Dept: GENERAL RADIOLOGY | Age: 77
End: 2017-08-11
Attending: INTERNAL MEDICINE
Payer: MEDICARE

## 2017-08-11 VITALS
DIASTOLIC BLOOD PRESSURE: 69 MMHG | SYSTOLIC BLOOD PRESSURE: 126 MMHG | TEMPERATURE: 97.4 F | HEIGHT: 66 IN | BODY MASS INDEX: 30.6 KG/M2 | OXYGEN SATURATION: 96 % | HEART RATE: 80 BPM | RESPIRATION RATE: 16 BRPM | WEIGHT: 190.4 LBS

## 2017-08-11 LAB
EKG ATRIAL RATE: 81 BPM
EKG Q-T INTERVAL: 488 MS
EKG QRS DURATION: 150 MS
EKG QTC CALCULATION (BAZETT): 562 MS
EKG R AXIS: -108 DEGREES
EKG T AXIS: 62 DEGREES
EKG VENTRICULAR RATE: 80 BPM
GLUCOSE BLD-MCNC: 142 MG/DL (ref 70–108)

## 2017-08-11 PROCEDURE — 94760 N-INVAS EAR/PLS OXIMETRY 1: CPT

## 2017-08-11 PROCEDURE — 93005 ELECTROCARDIOGRAM TRACING: CPT

## 2017-08-11 PROCEDURE — 2580000003 HC RX 258: Performed by: INTERNAL MEDICINE

## 2017-08-11 PROCEDURE — 96361 HYDRATE IV INFUSION ADD-ON: CPT

## 2017-08-11 PROCEDURE — 96368 THER/DIAG CONCURRENT INF: CPT

## 2017-08-11 PROCEDURE — 6370000000 HC RX 637 (ALT 250 FOR IP): Performed by: INTERNAL MEDICINE

## 2017-08-11 PROCEDURE — 82948 REAGENT STRIP/BLOOD GLUCOSE: CPT

## 2017-08-11 PROCEDURE — 2700000000 HC OXYGEN THERAPY PER DAY

## 2017-08-11 PROCEDURE — 71020 XR CHEST STANDARD TWO VW: CPT

## 2017-08-11 RX ADMIN — ISOSORBIDE MONONITRATE 30 MG: 30 TABLET ORAL at 08:34

## 2017-08-11 RX ADMIN — RANOLAZINE 500 MG: 500 TABLET, FILM COATED, EXTENDED RELEASE ORAL at 08:34

## 2017-08-11 RX ADMIN — METOPROLOL TARTRATE 50 MG: 50 TABLET, FILM COATED ORAL at 08:34

## 2017-08-11 RX ADMIN — SODIUM CHLORIDE: 9 INJECTION, SOLUTION INTRAVENOUS at 05:04

## 2017-08-11 RX ADMIN — POLYETHYLENE GLYCOL 3350 17 G: 17 POWDER, FOR SOLUTION ORAL at 08:34

## 2017-08-11 RX ADMIN — FUROSEMIDE 20 MG: 20 TABLET ORAL at 08:34

## 2017-08-11 RX ADMIN — PREDNISONE 40 MG: 20 TABLET ORAL at 08:34

## 2017-08-11 RX ADMIN — Medication 2 UNITS: at 08:35

## 2017-08-11 RX ADMIN — ASPIRIN 81 MG: 81 TABLET, CHEWABLE ORAL at 08:37

## 2017-08-11 ASSESSMENT — PAIN SCALES - GENERAL: PAINLEVEL_OUTOF10: 0

## 2017-08-11 NOTE — OP NOTE
[address] [id number]                               OPERATIVE REPORT    PATIENT NAME: Demetrice KWON                             :       1940  MED REC NO:   [id number]                                      ROOM:      0022  ACCOUNT NO:   [de-identified]                                      ADMISSION  DATE:  08/10/2017  PROVIDER:     Evonne Garza    DATE OF PROCEDURE:  08/10/2017    INDICATION FOR PROCEDURE:  1. Uncontrolled atrial fibrillation with rapid ventricular response. 2.  Nonischemic dilated tachycardia induced cardiomyopathy. PROCEDURES:  1. Insertion of a biventricular pacemaker ICD. 2.  AV node ablation. HARDWARE AND IMPLANTED:  [address] MP, model  [id number], serial #[id number]. LEADS INSERTED:  1. Right ventricle:  [employer] Durata, model 7120Q-58 cm,  serial [id number]. 2.  Posterolateral coronary sinus vein:  [employer] Quartet,  model 1458Q-86 cm, serial [id number]. OPERATIVE PROCEDURE:  The patient was brought back to the [facility] OR in  a fasting, nonabsorptive, nonsedated state. Informed consent was  signed on the chart. An active timeout was performed by the  participating staff and physician. Device and lead availability was  confirmed before beginning the procedure. The patient was placed in  the supine position on the procedure table and appropriate monitoring  devices were attached to the patient. IV contrast was injected  through the left antecubital vein to rule out persistent left superior  vena cava. After the separate procedure was performed and interpreted  by the physician, the left pectoral area was prepped and draped using  the usual sterile barrier OR technique. Local anesthesia was used to  anesthetize the incision site. A #10-blade scalpel was used to make  an incision through the cuticular layer.   Continuation of the  dissection down to the vulnerable  window, therefore the defibrillation threshold was equal to a less  than 15 joules with high probability. CONCLUSION:  1. Successful insertion of a biventricular pacemaker ICD. 2.  Successful device and lead testing with programming at the time of  the implant. 3.  Ablation of the AV node.         JD OMREJON    D: 08/10/2017 16:36:14       T: 08/11/2017 3:51:56     [id number]  Job#: [id number]     Doc#: [id number]

## 2017-08-11 NOTE — PROGRESS NOTES
Patient given to and discussed with discharge instructions. Patient's family at bedside. Patient and family have no further questions at this time. Belongings gathered. Patient leaving for home with family. Home oxygen in vehicle.

## 2017-08-11 NOTE — CARE COORDINATION
DISCHARGE BARRIERS  8/11/17, 9:33 AM    Reason for Referral: \"wants HH\"  Mental Status:  Patient is alert and oriented  Decision Making:  Patient makes own decisions  Family/Social/Home Environment:  Spoke with patient and spouse, daughter and son-in-law also in room. Patient lives with , daughter and granddaughter also in home. Patient was at [facility] prior to [facility] for several weeks for rehab. Patient is returning home. Patients home has a chair in shower, grab bars and high toilet. Patient drives and has been independent with ADL's in the past.  Patient has a lift chair and rolling walker. Family is very supportive. Patient agreeable to [facility] services, skilled nursing, PT, OT and aide. Patient has had [address] in the past and would like again. Current Services: none  Current Equipment:rolling walker, lift chair and shower chair  Payment [address] [place] Medicare  Concerns or Barriers to Discharge: none  Collabrative List of ECF/HH were provided:Pateint has had [facility] in the past and would like again. Teach Back Method used with patient and spouse regarding care plan and discharge planning  Patient and spouse verbalize understanding of the plan of care and contribute to goal setting. Anticipated Needs/Discharge Plan: Patient plans to return home with spouse, spouse, daughter and son-in-law to provide transportation at discharge. Referral to [facility] for skilled nursing, PT, OT and aide servies will be made. Spoke with Vear Staff from [facility], referral made for skilled nursing, PT, OT and aide services, patient accepted. Informed patient will be discharged today.     Electronically signed by DUGLAS Santacruz on 8/11/2017 at 9:33 AM

## 2017-08-11 NOTE — PROGRESS NOTES
CARDIOLOGY ELECTROPHYSIOLOGY   DISCHARGE NOTE    DATE:  8/11/2017    SUBJECTIVE:  The patient did well overnight. There were no events reported by the nursing staff. The patient denies having any chest pain, pleurisy, palpitations, or dyspnea. EXAM:  Vitals:    08/10/17 2314 08/11/17 0502 08/11/17 0505 08/11/17 0829   BP: (!) 109/57 135/80  126/69   Pulse: 80 82  80   Resp: 16 16  16   Temp: 97.3 °F (36.3 °C) 97.6 °F (36.4 °C)  97.4 °F (36.3 °C)   TempSrc: Oral Oral  Oral   SpO2: 99% 98%  98%   Weight:   190 lb 6.4 oz (86.4 kg)    Height:         Op Site:  Dressing is dry, no heamtoma  CV: RRR  RESPIRATIONS: CTA-B  EX: No edema    DIAGNOSTIC STUDIES:    ECG: Atrial fibrillation with Biventricular pacing    CXR PA/LAT:  Preliminary report:  No pneumothorax, leads in good position. IMPRESSION:  The patient is s/p insertion of a biventricular pacemaker/ ICD and AVN ablation. PLAN:  1. D/C: Home    2. F/U: 1. Pacemaker Clinic Call ([phone]) the office upon arrival at home to make a 7-10 day follow up appointment and a 6 week follow-up appointment will be made for a device check. 2. When visiting any hospital or physician, inform them that you have a device     3. The patient's LRL will need to be reprogrammed from 80 to 60 in three weeks. 3.  MEDS:     Medication List      CONTINUE taking these medications          acetaminophen 325 MG tablet   Commonly known as:  TYLENOL       amiodarone 200 MG tablet   Commonly known as:  CORDARONE   Take 1 tablet by mouth 2 times daily       aspirin 81 MG chewable tablet   Take 1 tablet by mouth daily       atorvastatin 40 MG tablet   Commonly known as:  LIPITOR   Take 1 tablet by mouth nightly       BLOOD GLUCOSE TEST STRIPS Strp   Test 4 times daily Diagnosis       furosemide 20 MG tablet   Commonly known as:  LASIX   Take 1 tablet by mouth daily       isosorbide mononitrate 30 MG extended release tablet   Commonly known as:  IMDUR   Take 1 tablet by mouth

## 2017-08-11 NOTE — CARE COORDINATION
8/11/17, 3:33 PM     Patient discharged home with spouse and [address], PT, OT and aideYesenia Elizondo [facility]. Patient spouse provided transportation at discharge. Discharge plan discussed by  and . Discharge plan reviewed with patient/ family. Patient/ family verbalize understanding of discharge plan and are in agreement with plan. Understanding was demonstrated using the teach back method.

## 2017-08-14 ENCOUNTER — TELEPHONE (OUTPATIENT)
Dept: PHARMACY | Facility: CLINIC | Age: 77
End: 2017-08-14

## 2017-08-14 PROBLEM — Z79.01 LONG TERM (CURRENT) USE OF ANTICOAGULANTS: Status: ACTIVE | Noted: 2017-08-14

## 2017-08-14 PROBLEM — J84.9 INTERSTITIAL LUNG DISEASE (HCC): Status: ACTIVE | Noted: 2017-08-14

## 2017-08-14 PROBLEM — I48.91 ATRIAL FIBRILLATION (HCC): Status: ACTIVE | Noted: 2017-08-14

## 2017-08-14 PROBLEM — I50.22 CHRONIC SYSTOLIC CONGESTIVE HEART FAILURE (HCC): Status: ACTIVE | Noted: 2017-08-14

## 2017-08-14 RX ORDER — POLYETHYLENE GLYCOL 3350 17 G/17G
17 POWDER, FOR SOLUTION ORAL PRN
COMMUNITY

## 2017-08-15 PROBLEM — I50.23 ACUTE ON CHRONIC SYSTOLIC CONGESTIVE HEART FAILURE (HCC): Status: RESOLVED | Noted: 2017-06-29 | Resolved: 2017-08-15

## 2017-08-15 PROBLEM — J18.9 HCAP (HEALTHCARE-ASSOCIATED PNEUMONIA): Status: RESOLVED | Noted: 2017-06-04 | Resolved: 2017-08-15

## 2017-08-15 PROBLEM — J06.9 UPPER RESPIRATORY TRACT INFECTION: Status: RESOLVED | Noted: 2017-01-16 | Resolved: 2017-08-15

## 2017-08-15 PROBLEM — N39.0 URINARY TRACT INFECTION: Status: RESOLVED | Noted: 2017-06-30 | Resolved: 2017-08-15

## 2017-08-18 ENCOUNTER — OFFICE VISIT (OUTPATIENT)
Dept: CARDIOLOGY | Age: 77
End: 2017-08-18
Payer: MEDICARE

## 2017-08-18 ENCOUNTER — TELEPHONE (OUTPATIENT)
Dept: CARDIOLOGY | Age: 77
End: 2017-08-18

## 2017-08-18 ENCOUNTER — TELEPHONE (OUTPATIENT)
Dept: CARDIOLOGY CLINIC | Age: 77
End: 2017-08-18

## 2017-08-18 VITALS
HEART RATE: 79 BPM | HEIGHT: 66 IN | SYSTOLIC BLOOD PRESSURE: 125 MMHG | BODY MASS INDEX: 29.06 KG/M2 | OXYGEN SATURATION: 98 % | WEIGHT: 180.8 LBS | DIASTOLIC BLOOD PRESSURE: 78 MMHG

## 2017-08-18 DIAGNOSIS — I42.0 DILATED CARDIOMYOPATHY (HCC): ICD-10-CM

## 2017-08-18 DIAGNOSIS — Z98.890 S/P AV NODAL ABLATION: ICD-10-CM

## 2017-08-18 DIAGNOSIS — I25.10 ASHD (ARTERIOSCLEROTIC HEART DISEASE): Primary | ICD-10-CM

## 2017-08-18 DIAGNOSIS — I48.20 CHRONIC ATRIAL FIBRILLATION (HCC): ICD-10-CM

## 2017-08-18 DIAGNOSIS — Z95.810 ICD (IMPLANTABLE CARDIOVERTER-DEFIBRILLATOR) IN PLACE: ICD-10-CM

## 2017-08-18 DIAGNOSIS — Z79.01 CHRONIC ANTICOAGULATION: ICD-10-CM

## 2017-08-18 PROCEDURE — 99214 OFFICE O/P EST MOD 30 MIN: CPT | Performed by: INTERNAL MEDICINE

## 2017-08-23 ENCOUNTER — TELEPHONE (OUTPATIENT)
Dept: CARDIOLOGY CLINIC | Age: 77
End: 2017-08-23

## 2017-08-23 RX ORDER — ISOSORBIDE MONONITRATE 30 MG/1
TABLET, EXTENDED RELEASE ORAL
Qty: 90 TABLET | Refills: 3 | Status: SHIPPED | OUTPATIENT
Start: 2017-08-23 | End: 2017-09-08 | Stop reason: SDUPTHER

## 2017-08-28 LAB
EKG ATRIAL RATE: 105 BPM
EKG Q-T INTERVAL: 504 MS
EKG QRS DURATION: 142 MS
EKG QTC CALCULATION (BAZETT): 592 MS
EKG R AXIS: -25 DEGREES
EKG T AXIS: 129 DEGREES
EKG VENTRICULAR RATE: 83 BPM

## 2017-08-29 ENCOUNTER — HOSPITAL ENCOUNTER (EMERGENCY)
Age: 77
Discharge: HOME | End: 2017-08-29
Payer: MEDICARE

## 2017-08-29 VITALS
TEMPERATURE: 97.9 F | HEART RATE: 80 BPM | SYSTOLIC BLOOD PRESSURE: 127 MMHG | RESPIRATION RATE: 18 BRPM | OXYGEN SATURATION: 95 % | DIASTOLIC BLOOD PRESSURE: 73 MMHG

## 2017-08-29 DIAGNOSIS — R04.0 EPISTAXIS: Primary | ICD-10-CM

## 2017-08-29 DIAGNOSIS — D68.32 WARFARIN-INDUCED COAGULOPATHY (HCC): ICD-10-CM

## 2017-08-29 DIAGNOSIS — T45.515A WARFARIN-INDUCED COAGULOPATHY (HCC): ICD-10-CM

## 2017-08-29 LAB
-: NORMAL
ABSOLUTE BANDS #: 0.3 K/UL (ref 0–1)
ABSOLUTE EOS #: 0 K/UL (ref 0–0.4)
ABSOLUTE LYMPH #: 1.41 K/UL (ref 1–4.8)
ABSOLUTE MONO #: 0.1 K/UL (ref 0–1)
BANDS: 3 %
BASOPHILS # BLD: 0 %
BASOPHILS ABSOLUTE: 0 K/UL (ref 0–0.2)
DIFFERENTIAL TYPE: ABNORMAL
EOSINOPHILS RELATIVE PERCENT: 0 %
HCT VFR BLD CALC: 42.3 % (ref 36–46)
HEMOGLOBIN: 14 G/DL (ref 12–16)
INR BLD: >8.4 (ref 0.9–1.2)
LYMPHOCYTES # BLD: 14 %
MCH RBC QN AUTO: 30.9 PG (ref 26–34)
MCHC RBC AUTO-ENTMCNC: 33 G/DL (ref 31–37)
MCV RBC AUTO: 93.6 FL (ref 80–100)
MONOCYTES # BLD: 1 %
MORPHOLOGY: ABNORMAL
PDW BLD-RTO: 20.9 % (ref 12.1–15.2)
PLATELET # BLD: 226 K/UL (ref 140–450)
PLATELET ESTIMATE: ABNORMAL
PMV BLD AUTO: 7.9 FL (ref 6–12)
PROTHROMBIN TIME: >100 SEC (ref 9.7–12.2)
RBC # BLD: 4.52 M/UL (ref 4–5.2)
RBC # BLD: ABNORMAL 10*6/UL
REASON FOR REJECTION: NORMAL
SEG NEUTROPHILS: 82 %
SEGMENTED NEUTROPHILS ABSOLUTE COUNT: 8.29 K/UL (ref 1.8–7.7)
WBC # BLD: 10.1 K/UL (ref 3.5–11)
WBC # BLD: ABNORMAL 10*3/UL
ZZ NTE CLEAN UP: ORDERED TEST: NORMAL
ZZ NTE WITH NAME CLEAN UP: SPECIMEN SOURCE: NORMAL

## 2017-08-29 PROCEDURE — 6370000000 HC RX 637 (ALT 250 FOR IP): Performed by: PHYSICIAN ASSISTANT

## 2017-08-29 PROCEDURE — 85610 PROTHROMBIN TIME: CPT

## 2017-08-29 PROCEDURE — 2500000003 HC RX 250 WO HCPCS: Performed by: PHYSICIAN ASSISTANT

## 2017-08-29 PROCEDURE — 2580000003 HC RX 258: Performed by: PHYSICIAN ASSISTANT

## 2017-08-29 PROCEDURE — 99283 EMERGENCY DEPT VISIT LOW MDM: CPT

## 2017-08-29 PROCEDURE — 36415 COLL VENOUS BLD VENIPUNCTURE: CPT

## 2017-08-29 PROCEDURE — 85025 COMPLETE CBC W/AUTO DIFF WBC: CPT

## 2017-08-29 RX ORDER — PHYTONADIONE 5 MG/1
10 TABLET ORAL ONCE
Status: COMPLETED | OUTPATIENT
Start: 2017-08-29 | End: 2017-08-29

## 2017-08-29 RX ORDER — CEPHALEXIN 500 MG/1
500 CAPSULE ORAL 4 TIMES DAILY
Qty: 28 CAPSULE | Refills: 0 | Status: SHIPPED | OUTPATIENT
Start: 2017-08-29 | End: 2017-09-05

## 2017-08-29 RX ADMIN — PHYTONADIONE 10 MG: 5 TABLET ORAL at 16:52

## 2017-08-29 RX ADMIN — TRANEXAMIC ACID 1000 MG: 100 INJECTION, SOLUTION INTRAVENOUS at 14:45

## 2017-08-29 ASSESSMENT — ENCOUNTER SYMPTOMS
ABDOMINAL PAIN: 0
EYE PAIN: 0
DIARRHEA: 0
WHEEZING: 0
COUGH: 0
BACK PAIN: 0
SORE THROAT: 0
RHINORRHEA: 0
SHORTNESS OF BREATH: 0
VOMITING: 0
NAUSEA: 0
EYE ITCHING: 0

## 2017-08-30 PROBLEM — R04.0 EPISTAXIS: Status: ACTIVE | Noted: 2017-08-30

## 2017-08-30 PROBLEM — J98.4 CHRONIC LUNG DISEASE: Status: ACTIVE | Noted: 2017-08-30

## 2017-08-31 ENCOUNTER — TELEPHONE (OUTPATIENT)
Dept: CARDIOLOGY CLINIC | Age: 77
End: 2017-08-31

## 2017-09-01 ENCOUNTER — HOSPITAL ENCOUNTER (EMERGENCY)
Age: 77
Discharge: HOME | End: 2017-09-01
Payer: MEDICARE

## 2017-09-01 VITALS
OXYGEN SATURATION: 95 % | DIASTOLIC BLOOD PRESSURE: 71 MMHG | HEART RATE: 80 BPM | TEMPERATURE: 98 F | SYSTOLIC BLOOD PRESSURE: 132 MMHG

## 2017-09-01 DIAGNOSIS — Z48.00 ENCOUNTER FOR REMOVAL OF NASAL PACK: Primary | ICD-10-CM

## 2017-09-01 PROCEDURE — 99282 EMERGENCY DEPT VISIT SF MDM: CPT

## 2017-09-03 ASSESSMENT — ENCOUNTER SYMPTOMS
EYE DISCHARGE: 0
BLOOD IN STOOL: 0
EYE REDNESS: 0
COUGH: 0
CHEST TIGHTNESS: 0
VOMITING: 0
RHINORRHEA: 0
WHEEZING: 0
BACK PAIN: 0
SHORTNESS OF BREATH: 0
ABDOMINAL PAIN: 0
DIARRHEA: 0
CONSTIPATION: 0
SORE THROAT: 0
NAUSEA: 0

## 2017-09-05 PROBLEM — I50.9 CHRONIC CONGESTIVE HEART FAILURE (HCC): Status: ACTIVE | Noted: 2017-09-05

## 2017-09-05 PROBLEM — R29.898 LEG FATIGUE: Status: ACTIVE | Noted: 2017-09-05

## 2017-09-06 PROBLEM — I50.22 CHRONIC SYSTOLIC CONGESTIVE HEART FAILURE (HCC): Status: RESOLVED | Noted: 2017-08-14 | Resolved: 2017-09-06

## 2017-09-06 PROBLEM — R79.1 SUPRATHERAPEUTIC INR: Status: RESOLVED | Noted: 2017-06-29 | Resolved: 2017-09-06

## 2017-09-08 RX ORDER — WARFARIN SODIUM 5 MG/1
5 TABLET ORAL DAILY
Status: ON HOLD | COMMUNITY
End: 2017-09-16

## 2017-09-11 PROBLEM — M62.89 MUSCLE FATIGUE: Status: ACTIVE | Noted: 2017-09-11

## 2017-09-11 PROBLEM — R55 NEAR SYNCOPE: Status: ACTIVE | Noted: 2017-09-11

## 2017-09-11 PROBLEM — R55 VASODEPRESSOR SYNCOPE: Status: ACTIVE | Noted: 2017-09-11

## 2017-09-14 ENCOUNTER — PREP FOR PROCEDURE (OUTPATIENT)
Dept: CARDIOLOGY CLINIC | Age: 77
End: 2017-09-14

## 2017-09-15 ENCOUNTER — ANESTHESIA EVENT (OUTPATIENT)
Dept: CARDIAC CATH/INVASIVE PROCEDURES | Age: 77
DRG: 274 | End: 2017-09-15
Payer: MEDICARE

## 2017-09-15 ENCOUNTER — ANESTHESIA (OUTPATIENT)
Dept: CARDIAC CATH/INVASIVE PROCEDURES | Age: 77
DRG: 274 | End: 2017-09-15
Payer: MEDICARE

## 2017-09-15 ENCOUNTER — HOSPITAL ENCOUNTER (INPATIENT)
Dept: INPATIENT UNIT | Age: 77
LOS: 3 days | Discharge: HOME HEALTH | DRG: 274 | End: 2017-09-18
Attending: INTERNAL MEDICINE | Admitting: INTERNAL MEDICINE
Payer: MEDICARE

## 2017-09-15 ENCOUNTER — APPOINTMENT (OUTPATIENT)
Dept: CARDIAC CATH/INVASIVE PROCEDURES | Age: 77
DRG: 274 | End: 2017-09-15
Attending: INTERNAL MEDICINE
Payer: MEDICARE

## 2017-09-15 VITALS
DIASTOLIC BLOOD PRESSURE: 58 MMHG | RESPIRATION RATE: 1 BRPM | OXYGEN SATURATION: 100 % | SYSTOLIC BLOOD PRESSURE: 135 MMHG

## 2017-09-15 PROBLEM — I48.21 PERMANENT ATRIAL FIBRILLATION (HCC): Status: ACTIVE | Noted: 2017-08-14

## 2017-09-15 LAB
ABO: NORMAL
ANION GAP SERPL CALCULATED.3IONS-SCNC: 13 MEQ/L (ref 8–16)
ANTIBODY SCREEN: NORMAL
BASE EXCESS (CALCULATED): 4.6 MMOL/L (ref -2.5–2.5)
BUN BLDV-MCNC: 21 MG/DL (ref 7–22)
CALCIUM IONIZED SERUM: 0.94 MMOL/L (ref 1.12–1.32)
CALCIUM SERPL-MCNC: 8.9 MG/DL (ref 8.5–10.5)
CHLORIDE BLD-SCNC: 94 MEQ/L (ref 98–111)
CO2: 28 MEQ/L (ref 23–33)
COLLECTED BY:: ABNORMAL
CREAT SERPL-MCNC: 0.9 MG/DL (ref 0.4–1.2)
GFR SERPL CREATININE-BSD FRML MDRD: 61 ML/MIN/1.73M2
GLUCOSE BLD-MCNC: 147 MG/DL (ref 70–108)
GLUCOSE BLD-MCNC: 169 MG/DL (ref 70–108)
GLUCOSE BLD-MCNC: 178 MG/DL (ref 70–108)
GLUCOSE BLD-MCNC: 84 MG/DL (ref 70–108)
GLUCOSE, WHOLE BLOOD: 119 MG/DL (ref 70–108)
HCO3: 30 MMOL/L (ref 23–28)
HCT VFR BLD CALC: 42.3 % (ref 37–47)
HEMOGLOBIN FINGERSTICK, POC: 11 G/DL (ref 12–16)
HEMOGLOBIN FINGERSTICK, POC: 11.2 G/DL (ref 12–16)
HEMOGLOBIN: 14.1 GM/DL (ref 12–16)
INR BLD: 4.26 (ref 0.85–1.13)
MRSA SCREEN RT-PCR: NEGATIVE
O2 SATURATION: 100 %
PCO2: 45 MMHG (ref 35–45)
PH BLOOD GAS: 7.43 (ref 7.35–7.45)
PO2: 369 MMHG (ref 71–104)
POC ACTIVATED CLOTTING TIME KAOLIN: 142 SECONDS (ref 1–150)
POC ACTIVATED CLOTTING TIME KAOLIN: 279 SECONDS (ref 1–150)
POC ACTIVATED CLOTTING TIME KAOLIN: 290 SECONDS (ref 1–150)
POC ACTIVATED CLOTTING TIME KAOLIN: 329 SECONDS (ref 1–150)
POC ACTIVATED CLOTTING TIME KAOLIN: 373 SECONDS (ref 1–150)
POC ACTIVATED CLOTTING TIME KAOLIN: > 1000 SECONDS (ref 1–150)
POTASSIUM SERPL-SCNC: 4.9 MEQ/L (ref 3.5–5.2)
POTASSIUM, WHOLE BLOOD: 3.5 MEQ/L (ref 3.5–5.2)
RH FACTOR: NORMAL
SODIUM BLD-SCNC: 135 MEQ/L (ref 135–145)
SODIUM, WHOLE BLOOD: 134 MEQ/L (ref 135–145)

## 2017-09-15 PROCEDURE — 6370000000 HC RX 637 (ALT 250 FOR IP): Performed by: INTERNAL MEDICINE

## 2017-09-15 PROCEDURE — 96360 HYDRATION IV INFUSION INIT: CPT

## 2017-09-15 PROCEDURE — 2580000003 HC RX 258: Performed by: INTERNAL MEDICINE

## 2017-09-15 PROCEDURE — 6360000002 HC RX W HCPCS

## 2017-09-15 PROCEDURE — 82948 REAGENT STRIP/BLOOD GLUCOSE: CPT

## 2017-09-15 PROCEDURE — 2720000010 HC SURG SUPPLY STERILE

## 2017-09-15 PROCEDURE — 36430 TRANSFUSION BLD/BLD COMPNT: CPT

## 2017-09-15 PROCEDURE — 86901 BLOOD TYPING SEROLOGIC RH(D): CPT

## 2017-09-15 PROCEDURE — 85347 COAGULATION TIME ACTIVATED: CPT

## 2017-09-15 PROCEDURE — 33340 PERQ CLSR TCAT L ATR APNDGE: CPT | Performed by: INTERNAL MEDICINE

## 2017-09-15 PROCEDURE — 2500000003 HC RX 250 WO HCPCS

## 2017-09-15 PROCEDURE — 82803 BLOOD GASES ANY COMBINATION: CPT

## 2017-09-15 PROCEDURE — P9016 RBC LEUKOCYTES REDUCED: HCPCS

## 2017-09-15 PROCEDURE — 85014 HEMATOCRIT: CPT

## 2017-09-15 PROCEDURE — 96361 HYDRATE IV INFUSION ADD-ON: CPT

## 2017-09-15 PROCEDURE — 7100000000 HC PACU RECOVERY - FIRST 15 MIN

## 2017-09-15 PROCEDURE — 80048 BASIC METABOLIC PNL TOTAL CA: CPT

## 2017-09-15 PROCEDURE — 85610 PROTHROMBIN TIME: CPT

## 2017-09-15 PROCEDURE — 86900 BLOOD TYPING SEROLOGIC ABO: CPT

## 2017-09-15 PROCEDURE — 02L73DK OCCLUSION OF LEFT ATRIAL APPENDAGE WITH INTRALUMINAL DEVICE, PERCUTANEOUS APPROACH: ICD-10-PCS | Performed by: INTERNAL MEDICINE

## 2017-09-15 PROCEDURE — 86923 COMPATIBILITY TEST ELECTRIC: CPT

## 2017-09-15 PROCEDURE — 84132 ASSAY OF SERUM POTASSIUM: CPT

## 2017-09-15 PROCEDURE — 3700000001 HC ADD 15 MINUTES (ANESTHESIA)

## 2017-09-15 PROCEDURE — 82947 ASSAY GLUCOSE BLOOD QUANT: CPT

## 2017-09-15 PROCEDURE — 84295 ASSAY OF SERUM SODIUM: CPT

## 2017-09-15 PROCEDURE — C1894 INTRO/SHEATH, NON-LASER: HCPCS

## 2017-09-15 PROCEDURE — C1769 GUIDE WIRE: HCPCS

## 2017-09-15 PROCEDURE — 2500000003 HC RX 250 WO HCPCS: Performed by: NURSE ANESTHETIST, CERTIFIED REGISTERED

## 2017-09-15 PROCEDURE — 82330 ASSAY OF CALCIUM: CPT

## 2017-09-15 PROCEDURE — 3700000000 HC ANESTHESIA ATTENDED CARE

## 2017-09-15 PROCEDURE — 93355 ECHO TRANSESOPHAGEAL (TEE): CPT | Performed by: INTERNAL MEDICINE

## 2017-09-15 PROCEDURE — 36620 INSERTION CATHETER ARTERY: CPT | Performed by: INTERNAL MEDICINE

## 2017-09-15 PROCEDURE — 7100000001 HC PACU RECOVERY - ADDTL 15 MIN

## 2017-09-15 PROCEDURE — 87641 MR-STAPH DNA AMP PROBE: CPT

## 2017-09-15 PROCEDURE — 36415 COLL VENOUS BLD VENIPUNCTURE: CPT

## 2017-09-15 PROCEDURE — 87081 CULTURE SCREEN ONLY: CPT

## 2017-09-15 PROCEDURE — 6370000000 HC RX 637 (ALT 250 FOR IP)

## 2017-09-15 PROCEDURE — A6258 TRANSPARENT FILM >16<=48 IN: HCPCS

## 2017-09-15 PROCEDURE — 85018 HEMOGLOBIN: CPT

## 2017-09-15 PROCEDURE — 2100000000 HC CCU R&B

## 2017-09-15 PROCEDURE — 6360000002 HC RX W HCPCS: Performed by: NURSE ANESTHETIST, CERTIFIED REGISTERED

## 2017-09-15 PROCEDURE — 86850 RBC ANTIBODY SCREEN: CPT

## 2017-09-15 PROCEDURE — C1760 CLOSURE DEV, VASC: HCPCS

## 2017-09-15 RX ORDER — FUROSEMIDE 20 MG/1
20 TABLET ORAL DAILY
Status: DISCONTINUED | OUTPATIENT
Start: 2017-09-15 | End: 2017-09-18 | Stop reason: HOSPADM

## 2017-09-15 RX ORDER — ACETAMINOPHEN 325 MG/1
650 TABLET ORAL EVERY 4 HOURS PRN
Status: DISCONTINUED | OUTPATIENT
Start: 2017-09-15 | End: 2017-09-18 | Stop reason: HOSPADM

## 2017-09-15 RX ORDER — FENTANYL CITRATE 50 UG/ML
INJECTION, SOLUTION INTRAMUSCULAR; INTRAVENOUS PRN
Status: DISCONTINUED | OUTPATIENT
Start: 2017-09-15 | End: 2017-09-15 | Stop reason: SDUPTHER

## 2017-09-15 RX ORDER — POTASSIUM CHLORIDE 7.45 MG/ML
10 INJECTION INTRAVENOUS PRN
Status: DISCONTINUED | OUTPATIENT
Start: 2017-09-15 | End: 2017-09-18 | Stop reason: HOSPADM

## 2017-09-15 RX ORDER — MORPHINE SULFATE 2 MG/ML
2 INJECTION, SOLUTION INTRAMUSCULAR; INTRAVENOUS EVERY 5 MIN PRN
Status: DISCONTINUED | OUTPATIENT
Start: 2017-09-15 | End: 2017-09-15

## 2017-09-15 RX ORDER — ROCURONIUM BROMIDE 10 MG/ML
INJECTION, SOLUTION INTRAVENOUS PRN
Status: DISCONTINUED | OUTPATIENT
Start: 2017-09-15 | End: 2017-09-15 | Stop reason: SDUPTHER

## 2017-09-15 RX ORDER — AMIODARONE HYDROCHLORIDE 200 MG/1
200 TABLET ORAL 2 TIMES DAILY
Status: DISCONTINUED | OUTPATIENT
Start: 2017-09-15 | End: 2017-09-18 | Stop reason: HOSPADM

## 2017-09-15 RX ORDER — POTASSIUM CHLORIDE 20 MEQ/1
40 TABLET, EXTENDED RELEASE ORAL PRN
Status: DISCONTINUED | OUTPATIENT
Start: 2017-09-15 | End: 2017-09-18 | Stop reason: HOSPADM

## 2017-09-15 RX ORDER — PROTAMINE SULFATE 10 MG/ML
INJECTION, SOLUTION INTRAVENOUS PRN
Status: DISCONTINUED | OUTPATIENT
Start: 2017-09-15 | End: 2017-09-15 | Stop reason: SDUPTHER

## 2017-09-15 RX ORDER — NEOSTIGMINE METHYLSULFATE 1 MG/ML
INJECTION, SOLUTION INTRAVENOUS PRN
Status: DISCONTINUED | OUTPATIENT
Start: 2017-09-15 | End: 2017-09-15 | Stop reason: SDUPTHER

## 2017-09-15 RX ORDER — CALCIUM CARBONATE 500(1250)
1000 TABLET ORAL EVERY 4 HOURS
Status: COMPLETED | OUTPATIENT
Start: 2017-09-15 | End: 2017-09-16

## 2017-09-15 RX ORDER — ONDANSETRON 2 MG/ML
4 INJECTION INTRAMUSCULAR; INTRAVENOUS EVERY 6 HOURS PRN
Status: DISCONTINUED | OUTPATIENT
Start: 2017-09-15 | End: 2017-09-18 | Stop reason: HOSPADM

## 2017-09-15 RX ORDER — FENTANYL CITRATE 50 UG/ML
50 INJECTION, SOLUTION INTRAMUSCULAR; INTRAVENOUS EVERY 5 MIN PRN
Status: DISCONTINUED | OUTPATIENT
Start: 2017-09-15 | End: 2017-09-15

## 2017-09-15 RX ORDER — HEPARIN SODIUM 1000 [USP'U]/ML
INJECTION, SOLUTION INTRAVENOUS; SUBCUTANEOUS PRN
Status: DISCONTINUED | OUTPATIENT
Start: 2017-09-15 | End: 2017-09-15 | Stop reason: SDUPTHER

## 2017-09-15 RX ORDER — RANOLAZINE 500 MG/1
500 TABLET, EXTENDED RELEASE ORAL 2 TIMES DAILY
Status: DISCONTINUED | OUTPATIENT
Start: 2017-09-15 | End: 2017-09-18 | Stop reason: HOSPADM

## 2017-09-15 RX ORDER — LIDOCAINE HYDROCHLORIDE 20 MG/ML
INJECTION, SOLUTION INFILTRATION; PERINEURAL PRN
Status: DISCONTINUED | OUTPATIENT
Start: 2017-09-15 | End: 2017-09-15 | Stop reason: SDUPTHER

## 2017-09-15 RX ORDER — ISOSORBIDE MONONITRATE 30 MG/1
30 TABLET, EXTENDED RELEASE ORAL DAILY
Status: DISCONTINUED | OUTPATIENT
Start: 2017-09-15 | End: 2017-09-18 | Stop reason: HOSPADM

## 2017-09-15 RX ORDER — ONDANSETRON 2 MG/ML
INJECTION INTRAMUSCULAR; INTRAVENOUS PRN
Status: DISCONTINUED | OUTPATIENT
Start: 2017-09-15 | End: 2017-09-15 | Stop reason: SDUPTHER

## 2017-09-15 RX ORDER — ASPIRIN 81 MG/1
81 TABLET, CHEWABLE ORAL DAILY
Status: DISCONTINUED | OUTPATIENT
Start: 2017-09-15 | End: 2017-09-18 | Stop reason: HOSPADM

## 2017-09-15 RX ORDER — POTASSIUM CHLORIDE 20MEQ/15ML
40 LIQUID (ML) ORAL PRN
Status: DISCONTINUED | OUTPATIENT
Start: 2017-09-15 | End: 2017-09-18 | Stop reason: HOSPADM

## 2017-09-15 RX ORDER — 0.9 % SODIUM CHLORIDE 0.9 %
250 INTRAVENOUS SOLUTION INTRAVENOUS ONCE
Status: DISCONTINUED | OUTPATIENT
Start: 2017-09-15 | End: 2017-09-18 | Stop reason: HOSPADM

## 2017-09-15 RX ORDER — PREDNISONE 20 MG/1
40 TABLET ORAL DAILY
Status: DISCONTINUED | OUTPATIENT
Start: 2017-09-15 | End: 2017-09-18 | Stop reason: HOSPADM

## 2017-09-15 RX ORDER — SODIUM CHLORIDE 9 MG/ML
INJECTION, SOLUTION INTRAVENOUS CONTINUOUS
Status: DISCONTINUED | OUTPATIENT
Start: 2017-09-15 | End: 2017-09-18 | Stop reason: HOSPADM

## 2017-09-15 RX ORDER — POTASSIUM CHLORIDE 750 MG/1
40 TABLET, FILM COATED, EXTENDED RELEASE ORAL ONCE
Status: COMPLETED | OUTPATIENT
Start: 2017-09-15 | End: 2017-09-15

## 2017-09-15 RX ORDER — GLYCOPYRROLATE 0.2 MG/ML
INJECTION INTRAMUSCULAR; INTRAVENOUS PRN
Status: DISCONTINUED | OUTPATIENT
Start: 2017-09-15 | End: 2017-09-15 | Stop reason: SDUPTHER

## 2017-09-15 RX ORDER — LABETALOL HYDROCHLORIDE 5 MG/ML
10 INJECTION, SOLUTION INTRAVENOUS EVERY 10 MIN PRN
Status: DISCONTINUED | OUTPATIENT
Start: 2017-09-15 | End: 2017-09-15

## 2017-09-15 RX ORDER — SUCCINYLCHOLINE CHLORIDE 20 MG/ML
INJECTION INTRAMUSCULAR; INTRAVENOUS PRN
Status: DISCONTINUED | OUTPATIENT
Start: 2017-09-15 | End: 2017-09-15 | Stop reason: SDUPTHER

## 2017-09-15 RX ORDER — DEXTROSE MONOHYDRATE 25 G/50ML
12.5 INJECTION, SOLUTION INTRAVENOUS PRN
Status: DISCONTINUED | OUTPATIENT
Start: 2017-09-15 | End: 2017-09-18 | Stop reason: HOSPADM

## 2017-09-15 RX ORDER — DEXTROSE MONOHYDRATE 50 MG/ML
100 INJECTION, SOLUTION INTRAVENOUS PRN
Status: DISCONTINUED | OUTPATIENT
Start: 2017-09-15 | End: 2017-09-18 | Stop reason: HOSPADM

## 2017-09-15 RX ORDER — CEFAZOLIN SODIUM 1 G/3ML
INJECTION, POWDER, FOR SOLUTION INTRAMUSCULAR; INTRAVENOUS PRN
Status: DISCONTINUED | OUTPATIENT
Start: 2017-09-15 | End: 2017-09-15 | Stop reason: SDUPTHER

## 2017-09-15 RX ORDER — FUROSEMIDE 10 MG/ML
INJECTION INTRAMUSCULAR; INTRAVENOUS PRN
Status: DISCONTINUED | OUTPATIENT
Start: 2017-09-15 | End: 2017-09-15 | Stop reason: SDUPTHER

## 2017-09-15 RX ORDER — METOPROLOL TARTRATE 50 MG/1
50 TABLET, FILM COATED ORAL EVERY MORNING
Status: DISCONTINUED | OUTPATIENT
Start: 2017-09-16 | End: 2017-09-18 | Stop reason: HOSPADM

## 2017-09-15 RX ORDER — NICOTINE POLACRILEX 4 MG
15 LOZENGE BUCCAL PRN
Status: DISCONTINUED | OUTPATIENT
Start: 2017-09-15 | End: 2017-09-18 | Stop reason: HOSPADM

## 2017-09-15 RX ORDER — LANOLIN ALCOHOL/MO/W.PET/CERES
3 CREAM (GRAM) TOPICAL NIGHTLY
Status: DISCONTINUED | OUTPATIENT
Start: 2017-09-15 | End: 2017-09-18 | Stop reason: HOSPADM

## 2017-09-15 RX ORDER — DEXAMETHASONE SODIUM PHOSPHATE 4 MG/ML
INJECTION, SOLUTION INTRA-ARTICULAR; INTRALESIONAL; INTRAMUSCULAR; INTRAVENOUS; SOFT TISSUE PRN
Status: DISCONTINUED | OUTPATIENT
Start: 2017-09-15 | End: 2017-09-15 | Stop reason: SDUPTHER

## 2017-09-15 RX ORDER — PROPOFOL 10 MG/ML
INJECTION, EMULSION INTRAVENOUS PRN
Status: DISCONTINUED | OUTPATIENT
Start: 2017-09-15 | End: 2017-09-15 | Stop reason: SDUPTHER

## 2017-09-15 RX ORDER — POLYETHYLENE GLYCOL 3350 17 G/17G
17 POWDER, FOR SOLUTION ORAL DAILY PRN
Status: DISCONTINUED | OUTPATIENT
Start: 2017-09-15 | End: 2017-09-18 | Stop reason: HOSPADM

## 2017-09-15 RX ADMIN — Medication 40 MG: at 09:28

## 2017-09-15 RX ADMIN — PHENYLEPHRINE HYDROCHLORIDE 100 MCG: 10 INJECTION INTRAMUSCULAR; INTRAVENOUS; SUBCUTANEOUS at 10:31

## 2017-09-15 RX ADMIN — PHENYLEPHRINE HYDROCHLORIDE 300 MCG: 10 INJECTION INTRAMUSCULAR; INTRAVENOUS; SUBCUTANEOUS at 11:20

## 2017-09-15 RX ADMIN — SODIUM CHLORIDE: 9 INJECTION, SOLUTION INTRAVENOUS at 11:00

## 2017-09-15 RX ADMIN — PHENYLEPHRINE HYDROCHLORIDE 200 MCG: 10 INJECTION INTRAMUSCULAR; INTRAVENOUS; SUBCUTANEOUS at 10:05

## 2017-09-15 RX ADMIN — PHENYLEPHRINE HYDROCHLORIDE 300 MCG: 10 INJECTION INTRAMUSCULAR; INTRAVENOUS; SUBCUTANEOUS at 11:15

## 2017-09-15 RX ADMIN — Medication 10 MG: at 09:53

## 2017-09-15 RX ADMIN — PHENYLEPHRINE HYDROCHLORIDE 200 MCG: 10 INJECTION INTRAMUSCULAR; INTRAVENOUS; SUBCUTANEOUS at 11:40

## 2017-09-15 RX ADMIN — LIDOCAINE HYDROCHLORIDE 80 MG: 20 INJECTION, SOLUTION INFILTRATION; PERINEURAL at 09:20

## 2017-09-15 RX ADMIN — PHENYLEPHRINE HYDROCHLORIDE 200 MCG: 10 INJECTION INTRAMUSCULAR; INTRAVENOUS; SUBCUTANEOUS at 11:23

## 2017-09-15 RX ADMIN — RANOLAZINE 500 MG: 500 TABLET, FILM COATED, EXTENDED RELEASE ORAL at 21:00

## 2017-09-15 RX ADMIN — PHENYLEPHRINE HYDROCHLORIDE 200 MCG: 10 INJECTION INTRAMUSCULAR; INTRAVENOUS; SUBCUTANEOUS at 11:35

## 2017-09-15 RX ADMIN — GLYCOPYRROLATE 0.4 MG: 0.2 INJECTION, SOLUTION INTRAMUSCULAR; INTRAVENOUS at 12:15

## 2017-09-15 RX ADMIN — PHENYLEPHRINE HYDROCHLORIDE 100 MCG: 10 INJECTION INTRAMUSCULAR; INTRAVENOUS; SUBCUTANEOUS at 10:04

## 2017-09-15 RX ADMIN — CALCIUM 1000 MG: 500 TABLET ORAL at 16:19

## 2017-09-15 RX ADMIN — SUCCINYLCHOLINE CHLORIDE 100 MG: 20 INJECTION, SOLUTION INTRAMUSCULAR; INTRAVENOUS at 09:20

## 2017-09-15 RX ADMIN — PROTAMINE SULFATE 50 MG: 10 INJECTION, SOLUTION INTRAVENOUS at 12:03

## 2017-09-15 RX ADMIN — HEPARIN SODIUM 13000 UNITS: 1000 INJECTION, SOLUTION INTRAVENOUS; SUBCUTANEOUS at 10:10

## 2017-09-15 RX ADMIN — PHENYLEPHRINE HYDROCHLORIDE 100 MCG: 10 INJECTION INTRAMUSCULAR; INTRAVENOUS; SUBCUTANEOUS at 09:30

## 2017-09-15 RX ADMIN — POTASSIUM CHLORIDE 40 MEQ: 750 TABLET, FILM COATED, EXTENDED RELEASE ORAL at 16:19

## 2017-09-15 RX ADMIN — PHENYLEPHRINE HYDROCHLORIDE 300 MCG: 10 INJECTION INTRAMUSCULAR; INTRAVENOUS; SUBCUTANEOUS at 11:17

## 2017-09-15 RX ADMIN — PROPOFOL 150 MG: 10 INJECTION, EMULSION INTRAVENOUS at 09:20

## 2017-09-15 RX ADMIN — ASPIRIN 81 MG: 81 TABLET, CHEWABLE ORAL at 16:14

## 2017-09-15 RX ADMIN — INSULIN LISPRO 1 UNITS: 100 INJECTION, SOLUTION INTRAVENOUS; SUBCUTANEOUS at 21:00

## 2017-09-15 RX ADMIN — FUROSEMIDE 15 MG: 10 INJECTION, SOLUTION INTRAMUSCULAR; INTRAVENOUS at 12:19

## 2017-09-15 RX ADMIN — ONDANSETRON 4 MG: 2 INJECTION INTRAMUSCULAR; INTRAVENOUS at 11:56

## 2017-09-15 RX ADMIN — FENTANYL CITRATE 50 MCG: 50 INJECTION INTRAMUSCULAR; INTRAVENOUS at 09:45

## 2017-09-15 RX ADMIN — PHENYLEPHRINE HYDROCHLORIDE 200 MCG: 10 INJECTION INTRAMUSCULAR; INTRAVENOUS; SUBCUTANEOUS at 10:12

## 2017-09-15 RX ADMIN — CALCIUM 1000 MG: 500 TABLET ORAL at 21:00

## 2017-09-15 RX ADMIN — DEXAMETHASONE SODIUM PHOSPHATE 8 MG: 4 INJECTION, SOLUTION INTRAMUSCULAR; INTRAVENOUS at 11:56

## 2017-09-15 RX ADMIN — PHENYLEPHRINE HYDROCHLORIDE 100 MCG: 10 INJECTION INTRAMUSCULAR; INTRAVENOUS; SUBCUTANEOUS at 10:35

## 2017-09-15 RX ADMIN — PHENYLEPHRINE HYDROCHLORIDE 300 MCG: 10 INJECTION INTRAMUSCULAR; INTRAVENOUS; SUBCUTANEOUS at 11:31

## 2017-09-15 RX ADMIN — PHENYLEPHRINE HYDROCHLORIDE 200 MCG: 10 INJECTION INTRAMUSCULAR; INTRAVENOUS; SUBCUTANEOUS at 11:45

## 2017-09-15 RX ADMIN — CEFAZOLIN 2000 MG: 1 INJECTION, POWDER, FOR SOLUTION INTRAMUSCULAR; INTRAVENOUS; PARENTERAL at 09:30

## 2017-09-15 RX ADMIN — Medication 2 UNITS: at 18:18

## 2017-09-15 RX ADMIN — PHENYLEPHRINE HYDROCHLORIDE 300 MCG: 10 INJECTION INTRAMUSCULAR; INTRAVENOUS; SUBCUTANEOUS at 11:25

## 2017-09-15 RX ADMIN — PREDNISONE 40 MG: 20 TABLET ORAL at 16:14

## 2017-09-15 RX ADMIN — Medication 10 MG: at 10:14

## 2017-09-15 RX ADMIN — Medication 10 MG: at 10:45

## 2017-09-15 RX ADMIN — NEOSTIGMINE METHYLSULFATE 3 MG: 1 INJECTION, SOLUTION INTRAVENOUS at 12:15

## 2017-09-15 RX ADMIN — PHENYLEPHRINE HYDROCHLORIDE 100 MCG: 10 INJECTION INTRAMUSCULAR; INTRAVENOUS; SUBCUTANEOUS at 09:35

## 2017-09-15 RX ADMIN — Medication 10 MG: at 11:15

## 2017-09-15 RX ADMIN — FENTANYL CITRATE 50 MCG: 50 INJECTION INTRAMUSCULAR; INTRAVENOUS at 09:27

## 2017-09-15 RX ADMIN — PHENYLEPHRINE HYDROCHLORIDE 100 MCG: 10 INJECTION INTRAMUSCULAR; INTRAVENOUS; SUBCUTANEOUS at 10:25

## 2017-09-15 RX ADMIN — PHENYLEPHRINE HYDROCHLORIDE 200 MCG: 10 INJECTION INTRAMUSCULAR; INTRAVENOUS; SUBCUTANEOUS at 10:15

## 2017-09-15 RX ADMIN — SODIUM CHLORIDE: 9 INJECTION, SOLUTION INTRAVENOUS at 06:58

## 2017-09-15 ASSESSMENT — PULMONARY FUNCTION TESTS
PIF_VALUE: 19
PIF_VALUE: 19
PIF_VALUE: 20
PIF_VALUE: 19
PIF_VALUE: 20
PIF_VALUE: 20
PIF_VALUE: 19
PIF_VALUE: 21
PIF_VALUE: 20
PIF_VALUE: 20
PIF_VALUE: 11
PIF_VALUE: 20
PIF_VALUE: 20
PIF_VALUE: 21
PIF_VALUE: 20
PIF_VALUE: 2
PIF_VALUE: 20
PIF_VALUE: 21
PIF_VALUE: 19
PIF_VALUE: 20
PIF_VALUE: 19
PIF_VALUE: 9
PIF_VALUE: 21
PIF_VALUE: 20
PIF_VALUE: 8
PIF_VALUE: 21
PIF_VALUE: 20
PIF_VALUE: 1
PIF_VALUE: 19
PIF_VALUE: 20
PIF_VALUE: 20
PIF_VALUE: 0
PIF_VALUE: 19
PIF_VALUE: 20
PIF_VALUE: 19
PIF_VALUE: 1
PIF_VALUE: 20
PIF_VALUE: 21
PIF_VALUE: 21
PIF_VALUE: 19
PIF_VALUE: 20
PIF_VALUE: 19
PIF_VALUE: 20
PIF_VALUE: 1
PIF_VALUE: 19
PIF_VALUE: 20
PIF_VALUE: 20
PIF_VALUE: 17
PIF_VALUE: 1
PIF_VALUE: 20
PIF_VALUE: 20
PIF_VALUE: 19
PIF_VALUE: 0
PIF_VALUE: 0
PIF_VALUE: 1
PIF_VALUE: 17
PIF_VALUE: 1
PIF_VALUE: 19
PIF_VALUE: 20
PIF_VALUE: 19
PIF_VALUE: 20
PIF_VALUE: 19
PIF_VALUE: 11
PIF_VALUE: 21
PIF_VALUE: 5
PIF_VALUE: 19
PIF_VALUE: 20
PIF_VALUE: 21
PIF_VALUE: 21
PIF_VALUE: 1
PIF_VALUE: 1
PIF_VALUE: 20
PIF_VALUE: 8
PIF_VALUE: 20
PIF_VALUE: 21
PIF_VALUE: 17
PIF_VALUE: 21
PIF_VALUE: 19
PIF_VALUE: 21
PIF_VALUE: 20
PIF_VALUE: 20
PIF_VALUE: 17
PIF_VALUE: 19
PIF_VALUE: 11
PIF_VALUE: 20
PIF_VALUE: 20
PIF_VALUE: 19
PIF_VALUE: 20
PIF_VALUE: 19
PIF_VALUE: 20
PIF_VALUE: 20
PIF_VALUE: 22
PIF_VALUE: 1
PIF_VALUE: 20
PIF_VALUE: 19
PIF_VALUE: 22
PIF_VALUE: 17
PIF_VALUE: 1
PIF_VALUE: 6
PIF_VALUE: 20
PIF_VALUE: 20
PIF_VALUE: 0
PIF_VALUE: 21
PIF_VALUE: 2
PIF_VALUE: 0
PIF_VALUE: 20
PIF_VALUE: 20
PIF_VALUE: 19
PIF_VALUE: 20
PIF_VALUE: 19
PIF_VALUE: 21
PIF_VALUE: 20
PIF_VALUE: 17
PIF_VALUE: 1
PIF_VALUE: 20
PIF_VALUE: 16
PIF_VALUE: 20
PIF_VALUE: 1
PIF_VALUE: 19
PIF_VALUE: 20
PIF_VALUE: 21
PIF_VALUE: 19
PIF_VALUE: 20
PIF_VALUE: 21
PIF_VALUE: 1
PIF_VALUE: 20
PIF_VALUE: 21
PIF_VALUE: 20
PIF_VALUE: 21
PIF_VALUE: 20
PIF_VALUE: 1
PIF_VALUE: 20
PIF_VALUE: 0
PIF_VALUE: 9
PIF_VALUE: 20
PIF_VALUE: 1
PIF_VALUE: 7
PIF_VALUE: 20
PIF_VALUE: 20
PIF_VALUE: 8
PIF_VALUE: 20
PIF_VALUE: 1
PIF_VALUE: 20
PIF_VALUE: 2
PIF_VALUE: 21
PIF_VALUE: 2
PIF_VALUE: 20
PIF_VALUE: 20
PIF_VALUE: 4
PIF_VALUE: 22
PIF_VALUE: 8
PIF_VALUE: 21
PIF_VALUE: 20
PIF_VALUE: 2
PIF_VALUE: 1
PIF_VALUE: 18
PIF_VALUE: 20
PIF_VALUE: 12
PIF_VALUE: 17
PIF_VALUE: 19
PIF_VALUE: 20
PIF_VALUE: 20
PIF_VALUE: 0
PIF_VALUE: 1
PIF_VALUE: 21
PIF_VALUE: 19
PIF_VALUE: 20
PIF_VALUE: 21
PIF_VALUE: 20
PIF_VALUE: 20
PIF_VALUE: 3

## 2017-09-15 ASSESSMENT — PAIN SCALES - GENERAL
PAINLEVEL_OUTOF10: 0

## 2017-09-16 LAB
ANION GAP SERPL CALCULATED.3IONS-SCNC: 11 MEQ/L (ref 8–16)
BUN BLDV-MCNC: 11 MG/DL (ref 7–22)
CALCIUM IONIZED: 1.04 MMOL/L (ref 1.12–1.32)
CALCIUM SERPL-MCNC: 8.6 MG/DL (ref 8.5–10.5)
CHLORIDE BLD-SCNC: 100 MEQ/L (ref 98–111)
CO2: 26 MEQ/L (ref 23–33)
CREAT SERPL-MCNC: 0.7 MG/DL (ref 0.4–1.2)
GFR SERPL CREATININE-BSD FRML MDRD: 81 ML/MIN/1.73M2
GLUCOSE BLD-MCNC: 113 MG/DL (ref 70–108)
GLUCOSE BLD-MCNC: 135 MG/DL (ref 70–108)
GLUCOSE BLD-MCNC: 147 MG/DL (ref 70–108)
GLUCOSE BLD-MCNC: 163 MG/DL (ref 70–108)
GLUCOSE BLD-MCNC: 196 MG/DL (ref 70–108)
GLUCOSE BLD-MCNC: 235 MG/DL (ref 70–108)
HCT VFR BLD CALC: 42.1 % (ref 37–47)
HEMOGLOBIN: 14 GM/DL (ref 12–16)
INR BLD: 3.6 (ref 0.85–1.13)
POTASSIUM SERPL-SCNC: 5.4 MEQ/L (ref 3.5–5.2)
SODIUM BLD-SCNC: 137 MEQ/L (ref 135–145)

## 2017-09-16 PROCEDURE — 2100000000 HC CCU R&B

## 2017-09-16 PROCEDURE — 85014 HEMATOCRIT: CPT

## 2017-09-16 PROCEDURE — 82330 ASSAY OF CALCIUM: CPT

## 2017-09-16 PROCEDURE — 36415 COLL VENOUS BLD VENIPUNCTURE: CPT

## 2017-09-16 PROCEDURE — 6370000000 HC RX 637 (ALT 250 FOR IP)

## 2017-09-16 PROCEDURE — 6370000000 HC RX 637 (ALT 250 FOR IP): Performed by: INTERNAL MEDICINE

## 2017-09-16 PROCEDURE — 85610 PROTHROMBIN TIME: CPT

## 2017-09-16 PROCEDURE — A6258 TRANSPARENT FILM >16<=48 IN: HCPCS

## 2017-09-16 PROCEDURE — 80048 BASIC METABOLIC PNL TOTAL CA: CPT

## 2017-09-16 PROCEDURE — 85018 HEMOGLOBIN: CPT

## 2017-09-16 PROCEDURE — 82948 REAGENT STRIP/BLOOD GLUCOSE: CPT

## 2017-09-16 RX ORDER — WARFARIN SODIUM 5 MG/1
5 TABLET ORAL DAILY
Qty: 30 TABLET | Refills: 3 | Status: SHIPPED | OUTPATIENT
Start: 2017-09-24 | End: 2017-11-13 | Stop reason: ALTCHOICE

## 2017-09-16 RX ADMIN — RANOLAZINE 500 MG: 500 TABLET, FILM COATED, EXTENDED RELEASE ORAL at 08:43

## 2017-09-16 RX ADMIN — Medication 3 MG: at 20:30

## 2017-09-16 RX ADMIN — Medication 2 UNITS: at 17:11

## 2017-09-16 RX ADMIN — PREDNISONE 40 MG: 20 TABLET ORAL at 08:43

## 2017-09-16 RX ADMIN — METOPROLOL TARTRATE 50 MG: 50 TABLET, FILM COATED ORAL at 08:43

## 2017-09-16 RX ADMIN — ISOSORBIDE MONONITRATE 30 MG: 30 TABLET ORAL at 08:43

## 2017-09-16 RX ADMIN — ASPIRIN 81 MG: 81 TABLET, CHEWABLE ORAL at 08:43

## 2017-09-16 RX ADMIN — RANOLAZINE 500 MG: 500 TABLET, FILM COATED, EXTENDED RELEASE ORAL at 20:27

## 2017-09-16 RX ADMIN — INSULIN LISPRO 1 UNITS: 100 INJECTION, SOLUTION INTRAVENOUS; SUBCUTANEOUS at 20:27

## 2017-09-16 RX ADMIN — AMIODARONE HYDROCHLORIDE 200 MG: 200 TABLET ORAL at 20:27

## 2017-09-16 RX ADMIN — CALCIUM 1000 MG: 500 TABLET ORAL at 01:48

## 2017-09-16 RX ADMIN — AMIODARONE HYDROCHLORIDE 200 MG: 200 TABLET ORAL at 08:43

## 2017-09-16 RX ADMIN — FUROSEMIDE 20 MG: 20 TABLET ORAL at 08:43

## 2017-09-16 ASSESSMENT — PAIN SCALES - GENERAL
PAINLEVEL_OUTOF10: 0

## 2017-09-17 LAB
GLUCOSE BLD-MCNC: 106 MG/DL (ref 70–108)
GLUCOSE BLD-MCNC: 133 MG/DL (ref 70–108)
GLUCOSE BLD-MCNC: 178 MG/DL (ref 70–108)
GLUCOSE BLD-MCNC: 233 MG/DL (ref 70–108)
HCT VFR BLD CALC: 35.5 % (ref 37–47)
HEMOGLOBIN: 12.2 GM/DL (ref 12–16)
MRSA SCREEN: NORMAL
VRE CULTURE: NORMAL

## 2017-09-17 PROCEDURE — A6258 TRANSPARENT FILM >16<=48 IN: HCPCS

## 2017-09-17 PROCEDURE — 85018 HEMOGLOBIN: CPT

## 2017-09-17 PROCEDURE — 85014 HEMATOCRIT: CPT

## 2017-09-17 PROCEDURE — 2100000000 HC CCU R&B

## 2017-09-17 PROCEDURE — 82948 REAGENT STRIP/BLOOD GLUCOSE: CPT

## 2017-09-17 PROCEDURE — 6370000000 HC RX 637 (ALT 250 FOR IP): Performed by: INTERNAL MEDICINE

## 2017-09-17 PROCEDURE — 36415 COLL VENOUS BLD VENIPUNCTURE: CPT

## 2017-09-17 PROCEDURE — 2580000003 HC RX 258: Performed by: INTERNAL MEDICINE

## 2017-09-17 RX ORDER — 0.9 % SODIUM CHLORIDE 0.9 %
1000 INTRAVENOUS SOLUTION INTRAVENOUS ONCE
Status: COMPLETED | OUTPATIENT
Start: 2017-09-17 | End: 2017-09-17

## 2017-09-17 RX ADMIN — Medication 3 MG: at 20:54

## 2017-09-17 RX ADMIN — INSULIN LISPRO 2 UNITS: 100 INJECTION, SOLUTION INTRAVENOUS; SUBCUTANEOUS at 20:56

## 2017-09-17 RX ADMIN — RANOLAZINE 500 MG: 500 TABLET, FILM COATED, EXTENDED RELEASE ORAL at 10:28

## 2017-09-17 RX ADMIN — ASPIRIN 81 MG: 81 TABLET, CHEWABLE ORAL at 10:28

## 2017-09-17 RX ADMIN — METOPROLOL TARTRATE 50 MG: 50 TABLET, FILM COATED ORAL at 10:27

## 2017-09-17 RX ADMIN — FUROSEMIDE 20 MG: 20 TABLET ORAL at 10:28

## 2017-09-17 RX ADMIN — RANOLAZINE 500 MG: 500 TABLET, FILM COATED, EXTENDED RELEASE ORAL at 20:54

## 2017-09-17 RX ADMIN — PREDNISONE 40 MG: 20 TABLET ORAL at 10:27

## 2017-09-17 RX ADMIN — AMIODARONE HYDROCHLORIDE 200 MG: 200 TABLET ORAL at 10:28

## 2017-09-17 RX ADMIN — ISOSORBIDE MONONITRATE 30 MG: 30 TABLET ORAL at 10:27

## 2017-09-17 RX ADMIN — AMIODARONE HYDROCHLORIDE 200 MG: 200 TABLET ORAL at 20:54

## 2017-09-17 RX ADMIN — SODIUM CHLORIDE 1000 ML: 9 INJECTION, SOLUTION INTRAVENOUS at 15:40

## 2017-09-17 RX ADMIN — Medication 2 UNITS: at 16:55

## 2017-09-17 ASSESSMENT — PAIN SCALES - GENERAL
PAINLEVEL_OUTOF10: 0

## 2017-09-18 ENCOUNTER — TELEPHONE (OUTPATIENT)
Dept: PHARMACY | Age: 77
End: 2017-09-18

## 2017-09-18 VITALS
BODY MASS INDEX: 29.8 KG/M2 | WEIGHT: 185.41 LBS | HEIGHT: 66 IN | RESPIRATION RATE: 20 BRPM | SYSTOLIC BLOOD PRESSURE: 110 MMHG | HEART RATE: 80 BPM | DIASTOLIC BLOOD PRESSURE: 80 MMHG | OXYGEN SATURATION: 100 % | TEMPERATURE: 97.5 F

## 2017-09-18 LAB — GLUCOSE BLD-MCNC: 108 MG/DL (ref 70–108)

## 2017-09-18 PROCEDURE — 82948 REAGENT STRIP/BLOOD GLUCOSE: CPT

## 2017-09-18 PROCEDURE — 6370000000 HC RX 637 (ALT 250 FOR IP): Performed by: INTERNAL MEDICINE

## 2017-09-18 PROCEDURE — C1760 CLOSURE DEV, VASC: HCPCS

## 2017-09-18 PROCEDURE — 2700000000 HC OXYGEN THERAPY PER DAY

## 2017-09-18 RX ADMIN — RANOLAZINE 500 MG: 500 TABLET, FILM COATED, EXTENDED RELEASE ORAL at 08:11

## 2017-09-18 RX ADMIN — PREDNISONE 40 MG: 20 TABLET ORAL at 08:12

## 2017-09-18 RX ADMIN — AMIODARONE HYDROCHLORIDE 200 MG: 200 TABLET ORAL at 08:11

## 2017-09-18 RX ADMIN — FUROSEMIDE 20 MG: 20 TABLET ORAL at 08:12

## 2017-09-18 RX ADMIN — ASPIRIN 81 MG: 81 TABLET, CHEWABLE ORAL at 08:11

## 2017-09-18 ASSESSMENT — PAIN SCALES - GENERAL
PAINLEVEL_OUTOF10: 0

## 2017-09-20 PROBLEM — I50.9 CHRONIC CONGESTIVE HEART FAILURE (HCC): Status: RESOLVED | Noted: 2017-09-05 | Resolved: 2017-09-20

## 2017-09-20 PROBLEM — I50.22 CHRONIC SYSTOLIC CONGESTIVE HEART FAILURE (HCC): Status: ACTIVE | Noted: 2017-09-20

## 2017-09-20 PROBLEM — R55 NEAR SYNCOPE: Status: RESOLVED | Noted: 2017-09-11 | Resolved: 2017-09-20

## 2017-09-20 PROBLEM — I48.0 PAROXYSMAL A-FIB (HCC): Status: ACTIVE | Noted: 2017-09-20

## 2017-09-20 PROBLEM — R04.0 EPISTAXIS: Status: RESOLVED | Noted: 2017-08-30 | Resolved: 2017-09-20

## 2017-09-20 PROBLEM — I48.21 PERMANENT ATRIAL FIBRILLATION (HCC): Status: RESOLVED | Noted: 2017-08-14 | Resolved: 2017-09-20

## 2017-09-21 ENCOUNTER — OFFICE VISIT (OUTPATIENT)
Dept: PULMONOLOGY | Age: 77
End: 2017-09-21
Payer: MEDICARE

## 2017-09-21 VITALS
WEIGHT: 181 LBS | BODY MASS INDEX: 29.09 KG/M2 | OXYGEN SATURATION: 99 % | HEART RATE: 80 BPM | TEMPERATURE: 97.9 F | DIASTOLIC BLOOD PRESSURE: 59 MMHG | HEIGHT: 66 IN | RESPIRATION RATE: 16 BRPM | SYSTOLIC BLOOD PRESSURE: 87 MMHG

## 2017-09-21 DIAGNOSIS — J84.10 PULMONARY FIBROSIS (HCC): ICD-10-CM

## 2017-09-21 DIAGNOSIS — J67.9 HYPERSENSITIVITY PNEUMONITIS (HCC): Primary | ICD-10-CM

## 2017-09-21 DIAGNOSIS — R06.09 DYSPNEA ON EXERTION: ICD-10-CM

## 2017-09-21 DIAGNOSIS — J96.11 CHRONIC RESPIRATORY FAILURE WITH HYPOXIA (HCC): ICD-10-CM

## 2017-09-21 PROCEDURE — 99215 OFFICE O/P EST HI 40 MIN: CPT | Performed by: INTERNAL MEDICINE

## 2017-09-21 RX ORDER — PREDNISONE 10 MG/1
TABLET ORAL
Qty: 60 TABLET | Refills: 5 | Status: SHIPPED | OUTPATIENT
Start: 2017-09-21 | End: 2017-09-25 | Stop reason: DRUGHIGH

## 2017-09-25 ENCOUNTER — OFFICE VISIT (OUTPATIENT)
Dept: CARDIOLOGY | Age: 77
End: 2017-09-25
Payer: MEDICARE

## 2017-09-25 ENCOUNTER — HOSPITAL ENCOUNTER (OUTPATIENT)
Age: 77
Discharge: HOME | End: 2017-09-25
Payer: MEDICARE

## 2017-09-25 VITALS
OXYGEN SATURATION: 98 % | DIASTOLIC BLOOD PRESSURE: 73 MMHG | HEIGHT: 66 IN | HEART RATE: 80 BPM | SYSTOLIC BLOOD PRESSURE: 108 MMHG | BODY MASS INDEX: 28.45 KG/M2 | WEIGHT: 177 LBS

## 2017-09-25 DIAGNOSIS — I27.20 PULMONARY HYPERTENSION (HCC): ICD-10-CM

## 2017-09-25 DIAGNOSIS — I25.10 ASHD (ARTERIOSCLEROTIC HEART DISEASE): ICD-10-CM

## 2017-09-25 DIAGNOSIS — Z95.810 ICD (IMPLANTABLE CARDIOVERTER-DEFIBRILLATOR) IN PLACE: ICD-10-CM

## 2017-09-25 DIAGNOSIS — I25.5 ISCHEMIC CARDIOMYOPATHY: Chronic | ICD-10-CM

## 2017-09-25 DIAGNOSIS — I48.0 PAROXYSMAL A-FIB (HCC): ICD-10-CM

## 2017-09-25 DIAGNOSIS — I25.5 ISCHEMIC CARDIOMYOPATHY: Primary | Chronic | ICD-10-CM

## 2017-09-25 LAB
ANION GAP SERPL CALCULATED.3IONS-SCNC: 17 MMOL/L (ref 9–17)
BUN BLDV-MCNC: 24 MG/DL (ref 8–23)
BUN/CREAT BLD: 26 (ref 9–20)
CALCIUM SERPL-MCNC: 8.7 MG/DL (ref 8.6–10.4)
CHLORIDE BLD-SCNC: 94 MMOL/L (ref 98–107)
CO2: 23 MMOL/L (ref 20–31)
CREAT SERPL-MCNC: 0.92 MG/DL (ref 0.5–0.9)
GFR AFRICAN AMERICAN: >60 ML/MIN
GFR NON-AFRICAN AMERICAN: 59 ML/MIN
GFR SERPL CREATININE-BSD FRML MDRD: ABNORMAL ML/MIN/{1.73_M2}
GFR SERPL CREATININE-BSD FRML MDRD: ABNORMAL ML/MIN/{1.73_M2}
GLUCOSE BLD-MCNC: 159 MG/DL (ref 70–99)
HCT VFR BLD CALC: 38.4 % (ref 36–46)
HEMOGLOBIN: 12.9 G/DL (ref 12–16)
MCH RBC QN AUTO: 31.8 PG (ref 26–34)
MCHC RBC AUTO-ENTMCNC: 33.6 G/DL (ref 31–37)
MCV RBC AUTO: 94.6 FL (ref 80–100)
PDW BLD-RTO: 18.2 % (ref 12.1–15.2)
PLATELET # BLD: 214 K/UL (ref 140–450)
PMV BLD AUTO: 7.6 FL (ref 6–12)
POTASSIUM SERPL-SCNC: 4.2 MMOL/L (ref 3.7–5.3)
RBC # BLD: 4.06 M/UL (ref 4–5.2)
SODIUM BLD-SCNC: 134 MMOL/L (ref 135–144)
WBC # BLD: 9.3 K/UL (ref 3.5–11)

## 2017-09-25 PROCEDURE — 99214 OFFICE O/P EST MOD 30 MIN: CPT | Performed by: INTERNAL MEDICINE

## 2017-09-25 PROCEDURE — 85027 COMPLETE CBC AUTOMATED: CPT

## 2017-09-25 PROCEDURE — 36415 COLL VENOUS BLD VENIPUNCTURE: CPT

## 2017-09-25 PROCEDURE — 80048 BASIC METABOLIC PNL TOTAL CA: CPT

## 2017-09-25 RX ORDER — PREDNISONE 20 MG/1
15 TABLET ORAL DAILY
COMMUNITY
End: 2017-11-03 | Stop reason: DRUGHIGH

## 2017-09-26 ENCOUNTER — HOSPITAL ENCOUNTER (OUTPATIENT)
Dept: PULMONOLOGY | Age: 77
Discharge: HOME | End: 2017-09-26
Payer: MEDICARE

## 2017-09-26 DIAGNOSIS — J84.10 PULMONARY FIBROSIS (HCC): ICD-10-CM

## 2017-09-26 DIAGNOSIS — J67.9 HYPERSENSITIVITY PNEUMONITIS (HCC): ICD-10-CM

## 2017-09-26 PROCEDURE — 94060 EVALUATION OF WHEEZING: CPT

## 2017-09-26 PROCEDURE — 6360000002 HC RX W HCPCS: Performed by: INTERNAL MEDICINE

## 2017-09-26 PROCEDURE — 94664 DEMO&/EVAL PT USE INHALER: CPT

## 2017-09-26 PROCEDURE — 94729 DIFFUSING CAPACITY: CPT

## 2017-09-26 PROCEDURE — 94726 PLETHYSMOGRAPHY LUNG VOLUMES: CPT

## 2017-09-26 RX ORDER — ALBUTEROL SULFATE 2.5 MG/3ML
2.5 SOLUTION RESPIRATORY (INHALATION) ONCE
Status: COMPLETED | OUTPATIENT
Start: 2017-09-26 | End: 2017-09-26

## 2017-09-26 RX ADMIN — ALBUTEROL SULFATE 2.5 MG: 2.5 SOLUTION RESPIRATORY (INHALATION) at 11:08

## 2017-09-27 PROBLEM — I25.10 CORONARY ARTERY DISEASE INVOLVING NATIVE CORONARY ARTERY: Chronic | Status: RESOLVED | Noted: 2017-06-04 | Resolved: 2017-09-27

## 2017-09-27 PROBLEM — I50.42 CHRONIC COMBINED SYSTOLIC AND DIASTOLIC CONGESTIVE HEART FAILURE (HCC): Status: ACTIVE | Noted: 2017-09-27

## 2017-09-27 PROBLEM — I50.22 CHRONIC SYSTOLIC CONGESTIVE HEART FAILURE (HCC): Status: RESOLVED | Noted: 2017-09-20 | Resolved: 2017-09-27

## 2017-09-28 ENCOUNTER — HOSPITAL ENCOUNTER (OUTPATIENT)
Dept: CT IMAGING | Age: 77
Discharge: HOME | End: 2017-09-28
Payer: MEDICARE

## 2017-09-28 DIAGNOSIS — J84.10 PULMONARY FIBROSIS (HCC): ICD-10-CM

## 2017-09-28 DIAGNOSIS — J67.9 HYPERSENSITIVITY PNEUMONITIS (HCC): ICD-10-CM

## 2017-09-28 PROCEDURE — 71250 CT THORAX DX C-: CPT

## 2017-10-04 PROBLEM — I48.91 ATRIAL FIBRILLATION (HCC): Status: ACTIVE | Noted: 2017-10-04

## 2017-10-07 ENCOUNTER — HOSPITAL ENCOUNTER (OUTPATIENT)
Age: 77
Discharge: HOME | End: 2017-10-07
Payer: MEDICARE

## 2017-10-07 DIAGNOSIS — Z79.01 LONG TERM (CURRENT) USE OF ANTICOAGULANTS: ICD-10-CM

## 2017-10-07 DIAGNOSIS — I48.91 ATRIAL FIBRILLATION, UNSPECIFIED TYPE (HCC): ICD-10-CM

## 2017-10-07 LAB
ANION GAP SERPL CALCULATED.3IONS-SCNC: 13 MMOL/L (ref 9–17)
BUN BLDV-MCNC: 19 MG/DL (ref 8–23)
BUN/CREAT BLD: 21 (ref 9–20)
CALCIUM SERPL-MCNC: 9.7 MG/DL (ref 8.6–10.4)
CHLORIDE BLD-SCNC: 96 MMOL/L (ref 98–107)
CO2: 29 MMOL/L (ref 20–31)
CREAT SERPL-MCNC: 0.89 MG/DL (ref 0.5–0.9)
GFR AFRICAN AMERICAN: >60 ML/MIN
GFR NON-AFRICAN AMERICAN: >60 ML/MIN
GFR SERPL CREATININE-BSD FRML MDRD: ABNORMAL ML/MIN/{1.73_M2}
GFR SERPL CREATININE-BSD FRML MDRD: ABNORMAL ML/MIN/{1.73_M2}
GLUCOSE BLD-MCNC: 88 MG/DL (ref 70–99)
INR BLD: 1.8 (ref 0.9–1.2)
POTASSIUM SERPL-SCNC: 4.8 MMOL/L (ref 3.7–5.3)
PROTHROMBIN TIME: 19.4 SEC (ref 9.7–12.2)
SODIUM BLD-SCNC: 138 MMOL/L (ref 135–144)

## 2017-10-07 PROCEDURE — 85610 PROTHROMBIN TIME: CPT

## 2017-10-07 PROCEDURE — 80048 BASIC METABOLIC PNL TOTAL CA: CPT

## 2017-10-07 PROCEDURE — 36415 COLL VENOUS BLD VENIPUNCTURE: CPT

## 2017-10-16 DIAGNOSIS — I48.19 PERSISTENT ATRIAL FIBRILLATION (HCC): Primary | ICD-10-CM

## 2017-10-16 PROBLEM — Z23 NEED FOR IMMUNIZATION AGAINST INFLUENZA: Status: ACTIVE | Noted: 2017-10-16

## 2017-10-17 PROBLEM — I48.0 PAROXYSMAL A-FIB (HCC): Status: RESOLVED | Noted: 2017-09-20 | Resolved: 2017-10-17

## 2017-10-23 ENCOUNTER — HOSPITAL ENCOUNTER (OUTPATIENT)
Dept: NON INVASIVE DIAGNOSTICS | Age: 77
Discharge: HOME | End: 2017-10-23
Payer: MEDICARE

## 2017-10-23 DIAGNOSIS — I48.20 CHRONIC ATRIAL FIBRILLATION (HCC): ICD-10-CM

## 2017-10-23 PROCEDURE — 93225 XTRNL ECG REC<48 HRS REC: CPT

## 2017-10-26 ENCOUNTER — OFFICE VISIT (OUTPATIENT)
Dept: PULMONOLOGY | Age: 77
End: 2017-10-26
Payer: MEDICARE

## 2017-10-26 VITALS
RESPIRATION RATE: 16 BRPM | HEART RATE: 80 BPM | SYSTOLIC BLOOD PRESSURE: 103 MMHG | HEIGHT: 66 IN | WEIGHT: 180 LBS | OXYGEN SATURATION: 96 % | BODY MASS INDEX: 28.93 KG/M2 | DIASTOLIC BLOOD PRESSURE: 59 MMHG | TEMPERATURE: 97.8 F

## 2017-10-26 DIAGNOSIS — J96.11 CHRONIC RESPIRATORY FAILURE WITH HYPOXIA (HCC): ICD-10-CM

## 2017-10-26 DIAGNOSIS — J67.9 HYPERSENSITIVITY PNEUMONITIS (HCC): Primary | ICD-10-CM

## 2017-10-26 DIAGNOSIS — R06.02 SHORTNESS OF BREATH: ICD-10-CM

## 2017-10-26 DIAGNOSIS — J84.112 UIP (USUAL INTERSTITIAL PNEUMONITIS) (HCC): ICD-10-CM

## 2017-10-26 DIAGNOSIS — J30.89 NON-SEASONAL ALLERGIC RHINITIS, UNSPECIFIED CHRONICITY, UNSPECIFIED TRIGGER: ICD-10-CM

## 2017-10-26 PROCEDURE — 99215 OFFICE O/P EST HI 40 MIN: CPT | Performed by: INTERNAL MEDICINE

## 2017-10-26 NOTE — PROGRESS NOTES
pain, murmur, orthopnea or paroxysmal nocturnal dyspnea  Gastrointestinal ROS: negative for - abdominal pain, blood in stools, change in bowel habits, change in stools, constipation, diarrhea, heartburn, hematemesis, melena, nausea/vomiting, stool incontinence and swallowing difficulty/pain  Musculoskeletal ROS: negative for - gait disturbance, joint pain, joint stiffness, joint swelling, muscle pain or muscular weakness  : negative for hematuria and dysuria  CNS: negative for Dizziness, diplopia, headache, weakness  Hem/Onc: positive for easy bleeding and bruising  Skin: negative for rash and skin lesions    No flowsheet data found. Allergies:   Allergies   Allergen Reactions    Adhesive Tape     Aspercreme [Trolamine Salicylate]     Augmentin [Amoxicillin-Pot Clavulanate]     Erythromycin Other (See Comments)     \"whole in stomach\"    Guaifenesin & Derivatives     Niacin And Related        Medications:  Outpatient Encounter Prescriptions as of 10/26/2017   Medication Sig Dispense Refill    metoprolol tartrate (LOPRESSOR) 50 MG tablet Take 1 tablet by mouth nightly 90 tablet 3    furosemide (LASIX) 20 MG tablet Take 1 tablet by mouth daily 30 tablet 0    predniSONE (DELTASONE) 20 MG tablet Take 20 mg by mouth daily Takes 1.5 tablet daily      warfarin (COUMADIN) 5 MG tablet Take 1 tablet by mouth daily (Patient taking differently: Take 5 mg by mouth daily ) 30 tablet 3    amiodarone (CORDARONE) 200 MG tablet Take 1 tablet by mouth 2 times daily 90 tablet 3    polyethylene glycol (GLYCOLAX) powder Take 17 g by mouth as needed (for constipation)      sulfamethoxazole-trimethoprim (BACTRIM DS) 800-160 MG per tablet 1 tab once a day on Monday, Wednesday and friday 30 tablet 5    OXYGEN Inhale 2 L into the lungs continuous       metFORMIN (GLUCOPHAGE) 500 MG tablet Take 1 tablet by mouth 2 times daily (with meals) 60 tablet 3    Glucose Blood (BLOOD GLUCOSE TEST STRIPS) STRP Test 4 times daily normal coloration and turgor, + rashes both elbows, no suspicious skin lesions noted     Labs:  None    CBC:   WBC   Date Value Ref Range Status   09/25/2017 9.3 3.5 - 11.0 k/uL Final     Hemoglobin   Date Value Ref Range Status   09/25/2017 12.9 12.0 - 16.0 g/dL Final     Platelet Count   Date Value Ref Range Status   05/04/2012 178 140 - 450 k/uL Final     Comment:     Performed at [facility], [place] [address]   [phone]     Platelets   Date Value Ref Range Status   09/25/2017 214 140 - 450 k/uL Final     BMP:   Sodium   Date Value Ref Range Status   10/07/2017 138 135 - 144 mmol/L Final     Potassium   Date Value Ref Range Status   10/07/2017 4.8 3.7 - 5.3 mmol/L Final     Chloride   Date Value Ref Range Status   10/07/2017 96 (L) 98 - 107 mmol/L Final     CO2   Date Value Ref Range Status   10/07/2017 29 20 - 31 mmol/L Final     BUN   Date Value Ref Range Status   10/07/2017 19 8 - 23 mg/dL Final     CREATININE   Date Value Ref Range Status   10/07/2017 0.89 0.50 - 0.90 mg/dL Final   09/25/2017 0.92 (H) 0.50 - 0.90 mg/dL Final   09/16/2017 0.7 0.4 - 1.2 mg/dL Final     Glucose   Date Value Ref Range Status   10/07/2017 88 70 - 99 mg/dL Final   05/04/2012 117 (H) 74 - 100 mg/dL Final     S. Calcium:   Calcium   Date Value Ref Range Status   10/07/2017 9.7 8.6 - 10.4 mg/dL Final     S. Ionized Calcium: No results found for: IONCA  S. Magnesium:   Magnesium   Date Value Ref Range Status   06/30/2017 2.1 1.6 - 2.6 mg/dL Final     Comment:     [facility] [id number] [place] [address] [phone]     S. Phosphorus:   Phosphorus   Date Value Ref Range Status   06/30/2017 3.7 2.6 - 4.5 mg/dL Final     Comment:     [address] [phone]     S.  Glucose:   POC Glucose   Date Value Ref Range Status   09/18/2017 108 70 - 108 mg/dl Final     Comment:     Performed at [facility], [address] 09/17/2017 233 (H) 70 - 108 mg/dl Final     Comment:     Performed at [facility], [address]   09/17/2017 178 (H) 70 - 108 mg/dl Final     Comment:     Performed at [facility], [address]     Glycosylated hemoglobin A1C:   Hemoglobin A1C   Date Value Ref Range Status   06/03/2017 6.5 (H) 4.0 - 6.0 % Final       Pulmonary Functions Testing Results:    No results found for: FEV1, FVC, [id number], TLC, DLCO    Blood Gases:   pH, Blood Gas   Date Value Ref Range Status   09/15/2017 7.43 7.35 - 7.45 Final     PCO2   Date Value Ref Range Status   09/15/2017 45 35 - 45 mmhg Final     PO2   Date Value Ref Range Status   09/15/2017 369 (H) 71 - 104 mmhg Final     HCO3   Date Value Ref Range Status   09/15/2017 30 (H) 23 - 28 mmol/l Final     O2 Sat   Date Value Ref Range Status   09/15/2017 100 % Final       Radiological reports:    CXR  8/11/17  1. Mild cardiomegaly. Permanent pacemaker/defibrillator. Mild interstitial fibrosis both mid and lower lung fields. 2. No acute infiltrates or effusions are seen. No pneumothorax. 6/29/17  Compared to 6/26/2017. Interval development of marked pulmonary vascular congestion, edema and probable small effusions. Stable cardiomegaly. No free intraperitoneal air.    6/26/17  The cardiomediastinal silhouette is stable.  Patchy infiltrates are again   seen in both lungs.  No new airspace disease.  No pleural effusion. 6/4/17    Ill-defined right perihilar and right lower lung airspace opacities,   concerning for an infectious/inflammatory process.  Mild central vascular   congestion.  Follow-up to resolution recommended. CT Scans  6/5/17  1. Extensive patchy airspace opacity throughout the lungs bilaterally with   confluent segmental pulmonary consolidation in both lower lobes.  Correlate   clinically for pneumonia. 2. Cardiomegaly. 3. Atherosclerotic disease including coronary arterial disease.    4.

## 2017-10-30 PROCEDURE — 93283 PRGRMG EVAL IMPLANTABLE DFB: CPT | Performed by: FAMILY MEDICINE

## 2017-11-02 ENCOUNTER — HOSPITAL ENCOUNTER (OUTPATIENT)
Dept: INPATIENT UNIT | Age: 77
Discharge: HOME | End: 2017-11-02
Attending: NUCLEAR MEDICINE | Admitting: NUCLEAR MEDICINE
Payer: MEDICARE

## 2017-11-02 VITALS
HEART RATE: 83 BPM | OXYGEN SATURATION: 98 % | DIASTOLIC BLOOD PRESSURE: 59 MMHG | WEIGHT: 180 LBS | RESPIRATION RATE: 19 BRPM | BODY MASS INDEX: 28.93 KG/M2 | SYSTOLIC BLOOD PRESSURE: 109 MMHG | TEMPERATURE: 98.1 F | HEIGHT: 66 IN

## 2017-11-02 LAB
ANION GAP SERPL CALCULATED.3IONS-SCNC: 20 MEQ/L (ref 8–16)
BASOPHILS ABSOLUTE: NORMAL /ΜL
BASOPHILS RELATIVE PERCENT: NORMAL %
BUN BLDV-MCNC: 20 MG/DL
BUN BLDV-MCNC: 20 MG/DL (ref 7–22)
CALCIUM SERPL-MCNC: 8.7 MG/DL
CALCIUM SERPL-MCNC: 8.7 MG/DL (ref 8.5–10.5)
CHLORIDE BLD-SCNC: 97 MEQ/L (ref 98–111)
CHLORIDE BLD-SCNC: NORMAL MMOL/L
CO2: 25 MEQ/L (ref 23–33)
CO2: 25 MMOL/L
CREAT SERPL-MCNC: 0.9 MG/DL
CREAT SERPL-MCNC: 0.9 MG/DL (ref 0.4–1.2)
EOSINOPHILS ABSOLUTE: NORMAL /ΜL
EOSINOPHILS RELATIVE PERCENT: NORMAL %
GFR CALCULATED: NORMAL
GFR SERPL CREATININE-BSD FRML MDRD: 61 ML/MIN/1.73M2
GLUCOSE BLD-MCNC: 178 MG/DL
GLUCOSE BLD-MCNC: 178 MG/DL (ref 70–108)
HCT VFR BLD CALC: 38.9 % (ref 36–46)
HCT VFR BLD CALC: 38.9 % (ref 37–47)
HEMOGLOBIN: 12.8 G/DL (ref 12–16)
HEMOGLOBIN: 12.8 GM/DL (ref 12–16)
INR BLD: 7.33
INR BLD: 7.33 (ref 0.85–1.13)
LYMPHOCYTES ABSOLUTE: NORMAL /ΜL
LYMPHOCYTES RELATIVE PERCENT: NORMAL %
MCH RBC QN AUTO: 32.3 PG
MCH RBC QN AUTO: 32.3 PG (ref 27–31)
MCHC RBC AUTO-ENTMCNC: 32.8 G/DL
MCHC RBC AUTO-ENTMCNC: 32.8 GM/DL (ref 33–37)
MCV RBC AUTO: 98.3 FL
MCV RBC AUTO: 98.3 FL (ref 81–99)
MONOCYTES ABSOLUTE: NORMAL /ΜL
MONOCYTES RELATIVE PERCENT: NORMAL %
NEUTROPHILS ABSOLUTE: NORMAL /ΜL
NEUTROPHILS RELATIVE PERCENT: NORMAL %
PDW BLD-RTO: 20.3 % (ref 11.5–14.5)
PLATELET # BLD: 226 K/ΜL
PLATELET # BLD: 226 THOU/MM3 (ref 130–400)
PMV BLD AUTO: 7.6 FL
PMV BLD AUTO: 7.6 MCM (ref 7.4–10.4)
POTASSIUM SERPL-SCNC: 4.9 MEQ/L (ref 3.5–5.2)
POTASSIUM SERPL-SCNC: 4.9 MMOL/L
PROTIME: NORMAL SECONDS
RBC # BLD: 3.96 10^6/ΜL
RBC # BLD: 3.96 MILL/MM3 (ref 4.2–5.4)
SODIUM BLD-SCNC: 142 MEQ/L (ref 135–145)
SODIUM BLD-SCNC: 142 MMOL/L
WBC # BLD: 9 10^3/ML
WBC # BLD: 9 THOU/MM3 (ref 4.8–10.8)

## 2017-11-02 PROCEDURE — 85610 PROTHROMBIN TIME: CPT

## 2017-11-02 PROCEDURE — 36415 COLL VENOUS BLD VENIPUNCTURE: CPT

## 2017-11-02 PROCEDURE — 80048 BASIC METABOLIC PNL TOTAL CA: CPT

## 2017-11-02 PROCEDURE — 2580000003 HC RX 258: Performed by: NUCLEAR MEDICINE

## 2017-11-02 PROCEDURE — 85027 COMPLETE CBC AUTOMATED: CPT

## 2017-11-02 RX ORDER — MIDAZOLAM HYDROCHLORIDE 1 MG/ML
10 INJECTION INTRAMUSCULAR; INTRAVENOUS ONCE
Status: DISCONTINUED | OUTPATIENT
Start: 2017-11-02 | End: 2017-11-02 | Stop reason: HOSPADM

## 2017-11-02 RX ORDER — SODIUM CHLORIDE 9 MG/ML
INJECTION, SOLUTION INTRAVENOUS CONTINUOUS
Status: DISCONTINUED | OUTPATIENT
Start: 2017-11-02 | End: 2017-11-02 | Stop reason: HOSPADM

## 2017-11-02 RX ORDER — FENTANYL CITRATE 50 UG/ML
100 INJECTION, SOLUTION INTRAMUSCULAR; INTRAVENOUS ONCE
Status: DISCONTINUED | OUTPATIENT
Start: 2017-11-02 | End: 2017-11-02 | Stop reason: HOSPADM

## 2017-11-02 RX ORDER — SODIUM CHLORIDE 0.9 % (FLUSH) 0.9 %
10 SYRINGE (ML) INJECTION PRN
Status: DISCONTINUED | OUTPATIENT
Start: 2017-11-02 | End: 2017-11-02 | Stop reason: HOSPADM

## 2017-11-02 RX ORDER — SODIUM CHLORIDE 0.9 % (FLUSH) 0.9 %
10 SYRINGE (ML) INJECTION EVERY 12 HOURS SCHEDULED
Status: DISCONTINUED | OUTPATIENT
Start: 2017-11-02 | End: 2017-11-02 | Stop reason: HOSPADM

## 2017-11-02 RX ADMIN — SODIUM CHLORIDE: 9 INJECTION, SOLUTION INTRAVENOUS at 14:17

## 2017-11-02 NOTE — PLAN OF CARE
Problem: Discharge Planning:  Goal: Participates in care planning  Participates in care planning   Outcome: Met This Shift    Goal: Discharged to appropriate level of care  Discharged to appropriate level of care   Outcome: Met This Shift      Problem: Tissue Perfusion - Cardiopulmonary, Altered:  Goal: Absence of angina  Absence of angina   Outcome: Met This Shift    Goal: Hemodynamic stability will improve  Hemodynamic stability will improve   Outcome: Met This Shift      Problem: Falls - Risk of  Goal: Absence of falls  Outcome: Met This Shift      Comments: Care plan reviewed with patient and family. Patient and family verbalize understanding of the plan of care and contribute to goal setting.

## 2017-11-02 NOTE — CONSULTS
tablet Take 20 mg by mouth daily Takes 1.5 tablet daily   Yes Historical Provider, MD   warfarin (COUMADIN) 5 MG tablet Take 1 tablet by mouth daily  Patient taking differently: Take 5 mg by mouth daily  9/24/17  Yes Misael Slater MD   amiodarone (CORDARONE) 200 MG tablet Take 1 tablet by mouth 2 times daily 9/8/17  Yes Neha Montalvo MD   polyethylene glycol [facility]) powder Take 17 g by mouth as needed (for constipation)   Yes Historical Provider, MD   sulfamethoxazole-trimethoprim (BACTRIM DS) 800-160 MG per tablet 1 tab once a day on Monday, Wednesday and friday 8/3/17  Yes Ciara Tomas MD   metFORMIN (GLUCOPHAGE) 500 MG tablet Take 1 tablet by mouth 2 times daily (with meals) 7/3/17  Yes Jatin Shabazz MD   melatonin 3 MG TABS tablet Take 3 mg by mouth nightly    Yes Historical Provider, MD   aspirin 81 MG chewable tablet Take 1 tablet by mouth daily 6/15/17  Yes Ana Chiu DO   isosorbide mononitrate (IMDUR) 30 MG extended release tablet Take 1 tablet by mouth daily 6/15/17  Yes Ana Chiu DO   atorvastatin (LIPITOR) 40 MG tablet Take 1 tablet by mouth nightly 6/15/17  Yes Ana Chiu DO   ranolazine (RANEXA) 500 MG extended release tablet Take 1 tablet by mouth 2 times daily 9/21/16  Yes Mahogany Green MD   OXYGEN Inhale 2 L into the lungs continuous     Historical Provider, MD   Glucose Blood (BLOOD GLUCOSE TEST STRIPS) STRP Test 4 times daily Diagnosis 7/2/17   Ant Jimenez MD     Additional information:       VITAL SIGNS   There were no vitals filed for this visit.     PHYSICAL:   General: No acute distress  HEENT:  Unremarkable for age  Neck: without increased JVD, carotid pulses 2+ bilaterally without bruits  Heart: RRR, S1 & S2 WNL, S4 gallop, without murmurs or rubs    Lungs: Clear to auscultation    Abdomen: BS present, without HSM, masses, or tenderness    Extremities: without C,C,E.  Pulses 2+ bilaterally  Mental Status: Alert & Oriented        PLANNED PROCEDURE   []Cath  []PCI

## 2017-11-03 ENCOUNTER — OFFICE VISIT (OUTPATIENT)
Dept: FAMILY MEDICINE CLINIC | Age: 77
End: 2017-11-03
Payer: MEDICARE

## 2017-11-03 VITALS
BODY MASS INDEX: 30.31 KG/M2 | DIASTOLIC BLOOD PRESSURE: 66 MMHG | HEIGHT: 66 IN | WEIGHT: 188.6 LBS | SYSTOLIC BLOOD PRESSURE: 124 MMHG

## 2017-11-03 DIAGNOSIS — I48.91 ATRIAL FIBRILLATION, UNSPECIFIED TYPE (HCC): Primary | ICD-10-CM

## 2017-11-03 DIAGNOSIS — Z79.01 LONG-TERM (CURRENT) USE OF ANTICOAGULANTS: ICD-10-CM

## 2017-11-03 LAB — INR BLD: 4.6

## 2017-11-03 PROCEDURE — 85610 PROTHROMBIN TIME: CPT | Performed by: FAMILY MEDICINE

## 2017-11-03 PROCEDURE — 99213 OFFICE O/P EST LOW 20 MIN: CPT | Performed by: FAMILY MEDICINE

## 2017-11-03 PROCEDURE — 36415 COLL VENOUS BLD VENIPUNCTURE: CPT | Performed by: FAMILY MEDICINE

## 2017-11-03 RX ORDER — PREDNISONE 10 MG/1
7 TABLET ORAL DAILY
COMMUNITY
Start: 2017-10-31 | End: 2018-02-07 | Stop reason: ALTCHOICE

## 2017-11-03 ASSESSMENT — PATIENT HEALTH QUESTIONNAIRE - PHQ9
SUM OF ALL RESPONSES TO PHQ QUESTIONS 1-9: 0
1. LITTLE INTEREST OR PLEASURE IN DOING THINGS: 0
2. FEELING DOWN, DEPRESSED OR HOPELESS: 0
SUM OF ALL RESPONSES TO PHQ9 QUESTIONS 1 & 2: 0

## 2017-11-03 NOTE — PROGRESS NOTES
[address] [address]  [address]  Dept: [phone]    Pt is here for his anticoagulation follow up    She was scheduled to have a NASIMA yesterday but they could not do this due to INR level being too high. She states that this was 7.3. They think that this is from the prednisone. She is now taking 15 mg prednisone daily x4 weeks. Coumadin was held last night. She is to go back next week for INR check on  11/08 to see if they can proceed with the NASIMA.     ROS:  General Constitutional:  Denies headache. Denies lightheadedness. Cardiovascular: Denies chest pain at rest. Denies irregular heartbeat. Denies palpitations. Skin: Denies excessive bruising    Wt Readings from Last 3 Encounters:   11/03/17 188 lb 9.6 oz (85.5 kg)   11/02/17 180 lb (81.6 kg)   10/26/17 180 lb (81.6 kg)     BP Readings from Last 3 Encounters:   11/03/17 124/66   11/02/17 (!) 109/59   10/26/17 (!) 103/59       PHYSICAL EXAM:  General Appearance: in no acute distress, well developed, well nourished. Ambulates with walker. Heart: No murmurs. S1, S2 normal, no gallops, irregular rhythm  Skin: no excessive bruising    Assessment:             1. Atrial fibrillation, unspecified type ([address]  Protime-INR   2. Long-term (current) use of anticoagulants         Plan:  See anticoagulation tracking    It's unusual that she has varying INR levels with increased strength of prednisone and decreased strengths of prednisone. Her risk is decreasing, so I will I have hold today and tomorrow's coumadin. Then she will go back on 2.5 mg on 11/05 and 11/06. I will recheck her INR on 11/07 in office. Orders Placed This Encounter   Procedures    Protime-INR     This external order was created through the results console. No orders of the defined types were placed in this encounter.

## 2017-11-06 ENCOUNTER — TELEPHONE (OUTPATIENT)
Dept: FAMILY MEDICINE CLINIC | Age: 77
End: 2017-11-06

## 2017-11-06 NOTE — PATIENT INSTRUCTIONS
Plan:  See anticoagulation tracking    It's unusual that she has varying INR levels with increased strength of prednisone and decreased strengths of prednisone. Her risk is decreasing, so I will I have hold today and tomorrow's coumadin. Then she will go back on 2.5 mg on 11/05 and 11/06. I will recheck her INR on 11/07 in office.

## 2017-11-06 NOTE — TELEPHONE ENCOUNTER
[place] called states that Dr. Mcgill Boys her hold coumadin due to elevated INR level. She states that this morning she was supposed to re-start coumadin but had a nosebleed. Per Dr. Agustina Chaparro, continue to hold coumadin today and I will see her in office tomorrow for INR check. Ntfd. [place].

## 2017-11-07 ENCOUNTER — OFFICE VISIT (OUTPATIENT)
Dept: FAMILY MEDICINE CLINIC | Age: 77
End: 2017-11-07
Payer: MEDICARE

## 2017-11-07 VITALS
HEIGHT: 66 IN | BODY MASS INDEX: 29.99 KG/M2 | WEIGHT: 186.6 LBS | DIASTOLIC BLOOD PRESSURE: 68 MMHG | SYSTOLIC BLOOD PRESSURE: 102 MMHG

## 2017-11-07 DIAGNOSIS — Z79.01 LONG-TERM (CURRENT) USE OF ANTICOAGULANTS: ICD-10-CM

## 2017-11-07 DIAGNOSIS — I48.91 ATRIAL FIBRILLATION, UNSPECIFIED TYPE (HCC): Primary | ICD-10-CM

## 2017-11-07 DIAGNOSIS — R04.0 EPISTAXIS: ICD-10-CM

## 2017-11-07 LAB — INR BLD: 1.5

## 2017-11-07 PROCEDURE — 36415 COLL VENOUS BLD VENIPUNCTURE: CPT | Performed by: FAMILY MEDICINE

## 2017-11-07 PROCEDURE — 99213 OFFICE O/P EST LOW 20 MIN: CPT | Performed by: FAMILY MEDICINE

## 2017-11-07 PROCEDURE — 85610 PROTHROMBIN TIME: CPT | Performed by: FAMILY MEDICINE

## 2017-11-07 NOTE — PATIENT INSTRUCTIONS
Plan:  See anticoagulation tracking  I recommend neosporin ointment on the right septum to reduce symptoms and chance of infection. She is having a TE for watchman placement and evaluation and if this is in place with no issues the plan is for cardiology to take her off coumadin.

## 2017-11-08 LAB
BASOPHILS ABSOLUTE: NORMAL /ΜL
BASOPHILS RELATIVE PERCENT: NORMAL %
BUN BLDV-MCNC: 24 MG/DL
CALCIUM SERPL-MCNC: 9 MG/DL
CHLORIDE BLD-SCNC: 98 MMOL/L
CO2: 28 MMOL/L
CREAT SERPL-MCNC: 0.8 MG/DL
EOSINOPHILS ABSOLUTE: NORMAL /ΜL
EOSINOPHILS RELATIVE PERCENT: NORMAL %
GFR CALCULATED: NORMAL
GLUCOSE BLD-MCNC: 97 MG/DL
HCT VFR BLD CALC: 40.5 % (ref 36–46)
HEMOGLOBIN: 13.6 G/DL (ref 12–16)
INR BLD: 1.28
LYMPHOCYTES ABSOLUTE: NORMAL /ΜL
LYMPHOCYTES RELATIVE PERCENT: NORMAL %
MCH RBC QN AUTO: 33.4 PG
MCHC RBC AUTO-ENTMCNC: 33.5 G/DL
MCV RBC AUTO: 99.6 FL
MONOCYTES ABSOLUTE: NORMAL /ΜL
MONOCYTES RELATIVE PERCENT: NORMAL %
NEUTROPHILS ABSOLUTE: NORMAL /ΜL
NEUTROPHILS RELATIVE PERCENT: NORMAL %
PLATELET # BLD: 213 K/ΜL
PMV BLD AUTO: 7.6 FL
POTASSIUM SERPL-SCNC: 4.5 MMOL/L
PROTIME: NORMAL SECONDS
RBC # BLD: 4.06 10^6/ΜL
SODIUM BLD-SCNC: 139 MMOL/L
WBC # BLD: 8.8 10^3/ML

## 2017-11-09 DIAGNOSIS — Z95.810 ICD (IMPLANTABLE CARDIOVERTER-DEFIBRILLATOR), BIVENTRICULAR, IN SITU: Primary | ICD-10-CM

## 2017-11-09 DIAGNOSIS — I25.5 ISCHEMIC CARDIOMYOPATHY: Chronic | ICD-10-CM

## 2017-11-10 RX ORDER — ATORVASTATIN CALCIUM 40 MG/1
40 TABLET, FILM COATED ORAL NIGHTLY
Qty: 90 TABLET | Refills: 3 | Status: SHIPPED | OUTPATIENT
Start: 2017-11-10 | End: 2019-01-09 | Stop reason: SDUPTHER

## 2017-11-13 ENCOUNTER — TELEPHONE (OUTPATIENT)
Dept: CARDIOTHORACIC SURGERY | Age: 77
End: 2017-11-13

## 2017-11-13 RX ORDER — ASPIRIN 81 MG/1
325 TABLET, CHEWABLE ORAL DAILY
Qty: 30 TABLET | Refills: 3 | Status: SHIPPED | OUTPATIENT
Start: 2017-11-13 | End: 2017-11-29 | Stop reason: SDUPTHER

## 2017-11-13 RX ORDER — CLOPIDOGREL BISULFATE 75 MG/1
75 TABLET ORAL DAILY
Qty: 30 TABLET | Refills: 3 | Status: SHIPPED | OUTPATIENT
Start: 2017-11-13 | End: 2018-03-07

## 2017-11-13 NOTE — TELEPHONE ENCOUNTER
45 day post Watchman NASIMA completed, Coumadin discontinued.   Start ASA 325mg and Plavix 75mg daily

## 2017-11-20 ENCOUNTER — OFFICE VISIT (OUTPATIENT)
Dept: FAMILY MEDICINE CLINIC | Age: 77
End: 2017-11-20
Payer: MEDICARE

## 2017-11-20 VITALS — SYSTOLIC BLOOD PRESSURE: 100 MMHG | WEIGHT: 186 LBS | DIASTOLIC BLOOD PRESSURE: 62 MMHG | BODY MASS INDEX: 30.48 KG/M2

## 2017-11-20 DIAGNOSIS — I10 ESSENTIAL HYPERTENSION: ICD-10-CM

## 2017-11-20 DIAGNOSIS — I25.10 ASHD (ARTERIOSCLEROTIC HEART DISEASE): ICD-10-CM

## 2017-11-20 DIAGNOSIS — E11.9 TYPE 2 DIABETES MELLITUS WITHOUT COMPLICATION, UNSPECIFIED LONG TERM INSULIN USE STATUS: Primary | ICD-10-CM

## 2017-11-20 DIAGNOSIS — Z13.220 SCREENING CHOLESTEROL LEVEL: ICD-10-CM

## 2017-11-20 DIAGNOSIS — L89.159 DECUBITUS ULCER OF SACRAL REGION, UNSPECIFIED ULCER STAGE: ICD-10-CM

## 2017-11-20 DIAGNOSIS — I48.0 PAROXYSMAL A-FIB (HCC): ICD-10-CM

## 2017-11-20 DIAGNOSIS — I50.42 CHRONIC COMBINED SYSTOLIC AND DIASTOLIC CHF (CONGESTIVE HEART FAILURE) (HCC): ICD-10-CM

## 2017-11-20 PROCEDURE — 99214 OFFICE O/P EST MOD 30 MIN: CPT | Performed by: FAMILY MEDICINE

## 2017-11-20 NOTE — PATIENT INSTRUCTIONS
PLAN:  I am please with her increased ejection fraction to 55% with the most recent NASIMA. I suggested possibly cardiac rehab or physical therapy to increase her activity tolerance. Her labs look great at this time. She has a full thickness skin breakdown on her intergluteal cleft, i suggested a skin barrier cream along with physical therapy. I would like to see her in three weeks for her skin breakdown and also again in three months to recheck her labs because of the amiodarone. We discussed the need for oxygen and if she would need to continue it. She was 98% 02 sat, after taking oxygen off she dropped to 96% in office. She may not need to continue this. I will see her again in three weeks to reeval the decub ulcer.

## 2017-11-20 NOTE — PROGRESS NOTES
DS) 800-160 MG per tablet 1 tab once a day on Monday, Wednesday and friday 8/3/17  Yes Andrade Terrell MD   OXYGEN Inhale 2 L into the lungs continuous    Yes Historical Provider, MD   Glucose Blood (BLOOD GLUCOSE TEST STRIPS) STRP Test 4 times daily Diagnosis 7/2/17  Yes Je Cullen MD   melatonin 3 MG TABS tablet Take 3 mg by mouth nightly    Yes Historical Provider, MD   isosorbide mononitrate (IMDUR) 30 MG extended release tablet Take 1 tablet by mouth daily 6/15/17  Yes Valentine Olszewski, DO   ranolazine (RANEXA) 500 MG extended release tablet Take 1 tablet by mouth 2 times daily 9/21/16  Yes Henok London MD       ROS:  General Constitutional: Denies chills. Denies fever. Denies headache. Denies lightheadedness. Ophthalmologic: Denies blurred vision. ENT: Denies nasal congestion. Denies sore throat. Denies ear pain and pressure. Respiratory: Denies cough. Admits occasional shortness of breath. Denies wheezing. Cardiovascular: Denies chest pain at rest. Denies irregular heartbeat. Denies palpitations. Gastrointestinal: Denies abdominal pain. Denies blood in the stool. Denies constipation. Denies diarrhea. Denies nausea. Denies vomiting. Genitourinary: Denies blood in the urine. Denies difficulty urinating. Denies frequent urination. Denies painful urination. Denies urinary incontinence  Musculoskeletal: Denies muscle aches. Denies painful joints. Denies swollen joints. Peripheral Vascular: Denies pain/cramping in legs after exertion. Skin: Denies dry skin. Denies itching. Denies rash. Neurologic: Denies falls. Denies dizziness. Denies fainting. Denies tingling/numbness. Psychiatric: Denies sleep disturbance. Denies anxiety. Denies depressed mood.     Past Surgical History:   Procedure Laterality Date    BRONCHOSCOPY  6/7/2017    BRONCHOSCOPY BRUSHINGS performed by Paulino Chapa DO at [facility]    BRONCHOSCOPY  6/7/2017    BRONCHOSCOPY/TRANSBRONCHIAL LUNG BIOPSY performed by Paulino Chapa DO at [facility]    BRONCHOSCOPY  6/7/2017    BRONCHOSCOPY FLUOROSCOPY performed by Lucinda Lr DO at [address] Right 06/17/2015    CATARACT REMOVAL WITH IMPLANT Right 08/14/2017    DR ROMERO    COLONOSCOPY  1/2005    DIAGNOSTIC CARDIAC CATH LAB PROCEDURE  06/17/15    EYE SURGERY      FRACTURE SURGERY  2004    Distal Radius Ulna    LOBECTOMY Left 6/14/2017    MINI PORT ACCESS LEFT CHEST, X2 SPECIMENS. performed by Duc Acosta MD at [address]  2years old    VOLVAR-ULCERATIVE LESION-CAUTERIZED    SKIN BIOPSY      TONSILLECTOMY AND ADENOIDECTOMY         Family History   Problem Relation Age of Onset    Heart Disease Mother     Stroke Mother     High Blood Pressure Mother     Cancer Father      lung    Stroke Brother     Heart Disease Maternal Grandmother        Past Medical History:   Diagnosis Date    Allergic rhinitis 10/1994    Asthma     Atrial fibrillation ([address] 12/2008    CAD (coronary artery disease)     Clotting disorder (HCC)     plebitis in L leg    COPD (chronic obstructive pulmonary disease) ([address]     DDD (degenerative disc disease), cervical     Degeneration of cervical intervertebral disc     Diverticulosis 2005    Gout     Gout, unspecified     H/O cardiac catheterization 6/17/15    LMCA: Normal 0% stenosis. LAD: Lesion on 1st diag: Proximal subsection. 80% stenosis. Lesion plaque is ruptured. Boris Hidalgo LCx: Lesion on 1st Ob Leslie: Mid subsection. 85% stenosis. RCA:Lesion on R PDA: Mid subsection. 85% stenosis. Lesion on R PDA: Distal subsection. 70% stenosis. Lesion on Prox RCA: Mid subsection. 50% stenosis. EF 50%.  H/O echocardiogram 11/09/2016    EF 40-45%. Mild LV hypertrophy normal LV cavity size. Sigmoid interventricular septum without evidence of outflow tract obstruction. Left atrium is moderately dilated (34-39) left atrial volume index of 36 ml/m2. Mild mitral regurg.  Diastology cannot be assessed due to

## 2017-11-21 PROBLEM — I48.91 ATRIAL FIBRILLATION (HCC): Status: RESOLVED | Noted: 2017-10-04 | Resolved: 2017-11-21

## 2017-11-27 RX ORDER — RANOLAZINE 500 MG/1
TABLET, FILM COATED, EXTENDED RELEASE ORAL
Qty: 180 TABLET | Refills: 3 | Status: SHIPPED | OUTPATIENT
Start: 2017-11-27 | End: 2019-01-18 | Stop reason: SDUPTHER

## 2017-11-29 RX ORDER — ASPIRIN 81 MG/1
325 TABLET, CHEWABLE ORAL DAILY
Qty: 30 TABLET | Refills: 3
Start: 2017-11-29 | End: 2018-06-27 | Stop reason: SDUPTHER

## 2017-12-13 ENCOUNTER — HOSPITAL ENCOUNTER (EMERGENCY)
Age: 77
Discharge: HOME | End: 2017-12-13
Payer: MEDICARE

## 2017-12-13 VITALS
OXYGEN SATURATION: 95 % | SYSTOLIC BLOOD PRESSURE: 136 MMHG | DIASTOLIC BLOOD PRESSURE: 88 MMHG | TEMPERATURE: 97.4 F | RESPIRATION RATE: 16 BRPM | HEART RATE: 81 BPM

## 2017-12-13 DIAGNOSIS — R04.0 EPISTAXIS: Primary | ICD-10-CM

## 2017-12-13 PROCEDURE — 30901 CONTROL OF NOSEBLEED: CPT

## 2017-12-13 PROCEDURE — 99283 EMERGENCY DEPT VISIT LOW MDM: CPT

## 2017-12-13 PROCEDURE — 6370000000 HC RX 637 (ALT 250 FOR IP): Performed by: PHYSICIAN ASSISTANT

## 2017-12-13 RX ORDER — CEPHALEXIN 500 MG/1
500 CAPSULE ORAL ONCE
Status: COMPLETED | OUTPATIENT
Start: 2017-12-13 | End: 2017-12-13

## 2017-12-13 RX ORDER — CEPHALEXIN 500 MG/1
500 CAPSULE ORAL 4 TIMES DAILY
Qty: 20 CAPSULE | Refills: 0 | Status: SHIPPED | OUTPATIENT
Start: 2017-12-13 | End: 2017-12-18

## 2017-12-13 RX ADMIN — CEPHALEXIN 500 MG: 500 CAPSULE ORAL at 20:32

## 2017-12-13 ASSESSMENT — ENCOUNTER SYMPTOMS
RHINORRHEA: 0
SORE THROAT: 0
DIARRHEA: 0
ABDOMINAL PAIN: 0
BACK PAIN: 0
EYE ITCHING: 0
VOMITING: 0
SHORTNESS OF BREATH: 0
NAUSEA: 0
EYE PAIN: 0
COUGH: 0
WHEEZING: 0

## 2017-12-14 ENCOUNTER — CARE COORDINATION (OUTPATIENT)
Dept: CARE COORDINATION | Age: 77
End: 2017-12-14

## 2017-12-14 ENCOUNTER — OFFICE VISIT (OUTPATIENT)
Dept: PULMONOLOGY | Age: 77
End: 2017-12-14
Payer: MEDICARE

## 2017-12-14 VITALS
HEART RATE: 80 BPM | SYSTOLIC BLOOD PRESSURE: 116 MMHG | BODY MASS INDEX: 29.25 KG/M2 | WEIGHT: 182 LBS | OXYGEN SATURATION: 97 % | DIASTOLIC BLOOD PRESSURE: 68 MMHG | RESPIRATION RATE: 16 BRPM | HEIGHT: 66 IN | TEMPERATURE: 97.8 F

## 2017-12-14 DIAGNOSIS — J96.11 CHRONIC RESPIRATORY FAILURE WITH HYPOXIA (HCC): ICD-10-CM

## 2017-12-14 DIAGNOSIS — Z79.52 CURRENT CHRONIC USE OF SYSTEMIC STEROIDS: ICD-10-CM

## 2017-12-14 DIAGNOSIS — Z79.899 LONG TERM CURRENT USE OF AMIODARONE: ICD-10-CM

## 2017-12-14 DIAGNOSIS — J67.9 HYPERSENSITIVITY PNEUMONITIS (HCC): Primary | ICD-10-CM

## 2017-12-14 DIAGNOSIS — J84.112 UIP (USUAL INTERSTITIAL PNEUMONITIS) (HCC): ICD-10-CM

## 2017-12-14 PROCEDURE — 99215 OFFICE O/P EST HI 40 MIN: CPT | Performed by: INTERNAL MEDICINE

## 2017-12-14 NOTE — PROGRESS NOTES
PULMONARY OP  PROGRESS NOTE      Patient:  Tang Betts  YOB: 1940    MRN: [id number]     Acct:        Pt seen and Chart reviewed. Mr/Ms Tang Betts is here in followup for   1. Hypersensitivity pneumonitis ([facility]     2. UIP (usual interstitial pneumonitis) ([facility]     3. Long term current use of amiodarone     4. Current chronic use of systemic steroids     5. Chronic respiratory failure with hypoxia ([facility]       Since she was seen last time she is doing better and is stable clinically and symptomatically, when she was seen last time she was started on prednisone taper dose and her prednisone was gradually tapered and for last 2 weeks she is on 10 mg of prednisone initial plan was to use the prednisone for about 6 months her open lung biopsy was done in June this year and most likely she was started on prednisone late June or early July so she is about every 6 months of prednisone clinically she is a stable she denies any loose cough but she denies chest pain she does have dyspnea on exertion she claimed that as long as she is addressed and she is feeling fine and denies any sputum production hemoptysis chest pain orthopnea or paroxysmal dyspnea, she is tolerating 10 mg of prednisone she denies any weight gain. Her appetite is not good, she denies night sweats fever chills diarrhea and vomiting skin rash or joints or reflux and dysphagia.    She is using her home oxygen at 2 L but since she started having nosebleeds she is not able to use the oxygen otherwise usually use oxygen on exertion and activity she is still undergoing cardiac rehab and when she is on a treadmill and she uses oxygen at 3 L    She is followed with cardiology with Dr. Tj Bal and Dr. Rachel Guerra here she has history of past click CHF history of syncope and multiple episodes a history of atrial fibrillation she had procedures done this by Dr. Tj Bal and currently she is followed by Dr. Zenia Martin she is continuing to have pedal edema and she has slight increased pedal edema according to her lately and she is off Coumadin and she is taking aspirin for Its a baby aspirin daily. She was seen in the emergency room last night when she is significant right near bleeding she had a nasal packing done and she was discharged from the ER and she was told that she can take the packing out in 2 days she had a similar episode of bleeding a week ago. She is followed for ILD/HP and seen last time for follow up  and (she was on prednisone 60 mg and decreased to 40 mg daily) and last time it was reduced to 30 mg daily for 4 weeks and then 20 mg daily ( she is taking 20 mg for 2 weeks now)  which she is taking daily, she is on Bactrim prophylaxis also, she was scheduled this time with PFTs and HRCT chest    She had history of dizziness but no syncope and under cardiology care, hypotension, orthostatic and seen and managed by Dr Les Michele in [facility] and had procedure done.  Watchman procedure done   She is is still on amiodarone    She was admitted to [facility] initially in early part of June for Pneumonia which she was actually diagnosed initially sometime in march this year and was getting off and on abx and inhalers and treated for CAP as outpt and as symptoms were getting worse and non resolving infiltrates she was transferred to [facility] in June she had bronchoscopy and TBBX which was non diagnostic and had OLBx done on 6/20/17 which was not confirmatory for UIP and even with open lung biopsy which was sent to [facility] diagnosis was either pulmonary fibrosis/ possible UIP or hypersensitivity Pneumonitis with fibrotic features, she was started on Prednisone and the plan for 6 months of prednisone started on 60 mg daily initially     She was readmitted again to MICU in [facility] in last week of June early July with Shock, A fib with RVR, pulmonary edema, acute respiratory Failure and treated there , cardioversion attempted twice not successful and managed by cardiology, started on amiodorone, BB, and on improvement discharged on home O2 to ECF and she is in ECF at this time  She is on chronic AC for chronic A fib for few years    Her MARGOT and CCP ab was negative there in [facility]  [place], denies exposure to birds at home or birds feeding, no animal exposure at home, denies molds at home           Subjective:   Review of Systems -   General ROS: positive for  - fatigue  negative for - chills, fever, hot flashes, malaise, night sweats, sleep disturbance, weight gain or weight loss  ENT ROS: negative for - epistaxis, headaches, nasal congestion, nasal discharge, nasal polyps, sinus pain, sneezing, sore throat, tinnitus, vertigo, visual changes or vocal changes  Allergy and Immunology - negative for - cough and shortness of breath at rest positive SOB on exertion  negative for - hemoptysis, orthopnea, pleuritic pain, sputum changes, stridor, tachypnea or wheezing  Cardiovascular ROS: positive for - dyspnea on exertion, no irregular heartbeat and syncope and dizziness and positive edema  negative for - chest pain, murmur, orthopnea or paroxysmal nocturnal dyspnea  Gastrointestinal ROS: negative for - abdominal pain, blood in stools, change in bowel habits, change in stools, constipation, diarrhea, heartburn, hematemesis, melena, nausea/vomiting, stool incontinence and swallowing difficulty/pain  Musculoskeletal ROS: negative for - gait disturbance, joint pain, joint stiffness, joint swelling, muscle pain or muscular weakness  : negative for hematuria and dysuria  CNS: negative for Dizziness, diplopia, headache, weakness  Hem/Onc: Negative for easy bleeding and bruising  Skin: negative for rash and skin lesions    No flowsheet data found. Allergies:   Allergies   Allergen Reactions    Adhesive Tape     Aspercreme [Trolamine Salicylate]     Augmentin [Amoxicillin-Pot Clavulanate]     Erythromycin Other (See Comments)     \"whole in stomach\"    Guaifenesin & Derivatives     Niacin And Related        Medications:  Outpatient Encounter Prescriptions as of 12/14/2017   Medication Sig Dispense Refill    cephALEXin (KEFLEX) 500 MG capsule Take 1 capsule by mouth 4 times daily for 5 days 20 capsule 0    furosemide (LASIX) 20 MG tablet TAKE ONE TABLET BY MOUTH ONCE DAILY (Patient taking differently: 1 tab on Monday Wednesday and Friday) 90 tablet 1    aspirin 81 MG chewable tablet Take 4 tablets by mouth daily 30 tablet 3    RANEXA 500 MG extended release tablet TAKE ONE TABLET BY MOUTH TWICE DAILY 180 tablet 3    metFORMIN (GLUCOPHAGE) 500 MG tablet Take 1 tablet by mouth 2 times daily (with meals) 60 tablet 3    clopidogrel (PLAVIX) 75 MG tablet Take 1 tablet by mouth daily 30 tablet 3    atorvastatin (LIPITOR) 40 MG tablet Take 1 tablet by mouth nightly 90 tablet 3    predniSONE (DELTASONE) 10 MG tablet 15 mg daily x4 weeks      metoprolol tartrate (LOPRESSOR) 50 MG tablet Take 1 tablet by mouth nightly 90 tablet 3    amiodarone (CORDARONE) 200 MG tablet Take 1 tablet by mouth 2 times daily 90 tablet 3    polyethylene glycol (GLYCOLAX) powder Take 17 g by mouth as needed (for constipation)      sulfamethoxazole-trimethoprim (BACTRIM DS) 800-160 MG per tablet 1 tab once a day on Monday, Wednesday and friday 30 tablet 5    OXYGEN Inhale 2 L into the lungs continuous       Glucose Blood (BLOOD GLUCOSE TEST STRIPS) STRP Test 4 times daily Diagnosis 100 strip 11    melatonin 3 MG TABS tablet Take 3 mg by mouth nightly       isosorbide mononitrate (IMDUR) 30 MG extended release tablet Take 1 tablet by mouth daily 30 tablet 3     No facility-administered encounter medications on file as of 12/14/2017.           Objective:    Physical Exam:  Vitals: /68   Pulse 80   Temp 97.8 °F (36.6 °C)   Resp 16   Ht 5' 5.5\" (1.664 m)   Wt 182 lb (82.6 kg)   SpO2 97%   BMI 29.83 kg/m²   Last 3 weights: Wt Readings from Last 3 Encounters:   12/14/17 182 lb (82.6 kg)   11/20/17 186 lb (84.4 kg)   11/07/17 186 lb 9.6 oz (84.6 kg)     Body mass index is 29.83 kg/m².     Physical Examination:   General appearance - oriented to person, place, and time, overweight, acyanotic, in no respiratory distress and chronically ill appearing  Mental status - alert, oriented to person, place, and time  Eyes - pupils equal and reactive, extraocular eye movements intact, sclera anicteric, left eye normal, right eye normal  Ears - right ear normal, left ear normal  Nose - normal and patent, no erythema, discharge or polyps and mucosal pallor  Mouth - mucous membranes moist, pharynx normal without lesions  Neck - supple, no significant adenopathy, short and thick  Chest - no tachypnea, retractions or cyanosis, Bilateral lower lung fields No crackles crackles present , no wheezing noted and no rhonchi  Heart -regularly regular rhythm no murmur  Abdomen - soft, nontender, nondistended, no masses or organomegaly  Neurological - alert, oriented, normal speech, no focal findings or movement disorder noted}  Extremities - positive pedal edema +  intact peripheral pulses  Skin - normal coloration and turgor, + rashes both elbows, no suspicious skin lesions noted     Labs:  None    CBC:   WBC   Date Value Ref Range Status   11/08/2017 8.8 4.8 - 10.8 thou/mm3 Final     Hemoglobin   Date Value Ref Range Status   11/08/2017 13.6 12.0 - 16.0 gm/dl Final     Platelet Count   Date Value Ref Range Status   05/04/2012 178 140 - 450 k/uL Final     Comment:     Performed at [facility] [address] [address] [phone]     Platelets   Date Value Ref Range Status   11/08/2017 213 130 - 400 thou/mm3 Final     BMP:   Sodium   Date Value Ref Range Status   11/08/2017 139 135 - 145 meq/L Final     Potassium   Date Value Ref Range Status   11/08/2017 4.5 3.5 - 5.2 meq/L Final     Chloride   Date Value Ref Range Status   11/08/2017 98 98 - 111 meq/L Final     CO2   Date Value Ref Range Status   11/08/2017 28 23 - 33 meq/L Final     BUN   Date Value Ref Range Status   11/08/2017 24 (H) 7 - 22 mg/dL Final     CREATININE   Date Value Ref Range Status   11/08/2017 0.8 0.4 - 1.2 mg/dL Final   11/08/2017 0.8  Final   11/02/2017 0.9 0.4 - 1.2 mg/dL Final     Glucose   Date Value Ref Range Status   11/08/2017 97 70 - 108 mg/dL Final   05/04/2012 117 (H) 74 - 100 mg/dL Final     S. Calcium:   Calcium   Date Value Ref Range Status   11/08/2017 9.0 8.5 - 10.5 mg/dL Final     Comment:     Performed at [address], [address]     S. Ionized Calcium: No results found for: IONCA  S. Magnesium:   Magnesium   Date Value Ref Range Status   06/30/2017 2.1 1.6 - 2.6 mg/dL Final     Comment:     [address] [phone]     S. Phosphorus:   Phosphorus   Date Value Ref Range Status   06/30/2017 3.7 2.6 - 4.5 mg/dL Final     Comment:     [address] [phone]     S.  Glucose:   POC Glucose   Date Value Ref Range Status   09/18/2017 108 70 - 108 mg/dl Final     Comment:     Performed at [address], [address]   09/17/2017 233 (H) 70 - 108 mg/dl Final     Comment:     Performed at [address], [address]   09/17/2017 178 (H) 70 - 108 mg/dl Final     Comment:     Performed at [address], [address]     Glycosylated hemoglobin A1C:   Hemoglobin A1C   Date Value Ref Range Status   06/03/2017 6.5 (H) 4.0 - 6.0 % Final       Pulmonary Functions Testing Results:    No results found for: FEV1, FVC, TJS1WUG, TLC, DLCO    Blood Gases:   pH, Blood Gas   Date Value Ref Range Status   09/15/2017 7.43 7.35 - 7.45 Final     PCO2   Date Value Ref Range Status   09/15/2017 45 35 - 45 mmhg Final     PO2   Date Value Ref Range Status Cem Noyola M.D.   **Electronically Signed Out**         jet/6/15/2017   Procedures/Addenda   ADDENDUM AFTER OUTSIDE CONSULTATION     Date Ordered:     6/20/2017     Status: Signed Out       Date Complete:     6/20/2017     By: Penney Osler. Robinson Berry M.D.       Date Reported:     6/20/2017       INTERPRETATION   THIS CASE HAS BEEN REVIEWED BY DR. Tiffani Brock AT [facility].  DR. LOVETT' DIAGNOSIS IS AS FOLLOWS:     \"LUNG, SUPERIOR SEGMENT, LEFT LOWER LOBE AND LEFT UPPER LOBE LINGULA,   WEDGE BIOPSIES ([id number], 1A, 2A; 6/14/2017):  UNCLASSIFIABLE CHRONIC   INTERSTITIAL PNEUMONIA WITH FIBROSIS (SEE COMMENT). \"     \". ... AS I INDICATED OVER THE TELEPHONE I REVIEWED THIS MATERIAL AND   COMPLETELY AGREE WITH YOUR DESCRIPTION.  WHILE I AM UNABLE TO CLASSIFY   THIS LESION ON THE BASIS OF THESE SAMPLES ALONE, I STRONGLY SUSPECT   THAT SHE MAY ULTIMATELY PROVE TO HAVE USUAL INTERSTITIAL PNEUMONIA   (UIP) DESPITE THE DISCORDANT RADIOLOGIC FINDINGS. THE LESION OF INTEREST IS BEST DEMONSTRATED IN THE LINGULA BIOPSY   WHICH DEMONSTRATES A CHRONIC INTERSTITIAL PNEUMONIA CHARACTERIZED BY A   COMBINATION OF INFLAMMATION AND FIBROSIS.  THE FIBROSIS PREDOMINATES   AND HAS A PATCHY DISTRIBUTION, IN SOME AREAS WITH ACCENTUATION AROUND   AIRWAYS WHERE IT IS ACCOMPANIED BY SO-CALLED PERIBRONCHIOLAR   METAPLASIA.  THE FIBROSIS INCLUDES AREAS OF SCARRING, BUT WITHOUT   WELL-DEVELOPED MICROSCOPIC HONEYCOMB CHANGE, AND CONSISTS MAINLY OF   DENSE COLLAGEN DEPOSITION WITH SCATTERED SUBEPITHELIAL FIBROBLAST   FOCI.  THE LOWER LOBE BIOPSY IS CONSIDERABLY LESS AFFECTED, ALTHOUGH   IT SHOWS EVIDENCE OF HYPERTENSIVE PULMONARY VASCULOPATHY LIKELY LINKED   TO HER UNDERLYING FIBROTIC LUNG DISEASE.      AS WE DISCUSSED, WHILE THE PATTERN OF FIBROSIS STRONGLY SUGGESTS A   DIAGNOSIS OF UIP THE PROMINENT ASSOCIATED PERIBRONCHIOLAR METAPLASIA   RAISES AS AN ALTERNATIVE FIBROTIC HYPERSENSITIVITY PNEUMONIA WITH   \"UIP-LIKE\" FEATURES. [address] [facility] [address] SAID THAT, I WAS UNABLE TO IDENTIFY THE   HISTOLOGIC FEATURES THAT WOULD ESTABLISH A DIAGNOSIS OF   HYPERSENSITIVITY PNEUMONIA WITH GREATER CONFIDENCE.  NONETHELESS, IT   SEEMS TO ME ONE LOGICAL WAY IN WHICH TO PROCEED MIGHT TO BE VIGOROUSLY   INVESTIGATE MS. INFANTE'S ENVIRONMENT FOR ANY ANTIGENIC EXPOSURES THAT   MIGHT EXPLAIN HER RESPIRATORY SYNDROME.  OFFENDING ANTIGENS TYPICALLY   FALL INTO ONE OF THREE CATEGORIES:  THERMOPHILIC BACTERIA, MOLDS AND   DANITZA PROTEINS.  IN THE ABSENCE OF ANY COMPELLING CLINICAL AND/OR   RADIOLOGIC SUPPORT FOR A DIAGNOSIS OF FIBROTIC HYPERSENSITIVITY   PNEUMONIA THEN I SUSPECT THAT CONTINUED FOLLOW-UP WILL BE HELPFUL IN   ESTABLISHING A MORE CONFIDENT DIAGNOSIS OF UIP, THE ANTICIPATED   FINDING IN IDIOPATHIC PULMONARY FIBROSIS (IPF).  IF BOTH WARRANTED AND   FEASIBLE A HIGH RESOLUTION CT SCAN OF HER CHEST MAY ALSO BE HELPFUL   AND FURTHER SUPPORTING THIS CONCLUSION. \"     FOR A COMPLETE COPY OF THE CONSULTATION REPORT, PLEASE REFER TO THE   ELECTRONIC MEDICAL RECORD INTO WHICH THIS RESULT HAS BEEN SCANNED. Monika Nina M.D.           Assessment:    ICD-10-CM ICD-9-CM    1. Hypersensitivity pneumonitis (HCC) J67.9 495.9    2. UIP (usual interstitial pneumonitis) ([facility] J84.112 515    3. Long term current use of amiodarone Z79.899 V58.69    4. Current chronic use of systemic steroids Z79.52 V58.65    5. Chronic respiratory failure with hypoxia (HCC) J96.11 518.83      799.02    5.       Persistent/ Chronic A fib    HP panel negative  Multiple CXRs from early and mid and late June and CT scan chest on June 4 seen, her CXR on may 30 did not show infiltrates and areas of consolidation and infiltrates seen on June 4 CXR and then worsened and also on June 19 CXR showed superimposed pulmonary edema over interstitial  Changes and infiltrates     Elvira she has interstitial edema also superimposed over the ILD because of poorly controlled A fib and tachycardia causing likley pulmonary congestion at least contributing to CXR findings and exam findings initially     HRCT Chest seen from 9/28/17 and showed significant resolution of ground glass changes and she does have some traction bronchiectasis and fibrosis and possibly mild honeycomb changes peripherally     Plan:    Wean  Prednisone to 10 mg daily for 2 more weeks then 7.5 mg daily for 2 weeks and then 5mg daily to for 2 weeks then 5 mg every other day for 2 weeks and then discontinue    Bactrim DS for prophylaxis to discontinue once less then 10 mg of prednisone  Will schedule  HRCT Chest before next appointment and  off prednisone and will need periodic follow up of HRCt Chest to determine the progression and evolution  follow up of UIP pattern on HRCT Chest in the future  Will get PFTs before next visit  Continue O2 at 2 L  6 min walk test before next visit  Follow up cardiology and HR control and syncope  Would like her to get off Amiodarone as soon as possible    RTC in 3 months      Avinash Barraza MD             12/14/2017, 1:08 PM

## 2017-12-14 NOTE — ED PROVIDER NOTES
[facility] ED  eMERGENCY dEPARTMENT eNCOUnter      Pt Name: Medardo Bull  MRN: [id number]  [place] 1940  Date of evaluation: 12/13/2017  Provider: Connor Townsend Dr       Chief Complaint   Patient presents with    Epistaxis     onset 20 min PTA       HISTORY OF PRESENT ILLNESS    [place] Sukhwinder Jimenez is a 65 y.o. female who presents to the emergency department from home With complaints of a bloody nose. Patient states that started about 20 meds prior to arrival.  She states that she's had problems with bloody noses in the past.  She's had have a nasal packing in the past.  She is on Plavix and aspirin. She denies trauma or injury. Triage notes and Nursing notes were reviewed by myself. Any discrepancies are addressed above. PAST MEDICAL HISTORY     Past Medical History:   Diagnosis Date    Allergic rhinitis 10/1994    Asthma     Atrial fibrillation ([facility] 12/2008    CAD (coronary artery disease)     Clotting disorder (HCC)     plebitis in L leg    COPD (chronic obstructive pulmonary disease) ([facility]     DDD (degenerative disc disease), cervical     Degeneration of cervical intervertebral disc     Diverticulosis 2005    Gout     Gout, unspecified     H/O cardiac catheterization 6/17/15    LMCA: Normal 0% stenosis. LAD: Lesion on 1st diag: Proximal subsection. 80% stenosis. Lesion plaque is ruptured. Gevena Sherri LCx: Lesion on 1st Ob Leslie: Mid subsection. 85% stenosis. RCA:Lesion on R PDA: Mid subsection. 85% stenosis. Lesion on R PDA: Distal subsection. 70% stenosis. Lesion on Prox RCA: Mid subsection. 50% stenosis. EF 50%.  H/O echocardiogram 11/09/2016    EF 40-45%. Mild LV hypertrophy normal LV cavity size. Sigmoid interventricular septum without evidence of outflow tract obstruction. Left atrium is moderately dilated (34-39) left atrial volume index of 36 ml/m2. Mild mitral regurg. Diastology cannot be assessed due to A-fib.     History of Holter monitoring 3/24/16 200 MG TABLET    Take 1 tablet by mouth 2 times daily    ASPIRIN 81 MG CHEWABLE TABLET    Take 4 tablets by mouth daily    ATORVASTATIN (LIPITOR) 40 MG TABLET    Take 1 tablet by mouth nightly    CLOPIDOGREL (PLAVIX) 75 MG TABLET    Take 1 tablet by mouth daily    FUROSEMIDE (LASIX) 20 MG TABLET    TAKE ONE TABLET BY MOUTH ONCE DAILY    GLUCOSE BLOOD (BLOOD GLUCOSE TEST STRIPS) STRP    Test 4 times daily Diagnosis    ISOSORBIDE MONONITRATE (IMDUR) 30 MG EXTENDED RELEASE TABLET    Take 1 tablet by mouth daily    MELATONIN 3 MG TABS TABLET    Take 3 mg by mouth nightly     METFORMIN (GLUCOPHAGE) 500 MG TABLET    Take 1 tablet by mouth 2 times daily (with meals)    METOPROLOL TARTRATE (LOPRESSOR) 50 MG TABLET    Take 1 tablet by mouth nightly    OXYGEN    Inhale 2 L into the lungs continuous     POLYETHYLENE GLYCOL (GLYCOLAX) POWDER    Take 17 g by mouth as needed (for constipation)    PREDNISONE (DELTASONE) 10 MG TABLET    15 mg daily x4 weeks    RANEXA 500 MG EXTENDED RELEASE TABLET    TAKE ONE TABLET BY MOUTH TWICE DAILY    SULFAMETHOXAZOLE-TRIMETHOPRIM (BACTRIM DS) 800-160 MG PER TABLET    1 tab once a day on Monday, Wednesday and friday       ALLERGIES     Adhesive tape; Aspercreme [trolamine salicylate]; Augmentin [amoxicillin-pot clavulanate];  Erythromycin; Guaifenesin & derivatives; and Niacin and related    FAMILY HISTORY       Family History   Problem Relation Age of Onset    Heart Disease Mother     Stroke Mother     High Blood Pressure Mother     Cancer Father      lung    Stroke Brother     Heart Disease Maternal Grandmother         SOCIAL HISTORY       Social History     Social History    Marital status:      Spouse name: N/A    Number of children: N/A    Years of education: N/A     Social History Main Topics    Smoking status: Never Smoker    Smokeless tobacco: Never Used    Alcohol use No    Drug use: No    Sexual activity: Not Asked     Other Topics Concern    None     Social History Narrative    None       REVIEW OF SYSTEMS       Review of Systems   Constitutional: Negative for appetite change, chills and fever. HENT: Positive for nosebleeds. Negative for congestion, ear pain, mouth sores, rhinorrhea and sore throat. Eyes: Negative for pain and itching. Respiratory: Negative for cough, shortness of breath and wheezing. Cardiovascular: Negative for chest pain. Gastrointestinal: Negative for abdominal pain, diarrhea, nausea and vomiting. Endocrine: Negative for polyuria. Genitourinary: Negative for dysuria, frequency, hematuria, urgency and vaginal bleeding. Musculoskeletal: Negative for back pain and neck pain. Skin: Negative for rash. Neurological: Negative for headaches. All other systems reviewed and are negative. Except as noted above the remainder of the review of systems was reviewed and is negative. PHYSICAL EXAM    (up to 7 for level 4, 8 or more for level 5)     ED Triage Vitals [12/13/17 1956]   BP Temp Temp Source Pulse Resp SpO2 Height Weight   -- 97.4 °F (36.3 °C) Tympanic -- -- -- -- --       Physical Exam   Constitutional: She is oriented to person, place, and time. She appears well-developed and well-nourished. No distress. HENT:   Head: Normocephalic and atraumatic. Right Ear: External ear normal.   Left Ear: External ear normal.   Mouth/Throat: Oropharynx is clear and moist.   Patient had brisk bright red blood coming from the right anterior and air. No septal hematoma noted. Eyes: Conjunctivae and EOM are normal. Pupils are equal, round, and reactive to light. Right eye exhibits no discharge. Left eye exhibits no discharge. Neck: Normal range of motion. Cardiovascular: Normal rate, regular rhythm and normal heart sounds. Exam reveals no friction rub. No murmur heard. Pulmonary/Chest: Effort normal and breath sounds normal. No respiratory distress. She has no wheezes. She exhibits no tenderness. Abdominal: Soft.  Bowel TO:    return to the ER in 3 days for nasal packing removal  In 3 days        DISCHARGE MEDICATIONS:  New Prescriptions    CEPHALEXIN (KEFLEX) 500 MG CAPSULE    Take 1 capsule by mouth 4 times daily for 5 days              (Please note that portions of this note were completed with a voice recognition program.  Efforts were made to edit the dictations but occasionally words are mis-transcribed.)      Devin Garibay PA-C (electronically signed)  Attending Emergency Physician            Devin Garibay PA-C  12/13/17 1247

## 2017-12-15 ENCOUNTER — TELEPHONE (OUTPATIENT)
Dept: CARDIOLOGY CLINIC | Age: 77
End: 2017-12-15

## 2017-12-15 NOTE — LETTER
[facility] [address] [facility] [place] [address]  Phone: [phone]  Fax: [phone]    Reid Ayers MD        December 20, 2017    [address] [place] [address]      Dear [place]:    Our office has been trying to contact you and we are not able to reach you. You have one phone number listed in your chart and the voicemail box is not set up, so we are unable to leave a message. Please contact our office at [phone]      If you have any questions or concerns, please don't hesitate to call.     Sincerely,        Reid Ayers MD

## 2017-12-17 ENCOUNTER — HOSPITAL ENCOUNTER (EMERGENCY)
Age: 77
Discharge: HOME | End: 2017-12-17
Payer: MEDICARE

## 2017-12-17 VITALS
TEMPERATURE: 96.4 F | RESPIRATION RATE: 20 BRPM | HEART RATE: 82 BPM | OXYGEN SATURATION: 97 % | SYSTOLIC BLOOD PRESSURE: 96 MMHG | DIASTOLIC BLOOD PRESSURE: 78 MMHG

## 2017-12-17 DIAGNOSIS — Z48.00 ENCOUNTER FOR REMOVAL OF NASAL PACKING: ICD-10-CM

## 2017-12-17 DIAGNOSIS — Z87.898 HX OF EPISTAXIS: Primary | ICD-10-CM

## 2017-12-17 PROCEDURE — 99282 EMERGENCY DEPT VISIT SF MDM: CPT

## 2017-12-17 ASSESSMENT — ENCOUNTER SYMPTOMS
BACK PAIN: 0
BLOOD IN STOOL: 0
WHEEZING: 0
DIARRHEA: 0
COUGH: 0
SORE THROAT: 0
NAUSEA: 0
ABDOMINAL PAIN: 0
VOMITING: 0
SHORTNESS OF BREATH: 0
EYE DISCHARGE: 0
CONSTIPATION: 0
RHINORRHEA: 0
EYE REDNESS: 0
CHEST TIGHTNESS: 0

## 2017-12-17 NOTE — ED PROVIDER NOTES
[facility] ED  eMERGENCY dEPARTMENT eNCOUnter      Pt Name: Hannah Daley  MRN: [id number]  [place] 1940  Date of evaluation: 12/17/2017  Provider: Kendal Bear [place] Renee       Chief Complaint   Patient presents with    Foreign Body in Nose     Requests rhino rocket removal         HISTORY OF PRESENT ILLNESS   (Location/Symptom, Timing/Onset, Context/Setting, Quality, Duration, Modifying Factors, Severity)  Note limiting factors. Hannah Daley is a 65 y.o. female who presents to the emergency department  For nasal packing removal.  Patient reports that she had nasal packing placed on Wednesday due to nosebleed. Reports she's had frequent nose bleeds which have been addressed. She was taken off her Coumadin but remains Plavix. She's not had any bleeding she is up that she is swallowing blood. Denies any new trauma to the nose. She has no other complaints at this time denies any shortness of breath or dizziness. HPI    Nursing Notes were reviewed. REVIEW OF SYSTEMS    (2-9 systems for level 4, 10 or more for level 5)     Review of Systems   Constitutional: Negative for chills, diaphoresis and fever. HENT: Negative for congestion, ear pain, rhinorrhea and sore throat. Needed for nasal packing removal   Eyes: Negative for discharge, redness and visual disturbance. Respiratory: Negative for cough, chest tightness, shortness of breath and wheezing. Cardiovascular: Negative for chest pain and palpitations. Gastrointestinal: Negative for abdominal pain, blood in stool, constipation, diarrhea, nausea and vomiting. Endocrine: Negative for polydipsia, polyphagia and polyuria. Genitourinary: Negative for decreased urine volume, difficulty urinating, dysuria, frequency and hematuria. Musculoskeletal: Negative for arthralgias, back pain and myalgias. Skin: Negative for pallor and rash.    Allergic/Immunologic: Negative for food allergies and immunocompromised state. Neurological: Negative for dizziness, syncope, weakness and light-headedness. Hematological: Negative for adenopathy. Does not bruise/bleed easily. Psychiatric/Behavioral: Negative for behavioral problems and suicidal ideas. The patient is not nervous/anxious. Except as noted above the remainder of the review of systems was reviewed and negative. PAST MEDICAL HISTORY     Past Medical History:   Diagnosis Date    Allergic rhinitis 10/1994    Asthma     Atrial fibrillation ([facility] [address] 12/2008    CAD (coronary artery disease)     Clotting disorder (HCC)     plebitis in L leg    COPD (chronic obstructive pulmonary disease) ([facility] [address]     DDD (degenerative disc disease), cervical     Degeneration of cervical intervertebral disc     Diverticulosis 2005    Gout     Gout, unspecified     H/O cardiac catheterization 6/17/15    LMCA: Normal 0% stenosis. LAD: Lesion on 1st diag: Proximal subsection. 80% stenosis. Lesion plaque is ruptured. Della Silva LCx: Lesion on 1st Ob Leslie: Mid subsection. 85% stenosis. RCA:Lesion on R PDA: Mid subsection. 85% stenosis. Lesion on R PDA: Distal subsection. 70% stenosis. Lesion on Prox RCA: Mid subsection. 50% stenosis. EF 50%.  H/O echocardiogram 11/09/2016    EF 40-45%. Mild LV hypertrophy normal LV cavity size. Sigmoid interventricular septum without evidence of outflow tract obstruction. Left atrium is moderately dilated (34-39) left atrial volume index of 36 ml/m2. Mild mitral regurg. Diastology cannot be assessed due to A-fib.  History of Holter monitoring 3/24/16    Atrial Fibrillation throughout,fairly controlled, HR  bpm 79% of the time. Occasional high ventricular rate episodes and multiple pauses suggestive of tachycardia/bradycardia syndrome  Msximum R-R interval 2.68 seconds.    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 She appears well-developed and well-nourished. No distress. HENT:   Head: Normocephalic and atraumatic. Right Ear: External ear normal.   Left Ear: External ear normal.   Nose: No septal deviation or nasal septal hematoma. Patient has nasal packing in the right Daryl Israel. There is no bleeding. Packing removed. No bleeding noted no septal hematoma. No tenderness. Eyes: Conjunctivae are normal. Right eye exhibits no discharge. Left eye exhibits no discharge. No scleral icterus. Neck: Normal range of motion. Neck supple. No tracheal deviation present. Cardiovascular: Normal rate, regular rhythm and intact distal pulses. Pulmonary/Chest: Effort normal. No stridor. No respiratory distress. Musculoskeletal: Normal range of motion. She exhibits no edema, tenderness or deformity. Neurological: She is alert and oriented to person, place, and time. No cranial nerve deficit. Skin: Skin is warm and dry. No rash noted. She is not diaphoretic. No erythema. Psychiatric: She has a normal mood and affect. Her behavior is normal.   Nursing note and vitals reviewed. DIAGNOSTIC RESULTS     EKG: All EKG's are interpreted by the Emergency Department Physician who either signs or Co-signs this chart in the absence of a cardiologist.      RADIOLOGY:   Non-plain film images such as CT, Ultrasound and MRI are read by the radiologist. Plain radiographic images are visualized and preliminarily interpreted by the emergency physician with the below findings:      Interpretation per the Radiologist below, if available at the time of this note:    No orders to display         ED BEDSIDE ULTRASOUND:   Performed by ED Physician - none    LABS:  Labs Reviewed - No data to display    All other labs were within normal range or not returned as of this dictation.     EMERGENCY DEPARTMENT COURSE and DIFFERENTIAL DIAGNOSIS/MDM:   Vitals:    Vitals:    12/17/17 1322 12/17/17 1332   BP:  107/66   Pulse: 80 82   Resp:  20   Temp: 96.4 °F (35.8 °C)    TempSrc: Tympanic    SpO2: 95%          MDM  68-year-old female with history of multiple episodes of nosebleeds who presents to have nasal packing removed from her right Kan Primes which was placed on Wednesday. I went in with my attending, Dr. Mcmahon Him nasal packing was pulled no septal hematoma and no bleeding noted. Have instructed patient how to care for this. She will all of the primary care follow up with ENT. 12/17/17  1:42 PM  When patient was being handed discharge her work she began to have slight bleeding from the right Kan Primes with a packing was placed. Immediately placed a clamp we will cleanse this for 20 minutes and recheck. 12/17/17  2:12 PM  Patient had clamp removed. She just has blood-tinged nasal discharge. There is no active bleeding. The nose is just irritated. There is no active blood    12/17/17  2:55 PM  Patient has not had any rebleed she will go home she understands that if she has any more bleeding to come back to the ER. There is no active bleeding on recheck. She will otherwise follow-up as instructed or return with any new or worsening complaints. Procedures    FINAL IMPRESSION      1. Hx of epistaxis    2.  Encounter for removal of nasal packing          DISPOSITION/PLAN   DISPOSITION Decision to Discharge    PATIENT REFERRED TO:  [facility] ED  [address]  [phone]    If symptoms worsen, As needed    Viki Villanueva MD  [facility]   [address] [facility]  [phone]    Schedule an appointment as soon as possible for a visit in 2 days        DISCHARGE MEDICATIONS:  New Prescriptions    No medications on file              Summation      Patient Course:      ED Medications administered this visit:  Medications - No data to display    New Prescriptions from this visit: New Prescriptions    No medications on file       Follow-up:  [facility] ED  [address]  [address] [place] [address] Road  [phone]    If symptoms worsen, As needed    Saravanan Vasques MD  [facility]  [address] [place] [address]  [phone]    Schedule an appointment as soon as possible for a visit in 2 days          Final Impression:   1. Hx of epistaxis    2.  Encounter for removal of nasal packing               (Please note that portions of this note were completed with a voice recognition program.  Efforts were made to edit the dictations but occasionally words are mis-transcribed.)            Camron Wilburn PA-C  12/17/17 [id number]

## 2017-12-17 NOTE — ED NOTES
Arrived the ER with rhino rocket in right nare. Denies any bleeding or swallowing of blood.      Akosua Lopes RN  12/17/17 4968

## 2017-12-17 NOTE — ED NOTES
Pt. To family car per wheelchair. No bleeding noted since nose last assessed by Melody Woods.      Larry Guillory RN  12/17/17 6762

## 2017-12-17 NOTE — ED NOTES
came to nurse's station and advised us her nose was bleeding again.        Tres Lay RN  12/17/17 4706

## 2017-12-17 NOTE — ED NOTES
While assisting patient to put on coat, a small amount of blood tinged nasal discharge came from right nare. Melody Blanchard notified and patient put nasal clamps on.      Omar Raza RN  12/17/17 7155

## 2017-12-18 ENCOUNTER — CARE COORDINATION (OUTPATIENT)
Dept: CARE COORDINATION | Age: 77
End: 2017-12-18

## 2017-12-18 NOTE — CARE COORDINATION
Patient was called to follow up with most recent ER visit. There was no answer. pt voicemail is not set up.  Writer will call back

## 2017-12-19 NOTE — CARE COORDINATION
2nd attempt to reach patient. There was no answer. unable to leave a message.  No other number to call

## 2017-12-20 ENCOUNTER — OFFICE VISIT (OUTPATIENT)
Dept: FAMILY MEDICINE CLINIC | Age: 77
End: 2017-12-20
Payer: MEDICARE

## 2017-12-20 DIAGNOSIS — R04.0 EPISTAXIS: Primary | ICD-10-CM

## 2017-12-20 DIAGNOSIS — J18.9 PNEUMONITIS: Chronic | ICD-10-CM

## 2017-12-20 PROBLEM — J98.4 PNEUMONITIS: Status: ACTIVE | Noted: 2017-12-20

## 2017-12-20 PROCEDURE — 99213 OFFICE O/P EST LOW 20 MIN: CPT | Performed by: FAMILY MEDICINE

## 2017-12-20 NOTE — PATIENT INSTRUCTIONS
PLAN:   I will use a silver nitrate stick on this for cauterization. I will also refer her to Dr. Nanette Hinojosa for further evaluation. I suspect she has a virus causing the gastrointestinal tract symptoms.

## 2017-12-20 NOTE — PROGRESS NOTES
[address] [address]  [place] [address]  Dept: [phone]    HPI: Patient presents today for an ER follow up. She was seen on 12/13 for epistaxis. While at the ER they put a clamp on her nose and ran a 'torpedo thing' up her nose. She went back to the ER on 12/17 to have these removed and had blood colored mucous     Current Outpatient Prescriptions   Medication Sig Dispense Refill    furosemide (LASIX) 20 MG tablet TAKE ONE TABLET BY MOUTH ONCE DAILY (Patient taking differently: 1 tab on Monday Wednesday and Friday) 90 tablet 1    aspirin 81 MG chewable tablet Take 4 tablets by mouth daily 30 tablet 3    RANEXA 500 MG extended release tablet TAKE ONE TABLET BY MOUTH TWICE DAILY 180 tablet 3    metFORMIN (GLUCOPHAGE) 500 MG tablet Take 1 tablet by mouth 2 times daily (with meals) 60 tablet 3    clopidogrel (PLAVIX) 75 MG tablet Take 1 tablet by mouth daily 30 tablet 3    atorvastatin (LIPITOR) 40 MG tablet Take 1 tablet by mouth nightly 90 tablet 3    predniSONE (DELTASONE) 10 MG tablet 15 mg daily x4 weeks      metoprolol tartrate (LOPRESSOR) 50 MG tablet Take 1 tablet by mouth nightly 90 tablet 3    amiodarone (CORDARONE) 200 MG tablet Take 1 tablet by mouth 2 times daily 90 tablet 3    polyethylene glycol (GLYCOLAX) powder Take 17 g by mouth as needed (for constipation)      sulfamethoxazole-trimethoprim (BACTRIM DS) 800-160 MG per tablet 1 tab once a day on Monday, Wednesday and friday 30 tablet 5    OXYGEN Inhale 2 L into the lungs continuous       Glucose Blood (BLOOD GLUCOSE TEST STRIPS) STRP Test 4 times daily Diagnosis 100 strip 11    melatonin 3 MG TABS tablet Take 3 mg by mouth nightly       isosorbide mononitrate (IMDUR) 30 MG extended release tablet Take 1 tablet by mouth daily 30 tablet 3     No current facility-administered medications for this visit.       ROS:  Admits tender nose  Admits diarrhea  Admits nausea  Admits no appetite  Admits bruising  Admits troubles staying asleep  Admits fatigue  Admits SOB  Admits weakness  Denies lightheadedness/dizziness    EXAM:  /64 (Site: Left Arm, Position: Sitting, Cuff Size: Large Adult)   Ht 5' 5.5\" (1.664 m)   Wt Readings from Last 3 Encounters:   12/14/17 182 lb (82.6 kg)   11/20/17 186 lb (84.4 kg)   11/07/17 186 lb 9.6 oz (84.6 kg)     BP Readings from Last 3 Encounters:   12/20/17 104/64   12/17/17 96/78   12/14/17 116/68     PHYSICAL EXAM:  General Appearance: in no acute distress, well developed, well nourished. Eyes: pupils equal, round reactive to light and accommodation. Ears: normal canal and TM's. Nose: Normal mucosa on L, crusting and clot on R kiesselbach plexus  Oral Cavity: mucosa moist.  Throat: clear. Neck/Thyroid: neck supple, full range of motion, no cervical lymphadenopathy, no thyromegaly or carotid bruits. Skin: warm and dry. No suspicious lesions. Heart: regular rate and rhythm. No murmurs. S1, S2 normal, no gallops. Lungs: clear to auscultation bilaterally. Abdomen: bowel sounds present, soft, nontender, nondistended, no masses or organomegaly. Musculoskeletal: normal, full range of motion in knees and hips, no swelling or tenderness. Extremities: no cyanosis or edema. Peripheral Pulses: 2+ throughout, symetric. Neurologic: nonfocal, motor strength normal upper and lower extremities, sensory exam intact. Psych: normal affect, speech fluent. ASSESSMENT:  1. Epistaxis     2. Pneumonitis                PLAN:   I will use a silver nitrate stick on this for cauterization. I will also refer her to Dr. Omar Paget for further evaluation. I suspect she has a virus causing the gastrointestinal tract symptoms. No orders of the defined types were placed in this encounter. No orders of the defined types were placed in this encounter. Scribed by: DONNY Arias, [id number]

## 2017-12-21 VITALS — HEIGHT: 66 IN | DIASTOLIC BLOOD PRESSURE: 64 MMHG | SYSTOLIC BLOOD PRESSURE: 104 MMHG

## 2017-12-21 PROCEDURE — 93295 DEV INTERROG REMOTE 1/2/MLT: CPT | Performed by: FAMILY MEDICINE

## 2017-12-21 PROCEDURE — 93296 REM INTERROG EVL PM/IDS: CPT | Performed by: FAMILY MEDICINE

## 2017-12-22 ENCOUNTER — TELEPHONE (OUTPATIENT)
Dept: CARDIOLOGY | Age: 77
End: 2017-12-22

## 2017-12-26 ENCOUNTER — TELEPHONE (OUTPATIENT)
Dept: CARDIOLOGY | Age: 77
End: 2017-12-26

## 2017-12-27 DIAGNOSIS — I25.5 ISCHEMIC CARDIOMYOPATHY: Chronic | ICD-10-CM

## 2017-12-27 DIAGNOSIS — Z95.810 CARDIAC DEFIBRILLATOR IN PLACE: Primary | ICD-10-CM

## 2018-01-09 ENCOUNTER — OFFICE VISIT (OUTPATIENT)
Dept: CARDIOLOGY | Age: 78
End: 2018-01-09
Payer: MEDICARE

## 2018-01-09 VITALS
OXYGEN SATURATION: 97 % | HEART RATE: 81 BPM | WEIGHT: 171.5 LBS | SYSTOLIC BLOOD PRESSURE: 110 MMHG | HEIGHT: 66 IN | DIASTOLIC BLOOD PRESSURE: 72 MMHG | BODY MASS INDEX: 27.56 KG/M2

## 2018-01-09 DIAGNOSIS — I50.42 CHRONIC COMBINED SYSTOLIC AND DIASTOLIC CHF, NYHA CLASS 3 (HCC): ICD-10-CM

## 2018-01-09 DIAGNOSIS — Z79.899 ON AMIODARONE THERAPY: Primary | ICD-10-CM

## 2018-01-09 DIAGNOSIS — E78.5 DYSLIPIDEMIA: ICD-10-CM

## 2018-01-09 DIAGNOSIS — I25.10 ASHD (ARTERIOSCLEROTIC HEART DISEASE): ICD-10-CM

## 2018-01-09 DIAGNOSIS — I48.20 CHRONIC ATRIAL FIBRILLATION (HCC): ICD-10-CM

## 2018-01-09 PROCEDURE — 99214 OFFICE O/P EST MOD 30 MIN: CPT | Performed by: INTERNAL MEDICINE

## 2018-01-10 NOTE — PROGRESS NOTES
Subjective:     CHIEF COMPLAINT / HPI:   Chief Complaint   Patient presents with    3 Month Follow-Up     Hx: A-Fib,CAD,Watchman. C/o:Shortness of breath with exertion about the same. Denies any chest pain,pressure,dizziness/lightheadedness,palpitations,edema,syncope,near syncope or fatigue. Dear Dr. Janice Castillo MD     I had the pleasure of seeing Ms. Phyllis Beck for follow-up of her atrial fibrillation and coronary artery disease. Antoinette Goss is 65 y.o. female with PMH of hypertension, hyperlipidemia, asthma and chronic atrial fibrillation. Her most recent cardiac catheterization showed moderate to severe three-vessel coronary artery disease as outlined below. 1-Patient's chronic atrial fibrillation is  Treated with rate control and anticoagulation with coumadin. Her Holter monitor on 4/9/2015 suggested tachycardiabradycardia syndrome. 2-On 3/16/2016, patient was sent from cardiac rehab because of not feeling well. Imdur 30 mg daily was added as well as Ranexa 500 mg BID. 3-Patient admitted to [facility] from 6/3/2017 to 6/15/2017 with worsening shortness of breath diagnosed with pneumonitis, status post lung biopsy. Currently on prednisone. 4-Another admission to [facility] from 6/29/2017 to 7/6/2017 with A. Fib with RVR and acute on chronic diastolic CHF. Patient started on amiodarone during this admission in addition to her ratel control and diuresis. 5-Another admission to [facility] from 7/11/2017 to 7/14/2017 with recurrent fall and black eye. 3 falling episodes, 2 of them she hit her head. She is not sure if she lost consciousness. MRI of the brain was negative. 6-Patient underwent AV node ablation and insertion of biventricular ICD by Dr. Carol Lemus on 8/10/2017. 7-On 9/18/2017 patient underwent insertion of a Watchman device. currently off anticoagulation     Today she is here today for follow-up. Doing ok overall.   Denies any chest pain, pressure or tightness. No worsening shortness of breath. largely off home Oxygen (schedulled to see Dr. Amanda Villela and Dr. Alex Drake in the next few weeks). No further dizziness or lightheadedness. No further episodes of fall. Cardiac cath 6-  LMCA: Normal 0% stenosis. LAD: Lesion on 1st diag: Proximal subsection. 80% stenosis. LCx: Lesion on 1st Ob Leslie: Mid subsection. 85% stenosis. RCA:Lesion on R PDA: Mid subsection. 85% stenosis. Lesion on R PDA: Distal subsection. 70% stenosis. Lesion on Prox RCA: Mid subsection. 50% stenosis. EF 50%. Past Medical History:    Past Medical History:   Diagnosis Date    Allergic rhinitis 10/1994    Asthma     Atrial fibrillation ([facility] [address] 12/2008    CAD (coronary artery disease)     Clotting disorder (HCC)     plebitis in L leg    COPD (chronic obstructive pulmonary disease) ([facility] [address]     DDD (degenerative disc disease), cervical     Degeneration of cervical intervertebral disc     Diverticulosis 2005    Gout     Gout, unspecified     H/O cardiac catheterization 6/17/15    LMCA: Normal 0% stenosis. LAD: Lesion on 1st diag: Proximal subsection. 80% stenosis. Lesion plaque is ruptured. Kin Gonazlez LCx: Lesion on 1st Ob Leslie: Mid subsection. 85% stenosis. RCA:Lesion on R PDA: Mid subsection. 85% stenosis. Lesion on R PDA: Distal subsection. 70% stenosis. Lesion on Prox RCA: Mid subsection. 50% stenosis. EF 50%.  H/O echocardiogram 11/09/2016    EF 40-45%. Mild LV hypertrophy normal LV cavity size. Sigmoid interventricular septum without evidence of outflow tract obstruction. Left atrium is moderately dilated (34-39) left atrial volume index of 36 ml/m2. Mild mitral regurg. Diastology cannot be assessed due to A-fib.  History of Holter monitoring 3/24/16    Atrial Fibrillation throughout,fairly controlled, HR  bpm 79% of the time.  Occasional high ventricular rate episodes and multiple pauses suggestive of tachycardia/bradycardia syndrome  Msximum R-R

## 2018-02-07 ENCOUNTER — HOSPITAL ENCOUNTER (OUTPATIENT)
Age: 78
Discharge: HOME | DRG: 191 | End: 2018-02-07
Payer: MEDICARE

## 2018-02-07 ENCOUNTER — OFFICE VISIT (OUTPATIENT)
Dept: FAMILY MEDICINE CLINIC | Age: 78
End: 2018-02-07
Payer: MEDICARE

## 2018-02-07 VITALS — SYSTOLIC BLOOD PRESSURE: 112 MMHG | DIASTOLIC BLOOD PRESSURE: 68 MMHG | HEIGHT: 66 IN | OXYGEN SATURATION: 98 %

## 2018-02-07 DIAGNOSIS — R06.09 DYSPNEA ON EXERTION: Primary | ICD-10-CM

## 2018-02-07 DIAGNOSIS — E11.9 TYPE 2 DIABETES MELLITUS WITHOUT COMPLICATION, UNSPECIFIED LONG TERM INSULIN USE STATUS: ICD-10-CM

## 2018-02-07 DIAGNOSIS — Z13.220 SCREENING CHOLESTEROL LEVEL: ICD-10-CM

## 2018-02-07 DIAGNOSIS — I50.22 CHRONIC SYSTOLIC CONGESTIVE HEART FAILURE (HCC): ICD-10-CM

## 2018-02-07 LAB
ALBUMIN SERPL-MCNC: 3.9 G/DL (ref 3.5–5.2)
ALBUMIN/GLOBULIN RATIO: 1.6 (ref 1–2.5)
ALP BLD-CCNC: 82 U/L (ref 35–104)
ALT SERPL-CCNC: 59 U/L (ref 5–33)
ANION GAP SERPL CALCULATED.3IONS-SCNC: 15 MMOL/L (ref 9–17)
AST SERPL-CCNC: 74 U/L
BILIRUB SERPL-MCNC: 0.75 MG/DL (ref 0.3–1.2)
BNP INTERPRETATION: ABNORMAL
BUN BLDV-MCNC: 14 MG/DL (ref 8–23)
BUN/CREAT BLD: 12 (ref 9–20)
CALCIUM SERPL-MCNC: 9.1 MG/DL (ref 8.6–10.4)
CHLORIDE BLD-SCNC: 102 MMOL/L (ref 98–107)
CO2: 23 MMOL/L (ref 20–31)
CREAT SERPL-MCNC: 1.15 MG/DL (ref 0.5–0.9)
ESTIMATED AVERAGE GLUCOSE: 105 MG/DL
GFR AFRICAN AMERICAN: 55 ML/MIN
GFR NON-AFRICAN AMERICAN: 46 ML/MIN
GFR SERPL CREATININE-BSD FRML MDRD: ABNORMAL ML/MIN/{1.73_M2}
GFR SERPL CREATININE-BSD FRML MDRD: ABNORMAL ML/MIN/{1.73_M2}
GLUCOSE BLD-MCNC: 144 MG/DL (ref 70–99)
HBA1C MFR BLD: 5.3 % (ref 4.8–5.9)
HCT VFR BLD CALC: 41.2 % (ref 36–46)
HEMOGLOBIN: 13.4 G/DL (ref 12–16)
HIGH SENSITIVE C-REACTIVE PROTEIN: 14.1 MG/L
MCH RBC QN AUTO: 32.5 PG (ref 26–34)
MCHC RBC AUTO-ENTMCNC: 32.6 G/DL (ref 31–37)
MCV RBC AUTO: 99.8 FL (ref 80–100)
NRBC AUTOMATED: ABNORMAL PER 100 WBC
PDW BLD-RTO: 16.2 % (ref 12.1–15.2)
PLATELET # BLD: 244 K/UL (ref 140–450)
PMV BLD AUTO: 8.5 FL (ref 6–12)
POTASSIUM SERPL-SCNC: 3.7 MMOL/L (ref 3.7–5.3)
PRO-BNP: 1078 PG/ML
RBC # BLD: 4.13 M/UL (ref 4–5.2)
SEDIMENTATION RATE, ERYTHROCYTE: 6 MM (ref 0–20)
SODIUM BLD-SCNC: 140 MMOL/L (ref 135–144)
T4 TOTAL: 4.8 UG/DL (ref 4.5–12)
TOTAL PROTEIN: 6.3 G/DL (ref 6.4–8.3)
TSH SERPL DL<=0.05 MIU/L-ACNC: 83.49 MIU/L (ref 0.3–5)
WBC # BLD: 7 K/UL (ref 3.5–11)

## 2018-02-07 PROCEDURE — 36415 COLL VENOUS BLD VENIPUNCTURE: CPT

## 2018-02-07 PROCEDURE — 99214 OFFICE O/P EST MOD 30 MIN: CPT | Performed by: FAMILY MEDICINE

## 2018-02-07 PROCEDURE — 83036 HEMOGLOBIN GLYCOSYLATED A1C: CPT

## 2018-02-07 PROCEDURE — 86141 C-REACTIVE PROTEIN HS: CPT

## 2018-02-07 PROCEDURE — 83880 ASSAY OF NATRIURETIC PEPTIDE: CPT

## 2018-02-07 PROCEDURE — 85651 RBC SED RATE NONAUTOMATED: CPT

## 2018-02-07 PROCEDURE — 80053 COMPREHEN METABOLIC PANEL: CPT

## 2018-02-07 PROCEDURE — 84436 ASSAY OF TOTAL THYROXINE: CPT

## 2018-02-07 PROCEDURE — 85027 COMPLETE CBC AUTOMATED: CPT

## 2018-02-07 PROCEDURE — 84443 ASSAY THYROID STIM HORMONE: CPT

## 2018-02-07 NOTE — PROGRESS NOTES
[address] [address]  [place] [address]  Dept: [phone]    HPI:  Pt. Presents c/o progressive worsening of weakness and falls x1 month. She also c/o nausea and vomiting x3 months. She states that Dr. Asutin Muse and Dr. Maya Winters thought that the nausea and vomiting was being caused by the prednisone. She was tapered off of this 5 days ago and symptoms have not improved. She stopped going to Cardiac Rehab due to increased nosebleeds and Dr. Austin Muse told her to put it on hold for now. Current Outpatient Prescriptions   Medication Sig Dispense Refill    aspirin 81 MG chewable tablet Take 4 tablets by mouth daily 30 tablet 3    RANEXA 500 MG extended release tablet TAKE ONE TABLET BY MOUTH TWICE DAILY 180 tablet 3    clopidogrel (PLAVIX) 75 MG tablet Take 1 tablet by mouth daily 30 tablet 3    atorvastatin (LIPITOR) 40 MG tablet Take 1 tablet by mouth nightly 90 tablet 3    metoprolol tartrate (LOPRESSOR) 50 MG tablet Take 1 tablet by mouth nightly 90 tablet 3    polyethylene glycol (GLYCOLAX) powder Take 17 g by mouth as needed (for constipation)      Glucose Blood (BLOOD GLUCOSE TEST STRIPS) STRP Test 4 times daily Diagnosis 100 strip 11    melatonin 3 MG TABS tablet Take 3 mg by mouth nightly       isosorbide mononitrate (IMDUR) 30 MG extended release tablet Take 1 tablet by mouth daily 30 tablet 3    OXYGEN Inhale 2 L into the lungs continuous        No current facility-administered medications for this visit. ROS:  Admits fatigue. Admits bowels moving irregularly (\"sometimes up to a week\") due to poor po intakes. Admits weakness in LE causing legs to give out and fall. Admits Falls x3 in last week. Admits productive cough in the morning x4 days. Admits SOB on exertion, Oxygen is not being worn due to nose bleeds and being told that this is contributing to these. Admits wheezing x4 days.    Admits intermittent nausea and vomiting x2

## 2018-02-08 NOTE — PROGRESS NOTES
Notify lab is not as bad as I expected  Not anemic  Kidney function is ok  Thyroid is however very bad  Need to start levothyroxine 50 mcg daily

## 2018-02-09 ENCOUNTER — APPOINTMENT (OUTPATIENT)
Dept: GENERAL RADIOLOGY | Age: 78
DRG: 191 | End: 2018-02-09
Payer: MEDICARE

## 2018-02-09 ENCOUNTER — HOSPITAL ENCOUNTER (INPATIENT)
Age: 78
LOS: 1 days | Discharge: ANOTHER HOSPITAL | DRG: 191 | End: 2018-02-10
Attending: EMERGENCY MEDICINE | Admitting: FAMILY MEDICINE
Payer: MEDICARE

## 2018-02-09 DIAGNOSIS — K52.9 GASTROENTERITIS: ICD-10-CM

## 2018-02-09 DIAGNOSIS — R53.1 GENERAL WEAKNESS: ICD-10-CM

## 2018-02-09 DIAGNOSIS — J44.1 ACUTE EXACERBATION OF CHRONIC OBSTRUCTIVE PULMONARY DISEASE (COPD) (HCC): Primary | ICD-10-CM

## 2018-02-09 PROBLEM — J84.9 INTERSTITIAL LUNG DISEASE (HCC): Chronic | Status: ACTIVE | Noted: 2017-08-14

## 2018-02-09 PROBLEM — J84.10 PULMONARY FIBROSIS (HCC): Chronic | Status: ACTIVE | Noted: 2017-06-19

## 2018-02-09 PROBLEM — I48.0 PAROXYSMAL A-FIB (HCC): Chronic | Status: ACTIVE | Noted: 2017-09-20

## 2018-02-09 PROBLEM — R04.0 EPISTAXIS: Status: RESOLVED | Noted: 2017-08-30 | Resolved: 2018-02-09

## 2018-02-09 PROBLEM — E11.9 TYPE 2 DIABETES MELLITUS WITHOUT COMPLICATION (HCC): Chronic | Status: ACTIVE | Noted: 2017-02-13

## 2018-02-09 PROBLEM — R79.89 ELEVATED LFTS: Status: ACTIVE | Noted: 2018-02-09

## 2018-02-09 PROBLEM — I50.42 CHRONIC COMBINED SYSTOLIC AND DIASTOLIC CHF (CONGESTIVE HEART FAILURE) (HCC): Chronic | Status: ACTIVE | Noted: 2017-09-27

## 2018-02-09 LAB
ABSOLUTE EOS #: 0 K/UL (ref 0–0.4)
ABSOLUTE IMMATURE GRANULOCYTE: ABNORMAL K/UL (ref 0–0.3)
ABSOLUTE LYMPH #: 0.71 K/UL (ref 1–4.8)
ABSOLUTE MONO #: 0.43 K/UL (ref 0–1)
ALBUMIN SERPL-MCNC: 3.6 G/DL (ref 3.5–5.2)
ALBUMIN/GLOBULIN RATIO: 1.5 (ref 1–2.5)
ALP BLD-CCNC: 82 U/L (ref 35–104)
ALT SERPL-CCNC: 116 U/L (ref 5–33)
ANION GAP SERPL CALCULATED.3IONS-SCNC: 16 MMOL/L (ref 9–17)
AST SERPL-CCNC: 168 U/L
BASOPHILS # BLD: 0 % (ref 0–2)
BASOPHILS ABSOLUTE: 0 K/UL (ref 0–0.2)
BILIRUB SERPL-MCNC: 0.88 MG/DL (ref 0.3–1.2)
BUN BLDV-MCNC: 15 MG/DL (ref 8–23)
BUN/CREAT BLD: 16 (ref 9–20)
CALCIUM SERPL-MCNC: 8.5 MG/DL (ref 8.6–10.4)
CHLORIDE BLD-SCNC: 98 MMOL/L (ref 98–107)
CO2: 23 MMOL/L (ref 20–31)
CREAT SERPL-MCNC: 0.96 MG/DL (ref 0.5–0.9)
DIFFERENTIAL TYPE: ABNORMAL
EOSINOPHILS RELATIVE PERCENT: 0 % (ref 0–8)
GFR AFRICAN AMERICAN: >60 ML/MIN
GFR NON-AFRICAN AMERICAN: 56 ML/MIN
GFR SERPL CREATININE-BSD FRML MDRD: ABNORMAL ML/MIN/{1.73_M2}
GFR SERPL CREATININE-BSD FRML MDRD: ABNORMAL ML/MIN/{1.73_M2}
GLUCOSE BLD-MCNC: 119 MG/DL (ref 70–99)
HCT VFR BLD CALC: 38.2 % (ref 36–46)
HEMOGLOBIN: 13.1 G/DL (ref 12–16)
IMMATURE GRANULOCYTES: ABNORMAL %
LACTIC ACID: 1.3 MMOL/L (ref 0.5–2.2)
LYMPHOCYTES # BLD: 10 % (ref 24–44)
MCH RBC QN AUTO: 33.3 PG (ref 26–34)
MCHC RBC AUTO-ENTMCNC: 34.4 G/DL (ref 31–37)
MCV RBC AUTO: 97 FL (ref 80–100)
MONOCYTES # BLD: 6 % (ref 0–12)
MORPHOLOGY: NORMAL
NRBC AUTOMATED: ABNORMAL PER 100 WBC
PDW BLD-RTO: 14.9 % (ref 12.1–15.2)
PLATELET # BLD: 178 K/UL (ref 140–450)
PLATELET ESTIMATE: ABNORMAL
PMV BLD AUTO: 8.5 FL (ref 6–12)
POTASSIUM SERPL-SCNC: 3.6 MMOL/L (ref 3.7–5.3)
RBC # BLD: 3.93 M/UL (ref 4–5.2)
RBC # BLD: ABNORMAL 10*6/UL
SEG NEUTROPHILS: 84 % (ref 36–66)
SEGMENTED NEUTROPHILS ABSOLUTE COUNT: 5.96 K/UL (ref 1.8–7.7)
SODIUM BLD-SCNC: 137 MMOL/L (ref 135–144)
TOTAL PROTEIN: 6 G/DL (ref 6.4–8.3)
TROPONIN INTERP: NORMAL
TROPONIN T: <0.03 NG/ML
WBC # BLD: 7.1 K/UL (ref 3.5–11)
WBC # BLD: ABNORMAL 10*3/UL

## 2018-02-09 PROCEDURE — 82805 BLOOD GASES W/O2 SATURATION: CPT

## 2018-02-09 PROCEDURE — 6360000002 HC RX W HCPCS: Performed by: EMERGENCY MEDICINE

## 2018-02-09 PROCEDURE — 80053 COMPREHEN METABOLIC PANEL: CPT

## 2018-02-09 PROCEDURE — 6370000000 HC RX 637 (ALT 250 FOR IP): Performed by: EMERGENCY MEDICINE

## 2018-02-09 PROCEDURE — 93005 ELECTROCARDIOGRAM TRACING: CPT

## 2018-02-09 PROCEDURE — 36415 COLL VENOUS BLD VENIPUNCTURE: CPT

## 2018-02-09 PROCEDURE — 99285 EMERGENCY DEPT VISIT HI MDM: CPT

## 2018-02-09 PROCEDURE — 71046 X-RAY EXAM CHEST 2 VIEWS: CPT

## 2018-02-09 PROCEDURE — 2580000003 HC RX 258: Performed by: INTERNAL MEDICINE

## 2018-02-09 PROCEDURE — 6370000000 HC RX 637 (ALT 250 FOR IP): Performed by: INTERNAL MEDICINE

## 2018-02-09 PROCEDURE — G0378 HOSPITAL OBSERVATION PER HR: HCPCS

## 2018-02-09 PROCEDURE — 83605 ASSAY OF LACTIC ACID: CPT

## 2018-02-09 PROCEDURE — 1200000000 HC SEMI PRIVATE

## 2018-02-09 PROCEDURE — 36600 WITHDRAWAL OF ARTERIAL BLOOD: CPT

## 2018-02-09 PROCEDURE — 85025 COMPLETE CBC W/AUTO DIFF WBC: CPT

## 2018-02-09 PROCEDURE — 84484 ASSAY OF TROPONIN QUANT: CPT

## 2018-02-09 PROCEDURE — 96374 THER/PROPH/DIAG INJ IV PUSH: CPT

## 2018-02-09 PROCEDURE — 94664 DEMO&/EVAL PT USE INHALER: CPT

## 2018-02-09 PROCEDURE — 87040 BLOOD CULTURE FOR BACTERIA: CPT

## 2018-02-09 PROCEDURE — 2580000003 HC RX 258: Performed by: EMERGENCY MEDICINE

## 2018-02-09 RX ORDER — UREA 10 %
3 LOTION (ML) TOPICAL NIGHTLY
Status: DISCONTINUED | OUTPATIENT
Start: 2018-02-09 | End: 2018-02-10 | Stop reason: HOSPADM

## 2018-02-09 RX ORDER — ACETAMINOPHEN 325 MG/1
650 TABLET ORAL EVERY 4 HOURS PRN
Status: DISCONTINUED | OUTPATIENT
Start: 2018-02-09 | End: 2018-02-10 | Stop reason: HOSPADM

## 2018-02-09 RX ORDER — ASPIRIN 81 MG/1
325 TABLET, CHEWABLE ORAL DAILY
Status: DISCONTINUED | OUTPATIENT
Start: 2018-02-10 | End: 2018-02-10

## 2018-02-09 RX ORDER — SODIUM CHLORIDE 9 MG/ML
INJECTION, SOLUTION INTRAVENOUS CONTINUOUS
Status: DISCONTINUED | OUTPATIENT
Start: 2018-02-09 | End: 2018-02-10 | Stop reason: HOSPADM

## 2018-02-09 RX ORDER — METHYLPREDNISOLONE SODIUM SUCCINATE 125 MG/2ML
125 INJECTION, POWDER, LYOPHILIZED, FOR SOLUTION INTRAMUSCULAR; INTRAVENOUS ONCE
Status: COMPLETED | OUTPATIENT
Start: 2018-02-09 | End: 2018-02-09

## 2018-02-09 RX ORDER — METOPROLOL TARTRATE 50 MG/1
50 TABLET, FILM COATED ORAL NIGHTLY
Status: DISCONTINUED | OUTPATIENT
Start: 2018-02-09 | End: 2018-02-10 | Stop reason: HOSPADM

## 2018-02-09 RX ORDER — METHYLPREDNISOLONE SODIUM SUCCINATE 125 MG/2ML
60 INJECTION, POWDER, LYOPHILIZED, FOR SOLUTION INTRAMUSCULAR; INTRAVENOUS EVERY 12 HOURS
Status: DISCONTINUED | OUTPATIENT
Start: 2018-02-09 | End: 2018-02-10

## 2018-02-09 RX ORDER — IPRATROPIUM BROMIDE AND ALBUTEROL SULFATE 2.5; .5 MG/3ML; MG/3ML
1 SOLUTION RESPIRATORY (INHALATION)
Status: DISCONTINUED | OUTPATIENT
Start: 2018-02-10 | End: 2018-02-10

## 2018-02-09 RX ORDER — POLYETHYLENE GLYCOL 3350 17 G/17G
17 POWDER, FOR SOLUTION ORAL PRN
Status: DISCONTINUED | OUTPATIENT
Start: 2018-02-09 | End: 2018-02-10 | Stop reason: HOSPADM

## 2018-02-09 RX ORDER — SODIUM CHLORIDE 0.9 % (FLUSH) 0.9 %
10 SYRINGE (ML) INJECTION EVERY 12 HOURS SCHEDULED
Status: DISCONTINUED | OUTPATIENT
Start: 2018-02-09 | End: 2018-02-10 | Stop reason: HOSPADM

## 2018-02-09 RX ORDER — RANOLAZINE 500 MG/1
500 TABLET, EXTENDED RELEASE ORAL 2 TIMES DAILY
Status: DISCONTINUED | OUTPATIENT
Start: 2018-02-09 | End: 2018-02-10 | Stop reason: HOSPADM

## 2018-02-09 RX ORDER — 0.9 % SODIUM CHLORIDE 0.9 %
500 INTRAVENOUS SOLUTION INTRAVENOUS ONCE
Status: COMPLETED | OUTPATIENT
Start: 2018-02-09 | End: 2018-02-09

## 2018-02-09 RX ORDER — ONDANSETRON 2 MG/ML
4 INJECTION INTRAMUSCULAR; INTRAVENOUS EVERY 6 HOURS PRN
Status: DISCONTINUED | OUTPATIENT
Start: 2018-02-09 | End: 2018-02-10 | Stop reason: HOSPADM

## 2018-02-09 RX ORDER — ATORVASTATIN CALCIUM 40 MG/1
40 TABLET, FILM COATED ORAL NIGHTLY
Status: DISCONTINUED | OUTPATIENT
Start: 2018-02-09 | End: 2018-02-10 | Stop reason: HOSPADM

## 2018-02-09 RX ORDER — CLOPIDOGREL BISULFATE 75 MG/1
75 TABLET ORAL DAILY
Status: DISCONTINUED | OUTPATIENT
Start: 2018-02-10 | End: 2018-02-10 | Stop reason: HOSPADM

## 2018-02-09 RX ORDER — FAMOTIDINE 20 MG/1
20 TABLET, FILM COATED ORAL 2 TIMES DAILY
Status: DISCONTINUED | OUTPATIENT
Start: 2018-02-09 | End: 2018-02-10 | Stop reason: HOSPADM

## 2018-02-09 RX ORDER — IPRATROPIUM BROMIDE AND ALBUTEROL SULFATE 2.5; .5 MG/3ML; MG/3ML
1 SOLUTION RESPIRATORY (INHALATION) ONCE
Status: COMPLETED | OUTPATIENT
Start: 2018-02-09 | End: 2018-02-09

## 2018-02-09 RX ORDER — SODIUM CHLORIDE 0.9 % (FLUSH) 0.9 %
10 SYRINGE (ML) INJECTION PRN
Status: DISCONTINUED | OUTPATIENT
Start: 2018-02-09 | End: 2018-02-10 | Stop reason: HOSPADM

## 2018-02-09 RX ADMIN — Medication 3 MG: at 22:40

## 2018-02-09 RX ADMIN — METOPROLOL TARTRATE 50 MG: 50 TABLET, FILM COATED ORAL at 22:41

## 2018-02-09 RX ADMIN — IPRATROPIUM BROMIDE AND ALBUTEROL SULFATE 1 AMPULE: .5; 3 SOLUTION RESPIRATORY (INHALATION) at 21:00

## 2018-02-09 RX ADMIN — FAMOTIDINE 20 MG: 20 TABLET, FILM COATED ORAL at 22:40

## 2018-02-09 RX ADMIN — METHYLPREDNISOLONE SODIUM SUCCINATE 125 MG: 125 INJECTION, POWDER, FOR SOLUTION INTRAMUSCULAR; INTRAVENOUS at 20:58

## 2018-02-09 RX ADMIN — ATORVASTATIN CALCIUM 40 MG: 40 TABLET, FILM COATED ORAL at 22:40

## 2018-02-09 RX ADMIN — SODIUM CHLORIDE 500 ML: 9 INJECTION, SOLUTION INTRAVENOUS at 19:10

## 2018-02-09 RX ADMIN — SODIUM CHLORIDE: 9 INJECTION, SOLUTION INTRAVENOUS at 22:40

## 2018-02-09 ASSESSMENT — ENCOUNTER SYMPTOMS
VOICE CHANGE: 0
COLOR CHANGE: 0
SHORTNESS OF BREATH: 0
VOMITING: 1
ABDOMINAL DISTENTION: 0
FACIAL SWELLING: 0
BLOOD IN STOOL: 0
NAUSEA: 1
SORE THROAT: 0
BACK PAIN: 0
ABDOMINAL PAIN: 0
WHEEZING: 0
PHOTOPHOBIA: 0
VOMITING: 0
CONSTIPATION: 0
NAUSEA: 0
DIARRHEA: 0
COUGH: 1
TROUBLE SWALLOWING: 0
CHEST TIGHTNESS: 0

## 2018-02-09 NOTE — ED PROVIDER NOTES
interventricular septum without evidence of outflow tract obstruction. Left atrium is moderately dilated (34-39) left atrial volume index of 36 ml/m2. Mild mitral regurg. Diastology cannot be assessed due to A-fib.  History of Holter monitoring 3/24/16    Atrial Fibrillation throughout,fairly controlled, HR  bpm 79% of the time. Occasional high ventricular rate episodes and multiple pauses suggestive of tachycardia/bradycardia syndrome  Msximum R-R interval 2.68 seconds. 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    Long term (current) use of anticoagulants 8/31/2015    Other abnormal glucose 2004    Pacemaker 08/10/2017    Insertion of a biventricular pacemaker/ICD AVN ablation    Phlebitis and thrombophlebitis of lower extremities, unspecified 1961    L leg    Senile osteoporosis 2006    L/S    Symptomatic menopausal or female climacteric states 06/1995    Syncope and collapse     Unspecified hereditary and idiopathic peripheral neuropathy 2012    Unspecified sleep apnea 11/2009       SURGICAL HISTORY       Past Surgical History:   Procedure Laterality Date    BRONCHOSCOPY  6/7/2017    BRONCHOSCOPY BRUSHINGS performed by Blas Khalil DO at [place] [address]  6/7/2017    BRONCHOSCOPY/TRANSBRONCHIAL LUNG BIOPSY performed by Blas Khalil DO at [place] [address]  6/7/2017    BRONCHOSCOPY FLUOROSCOPY performed by Blas Khalil DO at [address] [facility] [address] Right 06/17/2015    CATARACT REMOVAL WITH IMPLANT Right 08/14/2017    DR ROMERO    COLONOSCOPY  1/2005    DIAGNOSTIC CARDIAC CATH LAB PROCEDURE  06/17/15    EYE SURGERY      FRACTURE SURGERY  2004    Distal Radius Ulna    LOBECTOMY Left 6/14/2017    MINI PORT ACCESS LEFT CHEST, X2 SPECIMENS.  performed by Debra Christiansen MD at [facility]  2years old    Deysi Saini Other Topics Concern    None     Social History Narrative    None       REVIEW OF SYSTEMS       Review of Systems   Constitutional: Negative for chills, diaphoresis and fever. HENT: Negative for facial swelling, sore throat, trouble swallowing and voice change. Eyes: Negative for photophobia and visual disturbance. Respiratory: Positive for cough. Negative for chest tightness, shortness of breath and wheezing. Cardiovascular: Negative for chest pain, palpitations and leg swelling. Gastrointestinal: Negative for abdominal pain, blood in stool, diarrhea, nausea and vomiting. Endocrine: Negative for polydipsia and polyuria. Genitourinary: Negative for dysuria, frequency, hematuria and urgency. Musculoskeletal: Negative for back pain, neck pain and neck stiffness. Skin: Negative for pallor and rash. Neurological: Negative for seizures, speech difficulty, weakness, numbness and headaches. Hematological: Does not bruise/bleed easily. Psychiatric/Behavioral: Negative for confusion. The patient is not nervous/anxious. Except as noted above the remainder of the review of systems was reviewed and is negative. PHYSICAL EXAM    (up to 7 for level 4, 8 or more for level 5)     ED Triage Vitals [02/09/18 1757]   BP Temp Temp Source Pulse Resp SpO2 Height Weight   (!) 92/53 97.9 °F (36.6 °C) Tympanic 80 19 94 % -- 170 lb (77.1 kg)       Physical Exam   Constitutional: She is oriented to person, place, and time. Vital signs are normal. She appears well-developed and well-nourished. Non-toxic appearance. She does not appear ill. No distress. HENT:   Head: Normocephalic and atraumatic. Mouth/Throat: Oropharynx is clear and moist.   Eyes: Conjunctivae and EOM are normal. Pupils are equal, round, and reactive to light. Right conjunctiva is not injected. Left conjunctiva is not injected. No scleral icterus. Neck: Normal range of motion. Neck supple. No tracheal deviation present.  No thyromegaly present. Cardiovascular: Normal rate, regular rhythm, normal heart sounds and intact distal pulses. Exam reveals no gallop and no friction rub. No murmur heard. Pulmonary/Chest: Effort normal and breath sounds normal. No stridor. No respiratory distress. She has no wheezes. She has no rales. Abdominal: Soft. Bowel sounds are normal. She exhibits no distension and no mass. There is no tenderness. There is no rebound and no guarding. Negative Valdez's sign  Nontender McBurney's [place]  Negative Rovsig's sign  No bruising or echymosis of abdomen   Musculoskeletal: She exhibits no edema or tenderness. Extensive subacute echymosis RUE/forearm with a hematoma to midradius  Negative Reinaldo's Sign bilaterally   Lymphadenopathy:     She has no cervical adenopathy. Neurological: She is alert and oriented to person, place, and time. No cranial nerve deficit. She exhibits normal muscle tone. Coordination normal.   No nystagmus   Skin: Skin is warm and dry. No rash noted. She is not diaphoretic. No erythema. There is pallor. Psychiatric: She has a normal mood and affect. Her behavior is normal. Thought content normal.   Nursing note and vitals reviewed. DIAGNOSTIC RESULTS     EKG: (none if blank)  All EKG's are interpreted by the Emergency Department Physician who either signs or Co-signs this chart in the absence of a cardiologist.    No ischemia    RADIOLOGY: (none if blank)   Interpretation per the Radiologist below, if available at the time of this note:    CT CHEST WO CONTRAST   Final Result   Impression:     Suggested pulmonary fibrosis. No focal airspace disease. Sequela of heart failure. Please see CT abdomen and pelvis of the same date. Electronically Signed By: Saira Peoples   on  02/10/2018  09:09      CT ABDOMEN PELVIS W IV CONTRAST Additional Contrast? None   Final Result   Impression:     The gallbladder is distended and contains stones and sludge.  Mild #1   CULTURE BLOOD #2   TROPONIN   LACTIC ACID   MAGNESIUM       All other labs were within normal range or not returned as of this dictation. EMERGENCY DEPARTMENT COURSE and Medical Decision Making:     MDM/  ED Course        The patietn refuses imaging of RUE, states \"it wouldn't make any difference\", not concerned about a possibility of fracture. ED Medications administered this visit:    Medications   0.9 % sodium chloride bolus (0 mLs Intravenous Stopped 2/9/18 2009)   ipratropium-albuterol (DUONEB) nebulizer solution 1 ampule (1 ampule Inhalation Given 2/9/18 2100)   methylPREDNISolone sodium (SOLU-MEDROL) injection 125 mg (125 mg Intravenous Given 2/9/18 2058)   iopamidol (ISOVUE-300) 61 % injection 100 mL (100 mLs Intravenous Given 2/10/18 0825)       CONSULTS: (None if blank)      Procedures: (None if blank)       CLINICAL IMPRESSION      1. Acute exacerbation of chronic obstructive pulmonary disease (COPD) ([facility] [address]    2. General weakness    3.  Gastroenteritis          DISPOSITION/PLAN   DISPOSITION Admitted 02/09/2018 09:01:31 PM      PATIENT REFERRED TO:  Pal Chatman MD  [address] [place] [address]  [phone]            DISCHARGE MEDICATIONS:  Discharge Medication List as of 2/10/2018  8:51 PM                 (Please note that portions of this note were completed with a voice recognition program.  Efforts were made to edit the dictations but occasionally words are mis-transcribed.)      Yen Skinner DO, FACEP (electronically signed)  Attending Emergency Physician         Janie Barroso DO  02/19/18 [id number]

## 2018-02-10 ENCOUNTER — APPOINTMENT (OUTPATIENT)
Dept: CT IMAGING | Age: 78
DRG: 191 | End: 2018-02-10
Payer: MEDICARE

## 2018-02-10 ENCOUNTER — HOSPITAL ENCOUNTER (INPATIENT)
Age: 78
LOS: 10 days | Discharge: NURSING, REHAB OR LONG-TERM CARE | DRG: 445 | End: 2018-02-20
Attending: INTERNAL MEDICINE | Admitting: INTERNAL MEDICINE
Payer: MEDICARE

## 2018-02-10 VITALS
HEIGHT: 66 IN | TEMPERATURE: 96.1 F | WEIGHT: 167 LBS | OXYGEN SATURATION: 96 % | SYSTOLIC BLOOD PRESSURE: 118 MMHG | BODY MASS INDEX: 26.84 KG/M2 | DIASTOLIC BLOOD PRESSURE: 87 MMHG | RESPIRATION RATE: 16 BRPM | HEART RATE: 80 BPM

## 2018-02-10 DIAGNOSIS — R10.13 DYSPEPSIA: ICD-10-CM

## 2018-02-10 PROBLEM — K80.12 CALCULUS OF GALLBLADDER WITH ACUTE ON CHRONIC CHOLECYSTITIS: Status: ACTIVE | Noted: 2018-02-10

## 2018-02-10 PROBLEM — J44.9 COPD (CHRONIC OBSTRUCTIVE PULMONARY DISEASE) (HCC): Status: ACTIVE | Noted: 2018-02-09

## 2018-02-10 PROBLEM — J44.9 COPD (CHRONIC OBSTRUCTIVE PULMONARY DISEASE) (HCC): Chronic | Status: ACTIVE | Noted: 2018-02-09

## 2018-02-10 PROBLEM — J44.1 ACUTE EXACERBATION OF CHRONIC OBSTRUCTIVE PULMONARY DISEASE (COPD) (HCC): Status: RESOLVED | Noted: 2018-02-09 | Resolved: 2018-02-10

## 2018-02-10 PROBLEM — K81.0 ACUTE CHOLECYSTITIS: Status: ACTIVE | Noted: 2018-02-10

## 2018-02-10 PROBLEM — K52.9 GASTROENTERITIS: Status: RESOLVED | Noted: 2018-02-09 | Resolved: 2018-02-10

## 2018-02-10 LAB
ABSOLUTE BANDS #: 0.27 K/UL (ref 0–1)
ABSOLUTE EOS #: 0 K/UL (ref 0–0.4)
ABSOLUTE IMMATURE GRANULOCYTE: ABNORMAL K/UL (ref 0–0.3)
ABSOLUTE LYMPH #: 0.47 K/UL (ref 1–4.8)
ABSOLUTE MONO #: 0.07 K/UL (ref 0–1)
ALBUMIN SERPL-MCNC: 3.1 G/DL (ref 3.5–5.2)
ALBUMIN/GLOBULIN RATIO: 1.5 (ref 1–2.5)
ALLEN TEST: ABNORMAL
ALP BLD-CCNC: 73 U/L (ref 35–104)
ALT SERPL-CCNC: 145 U/L (ref 5–33)
ANION GAP SERPL CALCULATED.3IONS-SCNC: 15 MMOL/L (ref 9–17)
AST SERPL-CCNC: 209 U/L
BANDS: 4 % (ref 0–10)
BASOPHILS # BLD: 0 % (ref 0–2)
BASOPHILS ABSOLUTE: 0 K/UL (ref 0–0.2)
BILIRUB SERPL-MCNC: 0.71 MG/DL (ref 0.3–1.2)
BUN BLDV-MCNC: 13 MG/DL (ref 8–23)
BUN/CREAT BLD: 19 (ref 9–20)
CALCIUM SERPL-MCNC: 7.9 MG/DL (ref 8.6–10.4)
CARBOXYHEMOGLOBIN: ABNORMAL % (ref 0–5)
CHLORIDE BLD-SCNC: 102 MMOL/L (ref 98–107)
CO2: 21 MMOL/L (ref 20–31)
CREAT SERPL-MCNC: 0.7 MG/DL (ref 0.5–0.9)
DIFFERENTIAL TYPE: ABNORMAL
EKG ATRIAL RATE: 71 BPM
EKG Q-T INTERVAL: 506 MS
EKG QRS DURATION: 178 MS
EKG QTC CALCULATION (BAZETT): 583 MS
EKG R AXIS: -136 DEGREES
EKG T AXIS: 66 DEGREES
EKG VENTRICULAR RATE: 80 BPM
EOSINOPHILS RELATIVE PERCENT: 0 % (ref 0–8)
FIO2: ABNORMAL
GFR AFRICAN AMERICAN: >60 ML/MIN
GFR NON-AFRICAN AMERICAN: >60 ML/MIN
GFR SERPL CREATININE-BSD FRML MDRD: ABNORMAL ML/MIN/{1.73_M2}
GFR SERPL CREATININE-BSD FRML MDRD: ABNORMAL ML/MIN/{1.73_M2}
GLUCOSE BLD-MCNC: 165 MG/DL (ref 70–99)
HCO3 ARTERIAL: 20.7 MMOL/L (ref 22–26)
HCT VFR BLD CALC: 34.4 % (ref 36–46)
HEMOGLOBIN: 11.3 G/DL (ref 12–16)
IMMATURE GRANULOCYTES: ABNORMAL %
LYMPHOCYTES # BLD: 7 % (ref 24–44)
MAGNESIUM: 2 MG/DL (ref 1.6–2.6)
MCH RBC QN AUTO: 32.6 PG (ref 26–34)
MCHC RBC AUTO-ENTMCNC: 32.9 G/DL (ref 31–37)
MCV RBC AUTO: 99 FL (ref 80–100)
METHEMOGLOBIN: ABNORMAL % (ref 0–1.9)
MODE: ABNORMAL
MONOCYTES # BLD: 1 % (ref 0–12)
MORPHOLOGY: NORMAL
MYELOCYTES ABSOLUTE COUNT: 0.07 K/UL
MYELOCYTES: 1 %
NEGATIVE BASE EXCESS, ART: 2.3 MMOL/L (ref 0–2)
NOTIFICATION TIME: ABNORMAL
NOTIFICATION: ABNORMAL
NRBC AUTOMATED: ABNORMAL PER 100 WBC
O2 DEVICE/FLOW/%: ABNORMAL
O2 SAT, ARTERIAL: 95.5 % (ref 95–98)
OXYHEMOGLOBIN: ABNORMAL % (ref 95–98)
PATIENT TEMP: 37
PCO2 ARTERIAL: 30.9 MMHG (ref 35–45)
PCO2, ART, TEMP ADJ: ABNORMAL
PDW BLD-RTO: 16.2 % (ref 12.1–15.2)
PEEP/CPAP: ABNORMAL
PH ARTERIAL: 7.44 (ref 7.35–7.45)
PH, ART, TEMP ADJ: ABNORMAL (ref 7.35–7.45)
PLATELET # BLD: 157 K/UL (ref 140–450)
PLATELET ESTIMATE: ABNORMAL
PMV BLD AUTO: 8.8 FL (ref 6–12)
PO2 ARTERIAL: 73.6 MMHG (ref 80–100)
PO2, ART, TEMP ADJ: ABNORMAL MMHG (ref 80–100)
POSITIVE BASE EXCESS, ART: ABNORMAL MMOL/L (ref 0–2)
POTASSIUM SERPL-SCNC: 3.3 MMOL/L (ref 3.7–5.3)
PSV: ABNORMAL
PT. POSITION: ABNORMAL
RBC # BLD: 3.48 M/UL (ref 4–5.2)
RBC # BLD: ABNORMAL 10*6/UL
RESPIRATORY RATE: 16
SAMPLE SITE: ABNORMAL
SEG NEUTROPHILS: 87 % (ref 36–66)
SEGMENTED NEUTROPHILS ABSOLUTE COUNT: 5.82 K/UL (ref 1.8–7.7)
SET RATE: ABNORMAL
SODIUM BLD-SCNC: 138 MMOL/L (ref 135–144)
TEXT FOR RESPIRATORY: ABNORMAL
TOTAL HB: ABNORMAL G/DL (ref 12–16)
TOTAL PROTEIN: 5.2 G/DL (ref 6.4–8.3)
TOTAL RATE: ABNORMAL
VT: ABNORMAL
WBC # BLD: 6.7 K/UL (ref 3.5–11)
WBC # BLD: ABNORMAL 10*3/UL

## 2018-02-10 PROCEDURE — 6370000000 HC RX 637 (ALT 250 FOR IP): Performed by: FAMILY MEDICINE

## 2018-02-10 PROCEDURE — 96375 TX/PRO/DX INJ NEW DRUG ADDON: CPT

## 2018-02-10 PROCEDURE — 94664 DEMO&/EVAL PT USE INHALER: CPT

## 2018-02-10 PROCEDURE — 97165 OT EVAL LOW COMPLEX 30 MIN: CPT

## 2018-02-10 PROCEDURE — G8989 SELF CARE D/C STATUS: HCPCS

## 2018-02-10 PROCEDURE — 97163 PT EVAL HIGH COMPLEX 45 MIN: CPT

## 2018-02-10 PROCEDURE — 83735 ASSAY OF MAGNESIUM: CPT

## 2018-02-10 PROCEDURE — 6360000002 HC RX W HCPCS: Performed by: INTERNAL MEDICINE

## 2018-02-10 PROCEDURE — G0378 HOSPITAL OBSERVATION PER HR: HCPCS

## 2018-02-10 PROCEDURE — 80053 COMPREHEN METABOLIC PANEL: CPT

## 2018-02-10 PROCEDURE — 2580000003 HC RX 258: Performed by: INTERNAL MEDICINE

## 2018-02-10 PROCEDURE — 93005 ELECTROCARDIOGRAM TRACING: CPT

## 2018-02-10 PROCEDURE — G8988 SELF CARE GOAL STATUS: HCPCS

## 2018-02-10 PROCEDURE — G8987 SELF CARE CURRENT STATUS: HCPCS

## 2018-02-10 PROCEDURE — 6360000004 HC RX CONTRAST MEDICATION: Performed by: INTERNAL MEDICINE

## 2018-02-10 PROCEDURE — 74177 CT ABD & PELVIS W/CONTRAST: CPT

## 2018-02-10 PROCEDURE — 1200000000 HC SEMI PRIVATE

## 2018-02-10 PROCEDURE — G8978 MOBILITY CURRENT STATUS: HCPCS

## 2018-02-10 PROCEDURE — 6370000000 HC RX 637 (ALT 250 FOR IP): Performed by: INTERNAL MEDICINE

## 2018-02-10 PROCEDURE — 71250 CT THORAX DX C-: CPT

## 2018-02-10 PROCEDURE — 36415 COLL VENOUS BLD VENIPUNCTURE: CPT

## 2018-02-10 PROCEDURE — 85025 COMPLETE CBC W/AUTO DIFF WBC: CPT

## 2018-02-10 PROCEDURE — 97530 THERAPEUTIC ACTIVITIES: CPT

## 2018-02-10 PROCEDURE — G8979 MOBILITY GOAL STATUS: HCPCS

## 2018-02-10 PROCEDURE — 96372 THER/PROPH/DIAG INJ SC/IM: CPT

## 2018-02-10 PROCEDURE — 94760 N-INVAS EAR/PLS OXIMETRY 1: CPT

## 2018-02-10 PROCEDURE — 97162 PT EVAL MOD COMPLEX 30 MIN: CPT

## 2018-02-10 PROCEDURE — 94640 AIRWAY INHALATION TREATMENT: CPT

## 2018-02-10 RX ORDER — IPRATROPIUM BROMIDE AND ALBUTEROL SULFATE 2.5; .5 MG/3ML; MG/3ML
1 SOLUTION RESPIRATORY (INHALATION) 3 TIMES DAILY
Status: DISCONTINUED | OUTPATIENT
Start: 2018-02-10 | End: 2018-02-10 | Stop reason: HOSPADM

## 2018-02-10 RX ORDER — ALBUTEROL SULFATE 2.5 MG/3ML
2.5 SOLUTION RESPIRATORY (INHALATION) EVERY 4 HOURS PRN
Status: DISCONTINUED | OUTPATIENT
Start: 2018-02-10 | End: 2018-02-10 | Stop reason: HOSPADM

## 2018-02-10 RX ORDER — ASPIRIN 81 MG/1
324 TABLET, CHEWABLE ORAL DAILY
Status: DISCONTINUED | OUTPATIENT
Start: 2018-02-11 | End: 2018-02-10 | Stop reason: HOSPADM

## 2018-02-10 RX ADMIN — ENOXAPARIN SODIUM 40 MG: 40 INJECTION SUBCUTANEOUS at 09:25

## 2018-02-10 RX ADMIN — SODIUM CHLORIDE: 9 INJECTION, SOLUTION INTRAVENOUS at 12:34

## 2018-02-10 RX ADMIN — IPRATROPIUM BROMIDE AND ALBUTEROL SULFATE 1 AMPULE: .5; 3 SOLUTION RESPIRATORY (INHALATION) at 09:20

## 2018-02-10 RX ADMIN — ASPIRIN 81 MG 324 MG: 81 TABLET ORAL at 09:24

## 2018-02-10 RX ADMIN — CLOPIDOGREL BISULFATE 75 MG: 75 TABLET ORAL at 09:24

## 2018-02-10 RX ADMIN — METHYLPREDNISOLONE SODIUM SUCCINATE 60 MG: 125 INJECTION, POWDER, FOR SOLUTION INTRAMUSCULAR; INTRAVENOUS at 09:26

## 2018-02-10 RX ADMIN — IPRATROPIUM BROMIDE AND ALBUTEROL SULFATE 1 AMPULE: .5; 3 SOLUTION RESPIRATORY (INHALATION) at 16:00

## 2018-02-10 RX ADMIN — FAMOTIDINE 20 MG: 20 TABLET, FILM COATED ORAL at 09:24

## 2018-02-10 RX ADMIN — IOPAMIDOL 100 ML: 612 INJECTION, SOLUTION INTRAVENOUS at 08:25

## 2018-02-10 NOTE — PROGRESS NOTES
IM ADDENDUM    I discussed the case with Dr. Lyndon Lennox after she reviewed the imaging studies. She recommends transfer to tertiary center for surgery with cardiology and pulmonary consults. [facility] to [facility]. She also states Dr. Yvonne Romano at [address] [place] [address] in [facility] was the one who put her pacemaker in and she had an excellent experience there and would like to be transferred to [address] [place] [address]. I have a call out to the transfer line.       Jennifer Heaton MD  2/10/2018 2:13 pm

## 2018-02-10 NOTE — PROGRESS NOTES
[facility] connected Dr. Magdi Kaba and Dr. Delvin Obrien's on call Dr to discuss pt's transfer. Dr. Monica Colorado accepting pt.

## 2018-02-10 NOTE — ED PROVIDER NOTES
Abdominal: Soft. Bowel sounds are normal. She exhibits no distension. There is no tenderness. There is no rebound. Musculoskeletal: Normal range of motion. She exhibits no edema or tenderness. Neurological: She is alert and oriented to person, place, and time. No cranial nerve deficit. She exhibits normal muscle tone. Coordination normal.   Skin: Skin is warm and dry. She is not diaphoretic. Psychiatric: She has a normal mood and affect. Her behavior is normal. Judgment and thought content normal.     Nursing Notes were reviewed. Past Medical History:   Diagnosis Date    Allergic rhinitis 10/1994    Asthma     Atrial fibrillation ([facility] [address] 12/2008    CAD (coronary artery disease)     Clotting disorder (HCC)     plebitis in L leg    COPD (chronic obstructive pulmonary disease) ([facility] [address]     DDD (degenerative disc disease), cervical     Degeneration of cervical intervertebral disc     Diverticulosis 2005    Gout     Gout, unspecified     H/O cardiac catheterization 6/17/15    LMCA: Normal 0% stenosis. LAD: Lesion on 1st diag: Proximal subsection. 80% stenosis. Lesion plaque is ruptured. Gumaro Neighbor LCx: Lesion on 1st Ob Leslie: Mid subsection. 85% stenosis. RCA:Lesion on R PDA: Mid subsection. 85% stenosis. Lesion on R PDA: Distal subsection. 70% stenosis. Lesion on Prox RCA: Mid subsection. 50% stenosis. EF 50%.  H/O echocardiogram 11/09/2016    EF 40-45%. Mild LV hypertrophy normal LV cavity size. Sigmoid interventricular septum without evidence of outflow tract obstruction. Left atrium is moderately dilated (34-39) left atrial volume index of 36 ml/m2. Mild mitral regurg. Diastology cannot be assessed due to A-fib.  History of Holter monitoring 3/24/16    Atrial Fibrillation throughout,fairly controlled, HR  bpm 79% of the time. Occasional high ventricular rate episodes and multiple pauses suggestive of tachycardia/bradycardia syndrome  Msximum R-R interval 2.68 seconds.    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    Long term (current) use of anticoagulants 8/31/2015    Other abnormal glucose 2004    Pacemaker 08/10/2017    Insertion of a biventricular pacemaker/ICD AVN ablation    Phlebitis and thrombophlebitis of lower extremities, unspecified 1961    L leg    Senile osteoporosis 2006    L/S    Symptomatic menopausal or female climacteric states 06/1995    Syncope and collapse     Type II or unspecified type diabetes mellitus without mention of complication, not stated as uncontrolled 03/2009    Unspecified hereditary and idiopathic peripheral neuropathy 2012    Unspecified sleep apnea 11/2009       Family History   Problem Relation Age of Onset    Heart Disease Mother     Stroke Mother     High Blood Pressure Mother     Cancer Father      lung    Stroke Brother     Heart Disease Maternal Grandmother        Past Surgical History:   Procedure Laterality Date    BRONCHOSCOPY  6/7/2017    BRONCHOSCOPY BRUSHINGS performed by Hank Marshall DO at [place] [address]  6/7/2017    BRONCHOSCOPY/TRANSBRONCHIAL LUNG BIOPSY performed by Hank Marshall DO at [place] [address]  6/7/2017    BRONCHOSCOPY FLUOROSCOPY performed by Hank Marshall DO at [facility] [address] Right 06/17/2015    CATARACT REMOVAL WITH IMPLANT Right 08/14/2017    DR ROMERO    COLONOSCOPY  1/2005    DIAGNOSTIC CARDIAC CATH LAB PROCEDURE  06/17/15    EYE SURGERY      FRACTURE SURGERY  2004    Distal Radius Ulna    LOBECTOMY Left 6/14/2017    MINI PORT ACCESS LEFT CHEST, X2 SPECIMENS.  performed by Mariam Brock MD at [address]  2years old    VOLVAR-ULCERATIVE LESION-CAUTERIZED    SKIN BIOPSY      TONSILLECTOMY AND ADENOIDECTOMY         Social History     Social History    Marital status:      Spouse name: N/A    Number of children: N/A    Years of education:

## 2018-02-10 NOTE — PROGRESS NOTES
functional limits     Subjective  General  Chart Reviewed: Yes  Patient assessed for rehabilitation services?: Yes  Diagnosis: COPD exacerbation  Follows Commands: Within Functional Limits  General Comment  Comments: frequent falls over last 2 months  Subjective  Subjective: Per patient she has a rollator and just notes that when she has fallen it is because she usually needs to sit and she can't get to the seat on her rollator fast enough. Pt also states her legs feel like \"jello\" from the knees down and she falls. Pain Screening  Patient Currently in Pain: No (just sore from latest fall.)  Vital Signs  Patient Currently in Pain: No (just sore from latest fall.)  Pre Treatment Pain Screening  Pain at present: 0    Orientation  Orientation  Overall Orientation Status: Within Functional Limits    Social/Functional History  Social/Functional History  Lives With: Family  Type of Home: House  Home Layout: Two level, Performs ADL's on one level, Able to Live on Main level with bedroom/bathroom  Home Access: Stairs to enter without rails (on primary door)  Entrance Stairs - Number of Steps: 2  Bathroom Shower/Tub: Tub/Shower unit  Bathroom Toilet: Standard  Bathroom Accessibility: Accessible  Home Equipment: 4 wheeled walker  Receives Help From: Family  ADL Assistance: Needs assistance  Homemaking Assistance: Needs assistance  Homemaking Responsibilities: No  Ambulation Assistance: Needs assistance  Transfer Assistance: Needs assistance  Active : No  Occupation: Retired  Additional Comments: Pt has rollator with a seat.  Pt has been falling recently, legs just give out and she is weak  Objective     Observation/Palpation  Posture: Fair  Observation: pt is slow with mobility and very SOB    AROM RLE (degrees)  RLE AROM: WFL  AROM LLE (degrees)  LLE AROM : WFL  AROM RUE (degrees)  RUE AROM : WFL  AROM LUE (degrees)  LUE AROM : WFL  Strength RLE  Strength RLE: WFL  Strength LLE  Strength LLE: WFL  Tone RLE  RLE Tone: daily)  Current Treatment Recommendations: Strengthening, Neuromuscular Re-education, Home Exercise Program, Manual Therapy - Soft Tissue Mobilization, Safety Education & Training, Balance Training, Endurance Training, Patient/Caregiver Education & Training, Functional Mobility Training, Manual Therapy - Joint Manipulation, Equipment Evaluation, Education, & procurement, Transfer Training, Gait Training, Stair training  Plan Comment: Initiate POC  Safety Devices  Type of devices: Call light within reach, Patient at risk for falls, Left in chair    G-Code  PT G-Codes  Functional Limitation: Mobility: Walking and moving around  Mobility: Walking and Moving Around Current Status (): At least 40 percent but less than 60 percent impaired, limited or restricted  Mobility: Walking and Moving Around Goal Status (): At least 20 percent but less than 40 percent impaired, limited or restricted    Goals  Short term goals  Time Frame for Short term goals: 10 days  Short term goal 1: Initiate and progress HEP for strengthening and endurance  Short term goal 2: Pt will be CGA to SBA +1 for transfers with RW with good safety  Short term goal 3: Pt will be independent with bed mobility without use of rails for discharge home.   Short term goal 4: Pt will be able to ambulate up to 50 feet with RW in order to better tolerate household distances with min SOB  Patient Goals   Patient goals : be able to breathe and understand why she is having such a hard time walking       Therapy Time   Individual Concurrent Group Co-treatment   Time In 1230         Time Out 1302         Minutes [address], PT, DPT

## 2018-02-10 NOTE — PLAN OF CARE
Problem: Falls - Risk of  Goal: Absence of falls  Outcome: Ongoing  Patient alert and oriented, has demonstrated proper use of call light for assistance. Bed is in lowest position with wheels locked for safety. Will continue to monitor. Problem: Risk for Impaired Skin Integrity  Goal: Tissue integrity - skin and mucous membranes  Structural intactness and normal physiological function of skin and  mucous membranes. Outcome: Ongoing  Patient's skin condition is being assessed each shift and changes are being charted. Pillows are being used to relieve bony prominences and patient is being reminded to turn frequently to help maintain integrity of skin. Heels are also being elevated when patient is in bed. Will continue to monitor. Problem: Daily Care:  Goal: Daily care needs are met  Daily care needs are met   Outcome: Ongoing  Patient's daily cares and needs are being assessed each shift. Consideration is given according to patient's ability to assist with ADL's, and hourly rounding consists of asking patient if any help is needed. Will continue to monitor. Problem: Pain:  Goal: Patient's pain/discomfort is manageable  Patient's pain/discomfort is manageable   Outcome: Ongoing  Patient's pain level has been assessed using the 1-10 scale and medication has been administered as ordered depending on stated pain level. Pain rating and characteristics are being charted to track progress and reassessment of pain is being assessed. Will continue to monitor.

## 2018-02-10 NOTE — H&P
reports that she does not drink alcohol. Family History:       Problem Relation Age of Onset    Heart Disease Mother     Stroke Mother     High Blood Pressure Mother     Cancer Father      lung    Stroke Brother     Heart Disease Maternal Grandmother        Review of Systems:  Constitutional:negative  for fevers, and negative for chills. Respiratory: negative for shortness of breath, positive for cough, and negative for wheezing  Cardiovascular: negative for chest pain, negative for palpitations, and negative for syncope  Gastrointestinal: negative for abdominal pain, positive for nausea,positive for vomiting, negative for diarrhea, negative for constipation, and negative for hematochezia or melena  Genitourinary: negative for dysuria, negative for urinary urgency, negative for urinary frequency, and negative for hematuria  Neurological: negative for unilateral weakness, numbness or tingling. All other systems were reviewed with the patient and are negative except as stated    Objective:    Vitals:   Temp: 98.7 °F (37.1 °C)  BP: (!) 143/88  Resp: 16  Pulse: 78  SpO2: 95 %  -----------------------------------------------------------------  Exam:  GEN:    Awake, alert and oriented x 3. no acute distress. Well developed / well nourished. EYES:  EOMI, pupils equal   ENT:  Neck supple. No lymphadenopathy. No carotid bruit  CVS:    RRR, no murmur, rub or gallop  PULM: CTA, no wheezes, rales or rhonchi  ABD:    Bowels sounds normal.  Abdomen is soft. No distention. No RUQ tenderness and Valdez's sign is actually negative. EXT:   1+ edema bilaterally . No calf tenderness. NEURO: Motor and sensory are intact  SKIN:  no rashes. No skin lesions.     PSYCH:  Normal mood and affect  -----------------------------------------------------------------  Diagnostic Data:   · All diagnostic data was reviewed  Lab Results   Component Value Date    WBC 6.7 02/10/2018    HGB 11.3 (L) 02/10/2018    MCV 99.0 02/10/2018 No  · CODE STATUS: FULL CODE  · Nutrition Status: fair   · Physical therapy: Yes   · Old Charts reviewed: Yes  · EKG Reviewed:  Yes  · Advance Directive Addressed: Yes - in chart    Adi Moore M.D.  2/10/2018  1:11 PM

## 2018-02-10 NOTE — PROGRESS NOTES
Strength  Gross LUE Strength: WFL  RUE Strength  Gross RUE Strength: WFL                  Assessment   Performance deficits / Impairments: Decreased functional mobility ; Decreased ADL status; Decreased endurance;Decreased safe awareness  Assessment: ADMITTED WITH  COUGH AND DECREASED FUNCTION NEEDING 2 PERSON ASSIST AT HOME. PT P/W DECREASED ENDURANCE AND ADL/IADL SKILLS WHICH IMPACT HER ABILITY TO COMPLETE TASKS AT HER PLOF. WILL BENEFIT FROM OT SERVICES TO ADDRESS THESE DEFICITS. Prognosis: Good  Decision Making: Low Complexity  Patient Education: EDUCATION PROVIDED ON PURPOSE OF OT, SAFETY WITH FWW, FALL PREVENTION AND CALL LIGHT USAGE  Discharge Recommendations: Subacute/Skilled Nursing Facility;Continue to assess pending progress  REQUIRES OT FOLLOW UP: Yes  Activity Tolerance  Activity Tolerance: Patient Tolerated treatment well  Safety Devices  Safety Devices in place: Yes  Type of devices: Call light within reach; Left in chair;Patient at risk for falls;Nurse notified        Discharge Recommendations:  [address], Continue to assess pending progress     Plan   Plan  Times per week: 5-6  Times per day: Daily  Current Treatment Recommendations: Functional Mobility Training, Endurance Training, Self-Care / ADL, Home Management Training    G-Code  OT G-codes  Functional Limitation: Self care  Self Care Current Status (): At least 40 percent but less than 60 percent impaired, limited or restricted  Self Care Goal Status (): At least 20 percent but less than 40 percent impaired, limited or restricted  Self Care Discharge Status (): At least 20 percent but less than 40 percent impaired, limited or restricted  OutComes Score                                           AM-PAC Score             Goals  Short term goals  Time Frame for Short term goals: 3-4 DAYS  Short term goal 1: PT WILL COMPLETE A SINK LEVEL ADL WITH SBA.    Short term goal 2: PT WILL DEMO SAFETY AWARENESS WHILE

## 2018-02-11 LAB
ALBUMIN SERPL-MCNC: 3.3 G/DL (ref 3.5–5.1)
ALP BLD-CCNC: 80 U/L (ref 38–126)
ALT SERPL-CCNC: 150 U/L (ref 11–66)
AST SERPL-CCNC: 166 U/L (ref 5–40)
AVERAGE GLUCOSE: 99 MG/DL (ref 70–126)
BILIRUB SERPL-MCNC: 0.6 MG/DL (ref 0.3–1.2)
BILIRUBIN DIRECT: 0.3 MG/DL (ref 0–0.3)
EKG ATRIAL RATE: 75 BPM
EKG Q-T INTERVAL: 496 MS
EKG QRS DURATION: 186 MS
EKG QTC CALCULATION (BAZETT): 572 MS
EKG R AXIS: -130 DEGREES
EKG T AXIS: 65 DEGREES
EKG VENTRICULAR RATE: 80 BPM
GLUCOSE BLD-MCNC: 112 MG/DL (ref 70–108)
GLUCOSE BLD-MCNC: 139 MG/DL (ref 70–108)
HBA1C MFR BLD: 5.3 % (ref 4.4–6.4)
INR BLD: 1.01 (ref 0.85–1.13)
LACTIC ACID: 2.6 MMOL/L (ref 0.5–2.2)
LACTIC ACID: 3.4 MMOL/L (ref 0.5–2.2)
LACTIC ACID: 3.9 MMOL/L (ref 0.5–2.2)
LIPASE: 61 U/L (ref 5.6–51.3)
PRO-BNP: 2242 PG/ML (ref 0–1800)
TOTAL PROTEIN: 5.6 G/DL (ref 6.1–8)
TROPONIN T: < 0.01 NG/ML

## 2018-02-11 PROCEDURE — 2580000003 HC RX 258: Performed by: INTERNAL MEDICINE

## 2018-02-11 PROCEDURE — 82948 REAGENT STRIP/BLOOD GLUCOSE: CPT

## 2018-02-11 PROCEDURE — 6360000002 HC RX W HCPCS: Performed by: FAMILY MEDICINE

## 2018-02-11 PROCEDURE — 2580000003 HC RX 258: Performed by: FAMILY MEDICINE

## 2018-02-11 PROCEDURE — 36415 COLL VENOUS BLD VENIPUNCTURE: CPT

## 2018-02-11 PROCEDURE — S0028 INJECTION, FAMOTIDINE, 20 MG: HCPCS | Performed by: FAMILY MEDICINE

## 2018-02-11 PROCEDURE — 6370000000 HC RX 637 (ALT 250 FOR IP): Performed by: FAMILY MEDICINE

## 2018-02-11 PROCEDURE — 84484 ASSAY OF TROPONIN QUANT: CPT

## 2018-02-11 PROCEDURE — 2500000003 HC RX 250 WO HCPCS: Performed by: FAMILY MEDICINE

## 2018-02-11 PROCEDURE — 83605 ASSAY OF LACTIC ACID: CPT

## 2018-02-11 PROCEDURE — 99233 SBSQ HOSP IP/OBS HIGH 50: CPT | Performed by: INTERNAL MEDICINE

## 2018-02-11 PROCEDURE — 83690 ASSAY OF LIPASE: CPT

## 2018-02-11 PROCEDURE — 83036 HEMOGLOBIN GLYCOSYLATED A1C: CPT

## 2018-02-11 PROCEDURE — 85610 PROTHROMBIN TIME: CPT

## 2018-02-11 PROCEDURE — 80076 HEPATIC FUNCTION PANEL: CPT

## 2018-02-11 PROCEDURE — 99222 1ST HOSP IP/OBS MODERATE 55: CPT | Performed by: FAMILY MEDICINE

## 2018-02-11 PROCEDURE — 83880 ASSAY OF NATRIURETIC PEPTIDE: CPT

## 2018-02-11 PROCEDURE — 1200000000 HC SEMI PRIVATE

## 2018-02-11 RX ORDER — ATORVASTATIN CALCIUM 40 MG/1
40 TABLET, FILM COATED ORAL NIGHTLY
Status: DISCONTINUED | OUTPATIENT
Start: 2018-02-11 | End: 2018-02-20 | Stop reason: HOSPADM

## 2018-02-11 RX ORDER — SODIUM CHLORIDE 0.9 % (FLUSH) 0.9 %
10 SYRINGE (ML) INJECTION PRN
Status: DISCONTINUED | OUTPATIENT
Start: 2018-02-11 | End: 2018-02-20 | Stop reason: HOSPADM

## 2018-02-11 RX ORDER — PREDNISONE 10 MG/1
10 TABLET ORAL DAILY
Status: DISCONTINUED | OUTPATIENT
Start: 2018-02-11 | End: 2018-02-11

## 2018-02-11 RX ORDER — METOPROLOL TARTRATE 50 MG/1
50 TABLET, FILM COATED ORAL NIGHTLY
Status: DISCONTINUED | OUTPATIENT
Start: 2018-02-11 | End: 2018-02-20 | Stop reason: HOSPADM

## 2018-02-11 RX ORDER — DEXTROSE AND SODIUM CHLORIDE 5; .9 G/100ML; G/100ML
100 INJECTION, SOLUTION INTRAVENOUS PRN
Status: DISCONTINUED | OUTPATIENT
Start: 2018-02-11 | End: 2018-02-20 | Stop reason: HOSPADM

## 2018-02-11 RX ORDER — UREA 10 %
3 LOTION (ML) TOPICAL NIGHTLY
Status: DISCONTINUED | OUTPATIENT
Start: 2018-02-11 | End: 2018-02-20 | Stop reason: HOSPADM

## 2018-02-11 RX ORDER — RANOLAZINE 500 MG/1
500 TABLET, EXTENDED RELEASE ORAL 2 TIMES DAILY
Status: DISCONTINUED | OUTPATIENT
Start: 2018-02-11 | End: 2018-02-20 | Stop reason: HOSPADM

## 2018-02-11 RX ORDER — SODIUM CHLORIDE 0.9 % (FLUSH) 0.9 %
10 SYRINGE (ML) INJECTION EVERY 12 HOURS SCHEDULED
Status: DISCONTINUED | OUTPATIENT
Start: 2018-02-11 | End: 2018-02-20 | Stop reason: HOSPADM

## 2018-02-11 RX ORDER — NICOTINE POLACRILEX 4 MG
15 LOZENGE BUCCAL PRN
Status: DISCONTINUED | OUTPATIENT
Start: 2018-02-11 | End: 2018-02-20 | Stop reason: HOSPADM

## 2018-02-11 RX ORDER — ONDANSETRON 2 MG/ML
4 INJECTION INTRAMUSCULAR; INTRAVENOUS EVERY 6 HOURS PRN
Status: DISCONTINUED | OUTPATIENT
Start: 2018-02-11 | End: 2018-02-20 | Stop reason: HOSPADM

## 2018-02-11 RX ORDER — SODIUM CHLORIDE 9 MG/ML
INJECTION, SOLUTION INTRAVENOUS CONTINUOUS
Status: DISCONTINUED | OUTPATIENT
Start: 2018-02-11 | End: 2018-02-12

## 2018-02-11 RX ORDER — POLYETHYLENE GLYCOL 3350 17 G/17G
17 POWDER, FOR SOLUTION ORAL PRN
Status: DISCONTINUED | OUTPATIENT
Start: 2018-02-11 | End: 2018-02-20 | Stop reason: HOSPADM

## 2018-02-11 RX ORDER — ACETAMINOPHEN 325 MG/1
650 TABLET ORAL EVERY 4 HOURS PRN
Status: DISCONTINUED | OUTPATIENT
Start: 2018-02-11 | End: 2018-02-20 | Stop reason: HOSPADM

## 2018-02-11 RX ORDER — DEXTROSE MONOHYDRATE 25 G/50ML
12.5 INJECTION, SOLUTION INTRAVENOUS PRN
Status: DISCONTINUED | OUTPATIENT
Start: 2018-02-11 | End: 2018-02-20 | Stop reason: HOSPADM

## 2018-02-11 RX ADMIN — FAMOTIDINE 20 MG: 10 INJECTION, SOLUTION INTRAVENOUS at 08:41

## 2018-02-11 RX ADMIN — Medication 3 MG: at 02:01

## 2018-02-11 RX ADMIN — FAMOTIDINE 20 MG: 10 INJECTION, SOLUTION INTRAVENOUS at 02:01

## 2018-02-11 RX ADMIN — FAMOTIDINE 20 MG: 10 INJECTION, SOLUTION INTRAVENOUS at 20:24

## 2018-02-11 RX ADMIN — RANOLAZINE 500 MG: 500 TABLET, FILM COATED, EXTENDED RELEASE ORAL at 02:01

## 2018-02-11 RX ADMIN — Medication 10 ML: at 08:41

## 2018-02-11 RX ADMIN — ATORVASTATIN CALCIUM 40 MG: 40 TABLET, FILM COATED ORAL at 02:01

## 2018-02-11 RX ADMIN — ONDANSETRON 4 MG: 2 INJECTION INTRAMUSCULAR; INTRAVENOUS at 08:41

## 2018-02-11 RX ADMIN — SODIUM CHLORIDE: 9 INJECTION, SOLUTION INTRAVENOUS at 17:47

## 2018-02-11 RX ADMIN — Medication 10 ML: at 20:24

## 2018-02-11 RX ADMIN — RANOLAZINE 500 MG: 500 TABLET, FILM COATED, EXTENDED RELEASE ORAL at 20:23

## 2018-02-11 RX ADMIN — METOPROLOL TARTRATE 50 MG: 50 TABLET, FILM COATED ORAL at 02:13

## 2018-02-11 RX ADMIN — RANOLAZINE 500 MG: 500 TABLET, FILM COATED, EXTENDED RELEASE ORAL at 08:42

## 2018-02-11 RX ADMIN — ATORVASTATIN CALCIUM 40 MG: 40 TABLET, FILM COATED ORAL at 20:23

## 2018-02-11 NOTE — PROGRESS NOTES
progress    Patient:  Ben Mathur  YOB: 1940  Date of Service: 2/11/2018  MRN: [id number]   Acct:  [de-identified]   Primary Care Physician: Jennifer Blanchard MD    Chief Complaint: Nausea and Vomiting    History of Present Illness:   History obtained from chart review and the patient. The patient is a 65 y.o. female with Pulmonary Fibrosis, Diabetes, CAD, Pacemaker placement, CHF who presents from [facility] where she was evaluated for nausea and vomiting that has been going on for the past 2 months. Patient denies abdominal pain, fever, chills, diarrhea, chest pain, shortness of breath or dysuria. CT abdomen pelvis at hospital in [place] showed the gallbladder is distended and contains stones and sludge. Mild intrahepatic biliary ductal dilatation may be associated. Acute or chronic cholecystitis, or noncalcified choledocholithiasis may account for these findings. Patient is not able to undergo MRCP due to pacemaker. GI has been consulted for ERCP. Subjective:-  Nauseated  Tired  Gi noes reviewed    Scheduled Meds:   insulin lispro  0-12 Units Subcutaneous TID WC    insulin lispro  0-6 Units Subcutaneous Nightly    sodium chloride flush  10 mL Intravenous 2 times per day    famotidine (PEPCID) injection  20 mg Intravenous BID    atorvastatin  40 mg Oral Nightly    melatonin  3 mg Oral Nightly    metoprolol tartrate  50 mg Oral Nightly    ranolazine  500 mg Oral BID     Continuous Infusions:   dextrose 5 % and 0.9 % NaCl      sodium chloride       PRN Meds:.glucose, dextrose, glucagon (rDNA), dextrose 5 % and 0.9 % NaCl, sodium chloride flush, acetaminophen, magnesium hydroxide, ondansetron, polyethylene glycol    10 point review of systems completed, all other than noted above are negative. Vitals:   Vitals:    02/11/18 1602   BP: 113/72   Pulse: 82   Resp: 16   Temp: 97.5 °F (36.4 °C)   SpO2: 91%      BMI: Body mass index is 28.36 kg/m². 108 mg/dl   Troponin    Collection Time: 02/11/18  5:14 AM   Result Value Ref Range    Troponin T < 0.010 ng/ml   Protime-INR    Collection Time: 02/11/18  5:14 AM   Result Value Ref Range    INR 1.01 0.85 - 1.13   Lactic acid, plasma    Collection Time: 02/11/18  5:14 AM   Result Value Ref Range    Lactic Acid 2.6 (H) 0.5 - 2.2 mmol/L   Troponin    Collection Time: 02/11/18 12:15 PM   Result Value Ref Range    Troponin T < 0.010 ng/ml       Radiology:     Ct Chest Wo Contrast    Result Date: 2/10/2018  FINAL REPORT EXAM:  CT CHEST WO CONTRAST HISTORY:  ACUTE RESP ILLNESS, &gt; 36YEARS OLD vomiting TECHNIQUE:  CT imaging obtained through the chest without contrast. Transaxial, Coronal and sagittal reformats are provided. PRIORS:  Chest radiograph 02/09/2018 and 06/03/2017 FINDINGS:  Left chest pacemaker. Cardiomegaly. Coronary artery disease. No pericardial effusion. Thoracic aorta is normal in course and caliber with scattered atherosclerosis. No pneumothorax, effusion or focal airspace disease. Diffuse reticulation. Early basilar honeycombing is suggested with mild bronchiectasis. The central airways are patent. Please see CT abdomen and pelvis of the same date. The superficial soft tissues are otherwise unremarkable. No acute bony abnormality or worrisome osseous lesions identified. Impression:  Suggested pulmonary fibrosis. No focal airspace disease. Sequela of heart failure. Please see CT abdomen and pelvis of the same date. Electronically Signed By: Myles Garcia   on  02/10/2018  09:09    Ct Abdomen Pelvis W Iv Contrast Additional Contrast? None    Result Date: 2/10/2018  FINAL REPORT EXAM:  CT ABDOMEN PELVIS W IV CONTRAST HISTORY:  elevated LFT's, nausea, vomiting TECHNIQUE:  CT images are acquired through the Abdomen and Pelvis following intravenous administration of contrast. Transaxial, coronal and sagittal reformations are provided. PRIORS:  None FINDINGS:  Partially imaged pacemaker leads.

## 2018-02-11 NOTE — H&P
Home Medications:   No current facility-administered medications on file prior to encounter. Current Outpatient Prescriptions on File Prior to Encounter   Medication Sig Dispense Refill    aspirin 81 MG chewable tablet Take 4 tablets by mouth daily 30 tablet 3    clopidogrel (PLAVIX) 75 MG tablet Take 1 tablet by mouth daily 30 tablet 3    atorvastatin (LIPITOR) 40 MG tablet Take 1 tablet by mouth nightly 90 tablet 3    metoprolol tartrate (LOPRESSOR) 50 MG tablet Take 1 tablet by mouth nightly 90 tablet 3    polyethylene glycol (GLYCOLAX) powder Take 17 g by mouth as needed (for constipation)      RANEXA 500 MG extended release tablet TAKE ONE TABLET BY MOUTH TWICE DAILY 180 tablet 3    OXYGEN Inhale 2 L into the lungs continuous       Glucose Blood (BLOOD GLUCOSE TEST STRIPS) STRP Test 4 times daily Diagnosis 100 strip 11    melatonin 3 MG TABS tablet Take 3 mg by mouth nightly          Allergies:  Adhesive tape; Aspercreme [trolamine salicylate]; Augmentin [amoxicillin-pot clavulanate]; Erythromycin; Guaifenesin & derivatives; and Niacin and related    Social History:    reports that she has never smoked. She has never used smokeless tobacco. She reports that she does not drink alcohol or use drugs. Family History:       Problem Relation Age of Onset    Heart Disease Mother     Stroke Mother     High Blood Pressure Mother     Cancer Father      lung    Stroke Brother     Heart Disease Maternal Grandmother        Review of systems:  Constitutional: no fever, no night sweats, no fatigue  Head: no headache, no head injury, no migranes. Eye: no blurring of vision, no double vision.   Ears: no hearing difficulty, no tinnitus  Mouth/throat: no ulceration, dental caries, dysphagia  Lungs: no cough, no shortness of breath, no wheeze  CVS: no palpitation, no chest pain, no shortness of breath  GI: no abdominal pain, + nausea , + vomiting, no constipation  JUAN: no dysuria, frequency and

## 2018-02-11 NOTE — DISCHARGE SUMMARY
regurg. Diastology cannot be assessed due to A-fib.  History of Holter monitoring 3/24/16    Atrial Fibrillation throughout,fairly controlled, HR  bpm 79% of the time. Occasional high ventricular rate episodes and multiple pauses suggestive of tachycardia/bradycardia syndrome  Msximum R-R interval 2.68 seconds. 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 female who presents with generalized weakness. She fell at home yesterday and just couldn't get herself up. She states her health has been declining over the past couple 2-3 days. She has been having some nausea and vomiting during or right after meals ever since eating at a [employer] on [place]. Since then occasionally she will have vomiting right after a meal.  A couple days ago she was eating soup and had the urge to vomit so quickly she had to use her soup bowl. She denies any abdominal pain. She denies fevers or chills. She has been a lot more tired and fatigued lately. She also has been coughing, but denies any shortness of breath or wheezing.     She had had a recent set of labs by Dr. Stephany Savage as an outpatient on 2/7/18 which showed WBC 7.0,  BUM 14, creatinine 1.15 with normal lytes. ALT was 59 and AST 74 but bili was normal at 0.75 and Alk Phos was 82.   TSH was elevated at 83.49.       In the ER last night she was afebrile with TSH Latest Ref Range: 0.30 - 5.00 mIU/L 83.49 (H)         Results for Ricardo Gardner (MRN [id number]) as of 2/10/2018 13:10    Ref. Range 2/7/2018 17:07 2/9/2018 19:07 2/10/2018 06:28   Albumin Ref Range: 3.5 - 5.2 g/dL 3.9 3.6 3.1 (L)   Albumin/Globulin Ratio Ref Range: 1.0 - 2.5  1.6 1.5 1.5   Alk Phos Ref Range: 35 - 104 U/L 82 82 73   ALT Ref Range: 5 - 33 U/L 59 (H) 116 (H) 145 (H)   AST Ref Range: <32 U/L 74 (H) 168 (H) 209 (H)   Bilirubin Ref Range: 0.3 - 1.2 mg/dL 0.75 0.88 0.71   Total Protein Ref Range: 6.4 - 8.3 g/dL 6.3 (L) 6.0 (L) 5.2 (L)         Narrative   FINAL REPORT       EXAM:  CT CHEST WO CONTRAST       HISTORY:  ACUTE RESP ILLNESS, &gt; 35YEARS OLD vomiting        TECHNIQUE:  CT imaging obtained through the chest without contrast. Transaxial, Coronal and sagittal reformats are provided.       PRIORS:  Chest radiograph 02/09/2018 and 06/03/2017       FINDINGS:     Left chest pacemaker. Cardiomegaly. Coronary artery disease. No pericardial effusion.        Thoracic aorta is normal in course and caliber with scattered atherosclerosis.       No pneumothorax, effusion or focal airspace disease. Diffuse reticulation. Early basilar honeycombing is suggested with mild bronchiectasis. The central airways are patent.        Please see CT abdomen and pelvis of the same date.        The superficial soft tissues are otherwise unremarkable. No acute bony abnormality or worrisome osseous lesions identified.       Impression:     Suggested pulmonary fibrosis.  No focal airspace disease.        Sequela of heart failure.        Please see CT abdomen and pelvis of the same date.            Electronically Signed By: Denver Gonzalez   on  02/10/2018  09:09         Narrative   FINAL REPORT       EXAM:  CT ABDOMEN PELVIS W IV CONTRAST       HISTORY:  elevated LFT's, nausea, vomiting        TECHNIQUE:  CT images are acquired through the Abdomen and Pelvis following intravenous administration of contrast. Transaxial,

## 2018-02-11 NOTE — CONSULTS
had cataract surgery bilaterally. No history  of glaucoma. ENT:  Has had a tonsillectomy. No history of hearing loss. CARDIOVASCULAR:  Has had a history of atrial fibrillation. Also has an  implantable defibrillator and pacemaker. PULMONARY:  Never smoked. Has an  apparent diagnosis of pulmonary fibrosis. GI:  See history of present  illness. :  No history of kidney stones or urinary tract infection. NEUROLOGIC:  No history of stroke or seizures. PSYCHIATRIC:  No history of  depression or anxiety. DERMATOLOGIC:  No history of breast cancer or  psoriasis. MUSCULOSKELETAL:  No history of gout or chronic arthritis. ENDOCRINE:  No history of diabetes or thyroid disorder. ALLERGIC/IMMUNOLOGIC:  No history of lupus or HIV. PHYSICAL EXAMINATION:  VITAL SIGNS:  Height is 5 feet 5 inches, weight 173 pounds 1 ounce, BMI is  28.4. The temperature is 98.6, pulse 80, respirations 17, /85. GENERAL:  The patient is a well-developed, elderly white female who is in  no acute distress. EYES:  Pupils equal, round, and react to light. No conjunctival icterus or  pallor. ENT:  Ears and nose are normal to inspection. Oral membranes are normal.  NECK:  Symmetric without mass. Thyroid not enlarged. CHEST:  Lungs are clear. HEART:  Rate and rhythm regular. ABDOMEN:  Soft, nontender. Liver and spleen not enlarged. No guarding or  rebound. There are no masses. RECTAL:  Not performed. MENTAL STATUS:  The patient is awake, alert, oriented to time, person,  place, and situation. Remote and recent memory good. SKIN:  No rashes. LYMPHATIC:  No cervical or inguinal adenopathy. ASSESSMENT:  1. Cholecystitis. 2.  History of pacemaker and defibrillator implantation. 3.  Pulmonary fibrosis. COMMENT AND RECOMMENDATION:  The patient presents on this occasion with a  month and a half history of recurrent nausea and vomiting. She is noted to  have cholelithiasis and elevated liver tests.   She should be evaluated for  cholecystectomy. Because of her associated cardiac and pulmonary disease,  she is considered a higher than average risk candidate for surgery. Ordinarily, I would order an MRCP to check for choledocholithiasis. Since  the patient states that she cannot have an MRCP as a result of her  defibrillator, I would recommend that an intraoperative cholangiogram be  obtained at the time of surgery. Given that the alkaline phosphatase is  normal and the patient is not jaundiced, I believe there is a significant  likelihood that she does not have choledocholithiasis. I will repeat the patient's liver test tomorrow and will await the results  of the surgical findings. Kyung Monae M.D.    D: 02/11/2018 2:58:05       T: 02/11/2018 3:39:09     [id number]  Job#: [id number]     Doc#: [id number]    CC:  Leotha Severance. Jose Maria Hatfield M.D.        Jose Daniel Carbajal M.D.

## 2018-02-12 ENCOUNTER — APPOINTMENT (OUTPATIENT)
Dept: ULTRASOUND IMAGING | Age: 78
DRG: 445 | End: 2018-02-12
Attending: INTERNAL MEDICINE
Payer: MEDICARE

## 2018-02-12 ENCOUNTER — APPOINTMENT (OUTPATIENT)
Dept: NUCLEAR MEDICINE | Age: 78
DRG: 445 | End: 2018-02-12
Attending: INTERNAL MEDICINE
Payer: MEDICARE

## 2018-02-12 PROBLEM — K80.20 CALCULUS OF GALLBLADDER WITHOUT CHOLECYSTITIS WITHOUT OBSTRUCTION: Status: ACTIVE | Noted: 2018-02-12

## 2018-02-12 LAB
ALBUMIN SERPL-MCNC: 3 G/DL (ref 3.5–5.1)
ALP BLD-CCNC: 71 U/L (ref 38–126)
ALT SERPL-CCNC: 121 U/L (ref 11–66)
ANION GAP SERPL CALCULATED.3IONS-SCNC: 15 MEQ/L (ref 8–16)
ANISOCYTOSIS: ABNORMAL
AST SERPL-CCNC: 101 U/L (ref 5–40)
BACTERIA: ABNORMAL /HPF
BASOPHILS # BLD: 0.2 %
BASOPHILS ABSOLUTE: 0 THOU/MM3 (ref 0–0.1)
BILIRUB SERPL-MCNC: 0.6 MG/DL (ref 0.3–1.2)
BILIRUBIN URINE: NEGATIVE
BLOOD, URINE: ABNORMAL
BUN BLDV-MCNC: 9 MG/DL (ref 7–22)
CALCIUM IONIZED: 1.08 MMOL/L (ref 1.12–1.32)
CALCIUM SERPL-MCNC: 7.9 MG/DL (ref 8.5–10.5)
CASTS 2: ABNORMAL /LPF
CASTS UA: ABNORMAL /LPF
CHARACTER, URINE: ABNORMAL
CHLORIDE BLD-SCNC: 107 MEQ/L (ref 98–111)
CO2: 18 MEQ/L (ref 23–33)
COLOR: ABNORMAL
CREAT SERPL-MCNC: 0.7 MG/DL (ref 0.4–1.2)
CRYSTALS, UA: ABNORMAL
EOSINOPHIL # BLD: 0.7 %
EOSINOPHILS ABSOLUTE: 0 THOU/MM3 (ref 0–0.4)
EPITHELIAL CELLS, UA: ABNORMAL /HPF
GFR SERPL CREATININE-BSD FRML MDRD: 81 ML/MIN/1.73M2
GLUCOSE BLD-MCNC: 107 MG/DL (ref 70–108)
GLUCOSE BLD-MCNC: 111 MG/DL (ref 70–108)
GLUCOSE BLD-MCNC: 117 MG/DL (ref 70–108)
GLUCOSE BLD-MCNC: 95 MG/DL (ref 70–108)
GLUCOSE URINE: NEGATIVE MG/DL
HCT VFR BLD CALC: 33.8 % (ref 37–47)
HCT VFR BLD CALC: 36.5 % (ref 37–47)
HEMOGLOBIN: 10.8 GM/DL (ref 12–16)
HEMOGLOBIN: 11.8 GM/DL (ref 12–16)
INR BLD: 1.04 (ref 0.85–1.13)
INR BLD: 1.05 (ref 0.85–1.13)
KETONES, URINE: NEGATIVE
LACTIC ACID: 1.9 MMOL/L (ref 0.5–2.2)
LEUKOCYTE ESTERASE, URINE: ABNORMAL
LIPASE: 74.3 U/L (ref 5.6–51.3)
LYMPHOCYTES # BLD: 18.2 %
LYMPHOCYTES ABSOLUTE: 1.2 THOU/MM3 (ref 1–4.8)
MAGNESIUM: 1.8 MG/DL (ref 1.6–2.4)
MAGNESIUM: 1.9 MG/DL (ref 1.6–2.4)
MCH RBC QN AUTO: 31.5 PG (ref 27–31)
MCH RBC QN AUTO: 32.2 PG (ref 27–31)
MCHC RBC AUTO-ENTMCNC: 32 GM/DL (ref 33–37)
MCHC RBC AUTO-ENTMCNC: 32.3 GM/DL (ref 33–37)
MCV RBC AUTO: 98.6 FL (ref 81–99)
MCV RBC AUTO: 99.8 FL (ref 81–99)
MISCELLANEOUS 2: ABNORMAL
MONOCYTES # BLD: 5.8 %
MONOCYTES ABSOLUTE: 0.4 THOU/MM3 (ref 0.4–1.3)
NITRITE, URINE: POSITIVE
NUCLEATED RED BLOOD CELLS: 0 /100 WBC
PDW BLD-RTO: 15.8 % (ref 11.5–14.5)
PDW BLD-RTO: 16.1 % (ref 11.5–14.5)
PH UA: 5.5
PLATELET # BLD: 146 THOU/MM3 (ref 130–400)
PLATELET # BLD: 164 THOU/MM3 (ref 130–400)
PMV BLD AUTO: 8.7 FL (ref 7.4–10.4)
PMV BLD AUTO: 8.8 FL (ref 7.4–10.4)
POTASSIUM REFLEX MAGNESIUM: 3.1 MEQ/L (ref 3.5–5.2)
POTASSIUM SERPL-SCNC: 3.1 MEQ/L (ref 3.5–5.2)
POTASSIUM SERPL-SCNC: 3.5 MEQ/L (ref 3.5–5.2)
PROTEIN UA: ABNORMAL
RBC # BLD: 3.42 MILL/MM3 (ref 4.2–5.4)
RBC # BLD: 3.65 MILL/MM3 (ref 4.2–5.4)
RBC URINE: ABNORMAL /HPF
RENAL EPITHELIAL, UA: ABNORMAL
SEG NEUTROPHILS: 75.1 %
SEGMENTED NEUTROPHILS ABSOLUTE COUNT: 5 THOU/MM3 (ref 1.8–7.7)
SODIUM BLD-SCNC: 140 MEQ/L (ref 135–145)
SPECIFIC GRAVITY, URINE: 1.02 (ref 1–1.03)
TOTAL PROTEIN: 4.8 G/DL (ref 6.1–8)
UROBILINOGEN, URINE: 1 EU/DL
WBC # BLD: 6.6 THOU/MM3 (ref 4.8–10.8)
WBC # BLD: 6.7 THOU/MM3 (ref 4.8–10.8)
WBC UA: > 100 /HPF
YEAST: ABNORMAL

## 2018-02-12 PROCEDURE — 83690 ASSAY OF LIPASE: CPT

## 2018-02-12 PROCEDURE — 83605 ASSAY OF LACTIC ACID: CPT

## 2018-02-12 PROCEDURE — 99232 SBSQ HOSP IP/OBS MODERATE 35: CPT | Performed by: INTERNAL MEDICINE

## 2018-02-12 PROCEDURE — 84132 ASSAY OF SERUM POTASSIUM: CPT

## 2018-02-12 PROCEDURE — 82330 ASSAY OF CALCIUM: CPT

## 2018-02-12 PROCEDURE — 87184 SC STD DISK METHOD PER PLATE: CPT

## 2018-02-12 PROCEDURE — 78227 HEPATOBIL SYST IMAGE W/DRUG: CPT

## 2018-02-12 PROCEDURE — 1200000000 HC SEMI PRIVATE

## 2018-02-12 PROCEDURE — 36415 COLL VENOUS BLD VENIPUNCTURE: CPT

## 2018-02-12 PROCEDURE — 87077 CULTURE AEROBIC IDENTIFY: CPT

## 2018-02-12 PROCEDURE — A9537 TC99M MEBROFENIN: HCPCS | Performed by: SURGERY

## 2018-02-12 PROCEDURE — 81001 URINALYSIS AUTO W/SCOPE: CPT

## 2018-02-12 PROCEDURE — 99223 1ST HOSP IP/OBS HIGH 75: CPT | Performed by: SURGERY

## 2018-02-12 PROCEDURE — S0028 INJECTION, FAMOTIDINE, 20 MG: HCPCS | Performed by: FAMILY MEDICINE

## 2018-02-12 PROCEDURE — 2580000003 HC RX 258: Performed by: SURGERY

## 2018-02-12 PROCEDURE — 6360000004 HC RX CONTRAST MEDICATION: Performed by: SURGERY

## 2018-02-12 PROCEDURE — 3430000000 HC RX DIAGNOSTIC RADIOPHARMACEUTICAL: Performed by: SURGERY

## 2018-02-12 PROCEDURE — 85610 PROTHROMBIN TIME: CPT

## 2018-02-12 PROCEDURE — 76705 ECHO EXAM OF ABDOMEN: CPT

## 2018-02-12 PROCEDURE — 83735 ASSAY OF MAGNESIUM: CPT

## 2018-02-12 PROCEDURE — 6360000002 HC RX W HCPCS: Performed by: INTERNAL MEDICINE

## 2018-02-12 PROCEDURE — 87186 SC STD MICRODIL/AGAR DIL: CPT

## 2018-02-12 PROCEDURE — 80053 COMPREHEN METABOLIC PANEL: CPT

## 2018-02-12 PROCEDURE — 2580000003 HC RX 258: Performed by: FAMILY MEDICINE

## 2018-02-12 PROCEDURE — 85025 COMPLETE CBC W/AUTO DIFF WBC: CPT

## 2018-02-12 PROCEDURE — 2500000003 HC RX 250 WO HCPCS: Performed by: FAMILY MEDICINE

## 2018-02-12 PROCEDURE — 2580000003 HC RX 258: Performed by: NURSE PRACTITIONER

## 2018-02-12 PROCEDURE — 85027 COMPLETE CBC AUTOMATED: CPT

## 2018-02-12 PROCEDURE — 82948 REAGENT STRIP/BLOOD GLUCOSE: CPT

## 2018-02-12 PROCEDURE — 87086 URINE CULTURE/COLONY COUNT: CPT

## 2018-02-12 PROCEDURE — 80048 BASIC METABOLIC PNL TOTAL CA: CPT

## 2018-02-12 PROCEDURE — 6370000000 HC RX 637 (ALT 250 FOR IP): Performed by: FAMILY MEDICINE

## 2018-02-12 RX ORDER — POTASSIUM CHLORIDE 20 MEQ/1
40 TABLET, EXTENDED RELEASE ORAL ONCE
Status: COMPLETED | OUTPATIENT
Start: 2018-02-12 | End: 2018-02-12

## 2018-02-12 RX ORDER — 0.9 % SODIUM CHLORIDE 0.9 %
10 VIAL (ML) INJECTION EVERY 12 HOURS SCHEDULED
Status: DISCONTINUED | OUTPATIENT
Start: 2018-02-12 | End: 2018-02-14 | Stop reason: SDUPTHER

## 2018-02-12 RX ORDER — LEVOFLOXACIN 500 MG/1
500 TABLET, FILM COATED ORAL NIGHTLY
Status: COMPLETED | OUTPATIENT
Start: 2018-02-13 | End: 2018-02-19

## 2018-02-12 RX ORDER — 0.9 % SODIUM CHLORIDE 0.9 %
10 VIAL (ML) INJECTION PRN
Status: DISCONTINUED | OUTPATIENT
Start: 2018-02-12 | End: 2018-02-14 | Stop reason: SDUPTHER

## 2018-02-12 RX ORDER — FAMOTIDINE 20 MG/1
20 TABLET, FILM COATED ORAL 2 TIMES DAILY
Status: DISCONTINUED | OUTPATIENT
Start: 2018-02-12 | End: 2018-02-20 | Stop reason: HOSPADM

## 2018-02-12 RX ORDER — LEVOFLOXACIN 5 MG/ML
750 INJECTION, SOLUTION INTRAVENOUS ONCE
Status: COMPLETED | OUTPATIENT
Start: 2018-02-12 | End: 2018-02-13

## 2018-02-12 RX ORDER — 0.9 % SODIUM CHLORIDE 0.9 %
1000 INTRAVENOUS SOLUTION INTRAVENOUS ONCE
Status: DISCONTINUED | OUTPATIENT
Start: 2018-02-12 | End: 2018-02-12

## 2018-02-12 RX ADMIN — ATORVASTATIN CALCIUM 40 MG: 40 TABLET, FILM COATED ORAL at 20:24

## 2018-02-12 RX ADMIN — RANOLAZINE 500 MG: 500 TABLET, FILM COATED, EXTENDED RELEASE ORAL at 09:45

## 2018-02-12 RX ADMIN — FAMOTIDINE 20 MG: 20 TABLET, FILM COATED ORAL at 20:24

## 2018-02-12 RX ADMIN — Medication 10 ML: at 20:26

## 2018-02-12 RX ADMIN — SODIUM CHLORIDE 1.57 MCG: 9 INJECTION, SOLUTION INTRAVENOUS at 07:55

## 2018-02-12 RX ADMIN — SODIUM CHLORIDE 1000 ML: 9 INJECTION, SOLUTION INTRAVENOUS at 02:22

## 2018-02-12 RX ADMIN — POTASSIUM CHLORIDE 40 MEQ: 1500 TABLET, EXTENDED RELEASE ORAL at 11:13

## 2018-02-12 RX ADMIN — Medication 10 ML: at 09:45

## 2018-02-12 RX ADMIN — Medication 3 MG: at 20:24

## 2018-02-12 RX ADMIN — RANOLAZINE 500 MG: 500 TABLET, FILM COATED, EXTENDED RELEASE ORAL at 20:24

## 2018-02-12 RX ADMIN — Medication 10 ML: at 20:24

## 2018-02-12 RX ADMIN — LEVOFLOXACIN 750 MG: 5 INJECTION, SOLUTION INTRAVENOUS at 23:32

## 2018-02-12 RX ADMIN — Medication 8.7 MILLICURIE: at 06:50

## 2018-02-12 RX ADMIN — FAMOTIDINE 20 MG: 10 INJECTION, SOLUTION INTRAVENOUS at 09:45

## 2018-02-12 NOTE — PLAN OF CARE
Problem: Falls - Risk of  Goal: Absence of falls  Outcome: Ongoing  Patient free of falls throughout shift. Patient utilizes call light effectively. Bed in lowest position. Wheels locked. Bed alarm active. Problem: Risk for Impaired Skin Integrity  Goal: Tissue integrity - skin and mucous membranes  Structural intactness and normal physiological function of skin and  mucous membranes. Outcome: Ongoing  No new skin issues noted. Patient turns and repositions herself frequently throughout shift. Problem: Pain Control  Goal: Maintain pain level at or below patient's acceptable level (or 5 if patient is unable to determine acceptable level)  Outcome: Ongoing  Patient denying pain throughout shift. Problem: Cardiovascular  Goal: No DVT, peripheral vascular complications  Outcome: Ongoing  Patient free of signs and symptoms of DVT. No calf tenderness or swelling. Problem: Respiratory  Goal: O2 Sat > 90%  Outcome: Met This Shift      Problem: Discharge Planning:  Goal: Patients continuum of care needs are met  Patients continuum of care needs are met   Outcome: Ongoing  Discharge planning is ongoing with this patient. Comments: Care plan reviewed with patient. Patient verbalizes understanding of plan of care.

## 2018-02-12 NOTE — PLAN OF CARE
Problem: Falls - Risk of  Goal: Absence of falls  Outcome: Ongoing  Bed in lowest position. Call light within reach. Educated patient to use call light for assistance. Problem: Risk for Impaired Skin Integrity  Goal: Tissue integrity - skin and mucous membranes  Structural intactness and normal physiological function of skin and  mucous membranes. Outcome: Ongoing  Skin assessment completed. Patient turned every 2 hours and as needed. No skin breakdown this shift. Problem: Pain Control  Goal: Maintain pain level at or below patient's acceptable level (or 5 if patient is unable to determine acceptable level)  Outcome: Ongoing  Denies pain at this time. Problem: Cardiovascular  Goal: No DVT, peripheral vascular complications  Outcome: Ongoing  No signs of DVT at this time. Problem: Respiratory  Goal: O2 Sat > 90%  Outcome: Ongoing  O2 remains above 90% on room air. Problem: Discharge Planning:  Goal: Patients continuum of care needs are met  Patients continuum of care needs are met   Outcome: Ongoing  Monitoring for discharge barriers. Comments: Care plan reviewed with patient. Patient verbalize understanding of the plan of care and contribute to goal setting.

## 2018-02-12 NOTE — FLOWSHEET NOTE
Subjective:   visited patient and family during rounding for a follow up to provide emotional and spiritual support. At the point of my arrival in patient's room, she was found laying in her bed, awake and able to verbally communicate with me. Patient's  was also in the room wearing a nice Steve Speak hat. Patient immediately begin to share with me a detail of her illness. She stated that she had a gall bladder test and the result was good. She also stated that other test was planned for tomorrow on her kidney She said that she and her family and the rest of the family members are members of [facility] [address]. Patient said her gabby is strong and that it is that gabby that had brought them this far. Objective:    I nurtured hope and offered words of comfort and encouragement. I also provided emotional and spiritual support to the family including prayer. Assessment:  During my encounter with this patient, I found her to be very approachable and calm. She also appeared to be wanting to be independence of others. She was also hopeful. Spiritual care plan: It will be helpful to this patient and her  for spiritual care to reach out to this patient and family for continue emotional support.

## 2018-02-12 NOTE — PROGRESS NOTES
Hemoglobin/Hematocrit:    Lab Results   Component Value Date    HGB 10.8 02/12/2018    HCT 33.8 02/12/2018     BMP:    Lab Results   Component Value Date     02/12/2018    K 3.1 02/12/2018    K 3.1 02/12/2018     02/12/2018    CO2 18 02/12/2018    BUN 9 02/12/2018    LABALBU 3.0 02/12/2018    CREATININE 0.7 02/12/2018    CALCIUM 7.9 02/12/2018    GFRAA >60 02/10/2018    LABGLOM 81 02/12/2018    GLUCOSE 95 02/12/2018    GLUCOSE 117 05/04/2012     Hepatic Function Panel:    Lab Results   Component Value Date    ALKPHOS 71 02/12/2018     02/12/2018     02/12/2018    PROT 4.8 02/12/2018    BILITOT 0.6 02/12/2018    BILIDIR 0.3 02/11/2018    IBILI 1.23 06/30/2017    LABALBU 3.0 02/12/2018     Calcium:    Lab Results   Component Value Date    CALCIUM 7.9 02/12/2018     PT/INR:    Lab Results   Component Value Date    PROTIME 19.4 10/07/2017    PROTIME  08/14/2017      Comment:      Continue dose of 7.5mg daily    INR 1.05 02/12/2018     PTT:    Lab Results   Component Value Date    APTT 45.2 06/14/2017   [APTT     Significant Diagnostic Studies:     Current Meds:  Scheduled Meds:   calcium replacement protocol   Other RX Placeholder    potassium replacement protocol   Other RX Placeholder    magnesium replacement protocol   Other RX Placeholder    sincalide (KINEVAC) infusion  0.02 mcg/kg Intravenous Once    insulin lispro  0-12 Units Subcutaneous TID WC    insulin lispro  0-6 Units Subcutaneous Nightly    sodium chloride flush  10 mL Intravenous 2 times per day    famotidine (PEPCID) injection  20 mg Intravenous BID    atorvastatin  40 mg Oral Nightly    melatonin  3 mg Oral Nightly    metoprolol tartrate  50 mg Oral Nightly    ranolazine  500 mg Oral BID     Continuous Infusions:   dextrose 5 % and 0.9 % NaCl       PRN Meds:.glucose, dextrose, glucagon (rDNA), dextrose 5 % and 0.9 % NaCl, sodium chloride flush, acetaminophen, magnesium hydroxide, ondansetron, polyethylene

## 2018-02-12 NOTE — PROGRESS NOTES
Pharmacy Intravenous to Oral Protocol  Medication changed per P&T protocol: famotidine    Patient meets criteria based on the followin. IV therapy > 24 hours: Yes  2. Nausea/vomiting: No  3. Regular diet : clear liquid diet  4. Tolerating other oral medications: Yes  5. Afebrile for 24 hours: n/a  6.  WBC trending downward: n/a

## 2018-02-12 NOTE — PROGRESS NOTES
progress    Patient:  Andres Martínez  YOB: 1940  Date of Service: 2/12/2018  MRN: [id number]   Acct:  [de-identified]   Primary Care Physician: Pal Chatman MD    Chief Complaint: Nausea and Vomiting    History of Present Illness:   History obtained from chart review and the patient. The patient is a 65 y.o. female with Pulmonary Fibrosis, Diabetes, CAD, Pacemaker placement, CHF who presents from [facility] where she was evaluated for nausea and vomiting that has been going on for the past 2 months. Patient denies abdominal pain, fever, chills, diarrhea, chest pain, shortness of breath or dysuria. CT abdomen pelvis at hospital in [place] showed the gallbladder is distended and contains stones and sludge. Mild intrahepatic biliary ductal dilatation may be associated. Acute or chronic cholecystitis, or noncalcified choledocholithiasis may account for these findings. Patient is not able to undergo MRCP due to pacemaker. GI has been consulted for ERCP.        Subjective:-  Look better today  tolerating diet  Reviewed general surgery notes    Scheduled Meds:   calcium replacement protocol   Other RX Placeholder    potassium replacement protocol   Other RX Placeholder    magnesium replacement protocol   Other RX Placeholder    famotidine  20 mg Oral BID    sodium chloride (PF)  10 mL Intravenous 2 times per day    insulin lispro  0-12 Units Subcutaneous TID WC    insulin lispro  0-6 Units Subcutaneous Nightly    sodium chloride flush  10 mL Intravenous 2 times per day    atorvastatin  40 mg Oral Nightly    melatonin  3 mg Oral Nightly    metoprolol tartrate  50 mg Oral Nightly    ranolazine  500 mg Oral BID     Continuous Infusions:   dextrose 5 % and 0.9 % NaCl       PRN Meds:.sodium chloride (PF), glucose, dextrose, glucagon (rDNA), dextrose 5 % and 0.9 % NaCl, sodium chloride flush, acetaminophen, magnesium hydroxide, ondansetron, polyethylene glycol    10 point review of systems 11:56 AM   Result Value Ref Range    WBC 6.6 4.8 - 10.8 thou/mm3    RBC 3.65 (L) 4.20 - 5.40 mill/mm3    Hemoglobin 11.8 (L) 12.0 - 16.0 gm/dl    Hematocrit 36.5 (L) 37.0 - 47.0 %    MCV 99.8 (H) 81.0 - 99.0 fL    MCH 32.2 (H) 27.0 - 31.0 pg    MCHC 32.3 (L) 33.0 - 37.0 gm/dl    RDW 16.1 (H) 11.5 - 14.5 %    Platelets 489 868 - 100 thou/mm3    MPV 8.8 7.4 - 10.4 fL   POCT Glucose    Collection Time: 02/12/18  4:39 PM   Result Value Ref Range    POC Glucose 111 (H) 70 - 108 mg/dl   Protime-INR    Collection Time: 02/12/18  4:58 PM   Result Value Ref Range    INR 1.04 0.85 - 1.13       Radiology:     Ct Chest Wo Contrast    Result Date: 2/10/2018  FINAL REPORT EXAM:  CT CHEST WO CONTRAST HISTORY:  ACUTE RESP ILLNESS, &gt; 35YEARS OLD vomiting TECHNIQUE:  CT imaging obtained through the chest without contrast. Transaxial, Coronal and sagittal reformats are provided. PRIORS:  Chest radiograph 02/09/2018 and 06/03/2017 FINDINGS:  Left chest pacemaker. Cardiomegaly. Coronary artery disease. No pericardial effusion. Thoracic aorta is normal in course and caliber with scattered atherosclerosis. No pneumothorax, effusion or focal airspace disease. Diffuse reticulation. Early basilar honeycombing is suggested with mild bronchiectasis. The central airways are patent. Please see CT abdomen and pelvis of the same date. The superficial soft tissues are otherwise unremarkable. No acute bony abnormality or worrisome osseous lesions identified. Impression:  Suggested pulmonary fibrosis. No focal airspace disease. Sequela of heart failure. Please see CT abdomen and pelvis of the same date.  Electronically Signed By: Michelle Spencer   on  02/10/2018  09:09    Ct Abdomen Pelvis W Iv Contrast Additional Contrast? None    Result Date: 2/10/2018  FINAL REPORT EXAM:  CT ABDOMEN PELVIS W IV CONTRAST HISTORY:  elevated LFT's, nausea, vomiting TECHNIQUE:  CT images are acquired through the Abdomen and Pelvis following intravenous clinic previously-- duonebs prn, pulse ox to keep 02 > 92%        hida normal- no acute abdullahi- general surgery seen - no interventions planned - egd gini  Will discharge if egd normal and tolerates diet, lft getting normal        DVT prophylaxis: [] Lovenox                                 [x] SCDs                                 [] SQ Heparin                                 [] Encourage ambulation, low risk for DVT, no chemical or mechanical prophylaxis necessary              [] Already on Anticoagulation                Anticipated Disposition upon discharge: [] Home                                                                         [x] Home with Home Health                                                                         [] [facility]                                                                         [] [address]          Electronically signed by Yara Kumar MD on 2/12/2018 at 5:38 PM

## 2018-02-12 NOTE — CARE COORDINATION
2/12/18, 9:05 AM      Antoinette Goss       Admitted from: Direct admit from [facility].  2/10/2018/ 2136 Hospital day: 2   Location: -05/005- Reason for admit: Acute cholecystitis [K81.0] Status: Inpt. Admit order signed?: yes  PMH:  has a past medical history of Allergic rhinitis; Asthma; Atrial fibrillation ([address] CAD (coronary artery disease); Clotting disorder ([address] COPD (chronic obstructive pulmonary disease) ([address] DDD (degenerative disc disease), cervical; Degeneration of cervical intervertebral disc; Diverticulosis; Gout; Gout, unspecified; H/O cardiac catheterization; H/O echocardiogram; History of Holter monitoring; Hx of blood clots; Hyperlipidemia; Hypertension; Infectious hepatitis; Long term (current) use of anticoagulants; Other abnormal glucose; Pacemaker; Phlebitis and thrombophlebitis of lower extremities, unspecified; Senile osteoporosis; Symptomatic menopausal or female climacteric states; Syncope and collapse; Unspecified hereditary and idiopathic peripheral neuropathy; and Unspecified sleep apnea. Procedure: ERCP  Pertinent abnormal Imaging:CT of chest and abdomen, US of liver and gall bladder, chest x-ray.    Medications:  Scheduled Meds:   calcium replacement protocol   Other RX Placeholder    potassium replacement protocol   Other RX Placeholder    magnesium replacement protocol   Other RX Placeholder    sincalide (KINEVAC) infusion  0.02 mcg/kg Intravenous Once    potassium chloride  40 mEq Oral Once    insulin lispro  0-12 Units Subcutaneous TID WC    insulin lispro  0-6 Units Subcutaneous Nightly    sodium chloride flush  10 mL Intravenous 2 times per day    famotidine (PEPCID) injection  20 mg Intravenous BID    atorvastatin  40 mg Oral Nightly    melatonin  3 mg Oral Nightly    metoprolol tartrate  50 mg Oral Nightly    ranolazine  500 mg Oral BID     Continuous Infusions:   dextrose 5 % and 0.9 % NaCl        Pertinent Info/Orders/Treatment Plan:  Consults to

## 2018-02-12 NOTE — CONSULTS
DAILY 11/27/17   Mary Ellen Conti MD   OXYGEN Inhale 2 L into the lungs continuous     Historical Provider, MD   Glucose Blood (BLOOD GLUCOSE TEST STRIPS) STRP Test 4 times daily Diagnosis 7/2/17   Shannon Leal MD   melatonin 3 MG TABS tablet Take 3 mg by mouth nightly     Historical Provider, MD    Scheduled Meds:   calcium replacement protocol   Other RX Placeholder    potassium replacement protocol   Other RX Placeholder    magnesium replacement protocol   Other RX Placeholder    [facility]) infusion  0.02 mcg/kg Intravenous Once    insulin lispro  0-12 Units Subcutaneous TID WC    insulin lispro  0-6 Units Subcutaneous Nightly    sodium chloride flush  10 mL Intravenous 2 times per day    famotidine (PEPCID) injection  20 mg Intravenous BID    atorvastatin  40 mg Oral Nightly    melatonin  3 mg Oral Nightly    metoprolol tartrate  50 mg Oral Nightly    ranolazine  500 mg Oral BID     Continuous Infusions:   dextrose 5 % and 0.9 % NaCl       PRN Meds:.glucose, dextrose, glucagon (rDNA), dextrose 5 % and 0.9 % NaCl, sodium chloride flush, acetaminophen, magnesium hydroxide, ondansetron, polyethylene glycol  Allergies  Adhesive tape; Aspercreme [trolamine salicylate]; Augmentin [amoxicillin-pot clavulanate];  Erythromycin; Guaifenesin & derivatives; and Niacin and related  Family History  Family History   Problem Relation Age of Onset    Heart Disease Mother     Stroke Mother     High Blood Pressure Mother     Cancer Father      lung    Stroke Brother     Heart Disease Maternal Grandmother      Social History  Social History     Social History    Marital status:      Spouse name: N/A    Number of children: N/A    Years of education: N/A     Social History Main Topics    Smoking status: Never Smoker    Smokeless tobacco: Never Used    Alcohol use No    Drug use: No    Sexual activity: Not on file     Other Topics Concern    Not on file     Social History Narrative    No

## 2018-02-13 LAB
ALBUMIN SERPL-MCNC: 3 G/DL (ref 3.5–5.1)
ALP BLD-CCNC: 79 U/L (ref 38–126)
ALT SERPL-CCNC: 85 U/L (ref 11–66)
AST SERPL-CCNC: 62 U/L (ref 5–40)
BILIRUB SERPL-MCNC: 0.6 MG/DL (ref 0.3–1.2)
BILIRUBIN DIRECT: < 0.2 MG/DL (ref 0–0.3)
GLUCOSE BLD-MCNC: 83 MG/DL (ref 70–108)
GLUCOSE BLD-MCNC: 89 MG/DL (ref 70–108)
GLUCOSE BLD-MCNC: 97 MG/DL (ref 70–108)
INR BLD: 1.16 (ref 0.85–1.13)
LIPASE: 41 U/L (ref 5.6–51.3)
POTASSIUM SERPL-SCNC: 3.4 MEQ/L (ref 3.5–5.2)
TOTAL PROTEIN: 4.8 G/DL (ref 6.1–8)

## 2018-02-13 PROCEDURE — 6360000002 HC RX W HCPCS: Performed by: INTERNAL MEDICINE

## 2018-02-13 PROCEDURE — 36415 COLL VENOUS BLD VENIPUNCTURE: CPT

## 2018-02-13 PROCEDURE — 88305 TISSUE EXAM BY PATHOLOGIST: CPT

## 2018-02-13 PROCEDURE — 82948 REAGENT STRIP/BLOOD GLUCOSE: CPT

## 2018-02-13 PROCEDURE — 6370000000 HC RX 637 (ALT 250 FOR IP): Performed by: FAMILY MEDICINE

## 2018-02-13 PROCEDURE — 99232 SBSQ HOSP IP/OBS MODERATE 35: CPT | Performed by: INTERNAL MEDICINE

## 2018-02-13 PROCEDURE — 80076 HEPATIC FUNCTION PANEL: CPT

## 2018-02-13 PROCEDURE — 2580000003 HC RX 258: Performed by: NURSE PRACTITIONER

## 2018-02-13 PROCEDURE — 84132 ASSAY OF SERUM POTASSIUM: CPT

## 2018-02-13 PROCEDURE — 7100000000 HC PACU RECOVERY - FIRST 15 MIN: Performed by: INTERNAL MEDICINE

## 2018-02-13 PROCEDURE — 83690 ASSAY OF LIPASE: CPT

## 2018-02-13 PROCEDURE — 3609012400 HC EGD TRANSORAL BIOPSY SINGLE/MULTIPLE: Performed by: INTERNAL MEDICINE

## 2018-02-13 PROCEDURE — 1200000000 HC SEMI PRIVATE

## 2018-02-13 PROCEDURE — 85610 PROTHROMBIN TIME: CPT

## 2018-02-13 PROCEDURE — 0DB98ZX EXCISION OF DUODENUM, VIA NATURAL OR ARTIFICIAL OPENING ENDOSCOPIC, DIAGNOSTIC: ICD-10-PCS | Performed by: INTERNAL MEDICINE

## 2018-02-13 PROCEDURE — 6370000000 HC RX 637 (ALT 250 FOR IP): Performed by: INTERNAL MEDICINE

## 2018-02-13 PROCEDURE — 7100000001 HC PACU RECOVERY - ADDTL 15 MIN: Performed by: INTERNAL MEDICINE

## 2018-02-13 PROCEDURE — 2580000003 HC RX 258: Performed by: FAMILY MEDICINE

## 2018-02-13 PROCEDURE — 0DB78ZX EXCISION OF STOMACH, PYLORUS, VIA NATURAL OR ARTIFICIAL OPENING ENDOSCOPIC, DIAGNOSTIC: ICD-10-PCS | Performed by: INTERNAL MEDICINE

## 2018-02-13 RX ORDER — POTASSIUM CHLORIDE 20 MEQ/1
40 TABLET, EXTENDED RELEASE ORAL ONCE
Status: COMPLETED | OUTPATIENT
Start: 2018-02-13 | End: 2018-02-13

## 2018-02-13 RX ORDER — FENTANYL CITRATE 50 UG/ML
INJECTION, SOLUTION INTRAMUSCULAR; INTRAVENOUS PRN
Status: DISCONTINUED | OUTPATIENT
Start: 2018-02-13 | End: 2018-02-13 | Stop reason: HOSPADM

## 2018-02-13 RX ORDER — MIDAZOLAM HYDROCHLORIDE 1 MG/ML
INJECTION INTRAMUSCULAR; INTRAVENOUS PRN
Status: DISCONTINUED | OUTPATIENT
Start: 2018-02-13 | End: 2018-02-13 | Stop reason: HOSPADM

## 2018-02-13 RX ADMIN — ATORVASTATIN CALCIUM 40 MG: 40 TABLET, FILM COATED ORAL at 21:15

## 2018-02-13 RX ADMIN — LEVOFLOXACIN 500 MG: 500 TABLET, FILM COATED ORAL at 21:15

## 2018-02-13 RX ADMIN — POTASSIUM CHLORIDE 40 MEQ: 1500 TABLET, EXTENDED RELEASE ORAL at 05:51

## 2018-02-13 RX ADMIN — Medication 10 ML: at 21:19

## 2018-02-13 RX ADMIN — Medication 3 MG: at 21:15

## 2018-02-13 RX ADMIN — RANOLAZINE 500 MG: 500 TABLET, FILM COATED, EXTENDED RELEASE ORAL at 21:15

## 2018-02-13 RX ADMIN — RANOLAZINE 500 MG: 500 TABLET, FILM COATED, EXTENDED RELEASE ORAL at 09:51

## 2018-02-13 RX ADMIN — Medication 10 ML: at 09:54

## 2018-02-13 RX ADMIN — FAMOTIDINE 20 MG: 20 TABLET, FILM COATED ORAL at 21:15

## 2018-02-13 RX ADMIN — METOPROLOL TARTRATE 50 MG: 50 TABLET, FILM COATED ORAL at 21:15

## 2018-02-13 RX ADMIN — FAMOTIDINE 20 MG: 20 TABLET, FILM COATED ORAL at 08:58

## 2018-02-13 ASSESSMENT — PAIN SCALES - GENERAL
PAINLEVEL_OUTOF10: 0
PAINLEVEL_OUTOF10: 0

## 2018-02-13 ASSESSMENT — PAIN - FUNCTIONAL ASSESSMENT: PAIN_FUNCTIONAL_ASSESSMENT: 0-10

## 2018-02-13 NOTE — PROGRESS NOTES
ENDOSCOPY POSTPROCEDURE REPORT     PT NAME: [place]  MEDICAL RECORD NUMBER: [id number]  YOB: 1940    PROCEDURE PERFORMED BY : Juan Mendez    PROCEDURE TYPE:   EGD ___x___   COLONOSCOPY _______   ERCP ________  MEDICATIONS USED :  VERSED __5___   FENTANYL__75___   PROPOFOL ______  Patient tolerated procedure well. No apparent complications. Estimated blood loss:  Nil  Specimens removed:  Yes__x___  No______  Disposition of Specimens:  To Lab      BRIEF  FINDINGS:  1. Small HH  2.o/w neg. 3.Random bxs antrum and descending duodenum    PRELIMINARY PLAN:  1.F/U bx's  2. Resume diet as kathleen  3. OK for discharge in am GI-wise, if doing well. 4. Outpatient nuclear medicine gastric emptying study    For complete findings and plan, see dictated report.      Juan Mendez MD  2/13/2018  4:21 PM

## 2018-02-13 NOTE — PLAN OF CARE
Problem: Falls - Risk of  Goal: Absence of falls  BED ALARM ON UP WITH WALKER AND ASSIST GAIT STEADY    Problem: Risk for Impaired Skin Integrity  Goal: Tissue integrity - skin and mucous membranes  Structural intactness and normal physiological function of skin and  mucous membranes. SKIN INTACT LEFT BUTTOCK  olod scar moniter  Intervention: SKIN ASSESSMENT  moniter  Intervention: PRESSURE ULCER PREVENTION  Up change positions      Problem: Pain Control  Goal: Maintain pain level at or below patient's acceptable level (or 5 if patient is unable to determine acceptable level)  Turns self    Problem: Cardiovascular  Goal: No DVT, peripheral vascular complications  moniter    Problem: Discharge Planning:  Goal: Patients continuum of care needs are met  Patients continuum of care needs are met   Outcome: Ongoing  Lives at home with spause    Comments: Care plan reviewed with patient and . Patient and  verbalize understanding of the plan of care and contribute to goal setting.

## 2018-02-13 NOTE — PROGRESS NOTES
ondansetron, polyethylene glycol    10 point review of systems completed, all other than noted above are negative. Vitals:   Vitals:    02/13/18 1200   BP:    Pulse: 80   Resp: 20   Temp: 97.4 °F (36.3 °C)   SpO2:       BMI: Body mass index is 28.07 kg/m².                 Exam:  Physical Examination: General appearance - alert, well appearing, and in no distress  Mental status - alert, oriented to person, place, and time  Neck - supple, no significant adenopathy, no JVD, or carotid bruits  Chest - + course breaths throughout with crackles, occas  wheezes, rales or rhonchi, symmetric air entry  Heart - normal rate, regular rhythm, normal S1, S2, no murmurs, rubs, clicks or gallops  Abdomen - soft, nontender, nondistended, no masses or organomegaly  Neurological - alert, oriented, normal speech, no focal findings or movement disorder noted  Musculoskeletal - no joint tenderness, deformity or swelling  Extremities - peripheral pulses normal, no pedal edema, no clubbing or cyanosis  Skin - normal coloration and turgor, no rashes, no suspicious skin lesions noted      Review of Labs and Diagnostic Testing:    Recent Results (from the past 24 hour(s))   POCT Glucose    Collection Time: 02/12/18  4:39 PM   Result Value Ref Range    POC Glucose 111 (H) 70 - 108 mg/dl   Protime-INR    Collection Time: 02/12/18  4:58 PM   Result Value Ref Range    INR 1.04 0.85 - 1.13   Potassium    Collection Time: 02/12/18  7:50 PM   Result Value Ref Range    Potassium 3.5 3.5 - 5.2 meq/L   Urine with Reflexed Micro    Collection Time: 02/12/18  8:16 PM   Result Value Ref Range    Glucose, Ur NEGATIVE NEGATIVE mg/dl    Bilirubin Urine NEGATIVE NEGATIVE    Ketones, Urine NEGATIVE NEGATIVE    Specific Gravity, Urine 1.018 1.002 - 1.03    Blood, Urine SMALL (A) NEGATIVE    pH, UA 5.5 5.0 - 9.0    Protein, UA TRACE (A) NEGATIVE    Urobilinogen, Urine 1.0 0.0 - 1.0 eu/dl    Nitrite, Urine POSITIVE (A) NEGATIVE    Leukocyte Esterase, Urine

## 2018-02-14 LAB
CULTURE: NORMAL
GLUCOSE BLD-MCNC: 103 MG/DL (ref 70–108)
GLUCOSE BLD-MCNC: 111 MG/DL (ref 70–108)
GLUCOSE BLD-MCNC: 117 MG/DL (ref 70–108)
GLUCOSE BLD-MCNC: 95 MG/DL (ref 70–108)
INR BLD: 1.18 (ref 0.85–1.13)
Lab: NORMAL
Lab: NORMAL
POTASSIUM SERPL-SCNC: 3.7 MEQ/L (ref 3.5–5.2)
SPECIMEN DESCRIPTION: NORMAL
SPECIMEN DESCRIPTION: NORMAL
STATUS: NORMAL
STATUS: NORMAL

## 2018-02-14 PROCEDURE — 82948 REAGENT STRIP/BLOOD GLUCOSE: CPT

## 2018-02-14 PROCEDURE — 1200000000 HC SEMI PRIVATE

## 2018-02-14 PROCEDURE — 36415 COLL VENOUS BLD VENIPUNCTURE: CPT

## 2018-02-14 PROCEDURE — 6360000002 HC RX W HCPCS: Performed by: FAMILY MEDICINE

## 2018-02-14 PROCEDURE — 6360000002 HC RX W HCPCS: Performed by: INTERNAL MEDICINE

## 2018-02-14 PROCEDURE — 99233 SBSQ HOSP IP/OBS HIGH 50: CPT | Performed by: INTERNAL MEDICINE

## 2018-02-14 PROCEDURE — 6370000000 HC RX 637 (ALT 250 FOR IP): Performed by: FAMILY MEDICINE

## 2018-02-14 PROCEDURE — 85610 PROTHROMBIN TIME: CPT

## 2018-02-14 PROCEDURE — 84132 ASSAY OF SERUM POTASSIUM: CPT

## 2018-02-14 PROCEDURE — 6370000000 HC RX 637 (ALT 250 FOR IP): Performed by: INTERNAL MEDICINE

## 2018-02-14 PROCEDURE — 2580000003 HC RX 258: Performed by: FAMILY MEDICINE

## 2018-02-14 RX ORDER — METOCLOPRAMIDE HYDROCHLORIDE 5 MG/ML
5 INJECTION INTRAMUSCULAR; INTRAVENOUS
Status: DISCONTINUED | OUTPATIENT
Start: 2018-02-14 | End: 2018-02-18

## 2018-02-14 RX ADMIN — Medication 10 ML: at 17:32

## 2018-02-14 RX ADMIN — RANOLAZINE 500 MG: 500 TABLET, FILM COATED, EXTENDED RELEASE ORAL at 09:31

## 2018-02-14 RX ADMIN — METOPROLOL TARTRATE 50 MG: 50 TABLET, FILM COATED ORAL at 21:36

## 2018-02-14 RX ADMIN — FAMOTIDINE 20 MG: 20 TABLET, FILM COATED ORAL at 09:31

## 2018-02-14 RX ADMIN — FAMOTIDINE 20 MG: 20 TABLET, FILM COATED ORAL at 21:36

## 2018-02-14 RX ADMIN — ONDANSETRON 4 MG: 2 INJECTION INTRAMUSCULAR; INTRAVENOUS at 17:31

## 2018-02-14 RX ADMIN — Medication 3 MG: at 21:36

## 2018-02-14 RX ADMIN — RANOLAZINE 500 MG: 500 TABLET, FILM COATED, EXTENDED RELEASE ORAL at 21:36

## 2018-02-14 RX ADMIN — Medication 10 ML: at 09:33

## 2018-02-14 RX ADMIN — Medication 10 ML: at 21:38

## 2018-02-14 RX ADMIN — METOCLOPRAMIDE 5 MG: 5 INJECTION, SOLUTION INTRAMUSCULAR; INTRAVENOUS at 21:36

## 2018-02-14 RX ADMIN — ONDANSETRON 4 MG: 2 INJECTION INTRAMUSCULAR; INTRAVENOUS at 03:32

## 2018-02-14 RX ADMIN — LEVOFLOXACIN 500 MG: 500 TABLET, FILM COATED ORAL at 21:36

## 2018-02-14 RX ADMIN — Medication 10 ML: at 09:31

## 2018-02-14 RX ADMIN — ATORVASTATIN CALCIUM 40 MG: 40 TABLET, FILM COATED ORAL at 21:36

## 2018-02-14 ASSESSMENT — PAIN SCALES - GENERAL
PAINLEVEL_OUTOF10: 0

## 2018-02-14 NOTE — PLAN OF CARE
Problem: Falls - Risk of  Goal: Absence of falls  Outcome: Ongoing  No falls this shift. Bed alarm on. Call light within reach. Walker at bedside. Weakness in BLE's. Problem: Risk for Impaired Skin Integrity  Goal: Tissue integrity - skin and mucous membranes  Structural intactness and normal physiological function of skin and  mucous membranes. Outcome: Ongoing  No new skin issues noted this shift. Problem: Pain Control  Goal: Maintain pain level at or below patient's acceptable level (or 5 if patient is unable to determine acceptable level)  Outcome: Ongoing  Patient denies pain this shift. Problem: Cardiovascular  Goal: No DVT, peripheral vascular complications  Outcome: Ongoing  SCD's ordered. Patient educated on SCD's and wearing them throughout the night. Problem: Respiratory  Goal: O2 Sat > 90%  Outcome: Ongoing  Patient on room air. Saturation level WNL. Problem: Discharge Planning:  Goal: Patients continuum of care needs are met  Patients continuum of care needs are met   Outcome: Ongoing  Home with family. Comments: Care plan reviewed with patient. Patient verbalize understanding of the plan of care and contribute to goal setting.

## 2018-02-14 NOTE — PROGRESS NOTES
acetaminophen, magnesium hydroxide, ondansetron, polyethylene glycol    Assessment:  Nausea and vomiting intermittent ongoing ~ 2 months   SP EGD 2/13/2018 unremarkable   GES scheduled but was not done  Cholelithiasis without cholecystitis-surgery consulted NO surgery indication surgical intervention               US GB 2/12/2018 Distended gallbladder which contains biliary sludge versus small stones. The common bile duct is mildly dilated. HIDA--patent cystic and CBD, Normal EF    Abnormal LFTS               trending down toward normal, US liver no mass  UTI-GN on levaquin   NO MRCP d/t AICD  Pacemaker/AICD   Normocytic anemia  Pulmonary Fibrosis  Hypocalcemia-on replacement  Hypokalemia-on replacement  Elevated BNP  Reports never had EGD  Colonoscopy ~ 12 years ago in [place] \"normal\" except for diverticulosis  Weight loss down pant size ~ 8 months    Plan:    F/U bx's  Resume diet as kathleen  OK for discharge in am GI-wise, if doing well.   Outpatient nuclear medicine gastric emptying study  Patient to follow up with Dr. Carmel Rosas in 4 weeks    Case reviewed and impression/plan reviewed in collaboration with Dr Yesenia Pickett, CNP for Dr. Angela Arredondo   2/14/2018   6:47 AM

## 2018-02-14 NOTE — PROCEDURES
the  antrum. The antrum was normal.  The scope was retroflexed. Cardia,  fundus, EG junction examined. Retroflexed view did demonstrate small  hiatal hernia, otherwise normal.  The scope was straightened, and the  gastric body visualized in forward view, otherwise normal.  The esophagus  reinspected. This was normal.  Subsequently, biopsies obtained from  endoscopically unremarkable appearing mucosa from the gastric antrum and  from the descending duodenum, and these was sent together for pathology in  order to evaluate for any underlying histologic abnormalities due to the  patient's clinical history. After biopsies were obtained, air was  withdrawn. The scope was removed. The patient tolerated the procedure  well, no apparent complications. Photographic documentation was obtained  and printed. IMPRESSION:  1. Small hiatal hernia. 2.  Otherwise normal exam as described above. Random biopsies obtained in  order to evaluate for any underlying histologic abnormalities that could be  associated with the patient's clinical presentation. PLAN:  1. Followup biopsy results. 2.  Resume diet. 3.  Any further evaluation could be undertaken as an outpatient. This  could include nuclear medicine, gastric emptying study.         Erik Brager M.D.    D: 02/13/2018 7:01:00       T: 02/14/2018 10:15:44     RN/[email or web address]_LORENE  Job#: [id number]     Doc#: [id number]    CC:

## 2018-02-14 NOTE — PROGRESS NOTES
progress    Patient:  Babs Roberts  YOB: 1940  Date of Service: 2/14/2018  MRN: [id number]   Acct:  [de-identified]   Primary Care Physician: Tariq Arnold MD    Chief Complaint: Nausea and Vomiting    History of Present Illness:   History obtained from chart review and the patient. The patient is a 65 y.o. female with Pulmonary Fibrosis, Diabetes, CAD, Pacemaker placement, CHF who presents from [facility] where she was evaluated for nausea and vomiting that has been going on for the past 2 months. Patient denies abdominal pain, fever, chills, diarrhea, chest pain, shortness of breath or dysuria. CT abdomen pelvis at hospital in [place] showed the gallbladder is distended and contains stones and sludge. Mild intrahepatic biliary ductal dilatation may be associated. Acute or chronic cholecystitis, or noncalcified choledocholithiasis may account for these findings. Patient is not able to undergo MRCP due to pacemaker. GI has been consulted for ERCP. Subjective:-  Nausea   Cant eat anything seems like per pt    Scheduled Meds:   calcium replacement protocol   Other RX Placeholder    potassium replacement protocol   Other RX Placeholder    magnesium replacement protocol   Other RX Placeholder    famotidine  20 mg Oral BID    levofloxacin  500 mg Oral Nightly    insulin lispro  0-12 Units Subcutaneous TID WC    insulin lispro  0-6 Units Subcutaneous Nightly    sodium chloride flush  10 mL Intravenous 2 times per day    atorvastatin  40 mg Oral Nightly    melatonin  3 mg Oral Nightly    metoprolol tartrate  50 mg Oral Nightly    ranolazine  500 mg Oral BID     Continuous Infusions:   dextrose 5 % and 0.9 % NaCl       PRN Meds:.glucose, dextrose, glucagon (rDNA), dextrose 5 % and 0.9 % NaCl, sodium chloride flush, acetaminophen, magnesium hydroxide, ondansetron, polyethylene glycol    10 point review of systems completed, all other than noted above are negative.        Vitals: identified. Aorta is normal in course and caliber. Retroverted uterus. No free fluid in the pelvis. Superficial soft tissues are unremarkable. No acute or aggressive appearing skeletal findings. Impression:  The gallbladder is distended and contains stones and sludge. Mild intrahepatic biliary ductal dilatation may be associated. Acute or chronic cholecystitis, or noncalcified choledocholithiasis may account for these findings. Consider ultrasound follow-up as warranted. Electronically Signed By: Abbie Che   on  02/10/2018  09:00        EKG:Ventricular-paced rhythm  Biventricular pacemaker detected       Assessment / Plan:    1. Acute cholecystitis, with choledocholithiasis- LTFs lipase, lactic acid,repeat continue IV fluid- hida scan gini, cant do mrcp sec to defibrillator  2. Essential hypertension- resume home meds with parameters to hold  3. Type 2 diabetes mellitus without complication ([facility] [address]- SSI, HgA1C  4. Pulmonary fibrosis ([address]- duonebs prn, pulse ox to keep 02 > 92 %  5.    COPD vs Pulmonary ibrosis - has extensive hx from [facility] and has had workup in [facility] previously-- duonebs prn, pulse ox to keep 02 > 92%        hida normal- no acute abdullahi- general surgery seen - no interventions planned - egd unremarkable 2/13  Will discharge if egd normal and tolerates diet, lft getting normal  Add reglan trial    OP gastric empyting will stop reglan priot to tht        DVT prophylaxis: [] Lovenox                                 [x] SCDs                                 [] SQ Heparin                                 [] Encourage ambulation, low risk for DVT, no chemical or mechanical prophylaxis necessary              [] Already on Anticoagulation                Anticipated Disposition upon discharge: [] Home                                                                         [x] Home with Home Health                                                                         []

## 2018-02-14 NOTE — FLOWSHEET NOTE
To b/r with walker urinated legs became weak and sat on chair  Assisted to bed with josue steady  Vitals taken skin warm and dry and no chest pain

## 2018-02-14 NOTE — PLAN OF CARE
Problem: Falls - Risk of  Goal: Absence of falls  Outcome: Ongoing  BED ALARM ON  CALL LIGHT WITHIN REACH    Problem: Risk for Impaired Skin Integrity  Goal: Tissue integrity - skin and mucous membranes  Structural intactness and normal physiological function of skin and  mucous membranes.    Outcome: Ongoing  SKIN INTACT MONITER    Problem: Pain Control  Goal: Maintain pain level at or below patient's acceptable level (or 5 if patient is unable to determine acceptable level)  DENIES PAIN    Problem: Discharge Planning:  Goal: Patients continuum of care needs are met  Patients continuum of care needs are met   Outcome: Ongoing  PLANS TO BE DISHARGEW TO HOME    Comments: ,CARE

## 2018-02-15 LAB
GLUCOSE BLD-MCNC: 103 MG/DL (ref 70–108)
GLUCOSE BLD-MCNC: 104 MG/DL (ref 70–108)
GLUCOSE BLD-MCNC: 123 MG/DL (ref 70–108)
GLUCOSE BLD-MCNC: 131 MG/DL (ref 70–108)
INR BLD: 1.2 (ref 0.85–1.13)

## 2018-02-15 PROCEDURE — 85610 PROTHROMBIN TIME: CPT

## 2018-02-15 PROCEDURE — 1200000000 HC SEMI PRIVATE

## 2018-02-15 PROCEDURE — 99233 SBSQ HOSP IP/OBS HIGH 50: CPT | Performed by: INTERNAL MEDICINE

## 2018-02-15 PROCEDURE — 6360000002 HC RX W HCPCS: Performed by: INTERNAL MEDICINE

## 2018-02-15 PROCEDURE — 82948 REAGENT STRIP/BLOOD GLUCOSE: CPT

## 2018-02-15 PROCEDURE — 6370000000 HC RX 637 (ALT 250 FOR IP): Performed by: FAMILY MEDICINE

## 2018-02-15 PROCEDURE — 6370000000 HC RX 637 (ALT 250 FOR IP): Performed by: INTERNAL MEDICINE

## 2018-02-15 PROCEDURE — 2580000003 HC RX 258: Performed by: INTERNAL MEDICINE

## 2018-02-15 PROCEDURE — 2580000003 HC RX 258: Performed by: FAMILY MEDICINE

## 2018-02-15 PROCEDURE — 36415 COLL VENOUS BLD VENIPUNCTURE: CPT

## 2018-02-15 RX ORDER — SODIUM CHLORIDE 9 MG/ML
INJECTION, SOLUTION INTRAVENOUS CONTINUOUS
Status: DISCONTINUED | OUTPATIENT
Start: 2018-02-15 | End: 2018-02-16

## 2018-02-15 RX ADMIN — METOCLOPRAMIDE 5 MG: 5 INJECTION, SOLUTION INTRAMUSCULAR; INTRAVENOUS at 17:07

## 2018-02-15 RX ADMIN — Medication 3 MG: at 20:58

## 2018-02-15 RX ADMIN — FAMOTIDINE 20 MG: 20 TABLET, FILM COATED ORAL at 20:57

## 2018-02-15 RX ADMIN — METOCLOPRAMIDE 5 MG: 5 INJECTION, SOLUTION INTRAMUSCULAR; INTRAVENOUS at 05:54

## 2018-02-15 RX ADMIN — METOCLOPRAMIDE 5 MG: 5 INJECTION, SOLUTION INTRAMUSCULAR; INTRAVENOUS at 10:55

## 2018-02-15 RX ADMIN — Medication 10 ML: at 09:22

## 2018-02-15 RX ADMIN — RANOLAZINE 500 MG: 500 TABLET, FILM COATED, EXTENDED RELEASE ORAL at 20:57

## 2018-02-15 RX ADMIN — LEVOFLOXACIN 500 MG: 500 TABLET, FILM COATED ORAL at 20:58

## 2018-02-15 RX ADMIN — RANOLAZINE 500 MG: 500 TABLET, FILM COATED, EXTENDED RELEASE ORAL at 09:21

## 2018-02-15 RX ADMIN — ATORVASTATIN CALCIUM 40 MG: 40 TABLET, FILM COATED ORAL at 20:57

## 2018-02-15 RX ADMIN — SODIUM CHLORIDE: 9 INJECTION, SOLUTION INTRAVENOUS at 19:11

## 2018-02-15 RX ADMIN — FAMOTIDINE 20 MG: 20 TABLET, FILM COATED ORAL at 09:22

## 2018-02-15 RX ADMIN — METOCLOPRAMIDE 5 MG: 5 INJECTION, SOLUTION INTRAMUSCULAR; INTRAVENOUS at 22:15

## 2018-02-15 RX ADMIN — Medication 10 ML: at 20:58

## 2018-02-15 RX ADMIN — SODIUM CHLORIDE: 9 INJECTION, SOLUTION INTRAVENOUS at 09:22

## 2018-02-15 ASSESSMENT — PAIN SCALES - GENERAL
PAINLEVEL_OUTOF10: 0

## 2018-02-15 NOTE — PROGRESS NOTES
progress    Patient:  Antoinette Goss  YOB: 1940  Date of Service: 2/15/2018  MRN: [id number]   Acct:  [de-identified]   Primary Care Physician: Janice Castillo MD    Chief Complaint: Nausea and Vomiting    History of Present Illness:   History obtained from chart review and the patient. The patient is a 65 y.o. female with Pulmonary Fibrosis, Diabetes, CAD, Pacemaker placement, CHF who presents from [facility] where she was evaluated for nausea and vomiting that has been going on for the past 2 months. Patient denies abdominal pain, fever, chills, diarrhea, chest pain, shortness of breath or dysuria. CT abdomen pelvis at hospital in [place] showed the gallbladder is distended and contains stones and sludge. Mild intrahepatic biliary ductal dilatation may be associated. Acute or chronic cholecystitis, or noncalcified choledocholithiasis may account for these findings. Patient is not able to undergo MRCP due to pacemaker. GI has been consulted for ERCP.        Subjective:-  Nausea better with reglan  And she feels she can eat again    Scheduled Meds:   metoclopramide  5 mg Intravenous 4x Daily AC & HS    calcium replacement protocol   Other RX Placeholder    potassium replacement protocol   Other RX Placeholder    magnesium replacement protocol   Other RX Placeholder    famotidine  20 mg Oral BID    levofloxacin  500 mg Oral Nightly    insulin lispro  0-12 Units Subcutaneous TID WC    insulin lispro  0-6 Units Subcutaneous Nightly    sodium chloride flush  10 mL Intravenous 2 times per day    atorvastatin  40 mg Oral Nightly    melatonin  3 mg Oral Nightly    metoprolol tartrate  50 mg Oral Nightly    ranolazine  500 mg Oral BID     Continuous Infusions:   sodium chloride 100 mL/hr at 02/15/18 0922    dextrose 5 % and 0.9 % NaCl       PRN Meds:.glucose, dextrose, glucagon (rDNA), dextrose 5 % and 0.9 % NaCl, sodium chloride flush, acetaminophen, magnesium hydroxide, ondansetron, polyethylene glycol    10 point review of systems completed, all other than noted above are negative. Vitals:   Vitals:    02/15/18 1533   BP: (!) 103/59   Pulse: 80   Resp: 19   Temp: 97.6 °F (36.4 °C)   SpO2: 93%      BMI: Body mass index is 27.74 kg/m².                 Exam:  Physical Examination: General appearance - alert, well appearing, and in no distress  Mental status - alert, oriented to person, place, and time  Neck - supple, no significant adenopathy, no JVD, or carotid bruits  Chest - + course breaths throughout with crackles, occas  wheezes, rales or rhonchi, symmetric air entry  Heart - normal rate, regular rhythm, normal S1, S2, no murmurs, rubs, clicks or gallops  Abdomen - soft, nontender, nondistended, no masses or organomegaly  Neurological - alert, oriented, normal speech, no focal findings or movement disorder noted  Musculoskeletal - no joint tenderness, deformity or swelling  Extremities - peripheral pulses normal, no pedal edema, no clubbing or cyanosis  Skin - normal coloration and turgor, no rashes, no suspicious skin lesions noted      Review of Labs and Diagnostic Testing:    Recent Results (from the past 24 hour(s))   POCT Glucose    Collection Time: 02/14/18  9:31 PM   Result Value Ref Range    POC Glucose 103 70 - 108 mg/dl   Protime-INR    Collection Time: 02/15/18  5:52 AM   Result Value Ref Range    INR 1.20 (H) 0.85 - 1.13   POCT Glucose    Collection Time: 02/15/18  7:01 AM   Result Value Ref Range    POC Glucose 131 (H) 70 - 108 mg/dl   POCT Glucose    Collection Time: 02/15/18 11:37 AM   Result Value Ref Range    POC Glucose 103 70 - 108 mg/dl   POCT Glucose    Collection Time: 02/15/18  4:22 PM   Result Value Ref Range    POC Glucose 104 70 - 108 mg/dl       Radiology:     Ct Chest Wo Contrast    Result Date: 2/10/2018  FINAL REPORT EXAM:  CT CHEST WO CONTRAST HISTORY:  ACUTE RESP ILLNESS, &gt; 35YEARS OLD vomiting TECHNIQUE:  CT imaging obtained through the chest

## 2018-02-15 NOTE — CARE COORDINATION
2/15/18, 1:39 PM      [facility] day: 5  Location: [facility]/005- Reason for admit: Acute cholecystitis [K81.0]   Procedure: 2/14/18 EGD with biopsies  Treatment Plan of Care: GI has signed off, DM management, electrolyte replacement protocol, IV fluids, IV Reglan scheduled, daily INR, discharge planning. Patient did have a bowel movement. Core Measures: VTE  PCP: Glady Schwab, MD  Discharge Plan: Home with spouse.

## 2018-02-16 PROBLEM — J44.89 BRONCHITIS WITH CHRONIC AIRWAY OBSTRUCTION: Status: ACTIVE | Noted: 2018-02-16

## 2018-02-16 PROBLEM — J44.9 BRONCHITIS WITH CHRONIC AIRWAY OBSTRUCTION (HCC): Status: ACTIVE | Noted: 2018-02-16

## 2018-02-16 PROBLEM — B96.20 E. COLI UTI: Status: ACTIVE | Noted: 2017-06-30

## 2018-02-16 LAB
GLUCOSE BLD-MCNC: 112 MG/DL (ref 70–108)
GLUCOSE BLD-MCNC: 125 MG/DL (ref 70–108)
GLUCOSE BLD-MCNC: 135 MG/DL (ref 70–108)
GLUCOSE BLD-MCNC: 91 MG/DL (ref 70–108)
INR BLD: 1.16 (ref 0.85–1.13)
ORGANISM: ABNORMAL
URINE CULTURE REFLEX: ABNORMAL

## 2018-02-16 PROCEDURE — 6360000002 HC RX W HCPCS: Performed by: INTERNAL MEDICINE

## 2018-02-16 PROCEDURE — G8987 SELF CARE CURRENT STATUS: HCPCS

## 2018-02-16 PROCEDURE — G8988 SELF CARE GOAL STATUS: HCPCS

## 2018-02-16 PROCEDURE — 2580000003 HC RX 258: Performed by: FAMILY MEDICINE

## 2018-02-16 PROCEDURE — 85610 PROTHROMBIN TIME: CPT

## 2018-02-16 PROCEDURE — 82948 REAGENT STRIP/BLOOD GLUCOSE: CPT

## 2018-02-16 PROCEDURE — 6370000000 HC RX 637 (ALT 250 FOR IP): Performed by: FAMILY MEDICINE

## 2018-02-16 PROCEDURE — 2580000003 HC RX 258: Performed by: INTERNAL MEDICINE

## 2018-02-16 PROCEDURE — 36415 COLL VENOUS BLD VENIPUNCTURE: CPT

## 2018-02-16 PROCEDURE — 97166 OT EVAL MOD COMPLEX 45 MIN: CPT

## 2018-02-16 PROCEDURE — 97535 SELF CARE MNGMENT TRAINING: CPT

## 2018-02-16 PROCEDURE — 6370000000 HC RX 637 (ALT 250 FOR IP): Performed by: INTERNAL MEDICINE

## 2018-02-16 PROCEDURE — 99232 SBSQ HOSP IP/OBS MODERATE 35: CPT | Performed by: INTERNAL MEDICINE

## 2018-02-16 PROCEDURE — 1200000000 HC SEMI PRIVATE

## 2018-02-16 RX ADMIN — LEVOFLOXACIN 500 MG: 500 TABLET, FILM COATED ORAL at 21:10

## 2018-02-16 RX ADMIN — Medication 10 ML: at 21:11

## 2018-02-16 RX ADMIN — SODIUM CHLORIDE: 9 INJECTION, SOLUTION INTRAVENOUS at 04:51

## 2018-02-16 RX ADMIN — RANOLAZINE 500 MG: 500 TABLET, FILM COATED, EXTENDED RELEASE ORAL at 21:10

## 2018-02-16 RX ADMIN — ATORVASTATIN CALCIUM 40 MG: 40 TABLET, FILM COATED ORAL at 21:10

## 2018-02-16 RX ADMIN — RANOLAZINE 500 MG: 500 TABLET, FILM COATED, EXTENDED RELEASE ORAL at 09:03

## 2018-02-16 RX ADMIN — FAMOTIDINE 20 MG: 20 TABLET, FILM COATED ORAL at 21:10

## 2018-02-16 RX ADMIN — METOCLOPRAMIDE 5 MG: 5 INJECTION, SOLUTION INTRAMUSCULAR; INTRAVENOUS at 05:52

## 2018-02-16 RX ADMIN — FAMOTIDINE 20 MG: 20 TABLET, FILM COATED ORAL at 09:03

## 2018-02-16 RX ADMIN — METOCLOPRAMIDE 5 MG: 5 INJECTION, SOLUTION INTRAMUSCULAR; INTRAVENOUS at 19:38

## 2018-02-16 RX ADMIN — METOPROLOL TARTRATE 50 MG: 50 TABLET, FILM COATED ORAL at 21:10

## 2018-02-16 RX ADMIN — METOCLOPRAMIDE 5 MG: 5 INJECTION, SOLUTION INTRAMUSCULAR; INTRAVENOUS at 21:10

## 2018-02-16 RX ADMIN — Medication 3 MG: at 21:10

## 2018-02-16 RX ADMIN — METOCLOPRAMIDE 5 MG: 5 INJECTION, SOLUTION INTRAMUSCULAR; INTRAVENOUS at 11:38

## 2018-02-16 ASSESSMENT — ENCOUNTER SYMPTOMS
SINUS PAIN: 0
WHEEZING: 0
SORE THROAT: 0
VOMITING: 0
SINUS PRESSURE: 0
TROUBLE SWALLOWING: 0
RECTAL PAIN: 0
ANAL BLEEDING: 0
APNEA: 0
CHOKING: 0
COUGH: 1
ABDOMINAL DISTENTION: 0
VOICE CHANGE: 0
EYE ITCHING: 0
NAUSEA: 0
COLOR CHANGE: 0
SHORTNESS OF BREATH: 0
CONSTIPATION: 0
STRIDOR: 0
CHEST TIGHTNESS: 0
EYE REDNESS: 0

## 2018-02-16 ASSESSMENT — PAIN SCALES - GENERAL: PAINLEVEL_OUTOF10: 0

## 2018-02-16 NOTE — PROGRESS NOTES
Patient voiced great concern about possible discharge home today, she explained she is worried that she is still too weak. Reviewed with patient her medicare rights, she stated understanding. Will discuss possible TCU for patient with day shift nurse.

## 2018-02-16 NOTE — CARE COORDINATION
[place] feels she is too weak to return home at this time. She states she has been at [address] [place] [address] in [facility] before for physical therapy. She would like to return there for therapy at discharge. SS order placed. Patient is  Pre-cert with Aimee Best. Update to SS.  Electronically signed by Kristina Liu RN on 2/16/18 at 11:51 AM

## 2018-02-16 NOTE — PROGRESS NOTES
self-injury and suicidal ideas. The patient is not hyperactive. Physical Exam   Constitutional: She is oriented to person, place, and time. She appears well-developed and well-nourished. No distress. HENT:   Head: Normocephalic and atraumatic. Right Ear: External ear normal.   Left Ear: External ear normal.   Nose: Nose normal.   Mouth/Throat: Oropharynx is clear and moist. No oropharyngeal exudate. Eyes: Conjunctivae and EOM are normal. Pupils are equal, round, and reactive to light. Right eye exhibits no discharge. Left eye exhibits no discharge. No scleral icterus. Neck: Normal range of motion. Neck supple. No JVD present. No tracheal deviation present. No thyromegaly present. Cardiovascular: Normal rate, regular rhythm, normal heart sounds and intact distal pulses. Exam reveals no gallop and no friction rub. No murmur heard. Pulmonary/Chest: Effort normal. No stridor. No respiratory distress. She has no wheezes. She has rales. She exhibits no tenderness. Abdominal: Soft. Bowel sounds are normal. She exhibits no distension and no mass. There is no rebound and no guarding. Musculoskeletal: She exhibits no edema, tenderness or deformity. Lymphadenopathy:     She has no cervical adenopathy. Neurological: She is alert and oriented to person, place, and time. No cranial nerve deficit. She exhibits normal muscle tone. Coordination normal.   Skin: Skin is warm and dry. No rash noted. She is not diaphoretic. No erythema. No pallor. Psychiatric: She has a normal mood and affect. Her behavior is normal. Judgment and thought content normal.   Nursing note and vitals reviewed.     Medications:    metoclopramide  5 mg Intravenous 4x Daily AC & HS    calcium replacement protocol   Other RX Placeholder    potassium replacement protocol   Other RX Placeholder    magnesium replacement protocol   Other RX Placeholder    famotidine  20 mg Oral BID    levofloxacin  500 mg Oral Nightly    insulin [facility] [id number] 08/30/17 1255-Present   Narrative     Source: urine, clean catch       Site:           Current Antibiotics: not stated   Culture & Susceptibility     ESCHERICHIA COLI     Antibiotic Interpretation TORY Unit Status   ampicillin-sulbactam Intermediate =16 mcg/mL Final   cefOXitin Sensitive <=4 mcg/mL Final   cefTRIAXone Sensitive <=1 mcg/mL Final   cefuroxime Intermediate   Final   gentamicin Sensitive <=1 mcg/mL Final   levofloxacin Sensitive <=0.12 mcg/mL Final   nitrofurantoin Sensitive <=16 mcg/mL Final   tetracycline Resistant >=16 mcg/mL Final   trimethoprim-sulfamethoxazole Resistant >=320 mcg/mL Final          ENDOSCOPE STUDIES. None. PROCEDURES. None. RADIOLOGY. ECHO-11.8.2017. Summary   Ejection fraction is visually estimated at 55%.   Overall left ventricular function is normal.   Moderately dilated left atrium.  STACIE occlusion device seen   moderate edge noted on the medical side   no leak around the device   minimal residual ASD   no detachment from any side noted     HIDA SCAN-2.12.2018. Impression   1. Patent cystic and common bile ducts without evidence of acute cholecystitis. 2. Normal gallbladder ejection fraction. CT A/P-2.10.2018. Impression   Impression:     The gallbladder is distended and contains stones and sludge. Mild intrahepatic biliary ductal dilatation may be associated. Acute or chronic cholecystitis, or noncalcified choledocholithiasis may account for these findings. Consider ultrasound follow-up    as warranted.            CT CHEST W/O CONTRAST-2.10.2018. Impression   Impression:     Suggested pulmonary fibrosis. No focal airspace disease.        Sequela of heart failure.        Please see CT abdomen and pelvis of the same date. ASSESSMENT AND  PLAN. 1.PULMONARY. ACUTE BRONCHITIS W/ COPD-PRN NEBS,PO LEVAQUIN. CHRONIC IPF-PRN NEBS. 2.CARDIOVASCULAR. CAD-STABLE. [place]. PAF W/ CVR.     3.GI.

## 2018-02-16 NOTE — PLAN OF CARE
Problem: Nutrition  Goal: Optimal nutrition therapy  Outcome: Ongoing  Nutrition Problem: Inadequate oral intake  Intervention: Food and/or Nutrient Delivery: Continue current diet, Start ONS  Nutritional Goals: Patient will consume 75% or greater of meals during LOS.

## 2018-02-16 NOTE — PLAN OF CARE
Problem: DISCHARGE BARRIERS  Goal: Patient's continuum of care needs are met  Outcome: Ongoing  Plan for [facility] if pre-cert approved. See SW note from 2/16/18 for full assessment.

## 2018-02-17 LAB
ALBUMIN SERPL-MCNC: 3 G/DL (ref 3.5–5.1)
ALP BLD-CCNC: 92 U/L (ref 38–126)
ALT SERPL-CCNC: 38 U/L (ref 11–66)
ANION GAP SERPL CALCULATED.3IONS-SCNC: 11 MEQ/L (ref 8–16)
AST SERPL-CCNC: 25 U/L (ref 5–40)
BILIRUB SERPL-MCNC: 0.7 MG/DL (ref 0.3–1.2)
BUN BLDV-MCNC: 5 MG/DL (ref 7–22)
CALCIUM SERPL-MCNC: 8 MG/DL (ref 8.5–10.5)
CHLORIDE BLD-SCNC: 105 MEQ/L (ref 98–111)
CO2: 25 MEQ/L (ref 23–33)
CREAT SERPL-MCNC: 0.6 MG/DL (ref 0.4–1.2)
GFR SERPL CREATININE-BSD FRML MDRD: > 90 ML/MIN/1.73M2
GLUCOSE BLD-MCNC: 103 MG/DL (ref 70–108)
GLUCOSE BLD-MCNC: 106 MG/DL (ref 70–108)
GLUCOSE BLD-MCNC: 109 MG/DL (ref 70–108)
GLUCOSE BLD-MCNC: 110 MG/DL (ref 70–108)
GLUCOSE BLD-MCNC: 98 MG/DL (ref 70–108)
HCT VFR BLD CALC: 34.1 % (ref 37–47)
HEMOGLOBIN: 11.6 GM/DL (ref 12–16)
INR BLD: 1.15 (ref 0.85–1.13)
MCH RBC QN AUTO: 33.4 PG (ref 27–31)
MCHC RBC AUTO-ENTMCNC: 34 GM/DL (ref 33–37)
MCV RBC AUTO: 98.1 FL (ref 81–99)
PDW BLD-RTO: 14.8 % (ref 11.5–14.5)
PLATELET # BLD: 195 THOU/MM3 (ref 130–400)
PMV BLD AUTO: 8.1 FL (ref 7.4–10.4)
POTASSIUM SERPL-SCNC: 3.7 MEQ/L (ref 3.5–5.2)
RBC # BLD: 3.47 MILL/MM3 (ref 4.2–5.4)
SODIUM BLD-SCNC: 141 MEQ/L (ref 135–145)
TOTAL PROTEIN: 5 G/DL (ref 6.1–8)
WBC # BLD: 7.6 THOU/MM3 (ref 4.8–10.8)

## 2018-02-17 PROCEDURE — 6370000000 HC RX 637 (ALT 250 FOR IP): Performed by: FAMILY MEDICINE

## 2018-02-17 PROCEDURE — 36415 COLL VENOUS BLD VENIPUNCTURE: CPT

## 2018-02-17 PROCEDURE — 6370000000 HC RX 637 (ALT 250 FOR IP): Performed by: INTERNAL MEDICINE

## 2018-02-17 PROCEDURE — 6360000002 HC RX W HCPCS: Performed by: INTERNAL MEDICINE

## 2018-02-17 PROCEDURE — 85610 PROTHROMBIN TIME: CPT

## 2018-02-17 PROCEDURE — 2580000003 HC RX 258: Performed by: FAMILY MEDICINE

## 2018-02-17 PROCEDURE — 82948 REAGENT STRIP/BLOOD GLUCOSE: CPT

## 2018-02-17 PROCEDURE — 1200000000 HC SEMI PRIVATE

## 2018-02-17 PROCEDURE — 99232 SBSQ HOSP IP/OBS MODERATE 35: CPT | Performed by: INTERNAL MEDICINE

## 2018-02-17 PROCEDURE — 85027 COMPLETE CBC AUTOMATED: CPT

## 2018-02-17 PROCEDURE — 80053 COMPREHEN METABOLIC PANEL: CPT

## 2018-02-17 RX ADMIN — FAMOTIDINE 20 MG: 20 TABLET, FILM COATED ORAL at 09:22

## 2018-02-17 RX ADMIN — Medication 10 ML: at 17:29

## 2018-02-17 RX ADMIN — METOCLOPRAMIDE 5 MG: 5 INJECTION, SOLUTION INTRAMUSCULAR; INTRAVENOUS at 17:27

## 2018-02-17 RX ADMIN — METOPROLOL TARTRATE 50 MG: 50 TABLET, FILM COATED ORAL at 19:52

## 2018-02-17 RX ADMIN — Medication 10 ML: at 11:16

## 2018-02-17 RX ADMIN — ATORVASTATIN CALCIUM 40 MG: 40 TABLET, FILM COATED ORAL at 19:52

## 2018-02-17 RX ADMIN — METOCLOPRAMIDE 5 MG: 5 INJECTION, SOLUTION INTRAMUSCULAR; INTRAVENOUS at 11:15

## 2018-02-17 RX ADMIN — FAMOTIDINE 20 MG: 20 TABLET, FILM COATED ORAL at 19:52

## 2018-02-17 RX ADMIN — METOCLOPRAMIDE 5 MG: 5 INJECTION, SOLUTION INTRAMUSCULAR; INTRAVENOUS at 05:47

## 2018-02-17 RX ADMIN — RANOLAZINE 500 MG: 500 TABLET, FILM COATED, EXTENDED RELEASE ORAL at 09:22

## 2018-02-17 RX ADMIN — METOCLOPRAMIDE 5 MG: 5 INJECTION, SOLUTION INTRAMUSCULAR; INTRAVENOUS at 19:52

## 2018-02-17 RX ADMIN — LEVOFLOXACIN 500 MG: 500 TABLET, FILM COATED ORAL at 19:52

## 2018-02-17 RX ADMIN — RANOLAZINE 500 MG: 500 TABLET, FILM COATED, EXTENDED RELEASE ORAL at 19:52

## 2018-02-17 RX ADMIN — Medication 3 MG: at 22:08

## 2018-02-17 RX ADMIN — Medication 10 ML: at 17:28

## 2018-02-17 RX ADMIN — Medication 10 ML: at 19:53

## 2018-02-17 ASSESSMENT — ENCOUNTER SYMPTOMS
ANAL BLEEDING: 0
WHEEZING: 0
EYE ITCHING: 0
TROUBLE SWALLOWING: 0
CHOKING: 0
SORE THROAT: 0
CHEST TIGHTNESS: 0
STRIDOR: 0
SINUS PRESSURE: 0
SHORTNESS OF BREATH: 0
VOMITING: 0
NAUSEA: 0
CONSTIPATION: 0
APNEA: 0
RECTAL PAIN: 0
SINUS PAIN: 0
VOICE CHANGE: 0
COUGH: 1
COLOR CHANGE: 0
ABDOMINAL DISTENTION: 0
EYE REDNESS: 0

## 2018-02-17 ASSESSMENT — PAIN SCALES - GENERAL: PAINLEVEL_OUTOF10: 0

## 2018-02-17 NOTE — PROGRESS NOTES
confusion, dysphoric mood, self-injury and suicidal ideas. The patient is not hyperactive. Physical Exam   Constitutional: She is oriented to person, place, and time. She appears well-developed and well-nourished. No distress. HENT:   Head: Normocephalic and atraumatic. Right Ear: External ear normal.   Left Ear: External ear normal.   Nose: Nose normal.   Mouth/Throat: Oropharynx is clear and moist. No oropharyngeal exudate. Eyes: Conjunctivae and EOM are normal. Pupils are equal, round, and reactive to light. Right eye exhibits no discharge. Left eye exhibits no discharge. No scleral icterus. Neck: Normal range of motion. Neck supple. No JVD present. No tracheal deviation present. No thyromegaly present. Cardiovascular: Normal rate, regular rhythm, normal heart sounds and intact distal pulses. Exam reveals no gallop and no friction rub. No murmur heard. Pulmonary/Chest: Effort normal. No stridor. No respiratory distress. She has no wheezes. She has rales. She exhibits no tenderness. Abdominal: Soft. Bowel sounds are normal. She exhibits no distension and no mass. There is no rebound and no guarding. Musculoskeletal: She exhibits no edema, tenderness or deformity. Lymphadenopathy:     She has no cervical adenopathy. Neurological: She is alert and oriented to person, place, and time. No cranial nerve deficit. She exhibits normal muscle tone. Coordination normal.   Skin: Skin is warm and dry. No rash noted. She is not diaphoretic. No erythema. No pallor. Psychiatric: She has a normal mood and affect. Her behavior is normal. Judgment and thought content normal.   Nursing note and vitals reviewed.     Medications:    metoclopramide  5 mg Intravenous 4x Daily AC & HS    calcium replacement protocol   Other RX Placeholder    potassium replacement protocol   Other RX Placeholder    magnesium replacement protocol   Other RX Placeholder    famotidine  20 mg Oral BID    levofloxacin  500 mg

## 2018-02-17 NOTE — PLAN OF CARE
Problem: Falls - Risk of  Goal: Absence of falls  Outcome: Ongoing  Patient absent of falls this shift, falling star program in place. Uses call light appropriately. Problem: Risk for Impaired Skin Integrity  Goal: Tissue integrity - skin and mucous membranes  Structural intactness and normal physiological function of skin and  mucous membranes. Outcome: Ongoing  No evidence of skin breakdown noted. Problem: Pain Control  Goal: Maintain pain level at or below patient's acceptable level (or 5 if patient is unable to determine acceptable level)  Denies pain. Problem: Cardiovascular  Goal: No DVT, peripheral vascular complications  Outcome: Ongoing  DVT prophylactic intervention in place and maintained. No evidence of DVT noted with assessments. Problem: Discharge Planning:  Goal: Patients continuum of care needs are met  Patients continuum of care needs are met   Outcome: Ongoing  Plans to go to [facility] for rehab at time of discharge. Comments: Care plan reviewed with patient. Patient verbalize understanding of the plan of care and contribute to goal setting. She is relieved that she will be getting therapy.

## 2018-02-18 LAB
GLUCOSE BLD-MCNC: 107 MG/DL (ref 70–108)
GLUCOSE BLD-MCNC: 114 MG/DL (ref 70–108)
GLUCOSE BLD-MCNC: 131 MG/DL (ref 70–108)
GLUCOSE BLD-MCNC: 97 MG/DL (ref 70–108)
INR BLD: 1.14 (ref 0.85–1.13)

## 2018-02-18 PROCEDURE — 1200000000 HC SEMI PRIVATE

## 2018-02-18 PROCEDURE — 6360000002 HC RX W HCPCS: Performed by: INTERNAL MEDICINE

## 2018-02-18 PROCEDURE — 85610 PROTHROMBIN TIME: CPT

## 2018-02-18 PROCEDURE — 36415 COLL VENOUS BLD VENIPUNCTURE: CPT

## 2018-02-18 PROCEDURE — G8979 MOBILITY GOAL STATUS: HCPCS

## 2018-02-18 PROCEDURE — G8978 MOBILITY CURRENT STATUS: HCPCS

## 2018-02-18 PROCEDURE — 99232 SBSQ HOSP IP/OBS MODERATE 35: CPT | Performed by: INTERNAL MEDICINE

## 2018-02-18 PROCEDURE — 2580000003 HC RX 258: Performed by: FAMILY MEDICINE

## 2018-02-18 PROCEDURE — 6370000000 HC RX 637 (ALT 250 FOR IP): Performed by: FAMILY MEDICINE

## 2018-02-18 PROCEDURE — 82948 REAGENT STRIP/BLOOD GLUCOSE: CPT

## 2018-02-18 PROCEDURE — 97110 THERAPEUTIC EXERCISES: CPT

## 2018-02-18 PROCEDURE — 6370000000 HC RX 637 (ALT 250 FOR IP): Performed by: INTERNAL MEDICINE

## 2018-02-18 PROCEDURE — 97162 PT EVAL MOD COMPLEX 30 MIN: CPT

## 2018-02-18 RX ORDER — METOCLOPRAMIDE 10 MG/1
5 TABLET ORAL
Status: DISCONTINUED | OUTPATIENT
Start: 2018-02-18 | End: 2018-02-20 | Stop reason: HOSPADM

## 2018-02-18 RX ADMIN — RANOLAZINE 500 MG: 500 TABLET, FILM COATED, EXTENDED RELEASE ORAL at 20:20

## 2018-02-18 RX ADMIN — Medication 3 MG: at 20:17

## 2018-02-18 RX ADMIN — FAMOTIDINE 20 MG: 20 TABLET, FILM COATED ORAL at 08:10

## 2018-02-18 RX ADMIN — LEVOFLOXACIN 500 MG: 500 TABLET, FILM COATED ORAL at 20:17

## 2018-02-18 RX ADMIN — RANOLAZINE 500 MG: 500 TABLET, FILM COATED, EXTENDED RELEASE ORAL at 08:10

## 2018-02-18 RX ADMIN — METOCLOPRAMIDE 5 MG: 5 INJECTION, SOLUTION INTRAMUSCULAR; INTRAVENOUS at 07:00

## 2018-02-18 RX ADMIN — METOCLOPRAMIDE 5 MG: 10 TABLET ORAL at 20:17

## 2018-02-18 RX ADMIN — ATORVASTATIN CALCIUM 40 MG: 40 TABLET, FILM COATED ORAL at 20:17

## 2018-02-18 RX ADMIN — METOCLOPRAMIDE 5 MG: 5 INJECTION, SOLUTION INTRAMUSCULAR; INTRAVENOUS at 16:46

## 2018-02-18 RX ADMIN — METOPROLOL TARTRATE 50 MG: 50 TABLET, FILM COATED ORAL at 20:17

## 2018-02-18 RX ADMIN — METOCLOPRAMIDE 5 MG: 5 INJECTION, SOLUTION INTRAMUSCULAR; INTRAVENOUS at 11:27

## 2018-02-18 RX ADMIN — Medication 10 ML: at 08:10

## 2018-02-18 RX ADMIN — FAMOTIDINE 20 MG: 20 TABLET, FILM COATED ORAL at 20:17

## 2018-02-18 ASSESSMENT — ENCOUNTER SYMPTOMS
SINUS PRESSURE: 0
ABDOMINAL DISTENTION: 0
APNEA: 0
EYE ITCHING: 0
SHORTNESS OF BREATH: 0
SINUS PAIN: 0
ANAL BLEEDING: 0
WHEEZING: 0
NAUSEA: 1
EYE REDNESS: 0
CHOKING: 0
COUGH: 1
STRIDOR: 0
VOMITING: 0
CONSTIPATION: 0
VOICE CHANGE: 0
SORE THROAT: 0
TROUBLE SWALLOWING: 0
CHEST TIGHTNESS: 0
COLOR CHANGE: 0
RECTAL PAIN: 0

## 2018-02-18 ASSESSMENT — PAIN SCALES - GENERAL
PAINLEVEL_OUTOF10: 0

## 2018-02-18 NOTE — PROGRESS NOTES
Functional Limits    Hearing: Within functional limits         Pain: No    .          Social/Functional History:    Lives With: Family  Type of Home: House  Home Layout: Two level, Performs ADL's on one level, Able to Live on Main level with bedroom/bathroom  Home Access: Stairs to enter without rails  Entrance Stairs - Number of Steps: 2  Home Equipment: 4 wheeled walker, Lift chair     Bathroom Shower/Tub: Tub/Shower unit  Bathroom Toilet: Handicap height  Bathroom Equipment: Grab bars in shower, Shower chair  Bathroom Accessibility: Accessible       ADL Assistance: Independent     Homemaking Responsibilities: No  Ambulation Assistance: Independent  Transfer Assistance: Independent    Active : No  Occupation: Retired  Additional Comments: Pt reports using 4 wheeled walker all the time for >1 year. Reports family works including  & are in & out of the house. Objective:       RLE AROM: WFL         LLE AROM : WFL                   Strength RLE: WFL    Strength LLE: WFL       Sensation  Overall Sensation Status: WNL    Balance  Sitting - Static: Good  Sitting - Dynamic: Good  Standing - Static: Fair, +  Standing - Dynamic: Fair    Supine to Sit: Contact guard assistance (antiicpated )    Transfers  Sit to Stand: Minimal Assistance (from bedside chair, cues on hand placement )  Stand to sit: Contact guard assistance (to chair, cues on handplacement )  Bed to Chair: Unable to assess  Stand Pivot Transfers: Unable to assess       Ambulation 1  Surface: level tile  Device: Rolling Walker  Assistance: Minimal assistance  Quality of Gait: slow pace, verylittle foot clearance , guarded due tofear of falling  Distance: 3 feet forward and 3 feet backwards due to fear of BP issues causing her to go down  Comments: Pt with c/os SOB with standing as well and activity when up on feet. O2 sats have been good.  No light headedness       Exercises:  Comments: 10  reps seated long arc quads, marches, and reclined quad sets and ankle pumps to improve strength for function             Activity Tolerance:  Activity Tolerance: Patient limited by fatigue;Patient limited by endurance    Treatment Initiated: see there ex . Sit to stand a second trial CGA from chair, cues to scoot out to edge of chair and on hand placement     Assessment: Body structures, Functions, Activity limitations: Decreased functional mobility , Decreased endurance, Decreased ADL status, Decreased balance, Decreased strength, Decreased safe awareness  Assessment: Pt wit generalized weakness, decreased endurance and recent falls thus requiring skilled PT to imporve strength and safety with gait . Prognosis: Good    Clinical Presentation: Moderate - Evolving with Changing Characteristics: Pt wit generalized weakness, decreased endurance and recent falls thus requiring skilled PT to imporve strength and safety with gait . Decision Making:  Moderate Complexitybased on patient assessment and decision making process of determining plan of care and establishing reasonable expectations for measurable functional outcomes    REQUIRES PT FOLLOW UP: Yes  Discharge Recommendations: Continue to assess pending progress, Patient would benefit from continued therapy after discharge    Patient Education:  Patient Education: PT POC    Equipment Recommendations:       Safety:  Type of devices: Call light within reach, Patient at risk for falls, Left in chair, Nurse notified, Gait belt    Plan:  Times per week: 3-5 X GM  Times per day: Daily  Current Treatment Recommendations: Strengthening, Home Exercise Program, Safety Education & Training, Balance Training, Endurance Training, Patient/Caregiver Education & Training, Functional Mobility Training, Transfer Training, Gait Training, Stair training  Plan Comment:      Goals:  Patient goals : Walk without falls  Short term goals  Time Frame for Short term goals: 1week  Short term goal 1: Progress HEP for strengthening and endurance

## 2018-02-18 NOTE — PROGRESS NOTES
Hospitalist Progress Note  [facility] 5K       Patient: [place]  Unit/Bed: 5K-05/005-A  YOB: 1940  MRN: [id number]  Acct: [de-identified]   Admitting Diagnosis: Acute cholecystitis [K81.0]  Admit Date:  2/10/2018  Hospital Day: 8    Subjective:    Patient has no new complaints today. Pending rehab placement on Monday. Patient Seen, Chart, Labs, Radiology studies, and Consults reviewed. Objective:   /67   Pulse 80   Temp 97.8 °F (36.6 °C) (Oral)   Resp 16   Ht 5' 5.5\" (1.664 m)   Wt 173 lb 12.8 oz (78.8 kg)   SpO2 99%   BMI 28.48 kg/m²       Intake/Output Summary (Last 24 hours) at 02/18/18 0949  Last data filed at 02/18/18 0711   Gross per 24 hour   Intake              410 ml   Output             1900 ml   Net            -1490 ml     Review of Systems   Constitutional: Positive for fatigue. Negative for activity change, chills, fever and unexpected weight change. HENT: Negative for congestion, ear discharge, hearing loss, postnasal drip, sinus pain, sinus pressure, sore throat, tinnitus, trouble swallowing and voice change. Eyes: Negative for redness and itching. Respiratory: Positive for cough. Negative for apnea, choking, chest tightness, shortness of breath, wheezing and stridor. Cardiovascular: Negative for chest pain, palpitations and leg swelling. Gastrointestinal: Positive for nausea. Negative for abdominal distention, anal bleeding, constipation, rectal pain and vomiting. Genitourinary: Negative for decreased urine volume, difficulty urinating, dysuria, frequency, hematuria, pelvic pain, vaginal bleeding and vaginal pain. Musculoskeletal: Negative for arthralgias, gait problem, myalgias, neck pain and neck stiffness. Skin: Negative for color change, rash and wound. Neurological: Positive for weakness. Negative for dizziness, tremors, syncope, numbness and headaches.    Psychiatric/Behavioral: Negative for agitation, confusion, dysphoric mood, self-injury and suicidal ideas. The patient is not hyperactive. Physical Exam   Constitutional: She is oriented to person, place, and time. She appears well-developed and well-nourished. No distress. HENT:   Head: Normocephalic and atraumatic. Right Ear: External ear normal.   Left Ear: External ear normal.   Nose: Nose normal.   Mouth/Throat: Oropharynx is clear and moist. No oropharyngeal exudate. Eyes: Conjunctivae and EOM are normal. Pupils are equal, round, and reactive to light. Right eye exhibits no discharge. Left eye exhibits no discharge. No scleral icterus. Neck: Normal range of motion. Neck supple. No JVD present. No tracheal deviation present. No thyromegaly present. Cardiovascular: Normal rate, regular rhythm, normal heart sounds and intact distal pulses. Exam reveals no gallop and no friction rub. No murmur heard. Pulmonary/Chest: Effort normal. No stridor. No respiratory distress. She has no wheezes. She has rales. She exhibits no tenderness. Abdominal: Soft. Bowel sounds are normal. She exhibits no distension and no mass. There is no rebound and no guarding. Musculoskeletal: She exhibits no edema, tenderness or deformity. Lymphadenopathy:     She has no cervical adenopathy. Neurological: She is alert and oriented to person, place, and time. No cranial nerve deficit. She exhibits normal muscle tone. Coordination normal.   Skin: Skin is warm and dry. No rash noted. She is not diaphoretic. No erythema. No pallor. Psychiatric: She has a normal mood and affect. Her behavior is normal. Judgment and thought content normal.   Nursing note and vitals reviewed.     Medications:    metoclopramide  5 mg Intravenous 4x Daily AC & HS    calcium replacement protocol   Other RX Placeholder    potassium replacement protocol   Other RX Placeholder    magnesium replacement protocol   Other RX Placeholder    famotidine  20 mg Oral BID    levofloxacin  500 mg Oral Nightly    insulin DAYS. Max Movalerie CHRONIC IPF-PRN NEBS. 2.CARDIOVASCULAR. CAD-STABLE. [place]. PAF W/ CVR. 3.GI.     ASYMPTOMATIC CHOLELITHIASIS. GASTRIC EMPTYING STUDY ON Monday. 4.ID.     E.COLI UTI-RESOLVED. 5.ENDO. T2 DM-CONTROLLED. 6.MUSCULOSKELETAL. GENERAL BODY WEAKNESS W/ REDUCED ADLS AND AMBULATORY     FUNCTION-PT/OT . 7.DISPO. PENDING REHAB PLACEMENT TOMORROW.       Electronically signed by Jennie Villaseñor MD on 2/18/2018 at 9:49 AM    Rounding Hospitalist

## 2018-02-19 LAB
GLUCOSE BLD-MCNC: 115 MG/DL (ref 70–108)
GLUCOSE BLD-MCNC: 116 MG/DL (ref 70–108)
GLUCOSE BLD-MCNC: 117 MG/DL (ref 70–108)
GLUCOSE BLD-MCNC: 125 MG/DL (ref 70–108)
INR BLD: 1.11 (ref 0.85–1.13)

## 2018-02-19 PROCEDURE — 97535 SELF CARE MNGMENT TRAINING: CPT

## 2018-02-19 PROCEDURE — 6370000000 HC RX 637 (ALT 250 FOR IP): Performed by: INTERNAL MEDICINE

## 2018-02-19 PROCEDURE — 85610 PROTHROMBIN TIME: CPT

## 2018-02-19 PROCEDURE — 2580000003 HC RX 258: Performed by: FAMILY MEDICINE

## 2018-02-19 PROCEDURE — 97110 THERAPEUTIC EXERCISES: CPT

## 2018-02-19 PROCEDURE — 1200000000 HC SEMI PRIVATE

## 2018-02-19 PROCEDURE — 6370000000 HC RX 637 (ALT 250 FOR IP): Performed by: FAMILY MEDICINE

## 2018-02-19 PROCEDURE — 36415 COLL VENOUS BLD VENIPUNCTURE: CPT

## 2018-02-19 PROCEDURE — 97116 GAIT TRAINING THERAPY: CPT

## 2018-02-19 PROCEDURE — 82948 REAGENT STRIP/BLOOD GLUCOSE: CPT

## 2018-02-19 PROCEDURE — 99231 SBSQ HOSP IP/OBS SF/LOW 25: CPT | Performed by: INTERNAL MEDICINE

## 2018-02-19 RX ADMIN — RANOLAZINE 500 MG: 500 TABLET, FILM COATED, EXTENDED RELEASE ORAL at 08:39

## 2018-02-19 RX ADMIN — METOPROLOL TARTRATE 50 MG: 50 TABLET, FILM COATED ORAL at 21:56

## 2018-02-19 RX ADMIN — Medication 10 ML: at 21:57

## 2018-02-19 RX ADMIN — LEVOFLOXACIN 500 MG: 500 TABLET, FILM COATED ORAL at 21:56

## 2018-02-19 RX ADMIN — FAMOTIDINE 20 MG: 20 TABLET, FILM COATED ORAL at 08:39

## 2018-02-19 RX ADMIN — RANOLAZINE 500 MG: 500 TABLET, FILM COATED, EXTENDED RELEASE ORAL at 21:56

## 2018-02-19 RX ADMIN — Medication 10 ML: at 08:40

## 2018-02-19 RX ADMIN — METOCLOPRAMIDE 5 MG: 10 TABLET ORAL at 17:00

## 2018-02-19 RX ADMIN — Medication 3 MG: at 21:56

## 2018-02-19 RX ADMIN — ATORVASTATIN CALCIUM 40 MG: 40 TABLET, FILM COATED ORAL at 21:56

## 2018-02-19 RX ADMIN — FAMOTIDINE 20 MG: 20 TABLET, FILM COATED ORAL at 21:56

## 2018-02-19 RX ADMIN — METOCLOPRAMIDE 5 MG: 10 TABLET ORAL at 21:59

## 2018-02-19 RX ADMIN — METOCLOPRAMIDE 5 MG: 10 TABLET ORAL at 06:11

## 2018-02-19 ASSESSMENT — ENCOUNTER SYMPTOMS
ANAL BLEEDING: 0
CONSTIPATION: 0
SINUS PAIN: 0
SHORTNESS OF BREATH: 0
CHEST TIGHTNESS: 0
APNEA: 0
CHOKING: 0
VOMITING: 0
COLOR CHANGE: 0
SORE THROAT: 0
SINUS PRESSURE: 0
TROUBLE SWALLOWING: 0
RECTAL PAIN: 0
EYE ITCHING: 0
NAUSEA: 0
ABDOMINAL DISTENTION: 0
COUGH: 1
EYE REDNESS: 0
WHEEZING: 0
VOICE CHANGE: 0
STRIDOR: 0

## 2018-02-19 ASSESSMENT — PAIN SCALES - GENERAL
PAINLEVEL_OUTOF10: 0
PAINLEVEL_OUTOF10: 0

## 2018-02-19 NOTE — PROGRESS NOTES
TONSILLECTOMY AND ADENOIDECTOMY             Subjective       Subjective: RN okayed session. Pt in chair and agreeable to get washed up refused to attempt in bathroom d/t fatigue              Pain:  Pain Assessment  Patient Currently in Pain: Denies       Objective  Cognition Comment: slow processing              ADL  Grooming: Setup (Supervision seated to wash UB and complete oral care)  UE Bathing: Stand by assistance (seated to wash UB)  LE Bathing: Contact guard assistance ( - closeSBA, to wash bottom, pt able to wash BLE seated)  UE Dressing: Minimal assistance (to don hospital gown)  LE Dressing: Minimal assistance (to adjust LLE into pant leg, difficulty noted wiht task, extra time needed to don/doff depends and pj bottoms, SBA for balance to stand and pull up pants)   Extra time needed to complete BADL routine this date, min SOB, slightly unsteady with 2 hand release tasks            Transfers  Sit to stand: Contact guard assistance (from recliner X 2 events)  Stand to sit: Contact guard assistance (to recliner X 2 events)       Balance  Sitting Balance: Supervision  Standing Balance: Stand by assistance     Time: 2 minutes X 2 events  Activity: standing ADLs     Functional Mobility  Functional Mobility Comments: pt declined any mobility d/t just finishing with physical therapy prior to arrival            Activity Tolerance:  Activity Tolerance: Patient limited by fatigue  Activity Tolerance: slow moving    Assessment:     Performance deficits / Impairments: Decreased functional mobility , Decreased ADL status, Decreased endurance, Decreased balance, Decreased safe awareness  Prognosis: Fair  Discharge Recommendations: Subacute/Skilled Nursing Facility    Patient Education:  Patient Education: OT role, POC, safety with mobility, t/f technique  Barriers to Learning: none    Equipment Recommendations: Other: Continue to assess pending progress    Safety:  Safety Devices in place: Yes  Type of devices:  All fall

## 2018-02-19 NOTE — PROGRESS NOTES
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    Long term (current) use of anticoagulants 8/31/2015    Other abnormal glucose 2004    Pacemaker 08/10/2017    Insertion of a biventricular pacemaker/ICD AVN ablation    Phlebitis and thrombophlebitis of lower extremities, unspecified 1961    L leg    Senile osteoporosis 2006    L/S    Symptomatic menopausal or female climacteric states 06/1995    Syncope and collapse     Unspecified hereditary and idiopathic peripheral neuropathy 2012    Unspecified sleep apnea 11/2009     Past Surgical History:   Procedure Laterality Date    BRONCHOSCOPY  6/7/2017    BRONCHOSCOPY BRUSHINGS performed by Hai Forrest DO at [facility]  6/7/2017    BRONCHOSCOPY/TRANSBRONCHIAL LUNG BIOPSY performed by Hai Forrest DO at [place] [address]  6/7/2017    BRONCHOSCOPY FLUOROSCOPY performed by Hai Forrest DO at [address] [facility] [address] Right 06/17/2015    CATARACT REMOVAL WITH IMPLANT Right 08/14/2017    DR ROMERO    COLONOSCOPY  1/2005    DIAGNOSTIC CARDIAC CATH LAB PROCEDURE  06/17/15    EYE SURGERY      FRACTURE SURGERY  2004    Distal Radius Ulna    LOBECTOMY Left 6/14/2017    MINI PORT ACCESS LEFT CHEST, X2 SPECIMENS. performed by Jose Hammond MD at [address]  2years old    VOLVAR-ULCERATIVE LESION-CAUTERIZED    MD EGD TRANSORAL BIOPSY SINGLE/MULTIPLE Left 2/13/2018    EGD BIOPSY performed by Miguel Izaguirre MD at [address] [facility]         Restrictions/Precautions:  Restrictions/Precautions: General Precautions, Fall Risk    Subjective:  Chart Reviewed: Yes  Family / Caregiver Present: No  Subjective: RN approved session. Pt resting in bed upon arrival, pleasant and agreeable to therapy. Pain:  Denies.           Social/Functional:  Lives With: Family  Type of

## 2018-02-19 NOTE — PROGRESS NOTES
and suicidal ideas. The patient is not hyperactive. Physical Exam   Constitutional: She is oriented to person, place, and time. She appears well-developed and well-nourished. No distress. HENT:   Head: Normocephalic and atraumatic. Right Ear: External ear normal.   Left Ear: External ear normal.   Nose: Nose normal.   Mouth/Throat: Oropharynx is clear and moist. No oropharyngeal exudate. Eyes: Conjunctivae and EOM are normal. Pupils are equal, round, and reactive to light. Right eye exhibits no discharge. Left eye exhibits no discharge. No scleral icterus. Neck: Normal range of motion. Neck supple. No JVD present. No tracheal deviation present. No thyromegaly present. Cardiovascular: Normal rate, regular rhythm, normal heart sounds and intact distal pulses. Exam reveals no gallop and no friction rub. No murmur heard. Pulmonary/Chest: Effort normal. No stridor. No respiratory distress. She has no wheezes. She has rales. She exhibits no tenderness. Abdominal: Soft. Bowel sounds are normal. She exhibits no distension and no mass. There is no rebound and no guarding. Musculoskeletal: She exhibits no edema, tenderness or deformity. Lymphadenopathy:     She has no cervical adenopathy. Neurological: She is alert and oriented to person, place, and time. No cranial nerve deficit. She exhibits normal muscle tone. Coordination normal.   Skin: Skin is warm and dry. No rash noted. She is not diaphoretic. No erythema. No pallor. Psychiatric: She has a normal mood and affect. Her behavior is normal. Judgment and thought content normal.   Nursing note and vitals reviewed.     Medications:    metoclopramide  5 mg Oral 4x Daily AC & HS    calcium replacement protocol   Other RX Placeholder    potassium replacement protocol   Other RX Placeholder    magnesium replacement protocol   Other RX Placeholder    famotidine  20 mg Oral BID    levofloxacin  500 mg Oral Nightly    insulin lispro  0-12 Units [id number] Anjel Thakkar MD [facility] [id number] 08/30/17 1255-Present   Narrative     Source: urine, clean catch       Site:           Current Antibiotics: not stated   Culture & Susceptibility     ESCHERICHIA COLI     Antibiotic Interpretation TORY Unit Status   ampicillin-sulbactam Intermediate =16 mcg/mL Final   cefOXitin Sensitive <=4 mcg/mL Final   cefTRIAXone Sensitive <=1 mcg/mL Final   cefuroxime Intermediate   Final   gentamicin Sensitive <=1 mcg/mL Final   levofloxacin Sensitive <=0.12 mcg/mL Final   nitrofurantoin Sensitive <=16 mcg/mL Final   tetracycline Resistant >=16 mcg/mL Final   trimethoprim-sulfamethoxazole Resistant >=320 mcg/mL Final          ENDOSCOPE STUDIES. None. PROCEDURES. None. RADIOLOGY. ECHO-11.8.2017. Summary   Ejection fraction is visually estimated at 55%.   Overall left ventricular function is normal.   Moderately dilated left atrium.  STACIE occlusion device seen   moderate edge noted on the medical side   no leak around the device   minimal residual ASD   no detachment from any side noted     HIDA SCAN-2.12.2018. Impression   1. Patent cystic and common bile ducts without evidence of acute cholecystitis. 2. Normal gallbladder ejection fraction. CT A/P-2.10.2018. Impression   Impression:     The gallbladder is distended and contains stones and sludge. Mild intrahepatic biliary ductal dilatation may be associated. Acute or chronic cholecystitis, or noncalcified choledocholithiasis may account for these findings. Consider ultrasound follow-up    as warranted.            CT CHEST W/O CONTRAST-2.10.2018. Impression   Impression:     Suggested pulmonary fibrosis. No focal airspace disease.        Sequela of heart failure.        Please see CT abdomen and pelvis of the same date. ASSESSMENT AND  PLAN. 1.PULMONARY. ACUTE BRONCHITIS W/ COPD-PRN NEBS,PO LEVAQUIN COMPLETED 7 DAYS. Jen Mediate      CHRONIC

## 2018-02-19 NOTE — PLAN OF CARE
Problem: Falls - Risk of  Goal: Absence of falls  Outcome: Ongoing  Patient free of falls throughout shift. Up with one to bedside commode. Utilizes call light effectively. Bed in lowest position wheels locked. Problem: Risk for Impaired Skin Integrity  Goal: Tissue integrity - skin and mucous membranes  Structural intactness and normal physiological function of skin and  mucous membranes. Outcome: Ongoing  Patient turns self throughout shift. Patient has no new skin issues noted this shift. Problem: Pain Control  Goal: Maintain pain level at or below patient's acceptable level (or 5 if patient is unable to determine acceptable level)  Outcome: Ongoing  Patient denies pain throughout shift. Problem: Cardiovascular  Goal: No DVT, peripheral vascular complications  Outcome: Not Met This Shift  Patient free of signs and symptoms of DVT throughout shift. SCD's in place. Problem: Respiratory  Goal: O2 Sat > 90%  Outcome: Met This Shift      Problem: Discharge Planning:  Goal: Patients continuum of care needs are met  Patients continuum of care needs are met   Outcome: Ongoing  [facility]. Problem: Nutrition  Goal: Optimal nutrition therapy  Outcome: Ongoing  Patient receiving carb control diet, tolerating well. Comments: Care plan reviewed with patient. Patient verbalizes understanding of plan of care.

## 2018-02-19 NOTE — PLAN OF CARE
Problem: Falls - Risk of  Goal: Absence of falls  Outcome: Ongoing  Up with assistance. Unsteady gait. No falls this shift. Fall precautions in place. Comments: Problem: Risk for Impaired Skin Integrity  Goal: Tissue integrity - skin and mucous membranes  Structural intactness and normal physiological function of skin and  mucous membranes. Outcome: Ongoing  Patient turns self . Patient has no new skin issues noted this shift.      Problem: Pain Control  Goal: Maintain pain level at or below patient's acceptable level (or 5 if patient is unable to determine acceptable level)  Outcome: Ongoing  Patient denies pain.      Problem: Cardiovascular  Goal: No DVT, peripheral vascular complications  Outcome: Not Met This Shift  Patient free of signs and symptoms of DVT. SCD's in place.      Problem: Respiratory  Goal: O2 Sat > 90%  Outcome: Met This Shift        Problem: Discharge Planning:  Goal: Patients continuum of care needs are met  Patients continuum of care needs are met   Outcome: Ongoing  Patient plans to be discharged to [facility] rehab today. awaiting pre-cert.     Problem: Nutrition  Goal: Optimal nutrition therapy  Outcome: Ongoing  Patient receiving carb control diet, tolerating well.      Care plan reviewed with patient and patient verbalizes understanding of the plan of care and contribute to goal setting.

## 2018-02-20 VITALS
DIASTOLIC BLOOD PRESSURE: 83 MMHG | HEART RATE: 83 BPM | BODY MASS INDEX: 27.93 KG/M2 | RESPIRATION RATE: 16 BRPM | WEIGHT: 173.8 LBS | HEIGHT: 66 IN | TEMPERATURE: 97.5 F | OXYGEN SATURATION: 96 % | SYSTOLIC BLOOD PRESSURE: 139 MMHG

## 2018-02-20 PROBLEM — R11.2 INTRACTABLE NAUSEA AND VOMITING: Status: ACTIVE | Noted: 2018-02-20

## 2018-02-20 LAB
GLUCOSE BLD-MCNC: 115 MG/DL (ref 70–108)
GLUCOSE BLD-MCNC: 122 MG/DL (ref 70–108)
GLUCOSE BLD-MCNC: 98 MG/DL (ref 70–108)
INR BLD: 1.04 (ref 0.85–1.13)

## 2018-02-20 PROCEDURE — 6370000000 HC RX 637 (ALT 250 FOR IP): Performed by: FAMILY MEDICINE

## 2018-02-20 PROCEDURE — 97110 THERAPEUTIC EXERCISES: CPT

## 2018-02-20 PROCEDURE — 85610 PROTHROMBIN TIME: CPT

## 2018-02-20 PROCEDURE — 99239 HOSP IP/OBS DSCHRG MGMT >30: CPT | Performed by: INTERNAL MEDICINE

## 2018-02-20 PROCEDURE — 99232 SBSQ HOSP IP/OBS MODERATE 35: CPT | Performed by: INTERNAL MEDICINE

## 2018-02-20 PROCEDURE — 97116 GAIT TRAINING THERAPY: CPT

## 2018-02-20 PROCEDURE — 6370000000 HC RX 637 (ALT 250 FOR IP): Performed by: INTERNAL MEDICINE

## 2018-02-20 PROCEDURE — 36415 COLL VENOUS BLD VENIPUNCTURE: CPT

## 2018-02-20 PROCEDURE — 82948 REAGENT STRIP/BLOOD GLUCOSE: CPT

## 2018-02-20 RX ORDER — ACETAMINOPHEN 325 MG/1
650 TABLET ORAL EVERY 4 HOURS PRN
Qty: 120 TABLET | Refills: 3 | DISCHARGE
Start: 2018-02-20

## 2018-02-20 RX ORDER — METOCLOPRAMIDE 5 MG/1
5 TABLET ORAL
Qty: 120 TABLET | Refills: 3 | DISCHARGE
Start: 2018-02-20 | End: 2018-03-14 | Stop reason: ALTCHOICE

## 2018-02-20 RX ADMIN — FAMOTIDINE 20 MG: 20 TABLET, FILM COATED ORAL at 09:33

## 2018-02-20 RX ADMIN — METOCLOPRAMIDE 5 MG: 10 TABLET ORAL at 12:45

## 2018-02-20 RX ADMIN — RANOLAZINE 500 MG: 500 TABLET, FILM COATED, EXTENDED RELEASE ORAL at 09:33

## 2018-02-20 RX ADMIN — METOCLOPRAMIDE 5 MG: 10 TABLET ORAL at 09:33

## 2018-02-20 NOTE — PROGRESS NOTES
Normal-appearing liver. **This report has been created using voice recognition software. It may contain minor errors which are inherent in voice recognition technology. **      Final report electronically signed by Dr. Maryuri Gordon on 2/12/2018 7:13 AM      US GALLBLADDER RUQ   Final Result       1. Distended gallbladder which contains biliary sludge versus small stones. The common bile duct is mildly dilated. This can represent early acute cholecystitis. 2. Normal-appearing liver. **This report has been created using voice recognition software. It may contain minor errors which are inherent in voice recognition technology. **      Final report electronically signed by Dr. Maryuri Gordon on 2/12/2018 7:13 AM      NM GASTRIC EMPTYING    (Results Pending)       Diet: DIET CARB CONTROL; Carb Control: 4 carbs/meal (approximate 1800 kcals/day)  Dietary Nutrition Supplements: Frozen Oral Supplement    DVT prophylaxis: [x] Lovenox                                 [] SCDs                                 [] SQ Heparin                                 [] Encourage ambulation           [] Already on Anticoagulation     Disposition:    [] Home       [] TCU       [] Rehab       [] Psych       [x] SNF       [] [place]       [] Other-    Code Status: Full Code    PT/OT Eval Status:  ordered      Assessment/Plan:    Anticipated Discharge in : [address] Problems    Diagnosis Date Noted    Intractable nausea and vomiting [R11.2] 02/20/2018    Bronchitis with chronic airway obstruction ([facility] [J44.9] 02/16/2018    Asymptomatic cholelithiasis [K80.20] 02/12/2018    Chronic obstructive pulmonary disease ([address] [J44.9] 02/09/2018    General weakness [R53.1] 02/09/2018    Paroxysmal a-fib ([address] [I48.0] 09/20/2017    E. coli UTI [N39.0, B96.20] 06/30/2017    Pulmonary fibrosis ([address] [J84.10] 06/19/2017    Type 2 diabetes mellitus without complication ([address] [M11.0] 02/13/2017

## 2018-02-20 NOTE — PROGRESS NOTES
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    Long term (current) use of anticoagulants 8/31/2015    Other abnormal glucose 2004    Pacemaker 08/10/2017    Insertion of a biventricular pacemaker/ICD AVN ablation    Phlebitis and thrombophlebitis of lower extremities, unspecified 1961    L leg    Senile osteoporosis 2006    L/S    Symptomatic menopausal or female climacteric states 06/1995    Syncope and collapse     Unspecified hereditary and idiopathic peripheral neuropathy 2012    Unspecified sleep apnea 11/2009     Past Surgical History:   Procedure Laterality Date    BRONCHOSCOPY  6/7/2017    BRONCHOSCOPY BRUSHINGS performed by Elena Emanuel DO at [facility]  6/7/2017    BRONCHOSCOPY/TRANSBRONCHIAL LUNG BIOPSY performed by Elena Emanuel DO at [place] [address]  6/7/2017    BRONCHOSCOPY FLUOROSCOPY performed by Elena Emanuel DO at [facility] [address] Right 06/17/2015    CATARACT REMOVAL WITH IMPLANT Right 08/14/2017    DR ROMERO    COLONOSCOPY  1/2005    DIAGNOSTIC CARDIAC CATH LAB PROCEDURE  06/17/15    EYE SURGERY      FRACTURE SURGERY  2004    Distal Radius Ulna    LOBECTOMY Left 6/14/2017    MINI PORT ACCESS LEFT CHEST, X2 SPECIMENS. performed by Carolin Moses MD at [address]  2years old    VOLVAR-ULCERATIVE LESION-CAUTERIZED    MN EGD TRANSORAL BIOPSY SINGLE/MULTIPLE Left 2/13/2018    EGD BIOPSY performed by Darrell Sandoval MD at [address] [facility]         Restrictions/Precautions:  Restrictions/Precautions: General Precautions, Fall Risk    Subjective:  Chart Reviewed: Yes  Family / Caregiver Present: No  Subjective: RN approved session. Pt resting in bed upon arrival, pleasant and agreeable to therapy. Notes she walked around her room to use commode earlier today. Recommendations: Strengthening, Home Exercise Program, Safety Education & Training, Balance Training, Endurance Training, Patient/Caregiver Education & Training, Functional Mobility Training, Transfer Training, Gait Training, Stair training  Plan Comment:      Goals:  Patient goals : Walk without falls    Short term goals  Time Frame for Short term goals: 1week  Short term goal 1: Progress HEP for strengthening and endurance to prevent risk of falls   Short term goal 2: Pt will be  SBA +1 for transfers with RW with good safety to get on and off various surfaces  Short term goal 3: Pt will be independent with bed mobility without use of rails for discharge home.   Short term goal 4: Pt will be able to ambulate up to 50 feet with RW in order to better tolerate household distances with min SOB    Long term goals  Time Frame for Long term goals : NA due to short ELOS

## 2018-02-20 NOTE — PROGRESS NOTES
1915:  Received report from [facility]  2145:  Assessment and vital signs complete, no distress, denies pain, no needs expressed at this time. 2345:  Resting in bed, eyes closed, call light in reach. 0145:  Resting in bed quietly, no needs at this time. 0345:  In bed, eyes closed, call light in reach. 0515:  Reassessment and vital signs complete, no distress, denies pain, no needs at this time. 0630:  Resting in bed, eyes closed, no needs.   0700:  Report given to RN

## 2018-02-22 ENCOUNTER — OUTSIDE SERVICES (OUTPATIENT)
Dept: FAMILY MEDICINE CLINIC | Age: 78
End: 2018-02-22

## 2018-02-22 DIAGNOSIS — J43.1 PANLOBULAR EMPHYSEMA (HCC): Chronic | ICD-10-CM

## 2018-02-22 DIAGNOSIS — R11.2 INTRACTABLE VOMITING WITH NAUSEA, UNSPECIFIED VOMITING TYPE: Primary | ICD-10-CM

## 2018-02-22 DIAGNOSIS — R53.1 GENERAL WEAKNESS: ICD-10-CM

## 2018-02-22 DIAGNOSIS — K80.20 ASYMPTOMATIC CHOLELITHIASIS: ICD-10-CM

## 2018-02-22 DIAGNOSIS — I48.0 PAROXYSMAL A-FIB (HCC): Chronic | ICD-10-CM

## 2018-02-22 DIAGNOSIS — K59.09 OTHER CONSTIPATION: ICD-10-CM

## 2018-02-22 DIAGNOSIS — J44.9 BRONCHITIS WITH CHRONIC AIRWAY OBSTRUCTION (HCC): ICD-10-CM

## 2018-02-22 NOTE — PROGRESS NOTES
10/24/17   Pranav Abbott MD   polyethylene glycol [facility]) powder Take 17 g by mouth as needed (for constipation)    Historical Provider, MD   OXYGEN Inhale 2 L into the lungs continuous     Historical Provider, MD   Glucose Blood (BLOOD GLUCOSE TEST STRIPS) STRP Test 4 times daily Diagnosis 7/2/17   Oralia Todd MD   melatonin 3 MG TABS tablet Take 3 mg by mouth nightly     Historical Provider, MD       ROS:  General Constitutional: Denies chills. Denies fever. Denies headache. Denies lightheadedness. Ophthalmologic: Denies blurred vision. Respiratory: Denies cough. Denies shortness of breath. Denies wheezing. Cardiovascular: Denies chest pain at rest. Denies irregular heartbeat. Denies palpitations. Gastrointestinal: Denies abdominal pain. Denies blood in the stool. Admits constipation. Denies diarrhea. Denies nausea. Denies vomiting. Genitourinary: Denies blood in the urine. Denies difficulty urinating. Denies frequent urination. Denies painful urination. Denies urinary incontinence  Skin: Denies dry skin. Denies itching. Denies rash. Past Surgical History:   Procedure Laterality Date    BRONCHOSCOPY  6/7/2017    BRONCHOSCOPY BRUSHINGS performed by Guillaume Henriquez DO at [facility]    BRONCHOSCOPY  6/7/2017    BRONCHOSCOPY/TRANSBRONCHIAL LUNG BIOPSY performed by Guillaume Henriquez DO at [place] [address]  6/7/2017    BRONCHOSCOPY FLUOROSCOPY performed by Guillaume Henriquez DO at [address] [place] [address] Right 06/17/2015    CATARACT REMOVAL WITH IMPLANT Right 08/14/2017    DR ROMERO    COLONOSCOPY  1/2005    DIAGNOSTIC CARDIAC CATH LAB PROCEDURE  06/17/15    EYE SURGERY      FRACTURE SURGERY  2004    Distal Radius Ulna    LOBECTOMY Left 6/14/2017    MINI PORT ACCESS LEFT CHEST, X2 SPECIMENS.  performed by Boom Campos MD at [address] [place] [address]  2years old    VOLVAR-ULCERATIVE LESION-CAUTERIZED    ME EGD TRANSORAL BIOPSY SINGLE/MULTIPLE Left

## 2018-02-23 ENCOUNTER — HOSPITAL ENCOUNTER (OUTPATIENT)
Age: 78
Setting detail: SPECIMEN
Discharge: HOME | End: 2018-02-23
Payer: MEDICARE

## 2018-02-23 LAB
ALBUMIN SERPL-MCNC: 2.8 G/DL (ref 3.5–5.2)
ALBUMIN/GLOBULIN RATIO: 1.2 (ref 1–2.5)
ALP BLD-CCNC: 79 U/L (ref 35–104)
ALT SERPL-CCNC: 24 U/L (ref 5–33)
ANION GAP SERPL CALCULATED.3IONS-SCNC: 11 MMOL/L (ref 9–17)
AST SERPL-CCNC: 31 U/L
BILIRUB SERPL-MCNC: 0.71 MG/DL (ref 0.3–1.2)
BUN BLDV-MCNC: 4 MG/DL (ref 8–23)
BUN/CREAT BLD: 6 (ref 9–20)
CALCIUM SERPL-MCNC: 8.4 MG/DL (ref 8.6–10.4)
CHLORIDE BLD-SCNC: 102 MMOL/L (ref 98–107)
CO2: 29 MMOL/L (ref 20–31)
CREAT SERPL-MCNC: 0.66 MG/DL (ref 0.5–0.9)
GFR AFRICAN AMERICAN: >60 ML/MIN
GFR NON-AFRICAN AMERICAN: >60 ML/MIN
GFR SERPL CREATININE-BSD FRML MDRD: ABNORMAL ML/MIN/{1.73_M2}
GFR SERPL CREATININE-BSD FRML MDRD: ABNORMAL ML/MIN/{1.73_M2}
GLUCOSE BLD-MCNC: 91 MG/DL (ref 70–99)
HCT VFR BLD CALC: 33.4 % (ref 36–46)
HEMOGLOBIN: 11 G/DL (ref 12–16)
MCH RBC QN AUTO: 32.5 PG (ref 26–34)
MCHC RBC AUTO-ENTMCNC: 32.9 G/DL (ref 31–37)
MCV RBC AUTO: 98.6 FL (ref 80–100)
NRBC AUTOMATED: ABNORMAL PER 100 WBC
PDW BLD-RTO: 15.7 % (ref 12.1–15.2)
PLATELET # BLD: 265 K/UL (ref 140–450)
PMV BLD AUTO: 7.6 FL (ref 6–12)
POTASSIUM SERPL-SCNC: 4.4 MMOL/L (ref 3.7–5.3)
RBC # BLD: 3.39 M/UL (ref 4–5.2)
SODIUM BLD-SCNC: 142 MMOL/L (ref 135–144)
TOTAL PROTEIN: 5.1 G/DL (ref 6.4–8.3)
TSH SERPL DL<=0.05 MIU/L-ACNC: 62.04 MIU/L (ref 0.3–5)
WBC # BLD: 7.6 K/UL (ref 3.5–11)

## 2018-02-23 PROCEDURE — 36415 COLL VENOUS BLD VENIPUNCTURE: CPT

## 2018-02-23 PROCEDURE — 80053 COMPREHEN METABOLIC PANEL: CPT

## 2018-02-23 PROCEDURE — P9604 ONE-WAY ALLOW PRORATED TRIP: HCPCS

## 2018-02-23 PROCEDURE — 85027 COMPLETE CBC AUTOMATED: CPT

## 2018-02-23 PROCEDURE — 84443 ASSAY THYROID STIM HORMONE: CPT

## 2018-03-01 ENCOUNTER — HOSPITAL ENCOUNTER (OUTPATIENT)
Age: 78
Setting detail: SPECIMEN
Discharge: HOME | End: 2018-03-01
Payer: MEDICARE

## 2018-03-01 LAB
ANION GAP SERPL CALCULATED.3IONS-SCNC: 11 MMOL/L (ref 9–17)
BUN BLDV-MCNC: 7 MG/DL (ref 8–23)
BUN/CREAT BLD: 10 (ref 9–20)
CALCIUM SERPL-MCNC: 8.3 MG/DL (ref 8.6–10.4)
CHLORIDE BLD-SCNC: 100 MMOL/L (ref 98–107)
CO2: 28 MMOL/L (ref 20–31)
CREAT SERPL-MCNC: 0.67 MG/DL (ref 0.5–0.9)
GFR AFRICAN AMERICAN: >60 ML/MIN
GFR NON-AFRICAN AMERICAN: >60 ML/MIN
GFR SERPL CREATININE-BSD FRML MDRD: ABNORMAL ML/MIN/{1.73_M2}
GFR SERPL CREATININE-BSD FRML MDRD: ABNORMAL ML/MIN/{1.73_M2}
GLUCOSE BLD-MCNC: 99 MG/DL (ref 70–99)
HCT VFR BLD CALC: 32.2 % (ref 36.3–47.1)
HEMOGLOBIN: 10.2 G/DL (ref 11.9–15.1)
MAGNESIUM: 2.1 MG/DL (ref 1.6–2.6)
MCH RBC QN AUTO: 31.8 PG (ref 25.2–33.5)
MCHC RBC AUTO-ENTMCNC: 31.7 G/DL (ref 28.4–34.8)
MCV RBC AUTO: 100.3 FL (ref 82.6–102.9)
NRBC AUTOMATED: 0 PER 100 WBC
PDW BLD-RTO: 15.4 % (ref 11.8–14.4)
PLATELET # BLD: 284 K/UL (ref 138–453)
PMV BLD AUTO: 9.3 FL (ref 8.1–13.5)
POTASSIUM SERPL-SCNC: 3.8 MMOL/L (ref 3.7–5.3)
RBC # BLD: 3.21 M/UL (ref 3.95–5.11)
SODIUM BLD-SCNC: 139 MMOL/L (ref 135–144)
WBC # BLD: 4.9 K/UL (ref 3.5–11.3)

## 2018-03-01 PROCEDURE — 83735 ASSAY OF MAGNESIUM: CPT

## 2018-03-01 PROCEDURE — 80048 BASIC METABOLIC PNL TOTAL CA: CPT

## 2018-03-01 PROCEDURE — 85027 COMPLETE CBC AUTOMATED: CPT

## 2018-03-01 PROCEDURE — P9604 ONE-WAY ALLOW PRORATED TRIP: HCPCS

## 2018-03-01 PROCEDURE — 36415 COLL VENOUS BLD VENIPUNCTURE: CPT

## 2018-03-02 PROCEDURE — 93299 PR REM INTERROG ICPMS/SCRMS <30 D TECH REVIEW: CPT | Performed by: INTERNAL MEDICINE

## 2018-03-02 PROCEDURE — 93297 REM INTERROG DEV EVAL ICPMS: CPT | Performed by: INTERNAL MEDICINE

## 2018-03-07 ENCOUNTER — OFFICE VISIT (OUTPATIENT)
Dept: CARDIOLOGY | Age: 78
End: 2018-03-07
Payer: MEDICARE

## 2018-03-07 ENCOUNTER — NURSE ONLY (OUTPATIENT)
Dept: CARDIOLOGY | Age: 78
End: 2018-03-07
Payer: MEDICARE

## 2018-03-07 ENCOUNTER — APPOINTMENT (OUTPATIENT)
Dept: GENERAL RADIOLOGY | Age: 78
DRG: 644 | End: 2018-03-07
Payer: MEDICARE

## 2018-03-07 ENCOUNTER — HOSPITAL ENCOUNTER (INPATIENT)
Age: 78
LOS: 2 days | Discharge: HOME HEALTH | DRG: 644 | End: 2018-03-09
Attending: EMERGENCY MEDICINE | Admitting: FAMILY MEDICINE
Payer: MEDICARE

## 2018-03-07 VITALS
OXYGEN SATURATION: 94 % | SYSTOLIC BLOOD PRESSURE: 128 MMHG | HEIGHT: 65 IN | HEART RATE: 87 BPM | DIASTOLIC BLOOD PRESSURE: 83 MMHG | WEIGHT: 177 LBS | BODY MASS INDEX: 29.49 KG/M2

## 2018-03-07 DIAGNOSIS — I50.9 ACUTE ON CHRONIC CONGESTIVE HEART FAILURE, UNSPECIFIED CONGESTIVE HEART FAILURE TYPE: Primary | ICD-10-CM

## 2018-03-07 DIAGNOSIS — I48.20 CHRONIC ATRIAL FIBRILLATION (HCC): ICD-10-CM

## 2018-03-07 DIAGNOSIS — I50.42 CHRONIC COMBINED SYSTOLIC AND DIASTOLIC CHF, NYHA CLASS 3 (HCC): ICD-10-CM

## 2018-03-07 DIAGNOSIS — J96.11 CHRONIC RESPIRATORY FAILURE WITH HYPOXIA (HCC): Chronic | ICD-10-CM

## 2018-03-07 DIAGNOSIS — Z95.810 BIVENTRICULAR ICD (IMPLANTABLE CARDIOVERTER-DEFIBRILLATOR) IN PLACE: ICD-10-CM

## 2018-03-07 DIAGNOSIS — I50.32 CHRONIC DIASTOLIC CHF (CONGESTIVE HEART FAILURE) (HCC): Chronic | ICD-10-CM

## 2018-03-07 DIAGNOSIS — I42.9 CARDIOMYOPATHY, UNSPECIFIED TYPE (HCC): ICD-10-CM

## 2018-03-07 DIAGNOSIS — I50.42 CHRONIC COMBINED SYSTOLIC AND DIASTOLIC CHF (CONGESTIVE HEART FAILURE) (HCC): Primary | Chronic | ICD-10-CM

## 2018-03-07 DIAGNOSIS — I25.10 ASHD (ARTERIOSCLEROTIC HEART DISEASE): ICD-10-CM

## 2018-03-07 DIAGNOSIS — I49.5 TACHYCARDIA-BRADYCARDIA SYNDROME (HCC): Primary | ICD-10-CM

## 2018-03-07 PROBLEM — E03.2 HYPOTHYROIDISM DUE TO AMIODARONE: Status: ACTIVE | Noted: 2018-03-07

## 2018-03-07 PROBLEM — T46.2X1A AMIODARONE TOXICITY: Status: ACTIVE | Noted: 2018-03-07

## 2018-03-07 PROBLEM — E27.2 ADDISONIAN CRISIS (HCC): Status: ACTIVE | Noted: 2018-03-07

## 2018-03-07 PROBLEM — T46.2X1A HYPOTHYROIDISM DUE TO AMIODARONE: Status: ACTIVE | Noted: 2018-03-07

## 2018-03-07 LAB
-: ABNORMAL
AMORPHOUS: ABNORMAL
ANION GAP SERPL CALCULATED.3IONS-SCNC: 14 MMOL/L (ref 9–17)
BACTERIA: ABNORMAL
BILIRUBIN URINE: NEGATIVE
BNP INTERPRETATION: ABNORMAL
BUN BLDV-MCNC: 8 MG/DL (ref 8–23)
BUN/CREAT BLD: 12 (ref 9–20)
CALCIUM SERPL-MCNC: 8.5 MG/DL (ref 8.6–10.4)
CASTS UA: ABNORMAL /LPF
CHLORIDE BLD-SCNC: 99 MMOL/L (ref 98–107)
CO2: 27 MMOL/L (ref 20–31)
COLOR: YELLOW
COMMENT UA: NORMAL
CREAT SERPL-MCNC: 0.67 MG/DL (ref 0.5–0.9)
CRYSTALS, UA: ABNORMAL /HPF
EKG ATRIAL RATE: 75 BPM
EKG Q-T INTERVAL: 500 MS
EKG QRS DURATION: 164 MS
EKG QTC CALCULATION (BAZETT): 576 MS
EKG R AXIS: -119 DEGREES
EKG T AXIS: 54 DEGREES
EKG VENTRICULAR RATE: 80 BPM
EPITHELIAL CELLS UA: ABNORMAL /HPF (ref 0–25)
GFR AFRICAN AMERICAN: >60 ML/MIN
GFR NON-AFRICAN AMERICAN: >60 ML/MIN
GFR SERPL CREATININE-BSD FRML MDRD: ABNORMAL ML/MIN/{1.73_M2}
GFR SERPL CREATININE-BSD FRML MDRD: ABNORMAL ML/MIN/{1.73_M2}
GLUCOSE BLD-MCNC: 115 MG/DL (ref 70–99)
GLUCOSE URINE: NEGATIVE
HCT VFR BLD CALC: 36.9 % (ref 36.3–47.1)
HEMOGLOBIN: 11.5 G/DL (ref 11.9–15.1)
KETONES, URINE: NEGATIVE
LEUKOCYTE ESTERASE, URINE: NEGATIVE
MCH RBC QN AUTO: 31.4 PG (ref 25.2–33.5)
MCHC RBC AUTO-ENTMCNC: 31.2 G/DL (ref 28.4–34.8)
MCV RBC AUTO: 100.8 FL (ref 82.6–102.9)
MUCUS: ABNORMAL
NITRITE, URINE: NEGATIVE
NRBC AUTOMATED: 0 PER 100 WBC
OTHER OBSERVATIONS UA: ABNORMAL
PDW BLD-RTO: 15.6 % (ref 11.8–14.4)
PH UA: 6.5 (ref 5–9)
PLATELET # BLD: 210 K/UL (ref 138–453)
PMV BLD AUTO: 9.5 FL (ref 8.1–13.5)
POTASSIUM SERPL-SCNC: 3.8 MMOL/L (ref 3.7–5.3)
PRO-BNP: 1375 PG/ML
PROTEIN UA: NEGATIVE
RBC # BLD: 3.66 M/UL (ref 3.95–5.11)
RBC UA: ABNORMAL /HPF (ref 0–2)
RENAL EPITHELIAL, UA: ABNORMAL /HPF
SODIUM BLD-SCNC: 140 MMOL/L (ref 135–144)
SPECIFIC GRAVITY UA: 1.01 (ref 1.01–1.02)
TRICHOMONAS: ABNORMAL
TURBIDITY: CLEAR
URINE HGB: NEGATIVE
UROBILINOGEN, URINE: NORMAL
WBC # BLD: 3 K/UL (ref 3.5–11.3)
WBC UA: ABNORMAL /HPF (ref 0–5)
YEAST: ABNORMAL

## 2018-03-07 PROCEDURE — 96372 THER/PROPH/DIAG INJ SC/IM: CPT

## 2018-03-07 PROCEDURE — 83880 ASSAY OF NATRIURETIC PEPTIDE: CPT

## 2018-03-07 PROCEDURE — 6370000000 HC RX 637 (ALT 250 FOR IP): Performed by: FAMILY MEDICINE

## 2018-03-07 PROCEDURE — 93005 ELECTROCARDIOGRAM TRACING: CPT

## 2018-03-07 PROCEDURE — 80048 BASIC METABOLIC PNL TOTAL CA: CPT

## 2018-03-07 PROCEDURE — 36415 COLL VENOUS BLD VENIPUNCTURE: CPT

## 2018-03-07 PROCEDURE — 85027 COMPLETE CBC AUTOMATED: CPT

## 2018-03-07 PROCEDURE — 81001 URINALYSIS AUTO W/SCOPE: CPT

## 2018-03-07 PROCEDURE — 2580000003 HC RX 258: Performed by: FAMILY MEDICINE

## 2018-03-07 PROCEDURE — 99214 OFFICE O/P EST MOD 30 MIN: CPT | Performed by: INTERNAL MEDICINE

## 2018-03-07 PROCEDURE — 6360000002 HC RX W HCPCS: Performed by: FAMILY MEDICINE

## 2018-03-07 PROCEDURE — 2000000000 HC ICU R&B

## 2018-03-07 PROCEDURE — 96374 THER/PROPH/DIAG INJ IV PUSH: CPT

## 2018-03-07 PROCEDURE — 99285 EMERGENCY DEPT VISIT HI MDM: CPT

## 2018-03-07 PROCEDURE — 71046 X-RAY EXAM CHEST 2 VIEWS: CPT

## 2018-03-07 RX ORDER — METOPROLOL TARTRATE 50 MG/1
50 TABLET, FILM COATED ORAL NIGHTLY
Status: DISCONTINUED | OUTPATIENT
Start: 2018-03-07 | End: 2018-03-08

## 2018-03-07 RX ORDER — ATORVASTATIN CALCIUM 40 MG/1
40 TABLET, FILM COATED ORAL NIGHTLY
Status: DISCONTINUED | OUTPATIENT
Start: 2018-03-07 | End: 2018-03-09 | Stop reason: HOSPADM

## 2018-03-07 RX ORDER — ONDANSETRON 2 MG/ML
4 INJECTION INTRAMUSCULAR; INTRAVENOUS EVERY 6 HOURS PRN
Status: DISCONTINUED | OUTPATIENT
Start: 2018-03-07 | End: 2018-03-09 | Stop reason: HOSPADM

## 2018-03-07 RX ORDER — SODIUM CHLORIDE 0.9 % (FLUSH) 0.9 %
10 SYRINGE (ML) INJECTION EVERY 12 HOURS SCHEDULED
Status: DISCONTINUED | OUTPATIENT
Start: 2018-03-07 | End: 2018-03-09 | Stop reason: HOSPADM

## 2018-03-07 RX ORDER — FAMOTIDINE 20 MG/1
20 TABLET, FILM COATED ORAL 2 TIMES DAILY
Status: DISCONTINUED | OUTPATIENT
Start: 2018-03-07 | End: 2018-03-09 | Stop reason: HOSPADM

## 2018-03-07 RX ORDER — UREA 10 %
3 LOTION (ML) TOPICAL NIGHTLY
Status: DISCONTINUED | OUTPATIENT
Start: 2018-03-07 | End: 2018-03-09 | Stop reason: HOSPADM

## 2018-03-07 RX ORDER — ACETAMINOPHEN 325 MG/1
650 TABLET ORAL EVERY 4 HOURS PRN
Status: DISCONTINUED | OUTPATIENT
Start: 2018-03-07 | End: 2018-03-09 | Stop reason: HOSPADM

## 2018-03-07 RX ORDER — POLYETHYLENE GLYCOL 3350 17 G/17G
17 POWDER, FOR SOLUTION ORAL PRN
Status: DISCONTINUED | OUTPATIENT
Start: 2018-03-07 | End: 2018-03-09 | Stop reason: HOSPADM

## 2018-03-07 RX ORDER — METHYLPREDNISOLONE SODIUM SUCCINATE 125 MG/2ML
125 INJECTION, POWDER, LYOPHILIZED, FOR SOLUTION INTRAMUSCULAR; INTRAVENOUS EVERY 8 HOURS
Status: DISCONTINUED | OUTPATIENT
Start: 2018-03-07 | End: 2018-03-09 | Stop reason: HOSPADM

## 2018-03-07 RX ORDER — METOCLOPRAMIDE 5 MG/1
5 TABLET ORAL
Status: DISCONTINUED | OUTPATIENT
Start: 2018-03-07 | End: 2018-03-09 | Stop reason: HOSPADM

## 2018-03-07 RX ORDER — SODIUM CHLORIDE 0.9 % (FLUSH) 0.9 %
10 SYRINGE (ML) INJECTION PRN
Status: DISCONTINUED | OUTPATIENT
Start: 2018-03-07 | End: 2018-03-09 | Stop reason: HOSPADM

## 2018-03-07 RX ORDER — ASPIRIN 81 MG/1
325 TABLET, CHEWABLE ORAL DAILY
Status: DISCONTINUED | OUTPATIENT
Start: 2018-03-07 | End: 2018-03-09 | Stop reason: HOSPADM

## 2018-03-07 RX ORDER — LEVOTHYROXINE SODIUM 0.1 MG/1
100 TABLET ORAL DAILY
Status: DISCONTINUED | OUTPATIENT
Start: 2018-03-08 | End: 2018-03-09 | Stop reason: HOSPADM

## 2018-03-07 RX ORDER — RANOLAZINE 500 MG/1
500 TABLET, EXTENDED RELEASE ORAL 2 TIMES DAILY
Status: DISCONTINUED | OUTPATIENT
Start: 2018-03-07 | End: 2018-03-09 | Stop reason: HOSPADM

## 2018-03-07 RX ADMIN — Medication 10 ML: at 20:09

## 2018-03-07 RX ADMIN — FAMOTIDINE 20 MG: 20 TABLET, FILM COATED ORAL at 20:09

## 2018-03-07 RX ADMIN — METHYLPREDNISOLONE SODIUM SUCCINATE 125 MG: 125 INJECTION, POWDER, FOR SOLUTION INTRAMUSCULAR; INTRAVENOUS at 20:09

## 2018-03-07 RX ADMIN — METOPROLOL TARTRATE 50 MG: 50 TABLET ORAL at 20:09

## 2018-03-07 RX ADMIN — Medication 3 MG: at 20:09

## 2018-03-07 RX ADMIN — ATORVASTATIN CALCIUM 40 MG: 40 TABLET, FILM COATED ORAL at 20:09

## 2018-03-07 RX ADMIN — METOCLOPRAMIDE 5 MG: 5 TABLET ORAL at 20:09

## 2018-03-07 RX ADMIN — ENOXAPARIN SODIUM 40 MG: 40 INJECTION SUBCUTANEOUS at 20:09

## 2018-03-07 ASSESSMENT — ENCOUNTER SYMPTOMS
SHORTNESS OF BREATH: 0
NAUSEA: 0
SORE THROAT: 0
DIARRHEA: 0
CONSTIPATION: 0
BACK PAIN: 0
WHEEZING: 0
VOMITING: 0
COUGH: 0
EYE REDNESS: 0
RHINORRHEA: 0
CHEST TIGHTNESS: 0
EYE DISCHARGE: 0
ABDOMINAL PAIN: 0
BLOOD IN STOOL: 0

## 2018-03-07 ASSESSMENT — PAIN SCALES - GENERAL
PAINLEVEL_OUTOF10: 0

## 2018-03-07 NOTE — PROGRESS NOTES
Diastology cannot be assessed due to A-fib.  History of Holter monitoring 3/24/16    Atrial Fibrillation throughout,fairly controlled, HR  bpm 79% of the time. Occasional high ventricular rate episodes and multiple pauses suggestive of tachycardia/bradycardia syndrome  Msximum R-R interval 2.68 seconds. 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    Infectious hepatitis Age 14    Food Borne    Long term (current) use of anticoagulants 8/31/2015    Other abnormal glucose 2004    Pacemaker 08/10/2017    Insertion of a biventricular pacemaker/ICD AVN ablation    Phlebitis and thrombophlebitis of lower extremities, unspecified 1961    L leg    Senile osteoporosis 2006    L/S    Symptomatic menopausal or female climacteric states 06/1995    Syncope and collapse     Unspecified hereditary and idiopathic peripheral neuropathy 2012    Unspecified sleep apnea 11/2009     Past Surgical History:  Past Surgical History:   Procedure Laterality Date    BRONCHOSCOPY  6/7/2017    BRONCHOSCOPY BRUSHINGS performed by Sagar Sepulveda DO at [place] [address]  6/7/2017    BRONCHOSCOPY/TRANSBRONCHIAL LUNG BIOPSY performed by Sagar Sepulveda DO at [place] [address]  6/7/2017    BRONCHOSCOPY FLUOROSCOPY performed by Sagar Sepulveda DO at [facility] Right 06/17/2015    CATARACT REMOVAL WITH IMPLANT Right 08/14/2017    DR ROMERO    COLONOSCOPY  1/2005    DIAGNOSTIC CARDIAC CATH LAB PROCEDURE  06/17/15    EYE SURGERY      FRACTURE SURGERY  2004    Distal Radius Ulna    LOBECTOMY Left 6/14/2017    MINI PORT ACCESS LEFT CHEST, X2 SPECIMENS.  performed by Kwan Figueroa MD at [address]  2years old    VOLVAR-ULCERATIVE LESION-CAUTERIZED    MA EGD TRANSORAL BIOPSY SINGLE/MULTIPLE Left 2/13/2018    EGD BIOPSY performed by Kalyan Ling MD at [facility]    SKIN BIOPSY      TONSILLECTOMY AND ADENOIDECTOMY       Social History:    History   Smoking Status    Never Smoker   Smokeless Tobacco    Never Used     Current Medications:  Outpatient Prescriptions Marked as Taking for the 3/7/18 encounter (Office Visit) with Mary Kate Bliss MD   Medication Sig Dispense Refill    acetaminophen (TYLENOL) 325 MG tablet Take 2 tablets by mouth every 4 hours as needed for Pain 120 tablet 3    metoclopramide (REGLAN) 5 MG tablet Take 1 tablet by mouth 4 times daily (before meals and nightly) 120 tablet 3    aspirin 81 MG chewable tablet Take 4 tablets by mouth daily 30 tablet 3    RANEXA 500 MG extended release tablet TAKE ONE TABLET BY MOUTH TWICE DAILY 180 tablet 3    atorvastatin (LIPITOR) 40 MG tablet Take 1 tablet by mouth nightly 90 tablet 3    metoprolol tartrate (LOPRESSOR) 50 MG tablet Take 1 tablet by mouth nightly 90 tablet 3    polyethylene glycol (GLYCOLAX) powder Take 17 g by mouth as needed (for constipation)      Glucose Blood (BLOOD GLUCOSE TEST STRIPS) STRP Test 4 times daily Diagnosis 100 strip 11    melatonin 3 MG TABS tablet Take 3 mg by mouth nightly          REVIEW OF SYSTEMS:    CONSTITUTIONAL: No major weight gain or loss, fatigue, weakness, night sweats or fever. HEENT: No new vision difficulties or ringing in the ears. RESPIRATORY: See HPI   CARDIOVASCULAR: See HPI  GI: No nausea, vomiting, diarrhea, constipation, abdominal pain or changes in bowel habits. : No urinary frequency, urgency, incontinence hematuria or dysuria. SKIN: No cyanosis or skin lesions. MUSCULOSKELETAL: No new muscle or joint pain. NEUROLOGICAL: No syncope or TIA-like symptoms.   PSYCHIATRIC: No anxiety, pain, insomnia or depression    Objective:     PHYSICAL EXAM:      VITALS:    /83 (Site: Left Arm, Position: Sitting, Cuff Size: Large Adult)   Pulse 87   Ht 5' 5\" (1.651 m)   Wt 177 lb (80.3 kg)   SpO2 94%   BMI 29.45 kg/m²   CONSTITUTIONAL: Cooperative, no apparent distress. NEUROLOGIC:  Awake and orientated to person, place and time. PSYCH: Calm affect. SKIN: Warm and dry. HEENT: Sclera non-icteric, normocephalic, neck supple, no elevation of JVP, normal carotid pulses with no bruits and thyroid normal size. LUNGS:  No increased work of breathing and clear to auscultation, no crackles or wheezing. CARDIOVASCULAR:  Normal heart rate  With regular rhythm,  no murmurs, gallops, rubs, or abnormal heart sounds. Apical systolic murmur without radiation. The carotid upstroke is normal in amplitude and contour without delay or bruit. JVP is not elevated. ABDOMEN:  Normal bowel sounds, non-distended and non-tender to palpation. EXT: 2-3+ bilateral Lower extremity edema present, no calf tenderness. Pulses are present bilaterally. DATA:    Lab Results   Component Value Date    ALT 24 02/23/2018    AST 31 02/23/2018    ALKPHOS 79 02/23/2018    BILITOT 0.71 02/23/2018     Lab Results   Component Value Date    CREATININE 0.67 03/07/2018    BUN 8 03/07/2018     03/07/2018    K 3.8 03/07/2018    CL 99 03/07/2018    CO2 27 03/07/2018     Lab Results   Component Value Date    TSH 62.04 (H) 02/23/2018    I4CTOQR 4.8 02/07/2018     Lab Results   Component Value Date    WBC 3.0 (L) 03/07/2018    HGB 11.5 (L) 03/07/2018    HCT 36.9 03/07/2018    .8 03/07/2018     03/07/2018       Lab Results   Component Value Date    TRIG 272 (H) 02/06/2017    TRIG 351 (H) 06/06/2016    TRIG 246 (H) 02/10/2016     Lab Results   Component Value Date    HDL 40 (L) 02/06/2017    HDL 37 (L) 06/06/2016    HDL 34 (L) 02/10/2016     Lab Results   Component Value Date    LDLCHOLESTEROL 55 02/06/2017    LDLCHOLESTEROL 43 06/06/2016    LDLCHOLESTEROL 67 02/10/2016         Assessment:     1. Tachycardia-bradycardia syndrome ([facility]     2. Biventricular ICD (implantable cardioverter-defibrillator) in place     3. Cardiomyopathy, unspecified type ([address]     4.  Chronic

## 2018-03-07 NOTE — ED PROVIDER NOTES
Allergic/Immunologic: Negative for food allergies and immunocompromised state. Neurological: Negative for dizziness, syncope, weakness and light-headedness. Hematological: Negative for adenopathy. Does not bruise/bleed easily. Psychiatric/Behavioral: Negative for behavioral problems and suicidal ideas. The patient is not nervous/anxious. Except as noted above the remainder of the review of systems was reviewed and negative. PAST MEDICAL HISTORY     Past Medical History:   Diagnosis Date    Allergic rhinitis 10/1994    Asthma     Atrial fibrillation ([facility] 12/2008    CAD (coronary artery disease)     Clotting disorder (HCC)     plebitis in L leg    COPD (chronic obstructive pulmonary disease) ([facility]     DDD (degenerative disc disease), cervical     Degeneration of cervical intervertebral disc     Diverticulosis 2005    Gout     Gout, unspecified     H/O cardiac catheterization 6/17/15    LMCA: Normal 0% stenosis. LAD: Lesion on 1st diag: Proximal subsection. 80% stenosis. Lesion plaque is ruptured. [place] LCx: Lesion on 1st Ob Leslie: Mid subsection. 85% stenosis. RCA:Lesion on R PDA: Mid subsection. 85% stenosis. Lesion on R PDA: Distal subsection. 70% stenosis. Lesion on Prox RCA: Mid subsection. 50% stenosis. EF 50%.  H/O echocardiogram 11/09/2016    EF 40-45%. Mild LV hypertrophy normal LV cavity size. Sigmoid interventricular septum without evidence of outflow tract obstruction. Left atrium is moderately dilated (34-39) left atrial volume index of 36 ml/m2. Mild mitral regurg. Diastology cannot be assessed due to A-fib.  History of Holter monitoring 3/24/16    Atrial Fibrillation throughout,fairly controlled, HR  bpm 79% of the time. Occasional high ventricular rate episodes and multiple pauses suggestive of tachycardia/bradycardia syndrome  Msximum R-R interval 2.68 seconds.     Hx of blood clots     Hyperlipidemia 04/1992    Hypertension 07/1991    Hypothyroidism due to Refills: 3      RANEXA 500 MG extended release tablet TAKE ONE TABLET BY MOUTH TWICE DAILY  Qty: 180 tablet, Refills: 3      atorvastatin (LIPITOR) 40 MG tablet Take 1 tablet by mouth nightly  Qty: 90 tablet, Refills: 3      metoprolol tartrate (LOPRESSOR) 50 MG tablet Take 1 tablet by mouth nightly  Qty: 90 tablet, Refills: 3      polyethylene glycol (GLYCOLAX) powder Take 17 g by mouth as needed (for constipation)      melatonin 3 MG TABS tablet Take 3 mg by mouth nightly       acetaminophen (TYLENOL) 325 MG tablet Take 2 tablets by mouth every 4 hours as needed for Pain  Qty: 120 tablet, Refills: 3      OXYGEN Inhale 2 L into the lungs continuous       Glucose Blood (BLOOD GLUCOSE TEST STRIPS) STRP Test 4 times daily Diagnosis  Qty: 100 strip, Refills: 11             ALLERGIES     Adhesive tape; Aspercreme [trolamine salicylate]; Erythromycin; Guaifenesin & derivatives;  Niacin and related; and Augmentin [amoxicillin-pot clavulanate]    FAMILY HISTORY       Family History   Problem Relation Age of Onset    Heart Disease Mother     Stroke Mother     High Blood Pressure Mother     Cancer Father      lung    Stroke Brother     Heart Disease Maternal Grandmother           SOCIAL HISTORY       Social History     Social History    Marital status:      Spouse name: N/A    Number of children: N/A    Years of education: N/A     Social History Main Topics    Smoking status: Never Smoker    Smokeless tobacco: Never Used    Alcohol use No    Drug use: No    Sexual activity: Not Asked     Other Topics Concern    None     Social History Narrative    None       SCREENINGS    Nathan Coma Scale  Eye Opening: Spontaneous  Best Verbal Response: Oriented  Best Motor Response: Obeys commands  Nathan Coma Scale Score: 15        PHYSICAL EXAM    (up to 7 for level 4, 8 or more for level 5)     ED Triage Vitals [03/07/18 1617]   BP Temp Temp src Pulse Resp SpO2 Height Weight   (!) 146/88 -- -- 80 18 97 % -- -- sent her here did RBC quit him and he has accepted to admit her for further evaluation. Procedures    FINAL IMPRESSION    No diagnosis found. DISPOSITION/PLAN   DISPOSITION Admitted 03/07/2018 06:15:17 PM      PATIENT REFERRED TO:  Everton Bradshaw MD  [phone] [phone]            DISCHARGE MEDICATIONS:  Current Discharge Medication List                 Summation      Patient Course:      ED Medications administered this visit:    Medications   acetaminophen (TYLENOL) tablet 650 mg (not administered)   aspirin chewable tablet 325 mg (325 mg Oral Not Given 3/7/18 1958)   atorvastatin (LIPITOR) tablet 40 mg (40 mg Oral Given 3/7/18 2009)   melatonin tablet 3 mg (3 mg Oral Given 3/7/18 2009)   metoclopramide (REGLAN) tablet 5 mg (5 mg Oral Given 3/7/18 2009)   metoprolol tartrate (LOPRESSOR) tablet 50 mg (50 mg Oral Given 3/7/18 2009)   polyethylene glycol (GLYCOLAX) packet 17 g (not administered)   ranolazine (RANEXA) extended release tablet 500 mg (500 mg Oral Not Given 3/7/18 2010)   sodium chloride flush 0.9 % injection 10 mL (10 mLs Intravenous Given 3/7/18 2009)   sodium chloride flush 0.9 % injection 10 mL (not administered)   acetaminophen (TYLENOL) tablet 650 mg (not administered)   magnesium hydroxide (MILK OF MAGNESIA) 400 MG/5ML suspension 30 mL (not administered)   ondansetron (ZOFRAN) injection 4 mg (not administered)   famotidine (PEPCID) tablet 20 mg (20 mg Oral Given 3/7/18 2009)   enoxaparin (LOVENOX) injection 40 mg (40 mg Subcutaneous Given 3/7/18 2009)   methylPREDNISolone sodium (SOLU-MEDROL) injection 125 mg (125 mg Intravenous Given 3/7/18 2009)   levothyroxine (SYNTHROID) tablet 100 mcg (not administered)       New Prescriptions from this visit:    Current Discharge Medication List          Follow-up:  Everton Bradshaw MD  [address] [id number] [phone]              Final Impression: No diagnosis found.

## 2018-03-07 NOTE — H&P
affect  -----------------------------------------------------------------  Diagnostic Data:   · All diagnostic data was reviewed    Assessment:         Principal Problem:    Addisonian crisis ([address]  Active Problems:    Pulmonary HTN    Obstructive sleep apnea    Type 2 diabetes mellitus without complication (HCC)    Ischemic cardiomyopathy    Pulmonary fibrosis (HCC)    ASHD (arteriosclerotic heart disease)    Interstitial lung disease (HCC)    Vasodepressor syncope    Chronic combined systolic and diastolic CHF (congestive heart failure) (HCC)    Hypothyroidism due to amiodarone    Amiodarone toxicity  Resolved Problems:    * No resolved hospital problems.  *      Plan:     · This patient requires inpatient admission because of   I suspect addisonian type picture due to rather abrupt discontinuation of steroids after [prollnged high dose treatment  In addition she is sufferkng from amiodarone toxicitiy  She may as well have recurrence of her pulmonary fibrotic process as well  Her cardiac status may indeed be contributiing to her ill feelings as well  · Factors affecting the medical complexity of this patient include multi system disease and prolonged illness  · Estimated length of stay is 4 days  · Will get pulmonary aond cardiac consults    Vicky Liao MD

## 2018-03-08 PROBLEM — E44.1 MILD MALNUTRITION (HCC): Status: ACTIVE | Noted: 2018-03-08

## 2018-03-08 LAB
ABSOLUTE EOS #: 0.05 K/UL (ref 0–0.44)
ABSOLUTE IMMATURE GRANULOCYTE: 0 K/UL (ref 0–0.3)
ABSOLUTE LYMPH #: 0.62 K/UL (ref 1.1–3.7)
ABSOLUTE MONO #: 0.05 K/UL (ref 0.1–1.2)
ANION GAP SERPL CALCULATED.3IONS-SCNC: 12 MMOL/L (ref 9–17)
BASOPHILS # BLD: 0 % (ref 0–2)
BASOPHILS ABSOLUTE: 0 K/UL (ref 0–0.2)
BUN BLDV-MCNC: 8 MG/DL (ref 8–23)
BUN/CREAT BLD: 12 (ref 9–20)
CALCIUM SERPL-MCNC: 8.2 MG/DL (ref 8.6–10.4)
CHLORIDE BLD-SCNC: 96 MMOL/L (ref 98–107)
CO2: 29 MMOL/L (ref 20–31)
CREAT SERPL-MCNC: 0.67 MG/DL (ref 0.5–0.9)
DIFFERENTIAL TYPE: ABNORMAL
EOSINOPHILS RELATIVE PERCENT: 2 % (ref 1–4)
GFR AFRICAN AMERICAN: >60 ML/MIN
GFR NON-AFRICAN AMERICAN: >60 ML/MIN
GFR SERPL CREATININE-BSD FRML MDRD: ABNORMAL ML/MIN/{1.73_M2}
GFR SERPL CREATININE-BSD FRML MDRD: ABNORMAL ML/MIN/{1.73_M2}
GLUCOSE BLD-MCNC: 191 MG/DL (ref 70–99)
HCT VFR BLD CALC: 37.6 % (ref 36.3–47.1)
HEMOGLOBIN: 11.6 G/DL (ref 11.9–15.1)
IMMATURE GRANULOCYTES: 0 %
LYMPHOCYTES # BLD: 23 % (ref 24–43)
MCH RBC QN AUTO: 31.5 PG (ref 25.2–33.5)
MCHC RBC AUTO-ENTMCNC: 30.9 G/DL (ref 28.4–34.8)
MCV RBC AUTO: 102.2 FL (ref 82.6–102.9)
MONOCYTES # BLD: 2 % (ref 3–12)
MORPHOLOGY: ABNORMAL
NRBC AUTOMATED: 0 PER 100 WBC
PDW BLD-RTO: 15.4 % (ref 11.8–14.4)
PLATELET # BLD: 225 K/UL (ref 138–453)
PLATELET ESTIMATE: ABNORMAL
PMV BLD AUTO: 9.5 FL (ref 8.1–13.5)
POTASSIUM SERPL-SCNC: 3.8 MMOL/L (ref 3.7–5.3)
RBC # BLD: 3.68 M/UL (ref 3.95–5.11)
RBC # BLD: ABNORMAL 10*6/UL
SEG NEUTROPHILS: 73 % (ref 36–65)
SEGMENTED NEUTROPHILS ABSOLUTE COUNT: 1.98 K/UL (ref 1.5–8.1)
SODIUM BLD-SCNC: 137 MMOL/L (ref 135–144)
WBC # BLD: 2.7 K/UL (ref 3.5–11.3)
WBC # BLD: ABNORMAL 10*3/UL

## 2018-03-08 PROCEDURE — 97162 PT EVAL MOD COMPLEX 30 MIN: CPT

## 2018-03-08 PROCEDURE — 2000000000 HC ICU R&B

## 2018-03-08 PROCEDURE — 99221 1ST HOSP IP/OBS SF/LOW 40: CPT | Performed by: INTERNAL MEDICINE

## 2018-03-08 PROCEDURE — G8988 SELF CARE GOAL STATUS: HCPCS

## 2018-03-08 PROCEDURE — 97166 OT EVAL MOD COMPLEX 45 MIN: CPT

## 2018-03-08 PROCEDURE — 96372 THER/PROPH/DIAG INJ SC/IM: CPT

## 2018-03-08 PROCEDURE — 6370000000 HC RX 637 (ALT 250 FOR IP): Performed by: INTERNAL MEDICINE

## 2018-03-08 PROCEDURE — 96376 TX/PRO/DX INJ SAME DRUG ADON: CPT

## 2018-03-08 PROCEDURE — 97110 THERAPEUTIC EXERCISES: CPT

## 2018-03-08 PROCEDURE — 97116 GAIT TRAINING THERAPY: CPT

## 2018-03-08 PROCEDURE — 2580000003 HC RX 258: Performed by: FAMILY MEDICINE

## 2018-03-08 PROCEDURE — 85025 COMPLETE CBC W/AUTO DIFF WBC: CPT

## 2018-03-08 PROCEDURE — G8978 MOBILITY CURRENT STATUS: HCPCS

## 2018-03-08 PROCEDURE — G8979 MOBILITY GOAL STATUS: HCPCS

## 2018-03-08 PROCEDURE — 36415 COLL VENOUS BLD VENIPUNCTURE: CPT

## 2018-03-08 PROCEDURE — 6360000002 HC RX W HCPCS: Performed by: FAMILY MEDICINE

## 2018-03-08 PROCEDURE — 6370000000 HC RX 637 (ALT 250 FOR IP): Performed by: FAMILY MEDICINE

## 2018-03-08 PROCEDURE — 80048 BASIC METABOLIC PNL TOTAL CA: CPT

## 2018-03-08 PROCEDURE — G8987 SELF CARE CURRENT STATUS: HCPCS

## 2018-03-08 RX ORDER — MIDODRINE HYDROCHLORIDE 5 MG/1
5 TABLET ORAL
Status: DISCONTINUED | OUTPATIENT
Start: 2018-03-08 | End: 2018-03-09 | Stop reason: HOSPADM

## 2018-03-08 RX ADMIN — ASPIRIN 81 MG 324 MG: 81 TABLET ORAL at 09:41

## 2018-03-08 RX ADMIN — FAMOTIDINE 20 MG: 20 TABLET, FILM COATED ORAL at 20:48

## 2018-03-08 RX ADMIN — METOCLOPRAMIDE 5 MG: 5 TABLET ORAL at 20:48

## 2018-03-08 RX ADMIN — METHYLPREDNISOLONE SODIUM SUCCINATE 125 MG: 125 INJECTION, POWDER, FOR SOLUTION INTRAMUSCULAR; INTRAVENOUS at 11:04

## 2018-03-08 RX ADMIN — RANOLAZINE 500 MG: 500 TABLET, EXTENDED RELEASE ORAL at 20:57

## 2018-03-08 RX ADMIN — FAMOTIDINE 20 MG: 20 TABLET, FILM COATED ORAL at 09:41

## 2018-03-08 RX ADMIN — Medication 10 ML: at 11:05

## 2018-03-08 RX ADMIN — Medication 10 ML: at 19:12

## 2018-03-08 RX ADMIN — ATORVASTATIN CALCIUM 40 MG: 40 TABLET, FILM COATED ORAL at 20:48

## 2018-03-08 RX ADMIN — METHYLPREDNISOLONE SODIUM SUCCINATE 125 MG: 125 INJECTION, POWDER, FOR SOLUTION INTRAMUSCULAR; INTRAVENOUS at 04:00

## 2018-03-08 RX ADMIN — METOCLOPRAMIDE 5 MG: 5 TABLET ORAL at 17:08

## 2018-03-08 RX ADMIN — METOCLOPRAMIDE 5 MG: 5 TABLET ORAL at 07:16

## 2018-03-08 RX ADMIN — Medication 10 ML: at 04:23

## 2018-03-08 RX ADMIN — POLYETHYLENE GLYCOL 3350 17 G: 17 POWDER, FOR SOLUTION ORAL at 20:54

## 2018-03-08 RX ADMIN — METOPROLOL TARTRATE 25 MG: 25 TABLET ORAL at 20:48

## 2018-03-08 RX ADMIN — METOCLOPRAMIDE 5 MG: 5 TABLET ORAL at 11:04

## 2018-03-08 RX ADMIN — ENOXAPARIN SODIUM 40 MG: 40 INJECTION SUBCUTANEOUS at 09:41

## 2018-03-08 RX ADMIN — METHYLPREDNISOLONE SODIUM SUCCINATE 125 MG: 125 INJECTION, POWDER, FOR SOLUTION INTRAMUSCULAR; INTRAVENOUS at 19:12

## 2018-03-08 RX ADMIN — Medication 10 ML: at 09:55

## 2018-03-08 RX ADMIN — Medication 3 MG: at 20:48

## 2018-03-08 RX ADMIN — MIDODRINE HYDROCHLORIDE 5 MG: 5 TABLET ORAL at 20:56

## 2018-03-08 RX ADMIN — LEVOTHYROXINE SODIUM 100 MCG: 100 TABLET ORAL at 07:16

## 2018-03-08 ASSESSMENT — PAIN SCALES - GENERAL
PAINLEVEL_OUTOF10: 0

## 2018-03-08 NOTE — CONSULTS
[facility]    BRONCHOSCOPY  6/7/2017    BRONCHOSCOPY/TRANSBRONCHIAL LUNG BIOPSY performed by Lady Sixto DO at [address]  6/7/2017    BRONCHOSCOPY FLUOROSCOPY performed by Lady Sixto DO at [address] Right 06/17/2015    CATARACT REMOVAL WITH IMPLANT Right 08/14/2017    DR ROMERO    COLONOSCOPY  1/2005    DIAGNOSTIC CARDIAC CATH LAB PROCEDURE  06/17/15    EYE SURGERY      FRACTURE SURGERY  2004    Distal Radius Ulna    LOBECTOMY Left 6/14/2017    MINI PORT ACCESS LEFT CHEST, X2 SPECIMENS.  performed by Natasha Smyth MD at [address] [place] [address]  2years old    VOLVAR-ULCERATIVE LESION-CAUTERIZED    RI EGD TRANSORAL BIOPSY SINGLE/MULTIPLE Left 2/13/2018    EGD BIOPSY performed by Juan Mendez MD at [facility]    SKIN BIOPSY      TONSILLECTOMY AND ADENOIDECTOMY         MEDS   Current Medications    aspirin  325 mg Oral Daily    atorvastatin  40 mg Oral Nightly    melatonin  3 mg Oral Nightly    metoclopramide  5 mg Oral 4x Daily AC & HS    metoprolol tartrate  50 mg Oral Nightly    ranolazine  500 mg Oral BID    sodium chloride flush  10 mL Intravenous 2 times per day    famotidine  20 mg Oral BID    enoxaparin  40 mg Subcutaneous Daily    methylPREDNISolone sodium  125 mg Intravenous Q8H    levothyroxine  100 mcg Oral Daily     acetaminophen, polyethylene glycol, sodium chloride flush, acetaminophen, magnesium hydroxide, ondansetron  IV Drips/Infusions    Home Medications  Prescriptions Prior to Admission: metoclopramide (REGLAN) 5 MG tablet, Take 1 tablet by mouth 4 times daily (before meals and nightly)  aspirin 81 MG chewable tablet, Take 4 tablets by mouth daily  RANEXA 500 MG extended release tablet, TAKE ONE TABLET BY MOUTH TWICE DAILY  atorvastatin (LIPITOR) 40 MG tablet, Take 1 tablet by mouth nightly  metoprolol tartrate (LOPRESSOR) 50 MG tablet, Take 1 tablet by mouth nightly  polyethylene Av, Min:64, Max:105    Pulse Range: Pulse  Av.7  Min: 78  Max: 128  Respiration Range: Resp  Av.2  Min: 8  Max: 27  Current Pulse Ox[de-identified]  SpO2: 94 %  24HR Pulse Ox Range:  SpO2  Av.9 %  Min: 91 %  Max: 99 %  Oxygen Amount and Delivery: O2 Flow Rate (L/min): 2 L/min    Wt Readings from Last 3 Encounters:   18 179 lb 1.6 oz (81.2 kg)   18 177 lb (80.3 kg)   18 173 lb 12.8 oz (78.8 kg)       I/O (24 Hours)    Intake/Output Summary (Last 24 hours) at 18 1729  Last data filed at 18 1725   Gross per 24 hour   Intake             1200 ml   Output              300 ml   Net              900 ml       EXAM     General Appearance  Awake, alert, oriented, in no acute distress  HEENT - Normal, Head is normocephalic, atraumatic. EOMI, PERRLA  Mallampati Score - I (soft palate, uvula, fauces, tonsillar pillars visible)  Neck - Supple, symmetrical, trachea midline and Soft, trachea midline and straight  Lungs - positive findings: rales bibasilar, Chest expands equally bilaterally upon respiration, no accessory muscle used. Ausculation reveals no wheezes, rales or rhonchi. Cardiovascular - Heart sounds are normal.  Regular rhythm normal rate without murmur, gallop or rub. Abdomen - Soft, nontender, nondistended, no masses or organomegaly  Neurologic - CN II-XII are grossly intact.  There are no focal motor or sensory deficits  Skin - No bruising or bleeding  Extremities - No cyanosis, clubbing or edema    DATA   Old records and notes have been reviewed in [facility]    CBC   Lab Results   Component Value Date    WBC 2.7 2018    RBC 3.68 2018    RBC 4.98 2012    HGB 11.6 2018    HCT 37.6 2018     2018     2012    .2 2018    MCH 31.5 2018    MCHC 30.9 2018    RDW 15.4 2018    NRBC 0 2018    SEGSPCT 75.1 2018    LYMPHOPCT 23 2018    MONOPCT 2 2018    MYELOPCT 1 02/10/2018    BASOPCT abnormality or worrisome osseous lesions identified.           Impression   Impression:     Suggested pulmonary fibrosis.  No focal airspace disease.        Sequela of heart failure.        Please see CT abdomen and pelvis of the same date.            Electronically Signed By: Mirna Kehr   on  02/10/2018  09:09       (See actual reports for details)    \"Thank you for the kind referral. If any questions or problems arise, please don't hesitate to contact us\"    Electronically signed by Della Maloney DO on 3/8/2018 at 5:29 PM

## 2018-03-08 NOTE — PROGRESS NOTES
History  Social/Functional History  Lives With: Family  Home Layout: One level  Bathroom Shower/Tub: Tub/Shower unit  Bathroom Equipment: Grab bars in shower, Shower chair  Home Equipment: Rolling walker  ADL Assistance: [address]: Needs assistance  Ambulation Assistance: Independent  Transfer Assistance: Independent  Additional Comments: Pt. reports she was (I) with all ADL's prior, uses RW for functional mobility. Pt. states she has been at [address] [place] [address] for therapy the past 2-3 weeks prior to hospitalization. Objective   Vision: Within Functional Limits  Hearing: Within functional limits    Orientation  Overall Orientation Status: Within Functional Limits     Standing Balance  Sit to stand: Minimal assistance (min A from lower surfaces, CGA from chair with arms)  Stand to sit: Contact guard assistance  Functional Mobility  Functional - Mobility Device: Rolling Walker  Assist Level: Contact guard assistance  ADL  Feeding: Independent  Grooming: Contact guard assistance  UE Bathing: Stand by assistance  LE Bathing: Minimal assistance  UE Dressing: Stand by assistance  LE Dressing: Minimal assistance  Toileting: Minimal assistance     Transfers  Sit to stand: Minimal assistance (min A from lower surfaces, CGA from chair with arms)  Stand to sit: Contact guard assistance     Cognition  Overall Cognitive Status: WFL     LUE AROM (degrees)  LUE AROM : WFL  RUE AROM (degrees)  RUE AROM : WFL  LUE Strength  Gross LUE Strength: WFL  RUE Strength  Gross RUE Strength: WFL     Assessment   Performance deficits / Impairments: Decreased functional mobility ; Decreased ADL status; Decreased strength;Decreased endurance;Decreased high-level IADLs  Prognosis: Good  Decision Making: Medium Complexity  Discharge Recommendations: Continue to assess pending progress;ECF with OT;Patient would benefit from continued therapy after discharge  REQUIRES OT FOLLOW UP: Yes  Activity Tolerance  Activity Tolerance: Patient Tolerated treatment well  Activity Tolerance: pt. reports SOB with activity, SpO2 95-96% following functional mobility on room air  Safety Devices  Safety Devices in place: Yes  Type of devices: Left in chair;Call light within reach        Discharge Recommendations:  Continue to assess pending progress, ECF with OT, Patient would benefit from continued therapy after discharge     Plan   Plan  Times per week: 7x/wk  Times per day: Daily  Current Treatment Recommendations: Strengthening, Balance Training, Functional Mobility Training, Safety Education & Training, Self-Care / ADL    G-Code  OT G-codes  Functional Limitation: Self care  Self Care Current Status (): At least 40 percent but less than 60 percent impaired, limited or restricted  Self Care Goal Status (): At least 20 percent but less than 40 percent impaired, limited or restricted    Goals  Short term goals  Time Frame for Short term goals: 3-7 days   Short term goal 1: Pt. will tolerate 15 mins of BUE ther ex/act with decreased SOB to increase overall strength/activity tolerance for functional tasks. Short term goal 2: Pt. will complete bathing/dressing tasks with CGA with use of AE PRN to increase (I). Short term goal 3: Pt. will complete toileting tasks with SBA with reduced risk for falls. Short term goal 4: Pt. will demo G safety awareness during functional ADL transfers/mobility for safe return to PLOF. Short term goal 5: Pt. will verbalize 5 EC/WS techniques for ease with ADL tasks.        Therapy Time   Individual Concurrent Group Co-treatment   Time In [address] [facility]         Time Out 0903         Minutes [address] [place], [place]

## 2018-03-08 NOTE — PLAN OF CARE
Problem: Falls - Risk of  Goal: Absence of falls  Outcome: Ongoing  Patient is alert and oriented and has demonstrated the use of using the call light for assistance before getting up. Education has been provided to defer the use of the bed alarm and/or restraint free alarm as the patient has shown competency in calling for assistance and verbalizing the risk. Problem: Fluid Volume:  Goal: Hemodynamic stability will improve  Hemodynamic stability will improve   Outcome: Ongoing  Pro BNP elevated at 1,375. Intake and output recorded along with daily weight. Edema to BLE 1+ pitting. GATITO hose in place. IV lock. Problem: SAFETY  Goal: Free from accidental physical injury  Outcome: Ongoing  Patient remain free from injury. Fall risk assessment complete. Bed in low position with bed wheels locked and bed alarm on. Call light within reach. ID band and fall band on. Problem: SKIN INTEGRITY  Goal: Skin integrity is maintained or improved  Outcome: Ongoing  Bruising noted to BUE. No open areas noted. Rajinder scale complete. Patient self turn in bed. Problem: Gas Exchange - Impaired:  Goal: Levels of oxygenation will improve  Levels of oxygenation will improve   Outcome: Ongoing  Continuous pulse ox reading remain greater than 92% on room air. No dyspnea at rest or with exertion. Breathing treatments administered as ordered and PRN.

## 2018-03-08 NOTE — PLAN OF CARE
Problem: Falls - Risk of  Goal: Absence of falls  Outcome: Ongoing  Pt up with assistance and walker, call light in reach, instructed pt to use call light for assistance, alarm on, will continue to monitor    Problem: SAFETY  Goal: Free from accidental physical injury  Outcome: Ongoing  Safety maintained, will continue to monitor  Goal: Free from intentional harm  Outcome: Completed Date Met: 03/08/18      Problem: SKIN INTEGRITY  Goal: Skin integrity is maintained or improved  Outcome: Ongoing  Skin intact except for small open area to left buttocks, will continue to monitor

## 2018-03-08 NOTE — PLAN OF CARE
Problem: Nutrition  Goal: Optimal nutrition therapy  Outcome: Ongoing  Nutrition Problem: Unintended weight loss  Intervention: Food and/or Nutrient Delivery: Continue current diet  Nutritional Goals: PO >75% meals with good protein choices  Encourage protein foods

## 2018-03-09 ENCOUNTER — APPOINTMENT (OUTPATIENT)
Dept: NON INVASIVE DIAGNOSTICS | Age: 78
DRG: 644 | End: 2018-03-09
Payer: MEDICARE

## 2018-03-09 VITALS
HEART RATE: 82 BPM | DIASTOLIC BLOOD PRESSURE: 78 MMHG | TEMPERATURE: 97.2 F | SYSTOLIC BLOOD PRESSURE: 112 MMHG | HEIGHT: 65 IN | OXYGEN SATURATION: 98 % | WEIGHT: 178.8 LBS | BODY MASS INDEX: 29.79 KG/M2 | RESPIRATION RATE: 16 BRPM

## 2018-03-09 PROBLEM — I50.32 CHRONIC DIASTOLIC CHF (CONGESTIVE HEART FAILURE) (HCC): Chronic | Status: ACTIVE | Noted: 2018-03-09

## 2018-03-09 PROBLEM — J96.11 CHRONIC RESPIRATORY FAILURE WITH HYPOXIA (HCC): Chronic | Status: ACTIVE | Noted: 2018-03-09

## 2018-03-09 LAB
ANION GAP SERPL CALCULATED.3IONS-SCNC: 11 MMOL/L (ref 9–17)
BUN BLDV-MCNC: 10 MG/DL (ref 8–23)
BUN/CREAT BLD: 19 (ref 9–20)
CALCIUM SERPL-MCNC: 8 MG/DL (ref 8.6–10.4)
CHLORIDE BLD-SCNC: 100 MMOL/L (ref 98–107)
CO2: 29 MMOL/L (ref 20–31)
CREAT SERPL-MCNC: 0.52 MG/DL (ref 0.5–0.9)
GFR AFRICAN AMERICAN: >60 ML/MIN
GFR NON-AFRICAN AMERICAN: >60 ML/MIN
GFR SERPL CREATININE-BSD FRML MDRD: ABNORMAL ML/MIN/{1.73_M2}
GFR SERPL CREATININE-BSD FRML MDRD: ABNORMAL ML/MIN/{1.73_M2}
GLUCOSE BLD-MCNC: 173 MG/DL (ref 70–99)
LV EF: 48 %
LVEF MODALITY: NORMAL
POTASSIUM SERPL-SCNC: 3.9 MMOL/L (ref 3.7–5.3)
SODIUM BLD-SCNC: 140 MMOL/L (ref 135–144)
THYROXINE, FREE: 0.44 NG/DL (ref 0.93–1.7)
TSH SERPL DL<=0.05 MIU/L-ACNC: 14.18 MIU/L (ref 0.3–5)

## 2018-03-09 PROCEDURE — 97116 GAIT TRAINING THERAPY: CPT

## 2018-03-09 PROCEDURE — 2580000003 HC RX 258: Performed by: FAMILY MEDICINE

## 2018-03-09 PROCEDURE — 93306 TTE W/DOPPLER COMPLETE: CPT

## 2018-03-09 PROCEDURE — 6360000002 HC RX W HCPCS: Performed by: FAMILY MEDICINE

## 2018-03-09 PROCEDURE — 6370000000 HC RX 637 (ALT 250 FOR IP): Performed by: INTERNAL MEDICINE

## 2018-03-09 PROCEDURE — 96376 TX/PRO/DX INJ SAME DRUG ADON: CPT

## 2018-03-09 PROCEDURE — 97110 THERAPEUTIC EXERCISES: CPT

## 2018-03-09 PROCEDURE — 6370000000 HC RX 637 (ALT 250 FOR IP): Performed by: FAMILY MEDICINE

## 2018-03-09 PROCEDURE — 84439 ASSAY OF FREE THYROXINE: CPT

## 2018-03-09 PROCEDURE — 96372 THER/PROPH/DIAG INJ SC/IM: CPT

## 2018-03-09 PROCEDURE — 80048 BASIC METABOLIC PNL TOTAL CA: CPT

## 2018-03-09 PROCEDURE — 36415 COLL VENOUS BLD VENIPUNCTURE: CPT

## 2018-03-09 PROCEDURE — 84443 ASSAY THYROID STIM HORMONE: CPT

## 2018-03-09 RX ORDER — LEVOTHYROXINE SODIUM 0.1 MG/1
100 TABLET ORAL DAILY
Qty: 30 TABLET | Refills: 3 | Status: SHIPPED | OUTPATIENT
Start: 2018-03-09 | End: 2018-03-09

## 2018-03-09 RX ORDER — MIDODRINE HYDROCHLORIDE 5 MG/1
5 TABLET ORAL
Qty: 90 TABLET | Refills: 0 | Status: SHIPPED | OUTPATIENT
Start: 2018-03-09 | End: 2018-04-16 | Stop reason: SDUPTHER

## 2018-03-09 RX ORDER — LEVOTHYROXINE SODIUM 0.1 MG/1
100 TABLET ORAL DAILY
Qty: 30 TABLET | Refills: 0 | Status: SHIPPED | OUTPATIENT
Start: 2018-03-09 | End: 2018-05-14 | Stop reason: DRUGHIGH

## 2018-03-09 RX ORDER — MIDODRINE HYDROCHLORIDE 5 MG/1
5 TABLET ORAL
Qty: 90 TABLET | Refills: 3 | Status: SHIPPED | OUTPATIENT
Start: 2018-03-09 | End: 2018-03-09

## 2018-03-09 RX ADMIN — LEVOTHYROXINE SODIUM 100 MCG: 100 TABLET ORAL at 07:23

## 2018-03-09 RX ADMIN — RANOLAZINE 500 MG: 500 TABLET, EXTENDED RELEASE ORAL at 08:41

## 2018-03-09 RX ADMIN — METOCLOPRAMIDE 5 MG: 5 TABLET ORAL at 07:23

## 2018-03-09 RX ADMIN — Medication 10 ML: at 08:39

## 2018-03-09 RX ADMIN — ASPIRIN 81 MG 325 MG: 81 TABLET ORAL at 08:35

## 2018-03-09 RX ADMIN — ENOXAPARIN SODIUM 40 MG: 40 INJECTION SUBCUTANEOUS at 08:36

## 2018-03-09 RX ADMIN — MIDODRINE HYDROCHLORIDE 5 MG: 5 TABLET ORAL at 08:36

## 2018-03-09 RX ADMIN — METOCLOPRAMIDE 5 MG: 5 TABLET ORAL at 10:29

## 2018-03-09 RX ADMIN — METHYLPREDNISOLONE SODIUM SUCCINATE 125 MG: 125 INJECTION, POWDER, FOR SOLUTION INTRAMUSCULAR; INTRAVENOUS at 10:29

## 2018-03-09 RX ADMIN — METHYLPREDNISOLONE SODIUM SUCCINATE 125 MG: 125 INJECTION, POWDER, FOR SOLUTION INTRAMUSCULAR; INTRAVENOUS at 03:50

## 2018-03-09 RX ADMIN — Medication 10 ML: at 03:50

## 2018-03-09 RX ADMIN — MIDODRINE HYDROCHLORIDE 5 MG: 5 TABLET ORAL at 10:29

## 2018-03-09 RX ADMIN — FAMOTIDINE 20 MG: 20 TABLET, FILM COATED ORAL at 08:36

## 2018-03-09 ASSESSMENT — PAIN SCALES - GENERAL
PAINLEVEL_OUTOF10: 0

## 2018-03-09 NOTE — CONSULTS
Patient: Ben Mathur  : 1940  Date of Admission: 3/7/2018  Primary Care Physician: Jennifer Blanchard  Today's Date: 3/8/2018    REASON FOR CONSULTATION: Other (sent from Dr. Srini Dougherty office for [facility] difficulty ambulating)    Ben Mathur is 65 y.o.  with PMH of hypertension, hyperlipidemia, asthma and chronic atrial fibrillation. Her most recent cardiac catheterization showed moderate to severe three-vessel coronary artery disease as outlined below.     1-Patient's chronic atrial fibrillation is  Treated with rate control and anticoagulation with coumadin. Her Holter monitor on 2015 suggested tachycardiabradycardia syndrome.     2-On 3/16/2016, patient was sent from cardiac rehab because of not feeling well. Imdur 30 mg daily was added as well as Ranexa 500 mg BID.     3-Patient admitted to [facility] from 6/3/2017 to 6/15/2017 with worsening shortness of breath diagnosed with pneumonitis, status post lung biopsy.       4-Another admission to [facility] from 2017 to 2017 with A. Fib with RVR and acute on chronic diastolic CHF. Patient started on amiodarone during that admission in addition to her ratel control and diuresis.     5-Another admission to [facility] from 2017 to 2017 with recurrent fall and black eye. 3 falling episodes, 2 of them she hit her head. She is not sure if she lost consciousness. MRI of the brain was negative.     6-Patient underwent AV node ablation and insertion of biventricular ICD by Dr. Chantal Pappas on 8/10/2017.     7-On 2017 patient underwent insertion of a Watchman device. currently off anticoagulation     8-Amiodarone later stopped because of amiodarone-induced lung toxicity.       She is admitted to the hospital because of fatigue and orthostatic hypotension. She also has significant bilateral lower extremity edema. She denies any chest pain, pressure or tightness.   She is short of breath on mild osteoporosis 2006    L/S    Symptomatic menopausal or female climacteric states 06/1995    Syncope and collapse     Unspecified hereditary and idiopathic peripheral neuropathy 2012    Unspecified sleep apnea 11/2009       CURRENT ALLERGIES: Adhesive tape; Aspercreme [trolamine salicylate]; Erythromycin; Guaifenesin & derivatives; Niacin and related; and Augmentin [amoxicillin-pot clavulanate] REVIEW OF SYSTEMS: 14 systems were reviewed. Pertinent positives and negatives as above, all else negative. Past Surgical History:   Procedure Laterality Date    BRONCHOSCOPY  6/7/2017    BRONCHOSCOPY BRUSHINGS performed by Melissa Mendez DO at [facility]    BRONCHOSCOPY  6/7/2017    BRONCHOSCOPY/TRANSBRONCHIAL LUNG BIOPSY performed by Melissa Mendez DO at [facility]  6/7/2017    BRONCHOSCOPY FLUOROSCOPY performed by Melissa Mendez DO at [address] Right 06/17/2015    CATARACT REMOVAL WITH IMPLANT Right 08/14/2017    DR ROMERO    COLONOSCOPY  1/2005    DIAGNOSTIC CARDIAC CATH LAB PROCEDURE  06/17/15    EYE SURGERY      FRACTURE SURGERY  2004    Distal Radius Ulna    LOBECTOMY Left 6/14/2017    MINI PORT ACCESS LEFT CHEST, X2 SPECIMENS.  performed by Cailin Carvalho MD at [address] [facility]  2years old    VOLVAR-ULCERATIVE LESION-CAUTERIZED    NE EGD TRANSORAL BIOPSY SINGLE/MULTIPLE Left 2/13/2018    EGD BIOPSY performed by Octavia Gutierrez MD at [address] [facility]      Social History:  Social History   Substance Use Topics    Smoking status: Never Smoker    Smokeless tobacco: Never Used    Alcohol use No        CURRENT MEDICATIONS:  Outpatient Prescriptions Marked as Taking for the 3/7/18 encounter [facility] Encounter)   Medication Sig Dispense Refill    metoclopramide (REGLAN) 5 MG tablet Take 1 tablet by mouth 4 times daily (before meals and nightly) 120 tablet 3    aspirin 81 MG chewable tablet Take 4 tablets by mouth daily 30 tablet 3    RANEXA 500 MG extended release tablet TAKE ONE TABLET BY MOUTH TWICE DAILY 180 tablet 3    atorvastatin (LIPITOR) 40 MG tablet Take 1 tablet by mouth nightly 90 tablet 3    metoprolol tartrate (LOPRESSOR) 50 MG tablet Take 1 tablet by mouth nightly 90 tablet 3    polyethylene glycol (GLYCOLAX) powder Take 17 g by mouth as needed (for constipation)      melatonin 3 MG TABS tablet Take 3 mg by mouth nightly         metoprolol tartrate  25 mg Oral Nightly    midodrine  5 mg Oral TID WC    aspirin  325 mg Oral Daily    atorvastatin  40 mg Oral Nightly    melatonin  3 mg Oral Nightly    metoclopramide  5 mg Oral 4x Daily AC & HS    ranolazine  500 mg Oral BID    sodium chloride flush  10 mL Intravenous 2 times per day    famotidine  20 mg Oral BID    enoxaparin  40 mg Subcutaneous Daily    methylPREDNISolone sodium  125 mg Intravenous Q8H    levothyroxine  100 mcg Oral Daily         FAMILY HISTORY: family history includes Cancer in her father; Heart Disease in her maternal grandmother and mother; High Blood Pressure in her mother; Stroke in her brother and mother. PHYSICAL EXAM:   /67   Pulse 80   Temp 97 °F (36.1 °C) (Temporal)   Resp 12   Ht 5' 5\" (1.651 m)   Wt 179 lb 1.6 oz (81.2 kg)   SpO2 95%   BMI 29.80 kg/m²  Body mass index is 29.8 kg/m². Constitutional: She is oriented to person, place, and time. In no acute distress. HEENT: Normocephalic and atraumatic. No JVD present. Carotid bruit is not present. No mass and no thyromegaly present. No lymphadenopathy present. Cardiovascular: Normal rate, regular rhythm, normal heart sounds and intact distal pulses. Exam reveals no gallop and no friction rub. Systolic murmur at the left lower sternal border without radiation. Pulmonary/Chest: Diffuse bilateral medium sized crackles extending from the base up to mid lungs. No wheezes or rhonchi.  Abdominal: Soft, non-tender. Bowel sounds and aorta are normal. She exhibits no organomegaly, mass or bruit. Extremities: 2-3+ bilateral Lower extremity edema present, no calf tenderness. Neurological: She is alert and oriented to person, place, and time. No evidence of gross cranial nerve deficit. Coordination appeared normal.   Skin: Skin is warm and dry. There is no rash or diaphoresis. Psychiatric: She has a normal mood and affect. Her speech is normal and behavior is normal.      MOST RECENT LABS ON RECORD:   Lab Results   Component Value Date    WBC 2.7 (L) 03/08/2018    HGB 11.6 (L) 03/08/2018    HCT 37.6 03/08/2018     03/08/2018    CHOL 149 02/06/2017    TRIG 272 (H) 02/06/2017    HDL 40 (L) 02/06/2017    LDLDIRECT 84 09/11/2014    ALT 24 02/23/2018    AST 31 02/23/2018     03/08/2018    K 3.8 03/08/2018    CL 96 (L) 03/08/2018    CREATININE 0.67 03/08/2018    BUN 8 03/08/2018    CO2 29 03/08/2018    TSH 62.04 (H) 02/23/2018    INR 1.04 02/20/2018    LABA1C 5.3 02/11/2018    LABMICR 22 02/10/2016       ASSESSMENT:  Coronary artery disease. Chronic atrial fibrillation. Biventricular ICD in place. Orthostatic hypotension. Chronic diastolic CHF. PLAN:  Very complicated medical history as outlined in HPI. Currently with fatigue, orthostatic hypotension, hypothyroidism and acute on chronic CHF. Feeling a little better today. Not sure if this is the effect of high-dose steroids or not. She was able to take a few steps with her walker, no significant weakness. I will follow-up orthostatics every 12 hours ×2. Decrease metoprolol to 25 mg twice a day. Start midodrine 5 mg 3 times daily. Continue aspirin and Lipitor. PT/OT. Counseled regarding using compression stockings. I'm not going to re-start her on diuretic at this point I'm afraid it will worsen her orthostatic hypotension. Recheck TSH tomorrow morning. Follow-up echo.       Once again, thank you for allowing me to participate in this

## 2018-03-09 NOTE — DISCHARGE SUMMARY
biventricular pacemaker/ICD AVN ablation    Phlebitis and thrombophlebitis of lower extremities, unspecified 1961    L leg    Senile osteoporosis 2006    L/S    Symptomatic menopausal or female climacteric states 06/1995    Syncope and collapse     Unspecified hereditary and idiopathic peripheral neuropathy 2012    Unspecified sleep apnea 11/2009       Physical exam:      -----------------------------------------------------------------  Exam:  GEN:   A & O x3, no apparent distress  EYES: No gross abnormalities. NECK: normal,  no carotid bruits  PULM: clear to auscultation bilaterally- no wheezes, rales or rhonchi, normal air movement, no respiratory distress  COR: regular rate & rhythm, no murmurs and no gallops  ABD:  soft, non-tender, non-distended, normal bowel sounds, no masses or organomegaly  EXT:   no cyanosis, clubbing or edema present    NEURO: negative  SKIN:  no rashes or significant lesions  -----------------------------------------------------------------          Hospital Course: The patient was admitted for the above. She was treated with midocrine, fluid, decreased dose of metoprol and improved over the course of her hospitalization. Consultants:    · Pulmonary  · cardiology    Procedures:    · none    Complications:   · none    Significant Diagnostic Studies:   Xr Chest Standard (2 Vw)    Result Date: 3/7/2018  FINAL REPORT EXAM:  XR CHEST (2 VW) HISTORY:  chf TECHNIQUE:  PA and lateral chest PRIORS:  02/09/2018. FINDINGS:  Interstitial fibrotic changes are again identified. There is now parenchymal density seen in the left lung base, posteriorly which could represent new alveolar infiltrate or atelectasis. This is best seen on the lateral view. Cardiac pacemaker is present. The heart is mildly enlarged. The pulmonary vasculature appears normal. The osseous structures are grossly maintained. Impression:  Interstitial fibrotic changes, stable.  New mild left lower lobe infiltrate or IV CONTRAST HISTORY:  elevated LFT's, nausea, vomiting TECHNIQUE:  CT images are acquired through the Abdomen and Pelvis following intravenous administration of contrast. Transaxial, coronal and sagittal reformations are provided. PRIORS:  None FINDINGS:  Partially imaged pacemaker leads. Valvular and coronary artery calcifications. Mild cardiomegaly. No pericardial effusion. Bibasilar interstitial reticulation. No focal airspace disease or effusion. Question early right basilar honeycombing. Distended gallbladder. No pericholecystic edema. Layering sludge and dependent subcentimeter stones are noted. Mild intrahepatic biliary ductal dilatation. The liver, pancreas, spleen, and adrenal glands are otherwise unremarkable. Kidneys show no worrisome lesions, hydronephrosis, or calculi. Left renal cysts measure up to 3.6 cm. Urinary bladder is unremarkable. Small and large bowel are normal in caliber. Appendix is normal. No free air, free fluid, or lymphadenopathy identified. Aorta is normal in course and caliber. Retroverted uterus. No free fluid in the pelvis. Superficial soft tissues are unremarkable. No acute or aggressive appearing skeletal findings. Impression:  The gallbladder is distended and contains stones and sludge. Mild intrahepatic biliary ductal dilatation may be associated. Acute or chronic cholecystitis, or noncalcified choledocholithiasis may account for these findings. Consider ultrasound follow-up as warranted. Electronically Signed By: Etelvina Romo   on  02/10/2018  09:00    Us Liver    Result Date: 2/12/2018  PROCEDURE: US LIVER, US GALLBLADDER RUQ CLINICAL INFORMATION: nausea & vomiting, gallstones, suspected cholecystitis. Nausea, vomiting, hypertension. Diabetes. Elevated liver function tests. COMPARISON: No prior study. TECHNIQUE: Multiplanar sonographic images were obtained of the liver. Multiplanar sonographic images were also obtained of the gallbladder.  This examination was performed at the bedside. FINDINGS:  Liver - 14.3cm CC Gallbladder - 12.4 x 5.1 x 4.8 cm Gallbladder Wall - 0.27 cm Common Duct - 0.67 cm Valdez's Sign: negative The liver is of normal echogenicity. There are no liver masses. There is no intrahepatic biliary ductal dilatation. There is normal color flow in the main portal vein, right and left portal veins, hepatic artery, inferior vena cava and all 3 hepatic veins. The pancreatic head and body are within normal limits. The tail is not well seen due to overlying bowel gas. The gallbladder is quite distended. There is no pericholecystic fluid or gallbladder wall edema. There is layering sludge versus small stones. The common bile duct is mildly dilated and measures 7 mm. There is no hydronephrosis in the visualized aspects of the right kidney. 1. Distended gallbladder which contains biliary sludge versus small stones. The common bile duct is mildly dilated. This can represent early acute cholecystitis. 2. Normal-appearing liver. **This report has been created using voice recognition software. It may contain minor errors which are inherent in voice recognition technology. ** Final report electronically signed by Dr. Orin Garcia on 2/12/2018 7:13 AM    Us Gallbladder Ruq    Result Date: 2/12/2018  PROCEDURE: US LIVER, US GALLBLADDER RUQ CLINICAL INFORMATION: nausea & vomiting, gallstones, suspected cholecystitis. Nausea, vomiting, hypertension. Diabetes. Elevated liver function tests. COMPARISON: No prior study. TECHNIQUE: Multiplanar sonographic images were obtained of the liver. Multiplanar sonographic images were also obtained of the gallbladder. This examination was performed at the bedside. FINDINGS:  Liver - 14.3cm CC Gallbladder - 12.4 x 5.1 x 4.8 cm Gallbladder Wall - 0.27 cm Common Duct - 0.67 cm Valdez's Sign: negative The liver is of normal echogenicity. There are no liver masses. There is no intrahepatic biliary ductal dilatation.  There is normal color flow in the main portal vein, right and left portal veins, hepatic artery, inferior vena cava and all 3 hepatic veins. The pancreatic head and body are within normal limits. The tail is not well seen due to overlying bowel gas. The gallbladder is quite distended. There is no pericholecystic fluid or gallbladder wall edema. There is layering sludge versus small stones. The common bile duct is mildly dilated and measures 7 mm. There is no hydronephrosis in the visualized aspects of the right kidney. 1. Distended gallbladder which contains biliary sludge versus small stones. The common bile duct is mildly dilated. This can represent early acute cholecystitis. 2. Normal-appearing liver. **This report has been created using voice recognition software. It may contain minor errors which are inherent in voice recognition technology. ** Final report electronically signed by Dr. Ladarius Arreola on 2/12/2018 7:13 AM    Nm Hepatobiliary Scan W Ejection Fraction    Result Date: 2/12/2018  PROCEDURE: NM HEPATOBILIARY SCAN W PHARMACOLOGICAL INTERVENTION CLINICAL INFORMATION: Nausea, vomiting, distended gallbladder. Radiopharmaceutical: 8.7 mCi technetium 99m mebrofenin, intravenously. TECHNIQUE: Anterior images of the abdomen were obtained for 60 minutes following radiopharmaceutical administration. 1.57 mcg of Kinevac were slowly infused over 60 minutes while additional left anterior oblique images were obtained. Subsequently, a region of interest was drawn around the gallbladder and ejection fraction was calculated. COMPARISON: None FINDINGS: There is normal hepatic uptake of radiotracer. Activity is present in the gallbladder by 15 minutes. There is activity in the common bile duct and small bowel by 17 minutes. Following Kinevac administration, the calculated gallbladder ejection fraction is 40% (normal is 35% or greater). 1. Patent cystic and common bile ducts without evidence of acute cholecystitis.  2. Normal gallbladder

## 2018-03-09 NOTE — PROGRESS NOTES
[facility] [place]  OCCUPATIONAL THERAPY  No Visit Note    [x] ICU    [] Acute   Patient: Narcisa Mendes  Room: [address] not seen on 3/9/2018 at 10:11 AM due to pt is being discharged    Signature: [address], CARSON/L

## 2018-03-09 NOTE — PROGRESS NOTES
The discharge plan is to return to [address] [place] [address] for continue skilled care. Waiting for insurance approval to return.   DUGLAS Jones

## 2018-03-09 NOTE — PROGRESS NOTES
 Phlebitis and thrombophlebitis of lower extremities, unspecified 1961    L leg    Senile osteoporosis 2006    L/S    Symptomatic menopausal or female climacteric states 06/1995    Syncope and collapse     Unspecified hereditary and idiopathic peripheral neuropathy 2012    Unspecified sleep apnea 11/2009     Past Surgical History:   Procedure Laterality Date    BRONCHOSCOPY  6/7/2017    BRONCHOSCOPY BRUSHINGS performed by Camilla March DO at [facility]  6/7/2017    BRONCHOSCOPY/TRANSBRONCHIAL LUNG BIOPSY performed by Camilla March DO at [facility]  6/7/2017    BRONCHOSCOPY FLUOROSCOPY performed by Camilla March DO at [facility] [address] Right 06/17/2015    CATARACT REMOVAL WITH IMPLANT Right 08/14/2017    DR ROMERO    COLONOSCOPY  1/2005    DIAGNOSTIC CARDIAC CATH LAB PROCEDURE  06/17/15    EYE SURGERY      FRACTURE SURGERY  2004    Distal Radius Ulna    LOBECTOMY Left 6/14/2017    MINI PORT ACCESS LEFT CHEST, X2 SPECIMENS. performed by Sneha Hernandez MD at [address]  2years old    VOLVAR-ULCERATIVE LESION-CAUTERIZED    MS EGD TRANSORAL BIOPSY SINGLE/MULTIPLE Left 2/13/2018    EGD BIOPSY performed by Gaudencio Euceda MD at [facility]         Restrictions  Restrictions/Precautions  Restrictions/Precautions: General Precautions, Fall Risk  Subjective   General  Chart Reviewed: Yes  Subjective  Subjective: Pt with no reports of pain. General Comment  Comments: Assisted pt with tressa care.    Pain Screening  Patient Currently in Pain: Denies  Pain Assessment  Pain Assessment: 0-10  Pain Level: 0  Vital Signs  Patient Currently in Pain: Denies       Orientation  Orientation  Overall Orientation Status: Within Normal Limits  Objective   Bed mobility  Supine to Sit: Contact guard assistance  Scooting: Stand by assistance;Contact guard 2x/daily except weekends 1x/daily   Current Treatment Recommendations: Strengthening, IADL Training, Neuromuscular Re-education, Home Exercise Program, Safety Education & Training, Balance Training, Endurance Training, Functional Mobility Training, Transfer Training, Gait Training  Safety Devices  Type of devices:  All fall risk precautions in place, Left in chair, Call light within reach, Nurse notified (Nursing Tiffany Christensen RN) to apply an alarm.)     Therapy Time   Individual Concurrent Group Co-treatment   Time In 1240         Time Out 1325         Minutes [address] [place] [address]

## 2018-03-09 NOTE — PROGRESS NOTES
Lorie Whitaker   on  03/07/2018  17:36      ASSESSMENT:  Hypotension resolved            Hospital Problems:  Principal Problem:    Addisonian crisis ([address]  Active Problems:    Pulmonary HTN    Obstructive sleep apnea    Type 2 diabetes mellitus without complication (HCC)    Ischemic cardiomyopathy    Pulmonary fibrosis (HCC)    ASHD (arteriosclerotic heart disease)    Interstitial lung disease (HCC)    Vasodepressor syncope    Chronic combined systolic and diastolic CHF (congestive heart failure) (HCC)    Hypothyroidism due to amiodarone    Amiodarone toxicity    Mild malnutrition ([address]  Resolved Problems:    * No resolved hospital problems.  *      All Problems:  Patient Active Problem List   Diagnosis    Pulmonary HTN    Non-rheumatic mitral regurgitation    Essential hypertension    Obstructive sleep apnea    Gout    Type 2 diabetes mellitus without complication ([address]    Acute cystitis with hematuria    Ischemic cardiomyopathy    Hypokalemia    Acute pulmonary edema (HCC)    Ground glass opacity present on imaging of lung    Pulmonary fibrosis ([address]    ASHD (arteriosclerotic heart disease)    Uncomplicated asthma    E. coli UTI    Anemia of chronic disease    Closed TBI (traumatic brain injury) ([address]    Interstitial lung disease ([address]    Long-term (current) use of anticoagulants    Chronic lung disease    Leg fatigue    Acute on chronic congestive heart failure (HCC)    Vasodepressor syncope    Muscle fatigue    Paroxysmal a-fib (HCC)    Chronic combined systolic and diastolic CHF (congestive heart failure) (HCC)    Need for immunization against influenza    Screening cholesterol level    Decubitus ulcer of sacral region    Pneumonitis    General weakness    Chronic obstructive pulmonary disease (HCC)    Elevated LFTs    Calculus of gallbladder with acute on chronic cholecystitis    Asymptomatic cholelithiasis    Acute cholecystitis    Bronchitis with chronic airway obstruction (HCC)  Intractable vomiting with nausea    Addisonian crisis [facility]    Hypothyroidism due to amiodarone    Amiodarone toxicity    Mild malnutrition (HCC)       PLAN:  · To ECF for rehab    HIGH RISK MEDS: none    David Arevalo M.D.

## 2018-03-09 NOTE — DISCHARGE SUMMARY
Clarification: per cardiology note patient with chronic diastolic CHF. Also has chronic hypoxemic respiratory failure.     Ajit Jensen PA-C  3/9/2018, 10:24 AM

## 2018-03-09 NOTE — PROGRESS NOTES
Patient has decided to go home with home health. Dr. Buzz santiago with plan.     Mahad Kaplan PA-C  3/9/2018, 3:42 PM

## 2018-03-12 ENCOUNTER — CARE COORDINATION (OUTPATIENT)
Dept: CASE MANAGEMENT | Age: 78
End: 2018-03-12

## 2018-03-12 ENCOUNTER — TELEPHONE (OUTPATIENT)
Dept: FAMILY MEDICINE CLINIC | Age: 78
End: 2018-03-12

## 2018-03-12 ENCOUNTER — TELEPHONE (OUTPATIENT)
Dept: PULMONOLOGY | Age: 78
End: 2018-03-12

## 2018-03-12 DIAGNOSIS — I27.20 PULMONARY HTN (HCC): Primary | Chronic | ICD-10-CM

## 2018-03-12 PROCEDURE — 1111F DSCHRG MED/CURRENT MED MERGE: CPT | Performed by: FAMILY MEDICINE

## 2018-03-12 NOTE — TELEPHONE ENCOUNTER
Pt was called and informed of Letter of Approval from [address] [place] [address] for the CT of chest without contrast.    Authorization is Valid from 3-9-18 to 6-7-18. Authorization # is: [id number]    Pt states she was told when she was discharged from the hospital that this would not be approved, so writer offered to mail this approval letter to the pt for verification and for the pt to ask for assistance from family in verifying this information. Writer also informed pt that the phone information to [facility] Scheduling Dept would be included on it for her to contact if she so chooses to have this testing done between the dates authorized. Pt voices understanding of information given.

## 2018-03-14 ENCOUNTER — OFFICE VISIT (OUTPATIENT)
Dept: FAMILY MEDICINE CLINIC | Age: 78
End: 2018-03-14
Payer: MEDICARE

## 2018-03-14 VITALS
WEIGHT: 163.8 LBS | BODY MASS INDEX: 27.29 KG/M2 | HEIGHT: 65 IN | SYSTOLIC BLOOD PRESSURE: 112 MMHG | DIASTOLIC BLOOD PRESSURE: 66 MMHG

## 2018-03-14 DIAGNOSIS — I50.32 CHRONIC DIASTOLIC CHF (CONGESTIVE HEART FAILURE) (HCC): Chronic | ICD-10-CM

## 2018-03-14 DIAGNOSIS — I10 ESSENTIAL HYPERTENSION: Primary | Chronic | ICD-10-CM

## 2018-03-14 DIAGNOSIS — J44.9 CHRONIC OBSTRUCTIVE PULMONARY DISEASE, UNSPECIFIED COPD TYPE (HCC): ICD-10-CM

## 2018-03-14 DIAGNOSIS — E03.9 HYPOTHYROIDISM, UNSPECIFIED TYPE: ICD-10-CM

## 2018-03-14 DIAGNOSIS — E11.9 TYPE 2 DIABETES MELLITUS WITHOUT COMPLICATION, UNSPECIFIED LONG TERM INSULIN USE STATUS: Chronic | ICD-10-CM

## 2018-03-14 DIAGNOSIS — I25.10 ASHD (ARTERIOSCLEROTIC HEART DISEASE): ICD-10-CM

## 2018-03-14 DIAGNOSIS — Z13.220 SCREENING CHOLESTEROL LEVEL: ICD-10-CM

## 2018-03-14 PROCEDURE — 1111F DSCHRG MED/CURRENT MED MERGE: CPT | Performed by: FAMILY MEDICINE

## 2018-03-14 PROCEDURE — 99496 TRANSJ CARE MGMT HIGH F2F 7D: CPT | Performed by: FAMILY MEDICINE

## 2018-03-14 NOTE — PROGRESS NOTES
The following note was scribed by: Kristina Dutta, [id number] [facility]  [address] [facility] [address] [facility]  [place] [address] [id number]  Dept: [phone]    HPI:  Pt. Presents for follow up from [facility] and hospitalization. She was admitted from [facility] on 03/07 for lightheadedness and weakness. She was treated with midodrine, fluids, decreased dose of metoprolol. She improved over the course of her hospitalization. She was discharged home on 03/09. Today, she states she still feels weak but denies any additional lightheadedness. Home health is coming in nurse once weekly and PT/OT twice weekly. Accompanied by: , Jordy Calderon. Current Outpatient Prescriptions   Medication Sig Dispense Refill    metoprolol tartrate (LOPRESSOR) 25 MG tablet Take 1 tablet by mouth nightly 30 tablet 0    midodrine (PROAMATINE) 5 MG tablet Take 1 tablet by mouth 3 times daily (with meals) 90 tablet 0    levothyroxine (SYNTHROID) 100 MCG tablet Take 1 tablet by mouth Daily 30 tablet 0    acetaminophen (TYLENOL) 325 MG tablet Take 2 tablets by mouth every 4 hours as needed for Pain 120 tablet 3    aspirin 81 MG chewable tablet Take 4 tablets by mouth daily 30 tablet 3    RANEXA 500 MG extended release tablet TAKE ONE TABLET BY MOUTH TWICE DAILY 180 tablet 3    atorvastatin (LIPITOR) 40 MG tablet Take 1 tablet by mouth nightly 90 tablet 3    polyethylene glycol (GLYCOLAX) powder Take 17 g by mouth as needed (for constipation)      Glucose Blood (BLOOD GLUCOSE TEST STRIPS) STRP Test 4 times daily Diagnosis 100 strip 11    melatonin 3 MG TABS tablet Take 3 mg by mouth nightly       doxycycline hyclate (VIBRA-TABS) 100 MG tablet Take 1 tablet by mouth 2 times daily for 10 days 20 tablet 0     No current facility-administered medications for this visit. ROS:    General Constitutional: Denies chills. Denies fever. Denies headache. Denies lightheadedness. Ophthalmologic: Denies blurred vision.   ENT: powder  Take 17 g by mouth as needed (for constipation)             RANEXA 500 MG extended release tablet  TAKE ONE TABLET BY MOUTH TWICE DAILY                   Medications marked \"taking\" at this time  Outpatient Prescriptions Marked as Taking for the 3/14/18 encounter (Office Visit) with Jason Adams MD   Medication Sig Dispense Refill    metoprolol tartrate (LOPRESSOR) 25 MG tablet Take 1 tablet by mouth nightly 30 tablet 0    midodrine (PROAMATINE) 5 MG tablet Take 1 tablet by mouth 3 times daily (with meals) 90 tablet 0    levothyroxine (SYNTHROID) 100 MCG tablet Take 1 tablet by mouth Daily 30 tablet 0    acetaminophen (TYLENOL) 325 MG tablet Take 2 tablets by mouth every 4 hours as needed for Pain 120 tablet 3    aspirin 81 MG chewable tablet Take 4 tablets by mouth daily 30 tablet 3    RANEXA 500 MG extended release tablet TAKE ONE TABLET BY MOUTH TWICE DAILY 180 tablet 3    atorvastatin (LIPITOR) 40 MG tablet Take 1 tablet by mouth nightly 90 tablet 3    polyethylene glycol (GLYCOLAX) powder Take 17 g by mouth as needed (for constipation)      Glucose Blood (BLOOD GLUCOSE TEST STRIPS) STRP Test 4 times daily Diagnosis 100 strip 11    melatonin 3 MG TABS tablet Take 3 mg by mouth nightly           Medications patient taking as of now reconciled against medications ordered at time of hospital discharge: Yes    Vitals:    03/14/18 1339   BP: 112/66   Weight: 163 lb 12.8 oz (74.3 kg)   Height: 5' 5\" (1.651 m)     Body mass index is 27.26 kg/m². Wt Readings from Last 3 Encounters:   03/14/18 163 lb 12.8 oz (74.3 kg)   03/09/18 178 lb 12.8 oz (81.1 kg)   03/07/18 177 lb (80.3 kg)     BP Readings from Last 3 Encounters:   03/14/18 112/66   03/09/18 112/78   03/07/18 128/83        Inpatient course: Discharge summary reviewed- see chart.     Chief Complaint   Patient presents with   [address] from Hospital     History of Present illness - Follow up of Hospital diagnosis(es): HTN, COPD, CHF      Non

## 2018-03-16 ENCOUNTER — TELEPHONE (OUTPATIENT)
Dept: FAMILY MEDICINE CLINIC | Age: 78
End: 2018-03-16

## 2018-03-16 RX ORDER — DOXYCYCLINE HYCLATE 100 MG
100 TABLET ORAL 2 TIMES DAILY
Qty: 20 TABLET | Refills: 0 | Status: SHIPPED | OUTPATIENT
Start: 2018-03-16 | End: 2018-03-26

## 2018-03-19 PROBLEM — J44.9 BRONCHITIS WITH CHRONIC AIRWAY OBSTRUCTION (HCC): Status: RESOLVED | Noted: 2018-02-16 | Resolved: 2018-03-19

## 2018-03-19 PROBLEM — J44.89 BRONCHITIS WITH CHRONIC AIRWAY OBSTRUCTION: Status: RESOLVED | Noted: 2018-02-16 | Resolved: 2018-03-19

## 2018-03-19 PROBLEM — J96.11 CHRONIC RESPIRATORY FAILURE WITH HYPOXIA (HCC): Chronic | Status: RESOLVED | Noted: 2018-03-09 | Resolved: 2018-03-19

## 2018-03-19 PROBLEM — E27.2 ADDISONIAN CRISIS (HCC): Status: RESOLVED | Noted: 2018-03-07 | Resolved: 2018-03-19

## 2018-03-21 ENCOUNTER — HOSPITAL ENCOUNTER (OUTPATIENT)
Dept: CT IMAGING | Age: 78
Discharge: HOME | End: 2018-03-23
Payer: MEDICARE

## 2018-03-21 DIAGNOSIS — J84.112 UIP (USUAL INTERSTITIAL PNEUMONITIS) (HCC): ICD-10-CM

## 2018-03-21 PROCEDURE — 71250 CT THORAX DX C-: CPT

## 2018-03-22 ENCOUNTER — NURSE ONLY (OUTPATIENT)
Dept: CARDIOLOGY | Age: 78
End: 2018-03-22
Payer: MEDICARE

## 2018-03-22 ENCOUNTER — TELEPHONE (OUTPATIENT)
Dept: CARDIOLOGY | Age: 78
End: 2018-03-22

## 2018-03-22 ENCOUNTER — OFFICE VISIT (OUTPATIENT)
Dept: PULMONOLOGY | Age: 78
End: 2018-03-22
Payer: MEDICARE

## 2018-03-22 ENCOUNTER — OFFICE VISIT (OUTPATIENT)
Dept: CARDIOLOGY | Age: 78
End: 2018-03-22
Payer: MEDICARE

## 2018-03-22 VITALS
HEART RATE: 80 BPM | TEMPERATURE: 97.6 F | BODY MASS INDEX: 26.99 KG/M2 | RESPIRATION RATE: 16 BRPM | WEIGHT: 162 LBS | DIASTOLIC BLOOD PRESSURE: 83 MMHG | HEIGHT: 65 IN | SYSTOLIC BLOOD PRESSURE: 128 MMHG | OXYGEN SATURATION: 99 %

## 2018-03-22 VITALS
HEART RATE: 80 BPM | BODY MASS INDEX: 26.65 KG/M2 | HEIGHT: 66 IN | OXYGEN SATURATION: 98 % | SYSTOLIC BLOOD PRESSURE: 123 MMHG | DIASTOLIC BLOOD PRESSURE: 81 MMHG | WEIGHT: 165.8 LBS

## 2018-03-22 DIAGNOSIS — I95.1 ORTHOSTATIC HYPOTENSION: ICD-10-CM

## 2018-03-22 DIAGNOSIS — I25.5 ISCHEMIC CARDIOMYOPATHY: Chronic | ICD-10-CM

## 2018-03-22 DIAGNOSIS — J96.11 CHRONIC RESPIRATORY FAILURE WITH HYPOXIA (HCC): ICD-10-CM

## 2018-03-22 DIAGNOSIS — Z79.899 LONG TERM CURRENT USE OF AMIODARONE: ICD-10-CM

## 2018-03-22 DIAGNOSIS — J67.9 HYPERSENSITIVITY PNEUMONITIS (HCC): Primary | ICD-10-CM

## 2018-03-22 DIAGNOSIS — Z95.810 CARDIAC DEFIBRILLATOR IN PLACE: Primary | ICD-10-CM

## 2018-03-22 DIAGNOSIS — E78.5 DYSLIPIDEMIA: ICD-10-CM

## 2018-03-22 DIAGNOSIS — J84.112 UIP (USUAL INTERSTITIAL PNEUMONITIS) (HCC): ICD-10-CM

## 2018-03-22 DIAGNOSIS — Z95.810 BIVENTRICULAR ICD (IMPLANTABLE CARDIOVERTER-DEFIBRILLATOR) IN PLACE: ICD-10-CM

## 2018-03-22 DIAGNOSIS — I48.0 PAROXYSMAL ATRIAL FIBRILLATION (HCC): ICD-10-CM

## 2018-03-22 DIAGNOSIS — I48.20 CHRONIC A-FIB (HCC): Primary | ICD-10-CM

## 2018-03-22 DIAGNOSIS — I25.10 ASHD (ARTERIOSCLEROTIC HEART DISEASE): ICD-10-CM

## 2018-03-22 PROCEDURE — 93296 REM INTERROG EVL PM/IDS: CPT | Performed by: FAMILY MEDICINE

## 2018-03-22 PROCEDURE — 93295 DEV INTERROG REMOTE 1/2/MLT: CPT | Performed by: INTERNAL MEDICINE

## 2018-03-22 PROCEDURE — 99214 OFFICE O/P EST MOD 30 MIN: CPT | Performed by: INTERNAL MEDICINE

## 2018-03-22 PROCEDURE — 99213 OFFICE O/P EST LOW 20 MIN: CPT | Performed by: NURSE PRACTITIONER

## 2018-03-22 NOTE — PROGRESS NOTES
ADAM Workman am scribing for and in the presence of Dr. Kp Martinez     Subjective:     [address] [facility]:   Chief Complaint   Patient presents with    Follow-Up from Hospital     Hx: CHF,A-fib. C/o:Shortness of breath with exertion this has gotten alot better since she has been home,edema in both legs from knees into feet this has also gotten alot better,she is wearing compression stockings. Denies any chestpain,pressure,dizziness,lightheadedness or fatigue. Dear Dr. Mana Jose MD     I had the pleasure of seeing Ms. Tiffani Srinivasan for follow-up. Prachi Ruby is 65 y.o.  with PMH of hypertension, hyperlipidemia, asthma and chronic atrial fibrillation. Her most recent cardiac catheterization showed moderate to severe three-vessel coronary artery disease as outlined below. 1-Patient's chronic atrial fibrillation is  Treated with rate control and anticoagulation with coumadin. Her Holter monitor on 4/9/2015 suggested tachycardiabradycardia syndrome. 2-On 3/16/2016, patient was sent from cardiac rehab because of not feeling well. Imdur 30 mg daily was added as well as Ranexa 500 mg BID. 3-Patient admitted to [address] [place] [address] from 6/3/2017 to 6/15/2017 with worsening shortness of breath diagnosed with pneumonitis, status post lung biopsy. Currently on prednisone. 4-Another admission to [address] [place] [address] from 6/29/2017 to 7/6/2017 with A. Fib with RVR and acute on chronic diastolic CHF. Patient started on amiodarone during this admission in addition to her ratel control and diuresis. 5-Another admission to [facility] from 7/11/2017 to 7/14/2017 with recurrent fall and black eye. 3 falling episodes, 2 of them she hit her head. She is not sure if she lost consciousness. MRI of the brain was negative. 6-Patient underwent AV node ablation and [facility]     7-On 9/18/2017 patient underwent insertion of a I believe that the risk of significant morbidity and mortality related to the patient's current medical conditions are: Intermediate. 25 minuteswere spent with the patient and all of her questions were answered. The documentation recorded by the scribe, accurately and completely reflects the services I personally performed and the decisions made by me. Yesika Bullock MD, F.A.C.C. 3/22/2018

## 2018-03-22 NOTE — PROGRESS NOTES
doxycycline hyclate (VIBRA-TABS) 100 MG tablet, Take 1 tablet by mouth 2 times daily for 10 days, Disp: 20 tablet, Rfl: 0    metoprolol tartrate (LOPRESSOR) 25 MG tablet, Take 1 tablet by mouth nightly, Disp: 30 tablet, Rfl: 0    midodrine (PROAMATINE) 5 MG tablet, Take 1 tablet by mouth 3 times daily (with meals), Disp: 90 tablet, Rfl: 0    levothyroxine (SYNTHROID) 100 MCG tablet, Take 1 tablet by mouth Daily, Disp: 30 tablet, Rfl: 0    acetaminophen (TYLENOL) 325 MG tablet, Take 2 tablets by mouth every 4 hours as needed for Pain, Disp: 120 tablet, Rfl: 3    aspirin 81 MG chewable tablet, Take 4 tablets by mouth daily, Disp: 30 tablet, Rfl: 3    RANEXA 500 MG extended release tablet, TAKE ONE TABLET BY MOUTH TWICE DAILY, Disp: 180 tablet, Rfl: 3    atorvastatin (LIPITOR) 40 MG tablet, Take 1 tablet by mouth nightly, Disp: 90 tablet, Rfl: 3    polyethylene glycol (GLYCOLAX) powder, Take 17 g by mouth as needed (for constipation), Disp: , Rfl:     Glucose Blood (BLOOD GLUCOSE TEST STRIPS) STRP, Test 4 times daily Diagnosis, Disp: 100 strip, Rfl: 11    melatonin 3 MG TABS tablet, Take 3 mg by mouth nightly , Disp: , Rfl:       Objective:    Physical Exam:  Vitals: /83   Pulse 80   Temp 97.6 °F (36.4 °C)   Resp 16   Ht 5' 5\" (1.651 m)   Wt 162 lb (73.5 kg)   SpO2 99%   BMI 26.96 kg/m²   Last 3 weights: Wt Readings from Last 3 Encounters:   03/22/18 162 lb (73.5 kg)   03/22/18 165 lb 12.8 oz (75.2 kg)   03/14/18 163 lb 12.8 oz (74.3 kg)     Body mass index is 26.96 kg/m². Physical Examination:   Constitutional: Appears well, no distress; chronic ill appearance   EENT: PERRLA,  sclera clear, anicteric, oropharynx clear, no lesions, neck supple with midline trachea. Neck: Supple, symmetrical, trachea midline, no adenopathy, no goiter, no jvd skin normal  Respiratory: clear to auscultation, no wheezes or rales and unlabored breathing.  No intercostal tenderness  Cardiovascular: regular rate and rhythm, normal S1, S2, no murmur noted  Abdomen: soft, nontender, nondistended, no masses or organomegaly  Extremities:  1+ pedal edema, no clubbing or cyanosis    Labs:    CBC with dif - 3/8/18    WBC 2.7   3.5 - 11.3 k/uL Final 03/08/2018  8:00 AM [facility]   RBC 3.68   3.95 - 5.11 m/uL Final 03/08/2018  8:00 AM [facility]   Hemoglobin 11.6   11.9 - 15.1 g/dL Final 03/08/2018  8:00 AM [facility]   Hematocrit 37.6  36.3 - 47.1 % Final 03/08/2018  8:00 AM [facility]   .2  82.6 - 102.9 fL Final 03/08/2018  8:00 AM [facility]   MCH 31.5  25.2 - 33.5 pg Final 03/08/2018  8:00 AM [facility]   MCHC 30.9  28.4 - 34.8 g/dL Final 03/08/2018  8:00 AM [facility]   RDW 15.4   11.8 - 14.4 % Final 03/08/2018  8:00 AM [facility]   Platelets 187  522 - 453 k/uL Final 03/08/2018  8:00 AM [facility]   MPV 9.5  8.1 - 13.5 fL Final 03/08/2018  8:00 AM [facility]   NRBC Automated 0.0  0.0 per 100 WBC Final 03/08/2018  8:00 AM [facility]   Differential Type NOT REPORTED   Final 03/08/2018  8:00 AM [facility]   WBC Morphology NOT REPORTED   Final 03/08/2018  8:00 AM [facility]   RBC Morphology NOT REPORTED   Final 03/08/2018  8:00 AM [facility]   Platelet Estimate NOT REPORTED   Final 03/08/2018  8:00 AM [facility]   Seg Neutrophils 73   36 - 65 % Final 03/08/2018  8:00 AM [facility]   Lymphocytes 23   24 - 43 % Final 03/08/2018  8:00 AM [facility]   Monocytes 2   3 - 12 % Final 03/08/2018  8:00 AM [facility]   Eosinophils % 2  1 - 4 % Final 03/08/2018  8:00 AM [facility]   Immature Granulocytes 0  0 % Final 03/08/2018  8:00 AM [facility]   Basophils 0  0 - 2 % Final 03/08/2018  8:00 AM [facility]   Segs Absolute 1.98  1.50 - 8.10 k/uL Final 03/08/2018  8:00 AM stable appearing nonspecific interstitial fibrosis with basilar component. No honeycombing identified. Bronchiectasis in both    lower lung zones. No aroldo lymphadenopathy on the selected images. Cardiomegaly. Nonaneurysmal thoracic aorta. Left-sided pacemaker. No acute process of the upper abdomen.       1. Overall stable appearing nonspecific interstitial fibrosis. 2. Large focal consolidation of the right upper lobe typical for pneumonia           Assessment:    1. Hypersensitivity pneumonitis ([address]     2. UIP (usual interstitial pneumonitis) ([facility])  CT CHEST HIGH RESOLUTION   3. Long term current use of amiodarone     4. Chronic respiratory failure with hypoxia ([facility])     5. Paroxysmal atrial fibrillation ([facility])           PLAN:    Continue Doxycycline per primary for ear infection  PFTs before next visit   HRCT before next visit  F/U Cardiology re: HR control and syncope   Refills were provided - none at this time  Educated and clarified the medication use. Recommend flu vaccination in the fall annually. Recommendations given regarding pneumococcal vaccinations. Patient is up-to-date with vaccinations from pulmonary perspective. Maintain an active lifestyle. Questions  Patient had were answered to his/her satisfaction.     We'll see the patient back in 6 months      Alberto Acosta CNP             3/22/2018, 2:12 PM

## 2018-03-28 ENCOUNTER — CARE COORDINATION (OUTPATIENT)
Dept: CASE MANAGEMENT | Age: 78
End: 2018-03-28

## 2018-03-28 RX ORDER — LEVOTHYROXINE SODIUM 0.1 MG/1
100 TABLET ORAL DAILY
COMMUNITY
End: 2018-06-27 | Stop reason: DRUGHIGH

## 2018-03-28 RX ORDER — METOPROLOL SUCCINATE 25 MG/1
25 TABLET, EXTENDED RELEASE ORAL NIGHTLY
COMMUNITY
End: 2018-10-26 | Stop reason: ALTCHOICE

## 2018-03-28 RX ORDER — ATORVASTATIN CALCIUM 40 MG/1
40 TABLET, FILM COATED ORAL NIGHTLY
COMMUNITY
End: 2018-06-27 | Stop reason: SDUPTHER

## 2018-03-28 RX ORDER — RANOLAZINE 500 MG/1
500 TABLET, EXTENDED RELEASE ORAL 2 TIMES DAILY
COMMUNITY
End: 2018-06-27 | Stop reason: SDUPTHER

## 2018-03-30 ENCOUNTER — HOSPITAL ENCOUNTER (OUTPATIENT)
Dept: INPATIENT UNIT | Age: 78
Discharge: HOME | End: 2018-03-30
Attending: NUCLEAR MEDICINE | Admitting: NUCLEAR MEDICINE
Payer: MEDICARE

## 2018-03-30 VITALS
DIASTOLIC BLOOD PRESSURE: 96 MMHG | OXYGEN SATURATION: 95 % | WEIGHT: 155 LBS | HEART RATE: 80 BPM | HEIGHT: 65 IN | BODY MASS INDEX: 25.83 KG/M2 | SYSTOLIC BLOOD PRESSURE: 145 MMHG | RESPIRATION RATE: 17 BRPM | TEMPERATURE: 97.6 F

## 2018-03-30 LAB
ANION GAP SERPL CALCULATED.3IONS-SCNC: 12 MEQ/L (ref 8–16)
BUN BLDV-MCNC: 11 MG/DL (ref 7–22)
CALCIUM SERPL-MCNC: 9.4 MG/DL (ref 8.5–10.5)
CHLORIDE BLD-SCNC: 103 MEQ/L (ref 98–111)
CO2: 29 MEQ/L (ref 23–33)
CREAT SERPL-MCNC: 0.6 MG/DL (ref 0.4–1.2)
GFR SERPL CREATININE-BSD FRML MDRD: > 90 ML/MIN/1.73M2
GLUCOSE BLD-MCNC: 111 MG/DL (ref 70–108)
HCT VFR BLD CALC: 33.9 % (ref 37–47)
HEMOGLOBIN: 11.1 GM/DL (ref 12–16)
INR BLD: 1.1 (ref 0.85–1.13)
LV EF: 50 %
LVEF MODALITY: NORMAL
MCH RBC QN AUTO: 31.1 PG (ref 27–31)
MCHC RBC AUTO-ENTMCNC: 32.9 GM/DL (ref 33–37)
MCV RBC AUTO: 94.6 FL (ref 81–99)
PDW BLD-RTO: 16.7 % (ref 11.5–14.5)
PLATELET # BLD: 321 THOU/MM3 (ref 130–400)
PMV BLD AUTO: 7.9 FL (ref 7.4–10.4)
POTASSIUM REFLEX MAGNESIUM: 3.8 MEQ/L (ref 3.5–5.2)
RBC # BLD: 3.58 MILL/MM3 (ref 4.2–5.4)
SODIUM BLD-SCNC: 144 MEQ/L (ref 135–145)
WBC # BLD: 6.3 THOU/MM3 (ref 4.8–10.8)

## 2018-03-30 PROCEDURE — 6370000000 HC RX 637 (ALT 250 FOR IP)

## 2018-03-30 PROCEDURE — 85027 COMPLETE CBC AUTOMATED: CPT

## 2018-03-30 PROCEDURE — 80048 BASIC METABOLIC PNL TOTAL CA: CPT

## 2018-03-30 PROCEDURE — 93312 ECHO TRANSESOPHAGEAL: CPT

## 2018-03-30 PROCEDURE — 36415 COLL VENOUS BLD VENIPUNCTURE: CPT

## 2018-03-30 PROCEDURE — 85610 PROTHROMBIN TIME: CPT

## 2018-03-30 PROCEDURE — 93320 DOPPLER ECHO COMPLETE: CPT

## 2018-03-30 PROCEDURE — 2580000003 HC RX 258: Performed by: NUCLEAR MEDICINE

## 2018-03-30 PROCEDURE — 93325 DOPPLER ECHO COLOR FLOW MAPG: CPT

## 2018-03-30 PROCEDURE — 6360000002 HC RX W HCPCS: Performed by: NUCLEAR MEDICINE

## 2018-03-30 PROCEDURE — 93312 ECHO TRANSESOPHAGEAL: CPT | Performed by: NUCLEAR MEDICINE

## 2018-03-30 RX ORDER — FENTANYL CITRATE 50 UG/ML
100 INJECTION, SOLUTION INTRAMUSCULAR; INTRAVENOUS ONCE
Status: COMPLETED | OUTPATIENT
Start: 2018-03-30 | End: 2018-03-30

## 2018-03-30 RX ORDER — SODIUM CHLORIDE 0.9 % (FLUSH) 0.9 %
10 SYRINGE (ML) INJECTION PRN
Status: DISCONTINUED | OUTPATIENT
Start: 2018-03-30 | End: 2018-03-30 | Stop reason: HOSPADM

## 2018-03-30 RX ORDER — FLUMAZENIL 0.1 MG/ML
0.5 INJECTION, SOLUTION INTRAVENOUS ONCE
Status: DISCONTINUED | OUTPATIENT
Start: 2018-03-30 | End: 2018-03-30 | Stop reason: HOSPADM

## 2018-03-30 RX ORDER — NALOXONE HYDROCHLORIDE 0.4 MG/ML
0.4 INJECTION, SOLUTION INTRAMUSCULAR; INTRAVENOUS; SUBCUTANEOUS PRN
Status: DISCONTINUED | OUTPATIENT
Start: 2018-03-30 | End: 2018-03-30 | Stop reason: HOSPADM

## 2018-03-30 RX ORDER — SODIUM CHLORIDE 0.9 % (FLUSH) 0.9 %
10 SYRINGE (ML) INJECTION EVERY 12 HOURS SCHEDULED
Status: DISCONTINUED | OUTPATIENT
Start: 2018-03-30 | End: 2018-03-30 | Stop reason: HOSPADM

## 2018-03-30 RX ORDER — MIDAZOLAM HYDROCHLORIDE 1 MG/ML
10 INJECTION INTRAMUSCULAR; INTRAVENOUS ONCE
Status: COMPLETED | OUTPATIENT
Start: 2018-03-30 | End: 2018-03-30

## 2018-03-30 RX ORDER — SODIUM CHLORIDE 9 MG/ML
INJECTION, SOLUTION INTRAVENOUS CONTINUOUS
Status: DISCONTINUED | OUTPATIENT
Start: 2018-03-30 | End: 2018-03-30 | Stop reason: HOSPADM

## 2018-03-30 RX ORDER — MIDAZOLAM HYDROCHLORIDE 5 MG/ML
10 INJECTION INTRAMUSCULAR; INTRAVENOUS ONCE
Status: DISCONTINUED | OUTPATIENT
Start: 2018-03-30 | End: 2018-03-30 | Stop reason: DRUGHIGH

## 2018-03-30 RX ADMIN — FENTANYL CITRATE 25 MCG: 50 INJECTION INTRAMUSCULAR; INTRAVENOUS at 12:53

## 2018-03-30 RX ADMIN — MIDAZOLAM 2 MG: 1 INJECTION INTRAMUSCULAR; INTRAVENOUS at 12:39

## 2018-03-30 RX ADMIN — SODIUM CHLORIDE: 9 INJECTION, SOLUTION INTRAVENOUS at 11:00

## 2018-03-30 ASSESSMENT — PAIN SCALES - GENERAL
PAINLEVEL_OUTOF10: 0

## 2018-04-02 ENCOUNTER — TELEPHONE (OUTPATIENT)
Dept: CARDIOLOGY CLINIC | Age: 78
End: 2018-04-02

## 2018-04-11 ENCOUNTER — HOSPITAL ENCOUNTER (OUTPATIENT)
Dept: PULMONOLOGY | Age: 78
Discharge: HOME | End: 2018-04-11
Payer: MEDICARE

## 2018-04-11 ENCOUNTER — HOSPITAL ENCOUNTER (OUTPATIENT)
Age: 78
Discharge: HOME | End: 2018-04-11
Payer: MEDICARE

## 2018-04-11 DIAGNOSIS — E11.9 TYPE 2 DIABETES MELLITUS WITHOUT COMPLICATION, UNSPECIFIED LONG TERM INSULIN USE STATUS: Chronic | ICD-10-CM

## 2018-04-11 DIAGNOSIS — Z13.220 SCREENING CHOLESTEROL LEVEL: ICD-10-CM

## 2018-04-11 DIAGNOSIS — J84.112 UIP (USUAL INTERSTITIAL PNEUMONITIS) (HCC): ICD-10-CM

## 2018-04-11 DIAGNOSIS — J96.11 CHRONIC RESPIRATORY FAILURE WITH HYPOXIA (HCC): ICD-10-CM

## 2018-04-11 DIAGNOSIS — E03.9 HYPOTHYROIDISM, UNSPECIFIED TYPE: ICD-10-CM

## 2018-04-11 LAB
ALT SERPL-CCNC: 13 U/L (ref 5–33)
ANION GAP SERPL CALCULATED.3IONS-SCNC: 16 MMOL/L (ref 9–17)
AST SERPL-CCNC: 22 U/L
BUN BLDV-MCNC: 10 MG/DL (ref 8–23)
BUN/CREAT BLD: 14 (ref 9–20)
CALCIUM SERPL-MCNC: 9.5 MG/DL (ref 8.6–10.4)
CHLORIDE BLD-SCNC: 102 MMOL/L (ref 98–107)
CO2: 26 MMOL/L (ref 20–31)
CREAT SERPL-MCNC: 0.69 MG/DL (ref 0.5–0.9)
ESTIMATED AVERAGE GLUCOSE: 94 MG/DL
GFR AFRICAN AMERICAN: >60 ML/MIN
GFR NON-AFRICAN AMERICAN: >60 ML/MIN
GFR SERPL CREATININE-BSD FRML MDRD: ABNORMAL ML/MIN/{1.73_M2}
GFR SERPL CREATININE-BSD FRML MDRD: ABNORMAL ML/MIN/{1.73_M2}
GLUCOSE BLD-MCNC: 136 MG/DL (ref 70–99)
HBA1C MFR BLD: 4.9 % (ref 4.8–5.9)
HCT VFR BLD CALC: 39.7 % (ref 36.3–47.1)
HEMOGLOBIN: 12.1 G/DL (ref 11.9–15.1)
MCH RBC QN AUTO: 30.6 PG (ref 25.2–33.5)
MCHC RBC AUTO-ENTMCNC: 30.5 G/DL (ref 28.4–34.8)
MCV RBC AUTO: 100.3 FL (ref 82.6–102.9)
NRBC AUTOMATED: 0 PER 100 WBC
PDW BLD-RTO: 14.6 % (ref 11.8–14.4)
PLATELET # BLD: 252 K/UL (ref 138–453)
PMV BLD AUTO: 10.2 FL (ref 8.1–13.5)
POTASSIUM SERPL-SCNC: 3.6 MMOL/L (ref 3.7–5.3)
RBC # BLD: 3.96 M/UL (ref 3.95–5.11)
SODIUM BLD-SCNC: 144 MMOL/L (ref 135–144)
T4 TOTAL: 8.2 UG/DL (ref 4.5–12)
TSH SERPL DL<=0.05 MIU/L-ACNC: 12.13 MIU/L (ref 0.3–5)
WBC # BLD: 6 K/UL (ref 3.5–11.3)

## 2018-04-11 PROCEDURE — 80048 BASIC METABOLIC PNL TOTAL CA: CPT

## 2018-04-11 PROCEDURE — 94664 DEMO&/EVAL PT USE INHALER: CPT

## 2018-04-11 PROCEDURE — 85027 COMPLETE CBC AUTOMATED: CPT

## 2018-04-11 PROCEDURE — 84460 ALANINE AMINO (ALT) (SGPT): CPT

## 2018-04-11 PROCEDURE — 84436 ASSAY OF TOTAL THYROXINE: CPT

## 2018-04-11 PROCEDURE — 94060 EVALUATION OF WHEEZING: CPT

## 2018-04-11 PROCEDURE — 94726 PLETHYSMOGRAPHY LUNG VOLUMES: CPT

## 2018-04-11 PROCEDURE — 84443 ASSAY THYROID STIM HORMONE: CPT

## 2018-04-11 PROCEDURE — 94729 DIFFUSING CAPACITY: CPT

## 2018-04-11 PROCEDURE — 84450 TRANSFERASE (AST) (SGOT): CPT

## 2018-04-11 PROCEDURE — 94618 PULMONARY STRESS TESTING: CPT

## 2018-04-11 PROCEDURE — 83036 HEMOGLOBIN GLYCOSYLATED A1C: CPT

## 2018-04-11 PROCEDURE — 6360000002 HC RX W HCPCS: Performed by: INTERNAL MEDICINE

## 2018-04-11 PROCEDURE — 36415 COLL VENOUS BLD VENIPUNCTURE: CPT

## 2018-04-11 RX ORDER — ALBUTEROL SULFATE 2.5 MG/3ML
2.5 SOLUTION RESPIRATORY (INHALATION) ONCE
Status: COMPLETED | OUTPATIENT
Start: 2018-04-11 | End: 2018-04-11

## 2018-04-11 RX ADMIN — ALBUTEROL SULFATE 2.5 MG: 2.5 SOLUTION RESPIRATORY (INHALATION) at 11:13

## 2018-04-12 PROBLEM — Z13.220 SCREENING CHOLESTEROL LEVEL: Status: RESOLVED | Noted: 2017-11-20 | Resolved: 2018-04-12

## 2018-04-13 ENCOUNTER — TELEPHONE (OUTPATIENT)
Dept: FAMILY MEDICINE CLINIC | Age: 78
End: 2018-04-13

## 2018-04-16 RX ORDER — MIDODRINE HYDROCHLORIDE 5 MG/1
5 TABLET ORAL
Qty: 270 TABLET | Refills: 3 | Status: SHIPPED | OUTPATIENT
Start: 2018-04-16 | End: 2018-06-27 | Stop reason: SDUPTHER

## 2018-04-18 ENCOUNTER — OFFICE VISIT (OUTPATIENT)
Dept: FAMILY MEDICINE CLINIC | Age: 78
End: 2018-04-18
Payer: MEDICARE

## 2018-04-18 VITALS
BODY MASS INDEX: 27.12 KG/M2 | HEIGHT: 65 IN | WEIGHT: 162.8 LBS | DIASTOLIC BLOOD PRESSURE: 68 MMHG | SYSTOLIC BLOOD PRESSURE: 122 MMHG

## 2018-04-18 DIAGNOSIS — E78.5 HYPERLIPIDEMIA, UNSPECIFIED HYPERLIPIDEMIA TYPE: ICD-10-CM

## 2018-04-18 DIAGNOSIS — E03.9 HYPOTHYROIDISM, UNSPECIFIED TYPE: Primary | ICD-10-CM

## 2018-04-18 DIAGNOSIS — E11.9 TYPE 2 DIABETES MELLITUS WITHOUT COMPLICATION, UNSPECIFIED LONG TERM INSULIN USE STATUS: Chronic | ICD-10-CM

## 2018-04-18 PROCEDURE — 99213 OFFICE O/P EST LOW 20 MIN: CPT | Performed by: FAMILY MEDICINE

## 2018-04-18 RX ORDER — LEVOTHYROXINE SODIUM 112 UG/1
112 TABLET ORAL DAILY
Qty: 90 TABLET | Refills: 3 | Status: SHIPPED | OUTPATIENT
Start: 2018-04-18 | End: 2019-04-13 | Stop reason: SDUPTHER

## 2018-04-18 RX ORDER — LEVOTHYROXINE SODIUM 0.1 MG/1
100 TABLET ORAL DAILY
Qty: 90 TABLET | Refills: 3 | Status: CANCELLED | OUTPATIENT
Start: 2018-04-18

## 2018-04-18 ASSESSMENT — PATIENT HEALTH QUESTIONNAIRE - PHQ9
1. LITTLE INTEREST OR PLEASURE IN DOING THINGS: 0
SUM OF ALL RESPONSES TO PHQ QUESTIONS 1-9: 0
2. FEELING DOWN, DEPRESSED OR HOPELESS: 0
SUM OF ALL RESPONSES TO PHQ9 QUESTIONS 1 & 2: 0

## 2018-04-26 ENCOUNTER — NURSE ONLY (OUTPATIENT)
Dept: CARDIOLOGY | Age: 78
End: 2018-04-26
Payer: MEDICARE

## 2018-04-26 DIAGNOSIS — I50.42 CHRONIC COMBINED SYSTOLIC AND DIASTOLIC CONGESTIVE HEART FAILURE (HCC): Primary | ICD-10-CM

## 2018-04-26 PROCEDURE — 93297 REM INTERROG DEV EVAL ICPMS: CPT | Performed by: INTERNAL MEDICINE

## 2018-05-25 ENCOUNTER — NURSE ONLY (OUTPATIENT)
Dept: CARDIOLOGY | Age: 78
End: 2018-05-25
Payer: MEDICARE

## 2018-05-25 DIAGNOSIS — I50.9 ACUTE ON CHRONIC CONGESTIVE HEART FAILURE, UNSPECIFIED CONGESTIVE HEART FAILURE TYPE: Primary | ICD-10-CM

## 2018-05-27 PROCEDURE — 93297 REM INTERROG DEV EVAL ICPMS: CPT | Performed by: FAMILY MEDICINE

## 2018-05-27 PROCEDURE — 93299 PR REM INTERROG ICPMS/SCRMS <30 D TECH REVIEW: CPT | Performed by: FAMILY MEDICINE

## 2018-05-29 ENCOUNTER — TELEPHONE (OUTPATIENT)
Dept: CARDIOLOGY | Age: 78
End: 2018-05-29

## 2018-06-17 PROCEDURE — 1800000000 HC LEAVE OF ABSENCE

## 2018-06-20 PROCEDURE — 1800000000 HC LEAVE OF ABSENCE

## 2018-06-22 ENCOUNTER — TELEPHONE (OUTPATIENT)
Dept: CARDIOLOGY | Age: 78
End: 2018-06-22

## 2018-06-22 ENCOUNTER — HOSPITAL ENCOUNTER (OUTPATIENT)
Age: 78
Discharge: HOME | End: 2018-06-22
Payer: MEDICARE

## 2018-06-22 DIAGNOSIS — E11.9 TYPE 2 DIABETES MELLITUS WITHOUT COMPLICATION, UNSPECIFIED LONG TERM INSULIN USE STATUS: Chronic | ICD-10-CM

## 2018-06-22 DIAGNOSIS — E03.9 HYPOTHYROIDISM, UNSPECIFIED TYPE: ICD-10-CM

## 2018-06-22 DIAGNOSIS — E78.5 HYPERLIPIDEMIA, UNSPECIFIED HYPERLIPIDEMIA TYPE: ICD-10-CM

## 2018-06-22 LAB
ALT SERPL-CCNC: 13 U/L (ref 5–33)
ANION GAP SERPL CALCULATED.3IONS-SCNC: 14 MMOL/L (ref 9–17)
AST SERPL-CCNC: 19 U/L
BUN BLDV-MCNC: 11 MG/DL (ref 8–23)
BUN/CREAT BLD: 18 (ref 9–20)
CALCIUM SERPL-MCNC: 9.6 MG/DL (ref 8.6–10.4)
CHLORIDE BLD-SCNC: 103 MMOL/L (ref 98–107)
CO2: 27 MMOL/L (ref 20–31)
CREAT SERPL-MCNC: 0.6 MG/DL (ref 0.5–0.9)
ESTIMATED AVERAGE GLUCOSE: 111 MG/DL
GFR AFRICAN AMERICAN: >60 ML/MIN
GFR NON-AFRICAN AMERICAN: >60 ML/MIN
GFR SERPL CREATININE-BSD FRML MDRD: ABNORMAL ML/MIN/{1.73_M2}
GFR SERPL CREATININE-BSD FRML MDRD: ABNORMAL ML/MIN/{1.73_M2}
GLUCOSE BLD-MCNC: 100 MG/DL (ref 70–99)
HBA1C MFR BLD: 5.5 % (ref 4.8–5.9)
HCT VFR BLD CALC: 39.8 % (ref 36.3–47.1)
HEMOGLOBIN: 12.6 G/DL (ref 11.9–15.1)
HIGH SENSITIVE C-REACTIVE PROTEIN: 3.1 MG/L
MCH RBC QN AUTO: 28.7 PG (ref 25.2–33.5)
MCHC RBC AUTO-ENTMCNC: 31.7 G/DL (ref 28.4–34.8)
MCV RBC AUTO: 90.7 FL (ref 82.6–102.9)
NRBC AUTOMATED: 0 PER 100 WBC
PDW BLD-RTO: 14 % (ref 11.8–14.4)
PLATELET # BLD: 232 K/UL (ref 138–453)
PMV BLD AUTO: 10.6 FL (ref 8.1–13.5)
POTASSIUM SERPL-SCNC: 4.3 MMOL/L (ref 3.7–5.3)
RBC # BLD: 4.39 M/UL (ref 3.95–5.11)
SEDIMENTATION RATE, ERYTHROCYTE: 15 MM (ref 0–20)
SODIUM BLD-SCNC: 144 MMOL/L (ref 135–144)
T4 TOTAL: 11.9 UG/DL (ref 4.5–12)
TSH SERPL DL<=0.05 MIU/L-ACNC: 0.75 MIU/L (ref 0.3–5)
WBC # BLD: 6 K/UL (ref 3.5–11.3)

## 2018-06-22 PROCEDURE — 36415 COLL VENOUS BLD VENIPUNCTURE: CPT

## 2018-06-22 PROCEDURE — 84450 TRANSFERASE (AST) (SGOT): CPT

## 2018-06-22 PROCEDURE — 83036 HEMOGLOBIN GLYCOSYLATED A1C: CPT

## 2018-06-22 PROCEDURE — 85027 COMPLETE CBC AUTOMATED: CPT

## 2018-06-22 PROCEDURE — 85651 RBC SED RATE NONAUTOMATED: CPT

## 2018-06-22 PROCEDURE — 84436 ASSAY OF TOTAL THYROXINE: CPT

## 2018-06-22 PROCEDURE — 80048 BASIC METABOLIC PNL TOTAL CA: CPT

## 2018-06-22 PROCEDURE — 84460 ALANINE AMINO (ALT) (SGPT): CPT

## 2018-06-22 PROCEDURE — 86141 C-REACTIVE PROTEIN HS: CPT

## 2018-06-22 PROCEDURE — 84443 ASSAY THYROID STIM HORMONE: CPT

## 2018-06-27 ENCOUNTER — OFFICE VISIT (OUTPATIENT)
Dept: CARDIOLOGY | Age: 78
End: 2018-06-27
Payer: MEDICARE

## 2018-06-27 VITALS
WEIGHT: 156 LBS | DIASTOLIC BLOOD PRESSURE: 83 MMHG | SYSTOLIC BLOOD PRESSURE: 125 MMHG | HEIGHT: 65 IN | BODY MASS INDEX: 25.99 KG/M2 | OXYGEN SATURATION: 98 %

## 2018-06-27 DIAGNOSIS — E78.5 HYPERLIPIDEMIA, UNSPECIFIED HYPERLIPIDEMIA TYPE: ICD-10-CM

## 2018-06-27 DIAGNOSIS — I48.20 CHRONIC ATRIAL FIBRILLATION (HCC): ICD-10-CM

## 2018-06-27 DIAGNOSIS — I50.32 CHRONIC DIASTOLIC CHF (CONGESTIVE HEART FAILURE) (HCC): Primary | Chronic | ICD-10-CM

## 2018-06-27 DIAGNOSIS — I25.10 ASHD (ARTERIOSCLEROTIC HEART DISEASE): ICD-10-CM

## 2018-06-27 PROCEDURE — 99214 OFFICE O/P EST MOD 30 MIN: CPT | Performed by: INTERNAL MEDICINE

## 2018-06-29 ENCOUNTER — OFFICE VISIT (OUTPATIENT)
Dept: FAMILY MEDICINE CLINIC | Age: 78
End: 2018-06-29
Payer: MEDICARE

## 2018-06-29 VITALS
SYSTOLIC BLOOD PRESSURE: 120 MMHG | BODY MASS INDEX: 25.86 KG/M2 | HEIGHT: 65 IN | DIASTOLIC BLOOD PRESSURE: 74 MMHG | WEIGHT: 155.2 LBS

## 2018-06-29 DIAGNOSIS — Z13.220 SCREENING CHOLESTEROL LEVEL: ICD-10-CM

## 2018-06-29 DIAGNOSIS — E11.9 TYPE 2 DIABETES MELLITUS WITHOUT COMPLICATION, UNSPECIFIED LONG TERM INSULIN USE STATUS: Chronic | ICD-10-CM

## 2018-06-29 DIAGNOSIS — I48.0 PAROXYSMAL A-FIB (HCC): Primary | Chronic | ICD-10-CM

## 2018-06-29 DIAGNOSIS — I10 ESSENTIAL HYPERTENSION: Chronic | ICD-10-CM

## 2018-06-29 DIAGNOSIS — E03.9 HYPOTHYROIDISM, UNSPECIFIED TYPE: ICD-10-CM

## 2018-06-29 DIAGNOSIS — J45.909 ASTHMA, UNSPECIFIED ASTHMA SEVERITY, UNSPECIFIED WHETHER COMPLICATED, UNSPECIFIED WHETHER PERSISTENT: ICD-10-CM

## 2018-06-29 DIAGNOSIS — I50.22 CHRONIC SYSTOLIC CONGESTIVE HEART FAILURE (HCC): ICD-10-CM

## 2018-06-29 PROCEDURE — 99214 OFFICE O/P EST MOD 30 MIN: CPT | Performed by: FAMILY MEDICINE

## 2018-06-29 NOTE — PATIENT INSTRUCTIONS
PLAN:  I explain that hair loss is normal about 6-12 months after an acute illness or event. As things stabilize, her hair could come back. Labs reviewed, I am pleased with the results. I reviewed the nocturnal oximetry, she was below 90% for just short of 4 minutes. She does not need to continue to use the oxygen at bedtime. She is not benefiting from the use. I am happy to see how well she is doing at this time. I will see her back in 4 months or sooner if needed.

## 2018-06-29 NOTE — PROGRESS NOTES
(70.4 kg)   06/27/18 156 lb (70.8 kg)   04/18/18 162 lb 12.8 oz (73.8 kg)     BP Readings from Last 3 Encounters:   06/29/18 120/74   06/27/18 125/83   04/18/18 122/68     PHYSICAL EXAM:  General Appearance: in no acute distress, well developed, well nourished. Thinning of hair all over  Eyes: pupils equal, round reactive to light and accommodation. Ears: normal canal and TM's. Nose: nares patent, no lesions. Oral Cavity: mucosa moist.  Throat: clear. Neck/Thyroid: neck supple, full range of motion, no cervical lymphadenopathy, no thyromegaly or carotid bruits. Skin: warm and dry. No suspicious lesions. Heart: regular rate and rhythm. No murmurs. S1, S2 normal, no gallops. rate 70  Lungs: clear to auscultation bilaterally. Abdomen: bowel sounds present, soft, nontender, nondistended, no masses or organomegaly. Musculoskeletal: normal, full range of motion in knees and hips, no swelling or tenderness. Extremities: no cyanosis, non pitting edema  Peripheral Pulses: 2+ throughout, symetric. Neurologic: nonfocal, motor strength normal upper and lower extremities, sensory exam intact. Psych: normal affect, speech fluent. ASSESSMENT:   Diagnosis Orders   1. Paroxysmal a-fib (HCC)     2. Hypothyroidism, unspecified type  T4    TSH without Reflex   3. Essential hypertension     4. Chronic systolic congestive heart failure ([address]     5. Asthma, unspecified asthma severity, unspecified whether complicated, unspecified whether persistent     6. Type 2 diabetes mellitus without complication, unspecified long term insulin use status ([facility])  Basic Metabolic Panel    CBC    Hemoglobin A1C   7. Screening cholesterol level  ALT    AST    CRP,High Sensitivity    Lipid Panel    Sedimentation Rate       PLAN:  I explain that hair loss is normal about 6-12 months after an acute illness or event. As things stabilize, her hair could come back. Labs reviewed, I am pleased with the results.    I reviewed the nocturnal oximetry, she was below 90% for just short of 4 minutes. She does not need to continue to use the oxygen at bedtime. She is not benefiting from the use. I am happy to see how well she is doing at this time. I will see her back in 4 months or sooner if needed. Orders Placed This Encounter   Procedures    ALT     Standing Status:   Future     Standing Expiration Date:   6/29/2019    AST     Standing Status:   Future     Standing Expiration Date:   6/29/2019    Basic Metabolic Panel     Standing Status:   Future     Standing Expiration Date:   6/29/2019    CBC     Standing Status:   Future     Standing Expiration Date:   6/29/2019   Duarte Washington CRP,High Sensitivity     Standing Status:   Future     Standing Expiration Date:   6/29/2019    Hemoglobin A1C     Standing Status:   Future     Standing Expiration Date:   6/29/2019    Lipid Panel     Standing Status:   Future     Standing Expiration Date:   6/29/2019     Order Specific Question:   Is Patient Fasting?/# of Hours     Answer:   no fasting required    Sedimentation Rate     Standing Status:   Future     Standing Expiration Date:   6/29/2019    T4     Standing Status:   Future     Standing Expiration Date:   6/29/2019    TSH without Reflex     Standing Status:   Future     Standing Expiration Date:   6/29/2019     No orders of the defined types were placed in this encounter. I, Dr. Funmilayo Kaplan, personally performed the services described in this documentation as scribed by DONNY Quintanilla in my presence, and is both accurate and complete.

## 2018-07-09 ENCOUNTER — NURSE ONLY (OUTPATIENT)
Dept: CARDIOLOGY | Age: 78
End: 2018-07-09
Payer: MEDICARE

## 2018-07-09 DIAGNOSIS — Z95.810 ICD (IMPLANTABLE CARDIOVERTER-DEFIBRILLATOR) IN PLACE: ICD-10-CM

## 2018-07-09 DIAGNOSIS — I50.32 CHRONIC DIASTOLIC CHF (CONGESTIVE HEART FAILURE) (HCC): Primary | Chronic | ICD-10-CM

## 2018-07-11 ENCOUNTER — TELEPHONE (OUTPATIENT)
Dept: CARDIOLOGY | Age: 78
End: 2018-07-11

## 2018-07-11 PROCEDURE — 93297 REM INTERROG DEV EVAL ICPMS: CPT | Performed by: FAMILY MEDICINE

## 2018-07-11 PROCEDURE — 93299 PR REM INTERROG ICPMS/SCRMS <30 D TECH REVIEW: CPT | Performed by: FAMILY MEDICINE

## 2018-08-11 PROCEDURE — 93297 REM INTERROG DEV EVAL ICPMS: CPT | Performed by: FAMILY MEDICINE

## 2018-08-11 PROCEDURE — 93299 PR REM INTERROG ICPMS/SCRMS <30 D TECH REVIEW: CPT | Performed by: FAMILY MEDICINE

## 2018-08-14 ENCOUNTER — NURSE ONLY (OUTPATIENT)
Dept: CARDIOLOGY | Age: 78
End: 2018-08-14
Payer: MEDICARE

## 2018-08-14 DIAGNOSIS — I50.32 CHRONIC DIASTOLIC CHF (CONGESTIVE HEART FAILURE) (HCC): Primary | Chronic | ICD-10-CM

## 2018-08-17 ENCOUNTER — TELEPHONE (OUTPATIENT)
Dept: CARDIOLOGY | Age: 78
End: 2018-08-17

## 2018-09-20 ENCOUNTER — NURSE ONLY (OUTPATIENT)
Dept: CARDIOLOGY | Age: 78
End: 2018-09-20
Payer: MEDICARE

## 2018-09-20 DIAGNOSIS — Z95.810 ICD (IMPLANTABLE CARDIOVERTER-DEFIBRILLATOR) IN PLACE: Primary | ICD-10-CM

## 2018-09-20 DIAGNOSIS — I25.5 ISCHEMIC CARDIOMYOPATHY: Chronic | ICD-10-CM

## 2018-09-21 ENCOUNTER — TELEPHONE (OUTPATIENT)
Dept: CARDIOLOGY | Age: 78
End: 2018-09-21

## 2018-09-21 ENCOUNTER — HOSPITAL ENCOUNTER (OUTPATIENT)
Dept: CT IMAGING | Age: 78
Discharge: HOME | End: 2018-09-23
Payer: MEDICARE

## 2018-09-21 DIAGNOSIS — J84.112 UIP (USUAL INTERSTITIAL PNEUMONITIS) (HCC): ICD-10-CM

## 2018-09-21 PROCEDURE — 71250 CT THORAX DX C-: CPT

## 2018-09-21 PROCEDURE — 93296 REM INTERROG EVL PM/IDS: CPT | Performed by: INTERNAL MEDICINE

## 2018-09-21 PROCEDURE — 93295 DEV INTERROG REMOTE 1/2/MLT: CPT | Performed by: INTERNAL MEDICINE

## 2018-09-27 ENCOUNTER — OFFICE VISIT (OUTPATIENT)
Dept: PULMONOLOGY | Age: 78
End: 2018-09-27
Payer: MEDICARE

## 2018-09-27 VITALS
HEIGHT: 65 IN | DIASTOLIC BLOOD PRESSURE: 72 MMHG | SYSTOLIC BLOOD PRESSURE: 121 MMHG | WEIGHT: 150 LBS | TEMPERATURE: 97.6 F | BODY MASS INDEX: 24.99 KG/M2 | HEART RATE: 79 BPM | RESPIRATION RATE: 18 BRPM | OXYGEN SATURATION: 99 %

## 2018-09-27 DIAGNOSIS — J67.9 HYPERSENSITIVITY PNEUMONITIS (HCC): Primary | ICD-10-CM

## 2018-09-27 DIAGNOSIS — J84.10 PULMONARY FIBROSIS (HCC): ICD-10-CM

## 2018-09-27 DIAGNOSIS — J84.112 UIP (USUAL INTERSTITIAL PNEUMONITIS) (HCC): ICD-10-CM

## 2018-09-27 PROCEDURE — 99214 OFFICE O/P EST MOD 30 MIN: CPT | Performed by: NURSE PRACTITIONER

## 2018-09-27 ASSESSMENT — ENCOUNTER SYMPTOMS
EYES NEGATIVE: 1
ALLERGIC/IMMUNOLOGIC NEGATIVE: 1
GASTROINTESTINAL NEGATIVE: 1

## 2018-09-27 NOTE — PROGRESS NOTES
failure) ([facility]     Clotting disorder ([facility]     plebitis in L leg    COPD (chronic obstructive pulmonary disease) ([facility]     DDD (degenerative disc disease), cervical     Degeneration of cervical intervertebral disc     Diverticulosis 2005    Gout     Gout, unspecified     H/O cardiac catheterization 6/17/15    LMCA: Normal 0% stenosis. LAD: Lesion on 1st diag: Proximal subsection. 80% stenosis. Lesion plaque is ruptured. David Aleman LCx: Lesion on 1st Ob Leslie: Mid subsection. 85% stenosis. RCA:Lesion on R PDA: Mid subsection. 85% stenosis. Lesion on R PDA: Distal subsection. 70% stenosis. Lesion on Prox RCA: Mid subsection. 50% stenosis. EF 50%.  H/O echocardiogram 11/09/2016    EF 40-45%. Mild LV hypertrophy normal LV cavity size. Sigmoid interventricular septum without evidence of outflow tract obstruction. Left atrium is moderately dilated (34-39) left atrial volume index of 36 ml/m2. Mild mitral regurg. Diastology cannot be assessed due to A-fib.  H/O echocardiogram 03/09/2018    EF 45-50%. Apical hypokinesis noted. The left atrium is moderately dilated (34-39) with a left atrial volume index of 34ml/m2. Mild to moderate mitral regurg. Mild tricuspid regurg. Moderate diastolic dysfunction.  History of Holter monitoring 3/24/16    Atrial Fibrillation throughout,fairly controlled, HR  bpm 79% of the time. Occasional high ventricular rate episodes and multiple pauses suggestive of tachycardia/bradycardia syndrome  Msximum R-R interval 2.68 seconds.    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    Long term (current) use of anticoagulants 8/31/2015    Movement disorder     Other abnormal glucose 2004    Pacemaker 08/10/2017    Insertion of a biventricular pacemaker/ICD AVN ablation    Phlebitis and thrombophlebitis of lower extremities, unspecified 1961    L leg    Senile osteoporosis 2006    L/S    Symptomatic menopausal or female climacteric states 06/1995    Syncope and collapse     Unspecified hereditary and idiopathic peripheral neuropathy 2012    Unspecified sleep apnea 11/2009     Past Surgical History:   Procedure Laterality Date    BRONCHOSCOPY  6/7/2017    BRONCHOSCOPY BRUSHINGS performed by Tammi Bustos DO at [facility]  6/7/2017    BRONCHOSCOPY/TRANSBRONCHIAL LUNG BIOPSY performed by Tammi Bustos DO at [facility]  6/7/2017    BRONCHOSCOPY FLUOROSCOPY performed by Tammi Bustos DO at [address] [place] [address] [address] Right 06/17/2015    CATARACT REMOVAL WITH IMPLANT Right 08/14/2017    DR ROMERO    COLONOSCOPY  1/2005    DIAGNOSTIC CARDIAC CATH LAB PROCEDURE  06/17/15    EYE SURGERY      FRACTURE SURGERY  2004    Distal Radius Ulna    LOBECTOMY Left 6/14/2017    MINI PORT ACCESS LEFT CHEST, X2 SPECIMENS. performed by Bascom Brunner, MD at [address]  2years old    VOLVAR-ULCERATIVE LESION-CAUTERIZED    [place] EGD TRANSORAL BIOPSY SINGLE/MULTIPLE Left 2/13/2018    EGD BIOPSY performed by Mary Alice Walton MD at [facility]    SKIN BIOPSY      TONSILLECTOMY AND ADENOIDECTOMY       Family History   Problem Relation Age of Onset    Heart Disease Mother     Stroke Mother     High Blood Pressure Mother     Cancer Father         lung    Stroke Brother     Heart Disease Maternal Grandmother        Social History     Social History    Marital status:      Spouse name: N/A    Number of children: N/A    Years of education: N/A     Occupational History    Not on file.      Social History Main Topics    Smoking status: Never Smoker    Smokeless tobacco: Never Used    Alcohol use No    Drug use: No    Sexual activity: Not on file     Other Topics Concern    Not on file     Social History Narrative    ** Merged History Encounter ** Review of Systems   Constitutional: Negative. HENT: Negative. Eyes: Negative. Respiratory:        Denies shortness of breath and wheezing. Notes a daily chronic cough associated with her allergies. Cough is productive of clear/white sputum denies hemoptysis. Sputum production is increased in the morning   Cardiovascular: Negative. Gastrointestinal: Negative. Endocrine: Negative. Genitourinary: Negative. Musculoskeletal: Negative. Skin: Negative. Allergic/Immunologic: Negative. Neurological: Negative. Hematological: Negative. Psychiatric/Behavioral: Negative.         Objective:     Physical Exam  General appearance - alert, well appearing, and in no distress, oriented to person, place, and time and normal appearing weight  Mental status - alert, oriented to person, place, and time, normal mood, behavior, speech, dress, motor activity, and thought processes  Eyes - pupils equal and reactive, extraocular eye movements intact  Ears - not examined  Nose - normal and patent, no erythema, discharge or polyps  Mouth - mucous membranes moist, pharynx normal without lesions  Neck - supple, no significant adenopathy  Chest - clear to auscultation, no wheezes, rales or rhonchi, symmetric air entry  Heart - normal rate, regular rhythm, normal S1, S2, no murmurs, rubs, clicks or gallops  Abdomen - soft, nontender, nondistended, no masses or organomegaly  Neurological - alert, oriented, normal speech, no focal findings or movement disorder noted}  Extremities - pedal edema 1 +, intact peripheral pulses  Skin - normal coloration and turgor, no rashes, no suspicious skin lesions noted     Wt Readings from Last 3 Encounters:   09/27/18 150 lb (68 kg)   06/29/18 155 lb 3.2 oz (70.4 kg)   06/27/18 156 lb (70.8 kg)       Results for orders placed or performed during the hospital encounter of 06/22/18   ALT   Result Value Ref Range    ALT 13 5 - 33 U/L   AST   Result Value Ref Range    AST SYSTEM PROVIDED HISTORY: UIP (usual interstitial pneumonitis) ([address] TECHNOLOGIST PROVIDED HISTORY: follow up progression of UIP FINDINGS: Mediastinum: Pacer in the left chest and transvenous leads terminate in the right heart. Cardiomegaly and calcific coronary artery disease. Postop changes appear to be present in the left atrial appendage. Heart and great vessels are otherwise unremarkable. No pathologic mediastinal or hilar lymphadenopathy. HRCT Findings/Lungs/pleura: Centrilobular and paraseptal emphysema. Coarse interstitial markings are noted at the lung bases. Lungs are otherwise clear. Upper Abdomen: Normal Soft Tissues/Bones: Normal     Centrilobular and paraseptal emphysema. Nonspecific interstitial fibrotic changes appear stable. Coronary artery disease and postop changes as above. Assessment:      1. Hypersensitivity pneumonitis ([address]    2. UIP (usual interstitial pneumonitis) ([facility])    3. Pulmonary fibrosis ([address]          Plan:      1. Recommend flu vaccination in the fall annually. Scheduled next month with PCP  2. Recommendations given regarding pneumococcal vaccinations. Up to date  3. Patient is up-to-date with vaccinations from pulmonary perspective. 4. Maintain an active lifestyle. 5. Patient's questions were answered to her satisfaction. 6. Lifelong nonsmoker  7. CT scan of the chest was reviewed  8.  We'll see the patient back in 12 months        Electronically signed by OSCAR Tinajero CNP on 9/27/2018 at 9:49 AM

## 2018-10-18 ENCOUNTER — NURSE ONLY (OUTPATIENT)
Dept: CARDIOLOGY | Age: 78
End: 2018-10-18
Payer: MEDICARE

## 2018-10-18 DIAGNOSIS — I50.32 CHRONIC DIASTOLIC CHF (CONGESTIVE HEART FAILURE) (HCC): Primary | Chronic | ICD-10-CM

## 2018-10-18 PROCEDURE — 93299 PR REM INTERROG ICPMS/SCRMS <30 D TECH REVIEW: CPT | Performed by: FAMILY MEDICINE

## 2018-10-18 PROCEDURE — 93297 REM INTERROG DEV EVAL ICPMS: CPT | Performed by: FAMILY MEDICINE

## 2018-10-22 ENCOUNTER — TELEPHONE (OUTPATIENT)
Dept: CARDIOLOGY | Age: 78
End: 2018-10-22

## 2018-10-23 ENCOUNTER — HOSPITAL ENCOUNTER (OUTPATIENT)
Age: 78
Discharge: HOME | End: 2018-10-23
Payer: MEDICARE

## 2018-10-23 DIAGNOSIS — Z13.220 SCREENING CHOLESTEROL LEVEL: ICD-10-CM

## 2018-10-23 DIAGNOSIS — E11.9 TYPE 2 DIABETES MELLITUS WITHOUT COMPLICATION (HCC): Chronic | ICD-10-CM

## 2018-10-23 DIAGNOSIS — E03.9 HYPOTHYROIDISM, UNSPECIFIED TYPE: ICD-10-CM

## 2018-10-23 LAB
ALT SERPL-CCNC: 13 U/L (ref 5–33)
ANION GAP SERPL CALCULATED.3IONS-SCNC: 12 MMOL/L (ref 9–17)
AST SERPL-CCNC: 17 U/L
BUN BLDV-MCNC: 18 MG/DL (ref 8–23)
BUN/CREAT BLD: 27 (ref 9–20)
CALCIUM SERPL-MCNC: 9 MG/DL (ref 8.6–10.4)
CHLORIDE BLD-SCNC: 106 MMOL/L (ref 98–107)
CHOLESTEROL/HDL RATIO: 2.9
CHOLESTEROL: 117 MG/DL
CO2: 25 MMOL/L (ref 20–31)
CREAT SERPL-MCNC: 0.67 MG/DL (ref 0.5–0.9)
ESTIMATED AVERAGE GLUCOSE: 108 MG/DL
GFR AFRICAN AMERICAN: >60 ML/MIN
GFR NON-AFRICAN AMERICAN: >60 ML/MIN
GFR SERPL CREATININE-BSD FRML MDRD: ABNORMAL ML/MIN/{1.73_M2}
GFR SERPL CREATININE-BSD FRML MDRD: ABNORMAL ML/MIN/{1.73_M2}
GLUCOSE BLD-MCNC: 55 MG/DL (ref 70–99)
HBA1C MFR BLD: 5.4 % (ref 4.8–5.9)
HCT VFR BLD CALC: 41.5 % (ref 36.3–47.1)
HDLC SERPL-MCNC: 41 MG/DL
HEMOGLOBIN: 12.9 G/DL (ref 11.9–15.1)
HIGH SENSITIVE C-REACTIVE PROTEIN: 2.2 MG/L
LDL CHOLESTEROL: 37 MG/DL (ref 0–130)
MCH RBC QN AUTO: 29.3 PG (ref 25.2–33.5)
MCHC RBC AUTO-ENTMCNC: 31.1 G/DL (ref 28.4–34.8)
MCV RBC AUTO: 94.1 FL (ref 82.6–102.9)
NRBC AUTOMATED: 0 PER 100 WBC
PDW BLD-RTO: 13.9 % (ref 11.8–14.4)
PLATELET # BLD: 218 K/UL (ref 138–453)
PMV BLD AUTO: 10.9 FL (ref 8.1–13.5)
POTASSIUM SERPL-SCNC: 4.1 MMOL/L (ref 3.7–5.3)
RBC # BLD: 4.41 M/UL (ref 3.95–5.11)
SEDIMENTATION RATE, ERYTHROCYTE: 16 MM (ref 0–20)
SODIUM BLD-SCNC: 143 MMOL/L (ref 135–144)
T4 TOTAL: 9.9 UG/DL (ref 4.5–12)
TRIGL SERPL-MCNC: 194 MG/DL
TSH SERPL DL<=0.05 MIU/L-ACNC: 1.56 MIU/L (ref 0.3–5)
VLDLC SERPL CALC-MCNC: ABNORMAL MG/DL (ref 1–30)
WBC # BLD: 7.1 K/UL (ref 3.5–11.3)

## 2018-10-23 PROCEDURE — 86141 C-REACTIVE PROTEIN HS: CPT

## 2018-10-23 PROCEDURE — 85651 RBC SED RATE NONAUTOMATED: CPT

## 2018-10-23 PROCEDURE — 85027 COMPLETE CBC AUTOMATED: CPT

## 2018-10-23 PROCEDURE — 84460 ALANINE AMINO (ALT) (SGPT): CPT

## 2018-10-23 PROCEDURE — 80048 BASIC METABOLIC PNL TOTAL CA: CPT

## 2018-10-23 PROCEDURE — 84443 ASSAY THYROID STIM HORMONE: CPT

## 2018-10-23 PROCEDURE — 84436 ASSAY OF TOTAL THYROXINE: CPT

## 2018-10-23 PROCEDURE — 80061 LIPID PANEL: CPT

## 2018-10-23 PROCEDURE — 84450 TRANSFERASE (AST) (SGOT): CPT

## 2018-10-23 PROCEDURE — 36415 COLL VENOUS BLD VENIPUNCTURE: CPT

## 2018-10-23 PROCEDURE — 83036 HEMOGLOBIN GLYCOSYLATED A1C: CPT

## 2018-10-26 ENCOUNTER — OFFICE VISIT (OUTPATIENT)
Dept: FAMILY MEDICINE CLINIC | Age: 78
End: 2018-10-26
Payer: MEDICARE

## 2018-10-26 VITALS
SYSTOLIC BLOOD PRESSURE: 122 MMHG | WEIGHT: 157 LBS | HEIGHT: 65 IN | DIASTOLIC BLOOD PRESSURE: 80 MMHG | BODY MASS INDEX: 26.16 KG/M2

## 2018-10-26 DIAGNOSIS — Z79.4 TYPE 2 DIABETES MELLITUS WITHOUT COMPLICATION, WITH LONG-TERM CURRENT USE OF INSULIN (HCC): Primary | ICD-10-CM

## 2018-10-26 DIAGNOSIS — Z23 NEED FOR PROPHYLACTIC VACCINATION AND INOCULATION AGAINST INFLUENZA: ICD-10-CM

## 2018-10-26 DIAGNOSIS — G20 PARKINSONIAN TREMOR (HCC): ICD-10-CM

## 2018-10-26 DIAGNOSIS — I48.20 CHRONIC A-FIB (HCC): ICD-10-CM

## 2018-10-26 DIAGNOSIS — E11.9 TYPE 2 DIABETES MELLITUS WITHOUT COMPLICATION, WITH LONG-TERM CURRENT USE OF INSULIN (HCC): Primary | ICD-10-CM

## 2018-10-26 DIAGNOSIS — E03.9 HYPOTHYROIDISM, UNSPECIFIED TYPE: ICD-10-CM

## 2018-10-26 DIAGNOSIS — I10 ESSENTIAL HYPERTENSION: ICD-10-CM

## 2018-10-26 DIAGNOSIS — I49.5 SICK SINUS SYNDROME (HCC): ICD-10-CM

## 2018-10-26 DIAGNOSIS — E78.5 HYPERLIPIDEMIA, UNSPECIFIED HYPERLIPIDEMIA TYPE: ICD-10-CM

## 2018-10-26 PROBLEM — G20.C PARKINSONIAN TREMOR: Status: ACTIVE | Noted: 2018-10-26

## 2018-10-26 PROCEDURE — G0008 ADMIN INFLUENZA VIRUS VAC: HCPCS | Performed by: FAMILY MEDICINE

## 2018-10-26 PROCEDURE — 99214 OFFICE O/P EST MOD 30 MIN: CPT | Performed by: FAMILY MEDICINE

## 2018-10-26 PROCEDURE — 90662 IIV NO PRSV INCREASED AG IM: CPT | Performed by: FAMILY MEDICINE

## 2018-10-26 RX ORDER — AMANTADINE HYDROCHLORIDE 100 MG/1
100 TABLET ORAL 2 TIMES DAILY
Qty: 60 TABLET | Refills: 1 | Status: SHIPPED | OUTPATIENT
Start: 2018-10-26 | End: 2019-02-04 | Stop reason: SDUPTHER

## 2018-10-26 NOTE — PROGRESS NOTES
ADAM Dodson, leah scribing for and in the presence of Dr. Sonido Puga. 10/26/18/10:20am/SNP    [address] [address]  [phone]  Dept: Won[address] Sterling Hayes is a 77 y.o. female here for Follow-up; Hypertension; Hyperlipidemia; and COPD    HPI:  HYPERTENSION:  She is not exercising. She is not adherent to a low-salt diet. Blood pressure is not being monitored at home. HYPERLIPIDEMIA:     Medication compliance: compliant all of the time. Patient is not following a low fat, low cholesterol diet. She is not exercising regularly. COPD Symptoms:   Patient is not currently experiencing symptoms. She  reports that she has never smoked. She has never used smokeless tobacco. Current treatment includes nothing. Prior to Admission medications    Medication Sig Start Date End Date Taking?  Authorizing Provider   Amantadine (SYMMETREL) 100 MG TABS tablet Take 100 mg by mouth 2 times daily 10/26/18  Yes Sonido Puga MD   metoprolol tartrate (LOPRESSOR) 25 MG tablet TAKE ONE TABLET BY MOUTH NIGHTLY 10/8/18  Yes Sonido Puga MD   levothyroxine (SYNTHROID) 112 MCG tablet Take 1 tablet by mouth Daily 4/18/18  Yes Sonido Puga MD   aspirin 81 MG tablet Take 81 mg by mouth daily    Yes Historical Provider, MD   MIDODRINE HCL PO Take 5 mg by mouth 3 times daily   Yes Historical Provider, MD   acetaminophen (TYLENOL) 325 MG tablet Take 2 tablets by mouth every 4 hours as needed for Pain 2/20/18  Yes Nicky Mckenna MD   RANEXA 500 MG extended release tablet TAKE ONE TABLET BY MOUTH TWICE DAILY 11/27/17  Yes Bridgette Shabazz MD   atorvastatin (LIPITOR) 40 MG tablet Take 1 tablet by mouth nightly 11/10/17  Yes Sonido Puga MD   polyethylene glycol (GLYCOLAX) powder Take 17 g by mouth as needed (for constipation)   Yes Historical Provider, MD   melatonin 3 MG TABS tablet Take 3 mg by mouth nightly    Yes Historical Provider, MD SINGLE/MULTIPLE Left 2/13/2018    EGD BIOPSY performed by Jony Perry MD at [facility]    SKIN BIOPSY      TONSILLECTOMY AND ADENOIDECTOMY       Family History   Problem Relation Age of Onset    Heart Disease Mother     Stroke Mother     High Blood Pressure Mother     Cancer Father         lung    Stroke Brother     Heart Disease Maternal Grandmother      Past Medical History:   Diagnosis Date    Allergic rhinitis 10/1994    Arthritis     Asthma     Atrial fibrillation ([facility] 12/2008    CAD (coronary artery disease)     CHF (congestive heart failure) (HCC)     Clotting disorder (HCC)     plebitis in L leg    COPD (chronic obstructive pulmonary disease) ([facility]     DDD (degenerative disc disease), cervical     Degeneration of cervical intervertebral disc     Diverticulosis 2005    Gout     Gout, unspecified     H/O cardiac catheterization 6/17/15    LMCA: Normal 0% stenosis. LAD: Lesion on 1st diag: Proximal subsection. 80% stenosis. Lesion plaque is ruptured. [place] [address] LCx: Lesion on 1st Ob Leslie: Mid subsection. 85% stenosis. RCA:Lesion on R PDA: Mid subsection. 85% stenosis. Lesion on R PDA: Distal subsection. 70% stenosis. Lesion on Prox RCA: Mid subsection. 50% stenosis. EF 50%.  H/O echocardiogram 11/09/2016    EF 40-45%. Mild LV hypertrophy normal LV cavity size. Sigmoid interventricular septum without evidence of outflow tract obstruction. Left atrium is moderately dilated (34-39) left atrial volume index of 36 ml/m2. Mild mitral regurg. Diastology cannot be assessed due to A-fib.  H/O echocardiogram 03/09/2018    EF 45-50%. Apical hypokinesis noted. The left atrium is moderately dilated (34-39) with a left atrial volume index of 34ml/m2. Mild to moderate mitral regurg. Mild tricuspid regurg. Moderate diastolic dysfunction.  History of Holter monitoring 3/24/16    Atrial Fibrillation throughout,fairly controlled, HR  bpm 79% of the time.  Occasional high ventricular rate  Adhesive Tape     Aspercreme [Trolamine Salicylate]     Erythromycin Other (See Comments)     \"whole in stomach\"    Erythromycin Other (See Comments)     stomach pain    Guaifenesin & Derivatives     Niacin And Related     Niacin And Related Hives    Tape Radha Nathalia Tape] Dermatitis    Augmentin [Amoxicillin-Pot Clavulanate] Rash     PHYSICAL EXAM:    /80 (Site: Left Upper Arm, Position: Sitting, Cuff Size: Medium Adult)   Ht 5' 5\" (1.651 m)   Wt 157 lb (71.2 kg)   BMI 26.13 kg/m²   Wt Readings from Last 3 Encounters:   10/26/18 157 lb (71.2 kg)   09/27/18 150 lb (68 kg)   06/29/18 155 lb 3.2 oz (70.4 kg)     BP Readings from Last 3 Encounters:   10/26/18 122/80   09/27/18 121/72   06/29/18 120/74     General Appearance: in no acute distress, well developed, well nourished. Eyes: pupils equal, round reactive to light and accommodation. Ears: normal canal and TM's. Nose: nares patent, no lesions. Oral Cavity: mucosa moist.  Throat: clear. Neck/Thyroid: neck supple, full range of motion, no cervical lymphadenopathy, no thyromegaly or carotid bruits. Skin: warm and dry. No suspicious lesions. Heart: regular rate and rhythm. No murmurs. S1, S2 normal, no gallops. Rate 70  Lungs: clear to auscultation bilaterally. Abdomen: bowel sounds present, soft, nontender, nondistended, no masses or organomegaly. Musculoskeletal: normal, full range of motion in knees and hips, no swelling or tenderness. Extremities: no cyanosis, 1+ edema ankles and pre tibial  Peripheral Pulses: 2+ throughout, symetric. Neurologic: nonfocal, motor strength normal upper and lower extremities, sensory exam intact. Resting tremor R hand, cog wheeling upper extremities, fluid turn, minimal arm swing, upright stiff posture  Psych: normal affect, speech fluent. ASSESSMENT:   Diagnosis Orders   1. Type 2 diabetes mellitus without complication, with long-term current use of insulin ([facility])  Hemoglobin A1C   2.  Parkinsonian

## 2018-10-26 NOTE — PATIENT INSTRUCTIONS
flu season. Children 6 months through 6years of age may need two doses during the same flu season. Everyone else needs only one dose each flu season. Some inactivated flu vaccines contain a very small amount of a mercury-based preservative called thimerosal. Studies have not shown thimerosal in vaccines to be harmful, but flu vaccines that do not contain thimerosal are available. There is no live flu virus in flu shots. They cannot cause the flu. There are many flu viruses, and they are always changing. Each year a new flu vaccine is made to protect against three or four viruses that are likely to cause disease in the upcoming flu season. But even when the vaccine doesn't exactly match these viruses, it may still provide some protection. Flu vaccine cannot prevent:  · Flu that is caused by a virus not covered by the vaccine. · Illnesses that look like flu but are not. Some people should not get this vaccine  Tell the person who is giving you the vaccine:  · If you have any severe (life-threatening) allergies. If you ever had a life-threatening allergic reaction after a dose of flu vaccine, or have a severe allergy to any part of this vaccine, you may be advised not to get vaccinated. Most, but not all, types of flu vaccine contain a small amount of egg protein. · If you ever had Guillain-Barré syndrome (also called GBS) Some people with a history of GBS should not get this vaccine. This should be discussed with your doctor. · If you are not feeling well. It is usually okay to get flu vaccine when you have a mild illness, but you might be asked to come back when you feel better. Risks of a vaccine reaction  With any medicine, including vaccines, there is a chance of reactions. These are usually mild and go away on their own, but serious reactions are also possible. Most people who get a flu shot do not have any problems with it.   Minor problems following a flu shot include:  · Soreness, redness, or

## 2018-10-30 PROBLEM — E44.1 MILD MALNUTRITION (HCC): Status: RESOLVED | Noted: 2018-03-08 | Resolved: 2018-10-30

## 2018-11-28 ENCOUNTER — OFFICE VISIT (OUTPATIENT)
Dept: CARDIOLOGY | Age: 78
End: 2018-11-28
Payer: MEDICARE

## 2018-11-28 VITALS
BODY MASS INDEX: 25.07 KG/M2 | HEART RATE: 78 BPM | SYSTOLIC BLOOD PRESSURE: 110 MMHG | WEIGHT: 156 LBS | DIASTOLIC BLOOD PRESSURE: 71 MMHG | OXYGEN SATURATION: 98 % | HEIGHT: 66 IN

## 2018-11-28 DIAGNOSIS — I25.10 CORONARY ARTERY DISEASE INVOLVING NATIVE CORONARY ARTERY OF NATIVE HEART WITHOUT ANGINA PECTORIS: ICD-10-CM

## 2018-11-28 DIAGNOSIS — I51.89 LEFT VENTRICULAR SYSTOLIC DYSFUNCTION, NYHA CLASS 3: ICD-10-CM

## 2018-11-28 DIAGNOSIS — I48.20 CHRONIC ATRIAL FIBRILLATION (HCC): ICD-10-CM

## 2018-11-28 DIAGNOSIS — I50.32 CHRONIC DIASTOLIC CONGESTIVE HEART FAILURE (HCC): Primary | ICD-10-CM

## 2018-11-28 DIAGNOSIS — Z95.810 ICD (IMPLANTABLE CARDIOVERTER-DEFIBRILLATOR) IN PLACE: ICD-10-CM

## 2018-11-28 DIAGNOSIS — E78.2 MIXED HYPERLIPIDEMIA: ICD-10-CM

## 2018-11-28 PROCEDURE — 99214 OFFICE O/P EST MOD 30 MIN: CPT | Performed by: INTERNAL MEDICINE

## 2018-11-28 RX ORDER — ASPIRIN 81 MG/1
81 TABLET ORAL DAILY
Qty: 90 TABLET | Refills: 3 | COMMUNITY
Start: 2018-11-28

## 2018-11-28 NOTE — PROGRESS NOTES
06/17/15    EYE SURGERY      FRACTURE SURGERY  2004    Distal Radius Ulna    LOBECTOMY Left 6/14/2017    MINI PORT ACCESS LEFT CHEST, X2 SPECIMENS. performed by Lavonne Gardner MD at [facility]  2years old    VOLVAR-ULCERATIVE LESION-CAUTERIZED    DC EGD TRANSORAL BIOPSY SINGLE/MULTIPLE Left 2/13/2018    EGD BIOPSY performed by Yifan Mcdermott MD at [facility]    SKIN BIOPSY      TONSILLECTOMY AND ADENOIDECTOMY       Social History:    History   Smoking Status    Never Smoker   Smokeless Tobacco    Never Used     Current Medications:  Outpatient Prescriptions Marked as Taking for the 11/28/18 encounter (Office Visit) with Flo Mackey MD   Medication Sig Dispense Refill    Amantadine (SYMMETREL) 100 MG TABS tablet Take 100 mg by mouth 2 times daily (Patient taking differently: Take 100 mg by mouth daily ) 60 tablet 1    metoprolol tartrate (LOPRESSOR) 25 MG tablet TAKE ONE TABLET BY MOUTH NIGHTLY 90 tablet 3    levothyroxine (SYNTHROID) 112 MCG tablet Take 1 tablet by mouth Daily 90 tablet 3    aspirin 81 MG tablet Take 81 mg by mouth daily       MIDODRINE HCL PO Take 5 mg by mouth 3 times daily      acetaminophen (TYLENOL) 325 MG tablet Take 2 tablets by mouth every 4 hours as needed for Pain 120 tablet 3    RANEXA 500 MG extended release tablet TAKE ONE TABLET BY MOUTH TWICE DAILY 180 tablet 3    atorvastatin (LIPITOR) 40 MG tablet Take 1 tablet by mouth nightly 90 tablet 3    polyethylene glycol (GLYCOLAX) powder Take 17 g by mouth as needed (for constipation)      melatonin 3 MG TABS tablet Take 3 mg by mouth nightly as needed          REVIEW OF SYSTEMS:    CONSTITUTIONAL: No major weight gain or loss, fatigue, weakness, night sweats or fever. HEENT: No new vision difficulties or ringing in the ears. RESPIRATORY: See HPI   CARDIOVASCULAR: See HPI  GI: No nausea, vomiting, diarrhea, constipation, abdominal pain or changes in bowel habits.   : No Lab Results   Component Value Date    HDL 41 10/23/2018    HDL 40 (L) 02/06/2017    HDL 37 (L) 06/06/2016     Lab Results   Component Value Date    LDLCHOLESTEROL 37 10/23/2018    LDLCHOLESTEROL 55 02/06/2017    LDLCHOLESTEROL 43 06/06/2016         Assessment:      Diagnosis Orders   1. Chronic diastolic congestive heart failure ([address]     2. Coronary artery disease involving native coronary artery of native heart without angina pectoris     3. Chronic atrial fibrillation (HCC)  Pacer interrogate    Echo 2D w doppler w color complete   4. Left ventricular systolic dysfunction, NYHA class 3  Pacer interrogate    Echo 2D w doppler w color complete   5. Mixed hyperlipidemia     6. ICD (implantable cardioverter-defibrillator) in place       Chronic Diastolic Heart Failure: Currently compensated  Beta Blocker: Continue Metoprolol Succinate (Toprol XL) 25 mg daily. I also discussed the potential side effects of this medication including lightheadedness and dizziness and told her to call the office if this occurs. Nonpharmacologic management of Heart Failure: I told her to continue wearing lower extremity compression stockings and I advised her to try and keep his legs up whenever possible and to limit salt in her diet. · Atherosclerotic Heart Disease:  · Antiplatelet Agent: Decrease to aspirin 81 mg daily  I also reminded her to watch for signs of blood in her stool or black tarry stools and stop the medication immediately if this develops as this could be life threatening. · Beta Blocker: Continue metoprolol succinate (Toprol XL)       · Anti-anginal medications: Continue ranolazine (Ranexa) 500 mg 2 times/day. I also discussed the potential side effects of this medication including lightheadedness and dizziness and told her to call the office if this occurs. · Statin Therapy: Continue atorvastatin (Lipitor) 40 mg nightly. · Additional Testing: I Ordered an Echocardiogram to assess Ms. Love's ejection fraction and to look for significant valvular heart disease as a source of Ms. Love symptoms to be done in 6 months prior to next appointment. · Chronic Atrial Fibrillation: Rate Control  · Beta Blocker: Continue metoprolol succinate (Toprol XL) 25 mg  daily. I also discussed the potential side effects of this medication including lightheadedness and dizziness and told her to stop the medication of this occurs and call our office if this occurs. · Stroke Risk: Her CHADS2 score is 5/9 (6.7% stroke risk)  · Anticoagulation: Watchman in place. Off Coumadin. · Hyperlipidemia: Mixed  · Statin Therapy: Continue atorvastatin (Lipitor) 40 mg. I discussed the potential benefits of statin therapy as well as the potential risks including myalgia as well as the rare but potentially serious complication of liver or kidney damage. Although rare, I told him that this could be serious and therefore told her to stop the medication immediately and call if she developed any severe muscle aches or pains and she agreed to do so. · PCSK9 inhibitors: Not indicated at this time     Implantable Cardioverter Defibrillator (ICD): Indication for Device Placement: Biventricular ICD secondary to severe left ventricular systolic dysfunction and chronic atrial fibrillation  Interrogation Findings: Within normal limits and therefore no changes were made. Paced 99% of the time. VVIR mode. Will recheck at next visit. FOLLOW UP:  I told Ms. Mirna Cochran to call my office if she had any problems, but otherwise told her to Return in about 6 months (around 5/28/2019). However, I would be happy to see her sooner should the need arise. Sincerely,  Radha Olivo MD, F.A.C.C. [facility]ist    [address]  [facility], [address] [place] [address]  Phone: [phone], Fax: [phone]     I believe that the risk of significant morbidity and mortality related to the patient's current medical conditions are: Intermediate.

## 2019-01-07 ENCOUNTER — TELEPHONE (OUTPATIENT)
Dept: CARDIOLOGY | Age: 79
End: 2019-01-07

## 2019-01-07 PROCEDURE — 93299 PR REM INTERROG ICPMS/SCRMS <30 D TECH REVIEW: CPT | Performed by: INTERNAL MEDICINE

## 2019-01-07 PROCEDURE — 93297 REM INTERROG DEV EVAL ICPMS: CPT | Performed by: INTERNAL MEDICINE

## 2019-01-08 ENCOUNTER — NURSE ONLY (OUTPATIENT)
Dept: CARDIOLOGY | Age: 79
End: 2019-01-08
Payer: MEDICARE

## 2019-01-08 DIAGNOSIS — I50.32 CHRONIC DIASTOLIC CHF (CONGESTIVE HEART FAILURE) (HCC): Primary | Chronic | ICD-10-CM

## 2019-01-10 RX ORDER — ATORVASTATIN CALCIUM 40 MG/1
TABLET, FILM COATED ORAL
Qty: 90 TABLET | Refills: 3 | Status: SHIPPED | OUTPATIENT
Start: 2019-01-10 | End: 2020-02-21

## 2019-01-18 RX ORDER — RANOLAZINE 500 MG/1
500 TABLET, EXTENDED RELEASE ORAL 2 TIMES DAILY
Qty: 180 TABLET | Refills: 3 | Status: SHIPPED | OUTPATIENT
Start: 2019-01-18 | End: 2019-11-04 | Stop reason: ALTCHOICE

## 2019-02-05 RX ORDER — AMANTADINE HYDROCHLORIDE 100 MG/1
TABLET ORAL
Qty: 180 TABLET | Refills: 1 | Status: SHIPPED | OUTPATIENT
Start: 2019-02-05 | End: 2019-10-04 | Stop reason: SDUPTHER

## 2019-02-08 ENCOUNTER — TELEPHONE (OUTPATIENT)
Dept: CARDIOLOGY | Age: 79
End: 2019-02-08

## 2019-02-19 ENCOUNTER — HOSPITAL ENCOUNTER (OUTPATIENT)
Age: 79
Discharge: HOME | End: 2019-02-19
Payer: MEDICARE

## 2019-02-19 DIAGNOSIS — E03.9 HYPOTHYROIDISM, UNSPECIFIED TYPE: ICD-10-CM

## 2019-02-19 DIAGNOSIS — E11.9 TYPE 2 DIABETES MELLITUS WITHOUT COMPLICATION, WITH LONG-TERM CURRENT USE OF INSULIN (HCC): ICD-10-CM

## 2019-02-19 DIAGNOSIS — E78.5 HYPERLIPIDEMIA, UNSPECIFIED HYPERLIPIDEMIA TYPE: ICD-10-CM

## 2019-02-19 DIAGNOSIS — Z79.4 TYPE 2 DIABETES MELLITUS WITHOUT COMPLICATION, WITH LONG-TERM CURRENT USE OF INSULIN (HCC): ICD-10-CM

## 2019-02-19 LAB
ABSOLUTE EOS #: 0.21 K/UL (ref 0–0.44)
ABSOLUTE IMMATURE GRANULOCYTE: <0.03 K/UL (ref 0–0.3)
ABSOLUTE LYMPH #: 1.33 K/UL (ref 1.1–3.7)
ABSOLUTE MONO #: 0.44 K/UL (ref 0.1–1.2)
ALT SERPL-CCNC: 11 U/L (ref 5–33)
ANION GAP SERPL CALCULATED.3IONS-SCNC: 10 MMOL/L (ref 9–17)
AST SERPL-CCNC: 15 U/L
BASOPHILS # BLD: 1 % (ref 0–2)
BASOPHILS ABSOLUTE: 0.04 K/UL (ref 0–0.2)
BUN BLDV-MCNC: 14 MG/DL (ref 8–23)
BUN/CREAT BLD: 18 (ref 9–20)
CALCIUM SERPL-MCNC: 9.6 MG/DL (ref 8.6–10.4)
CHLORIDE BLD-SCNC: 103 MMOL/L (ref 98–107)
CO2: 28 MMOL/L (ref 20–31)
CREAT SERPL-MCNC: 0.79 MG/DL (ref 0.5–0.9)
DIFFERENTIAL TYPE: ABNORMAL
EOSINOPHILS RELATIVE PERCENT: 3 % (ref 1–4)
ESTIMATED AVERAGE GLUCOSE: 111 MG/DL
GFR AFRICAN AMERICAN: >60 ML/MIN
GFR NON-AFRICAN AMERICAN: >60 ML/MIN
GFR SERPL CREATININE-BSD FRML MDRD: ABNORMAL ML/MIN/{1.73_M2}
GFR SERPL CREATININE-BSD FRML MDRD: ABNORMAL ML/MIN/{1.73_M2}
GLUCOSE BLD-MCNC: 105 MG/DL (ref 70–99)
HBA1C MFR BLD: 5.5 % (ref 4.8–5.9)
HCT VFR BLD CALC: 43.7 % (ref 36.3–47.1)
HEMOGLOBIN: 13.8 G/DL (ref 11.9–15.1)
IMMATURE GRANULOCYTES: 0 %
LYMPHOCYTES # BLD: 22 % (ref 24–43)
MCH RBC QN AUTO: 28.8 PG (ref 25.2–33.5)
MCHC RBC AUTO-ENTMCNC: 31.6 G/DL (ref 28.4–34.8)
MCV RBC AUTO: 91 FL (ref 82.6–102.9)
MONOCYTES # BLD: 7 % (ref 3–12)
NRBC AUTOMATED: 0 PER 100 WBC
PDW BLD-RTO: 13.8 % (ref 11.8–14.4)
PLATELET # BLD: 211 K/UL (ref 138–453)
PLATELET ESTIMATE: ABNORMAL
PMV BLD AUTO: 10 FL (ref 8.1–13.5)
POTASSIUM SERPL-SCNC: 4.7 MMOL/L (ref 3.7–5.3)
RBC # BLD: 4.8 M/UL (ref 3.95–5.11)
RBC # BLD: ABNORMAL 10*6/UL
SEG NEUTROPHILS: 67 % (ref 36–65)
SEGMENTED NEUTROPHILS ABSOLUTE COUNT: 4.1 K/UL (ref 1.5–8.1)
SODIUM BLD-SCNC: 141 MMOL/L (ref 135–144)
T4 TOTAL: 8.4 UG/DL (ref 4.5–12)
TSH SERPL DL<=0.05 MIU/L-ACNC: 1.16 MIU/L (ref 0.3–5)
WBC # BLD: 6.1 K/UL (ref 3.5–11.3)
WBC # BLD: ABNORMAL 10*3/UL

## 2019-02-19 PROCEDURE — 36415 COLL VENOUS BLD VENIPUNCTURE: CPT

## 2019-02-19 PROCEDURE — 84460 ALANINE AMINO (ALT) (SGPT): CPT

## 2019-02-19 PROCEDURE — 84443 ASSAY THYROID STIM HORMONE: CPT

## 2019-02-19 PROCEDURE — 84436 ASSAY OF TOTAL THYROXINE: CPT

## 2019-02-19 PROCEDURE — 84450 TRANSFERASE (AST) (SGOT): CPT

## 2019-02-19 PROCEDURE — 85025 COMPLETE CBC W/AUTO DIFF WBC: CPT

## 2019-02-19 PROCEDURE — 83036 HEMOGLOBIN GLYCOSYLATED A1C: CPT

## 2019-02-19 PROCEDURE — 80048 BASIC METABOLIC PNL TOTAL CA: CPT

## 2019-02-27 ENCOUNTER — NURSE ONLY (OUTPATIENT)
Dept: CARDIOLOGY | Age: 79
End: 2019-02-27
Payer: MEDICARE

## 2019-02-27 DIAGNOSIS — I25.5 ISCHEMIC CARDIOMYOPATHY: Chronic | ICD-10-CM

## 2019-02-27 DIAGNOSIS — I49.5 SICK SINUS SYNDROME (HCC): ICD-10-CM

## 2019-02-27 DIAGNOSIS — I50.32 CHRONIC DIASTOLIC CHF (CONGESTIVE HEART FAILURE) (HCC): Chronic | ICD-10-CM

## 2019-03-01 ENCOUNTER — OFFICE VISIT (OUTPATIENT)
Dept: FAMILY MEDICINE CLINIC | Age: 79
End: 2019-03-01
Payer: MEDICARE

## 2019-03-01 VITALS
DIASTOLIC BLOOD PRESSURE: 82 MMHG | SYSTOLIC BLOOD PRESSURE: 128 MMHG | HEIGHT: 65 IN | BODY MASS INDEX: 27.52 KG/M2 | WEIGHT: 165.2 LBS

## 2019-03-01 DIAGNOSIS — R55 VASODEPRESSOR SYNCOPE: ICD-10-CM

## 2019-03-01 DIAGNOSIS — I10 ESSENTIAL HYPERTENSION: Chronic | ICD-10-CM

## 2019-03-01 DIAGNOSIS — J44.9 CHRONIC OBSTRUCTIVE PULMONARY DISEASE, UNSPECIFIED COPD TYPE (HCC): ICD-10-CM

## 2019-03-01 DIAGNOSIS — I48.0 PAROXYSMAL A-FIB (HCC): ICD-10-CM

## 2019-03-01 DIAGNOSIS — N95.1 MENOPAUSAL STATE: ICD-10-CM

## 2019-03-01 DIAGNOSIS — E03.9 HYPOTHYROIDISM, UNSPECIFIED TYPE: ICD-10-CM

## 2019-03-01 DIAGNOSIS — G20 PARKINSONIAN TREMOR (HCC): ICD-10-CM

## 2019-03-01 DIAGNOSIS — E11.9 TYPE 2 DIABETES MELLITUS WITHOUT COMPLICATION, UNSPECIFIED WHETHER LONG TERM INSULIN USE (HCC): Primary | Chronic | ICD-10-CM

## 2019-03-01 DIAGNOSIS — Z91.81 AT HIGH RISK FOR FALLS: ICD-10-CM

## 2019-03-01 DIAGNOSIS — E78.5 HYPERLIPIDEMIA, UNSPECIFIED HYPERLIPIDEMIA TYPE: ICD-10-CM

## 2019-03-01 PROCEDURE — 99214 OFFICE O/P EST MOD 30 MIN: CPT | Performed by: FAMILY MEDICINE

## 2019-03-01 RX ORDER — IPRATROPIUM BROMIDE 42 UG/1
2 SPRAY, METERED NASAL 2 TIMES DAILY
Qty: 1 BOTTLE | Refills: 3 | Status: SHIPPED | OUTPATIENT
Start: 2019-03-01 | End: 2019-10-04 | Stop reason: SDUPTHER

## 2019-03-01 ASSESSMENT — PATIENT HEALTH QUESTIONNAIRE - PHQ9
2. FEELING DOWN, DEPRESSED OR HOPELESS: 0
SUM OF ALL RESPONSES TO PHQ9 QUESTIONS 1 & 2: 0
SUM OF ALL RESPONSES TO PHQ QUESTIONS 1-9: 0
1. LITTLE INTEREST OR PLEASURE IN DOING THINGS: 0
SUM OF ALL RESPONSES TO PHQ QUESTIONS 1-9: 0

## 2019-03-07 ENCOUNTER — HOSPITAL ENCOUNTER (OUTPATIENT)
Dept: BONE DENSITY | Age: 79
Discharge: HOME | End: 2019-03-09
Payer: MEDICARE

## 2019-03-07 DIAGNOSIS — N95.1 MENOPAUSAL STATE: ICD-10-CM

## 2019-03-07 PROCEDURE — 77080 DXA BONE DENSITY AXIAL: CPT

## 2019-03-08 ENCOUNTER — TELEPHONE (OUTPATIENT)
Dept: CARDIOLOGY | Age: 79
End: 2019-03-08

## 2019-03-08 PROCEDURE — 93297 REM INTERROG DEV EVAL ICPMS: CPT | Performed by: INTERNAL MEDICINE

## 2019-03-08 PROCEDURE — 93299 PR REM INTERROG ICPMS/SCRMS <30 D TECH REVIEW: CPT | Performed by: INTERNAL MEDICINE

## 2019-04-02 ENCOUNTER — TELEPHONE (OUTPATIENT)
Dept: CARDIOLOGY | Age: 79
End: 2019-04-02

## 2019-04-02 NOTE — TELEPHONE ENCOUNTER
[facility] and per Dr Brandon Jarvis wanted patient to be called to see how she is doing and feeling. Tried to call patient but no answer. Left voicemail for her to call me back at the office.

## 2019-04-02 NOTE — TELEPHONE ENCOUNTER
[place] called back. Feels no different. Still has to be careful getting out of chair gets SOB.   Uses walker when at [facility] to sit and rest.

## 2019-04-02 NOTE — TELEPHONE ENCOUNTER
Let Dr Rossana Koch know pt called back and let Karla know that she felt no different. However she still has to be carefull get out of chair and gets SOB. Uses walker when at [address] to sit and rest. Also Called her back myself to see what her weight was and she had stated it was 158 lb today compared to office weight in Nov 2018. Spoke with Dr Rossana Koch about above information and patient had stated that we will continue to monitor for now. Pt verbalized understanding.

## 2019-04-15 RX ORDER — LEVOTHYROXINE SODIUM 112 UG/1
TABLET ORAL
Qty: 90 TABLET | Refills: 3 | Status: SHIPPED | OUTPATIENT
Start: 2019-04-15 | End: 2020-02-21

## 2019-04-20 ENCOUNTER — HOSPITAL ENCOUNTER (INPATIENT)
Age: 79
LOS: 4 days | Discharge: NURSING, REHAB OR LONG-TERM CARE | DRG: 481 | End: 2019-04-24
Attending: EMERGENCY MEDICINE | Admitting: FAMILY MEDICINE
Payer: MEDICARE

## 2019-04-20 ENCOUNTER — APPOINTMENT (OUTPATIENT)
Dept: GENERAL RADIOLOGY | Age: 79
DRG: 481 | End: 2019-04-20
Payer: MEDICARE

## 2019-04-20 ENCOUNTER — APPOINTMENT (OUTPATIENT)
Dept: CT IMAGING | Age: 79
DRG: 481 | End: 2019-04-20
Payer: MEDICARE

## 2019-04-20 DIAGNOSIS — S72.001A CLOSED FRACTURE OF NECK OF RIGHT FEMUR, INITIAL ENCOUNTER (HCC): ICD-10-CM

## 2019-04-20 DIAGNOSIS — G89.18 POST-OP PAIN: Primary | ICD-10-CM

## 2019-04-20 LAB
-: ABNORMAL
ABSOLUTE EOS #: 0.16 K/UL (ref 0–0.44)
ABSOLUTE IMMATURE GRANULOCYTE: 0.04 K/UL (ref 0–0.3)
ABSOLUTE LYMPH #: 1.02 K/UL (ref 1.1–3.7)
ABSOLUTE MONO #: 0.37 K/UL (ref 0.1–1.2)
ALBUMIN SERPL-MCNC: 4.1 G/DL (ref 3.5–5.2)
ALBUMIN/GLOBULIN RATIO: 1.5 (ref 1–2.5)
ALP BLD-CCNC: 104 U/L (ref 35–104)
ALT SERPL-CCNC: 25 U/L (ref 5–33)
AMORPHOUS: ABNORMAL
ANION GAP SERPL CALCULATED.3IONS-SCNC: 13 MMOL/L (ref 9–17)
AST SERPL-CCNC: 30 U/L
BACTERIA: ABNORMAL
BASOPHILS # BLD: 0 % (ref 0–2)
BASOPHILS ABSOLUTE: <0.03 K/UL (ref 0–0.2)
BILIRUB SERPL-MCNC: 0.48 MG/DL (ref 0.3–1.2)
BILIRUBIN URINE: NEGATIVE
BUN BLDV-MCNC: 17 MG/DL (ref 8–23)
BUN/CREAT BLD: 19 (ref 9–20)
CALCIUM SERPL-MCNC: 9.6 MG/DL (ref 8.6–10.4)
CASTS UA: ABNORMAL /LPF
CHLORIDE BLD-SCNC: 101 MMOL/L (ref 98–107)
CO2: 26 MMOL/L (ref 20–31)
COLOR: YELLOW
COMMENT UA: ABNORMAL
CREAT SERPL-MCNC: 0.89 MG/DL (ref 0.5–0.9)
CRYSTALS, UA: ABNORMAL /HPF
DIFFERENTIAL TYPE: ABNORMAL
EOSINOPHILS RELATIVE PERCENT: 3 % (ref 1–4)
EPITHELIAL CELLS UA: ABNORMAL /HPF (ref 0–25)
GFR AFRICAN AMERICAN: >60 ML/MIN
GFR NON-AFRICAN AMERICAN: >60 ML/MIN
GFR SERPL CREATININE-BSD FRML MDRD: ABNORMAL ML/MIN/{1.73_M2}
GFR SERPL CREATININE-BSD FRML MDRD: ABNORMAL ML/MIN/{1.73_M2}
GLUCOSE BLD-MCNC: 112 MG/DL (ref 70–99)
GLUCOSE URINE: NEGATIVE
HCT VFR BLD CALC: 43.9 % (ref 36.3–47.1)
HEMOGLOBIN: 13.8 G/DL (ref 11.9–15.1)
IMMATURE GRANULOCYTES: 1 %
INR BLD: 1 (ref 0.9–1.2)
KETONES, URINE: NEGATIVE
LEUKOCYTE ESTERASE, URINE: NEGATIVE
LYMPHOCYTES # BLD: 18 % (ref 24–43)
MCH RBC QN AUTO: 28.8 PG (ref 25.2–33.5)
MCHC RBC AUTO-ENTMCNC: 31.4 G/DL (ref 28.4–34.8)
MCV RBC AUTO: 91.5 FL (ref 82.6–102.9)
MONOCYTES # BLD: 6 % (ref 3–12)
MUCUS: ABNORMAL
NITRITE, URINE: NEGATIVE
NRBC AUTOMATED: 0 PER 100 WBC
OTHER OBSERVATIONS UA: ABNORMAL
PDW BLD-RTO: 13.5 % (ref 11.8–14.4)
PH UA: 7 (ref 5–9)
PLATELET # BLD: 190 K/UL (ref 138–453)
PLATELET ESTIMATE: ABNORMAL
PMV BLD AUTO: 10.6 FL (ref 8.1–13.5)
POTASSIUM SERPL-SCNC: 4.6 MMOL/L (ref 3.7–5.3)
PROTEIN UA: NEGATIVE
PROTHROMBIN TIME: 10.1 SEC (ref 9.7–12.2)
RBC # BLD: 4.8 M/UL (ref 3.95–5.11)
RBC # BLD: ABNORMAL 10*6/UL
RBC UA: ABNORMAL /HPF (ref 0–2)
RENAL EPITHELIAL, UA: ABNORMAL /HPF
SEG NEUTROPHILS: 72 % (ref 36–65)
SEGMENTED NEUTROPHILS ABSOLUTE COUNT: 4.16 K/UL (ref 1.5–8.1)
SODIUM BLD-SCNC: 140 MMOL/L (ref 135–144)
SPECIFIC GRAVITY UA: 1.02 (ref 1.01–1.02)
TOTAL PROTEIN: 6.9 G/DL (ref 6.4–8.3)
TRICHOMONAS: ABNORMAL
TURBIDITY: CLEAR
URINE HGB: NEGATIVE
UROBILINOGEN, URINE: NORMAL
WBC # BLD: 5.8 K/UL (ref 3.5–11.3)
WBC # BLD: ABNORMAL 10*3/UL
WBC UA: ABNORMAL /HPF (ref 0–5)
YEAST: ABNORMAL

## 2019-04-20 PROCEDURE — 6360000002 HC RX W HCPCS: Performed by: EMERGENCY MEDICINE

## 2019-04-20 PROCEDURE — 73700 CT LOWER EXTREMITY W/O DYE: CPT

## 2019-04-20 PROCEDURE — 36415 COLL VENOUS BLD VENIPUNCTURE: CPT

## 2019-04-20 PROCEDURE — 71045 X-RAY EXAM CHEST 1 VIEW: CPT

## 2019-04-20 PROCEDURE — 81001 URINALYSIS AUTO W/SCOPE: CPT

## 2019-04-20 PROCEDURE — 70450 CT HEAD/BRAIN W/O DYE: CPT

## 2019-04-20 PROCEDURE — 85025 COMPLETE CBC W/AUTO DIFF WBC: CPT

## 2019-04-20 PROCEDURE — 72125 CT NECK SPINE W/O DYE: CPT

## 2019-04-20 PROCEDURE — 6370000000 HC RX 637 (ALT 250 FOR IP): Performed by: FAMILY MEDICINE

## 2019-04-20 PROCEDURE — 70486 CT MAXILLOFACIAL W/O DYE: CPT

## 2019-04-20 PROCEDURE — 80053 COMPREHEN METABOLIC PANEL: CPT

## 2019-04-20 PROCEDURE — 6370000000 HC RX 637 (ALT 250 FOR IP): Performed by: EMERGENCY MEDICINE

## 2019-04-20 PROCEDURE — 1200000000 HC SEMI PRIVATE

## 2019-04-20 PROCEDURE — 85610 PROTHROMBIN TIME: CPT

## 2019-04-20 PROCEDURE — 99285 EMERGENCY DEPT VISIT HI MDM: CPT

## 2019-04-20 PROCEDURE — 93005 ELECTROCARDIOGRAM TRACING: CPT

## 2019-04-20 PROCEDURE — 73502 X-RAY EXAM HIP UNI 2-3 VIEWS: CPT

## 2019-04-20 PROCEDURE — 2580000003 HC RX 258: Performed by: FAMILY MEDICINE

## 2019-04-20 PROCEDURE — 96374 THER/PROPH/DIAG INJ IV PUSH: CPT

## 2019-04-20 RX ORDER — MORPHINE SULFATE 2 MG/ML
2 INJECTION, SOLUTION INTRAMUSCULAR; INTRAVENOUS EVERY 4 HOURS PRN
Status: DISCONTINUED | OUTPATIENT
Start: 2019-04-20 | End: 2019-04-24 | Stop reason: HOSPADM

## 2019-04-20 RX ORDER — UREA 10 %
3 LOTION (ML) TOPICAL NIGHTLY PRN
Status: DISCONTINUED | OUTPATIENT
Start: 2019-04-21 | End: 2019-04-24 | Stop reason: HOSPADM

## 2019-04-20 RX ORDER — SODIUM CHLORIDE 9 MG/ML
INJECTION, SOLUTION INTRAVENOUS CONTINUOUS
Status: DISCONTINUED | OUTPATIENT
Start: 2019-04-20 | End: 2019-04-22

## 2019-04-20 RX ORDER — SODIUM CHLORIDE 0.9 % (FLUSH) 0.9 %
10 SYRINGE (ML) INJECTION PRN
Status: DISCONTINUED | OUTPATIENT
Start: 2019-04-20 | End: 2019-04-24 | Stop reason: HOSPADM

## 2019-04-20 RX ORDER — HYDROCODONE BITARTRATE AND ACETAMINOPHEN 5; 325 MG/1; MG/1
1 TABLET ORAL ONCE
Status: COMPLETED | OUTPATIENT
Start: 2019-04-20 | End: 2019-04-20

## 2019-04-20 RX ORDER — POLYETHYLENE GLYCOL 3350 17 G/17G
17 POWDER, FOR SOLUTION ORAL PRN
Status: DISCONTINUED | OUTPATIENT
Start: 2019-04-20 | End: 2019-04-24 | Stop reason: HOSPADM

## 2019-04-20 RX ORDER — RANOLAZINE 500 MG/1
500 TABLET, EXTENDED RELEASE ORAL 2 TIMES DAILY
Status: DISCONTINUED | OUTPATIENT
Start: 2019-04-20 | End: 2019-04-24 | Stop reason: HOSPADM

## 2019-04-20 RX ORDER — ONDANSETRON 2 MG/ML
4 INJECTION INTRAMUSCULAR; INTRAVENOUS EVERY 6 HOURS PRN
Status: DISCONTINUED | OUTPATIENT
Start: 2019-04-20 | End: 2019-04-24 | Stop reason: HOSPADM

## 2019-04-20 RX ORDER — SODIUM CHLORIDE 0.9 % (FLUSH) 0.9 %
10 SYRINGE (ML) INJECTION EVERY 12 HOURS SCHEDULED
Status: DISCONTINUED | OUTPATIENT
Start: 2019-04-20 | End: 2019-04-24 | Stop reason: HOSPADM

## 2019-04-20 RX ORDER — AMANTADINE HYDROCHLORIDE 100 MG/1
100 CAPSULE, GELATIN COATED ORAL 2 TIMES DAILY
Status: DISCONTINUED | OUTPATIENT
Start: 2019-04-20 | End: 2019-04-24 | Stop reason: HOSPADM

## 2019-04-20 RX ORDER — MORPHINE SULFATE 2 MG/ML
2 INJECTION, SOLUTION INTRAMUSCULAR; INTRAVENOUS ONCE
Status: COMPLETED | OUTPATIENT
Start: 2019-04-20 | End: 2019-04-20

## 2019-04-20 RX ORDER — ATORVASTATIN CALCIUM 40 MG/1
40 TABLET, FILM COATED ORAL NIGHTLY
Status: DISCONTINUED | OUTPATIENT
Start: 2019-04-20 | End: 2019-04-24 | Stop reason: HOSPADM

## 2019-04-20 RX ORDER — ACETAMINOPHEN 325 MG/1
650 TABLET ORAL EVERY 4 HOURS PRN
Status: DISCONTINUED | OUTPATIENT
Start: 2019-04-20 | End: 2019-04-20 | Stop reason: SDUPTHER

## 2019-04-20 RX ORDER — ACETAMINOPHEN 325 MG/1
650 TABLET ORAL EVERY 4 HOURS PRN
Status: DISCONTINUED | OUTPATIENT
Start: 2019-04-20 | End: 2019-04-24 | Stop reason: HOSPADM

## 2019-04-20 RX ORDER — IPRATROPIUM BROMIDE 42 UG/1
2 SPRAY, METERED NASAL 2 TIMES DAILY
Status: DISCONTINUED | OUTPATIENT
Start: 2019-04-20 | End: 2019-04-24 | Stop reason: HOSPADM

## 2019-04-20 RX ORDER — MIDODRINE HYDROCHLORIDE 5 MG/1
5 TABLET ORAL 2 TIMES DAILY
Status: DISCONTINUED | OUTPATIENT
Start: 2019-04-20 | End: 2019-04-24 | Stop reason: HOSPADM

## 2019-04-20 RX ORDER — LEVOTHYROXINE SODIUM 112 UG/1
112 TABLET ORAL DAILY
Status: DISCONTINUED | OUTPATIENT
Start: 2019-04-21 | End: 2019-04-24 | Stop reason: HOSPADM

## 2019-04-20 RX ORDER — ATORVASTATIN CALCIUM 40 MG/1
40 TABLET, FILM COATED ORAL DAILY
Status: DISCONTINUED | OUTPATIENT
Start: 2019-04-21 | End: 2019-04-20

## 2019-04-20 RX ADMIN — AMANTADINE HYDROCHLORIDE 100 MG: 100 CAPSULE ORAL at 23:15

## 2019-04-20 RX ADMIN — MIDODRINE HYDROCHLORIDE 5 MG: 5 TABLET ORAL at 23:15

## 2019-04-20 RX ADMIN — METOPROLOL TARTRATE 25 MG: 25 TABLET ORAL at 23:15

## 2019-04-20 RX ADMIN — MORPHINE SULFATE 2 MG: 2 INJECTION, SOLUTION INTRAMUSCULAR; INTRAVENOUS at 19:30

## 2019-04-20 RX ADMIN — ATORVASTATIN CALCIUM 40 MG: 40 TABLET, FILM COATED ORAL at 23:15

## 2019-04-20 RX ADMIN — SODIUM CHLORIDE: 9 INJECTION, SOLUTION INTRAVENOUS at 22:36

## 2019-04-20 RX ADMIN — HYDROCODONE BITARTRATE AND ACETAMINOPHEN 1 TABLET: 5; 325 TABLET ORAL at 18:56

## 2019-04-20 ASSESSMENT — PAIN DESCRIPTION - PAIN TYPE
TYPE: ACUTE PAIN
TYPE: ACUTE PAIN

## 2019-04-20 ASSESSMENT — PAIN DESCRIPTION - DESCRIPTORS
DESCRIPTORS: PRESSURE
DESCRIPTORS: ACHING

## 2019-04-20 ASSESSMENT — PAIN DESCRIPTION - ORIENTATION
ORIENTATION: RIGHT
ORIENTATION: RIGHT

## 2019-04-20 ASSESSMENT — PAIN SCALES - GENERAL
PAINLEVEL_OUTOF10: 5
PAINLEVEL_OUTOF10: 4

## 2019-04-20 ASSESSMENT — PAIN DESCRIPTION - LOCATION
LOCATION: HIP
LOCATION: HIP

## 2019-04-20 ASSESSMENT — PAIN DESCRIPTION - FREQUENCY
FREQUENCY: CONTINUOUS
FREQUENCY: INTERMITTENT

## 2019-04-21 ENCOUNTER — ANESTHESIA (OUTPATIENT)
Dept: OPERATING ROOM | Age: 79
DRG: 481 | End: 2019-04-21
Payer: MEDICARE

## 2019-04-21 ENCOUNTER — APPOINTMENT (OUTPATIENT)
Dept: GENERAL RADIOLOGY | Age: 79
DRG: 481 | End: 2019-04-21
Payer: MEDICARE

## 2019-04-21 ENCOUNTER — ANESTHESIA EVENT (OUTPATIENT)
Dept: OPERATING ROOM | Age: 79
DRG: 481 | End: 2019-04-21
Payer: MEDICARE

## 2019-04-21 VITALS — TEMPERATURE: 74 F | OXYGEN SATURATION: 94 % | DIASTOLIC BLOOD PRESSURE: 50 MMHG | SYSTOLIC BLOOD PRESSURE: 93 MMHG

## 2019-04-21 LAB
ABO/RH: NORMAL
ABSOLUTE EOS #: 0.3 K/UL (ref 0–0.44)
ABSOLUTE IMMATURE GRANULOCYTE: 0.03 K/UL (ref 0–0.3)
ABSOLUTE LYMPH #: 1.23 K/UL (ref 1.1–3.7)
ABSOLUTE MONO #: 0.52 K/UL (ref 0.1–1.2)
ANTIBODY SCREEN: NEGATIVE
ARM BAND NUMBER: NORMAL
BASOPHILS # BLD: 1 % (ref 0–2)
BASOPHILS ABSOLUTE: 0.04 K/UL (ref 0–0.2)
DIFFERENTIAL TYPE: ABNORMAL
EKG ATRIAL RATE: 93 BPM
EKG Q-T INTERVAL: 438 MS
EKG QRS DURATION: 160 MS
EKG QTC CALCULATION (BAZETT): 514 MS
EKG R AXIS: -112 DEGREES
EKG T AXIS: 71 DEGREES
EKG VENTRICULAR RATE: 83 BPM
EOSINOPHILS RELATIVE PERCENT: 4 % (ref 1–4)
EXPIRATION DATE: NORMAL
GLUCOSE BLD-MCNC: 234 MG/DL (ref 74–100)
GLUCOSE BLD-MCNC: 96 MG/DL (ref 74–100)
HCT VFR BLD CALC: 40.7 % (ref 36.3–47.1)
HEMOGLOBIN: 13.1 G/DL (ref 11.9–15.1)
IMMATURE GRANULOCYTES: 0 %
INR BLD: 1 (ref 0.9–1.2)
LYMPHOCYTES # BLD: 18 % (ref 24–43)
MCH RBC QN AUTO: 29.1 PG (ref 25.2–33.5)
MCHC RBC AUTO-ENTMCNC: 32.2 G/DL (ref 28.4–34.8)
MCV RBC AUTO: 90.4 FL (ref 82.6–102.9)
MONOCYTES # BLD: 7 % (ref 3–12)
NRBC AUTOMATED: 0 PER 100 WBC
PARTIAL THROMBOPLASTIN TIME: 27 SEC (ref 23.2–34.4)
PDW BLD-RTO: 13.6 % (ref 11.8–14.4)
PLATELET # BLD: 169 K/UL (ref 138–453)
PLATELET ESTIMATE: ABNORMAL
PMV BLD AUTO: 10.2 FL (ref 8.1–13.5)
PROTHROMBIN TIME: 10.7 SEC (ref 9.7–12.2)
RBC # BLD: 4.5 M/UL (ref 3.95–5.11)
RBC # BLD: ABNORMAL 10*6/UL
SEG NEUTROPHILS: 70 % (ref 36–65)
SEGMENTED NEUTROPHILS ABSOLUTE COUNT: 4.9 K/UL (ref 1.5–8.1)
WBC # BLD: 7 K/UL (ref 3.5–11.3)
WBC # BLD: ABNORMAL 10*3/UL

## 2019-04-21 PROCEDURE — 6370000000 HC RX 637 (ALT 250 FOR IP): Performed by: FAMILY MEDICINE

## 2019-04-21 PROCEDURE — 2580000003 HC RX 258: Performed by: FAMILY MEDICINE

## 2019-04-21 PROCEDURE — 86850 RBC ANTIBODY SCREEN: CPT

## 2019-04-21 PROCEDURE — 6370000000 HC RX 637 (ALT 250 FOR IP): Performed by: ORTHOPAEDIC SURGERY

## 2019-04-21 PROCEDURE — 6360000002 HC RX W HCPCS: Performed by: FAMILY MEDICINE

## 2019-04-21 PROCEDURE — 0QH634Z INSERTION OF INTERNAL FIXATION DEVICE INTO RIGHT UPPER FEMUR, PERCUTANEOUS APPROACH: ICD-10-PCS | Performed by: ORTHOPAEDIC SURGERY

## 2019-04-21 PROCEDURE — 6360000002 HC RX W HCPCS: Performed by: ORTHOPAEDIC SURGERY

## 2019-04-21 PROCEDURE — 7100000000 HC PACU RECOVERY - FIRST 15 MIN: Performed by: ORTHOPAEDIC SURGERY

## 2019-04-21 PROCEDURE — 82947 ASSAY GLUCOSE BLOOD QUANT: CPT

## 2019-04-21 PROCEDURE — 2709999900 HC NON-CHARGEABLE SUPPLY: Performed by: ORTHOPAEDIC SURGERY

## 2019-04-21 PROCEDURE — 3E0T3BZ INTRODUCTION OF ANESTHETIC AGENT INTO PERIPHERAL NERVES AND PLEXI, PERCUTANEOUS APPROACH: ICD-10-PCS | Performed by: ORTHOPAEDIC SURGERY

## 2019-04-21 PROCEDURE — 85025 COMPLETE CBC W/AUTO DIFF WBC: CPT

## 2019-04-21 PROCEDURE — 1200000000 HC SEMI PRIVATE

## 2019-04-21 PROCEDURE — 3700000000 HC ANESTHESIA ATTENDED CARE: Performed by: ORTHOPAEDIC SURGERY

## 2019-04-21 PROCEDURE — 3600000014 HC SURGERY LEVEL 4 ADDTL 15MIN: Performed by: ORTHOPAEDIC SURGERY

## 2019-04-21 PROCEDURE — 36415 COLL VENOUS BLD VENIPUNCTURE: CPT

## 2019-04-21 PROCEDURE — 3600000004 HC SURGERY LEVEL 4 BASE: Performed by: ORTHOPAEDIC SURGERY

## 2019-04-21 PROCEDURE — 86901 BLOOD TYPING SEROLOGIC RH(D): CPT

## 2019-04-21 PROCEDURE — 3700000001 HC ADD 15 MINUTES (ANESTHESIA): Performed by: ORTHOPAEDIC SURGERY

## 2019-04-21 PROCEDURE — 2720000010 HC SURG SUPPLY STERILE: Performed by: ORTHOPAEDIC SURGERY

## 2019-04-21 PROCEDURE — C1713 ANCHOR/SCREW BN/BN,TIS/BN: HCPCS | Performed by: ORTHOPAEDIC SURGERY

## 2019-04-21 PROCEDURE — 64450 NJX AA&/STRD OTHER PN/BRANCH: CPT | Performed by: NURSE ANESTHETIST, CERTIFIED REGISTERED

## 2019-04-21 PROCEDURE — 2500000003 HC RX 250 WO HCPCS: Performed by: ORTHOPAEDIC SURGERY

## 2019-04-21 PROCEDURE — 2500000003 HC RX 250 WO HCPCS: Performed by: NURSE ANESTHETIST, CERTIFIED REGISTERED

## 2019-04-21 PROCEDURE — 86900 BLOOD TYPING SEROLOGIC ABO: CPT

## 2019-04-21 PROCEDURE — 73502 X-RAY EXAM HIP UNI 2-3 VIEWS: CPT

## 2019-04-21 PROCEDURE — 85730 THROMBOPLASTIN TIME PARTIAL: CPT

## 2019-04-21 PROCEDURE — 7100000001 HC PACU RECOVERY - ADDTL 15 MIN: Performed by: ORTHOPAEDIC SURGERY

## 2019-04-21 PROCEDURE — 85610 PROTHROMBIN TIME: CPT

## 2019-04-21 PROCEDURE — 6360000002 HC RX W HCPCS: Performed by: NURSE ANESTHETIST, CERTIFIED REGISTERED

## 2019-04-21 PROCEDURE — C1769 GUIDE WIRE: HCPCS | Performed by: ORTHOPAEDIC SURGERY

## 2019-04-21 PROCEDURE — 2580000003 HC RX 258: Performed by: ORTHOPAEDIC SURGERY

## 2019-04-21 DEVICE — IMPLANTABLE DEVICE: Type: IMPLANTABLE DEVICE | Site: HIP | Status: FUNCTIONAL

## 2019-04-21 DEVICE — SCREW BNE L90MM DIA7.3MM THRD L16MM CANC S STL SELF DRL ST: Type: IMPLANTABLE DEVICE | Site: HIP | Status: FUNCTIONAL

## 2019-04-21 RX ORDER — PHENYLEPHRINE HYDROCHLORIDE 10 MG/ML
INJECTION INTRAVENOUS PRN
Status: DISCONTINUED | OUTPATIENT
Start: 2019-04-21 | End: 2019-04-21 | Stop reason: SDUPTHER

## 2019-04-21 RX ORDER — HYDROCODONE BITARTRATE AND ACETAMINOPHEN 5; 325 MG/1; MG/1
1 TABLET ORAL EVERY 4 HOURS PRN
Status: DISCONTINUED | OUTPATIENT
Start: 2019-04-21 | End: 2019-04-24 | Stop reason: HOSPADM

## 2019-04-21 RX ORDER — CLINDAMYCIN PHOSPHATE 900 MG/50ML
900 INJECTION INTRAVENOUS
Status: COMPLETED | OUTPATIENT
Start: 2019-04-21 | End: 2019-04-21

## 2019-04-21 RX ORDER — LIDOCAINE HYDROCHLORIDE 20 MG/ML
INJECTION, SOLUTION EPIDURAL; INFILTRATION; INTRACAUDAL; PERINEURAL PRN
Status: DISCONTINUED | OUTPATIENT
Start: 2019-04-21 | End: 2019-04-21 | Stop reason: SDUPTHER

## 2019-04-21 RX ORDER — ROPIVACAINE HYDROCHLORIDE 5 MG/ML
INJECTION, SOLUTION EPIDURAL; INFILTRATION; PERINEURAL PRN
Status: DISCONTINUED | OUTPATIENT
Start: 2019-04-21 | End: 2019-04-21 | Stop reason: SDUPTHER

## 2019-04-21 RX ORDER — DOCUSATE SODIUM 100 MG/1
100 CAPSULE, LIQUID FILLED ORAL 2 TIMES DAILY
Status: DISCONTINUED | OUTPATIENT
Start: 2019-04-21 | End: 2019-04-24 | Stop reason: HOSPADM

## 2019-04-21 RX ORDER — MORPHINE SULFATE 2 MG/ML
2 INJECTION, SOLUTION INTRAMUSCULAR; INTRAVENOUS
Status: DISCONTINUED | OUTPATIENT
Start: 2019-04-21 | End: 2019-04-24 | Stop reason: HOSPADM

## 2019-04-21 RX ORDER — DEXAMETHASONE SODIUM PHOSPHATE 10 MG/ML
INJECTION INTRAMUSCULAR; INTRAVENOUS PRN
Status: DISCONTINUED | OUTPATIENT
Start: 2019-04-21 | End: 2019-04-21 | Stop reason: SDUPTHER

## 2019-04-21 RX ORDER — MORPHINE SULFATE 4 MG/ML
4 INJECTION, SOLUTION INTRAMUSCULAR; INTRAVENOUS
Status: DISCONTINUED | OUTPATIENT
Start: 2019-04-21 | End: 2019-04-24 | Stop reason: HOSPADM

## 2019-04-21 RX ORDER — HYDROCODONE BITARTRATE AND ACETAMINOPHEN 5; 325 MG/1; MG/1
2 TABLET ORAL EVERY 4 HOURS PRN
Status: DISCONTINUED | OUTPATIENT
Start: 2019-04-21 | End: 2019-04-24 | Stop reason: HOSPADM

## 2019-04-21 RX ORDER — ACETAMINOPHEN 10 MG/ML
INJECTION, SOLUTION INTRAVENOUS PRN
Status: DISCONTINUED | OUTPATIENT
Start: 2019-04-21 | End: 2019-04-21 | Stop reason: SDUPTHER

## 2019-04-21 RX ORDER — PROPOFOL 10 MG/ML
INJECTION, EMULSION INTRAVENOUS PRN
Status: DISCONTINUED | OUTPATIENT
Start: 2019-04-21 | End: 2019-04-21 | Stop reason: SDUPTHER

## 2019-04-21 RX ORDER — PROPOFOL 10 MG/ML
INJECTION, EMULSION INTRAVENOUS CONTINUOUS PRN
Status: DISCONTINUED | OUTPATIENT
Start: 2019-04-21 | End: 2019-04-21 | Stop reason: SDUPTHER

## 2019-04-21 RX ADMIN — AMANTADINE HYDROCHLORIDE 100 MG: 100 CAPSULE ORAL at 21:05

## 2019-04-21 RX ADMIN — PHENYLEPHRINE HYDROCHLORIDE 200 MCG: 10 INJECTION INTRAVENOUS at 12:57

## 2019-04-21 RX ADMIN — ROPIVACAINE HYDROCHLORIDE 50 ML: 5 INJECTION, SOLUTION EPIDURAL; INFILTRATION; PERINEURAL at 11:54

## 2019-04-21 RX ADMIN — PROPOFOL 100 MCG/KG/MIN: 10 INJECTION, EMULSION INTRAVENOUS at 12:11

## 2019-04-21 RX ADMIN — RANOLAZINE 500 MG: 500 TABLET, EXTENDED RELEASE ORAL at 09:38

## 2019-04-21 RX ADMIN — ACETAMINOPHEN 650 MG: 325 TABLET, FILM COATED ORAL at 02:58

## 2019-04-21 RX ADMIN — ENOXAPARIN SODIUM 40 MG: 40 INJECTION SUBCUTANEOUS at 17:26

## 2019-04-21 RX ADMIN — MORPHINE SULFATE 2 MG: 2 INJECTION, SOLUTION INTRAMUSCULAR; INTRAVENOUS at 08:21

## 2019-04-21 RX ADMIN — SODIUM CHLORIDE: 9 INJECTION, SOLUTION INTRAVENOUS at 15:37

## 2019-04-21 RX ADMIN — DOCUSATE SODIUM 100 MG: 100 CAPSULE, LIQUID FILLED ORAL at 21:05

## 2019-04-21 RX ADMIN — PHENYLEPHRINE HYDROCHLORIDE 200 MCG: 10 INJECTION INTRAVENOUS at 12:46

## 2019-04-21 RX ADMIN — RANOLAZINE 500 MG: 500 TABLET, EXTENDED RELEASE ORAL at 21:02

## 2019-04-21 RX ADMIN — MIDODRINE HYDROCHLORIDE 5 MG: 5 TABLET ORAL at 08:21

## 2019-04-21 RX ADMIN — LIDOCAINE HYDROCHLORIDE 50 MG: 20 INJECTION, SOLUTION EPIDURAL; INFILTRATION; INTRACAUDAL at 12:11

## 2019-04-21 RX ADMIN — PHENYLEPHRINE HYDROCHLORIDE 100 MCG: 10 INJECTION INTRAVENOUS at 12:38

## 2019-04-21 RX ADMIN — DEXAMETHASONE SODIUM PHOSPHATE 10 MG: 10 INJECTION INTRAMUSCULAR; INTRAVENOUS at 11:54

## 2019-04-21 RX ADMIN — DEXTROSE MONOHYDRATE 900 MG: 50 INJECTION, SOLUTION INTRAVENOUS at 17:31

## 2019-04-21 RX ADMIN — ATORVASTATIN CALCIUM 40 MG: 40 TABLET, FILM COATED ORAL at 21:05

## 2019-04-21 RX ADMIN — MIDODRINE HYDROCHLORIDE 5 MG: 5 TABLET ORAL at 21:05

## 2019-04-21 RX ADMIN — CLINDAMYCIN PHOSPHATE 900 MG: 900 INJECTION, SOLUTION INTRAVENOUS at 12:07

## 2019-04-21 RX ADMIN — IPRATROPIUM BROMIDE 2 SPRAY: 42 SPRAY, METERED NASAL at 09:38

## 2019-04-21 RX ADMIN — METOPROLOL TARTRATE 25 MG: 25 TABLET ORAL at 21:05

## 2019-04-21 RX ADMIN — MORPHINE SULFATE 2 MG: 2 INJECTION, SOLUTION INTRAMUSCULAR; INTRAVENOUS at 15:36

## 2019-04-21 RX ADMIN — SODIUM CHLORIDE: 9 INJECTION, SOLUTION INTRAVENOUS at 07:19

## 2019-04-21 RX ADMIN — LEVOTHYROXINE SODIUM 112 MCG: 112 TABLET ORAL at 08:21

## 2019-04-21 RX ADMIN — PROPOFOL 60 MG: 10 INJECTION, EMULSION INTRAVENOUS at 12:11

## 2019-04-21 RX ADMIN — ACETAMINOPHEN 1000 MG: 10 INJECTION, SOLUTION INTRAVENOUS at 12:27

## 2019-04-21 RX ADMIN — AMANTADINE HYDROCHLORIDE 100 MG: 100 CAPSULE ORAL at 08:21

## 2019-04-21 RX ADMIN — IPRATROPIUM BROMIDE 2 SPRAY: 42 SPRAY, METERED NASAL at 21:02

## 2019-04-21 ASSESSMENT — PULMONARY FUNCTION TESTS
PIF_VALUE: 1
PIF_VALUE: 0
PIF_VALUE: 1
PIF_VALUE: 0
PIF_VALUE: 1
PIF_VALUE: 0
PIF_VALUE: 2
PIF_VALUE: 1
PIF_VALUE: 0
PIF_VALUE: 0
PIF_VALUE: 1
PIF_VALUE: 0
PIF_VALUE: 1
PIF_VALUE: 0
PIF_VALUE: 1
PIF_VALUE: 0
PIF_VALUE: 1
PIF_VALUE: 0
PIF_VALUE: 1
PIF_VALUE: 0
PIF_VALUE: 2
PIF_VALUE: 1

## 2019-04-21 ASSESSMENT — PAIN SCALES - GENERAL
PAINLEVEL_OUTOF10: 5
PAINLEVEL_OUTOF10: 0
PAINLEVEL_OUTOF10: 5
PAINLEVEL_OUTOF10: 3
PAINLEVEL_OUTOF10: 4
PAINLEVEL_OUTOF10: 0
PAINLEVEL_OUTOF10: 4
PAINLEVEL_OUTOF10: 7
PAINLEVEL_OUTOF10: 3

## 2019-04-21 ASSESSMENT — PAIN DESCRIPTION - LOCATION
LOCATION: HIP

## 2019-04-21 ASSESSMENT — PAIN DESCRIPTION - ORIENTATION
ORIENTATION: RIGHT

## 2019-04-21 ASSESSMENT — PAIN DESCRIPTION - PAIN TYPE
TYPE: SURGICAL PAIN
TYPE: ACUTE PAIN
TYPE: SURGICAL PAIN
TYPE: SURGICAL PAIN

## 2019-04-21 ASSESSMENT — PAIN DESCRIPTION - DESCRIPTORS
DESCRIPTORS: SORE
DESCRIPTORS: PRESSURE
DESCRIPTORS: SORE

## 2019-04-21 NOTE — PROGRESS NOTES
Discharge Criteria    Inpatients must meet Criteria 1 through 7. All other patients are either YES or N/A. If a NO is chosen then Anesthesia or Surgeon must be notified. 1.  Minimum 30 minutes after last dose of sedative medication, minimum 120 minutes after last dose of reversal agent. Yes      2. Systolic BP stable within 20 mmHg for 30 minutes & systolic BP between 90 & 693 or within 10 mmHg of baseline. Yes      3. Pulse between 60 and 100 or within 10 bpm of baseline. Yes      4. Spontaneous respiratory rate >/= 10 per minute. Yes      5. SaO2 >/= 95 or  >/= baseline. Yes      6. Able to cough and swallow or return to baseline function. Yes      7. Alert and oriented or return to baseline mental status. Yes      8. Demonstrates controlled, coordinated movements, ambulates with steady gait, or return to baseline activity function. N/A      9. Minimal or no pain or nausea, or at a level tolerable and acceptable to patient. N/A      10. Takes and retains oral fluids as allowed. N/A      11. Procedural / perioperative site stable. Minimal or no bleeding. N/A          12. If GI endoscopy procedure, minimal or no abdominal distention or passing flatus. N/A      13. Written discharge instructions and emergency telephone number provided. N/A      14. Accompanied by a responsible adult. N/A      Adult patient discharged from facility without responsible person meets above criteria plus the following:   a) remains awake without stimulus for 30 minutes     b) oriented appropriate for age      c) all vital signs stable   d) no significant risk of losing protective reflexes      e) able to maintain pre-procedure mobility without assistance   f) no nausea or dizziness      g) transportation arrangements that do not require patient to operate motor Vehicle.      N/A

## 2019-04-21 NOTE — PROGRESS NOTES
Patient arrived to floor at 2200, admitted for right hip fracture by Dr. Murphy Wheatley. Dr, Lillie Bamberger was contacted in ED. Pt transferred to bed with assistance. VS and assessment obtained, see flow sheet. Pt states her pain is \"3-4\" on a 0-10 pain scale and described it \"more of like a pressure but it is not always there if I am laying still\". Pt denied any pain medications for intervention at that time. Pt oriented to room and call light. Telemetry in place, patient has paced rhythm. Gale catheter inserted at 2230, urine returned and specimen sent to lab for UA. IVF started. Navigator completed. PHI form completed, no restrictions. Bedside tablet activated. Pt has glasses at home, family will bring in tomorrow. Pt is NPO with strict bedrest.     Medications administered as ordered.  took home patient's medications. Patient contacted  to bring in home medications of Ranexa and nasal spray as they are not available here, which will be brought in the morning. Pt denies any further needs at this time. Will continue to monitor.

## 2019-04-21 NOTE — H&P
tricuspid regurg. Moderate diastolic dysfunction.  History of Holter monitoring 3/24/16    Atrial Fibrillation throughout,fairly controlled, HR  bpm 79% of the time. Occasional high ventricular rate episodes and multiple pauses suggestive of tachycardia/bradycardia syndrome  Msximum R-R interval 2.68 seconds. 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    Long term (current) use of anticoagulants 8/31/2015    Movement disorder     Other abnormal glucose 2004    Pacemaker 08/10/2017    Insertion of a biventricular pacemaker/ICD AVN ablation    Phlebitis and thrombophlebitis of lower extremities, unspecified 1961    L leg    Senile osteoporosis 2006    L/S    Symptomatic menopausal or female climacteric states 06/1995    Syncope and collapse     Unspecified hereditary and idiopathic peripheral neuropathy 2012    Unspecified sleep apnea 11/2009       Past Surgical History:        Procedure Laterality Date    BRONCHOSCOPY  6/7/2017    BRONCHOSCOPY BRUSHINGS performed by Amber Washington DO at [address] [place] [address]  6/7/2017    BRONCHOSCOPY/TRANSBRONCHIAL LUNG BIOPSY performed by Amber Washington DO at [address] [place] [address]  6/7/2017    BRONCHOSCOPY FLUOROSCOPY performed by Amber Washington DO at [address] Right 06/17/2015    CATARACT REMOVAL WITH IMPLANT Right 08/14/2017    DR ROMERO    COLONOSCOPY  1/2005    DIAGNOSTIC CARDIAC CATH LAB PROCEDURE  06/17/15    EYE SURGERY      FRACTURE SURGERY  2004    Distal Radius Ulna    LOBECTOMY Left 6/14/2017    MINI PORT ACCESS LEFT CHEST, X2 SPECIMENS.  performed by Rudy Pathak MD at [facility].  2years old    VOLVAR-ULCERATIVE LESION-CAUTERIZED    [place] EGD TRANSORAL BIOPSY SINGLE/MULTIPLE Left 2/13/2018    EGD BIOPSY performed by Mallory Samuel MD at [facility]    SKIN BIOPSY      TONSILLECTOMY AND ADENOIDECTOMY         Family History:       Problem Relation Age of Onset    Heart Disease Mother     Stroke Mother     High Blood Pressure Mother     Cancer Father         lung    Stroke Brother     Heart Disease Maternal Grandmother        Social History:   TOBACCO:   reports that she has never smoked. She has never used smokeless tobacco.  ETOH:   reports that she does not drink alcohol. OCCUPATION: none    Allergies:  Adhesive tape; Aspercreme [trolamine salicylate]; Erythromycin; Erythromycin; Guaifenesin & derivatives; Niacin and related; Niacin and related; Tape Erick Bile tape]; and Augmentin [amoxicillin-pot clavulanate]    Medications Prior to Admission:    Prior to Admission medications    Medication Sig Start Date End Date Taking?  Authorizing Provider   levothyroxine (SYNTHROID) 112 MCG tablet TAKE 1 TABLET BY MOUTH ONCE DAILY 4/15/19  Yes Neli Gallegos MD   ipratropium (ATROVENT) 0.06 % nasal spray 2 sprays by Nasal route 2 times daily 3/1/19 2/29/20 Yes Neli Gallegos MD   Amantadine (SYMMETREL) 100 MG TABS tablet TAKE 1 TABLET BY MOUTH TWICE DAILY  Patient taking differently: 1 tab qhs 2/5/19  Yes Neli Gallegos MD   ranolazine (RANEXA) 500 MG extended release tablet Take 1 tablet by mouth 2 times daily 1/18/19  Yes Toña Diaz MD   atorvastatin (LIPITOR) 40 MG tablet TAKE ONE TABLET BY MOUTH NIGHTLY 1/10/19  Yes Neli Gallegos MD   aspirin EC 81 MG EC tablet Take 1 tablet by mouth daily 11/28/18  Yes Toña Diaz MD   metoprolol tartrate (LOPRESSOR) 25 MG tablet TAKE ONE TABLET BY MOUTH NIGHTLY 10/8/18  Yes Neli Gallegos MD   MIDODRINE HCL PO Take 5 mg by mouth 2 times daily    Yes Historical Provider, MD   polyethylene glycol (GLYCOLAX) powder Take 17 g by mouth as needed (for constipation)   Yes Historical Provider, MD   acetaminophen (TYLENOL) 325 MG tablet Take 2 tablets by mouth every 4 hours as needed for Pain 2/20/18   Irene Su MD   melatonin 3 MG TABS tablet Take 3 mg by mouth nightly as needed     Historical Provider, MD       Review of Systems:  Constitutional:negative  for fevers, and negative for chills. Eyes: negative for visual disturbance   ENT: negative for sore throat, negative nasal congestion, and negative for earache  Respiratory: negative for shortness of breath, negative for cough, and negative for wheezing  Cardiovascular: negative for chest pain, negative for palpitations, and negative for syncope  Gastrointestinal: negative for abdominal pain, negative for nausea,negative for vomiting, negative for diarrhea, negative for constipation, and negative for hematochezia or melena  Genitourinary: negative for dysuria, negative for urinary urgency, negative for urinary frequency, and negative for hematuria  Skin: negative for skin rash, and negative for skin lesions  Neurological: negative for unilateral weakness, numbness or tingling. Physical Exam:    Vitals:   Vitals:    04/21/19 0628   BP:    Pulse: 78   Resp:    Temp:    SpO2:      Weight: 158 lb 3.2 oz (71.8 kg)   Height: 5' 5\" (165.1 cm)     GEN:  alert and oriented to person, place and time, well-developed and well-nourished, in no acute distress  EYES: No gross abnormalities.  and PERRL  NECK: normal, supple, no lymphadenopathy,  no carotid bruits  PULM: clear to auscultation bilaterally- no wheezes, rales or rhonchi, normal air movement, no respiratory distress  COR: regular rate & rhythm, no murmurs and no gallops  ABD:  soft, non-tender, non-distended, normal bowel sounds, no masses or organomegaly  EXT:   no cyanosis, clubbing or edema present  , rt leg movements painful  NEURO: follows commands, DENNIS, no deficits  SKIN:  no rashes or significant lesions  -----------------------------------------------------------------  Diagnostic Data:   Lab Results   Component Value Date    WBC 7.0 04/21/2019    HGB 13.1 04/21/2019  04/21/2019       Lab Results   Component Value Date    BUN 17 04/20/2019    CREATININE 0.89 04/20/2019     04/20/2019    K 4.6 04/20/2019    CALCIUM 9.6 04/20/2019     04/20/2019    CO2 26 04/20/2019    LABGLOM >60 04/20/2019       Lab Results   Component Value Date    WBCUA 0 TO 2 04/20/2019    RBCUA 0 TO 2 04/20/2019    EPITHUA 0 TO 2 04/20/2019    LEUKOCYTESUR NEGATIVE 04/20/2019    SPECGRAV 1.020 04/20/2019    GLUCOSEU NEGATIVE 04/20/2019    KETUA NEGATIVE 04/20/2019    PROTEINU NEGATIVE 04/20/2019    HGBUR NEGATIVE 04/20/2019    CASTUA NOT REPORTED 04/20/2019    CRYSTUA NOT REPORTED 04/20/2019    BACTERIA 1+ (A) 04/20/2019    YEAST NOT REPORTED 04/20/2019       Lab Results   Component Value Date    MYOGLOBIN 74 (H) 06/03/2017    TROPONINT < 0.010 02/11/2018    CKMB 1.7 06/03/2017    PROBNP 1,375 (H) 03/07/2018       Xr Hip Right (2-3 Views)    Result Date: 4/20/2019  EXAMINATION: 2 XRAY VIEWS OF THE RIGHT HIP 4/20/2019 7:15 pm COMPARISON: September 24, 2013 HISTORY: ORDERING SYSTEM PROVIDED HISTORY: fall TECHNOLOGIST PROVIDED HISTORY: X-ray injured hip and pelvis - add femur if pain and/or swelling is distal to the hip fall FINDINGS: Nondisplaced impacted femoral neck fracture. Hip fracture as above     Ct Head Wo Contrast    Result Date: 4/20/2019  EXAMINATION: CT OF THE HEAD WITHOUT CONTRAST  4/20/2019 8:28 pm TECHNIQUE: CT of the head was performed without the administration of intravenous contrast. Dose modulation, iterative reconstruction, and/or weight based adjustment of the mA/kV was utilized to reduce the radiation dose to as low as reasonably achievable. COMPARISON: 07/11/2017 HISTORY: ORDERING SYSTEM PROVIDED HISTORY: head bleed TECHNOLOGIST PROVIDED HISTORY: Pain status post fall. FINDINGS: BRAIN/VENTRICLES: There is no acute intracranial hemorrhage, mass effect or midline shift. No abnormal extra-axial fluid collection.   The gray-white differentiation is maintained without evidence of an acute infarct. There is no evidence of hydrocephalus. Remote lacunar infarction within the periventricular white matter. Scattered subcortical and periventricular white matter hypodensities are most compatible with chronic small vessel disease. ORBITS: Right periorbital soft tissue edema without underlying globe or facial bone fracture. SINUSES: The visualized paranasal sinuses and mastoid air cells demonstrate no acute abnormality. SOFT TISSUES/SKULL:  No acute abnormality of the visualized skull or soft tissues. No acute intracranial abnormality. No intracranial hemorrhage, mass effect, or midline shift. Chronic small vessel disease. Remote lacunar infarction within the left periventricular white matter. Ct Cervical Spine Wo Contrast    Result Date: 4/20/2019  EXAMINATION: CT OF THE CERVICAL SPINE WITHOUT CONTRAST 4/20/2019 8:27 pm TECHNIQUE: CT of the cervical spine was performed without the administration of intravenous contrast. Multiplanar reformatted images are provided for review. Dose modulation, iterative reconstruction, and/or weight based adjustment of the mA/kV was utilized to reduce the radiation dose to as low as reasonably achievable. COMPARISON: None. HISTORY: ORDERING SYSTEM PROVIDED HISTORY: fall FINDINGS: BONES/ALIGNMENT: There is no evidence of an acute cervical spine fracture. Atlantoaxial rotation. No listhesis. . DEGENERATIVE CHANGES: Multilevel degenerative disc disease, advanced at C4-5 and C5-6. Multilevel facet arthropathy is noted as well, most notable at C2-3 on the left. SOFT TISSUES: There is no prevertebral soft tissue swelling. No acute abnormality of the cervical spine. Xr Chest Portable    Result Date: 4/20/2019  EXAMINATION: SINGLE XRAY VIEW OF THE CHEST 4/20/2019 8:25 pm COMPARISON: March 7, 2018 HISTORY: ORDERING SYSTEM PROVIDED HISTORY: fracture TECHNOLOGIST PROVIDED HISTORY: fracture FINDINGS: Dual lead AICD on the left.   Heart and mediastinum normal.  Moderate increase in interstitial and vascular markings. Bony thorax intact. Interval progression of pulmonary fibrosis or edema     Ct Hip Right Wo Contrast    Result Date: 4/20/2019  EXAMINATION: CT OF THE RIGHT HIP WITHOUT CONTRAST 4/20/2019 8:26 pm TECHNIQUE: CT of the right hip was performed without the administration of intravenous contrast.  Multiplanar reformatted images are provided for review. Dose modulation, iterative reconstruction, and/or weight based adjustment of the mA/kV was utilized to reduce the radiation dose to as low as reasonably achievable. COMPARISON: Right hip radiographs 04/20/2018. HISTORY ORDERING SYSTEM PROVIDED HISTORY: FRACTURE, HIP Initial encounter. FINDINGS: Bones: There is an acute mildly displaced and impacted right femoral neck fracture. No other fracture identified. No dislocation. No suspicious lytic or blastic osseous lesion. Soft Tissue:  No abnormal soft tissue mass or fluid collection. No radiopaque foreign body. The visualized soft tissue contents of the pelvis are unremarkable. Joint:  Moderate right hip degenerative changes. Moderate pubic symphysis degenerative changes. Moderate degenerative changes of the right sacroiliac joint. Acute mildly impacted and displaced right femoral neck fracture. Ct Maxillofacial Wo Contrast    Result Date: 4/20/2019  EXAMINATION: CT OF THE FACE WITHOUT CONTRAST  4/20/2019 8:26 pm TECHNIQUE: CT of the face was performed without the administration of intravenous contrast. Multiplanar reformatted images are provided for review. Dose modulation, iterative reconstruction, and/or weight based adjustment of the mA/kV was utilized to reduce the radiation dose to as low as reasonably achievable. COMPARISON: None HISTORY: ORDERING SYSTEM PROVIDED HISTORY: fall FINDINGS: FACIAL BONES:  The maxilla, pterygoid plates and zygomatic arches are intact. The mandible is intact.   The mandibular condyles are normally situated. The nasal bones and maxillary nasal processes are intact. ORBITS:  The globes appear intact. The extraocular muscles, optic nerve sheath complexes and lacrimal glands appear unremarkable. No retrobulbar hematoma or mass is seen. The orbital walls and rims are intact. SINUSES/MASTOIDS:  The paranasal sinuses and mastoid air cells are well aerated. No acute fracture is seen. SOFT TISSUES:  Periorbital soft tissue swelling on the right. Right periorbital soft tissue swelling. No blow-out fracture. Assessment:    Principal Problem:    Hip fracture, right, closed, initial encounter [facility])  Active Problems:    Essential hypertension    Obstructive sleep apnea    Type 2 diabetes mellitus without complication (HCC)    ASHD (arteriosclerotic heart disease)    Chronic diastolic CHF (congestive heart failure) ([facility])    Hypothyroidism    Hip fx, right, closed, initial encounter [facility])  Resolved Problems:    * No resolved hospital problems.  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    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    Intractable vomiting with nausea 02/20/2018    Asymptomatic cholelithiasis 02/12/2018    Acute cholecystitis     Calculus of gallbladder with acute on chronic cholecystitis 02/10/2018    General weakness 02/09/2018    Chronic obstructive pulmonary disease ([address] 02/09/2018    Elevated LFTs 02/09/2018    Pneumonitis 12/20/2017    Decubitus ulcer of sacral region 11/20/2017    Need for prophylactic vaccination and inoculation against influenza 10/16/2017    Paroxysmal A-fib ([email or web address] 09/20/2017    Vasodepressor syncope 09/11/2017    Muscle fatigue 09/11/2017    Leg fatigue 09/05/2017    Acute on chronic congestive heart failure ([facility] 09/05/2017    Chronic lung disease 08/30/2017    Interstitial lung disease ([email or web address] 08/14/2017    Long term current use of anticoagulant therapy 08/14/2017    Closed TBI (traumatic brain injury) ([email or web address] 07/11/2017    E. coli UTI 06/30/2017    Anemia of chronic disease 06/30/2017    Pulmonary fibrosis ([email or web address] 06/19/2017    ASHD (arteriosclerotic heart disease) 33/89/0621    Uncomplicated asthma 51/80/9009    Acute pulmonary edema (HCC) 06/06/2017    Ground glass opacity present on imaging of lung 06/06/2017    Ischemic cardiomyopathy 06/04/2017    Hypokalemia 06/04/2017    Acute cystitis with hematuria 05/31/2017    Type 2 diabetes mellitus without complication ([email or web address] 90/22/2679    Essential hypertension 08/31/2015    Obstructive sleep apnea 08/31/2015    Gout 08/31/2015       Plan:     · This patient requires inpatient admission because of fx rt hip requiring surgical intervention  · Factors affecting the medical complexity of this patient include chf,type 2 dm,htn,h/o afib  · Estimated length of stay is 2 days  ·   · High risk medication monitoring: none    CORE MEASURES  DVT prophylaxis: Lovenox  Decubitus ulcer present on admission: No  CODE STATUS: FULL CODE  Nutrition Status: good   Physical therapy: Yes   Old Charts reviewed: Yes  EKG Reviewed:  Yes  Advance Directive Addressed: Yes  Total time spent in evaluating this patient and formulating plan of care 35 minutes  Rui Cano MD  4/21/2019, 7:46 AM

## 2019-04-21 NOTE — CONSULTS
Consults   Department of Orthopedic Surgery  Consult Note        Reason for Consult:  Right hip fracture    CHIEF COMPLAINT:  Right hip pain    History Obtained From:  Patient, electronic medical record    HISTORY OF PRESENT ILLNESS:               The patient is a 77 y.o. female who presents with acute onset right hip pain. She leaned over to  a cat when she lost her balance and fell onto her right side. Patient was brought to the ER today where x-rays revealed right femur fracture. Patient was admitted to medical service. I was consulted for fracture treatment. Patient denies head injury. No loss of consciousness or amnesia. Currently, patient complains of constant, moderate, sharp pain localized to the right hip. She rates pain as 7/10. She also notes some tenderness to her right wrist.  Denies other sources of acute pain or injury. Pain is exacerbated by touch to the hip or movement of the hip. She denies antecedent hip pain. Past Medical History:        Diagnosis Date    Allergic rhinitis 10/1994    Arthritis     Asthma     Atrial fibrillation ([facility] 12/2008    CAD (coronary artery disease)     [facility] failure) [facility]     Clotting disorder ([facility])     plebitis in L leg    COPD (chronic obstructive pulmonary disease) ([facility]     DDD (degenerative disc disease), cervical     Degeneration of cervical intervertebral disc     Diverticulosis 2005    Gout     Gout, unspecified     H/O cardiac catheterization 6/17/15    LMCA: Normal 0% stenosis. LAD: Lesion on 1st diag: Proximal subsection. 80% stenosis. Lesion plaque is ruptured. Andre Parker LCx: Lesion on 1st Ob Leslie: Mid subsection. 85% stenosis. RCA:Lesion on R PDA: Mid subsection. 85% stenosis. Lesion on R PDA: Distal subsection. 70% stenosis. Lesion on Prox RCA: Mid subsection. 50% stenosis. EF 50%.  H/O echocardiogram 11/09/2016    EF 40-45%. Mild LV hypertrophy normal LV cavity size.  Sigmoid interventricular septum without CARDIAC CATH LAB PROCEDURE  06/17/15    EYE SURGERY      FRACTURE SURGERY  2004    Distal Radius Ulna    LOBECTOMY Left 6/14/2017    MINI PORT ACCESS LEFT CHEST, X2 SPECIMENS. performed by Miquel Hunter MD at [address] [place] [address]  2years old    VOLVAR-ULCERATIVE LESION-CAUTERIZED    [place] EGD TRANSORAL BIOPSY SINGLE/MULTIPLE Left 2/13/2018    EGD BIOPSY performed by Curtis Armas MD at [address] [place] [address]      TONSILLECTOMY AND ADENOIDECTOMY       Current Medications:   Current Facility-Administered Medications: clindamycin (CLEOCIN) 900 mg in dextrose 5 % 50 mL IVPB, 900 mg, Intravenous, 30 Min Pre-Op  [MAR Hold] melatonin tablet 3 mg, 3 mg, Oral, Nightly PRN  [MAR Hold] polyethylene glycol (GLYCOLAX) packet 17 g, 17 g, Oral, PRN  [MAR Hold] acetaminophen (TYLENOL) tablet 650 mg, 650 mg, Oral, Q4H PRN  [MAR Hold] midodrine (PROAMATINE) tablet 5 mg, 5 mg, Oral, BID  [MAR Hold] metoprolol tartrate (LOPRESSOR) tablet 25 mg, 25 mg, Oral, Nightly  [MAR Hold] ranolazine (RANEXA) extended release tablet 500 mg, 500 mg, Oral, BID  [MAR Hold] amantadine (SYMMETREL) capsule 100 mg, 100 mg, Oral, BID  [MAR Hold] ipratropium (ATROVENT) 0.06 % nasal spray 2 spray, 2 spray, Nasal, BID  [MAR Hold] levothyroxine (SYNTHROID) tablet 112 mcg, 112 mcg, Oral, Daily  [MAR Hold] 0.9 % sodium chloride infusion, , Intravenous, Continuous  [MAR Hold] sodium chloride flush 0.9 % injection 10 mL, 10 mL, Intravenous, 2 times per day  [facility] Hold] sodium chloride flush 0.9 % injection 10 mL, 10 mL, Intravenous, PRN  [MAR Hold] ondansetron (ZOFRAN) injection 4 mg, 4 mg, Intravenous, Q6H PRN  [MAR Hold] enoxaparin (LOVENOX) injection 40 mg, 40 mg, Subcutaneous, Daily  [MAR Hold] morphine (PF) injection 2 mg, 2 mg, Intravenous, Q4H PRN  [MAR Hold] atorvastatin (LIPITOR) tablet 40 mg, 40 mg, Oral, Nightly    Allergies:  Adhesive tape; Aspercreme [trolamine salicylate];  Erythromycin; Erythromycin; breathing, no wheezing  CARDIOVASCULAR:  regular rate and rhythm, no peripheral cyanosis or edema  ABDOMEN:  soft, non-distended, non-tender, no guarding  MUSCULOSKELETAL:    Bilateral upper extremities: Skin intact. No erythema, ecchymosis or edema. Full ROM of the joints without pain. Patient has mild tenderness thenar eminence of right hand. No snuffbox tenderness. Joints are stable. 5/5 strength with motor function intact. Sensation intact. 2+ radial pulses. Left lower extremity:  Skin intact without signs of trauma. Full ROM of the joints without pain. Nontender. Joints are stable. 5/5 strength with motor function intact. Sensation intact. 2+ pedal pulses. Right lower extremity:   Skin intact without signs of trauma. Hip motion was not attempted secondary to acute injury. Moderate tenderness hip. Nontender distally. Knee and ankle are stable. 5/5 strength distally with motor function intact. Sensation intact. 2+ pedal pulses.     DATA:    CBC with Differential:    Lab Results   Component Value Date    WBC 7.0 04/21/2019    RBC 4.50 04/21/2019    RBC 4.98 05/04/2012    HGB 13.1 04/21/2019    HCT 40.7 04/21/2019     04/21/2019     05/04/2012    MCV 90.4 04/21/2019    MCH 29.1 04/21/2019    MCHC 32.2 04/21/2019    RDW 13.6 04/21/2019    NRBC 0 02/12/2018    SEGSPCT 75.1 02/12/2018    LYMPHOPCT 18 04/21/2019    MONOPCT 7 04/21/2019    MYELOPCT 1 02/10/2018    BASOPCT 1 04/21/2019    MONOSABS 0.52 04/21/2019    MONOSABS 0.4 02/12/2018    LYMPHSABS 1.23 04/21/2019    LYMPHSABS 1.2 02/12/2018    EOSABS 0.30 04/21/2019    EOSABS 0.0 02/12/2018    BASOSABS 0.04 04/21/2019    DIFFTYPE NOT REPORTED 04/21/2019     BMP:    Lab Results   Component Value Date     04/20/2019    K 4.6 04/20/2019    K 3.8 03/30/2018     04/20/2019    CO2 26 04/20/2019    BUN 17 04/20/2019    LABALBU 4.1 04/20/2019    CREATININE 0.89 04/20/2019    CALCIUM 9.6 04/20/2019    GFRAA >60 04/20/2019 LABGLOM >60 04/20/2019    LABGLOM >90 03/30/2018    GLUCOSE 112 04/20/2019    GLUCOSE 117 05/04/2012     Radiology Review:  X-rays and CT were reviewed. Patient has valgus-impacted right femoral neck fracture with mild displacement. IMPRESSION/RECOMMENDATIONS:    Closed, valgus-impacted right femoral neck fracture  H/o atrial fibrillation s/p ablation    Reviewed findings with the patient and family. Discussed both non-surgical and surgical treatment options. They elected to proceed with surgical stabilization. Risks of surgery include, but are not limited to, risks of anesthesia including death, risk of infection, wound healing complications, bleeding, blood clot, nerve/blood vessel injury, pain, stiffness, malunion, nonunion, need for further surgery including possible need for hip replacement in the event of nonunion or osteonecrosis. Informed consent was obtained following discussion of risks and benefits.     Patient will proceed with percutaneous pinning right femoral neck fracture  Anticipate need for SNF upon discharge  Pain control

## 2019-04-21 NOTE — PROGRESS NOTES
[facility]  OCCUPATIONAL THERAPY  No Visit Note    [] ICU    [x] Acute   Patient: [place]  Room: [address] [facility] [address] not seen on 4/21/2019 at 12:47 PM due to pt currently in surgery and unable to be seen for OT evaluation at this time.           Signature: Shana Carey, MOT, OTR/L

## 2019-04-21 NOTE — PLAN OF CARE
Problem: Pain:  Goal: Pain level will decrease  Description  Pain level will decrease  Outcome: Ongoing  Note:   Pt's pain is assessed using 0-10 scale. Pt has PRN pain medications available. Patient has been educated on use and possible side effects of pain medications. Patient understands to ask for pain medications before pain becomes too unbearable but has also been educated and nonpharmacological options such as deep breathing, distraction, and repositioning. Goal: Control of acute pain  Description  Control of acute pain  Outcome: Ongoing  Goal: Control of chronic pain  Description  Control of chronic pain  Outcome: Ongoing     Problem: Falls - Risk of:  Goal: Will remain free from falls  Description  Will remain free from falls  Outcome: Ongoing  Note:   Pt A&Ox4. Pt uses call light appropriately and call light and bedside table remain in reach. Rails up x2 bed in lowest position. Bed alarm is on. Pt wears nonskid socks when standing or ambulating. Fall sign in place. Room remains free of clutter. Pt knows when and how to use assistive device when ambulating. Goal: Absence of physical injury  Description  Absence of physical injury  Outcome: Ongoing     Problem: Risk for Impaired Skin Integrity  Goal: Tissue integrity - skin and mucous membranes  Description  Structural intactness and normal physiological function of skin and  mucous membranes. Outcome: Ongoing  Note:   Patient is educated on the importance of turning and repositioning often. Skin remains warm dry and intact with no skin issues noted. Rajinder scale is assessed each shift. Will continue to monitor. Patient will dangle at bedside day of surgery. Problem: Activity:  Goal: Ability to ambulate will improve  Description  Ability to ambulate will improve  Outcome: Ongoing  Note:   Patient will dangle at bedside day of surgery.  PT OT are ordered for post op day #1  Goal: Ability to perform activities at highest level will improve  Description  Ability to perform activities at highest level will improve  Outcome: Ongoing     Problem: Health Behavior:  Goal: Identification of resources available to assist in meeting health care needs will improve  Description  Identification of resources available to assist in meeting health care needs will improve  Outcome: Ongoing     Problem: Nutritional:  Goal: Ability to attain and maintain optimal nutritional status will improve  Description  Ability to attain and maintain optimal nutritional status will improve  Outcome: Ongoing  Note:   Patient is on a general diet and encouraged to order well balanced meals. Problem: Physical Regulation:  Goal: Will remain free from infection  Description  Will remain free from infection  Outcome: Ongoing  Goal: Postoperative complications will be avoided or minimized  Description  Postoperative complications will be avoided or minimized  Outcome: Ongoing  Goal: Diagnostic test results will improve  Description  Diagnostic test results will improve  Outcome: Ongoing     Problem: Safety:  Goal: Ability to remain free from injury will improve  Description  Ability to remain free from injury will improve  Outcome: Ongoing     Problem: Self-Care:  Goal: Ability to meet self-care needs will improve  Description  Ability to meet self-care needs will improve  Outcome: Ongoing  Note:   Daily care needs are being met. Items for personal hygiene are supplied and help provided with bathing. Patient will do all ADL's within patient's ability. Problem: Skin Integrity:  Goal: Demonstration of wound healing without infection will improve  Description  Demonstration of wound healing without infection will improve  Outcome: Ongoing  Note:   Labs are being drawn daily. Vitals with temperature are assessed twice each shift or PRN. Antibiotics are ordered. Aquacel remains clean dry and intact and is assessed at least twice per shift and PRN.  Aquacel has a 'do not remove until' date and wound care instructions. Goal: Risk for impaired skin integrity will decrease  Description  Risk for impaired skin integrity will decrease  Outcome: Ongoing     Problem: Tissue Perfusion:  Goal: Peripheral tissue perfusion will improve  Description  Peripheral tissue perfusion will improve  Outcome: Ongoing  Note:   Pulses are being checked with vitals and PRN. Lovenox has been restarted. Patient is wearing personal TEDs and SCD's remain in place. Patient's medical history is provided during shift handoff.     Goal: Risk of venous thrombosis will decrease  Description  Risk of venous thrombosis will decrease  Outcome: Ongoing     Problem: Urinary Elimination:  Goal: Ability to reestablish a normal urinary elimination pattern will improve - after catheter removal  Description  Ability to reestablish a normal urinary elimination pattern will improve  Outcome: Ongoing

## 2019-04-21 NOTE — ANESTHESIA PRE PROCEDURE
Department of Anesthesiology  Preprocedure Note       Name:  Haroon Spencer   Age:  77 y.o.  :  1940                                          MRN:  [id number]         Date:  2019      Surgeon: Meseret Go):  Minerva Ryan MD    Procedure: HIP PINNING (Right )    Medications prior to admission:   Prior to Admission medications    Medication Sig Start Date End Date Taking?  Authorizing Provider   levothyroxine (SYNTHROID) 112 MCG tablet TAKE 1 TABLET BY MOUTH ONCE DAILY 4/15/19  Yes Omar Walsh MD   ipratropium (ATROVENT) 0.06 % nasal spray 2 sprays by Nasal route 2 times daily 3/1/19 2/29/20 Yes Omar Walsh MD   Amantadine (SYMMETREL) 100 MG TABS tablet TAKE 1 TABLET BY MOUTH TWICE DAILY  Patient taking differently: 1 tab qhs 19  Yes Omar Walsh MD   ranolazine (RANEXA) 500 MG extended release tablet Take 1 tablet by mouth 2 times daily 19  Yes Amaya Marino MD   atorvastatin (LIPITOR) 40 MG tablet TAKE ONE TABLET BY MOUTH NIGHTLY 1/10/19  Yes Omar Walsh MD   aspirin EC 81 MG EC tablet Take 1 tablet by mouth daily 18  Yes Amaya Marino MD   metoprolol tartrate (LOPRESSOR) 25 MG tablet TAKE ONE TABLET BY MOUTH NIGHTLY 10/8/18  Yes Omar Walsh MD   MIDODRINE HCL PO Take 5 mg by mouth 2 times daily    Yes Historical Provider, MD   polyethylene glycol (GLYCOLAX) powder Take 17 g by mouth as needed (for constipation)   Yes Historical Provider, MD   acetaminophen (TYLENOL) 325 MG tablet Take 2 tablets by mouth every 4 hours as needed for Pain 18   Kvng Angulo MD   melatonin 3 MG TABS tablet Take 3 mg by mouth nightly as needed     Historical Provider, MD       Current medications:    Current Facility-Administered Medications   Medication Dose Route Frequency Provider Last Rate Last Dose    melatonin tablet 3 mg  3 mg Oral Nightly PRN Isabel Lesch, MD        polyethylene glycol (GLYCOLAX) packet 17 g  17 g Oral PRN Isabel Lesch, MD        acetaminophen (TYLENOL) tablet 650 mg  650 mg Oral Q4H PRN Inderjit Campbell MD   650 mg at 04/21/19 0258    midodrine (PROAMATINE) tablet 5 mg  5 mg Oral BID Inderjit Campbell MD   5 mg at 04/21/19 1380    metoprolol tartrate (LOPRESSOR) tablet 25 mg  25 mg Oral Nightly Inderjit Campbell MD   25 mg at 04/20/19 2315    ranolazine (RANEXA) extended release tablet 500 mg  500 mg Oral BID Inderjit Campbell MD   500 mg at 04/21/19 0938    amantadine (SYMMETREL) capsule 100 mg  100 mg Oral BID Inderjit Campbell MD   100 mg at 04/21/19 7734    ipratropium (ATROVENT) 0.06 % nasal spray 2 spray  2 spray Nasal BID Omayra Posadas MD   2 spray at 04/21/19 0938    levothyroxine (SYNTHROID) tablet 112 mcg  112 mcg Oral Daily Inderjit Campbell MD   112 mcg at 04/21/19 0821    0.9 % sodium chloride infusion   Intravenous Continuous Inderjit Campbell  mL/hr at 04/21/19 0719      sodium chloride flush 0.9 % injection 10 mL  10 mL Intravenous 2 times per day Omayra Posadas MD        sodium chloride flush 0.9 % injection 10 mL  10 mL Intravenous PRN Omayra Posadas MD        ondansetron (ZOFRAN) injection 4 mg  4 mg Intravenous Q6H PRN Inderjit Campbell MD        enoxaparin (LOVENOX) injection 40 mg  40 mg Subcutaneous Daily Omayra Posadas MD        morphine (PF) injection 2 mg  2 mg Intravenous Q4H PRN Inderjit Campbell MD   2 mg at 04/21/19 0821    atorvastatin (LIPITOR) tablet 40 mg  40 mg Oral Nightly Omayra Posadas MD   40 mg at 04/20/19 2315       Allergies:     Allergies   Allergen Reactions    Adhesive Tape     Aspercreme [Trolamine Salicylate]     Erythromycin Other (See Comments)     \"whole in stomach\"    Erythromycin Other (See Comments)     stomach pain    Guaifenesin & Derivatives     Niacin And Related     Niacin And Related Hives    Tape Rangel Degree Tape] Dermatitis    Augmentin [Amoxicillin-Pot Clavulanate] Rash       Problem List:    Patient Active Problem List   Diagnosis Code    Pulmonary HTN I27.20    Non-rheumatic mitral regurgitation I34.0    Essential hypertension I10    Obstructive sleep apnea G47.33    Gout M10.9    Type 2 diabetes mellitus without complication (HCC) S85.3    Acute cystitis with hematuria N30.01    Ischemic cardiomyopathy I25.5    Hypokalemia E87.6    Acute pulmonary edema (HCC) J81.0    Ground glass opacity present on imaging of lung R91.8    Pulmonary fibrosis (HCC) J84.10    ASHD (arteriosclerotic heart disease) F95.79    Uncomplicated asthma A03.605    E. coli UTI N39.0, B96.20    Anemia of chronic disease D63.8    Closed TBI (traumatic brain injury) ([facility] S06. 9X9A    Interstitial lung disease ([facility] J84.9    Long term current use of anticoagulant therapy Z79.01    Chronic lung disease J98.4    Leg fatigue R29.898    Acute on chronic congestive heart failure (HCC) I50.9    Vasodepressor syncope R55    Muscle fatigue M62.89    Paroxysmal A-fib ([facility]) I48.0    Need for prophylactic vaccination and inoculation against influenza Z23    Decubitus ulcer of sacral region L89.159    Pneumonitis J18.9    General weakness R53.1    Chronic obstructive pulmonary disease (HCC) J44.9    Elevated LFTs R94.5    Calculus of gallbladder with acute on chronic cholecystitis K80.12    Asymptomatic cholelithiasis K80.20    Acute cholecystitis K81.0    Intractable vomiting with nausea R11.2    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, 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    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    Chronic diastolic CHF (congestive heart failure) ([facility]) I50.32    Hypothyroidism E03.9    Dyspepsia R10.13    Chronic a-fib ([facility]) I48.2    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    Sick sinus syndrome ([facility]) I49.5    Hip fracture, right, closed, initial encounter ([facility] S72.001A    Hip fx, right, closed, initial encounter ([facility] S72.001A       Past Medical History:        Diagnosis Date    Allergic rhinitis 10/1994    Arthritis     Asthma     Atrial fibrillation ([facility] 12/2008    CAD (coronary artery disease)     CHF (congestive heart failure) (HCC)     Clotting disorder (HCC)     plebitis in L leg    COPD (chronic obstructive pulmonary disease) [facility]     DDD (degenerative disc disease), cervical     Degeneration of cervical intervertebral disc     Diverticulosis 2005    Gout     Gout, unspecified     H/O cardiac catheterization 6/17/15    LMCA: Normal 0% stenosis. LAD: Lesion on 1st diag: Proximal subsection. 80% stenosis. Lesion plaque is ruptured. Lucinda Gómez LCx: Lesion on 1st Ob Leslie: Mid subsection. 85% stenosis. RCA:Lesion on R PDA: Mid subsection. 85% stenosis. Lesion on R PDA: Distal subsection. 70% stenosis. Lesion on Prox RCA: Mid subsection. 50% stenosis. EF 50%.  H/O echocardiogram 11/09/2016    EF 40-45%. Mild LV hypertrophy normal LV cavity size. Sigmoid interventricular septum without evidence of outflow tract obstruction. Left atrium is moderately dilated (34-39) left atrial volume index of 36 ml/m2. Mild mitral regurg. Diastology cannot be assessed due to A-fib.  H/O echocardiogram 03/09/2018    EF 45-50%. Apical hypokinesis noted. The left atrium is moderately dilated (34-39) with a left atrial volume index of 34ml/m2. Mild to moderate mitral regurg. Mild tricuspid regurg. Moderate diastolic dysfunction.  History of Holter monitoring 3/24/16    Atrial Fibrillation throughout,fairly controlled, HR  bpm 79% of the time. Occasional high ventricular rate episodes and multiple pauses suggestive of tachycardia/bradycardia syndrome  Msximum R-R interval 2.68 seconds.    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    Long term (current) use of anticoagulants 8/31/2015    Movement disorder     Other abnormal glucose 2004    Pacemaker 08/10/2017    Insertion of a biventricular pacemaker/ICD AVN ablation    Phlebitis and thrombophlebitis of lower extremities, unspecified 1961    L leg    Senile osteoporosis 2006    L/S    Symptomatic menopausal or female climacteric states 06/1995    Syncope and collapse     Unspecified hereditary and idiopathic peripheral neuropathy 2012    Unspecified sleep apnea 11/2009       Past Surgical History:        Procedure Laterality Date    BRONCHOSCOPY  6/7/2017    BRONCHOSCOPY BRUSHINGS performed by Alisa George DO at [facility]  6/7/2017    BRONCHOSCOPY/TRANSBRONCHIAL LUNG BIOPSY performed by Alisa George DO at [place] [address]  6/7/2017    BRONCHOSCOPY FLUOROSCOPY performed by Alisa George DO at [address] [place] [address] Road Right 06/17/2015    CATARACT REMOVAL WITH IMPLANT Right 08/14/2017    DR ROMERO    COLONOSCOPY  1/2005    DIAGNOSTIC CARDIAC CATH LAB PROCEDURE  06/17/15    EYE SURGERY      FRACTURE SURGERY  2004    Distal Radius Ulna    LOBECTOMY Left 6/14/2017    MINI PORT ACCESS LEFT CHEST, X2 SPECIMENS.  performed by Tony Hinson MD at [address] [place] [address]  2years old    VOLVAR-ULCERATIVE LESION-CAUTERIZED    MI EGD TRANSORAL BIOPSY SINGLE/MULTIPLE Left 2/13/2018    EGD BIOPSY performed by Genoveva Mendez MD at [address] [place] [address]      TONSILLECTOMY AND ADENOIDECTOMY         Social History:    Social History     Tobacco Use    Smoking status: Never Smoker    Smokeless tobacco: Never Used   Substance Use Topics    Alcohol use: No     Alcohol/week: 0.0 oz                                Counseling given: Not Answered      Vital Signs (Current):   Vitals:    04/21/19 0445 04/21/19 0628 04/21/19 0750 04/21/19 1107   BP:   (!) 140/73 (!) 137/93   Pulse:  78 80 80   Resp:   20 20   Temp:   36.4 °C (97.6 °F) 37.3 °C (99.1 °F)   TempSrc:   Temporal Temporal   SpO2:   99% 96%   Weight: 158 lb 3.2 oz (71.8 kg)      Height: 5' 5\" (1.651 m) BP Readings from Last 3 Encounters:   04/21/19 (!) 137/93   03/01/19 128/82   11/28/18 110/71       NPO Status: Time of last liquid consumption: 0821(sips with medications)                        Time of last solid consumption: 2000                        Date of last liquid consumption: 04/21/19                        Date of last solid food consumption: 04/20/19    BMI:   Wt Readings from Last 3 Encounters:   04/21/19 158 lb 3.2 oz (71.8 kg)   03/01/19 165 lb 3.2 oz (74.9 kg)   11/28/18 156 lb (70.8 kg)     Body mass index is 26.33 kg/m².     CBC:   Lab Results   Component Value Date    WBC 7.0 04/21/2019    RBC 4.50 04/21/2019    RBC 4.98 05/04/2012    HGB 13.1 04/21/2019    HCT 40.7 04/21/2019    MCV 90.4 04/21/2019    RDW 13.6 04/21/2019     04/21/2019     05/04/2012       CMP:   Lab Results   Component Value Date     04/20/2019    K 4.6 04/20/2019    K 3.8 03/30/2018     04/20/2019    CO2 26 04/20/2019    BUN 17 04/20/2019    CREATININE 0.89 04/20/2019    GFRAA >60 04/20/2019    LABGLOM >60 04/20/2019    LABGLOM >90 03/30/2018    GLUCOSE 112 04/20/2019    GLUCOSE 117 05/04/2012    PROT 6.9 04/20/2019    CALCIUM 9.6 04/20/2019    BILITOT 0.48 04/20/2019    ALKPHOS 104 04/20/2019    AST 30 04/20/2019    ALT 25 04/20/2019       POC Tests:   Recent Labs     04/21/19  0754   POCGLU 96       Coags:   Lab Results   Component Value Date    PROTIME 10.7 04/21/2019    PROTIME  08/14/2017      Comment:      Continue dose of 7.5mg daily    INR 1.0 04/21/2019    APTT 27.0 04/21/2019       HCG (If Applicable): No results found for: PREGTESTUR, PREGSERUM, HCG, HCGQUANT     ABGs:   Lab Results   Component Value Date    PHART 7.443 02/09/2018    PO2ART 73.6 02/09/2018    UCM3JEG 30.9 02/09/2018    UEC4MDP 20.7 02/09/2018    A5VIOJNB 95.5 02/09/2018        Type & Screen (If Applicable):  Lab Results   Component Value Date    [address] POS 09/15/2017       Anesthesia Evaluation   no history of anesthetic complications:   Airway: Mallampati: III  TM distance: >3 FB   Neck ROM: full  Mouth opening: > = 3 FB Dental:      Comment: Poor dentition, multiple chipped    Pulmonary:normal exam  breath sounds clear to auscultation  (+) sleep apnea: on noncompliant,  asthma:                            Cardiovascular:  Exercise tolerance: good (>4 METS),   (+) hypertension:, pacemaker:, CAD:, dysrhythmias (currently SR): atrial fibrillation, CHF:, hyperlipidemia      ECG reviewed                        Neuro/Psych:               GI/Hepatic/Renal:             Endo/Other:    (+) Diabetes (pt states her A1C is down and she is not taking any insulin)Type II DM, well controlled, , hypothyroidism::., .                 Abdominal:           Vascular:   + PE. Anesthesia Plan      regional     ASA 3     (Discussed PNB with pt, consented)  Induction: intravenous. Anesthetic plan and risks discussed with patient.                       OSCAR Llamas - CRNA   4/21/2019

## 2019-04-21 NOTE — PROGRESS NOTES
Patient is resting comfortably in bed. Patient denies pain at this time. Will reassess often. Patient denies needs at this time.

## 2019-04-21 NOTE — ED NOTES
Lorenzo Acevedo  Is OOT.  Dr Bennett Stagers called via answering service and connected with Dr Mahmood [facility]  04/20/19 9559

## 2019-04-21 NOTE — OP NOTE
DATE OF SURGERY: 4/21/19    PREOPERATIVE DIAGNOSIS:  Closed right valgus-impacted femoral neck fracture. POSTOPERATIVE DIAGNOSIS:  Same     PROCEDURE:  Percutaneous pinning right femoral neck fracture. SURGEON:  Lc Rodriguez M.D. ANESTHESIA:  General.     COMPLICATIONS:  None. ESTIMATED BLOOD LOSS:  Minimal.     DISPOSITION:  Stable to the recovery room. IMPLANTS:  3 Synthes partially-threaded 7.3 mm stainless steel cannulated screws. Screw lengths measured 100 mm, 90 mm and 90 mm. INDICATIONS FOR PROCEDURE:  This patient sustained an acute injury to the hip following a fall. Patient was unable to ambulate. X-rays in the emergency department revealed a femoral neck fracture. After discussion with the patient and family including non-surgical and surgical treatment options, they elected to undergo surgical fixation. Informed consent was obtained following the discussion of risks and benefits. DESCRIPTION OF THE PROCEDURE:  The patient was identified in the preoperative holding area. With the patient's agreement and confirming on x-ray, the injured hip was marked as the site of surgery. The patient was taken to the operating room. General anesthesia was induced, and the patient was then positioned supine on the operating room table. Prophylactic IV antibiotics were administered. The ankle and foot were padded with ABD pads and cotton Webril and was placed in a traction boot. The non-operative leg was placed into a well leg cline, with the hip and knee flexed. After confirming the appropriate operative site, C-arm fluoroscopy was positioned over the injured hip confirming fracture and adequate imaging in AP and lateral projections. The injured extremity was then prepped and draped in usual sterile fashion. Preoperative timeout was taken to confirm the proper patient, surgical site and procedure.   We then began by creating a longitudinal incision over the lateral aspect of the thigh, in line with the femoral neck on C-arm imaging. Sharp dissection was carried down through the skin, subcutaneous fat and IT band. The lateral cortex of the femur was palpated. The first guidewire was then advanced into the proximal femur under fluoroscopic guidance. The guidewire was placed at the inferior and posterior aspects of the femoral neck. It was advanced into the subchondral bone of the femoral head. After we were satisfied with the position of the first guidewire on AP and lateral fluoroscopic imaging, a second guidewire was placed using a parallel guide. The second guidewire was placed at the posterior and superior aspect of the femoral neck. Lastly, a third guidewire was placed at the superior and anterior aspects of the femoral neck. Guidewires were advanced into the subchondral bone of the femoral head. Placement was performed under fluoroscopic guidance in both AP and lateral projections. We then measured the length of the guidewires and drilled over the guidewires. Screws were then placed, achieving excellent purchase. Screw position was confirmed with AP and lateral fluoroscopic imaging. The guidewires were then removed and final fluoroscopic images were obtained. The wound was then irrigated. The IT band was then closed with 0 Vicryl suture. Subcutaneous layer was closed with 2-0 Vicryl sutures placed in inverted, interrupted fashion. The skin was closed with staples. 0.5% Marcaine with epinephrine was infiltrated subcutaneously around the incisions. Sterile dressings were applied. The operative drapes removed. Patient was then awoke from anesthesia without complications, was removed from the operating table, and was taken to recovery room in stable condition.

## 2019-04-21 NOTE — ANESTHESIA PROCEDURE NOTES
Peripheral Block    Patient location during procedure: pre-op  Start time: 4/21/2019 11:50 AM  End time: 4/21/2019 11:54 AM  Staffing  Resident/CRNA: OSCAR Stanton CRNA  Preanesthetic Checklist  Completed: patient identified, site marked, surgical consent, pre-op evaluation, timeout performed, IV checked, risks and benefits discussed, monitors and equipment checked, anesthesia consent given, oxygen available and patient being monitored  Peripheral Block  Patient position: supine  Prep: ChloraPrep  Patient monitoring: IV access and continuous pulse ox  Block type: Fascia iliaca  Laterality: right  Injection technique: single-shot  Procedures: ultrasound guided  Local infiltration: ropivacaine and decadron  Infiltration strength: 0.5 %  Dose: 50 mL  Provider prep: sterile gloves  Local infiltration: ropivacaine and decadron  Needle  Needle type: pajunk 80 mm TAP.    Assessment  Injection assessment: negative aspiration for heme, no paresthesia on injection and local visualized surrounding nerve on ultrasound  Paresthesia pain: none  Slow fractionated injection: yes  Hemodynamics: stable  Reason for block: post-op pain management and at surgeon's request

## 2019-04-22 ENCOUNTER — APPOINTMENT (OUTPATIENT)
Dept: GENERAL RADIOLOGY | Age: 79
DRG: 481 | End: 2019-04-22
Payer: MEDICARE

## 2019-04-22 LAB
ABSOLUTE EOS #: 0 K/UL (ref 0–0.44)
ABSOLUTE IMMATURE GRANULOCYTE: 0 K/UL (ref 0–0.3)
ABSOLUTE LYMPH #: 0.66 K/UL (ref 1.1–3.7)
ABSOLUTE MONO #: 0.33 K/UL (ref 0.1–1.2)
BASOPHILS # BLD: 0 % (ref 0–2)
BASOPHILS ABSOLUTE: 0 K/UL (ref 0–0.2)
DIFFERENTIAL TYPE: ABNORMAL
EOSINOPHILS RELATIVE PERCENT: 0 % (ref 1–4)
GLUCOSE BLD-MCNC: 110 MG/DL (ref 74–100)
GLUCOSE BLD-MCNC: 118 MG/DL (ref 74–100)
HCT VFR BLD CALC: 37.7 % (ref 36.3–47.1)
HEMOGLOBIN: 12.2 G/DL (ref 11.9–15.1)
IMMATURE GRANULOCYTES: 0 %
LYMPHOCYTES # BLD: 8 % (ref 24–43)
MCH RBC QN AUTO: 29 PG (ref 25.2–33.5)
MCHC RBC AUTO-ENTMCNC: 32.4 G/DL (ref 28.4–34.8)
MCV RBC AUTO: 89.8 FL (ref 82.6–102.9)
MONOCYTES # BLD: 4 % (ref 3–12)
MORPHOLOGY: NORMAL
NRBC AUTOMATED: 0 PER 100 WBC
PDW BLD-RTO: 13.6 % (ref 11.8–14.4)
PLATELET # BLD: 176 K/UL (ref 138–453)
PLATELET ESTIMATE: ABNORMAL
PMV BLD AUTO: 10.4 FL (ref 8.1–13.5)
RBC # BLD: 4.2 M/UL (ref 3.95–5.11)
RBC # BLD: ABNORMAL 10*6/UL
SEG NEUTROPHILS: 88 % (ref 36–65)
SEGMENTED NEUTROPHILS ABSOLUTE COUNT: 7.31 K/UL (ref 1.5–8.1)
VITAMIN D 25-HYDROXY: 16.5 NG/ML (ref 30–100)
WBC # BLD: 8.3 K/UL (ref 3.5–11.3)
WBC # BLD: ABNORMAL 10*3/UL

## 2019-04-22 PROCEDURE — 82306 VITAMIN D 25 HYDROXY: CPT

## 2019-04-22 PROCEDURE — 73110 X-RAY EXAM OF WRIST: CPT

## 2019-04-22 PROCEDURE — 2580000003 HC RX 258: Performed by: ORTHOPAEDIC SURGERY

## 2019-04-22 PROCEDURE — 97116 GAIT TRAINING THERAPY: CPT

## 2019-04-22 PROCEDURE — 82947 ASSAY GLUCOSE BLOOD QUANT: CPT

## 2019-04-22 PROCEDURE — 97110 THERAPEUTIC EXERCISES: CPT

## 2019-04-22 PROCEDURE — 97535 SELF CARE MNGMENT TRAINING: CPT

## 2019-04-22 PROCEDURE — 36415 COLL VENOUS BLD VENIPUNCTURE: CPT

## 2019-04-22 PROCEDURE — 6360000002 HC RX W HCPCS: Performed by: ORTHOPAEDIC SURGERY

## 2019-04-22 PROCEDURE — 97166 OT EVAL MOD COMPLEX 45 MIN: CPT

## 2019-04-22 PROCEDURE — 99232 SBSQ HOSP IP/OBS MODERATE 35: CPT | Performed by: FAMILY MEDICINE

## 2019-04-22 PROCEDURE — 2500000003 HC RX 250 WO HCPCS: Performed by: ORTHOPAEDIC SURGERY

## 2019-04-22 PROCEDURE — 85025 COMPLETE CBC W/AUTO DIFF WBC: CPT

## 2019-04-22 PROCEDURE — 97162 PT EVAL MOD COMPLEX 30 MIN: CPT

## 2019-04-22 PROCEDURE — 6370000000 HC RX 637 (ALT 250 FOR IP): Performed by: ORTHOPAEDIC SURGERY

## 2019-04-22 PROCEDURE — 1200000000 HC SEMI PRIVATE

## 2019-04-22 PROCEDURE — 97530 THERAPEUTIC ACTIVITIES: CPT

## 2019-04-22 RX ORDER — HYDROCODONE BITARTRATE AND ACETAMINOPHEN 5; 325 MG/1; MG/1
1 TABLET ORAL EVERY 4 HOURS PRN
Qty: 42 TABLET | Refills: 0 | Status: SHIPPED | OUTPATIENT
Start: 2019-04-22 | End: 2019-04-29

## 2019-04-22 RX ADMIN — SODIUM CHLORIDE: 9 INJECTION, SOLUTION INTRAVENOUS at 01:00

## 2019-04-22 RX ADMIN — AMANTADINE HYDROCHLORIDE 100 MG: 100 CAPSULE ORAL at 07:15

## 2019-04-22 RX ADMIN — MIDODRINE HYDROCHLORIDE 5 MG: 5 TABLET ORAL at 07:15

## 2019-04-22 RX ADMIN — DEXTROSE MONOHYDRATE 900 MG: 50 INJECTION, SOLUTION INTRAVENOUS at 03:55

## 2019-04-22 RX ADMIN — Medication 10 ML: at 21:06

## 2019-04-22 RX ADMIN — ENOXAPARIN SODIUM 40 MG: 40 INJECTION SUBCUTANEOUS at 07:16

## 2019-04-22 RX ADMIN — DOCUSATE SODIUM 100 MG: 100 CAPSULE, LIQUID FILLED ORAL at 07:15

## 2019-04-22 RX ADMIN — IPRATROPIUM BROMIDE 2 SPRAY: 42 SPRAY, METERED NASAL at 21:04

## 2019-04-22 RX ADMIN — AMANTADINE HYDROCHLORIDE 100 MG: 100 CAPSULE ORAL at 21:02

## 2019-04-22 RX ADMIN — METOPROLOL TARTRATE 25 MG: 25 TABLET ORAL at 21:02

## 2019-04-22 RX ADMIN — HYDROCODONE BITARTRATE AND ACETAMINOPHEN 2 TABLET: 5; 325 TABLET ORAL at 18:50

## 2019-04-22 RX ADMIN — HYDROCODONE BITARTRATE AND ACETAMINOPHEN 1 TABLET: 5; 325 TABLET ORAL at 13:24

## 2019-04-22 RX ADMIN — DOCUSATE SODIUM 100 MG: 100 CAPSULE, LIQUID FILLED ORAL at 21:02

## 2019-04-22 RX ADMIN — RANOLAZINE 500 MG: 500 TABLET, EXTENDED RELEASE ORAL at 21:03

## 2019-04-22 RX ADMIN — Medication 10 ML: at 07:15

## 2019-04-22 RX ADMIN — RANOLAZINE 500 MG: 500 TABLET, EXTENDED RELEASE ORAL at 07:16

## 2019-04-22 RX ADMIN — LEVOTHYROXINE SODIUM 112 MCG: 112 TABLET ORAL at 07:15

## 2019-04-22 RX ADMIN — MIDODRINE HYDROCHLORIDE 5 MG: 5 TABLET ORAL at 21:02

## 2019-04-22 RX ADMIN — ATORVASTATIN CALCIUM 40 MG: 40 TABLET, FILM COATED ORAL at 21:02

## 2019-04-22 ASSESSMENT — PAIN DESCRIPTION - PAIN TYPE
TYPE: SURGICAL PAIN
TYPE: SURGICAL PAIN

## 2019-04-22 ASSESSMENT — PAIN SCALES - GENERAL
PAINLEVEL_OUTOF10: 7
PAINLEVEL_OUTOF10: 2
PAINLEVEL_OUTOF10: 0
PAINLEVEL_OUTOF10: 2
PAINLEVEL_OUTOF10: 0
PAINLEVEL_OUTOF10: 2
PAINLEVEL_OUTOF10: 5
PAINLEVEL_OUTOF10: 0

## 2019-04-22 ASSESSMENT — PAIN DESCRIPTION - LOCATION
LOCATION_2: ARM
LOCATION: ANKLE

## 2019-04-22 ASSESSMENT — PAIN DESCRIPTION - ORIENTATION: ORIENTATION_2: RIGHT

## 2019-04-22 ASSESSMENT — PAIN DESCRIPTION - INTENSITY: RATING_2: 7

## 2019-04-22 NOTE — PROGRESS NOTES
Patient was partially cleaned up with bath wipes and lotion used on back and arms. Writer offered to clean her up completely but patient refused at this time.

## 2019-04-22 NOTE — PROGRESS NOTES
Facsimile Transmission Cover Sheet    Information contained in this transmission is for the sole use of the intended recipients and may contain confidential and privileged information. Any unauthorized review, use, disclosure or distribution is prohibited. If you are not the intended recipient, please contact the sender and destroy all copies of the original message. Disclosure is made for the purpose of healthcare operations and continuity of care. To: __Dr. Hughes________________________    From: [facility]    Fax Number: [phone]    Sender:JENIFFER Campbell Rn______________ Phone Number:_[phone]____________      This request is: Urgent - No    Information and/or questions regarding patient condition:    [place] has a DNRCC-A order in her medical record. She would like this honored. Can you please change her code status? Thanks! Lucinda Rivas                    Physician Signature, Date and Time needed for any orders written on this fax. Thank you.     Signature_____________________ Date __________ Time _______

## 2019-04-22 NOTE — PROGRESS NOTES
Palliative Care Notes    Reason for Consult:  goals of care    Patient Active Problem List   Diagnosis    Pulmonary HTN    Non-rheumatic mitral regurgitation    Essential hypertension    Obstructive sleep apnea    Gout    Type [facility] mellitus [facility] complication [facility]    Acute cystitis with hematuria    Ischemic cardiomyopathy    Hypokalemia    Acute pulmonary edema (HCC)    Ground glass opacity present on imaging of lung    [facility]monary fibrosis [facility]    ASHD (arteriosclerotic heart disease)    Uncomplicated asthma    E. coli UTI    Anemia of chronic disease    Closed TBI (traumatic brain injury) [facility]    Interstitial lung disease ([facility]    Long term current use of anticoagulant therapy    Chronic lung disease    Leg fatigue    Acute on chronic congestive heart failure (HCC)    Vasodepressor syncope    Muscle fatigue    Paroxysmal A-fib (HCC)    Need for prophylactic vaccination and inoculation against influenza    Decubitus ulcer of sacral region    Pneumonitis    General weakness    Chronic obstructive pulmonary disease (HCC)    Elevated LFTs    Calculus of gallbladder with acute on chronic cholecystitis    Asymptomatic cholelithiasis    Acute cholecystitis    Intractable vomiting with nausea    Hypothyroidism due to amiodarone    Amiodarone toxicity    Chronic diastolic CHF (congestive heart failure) (HCC)    Hypothyroidism    Dyspepsia    Chronic a-fib (HCC)    Parkinsonian tremor (HCC)    Sick sinus syndrome [facility]    Hip fracture, right, closed, initial encounter ([facility]    Hip fx, right, closed, initial encounter ([facility]       Advance Directives:  Code status: Full Code  Patient has capacity for medical decisions: yes  Health Care Power of : yes  Living Will: yes        Pain Management:  Pain is controlled with current analgesics. Medication(s) being used: narcotic analgesics including hydrocodone/acetaminophen (Lorcet, Lortab, Norco, Vicodin).     Symptom Management:  Are there any other symptoms that are distressing to the patient or family that needs addressed? Anxiety:  none                          Dyspnea:  chronic dyspnea                          Fatigue:  denies                          Bladder function:  denies problems                          Bowel function:  risk for constipation    Other:  none     Spiritual history/needs:   notified: n/a    Palliative Performance Scale:  ___70%  Ambulation reduced; Some disease; Can't do normal job or work; intake normal or reduced; can do full self care; LOC full  ___60%  Ambulation reduced; Significant disease; Can't do hobbies/housework; intake normal or reduced; occasional assist; LOC full/confusion  _x__50%  Mainly sit/lie; Extensive disease; Can't do any work; Considerable assist; intake normal or reduced; LOC full/confusion  ___40%  Mainly in bed; Extensive disease; Mainly assist; intake normal or reduced; LOC full/confusion   ___30%  Bed Bound; Extensive disease; Total care; intake reduced; LOCfull/confusion  ___20%  Bed Bound; Extensive disease; Total care; intake minimal; Drowsy/coma  ___10%  Bed Bound; Extensive disease; Total care; Mouth care only; Drowsy/coma  ___0       Death    1 year Mortality Risk %:    16%  Readmission Risk Score: 13    Notes:   [place] is sitting up in the chair during my visit. She fell fracturing her right hip. Has ecchymosis around right orbit from her glasses and soreness in her right arm. Tells me that she used to fall frequently but this episode was from chasing her cat. Has had surgery and is planning on going to [facility] at discharge. Lives at home with her , her daughter and grand child. Tells me she does not use the stairs anymore due to her CHF and becoming short of breath with exertion. States she spends more of her time in a recliner chair with her legs up due to vascular issues in her legs.   Her activity is also limited due to her CHF. She has a pacemaker and defibrillator. She also had a Watchman's device placed. ACP discussion. Has a DNRCC-A order in her chart. She would like this to still be honored. Message sent to PCP. Has a living will and health care POA. Knows she needs to bring in a copy. 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 Support given. [place] enjoys [employer] and sewing. She still attends [employer] when she is able to attend. She shares parts of her story. Permitted to share. Denies other needs or concerns at this time. Will follow and support. Palliative Care Plan:  Education/support to patient  Providing support for coping/adaptation/distress of patient  Specific spiritual beliefs/practices  Continue with current plan of care  Code status clarified: DNRCCA  Pain management for comfort  Palliative Care Goals:  improve or maintain function/quality of life, spiritual needs, remain at home and preserve independence/autonomy/control  Visit focus:  Routine meeting  Discuss goals of care  Listen to patient/family concerns  Interdiscplinary collaboration  Build trust  Elicit patient's goals and values, and use these to establish or modify goals of care     Marychuy Kirk RN, Dora Wright and Elba Garcia Nurse Coordinator  4/22/2019 11:24 AM

## 2019-04-22 NOTE — PLAN OF CARE
Patient had hip repair yesterday. Patient up and transferred to chair. Tolerated well. Remains numb to RLE. Baljinder betts'tami. Working with PT. Will begin hip precaution education.  Unable to discuss dc pllans in depth at this time, unsure how well she will do post op

## 2019-04-22 NOTE — PROGRESS NOTES
Nutrition Assessment    Type and Reason for Visit: Initial, Consult(Poor appetite)    Nutrition Recommendations: Continue current diet, start ONS. Nutrition Assessment: Increased nutrient needs RT acute injury/trauma AEB presence of wounds, both surgical and a stage II pressure wound. Pt admitted with hip facture and is currently recovering from surgery. Pt also has Dx of CHF. Pt had consult for poor appetite, however pt reported that her appetite is fine now and it has been fine at home. Pt lives with  who does most of the shopping and pretty much just buys frozen dinners for the two of them. Pt is aware that their brand choice can be higher in sodium, but she feels like they fit into her diet because she does not eat a lot of salt otherwise. Told pt about Smart Ones and Healthy Choice and she stated that her  has a hard time shopping for her and he likes the other brands. Pt repots her UBW to be 158# but her wt flucuates a lot. Current wt gain could be due to edema. Pt does not follow any certain diet at home and stated \"my lab work has been fine, so I think I am doing okay. \" Encouraged pt to eat protein rich food sources for wound healing and pt understood and would like vanilla ONS during hospital stay. Will continue to monitor po intakes, wt, healing. Malnutrition Assessment:  · Malnutrition Status: At risk for malnutrition  · Context: Acute illness or injury  · Findings of the 6 clinical characteristics of malnutrition (Minimum of 2 out of 6 clinical characteristics is required to make the diagnosis of moderate or severe Protein Calorie Malnutrition based on AND/ASPEN Guidelines):  1. Energy Intake-Unable to assess, Unable to assess    2. Weight Loss-No significant weight loss,    3. Fat Loss-Mild subcutaneous fat loss, Orbital  4. Muscle Loss-No significant muscle mass loss,    5. Fluid Accumulation-Mild fluid accumulation, Extremities  6.   Strength-Not measured    Nutrition Risk Level: Moderate    Nutrient Needs:  · Estimated Daily Total Kcal: 6014-9249(22-50KTPV/ZN)  · Estimated Daily Protein (g): 80-91(1.4-1.6g/kg)  · Estimated Daily Total Fluid (ml/day): 1700(1 mL/kcal)    Nutrition Diagnosis:   · Problem: Increased nutrient needs(Protein/energy)  · Etiology: related to Acute injury/trauma     Signs and symptoms:  as evidenced by Presence of wounds    Objective Information:  · Nutrition-Focused Physical Findings: Dentures, loose fitting but has appointment scheduled in May.   · Wound Type: Stage II, Surgical Wound  · Current Nutrition Therapies:  · Oral Diet Orders: Cardiac   · Oral Diet intake: %  · Oral Nutrition Supplement (ONS) Orders: None  · Anthropometric Measures:  · Ht: 5' 5\" (165.1 cm)   · Current Body Wt: 163 lb (73.9 kg)  · Admission Body Wt: 157 lb (71.2 kg)  · Usual Body Wt: 158 lb (71.7 kg)(stated by pt)  · % Weight Change:  ,  +3% of UBW  · Ideal Body Wt: 125 lb (56.7 kg), % Ideal Body 130%  · BMI Classification: BMI 25.0 - 29.9 Overweight    Nutrition Interventions:   Continue current diet, Start ONS  Continued Inpatient Monitoring, Education not appropriate at this time, Coordination of Care    Nutrition Evaluation:   · Evaluation: Goals set   · Goals: PO >75% of meals and supplement    · Monitoring: Meal Intake, Supplement Intake, Wound Healing, Weight    Wt Readings from Last 10 Encounters:   04/22/19 163 lb (73.9 kg)   03/01/19 165 lb 3.2 oz (74.9 kg)   11/28/18 156 lb (70.8 kg)   10/26/18 157 lb (71.2 kg)   09/27/18 150 lb (68 kg)   06/29/18 155 lb 3.2 oz (70.4 kg)   06/27/18 156 lb (70.8 kg)   04/18/18 162 lb 12.8 oz (73.8 kg)   03/30/18 155 lb (70.3 kg)   03/22/18 162 lb (73.5 kg)     Recent Labs     04/20/19  1925      K 4.6      CO2 26   BUN 17   CREATININE 0.89   GLUCOSE 112*   ALT 25   ALKPHOS 104   GFR                 Lab Results   Component Value Date    LABALBU 4.1 04/20/2019      Lab Results   Component Value Date    LABA1C 5.5 02/19/2019     Electronically signed by Vadim Bob on 4/22/19 at 10:42 AM    Contact Number: [id number]

## 2019-04-22 NOTE — PROGRESS NOTES
Bearing Restrictions  Right Lower Extremity Weight Bearing: Partial Weight Bearing  Partial Weight Bearing Percentage Or Pounds: R hip 50% WB  Subjective   General  Chart Reviewed: Yes  Response To Previous Treatment: Patient with no complaints from previous session. Family / Caregiver Present: Yes  Referring Practitioner: Dr. Brittani Noel: Pt reports that R hand/wrist in sitting is 1/10 and 7/10 with gait using walker. Pt reports that numbness in RLE is gone. General Comment  Comments: Nursing Lalla Boas) was notified of pain. Orientation  Orientation  Overall Orientation Status: Within Normal Limits  Cognition   Objective   Transfers  Sit to Stand: Minimal Assistance  Stand to sit: Minimal Assistance  Ambulation  Ambulation?: Yes  WB Status: 50% RLE  Ambulation 1  Surface: level tile  Device: Rolling Walker  Assistance: Contact guard assistance  Distance: 30'   Comments: Pt given cues for 50% WB and for step length and technique for turns. Stairs/Curb  Stairs?: No     Balance  Posture: Fair  Sitting - Static: Good  Sitting - Dynamic: Good  Standing - Static: Fair;+  Standing - Dynamic: Fair  Exercises  Straight Leg Raise: x15  Quad Sets: x15  Heelslides: x15  Hip Flexion: x15(marching with AAROM on RLE)  Hip Abduction: x15(AAROM on RLE in reclined position but AROM in seated position.)  Knee Long Arc Quad: x15  Ankle Pumps: x15  Comments: Above exs tolerated well with verbal cues for proper technique to optimize benefits. Exs completed in seated/reclined in chair. Assessment   Patient Education: Pt educated on WB status and precautions. Also educated on safety with transfers and ambulation. Pt with good understanding.   REQUIRES PT FOLLOW UP: Yes  Activity Tolerance  Activity Tolerance: Patient Tolerated treatment well     G-Code  OutComes Score    AM-PAC Score    Goals  Short term goals  Time Frame for Short term goals: 10 days   Short term goal 1: Pt will be educated on her POC   Short term goal 2: Pt will perform all transfers with CGA in order to increase independence   Short term goal 3: Pt will ambulate 100 feet with FWW CGA while maintaining proper WB   Short term goal 4: Pt will increase dynamic standing balance to Fair + in order to decrease fall risk   Patient Goals   Patient goals : \"to get back to normal\"    Plan    Plan  Times per week: 7x/wk   Times per day: Twice a day  Plan weeks: 2x/daily except weekends 1x/daily   Current Treatment Recommendations: Strengthening, Transfer Training, Endurance Training, Neuromuscular Re-education, Patient/Caregiver Education & Training, Equipment Evaluation, Education, & procurement, Pain Management, Balance Training, Gait Training, Home Exercise Program, Functional Mobility Training, Stair training, Safety Education & Training  Safety Devices  Type of devices: Call light within reach, Left in chair, All fall risk precautions in place, Nurse notified, Gait belt(no alarm upon entry)     Therapy Time   Individual Concurrent Group Co-treatment   Time In 1230         Time Out 1330         Minutes [address]

## 2019-04-22 NOTE — PROGRESS NOTES
Physical Therapy    Facility/Department: [address] [place] [address] MED SURG  Initial Assessment    NAME: [place]  : 1940  MRN: [id number]    Date of Service: 2019    Discharge Recommendations:  Continue to assess pending progress        Assessment   Body structures, Functions, Activity limitations: Decreased functional mobility ; Decreased strength;Decreased endurance;Decreased balance;Decreased high-level IADLs  Assessment: Pt is a 77year old female that suffered a R hip fx from a fall. Pt presents with R Le weakness, requires asssit for transfers and has difficulty with ambulation. Pt will benefit from skilled PT in order to address these deficits. Treatment Diagnosis: difficutly walking s/p R hip fx   Prognosis: Good  Decision Making: Medium Complexity  Patient Education: pt educated on her POC and HEP   REQUIRES PT FOLLOW UP: Yes  Activity Tolerance  Activity Tolerance: Patient Tolerated treatment well       Patient Diagnosis(es): The encounter diagnosis was Post-op pain.      has a past medical history of Allergic rhinitis, Arthritis, Asthma, Atrial fibrillation (HCC), CAD (coronary artery disease), CHF (congestive heart failure) ([address] [facility] Clotting disorder ([facility] COPD (chronic obstructive pulmonary disease) ([facility] DDD (degenerative disc disease), cervical, Degeneration of cervical intervertebral disc, Diverticulosis, Gout, Gout, unspecified, H/O cardiac catheterization, H/O echocardiogram, H/O echocardiogram, History of Holter monitoring, Hx of blood clots, Hyperlipidemia, Hyperlipidemia, Hypertension, Hypertension, Hypothyroidism due to amiodarone, Infectious hepatitis, Long term (current) use of anticoagulants, Movement disorder, Other abnormal glucose, Pacemaker, Phlebitis and thrombophlebitis of lower extremities, unspecified, Senile osteoporosis, Symptomatic menopausal or female climacteric states, Syncope and collapse, Unspecified hereditary and idiopathic peripheral neuropathy, and Unspecified sleep apnea.   has a past surgical history that includes Tonsillectomy and adenoidectomy; eye surgery; skin biopsy; Cardiac catheterization (Right, 06/17/2015); Diagnostic Cardiac Cath Lab Procedure (06/17/15); fracture surgery (2004); Colonoscopy (1/2005); other surgical history (2years old); bronchoscopy (6/7/2017); bronchoscopy (6/7/2017); bronchoscopy (6/7/2017); lobectomy (Left, 6/14/2017); Cataract removal with implant (Right, 08/14/2017); pr egd transoral biopsy single/multiple (Left, 2/13/2018); and Hip fracture surgery (Right, 04/21/2019).     Restrictions  Restrictions/Precautions  Restrictions/Precautions: Fall Risk, Weight Bearing, General Precautions  Lower Extremity Weight Bearing Restrictions  Partial Weight Bearing Percentage Or Pounds: R hip 50% WB  Vision/Hearing  Vision: Within Functional Limits  Hearing: Within functional limits     Subjective  General  Chart Reviewed: Yes  Response To Previous Treatment: Not applicable  Family / Caregiver Present: No  Referring Practitioner: Dr. Ryan Yoa   Referral Date : 04/20/19  Diagnosis: R hip fx   Subjective  Subjective: pt reports a little numbness and pain in R ankle   Pain Screening  Patient Currently in Pain: Denies  Pain Assessment  Pain Assessment: 0-10  Pain Level: 2  Pain Type: Surgical pain  Pain Location: Ankle       Orientation  Orientation  Overall Orientation Status: Within Normal Limits  Social/Functional History  Social/Functional History  Lives With: Spouse  Type of Home: House  Home Layout: Two level, Able to Live on Main level with bedroom/bathroom  Home Access: Stairs to enter with rails  ADL Assistance: Independent  Homemaking Assistance: Independent  Ambulation Assistance: Independent  Transfer Assistance: Independent  Active : No  Cognition        Objective          AROM RLE (degrees)  RLE AROM: WFL  AROM LLE (degrees)  LLE AROM : WFL  Strength RLE  Comment: hip grossly 3+/5, knee 4-/5   Strength LLE  Comment: grossly 4/5 Transfers  Sit to Stand: Minimal Assistance  Stand to sit: Minimal Assistance  Ambulation  Ambulation?: Yes  Ambulation 1  Device: Rolling Walker  Assistance: Contact guard assistance  Distance: 2 steps   Comments: pt's leg is still numb      Balance  Posture: Fair  Sitting - Static: Good  Sitting - Dynamic: Good  Standing - Static: Fair;+  Standing - Dynamic: [address]  Times per week: 7x/wk   Times per day: Twice a day  Plan weeks: 2x/daily except weekends 1x/daily   Current Treatment Recommendations: Strengthening, Transfer Training, Endurance Training, Neuromuscular Re-education, Patient/Caregiver Education & Training, Equipment Evaluation, Education, & procurement, Pain Management, Balance Training, Gait Training, Home Exercise Program, Functional Mobility Training, Stair training, Safety Education & Training  Safety Devices  Type of devices: Call light within reach, Left in chair(nurse is in room with patient )    G-Code       OutComes Score                                                  AM-PAC Score             Goals  Short term goals  Time Frame for Short term goals: 10 days   Short term goal 1: Pt will be educated on her POC   Short term goal 2: Pt will perform all transfers with CGA in order to increase independence   Short term goal 3: Pt will ambulate 100 feet with FWW CGA while maintaining proper WB   Short term goal 4: Pt will increase dynamic standing balance to Fair + in order to decrease fall risk   Patient Goals   Patient goals : \"to get back to normal\"       Therapy Time   Individual Concurrent Group Co-treatment   Time In 0703         Time Out 0722         Minutes [address], PT

## 2019-04-22 NOTE — PROGRESS NOTES
Patient sat at bedside for about 40 minutes and stood at bedside for about 10 seconds. Patient denies pain at this time. Patient was helped back into bed 2 person assist and repositioned. A warm blanket was given and a towel rolled for behind patients back.

## 2019-04-22 NOTE — PROGRESS NOTES
Department of Orthopedic Surgery  Progress Note    Subjective:  Overall doing well postoperatively. Pain in her hip is perceived as mild (1 out of 10). Pain is described as an ache. Pain is intermittent. States pain has been adequately controlled with oral pain medications. States pain worse with increased movement and standing. Pain better with rest and oral pain medication. No numbness/tingling noted. No calf pain. Also has been having increased pain in right wrist following fall. Landed on right wrist trying to break her fall. Pain is intermittent. Pain is described as aching. Pain is mild/moderate. Worse with increased movement, palpation and using walker. No numbness/tingling    Vitals  VITALS:  BP (!) 154/87   Pulse 80   Temp 97.6 °F (36.4 °C) (Temporal)   Resp 16   Ht 5' 5\" (1.651 m)   Wt 163 lb (73.9 kg)   SpO2 94%   BMI 27.12 kg/m²     PHYSICAL EXAM:  General: in no apparent distress, alert and oriented times 3; ecchymosis about right eye noted  Right Upper Extremity: Inspection reveals skin intact with no significant erythema, ecchymosis or edema. Has pain with palpation over dorsal and volar aspect of distal radius. Mild pain with axial loading of thumb. Minimal snuff box tenderenss. Motion limited to approx 45 flexion and extension d/t pain and guarding. 2+ radial pulse. Sensation to light touch intact. Rapid cap refill  Right Lower Extremity  Incision:  Aquacel dressing in place, clean, dry and intact  Neurologic:  Moving lower extremity as appropriate following sugery. Able to dorsiflex and plantar flex foot/ankle. Intact to gross sensation and touch in lower extremity. Vascular: present 2+ lower extremity. Calf soft, non-tender.  No evidence of DVT seen on physical exam.  Abnormal Exam findings:  none    LABS:  Hgb:    Lab Results   Component Value Date    HGB 12.2 04/22/2019     CBC:   Lab Results   Component Value Date    WBC 8.3 04/22/2019    RBC 4.20 04/22/2019    RBC 4.98 05/04/2012    HGB 12.2 04/22/2019    HCT 37.7 04/22/2019    MCV 89.8 04/22/2019    MCH 29.0 04/22/2019    MCHC 32.4 04/22/2019    RDW 13.6 04/22/2019     04/22/2019     05/04/2012    MPV 10.4 04/22/2019       ASSESSMENT AND PLAN:  -Post operative day 1 status post right percutaneous pinning of femoral neck fracture  -Right wrist sprain    1:  50% WBAT with walker  2:  Order 3 views of right wrist to r/o fracture  2:  Continue Deep venous thrombosis prophylaxis; will discharge with Lovenox 40 mg dialy  3:  Continue physical therapy  4:  D/C Plan:  Skilled nursing facility when medically stable  5:  Continue Pain Control.   Will d/c on Norco 5-325 mg every 4 hours prn pain   6:  Keep f/u appt with Dr. Ophelia Harden

## 2019-04-22 NOTE — PROGRESS NOTES
Met with Patient this a.m. She is a 77year old , white female, admitted with a right hip fracture. Patient is alert and oriented, pleasant and cooperative with this assessment. States that she would like to go to [facility], does not want to go to [facility]. Patient resides with her , daughter and daughters family in [facility]. Patient uses a rollator walker, wheelchair, shower chair and has hand rails in the bathroom for assistance. Patient accesses her home through the garage where there is a ramp with \"one little step\" through to enter. Daughters family resides in the upstairs of the home with Patient and her  occupying the first floor. Patient does not drive, relies on family for her transportation needs. PCP is Dr. Herman Mullen. Medications pose no cost barriers to Patient being discharged. Referral made to [facility] as this is Patient's first choice. [facility] can take Patient as soon as they receive the authorization to do so from [facility]. Pre-cert initiated today. Patient is a \"DNR-CCA\" Code status at this time. Is noted to have Advanced Directives in place. LSW will monitor and assist with discharge planning as appropriate.     Sahilda. [address]  4/22/2019

## 2019-04-22 NOTE — PROGRESS NOTES
Occupational Therapy   Occupational Therapy Initial Assessment  Date: 2019   Patient Name: Bella Guerrero  MRN: [id number]     : 1940    Date of Service: 2019    Discharge Recommendations:  Continue to assess pending progress, IP Rehab       Assessment   Performance deficits / Impairments: Decreased functional mobility ; Decreased balance;Decreased safe awareness;Decreased ADL status; Decreased endurance;Decreased strength  Prognosis: Good  Decision Making: Medium Complexity  Patient Education: educated on POC, sitting in a controlled manner, use of grab bars, and not pulling on walker when sitting and standing  REQUIRES OT FOLLOW UP: Yes  Activity Tolerance  Activity Tolerance: Patient Tolerated treatment well  Safety Devices  Safety Devices in place: Yes  Type of devices: Left in chair;Call light within reach(family present)           Patient Diagnosis(es): The encounter diagnosis was Post-op pain. has a past medical history of Allergic rhinitis, Arthritis, Asthma, Atrial fibrillation (HCC), CAD (coronary artery disease), CHF (congestive heart failure) ([facility] [address] Clotting disorder ([facility] [address] COPD (chronic obstructive pulmonary disease) ([facility] [address] DDD (degenerative disc disease), cervical, Degeneration of cervical intervertebral disc, Diverticulosis, Gout, Gout, unspecified, H/O cardiac catheterization, H/O echocardiogram, H/O echocardiogram, History of Holter monitoring, Hx of blood clots, Hyperlipidemia, Hyperlipidemia, Hypertension, Hypertension, Hypothyroidism due to amiodarone, Infectious hepatitis, Long term (current) use of anticoagulants, Movement disorder, Other abnormal glucose, Pacemaker, Phlebitis and thrombophlebitis of lower extremities, unspecified, Senile osteoporosis, Symptomatic menopausal or female climacteric states, Syncope and collapse, Unspecified hereditary and idiopathic peripheral neuropathy, and Unspecified sleep apnea.    has a past surgical history that includes Tonsillectomy and adenoidectomy; eye surgery; skin biopsy; Cardiac catheterization (Right, 06/17/2015); Diagnostic Cardiac Cath Lab Procedure (06/17/15); fracture surgery (2004); Colonoscopy (1/2005); other surgical history (2years old); bronchoscopy (6/7/2017); bronchoscopy (6/7/2017); bronchoscopy (6/7/2017); lobectomy (Left, 6/14/2017); Cataract removal with implant (Right, 08/14/2017); pr egd transoral biopsy single/multiple (Left, 2/13/2018); and Hip fracture surgery (Right, 04/21/2019). Restrictions  Restrictions/Precautions  Restrictions/Precautions: Weight Bearing, General Precautions, Fall Risk  Lower Extremity Weight Bearing Restrictions  Right Lower Extremity Weight Bearing: Partial Weight Bearing  Partial Weight Bearing Percentage Or Pounds: 50% PWB RLE    Subjective   General  Chart Reviewed: Yes  Patient assessed for rehabilitation services?: Yes  Family / Caregiver Present: Yes  Referring Practitioner: Dr. Humberto Mota  Diagnosis: R hip fx  Subjective  Subjective: Pt reports no pain in hip at time of eval. Reports that her right wrist is becoming more sore from the fall. Pain Assessment  Pain Level: 5  Social/Functional History  Social/Functional History  Lives With: Spouse  Type of Home: House  Home Layout: Two level, Able to Live on Main level with bedroom/bathroom  Home Access: Stairs to enter without rails  Entrance Stairs - Number of Steps: 2  Bathroom Shower/Tub: Tub/Shower unit  Bathroom Toilet: Handicap height  Bathroom Equipment: Grab bars in shower  Home Equipment: 4 wheeled walker(doesn't use at home; but uses it at the grocery store, only to sit down because she gets tired.)  ADL Assistance: Independent  Homemaking Assistance: Needs assistance(cooking is too difficult d/t standing)  Meal Prep: Moderate  Laundry: Moderate  Vacuuming: Total  Cleaning:  Total(gets too dizzy with the bending over.)  Yard Work: Moderate  Ambulation Assistance: Independent  Transfer Assistance: Independent  Active : No  Patient's  Info: , daughter, granddaughter       Objective   Vision: Impaired  Vision Exceptions: Wears glasses for reading  Hearing: Within functional limits          Balance  Sitting Balance: Supervision  Standing Balance: Contact guard assistance  Functional Mobility  Functional - Mobility Device: Rolling Walker  Activity: To/from bathroom  Assist Level: Contact guard assistance  Functional Mobility Comments: CGA for ambulation to/from bathroom. Toilet Transfers  Toilet - Technique: Ambulating  Equipment Used: Grab bars(to assist with sit to stand)  Toilet Transfer: Minimal assistance  Toilet Transfers Comments: David and verbal cues for toilet transfers. VC to use grab bar and to not pull on walker for sitting and standing  ADL  Grooming: Moderate assistance  UE Bathing: Stand by assistance  LE Bathing: Moderate assistance  UE Dressing: Stand by assistance  LE Dressing: Moderate assistance  Toileting: Moderate assistance(pt required modA to complete toileting. Assist needed with toilet hygiene.)           Transfers  Sit to stand: Minimal assistance(David sit to stand from recliner and from toilet.)  Stand to sit: Moderate assistance(David stand to sit at toilet. [address] [place] for stand to sit at recliner)  Transfer Comments: When completing stand to sit at recliner. Pt sat quickly and in an uncontrolled manner. Educated on the importance of not plopping. Pt verbalized good understanding. LUE AROM (degrees)  LUE AROM : WFL  Left Hand AROM (degrees)  Left Hand AROM: WFL  RUE AROM (degrees)  RUE AROM : WFL  Right Hand AROM (degrees)  Right Hand AROM: WFL  LUE Strength  Gross LUE Strength: WFL  LUE Strength Comment: Grossly 4-/5  RUE Strength  Gross RUE Strength: WFL  RUE Strength Comment: Slightly weaker than LUE d/t fall.  Grossly 3+/5         Plan   Plan  Times per week: 7x/week  Times per day: Daily  Current Treatment Recommendations: Strengthening, Endurance Training, Patient/Caregiver Education & Training, Self-Care / ADL, Functional Mobility Training, Safety Education & Training      Goals  Short term goals  Time Frame for Short term goals: 10 visits  Short term goal 1: Pt will tolerate 10 minutes of standing while engaging in therpeutic activity of choice to increase standing tolerance and balance for increased independence and safety with functional transfers and ADL. Short term goal 2: Pt will engage in 15 minutes of therex/theract to increase BUE strength for increased independence with ADL and functional transfers. Short term goal 3: Pt will complete toileting routine, including clothing management and toilet hygiene SBA, with use of grab bars for safety. Short term goal 4: Pt will complete LB bathing/dressing SBA c AE as needed to increase independence and safety with ADL.        Therapy Time   Individual Concurrent Group Co-treatment   Time In 1356         Time Out 1420         Minutes Justin Chen, OTR/L

## 2019-04-23 ENCOUNTER — APPOINTMENT (OUTPATIENT)
Dept: GENERAL RADIOLOGY | Age: 79
DRG: 481 | End: 2019-04-23
Payer: MEDICARE

## 2019-04-23 ENCOUNTER — APPOINTMENT (OUTPATIENT)
Dept: CT IMAGING | Age: 79
DRG: 481 | End: 2019-04-23
Payer: MEDICARE

## 2019-04-23 PROBLEM — I63.9 CEREBROVASCULAR ACCIDENT (CVA) INVOLVING LEFT CEREBRAL HEMISPHERE (HCC): Chronic | Status: ACTIVE | Noted: 2019-04-23

## 2019-04-23 LAB
ABSOLUTE EOS #: 0.35 K/UL (ref 0–0.44)
ABSOLUTE IMMATURE GRANULOCYTE: 0.05 K/UL (ref 0–0.3)
ABSOLUTE LYMPH #: 1.99 K/UL (ref 1.1–3.7)
ABSOLUTE MONO #: 0.56 K/UL (ref 0.1–1.2)
BASOPHILS # BLD: 1 % (ref 0–2)
BASOPHILS ABSOLUTE: 0.04 K/UL (ref 0–0.2)
DIFFERENTIAL TYPE: ABNORMAL
EOSINOPHILS RELATIVE PERCENT: 4 % (ref 1–4)
GLUCOSE BLD-MCNC: 107 MG/DL (ref 74–100)
GLUCOSE BLD-MCNC: 73 MG/DL (ref 74–100)
HCT VFR BLD CALC: 38.7 % (ref 36.3–47.1)
HEMOGLOBIN: 12.2 G/DL (ref 11.9–15.1)
IMMATURE GRANULOCYTES: 1 %
LYMPHOCYTES # BLD: 23 % (ref 24–43)
MCH RBC QN AUTO: 28.5 PG (ref 25.2–33.5)
MCHC RBC AUTO-ENTMCNC: 31.5 G/DL (ref 28.4–34.8)
MCV RBC AUTO: 90.4 FL (ref 82.6–102.9)
MONOCYTES # BLD: 7 % (ref 3–12)
NRBC AUTOMATED: 0 PER 100 WBC
PDW BLD-RTO: 14.1 % (ref 11.8–14.4)
PLATELET # BLD: 166 K/UL (ref 138–453)
PLATELET ESTIMATE: ABNORMAL
PMV BLD AUTO: 10.2 FL (ref 8.1–13.5)
RBC # BLD: 4.28 M/UL (ref 3.95–5.11)
RBC # BLD: ABNORMAL 10*6/UL
SEG NEUTROPHILS: 65 % (ref 36–65)
SEGMENTED NEUTROPHILS ABSOLUTE COUNT: 5.54 K/UL (ref 1.5–8.1)
WBC # BLD: 8.5 K/UL (ref 3.5–11.3)
WBC # BLD: ABNORMAL 10*3/UL

## 2019-04-23 PROCEDURE — 97535 SELF CARE MNGMENT TRAINING: CPT

## 2019-04-23 PROCEDURE — 36415 COLL VENOUS BLD VENIPUNCTURE: CPT

## 2019-04-23 PROCEDURE — 82947 ASSAY GLUCOSE BLOOD QUANT: CPT

## 2019-04-23 PROCEDURE — 6370000000 HC RX 637 (ALT 250 FOR IP): Performed by: ORTHOPAEDIC SURGERY

## 2019-04-23 PROCEDURE — 73200 CT UPPER EXTREMITY W/O DYE: CPT

## 2019-04-23 PROCEDURE — 6360000002 HC RX W HCPCS: Performed by: ORTHOPAEDIC SURGERY

## 2019-04-23 PROCEDURE — 85025 COMPLETE CBC W/AUTO DIFF WBC: CPT

## 2019-04-23 PROCEDURE — 2580000003 HC RX 258: Performed by: ORTHOPAEDIC SURGERY

## 2019-04-23 PROCEDURE — 97110 THERAPEUTIC EXERCISES: CPT

## 2019-04-23 PROCEDURE — 73090 X-RAY EXAM OF FOREARM: CPT

## 2019-04-23 PROCEDURE — 1200000000 HC SEMI PRIVATE

## 2019-04-23 PROCEDURE — 6370000000 HC RX 637 (ALT 250 FOR IP): Performed by: FAMILY MEDICINE

## 2019-04-23 RX ORDER — ERGOCALCIFEROL 1.25 MG/1
50000 CAPSULE ORAL WEEKLY
Status: DISCONTINUED | OUTPATIENT
Start: 2019-04-23 | End: 2019-04-24 | Stop reason: HOSPADM

## 2019-04-23 RX ADMIN — DOCUSATE SODIUM 100 MG: 100 CAPSULE, LIQUID FILLED ORAL at 20:38

## 2019-04-23 RX ADMIN — HYDROCODONE BITARTRATE AND ACETAMINOPHEN 1 TABLET: 5; 325 TABLET ORAL at 08:21

## 2019-04-23 RX ADMIN — MIDODRINE HYDROCHLORIDE 5 MG: 5 TABLET ORAL at 20:38

## 2019-04-23 RX ADMIN — IPRATROPIUM BROMIDE 2 SPRAY: 42 SPRAY, METERED NASAL at 20:40

## 2019-04-23 RX ADMIN — IPRATROPIUM BROMIDE 2 SPRAY: 42 SPRAY, METERED NASAL at 08:23

## 2019-04-23 RX ADMIN — ENOXAPARIN SODIUM 40 MG: 40 INJECTION SUBCUTANEOUS at 08:21

## 2019-04-23 RX ADMIN — DOCUSATE SODIUM 100 MG: 100 CAPSULE, LIQUID FILLED ORAL at 08:21

## 2019-04-23 RX ADMIN — MIDODRINE HYDROCHLORIDE 5 MG: 5 TABLET ORAL at 08:21

## 2019-04-23 RX ADMIN — ERGOCALCIFEROL 50000 UNITS: 1.25 CAPSULE ORAL at 08:24

## 2019-04-23 RX ADMIN — Medication 10 ML: at 20:58

## 2019-04-23 RX ADMIN — Medication 10 ML: at 08:23

## 2019-04-23 RX ADMIN — AMANTADINE HYDROCHLORIDE 100 MG: 100 CAPSULE ORAL at 20:38

## 2019-04-23 RX ADMIN — LEVOTHYROXINE SODIUM 112 MCG: 112 TABLET ORAL at 08:21

## 2019-04-23 RX ADMIN — ATORVASTATIN CALCIUM 40 MG: 40 TABLET, FILM COATED ORAL at 20:38

## 2019-04-23 RX ADMIN — RANOLAZINE 500 MG: 500 TABLET, EXTENDED RELEASE ORAL at 20:39

## 2019-04-23 RX ADMIN — RANOLAZINE 500 MG: 500 TABLET, EXTENDED RELEASE ORAL at 08:23

## 2019-04-23 RX ADMIN — AMANTADINE HYDROCHLORIDE 100 MG: 100 CAPSULE ORAL at 08:21

## 2019-04-23 RX ADMIN — METOPROLOL TARTRATE 25 MG: 25 TABLET ORAL at 20:38

## 2019-04-23 ASSESSMENT — PAIN DESCRIPTION - DESCRIPTORS: DESCRIPTORS: BURNING

## 2019-04-23 ASSESSMENT — PAIN SCALES - GENERAL
PAINLEVEL_OUTOF10: 5
PAINLEVEL_OUTOF10: 5
PAINLEVEL_OUTOF10: 0

## 2019-04-23 ASSESSMENT — PAIN DESCRIPTION - LOCATION
LOCATION_2: ARM
LOCATION: HIP
LOCATION: HIP

## 2019-04-23 ASSESSMENT — PAIN DESCRIPTION - INTENSITY: RATING_2: 1

## 2019-04-23 ASSESSMENT — PAIN DESCRIPTION - PAIN TYPE
TYPE: ACUTE PAIN
TYPE: ACUTE PAIN

## 2019-04-23 ASSESSMENT — PAIN DESCRIPTION - ORIENTATION
ORIENTATION: RIGHT
ORIENTATION_2: RIGHT

## 2019-04-23 ASSESSMENT — PAIN DESCRIPTION - FREQUENCY: FREQUENCY: CONTINUOUS

## 2019-04-23 NOTE — PROGRESS NOTES
Department of Orthopedic Surgery  Progress Note    Subjective: Overall doing well. Her hip has feeling great. Not having any pain in her hip. However, she is still having pain in her wrist and in her right forearm. Pain is hip is 0 out of 10 at rest.  With movement, hip pain is 1 out of 10. Hip pain worse with movement. Hip pain better with rest.   Has been controlled with oral pain medications. Still having persistent pain of her right wrist and forearm. Pain does not radiate to elbow. Pain is 5 out of 10. Pain is worse with increased movement, increased use, and palpation. Vitals  VITALS:  BP (!) 148/89   Pulse 80   Temp 97.6 °F (36.4 °C) (Temporal)   Resp 16   Ht 5' 5\" (1.651 m)   Wt 161 lb 9.6 oz (73.3 kg)   SpO2 96%   BMI 26.89 kg/m²     PHYSICAL EXAM:  General: in no apparent distress, well developed and well nourished, alert and oriented times 3  Right Upper Extremity: Inspection of wrist reveals skin intact with no significant erythema, ecchymosis or edema. At elbow, there is a scabbed abrasion present on lateral aspect of elbow. No significant edema or ecchymosis. Has pain with palpation over dorsal and volar aspect of distal radius. Also has pain with palpation over midshaft of radius/ulna. Mild pain with palpation over scaphoid tubercle. Mild pain with palpation over 1st CMC joint. Minimal snuff box tenderenss. Motion limited to approximately 60 flexion and extension d/t pain and guarding. Full passive motion of elbow with pain forearm with full supination and pronation  2+ radial pulse. Sensation to light touch intact. Rapid cap refill  Right Lower Extremity  Incision:  Aquacel dressing in place, clean, dry and intact  Neurologic:  Moving lower extremity as appropriate following sugery. Able to dorsiflex and plantar flex foot/ankle. Intact to gross sensation and touch in lower extremity. Vascular: present 2+ lower extremity. Calf soft, non-tender.  No evidence of DVT seen on physical exam.  Abnormal Exam findings:  none    LABS:  Hgb:    Lab Results   Component Value Date    HGB 12.2 04/23/2019     Imaging:  3 Views of right wrist  No acute fracture.  Mild narrowing of the 1st carpometacarpal joint with   subchondral sclerosis.  Mild triscaphe joint space narrowing. ASSESSMENT AND PLAN:  -Post operative day 2 status post right percutaneous pinning of femoral neck fracture  -Right wrist sprain and likely forearm contusion      1:  50% WB RLE w/ walker  2:  3 views right wrist negative for fx.   Dr. Jossy Mi ordered CT right wrist to r/o occult scaphoid fx  3:  Will order 2 views of right forearm to r/o fx  4:  Continue Deep venous thrombosis prophylaxis; d/c on Lovenox 40 mg/daily   5:  Continue physical therapy  6:  D/C Plan:  Skilled nursing facility/[facility]  7:  Continue Pain Control; will d/c on Norco 5-325 mg every 4-6 hours   8:  Keep 2 week f/u with Dr. Jeevan Burroughs

## 2019-04-23 NOTE — PROGRESS NOTES
[facility]  Inpatient/Observation/Outpatient Rehabilitation    Date: 2019  Patient Name: Riley Horner       [x] Inpatient Acute/Observation       []  Outpatient  : 1940       Pt not seen this AM d/t being out for testing. Will attempt evaluation at our earliest opportunity.        Magaly Bookbinder   Date: 2019, 9:04 AM

## 2019-04-23 NOTE — PLAN OF CARE
Problem: Pain:  Goal: Control of acute pain  Description  Control of acute pain  Outcome: Ongoing  Note:   Frequent pain assessments provided with interventions as appropriate. Will continue to monitor. Problem: Falls - Risk of:  Goal: Will remain free from falls  Description  Will remain free from falls  Outcome: Ongoing  Note:   Pt bed in lowest position with wheels locked. Call light within reach. Bed alarm in place. Room remains free of obstacles. Pt is A&O, reminded to use call light as needed, verbalizes understanding. Will continue to monitor. Problem: Risk for Impaired Skin Integrity  Goal: Tissue integrity - skin and mucous membranes  Description  Structural intactness and normal physiological function of skin and  mucous membranes. Outcome: Ongoing  Note:   Pt turns and repositions. Skin assessment done, see flow sheet. Problem: Activity:  Goal: Ability to ambulate will improve  Description  Ability to ambulate will improve  Outcome: Ongoing  Note:   Pt is able to ambulate with walker, tolerating fairly well. Problem: Tissue Perfusion:  Goal: Risk of venous thrombosis will decrease  Description  Risk of venous thrombosis will decrease  Outcome: Ongoing  Note:   Bilateral SCD's in place. Will continue to monitor.       Problem: Urinary Elimination:  Goal: Ability to reestablish a normal urinary elimination pattern will improve - after catheter removal  Description  Ability to reestablish a normal urinary elimination pattern will improve  Outcome: Ongoing  Note:   Pt has been urinating well since urinary catheter removal.

## 2019-04-23 NOTE — PLAN OF CARE
Problem: Pain:  Goal: Control of acute pain  Description  Control of acute pain  Note:   Pain assessed with hourly rounding and PRN. Pain meds adminstered as needed and reassessed. Pt offered diversional activities, repositioning, and comfort items. Problem: Falls - Risk of:  Goal: Will remain free from falls  Description  Will remain free from falls  Note:   Fall screening completed. Patient alert and oriented, uses call light appropriately. Needs assessed with hourly rounding. Environment scanned for safety issues. Bed alarm activated. Problem: Risk for Impaired Skin Integrity  Goal: Tissue integrity - skin and mucous membranes  Description  Structural intactness and normal physiological function of skin and  mucous membranes. Note:   Rajinder score assessed each shift. Writer ensured pt repositioned if not done by self. Skin visually assessed with head to toe. Preventative measures used as appropriate      Problem: Activity:  Goal: Ability to ambulate will improve  Description  Ability to ambulate will improve  Note:   PT and OT ordered.  Up with 2 assist.

## 2019-04-23 NOTE — PROGRESS NOTES
Occupational Therapy   Occupational Therapy Initial Assessment  Date: 2019   Patient Name: Tammie Staples  MRN: [id number]     : 1940    Date of Service: 2019    Discharge Recommendations:  Continue to assess pending progress, IP Rehab       Assessment   Performance deficits / Impairments: Decreased functional mobility ; Decreased balance;Decreased safe awareness;Decreased ADL status; Decreased endurance;Decreased strength  Prognosis: Good  Patient Education: AE including reacher, sock aide, transfer techniques, WB status  REQUIRES OT FOLLOW UP: Yes  Activity Tolerance  Activity Tolerance: Patient Tolerated treatment well  Safety Devices  Safety Devices in place: Yes  Type of devices: Left in chair;Call light within reach(family present)           Patient Diagnosis(es): The encounter diagnosis was Post-op pain. has a past medical history of Allergic rhinitis, Arthritis, Asthma, Atrial fibrillation ([facility] [address] CAD (coronary artery disease), Cerebrovascular accident (CVA) involving left cerebral hemisphere [facility]), CHF (congestive heart failure) ([facility] [address] Clotting disorder ([facility] [address] COPD (chronic obstructive pulmonary disease) ([facility] [address] DDD (degenerative disc disease), cervical, Degeneration of cervical intervertebral disc, Diverticulosis, Gout, Gout, unspecified, H/O cardiac catheterization, H/O echocardiogram, H/O echocardiogram, History of Holter monitoring, Hx of blood clots, Hyperlipidemia, Hyperlipidemia, Hypertension, Hypertension, Hypothyroidism due to amiodarone, Infectious hepatitis, Long term (current) use of anticoagulants, Movement disorder, Other abnormal glucose, Pacemaker, Phlebitis and thrombophlebitis of lower extremities, unspecified, Senile osteoporosis, Symptomatic menopausal or female climacteric states, Syncope and collapse, Unspecified hereditary and idiopathic peripheral neuropathy, and Unspecified sleep apnea.    has a past surgical history that includes Tonsillectomy and adenoidectomy; eye surgery; skin biopsy; Cardiac catheterization (Right, 06/17/2015); Diagnostic Cardiac Cath Lab Procedure (06/17/15); fracture surgery (2004); Colonoscopy (1/2005); other surgical history (2years old); bronchoscopy (6/7/2017); bronchoscopy (6/7/2017); bronchoscopy (6/7/2017); lobectomy (Left, 6/14/2017); Cataract removal with implant (Right, 08/14/2017); pr egd transoral biopsy single/multiple (Left, 2/13/2018); Hip fracture surgery (Right, 04/21/2019); and hip pinning (Right, 4/21/2019). Restrictions  Restrictions/Precautions  Restrictions/Precautions: Weight Bearing, General Precautions, Fall Risk  Lower Extremity Weight Bearing Restrictions  Right Lower Extremity Weight Bearing: Partial Weight Bearing  Partial Weight Bearing Percentage Or Pounds: 50% PWB RLE    Subjective   General  Chart Reviewed: Yes  Patient assessed for rehabilitation services?: Yes  Family / Caregiver Present: Yes  Referring Practitioner: Dr. Jeevan Burroughs  Diagnosis: R hip fx  Subjective  Subjective: Patient states that she is a lot of pain in wrist, unable to utilize during transfers or self care. Patient at x-ray and CT scans done this am. X-ray was negative for fx.   Pain Assessment  Pain Assessment: 0-10  Pain Level: 0  Response to Pain Intervention: Patient Satisfied  Oxygen Therapy  SpO2: 98 %  Pulse Oximeter Device Mode: Intermittent  Pulse Oximeter Device Location: Finger  O2 Device: None (Room air)  Social/Functional History  Social/Functional History  Lives With: Spouse  Type of Home: House  Home Layout: Two level, Able to Live on Main level with bedroom/bathroom  Home Access: Stairs to enter without rails  Entrance Stairs - Number of Steps: 2  Bathroom Shower/Tub: Tub/Shower unit  Bathroom Toilet: Handicap height  Bathroom Equipment: Grab bars in shower  Home Equipment: 4 wheeled walker(doesn't use at home; but uses it at the grocery store, only to sit down because she gets tired.)  ADL Assistance: [facility] Assistance: Needs assistance(cooking is too difficult d/t standing)  Meal Prep: Moderate  Laundry: Moderate  Vacuuming: Total  Cleaning: Total(gets too dizzy with the bending over.)  Yard Work: Moderate  Ambulation Assistance: Independent  Transfer Assistance: Independent  Active : No  Patient's  Info: , daughter, granddaughter       Objective              Toilet Transfers  Toilet - Technique: Stand pivot  Equipment Used: Standard bedside commode  Toilet Transfer: Minimal assistance; Moderate assistance  Toilet Transfers Comments: cueing for WB and transfer techniques  ADL  LE Dressing: Minimal assistance(Patient educated on use of reacher as well as sock aide for donning/doffing socks. Compelted ADL task post education c min A. )  Toileting: Maximum assistance(Patient completed stand pivot EOB > commode c mod A. Max A for hygiene post toileting. )  Additional Comments: Patient stated that she previously had been issued d/c folders multiple times in the past, declines at this time. States that she is familiar with information contained in packet. Bed mobility  Supine to Sit: Maximum assistance  Transfers  Sit to stand: Minimal assistance; Moderate assistance  Stand to sit: Minimal assistance; Moderate assistance  Transfer Comments: Patient reports pain in R hand/wrist, states unable to bear weight or utilize during transfer/self care.                                                Plan   Plan  Times per week: 7x/week  Times per day: Daily  Current Treatment Recommendations: Strengthening, Endurance Training, Patient/Caregiver Education & Training, Self-Care / ADL, Functional Mobility Training, Safety Education & Training      Goals  Short term goals  Time Frame for Short term goals: 10 visits  Short term goal 1: Pt will tolerate 10 minutes of standing while engaging in therpeutic activity of choice to increase standing tolerance and balance for increased independence and safety with functional

## 2019-04-23 NOTE — PROGRESS NOTES
4/21/2019). Restrictions  Restrictions/Precautions  Restrictions/Precautions: Weight Bearing, General Precautions, Fall Risk  Lower Extremity Weight Bearing Restrictions  Right Lower Extremity Weight Bearing: Partial Weight Bearing  Partial Weight Bearing Percentage Or Pounds: 50% PWB RLE  Subjective   General  Chart Reviewed: Yes  Response To Previous Treatment: Patient with no complaints from previous session. Family / Caregiver Present: Yes  Referring Practitioner: Dr. Bradley Franco: Pt reports R hand/wrist pain but did not rate numerically. Pt reports R hip pain 2-3/10 when moving it. Pt pleasant and agreeable to therapy, but stated she did not want to stand or walk this PM.          Orientation  Orientation  Overall Orientation Status: Within Normal Limits  Cognition      Objective      Transfers  Sit to Stand: Unable to assess  Stand to sit: Unable to assess  Comment: Pt refused d/t pain in R hand/wrist  Ambulation  Ambulation?: No(Pt refused amb this date d/t pain in R hand/wrist. )     Balance  Sitting - Static: Good  Sitting - Dynamic: Good  Exercises  Straight Leg Raise: x15  Quad Sets: x15  Heelslides: x15  Gluteal Sets: x15  Hip Flexion: x15  Hip Abduction: x15  Knee Long Arc Quad: x15  Knee Short Arc Quad: x15  Ankle Pumps: x15  Comments: BLE ther ex completed in seated and reclined. AAROM as needed on R LE. Pt required VCs for proper technique. Assessment   Treatment Diagnosis: difficutly walking s/p R hip fx   Prognosis: Good  Patient Education: Educated pt on importance of standing and transfers with fair to good understanding.    REQUIRES PT FOLLOW UP: Yes  Activity Tolerance  Activity Tolerance: Patient Tolerated treatment well     G-Code     OutComes Score                                                  AM-PAC Score             Goals  Short term goals  Time Frame for Short term goals: 10 days   Short term goal 1: Pt will be educated on her POC   Short term goal 2: Pt will perform all transfers with CGA in order to increase independence   Short term goal 3: Pt will ambulate 100 feet with FWW CGA while maintaining proper WB   Short term goal 4: Pt will increase dynamic standing balance to Fair + in order to decrease fall risk   Patient Goals   Patient goals : \"to get back to normal\"    Plan    Plan  Times per week: 7x/wk   Times per day: Twice a day  Plan weeks: 2x/daily except weekends 1x/daily   Current Treatment Recommendations: Strengthening, Transfer Training, Endurance Training, Neuromuscular Re-education, Patient/Caregiver Education & Training, Equipment Evaluation, Education, & procurement, Pain Management, Balance Training, Gait Training, Home Exercise Program, Functional Mobility Training, Stair training, Safety Education & Training  Safety Devices  Type of devices: Call light within reach, Left in chair, All fall risk precautions in place, Nurse notified, Gait belt(No alarm upon entry.  Family in room throughout tx. )     Therapy Time   Individual Concurrent Group Co-treatment   Time In 1         Time Out 1530         Minutes [address], [place]

## 2019-04-24 VITALS
SYSTOLIC BLOOD PRESSURE: 143 MMHG | DIASTOLIC BLOOD PRESSURE: 83 MMHG | RESPIRATION RATE: 16 BRPM | TEMPERATURE: 97.4 F | WEIGHT: 161.9 LBS | BODY MASS INDEX: 26.98 KG/M2 | OXYGEN SATURATION: 96 % | HEIGHT: 65 IN | HEART RATE: 80 BPM

## 2019-04-24 LAB
ABSOLUTE EOS #: 0.55 K/UL (ref 0–0.44)
ABSOLUTE IMMATURE GRANULOCYTE: 0.04 K/UL (ref 0–0.3)
ABSOLUTE LYMPH #: 1.94 K/UL (ref 1.1–3.7)
ABSOLUTE MONO #: 0.64 K/UL (ref 0.1–1.2)
BASOPHILS # BLD: 1 % (ref 0–2)
BASOPHILS ABSOLUTE: 0.04 K/UL (ref 0–0.2)
DIFFERENTIAL TYPE: ABNORMAL
EOSINOPHILS RELATIVE PERCENT: 7 % (ref 1–4)
HCT VFR BLD CALC: 39.8 % (ref 36.3–47.1)
HEMOGLOBIN: 13.1 G/DL (ref 11.9–15.1)
IMMATURE GRANULOCYTES: 1 %
LYMPHOCYTES # BLD: 24 % (ref 24–43)
MCH RBC QN AUTO: 29 PG (ref 25.2–33.5)
MCHC RBC AUTO-ENTMCNC: 32.9 G/DL (ref 28.4–34.8)
MCV RBC AUTO: 88.2 FL (ref 82.6–102.9)
MONOCYTES # BLD: 8 % (ref 3–12)
NRBC AUTOMATED: 0 PER 100 WBC
PDW BLD-RTO: 14.4 % (ref 11.8–14.4)
PLATELET # BLD: 180 K/UL (ref 138–453)
PLATELET ESTIMATE: ABNORMAL
PMV BLD AUTO: 9.9 FL (ref 8.1–13.5)
RBC # BLD: 4.51 M/UL (ref 3.95–5.11)
RBC # BLD: ABNORMAL 10*6/UL
SEG NEUTROPHILS: 59 % (ref 36–65)
SEGMENTED NEUTROPHILS ABSOLUTE COUNT: 4.87 K/UL (ref 1.5–8.1)
WBC # BLD: 8.1 K/UL (ref 3.5–11.3)
WBC # BLD: ABNORMAL 10*3/UL

## 2019-04-24 PROCEDURE — 97535 SELF CARE MNGMENT TRAINING: CPT

## 2019-04-24 PROCEDURE — 97110 THERAPEUTIC EXERCISES: CPT

## 2019-04-24 PROCEDURE — 97116 GAIT TRAINING THERAPY: CPT

## 2019-04-24 PROCEDURE — 85025 COMPLETE CBC W/AUTO DIFF WBC: CPT

## 2019-04-24 PROCEDURE — 36415 COLL VENOUS BLD VENIPUNCTURE: CPT

## 2019-04-24 PROCEDURE — 6360000002 HC RX W HCPCS: Performed by: ORTHOPAEDIC SURGERY

## 2019-04-24 PROCEDURE — 99238 HOSP IP/OBS DSCHRG MGMT 30/<: CPT | Performed by: FAMILY MEDICINE

## 2019-04-24 PROCEDURE — 2580000003 HC RX 258: Performed by: ORTHOPAEDIC SURGERY

## 2019-04-24 PROCEDURE — 6370000000 HC RX 637 (ALT 250 FOR IP): Performed by: ORTHOPAEDIC SURGERY

## 2019-04-24 RX ORDER — ERGOCALCIFEROL (VITAMIN D2) 1250 MCG
50000 CAPSULE ORAL WEEKLY
Qty: 5 CAPSULE | DISCHARGE
Start: 2019-04-30 | End: 2019-08-19

## 2019-04-24 RX ADMIN — ACETAMINOPHEN 650 MG: 325 TABLET, FILM COATED ORAL at 00:24

## 2019-04-24 RX ADMIN — MIDODRINE HYDROCHLORIDE 5 MG: 5 TABLET ORAL at 09:13

## 2019-04-24 RX ADMIN — IPRATROPIUM BROMIDE 2 SPRAY: 42 SPRAY, METERED NASAL at 09:13

## 2019-04-24 RX ADMIN — HYDROCODONE BITARTRATE AND ACETAMINOPHEN 1 TABLET: 5; 325 TABLET ORAL at 06:58

## 2019-04-24 RX ADMIN — ENOXAPARIN SODIUM 40 MG: 40 INJECTION SUBCUTANEOUS at 09:13

## 2019-04-24 RX ADMIN — AMANTADINE HYDROCHLORIDE 100 MG: 100 CAPSULE ORAL at 09:14

## 2019-04-24 RX ADMIN — DOCUSATE SODIUM 100 MG: 100 CAPSULE, LIQUID FILLED ORAL at 09:13

## 2019-04-24 RX ADMIN — Medication 10 ML: at 09:14

## 2019-04-24 RX ADMIN — POLYETHYLENE GLYCOL (3350) 17 G: 17 POWDER, FOR SOLUTION ORAL at 11:01

## 2019-04-24 RX ADMIN — LEVOTHYROXINE SODIUM 112 MCG: 112 TABLET ORAL at 09:14

## 2019-04-24 RX ADMIN — RANOLAZINE 500 MG: 500 TABLET, EXTENDED RELEASE ORAL at 09:13

## 2019-04-24 ASSESSMENT — PAIN DESCRIPTION - LOCATION
LOCATION: HIP
LOCATION_2: ARM
LOCATION: HIP;HAND

## 2019-04-24 ASSESSMENT — PAIN DESCRIPTION - ONSET
ONSET: SUDDEN
ONSET: ON-GOING

## 2019-04-24 ASSESSMENT — PAIN SCALES - GENERAL
PAINLEVEL_OUTOF10: 4
PAINLEVEL_OUTOF10: 1
PAINLEVEL_OUTOF10: 0
PAINLEVEL_OUTOF10: 2
PAINLEVEL_OUTOF10: 2
PAINLEVEL_OUTOF10: 1

## 2019-04-24 ASSESSMENT — PAIN DESCRIPTION - FREQUENCY
FREQUENCY: INTERMITTENT
FREQUENCY: CONTINUOUS

## 2019-04-24 ASSESSMENT — PAIN DESCRIPTION - PAIN TYPE
TYPE: ACUTE PAIN
TYPE: SURGICAL PAIN
TYPE: ACUTE PAIN

## 2019-04-24 ASSESSMENT — PAIN DESCRIPTION - DESCRIPTORS
DESCRIPTORS: ACHING;JABBING;SHARP
DESCRIPTORS: ACHING

## 2019-04-24 ASSESSMENT — PAIN DESCRIPTION - ORIENTATION
ORIENTATION: RIGHT
ORIENTATION: RIGHT
ORIENTATION_2: RIGHT

## 2019-04-24 ASSESSMENT — PAIN DESCRIPTION - INTENSITY: RATING_2: 2

## 2019-04-24 ASSESSMENT — PAIN DESCRIPTION - PROGRESSION: CLINICAL_PROGRESSION: GRADUALLY WORSENING

## 2019-04-24 NOTE — PROGRESS NOTES
Physical Therapy  Facility/Department: [facility] AT THE [facility] MED SURG  Daily Treatment Note  NAME: [place]  : 1940  MRN: [id number]    Date of Service: 2019    Discharge Recommendations:  Continue to assess pending progress        Patient Diagnosis(es): The encounter diagnosis was Post-op pain. has a past medical history of Allergic rhinitis, Arthritis, Asthma, Atrial fibrillation ([facility] CAD (coronary artery disease), Cerebrovascular accident (CVA) involving left cerebral hemisphere [facility]), CHF (congestive heart failure) ([facility] Clotting disorder ([facility] COPD (chronic obstructive pulmonary disease) ([facility] DDD (degenerative disc disease), cervical, Degeneration of cervical intervertebral disc, Diverticulosis, Gout, Gout, unspecified, H/O cardiac catheterization, H/O echocardiogram, H/O echocardiogram, History of Holter monitoring, Hx of blood clots, Hyperlipidemia, Hyperlipidemia, Hypertension, Hypertension, Hypothyroidism due to amiodarone, Infectious hepatitis, Long term (current) use of anticoagulants, Movement disorder, Other abnormal glucose, Pacemaker, Phlebitis and thrombophlebitis of lower extremities, unspecified, Senile osteoporosis, Symptomatic menopausal or female climacteric states, Syncope and collapse, Unspecified hereditary and idiopathic peripheral neuropathy, and Unspecified sleep apnea. has a past surgical history that includes Tonsillectomy and adenoidectomy; eye surgery; skin biopsy; Cardiac catheterization (Right, 2015); Diagnostic Cardiac Cath Lab Procedure (06/17/15); fracture surgery (); Colonoscopy (2005); other surgical history (2years old); bronchoscopy (2017); bronchoscopy (2017); bronchoscopy (2017); lobectomy (Left, 2017); Cataract removal with implant (Right, 2017); pr egd transoral biopsy single/multiple (Left, 2018);  Hip fracture surgery (Right, 2019); and hip pinning (Right, 4/21/2019). Restrictions  Restrictions/Precautions  Restrictions/Precautions: Weight Bearing, General Precautions, Fall Risk  Lower Extremity Weight Bearing Restrictions  Right Lower Extremity Weight Bearing: Partial Weight Bearing  Partial Weight Bearing Percentage Or Pounds: 50% PWB RLE  Subjective   General  Chart Reviewed: Yes  Response To Previous Treatment: Patient with no complaints from previous session. Family / Caregiver Present: No  Referring Practitioner: Dr. Mott Mood: Pt reported R hip pain at 1/10 with movement, 0/10 at rest.  R hand/wrist \"just pressure\" with trans/amb. Orientation  Orientation  Overall Orientation Status: Within Functional Limits  Cognition      Objective      Transfers  Sit to Stand: Contact guard assistance  Stand to sit: Contact guard assistance  Ambulation  Ambulation?: Yes  WB Status: 50% PWB RLE  Ambulation 1  Surface: level tile  Device: Rolling Walker  Assistance: Contact guard assistance  Quality of Gait: slow steady tan with short step length/hieght  Distance: 20 ft x2  Comments: Pt able to maintain RLE 50% PWB without VCs. Balance  Standing - Static: Fair;+  Standing - Dynamic: Fair  Exercises  Straight Leg Raise: x15  Quad Sets: x15  Heelslides: x15  Hip Abduction: x15  Knee Long Arc Quad: x15  Knee Short Arc Quad: x15  Ankle Pumps: 15x2  Comments: BLE ther ex completed reclined and seated, AAROM on RLE as needed. Assessment   Body structures, Functions, Activity limitations: Decreased functional mobility ; Decreased strength;Decreased endurance;Decreased balance;Decreased high-level IADLs  Assessment: Pt demonstrated good tolerance to tx, able to maintain R LE 50% PWB without VCs. Treatment Diagnosis: difficutly walking s/p R hip fx   Prognosis: Good  Patient Education: Reviewed safety with trans/amb, pt with approprtiate understanding.   REQUIRES PT FOLLOW UP: Yes     G-Code     OutComes Score AM-PAC Score             Goals  Short term goals  Time Frame for Short term goals: 10 days   Short term goal 1: Pt will be educated on her POC   Short term goal 2: Pt will perform all transfers with CGA in order to increase independence   Short term goal 3: Pt will ambulate 100 feet with FWW CGA while maintaining proper WB   Short term goal 4: Pt will increase dynamic standing balance to Fair + in order to decrease fall risk   Patient Goals   Patient goals : \"to get back to normal\"    Plan    Plan  Times per week: 7x/wk   Times per day: Twice a day  Plan weeks: 2x/daily except weekends 1x/daily   Current Treatment Recommendations: Strengthening, Transfer Training, Endurance Training, Neuromuscular Re-education, Patient/Caregiver Education & Training, Equipment Evaluation, Education, & procurement, Pain Management, Balance Training, Gait Training, Home Exercise Program, Functional Mobility Training, Stair training, Safety Education & Training  Safety Devices  Type of devices:  All fall risk precautions in place, Call light within reach, Gait belt, Left in chair, Nurse notified     Therapy Time   Individual Concurrent Group Co-treatment   Time In 0830         Time Out [address] [place]         Minutes [address], [id number]

## 2019-04-24 NOTE — PROGRESS NOTES
[facility] Manjinder Crook, who states that [employer] approval obtained and [facility] can accept Patient today.       [address]  4/24/2019

## 2019-04-24 NOTE — PLAN OF CARE
Problem: Pain:  Goal: Pain level will decrease  Description  Pain level will decrease  Outcome: Ongoing  Goal: Control of acute pain  Description  Control of acute pain  4/24/2019 0225 by Luis Friend RN  Outcome: Ongoing    Frequent pain assessments with interventions as appropriate provided. Pt instructed on rating pain scale, verbalized understanding. Pain medication administered per [facility] with good effects. Will continue to monitor. Problem: Falls - Risk of:  Goal: Will remain free from falls  Description  Will remain free from falls  4/24/2019 1018 by Jose Miguel Arredondo RN  Outcome: Ongoing  4/24/2019 0225 by Luis Friend RN  Outcome: Ongoing    Pt bed in lowest position with wheels locked. Call light within reach. Bed alarm in place. Room remains free of obstacles. Pt reminded to use call light as needed, verbalizes understanding. Will continue to monitor. Problem: Risk for Impaired Skin Integrity  Goal: Tissue integrity - skin and mucous membranes  Description  Structural intactness and normal physiological function of skin and  mucous membranes. 4/24/2019 1018 by Jose Miguel Arredondo RN  Outcome: Ongoing  4/24/2019 0225 by Luis Friend RN  Outcome: Ongoing    Pt turns self/repositions self independently and frequently t/o shift. Skin assessment performed, see flow sheet. Will continue to monitor. Problem: Activity:  Goal: Ability to ambulate will improve  Description  Ability to ambulate will improve  4/24/2019 1018 by Jose Miguel Arredondo RN  Outcome: Ongoing  4/24/2019 0225 by Luis Friend RN  Outcome: Ongoing    Pt is 50% weight bearing on right side. Pt able to ambulate to recliner and/or bedside commode and back to bed using walker, tolerating well. Will continue to monitor.       Problem: Nutritional:  Goal: Ability to attain and maintain optimal nutritional status will improve  Description  Ability to attain and maintain optimal nutritional status will improve  Outcome: Ongoing  Meals provided per selective menu and as ordered by MD. Pt's appetite is good. Pt encouraged to take in adequate nutrition and increase fluids. See I&O flow sheet for documented intake. Will continue to monitor. Problem: Physical Regulation:  Goal: Will remain free from infection  Description  Will remain free from infection  Outcome: Ongoing    Pt afebrile t/o shift, no ssx of infection noted. Incision dressing clean/dry/intact. Pt encouraged to increase fluid intake and take in adequate nutrition. Will continue to monitor. Problem: Skin Integrity:  Goal: Risk for impaired skin integrity will decrease  Description  Risk for impaired skin integrity will decrease  Outcome: Ongoing    Skin assessment completed per shift and as needed. Surgical incision to right hip covered with Aquacel dressing, no drainage, dressing clean/dry/intact. Skin concerns as charted. Pt turned and repositioned every 2 hours and as needed t/o shift. Will continue to monitor. Problem: Urinary Elimination:  Goal: Ability to reestablish a normal urinary elimination pattern will improve - after catheter removal  Description  Ability to reestablish a normal urinary elimination pattern will improve  Outcome: Ongoing     No complications noted/reported with urination. Patient denies burning, pain, discomfort with urination. Urine is yellow in color, unremarkable, no unusual odor at this time. Incont at times. Will continue to monitor.

## 2019-04-24 NOTE — PROGRESS NOTES
CT of right wrist reveals diffuse osteopenia with mild osteoarthrosis throughout. No acute fracture or dislocation noted on CT.    ASSESSMENT AND PLAN:  -Post operative day 3 status post right percutaneous pinning of femoral neck fracture  -Right wrist sprain       1:  50% WB RLE w/ walker. Can consider platform walker if needed secondarily to wrist sprain  2:  Can consider pre-lalo wrist or thumb spica brace for wrist sprain. Can use as tolerable  3:  Continue Deep venous thrombosis prophylaxis; will d/c on Lovenox 40 mg  4:  Continue physical therapy  5:  D/C Plan:  Skilled nursing facility ([facility])  6:  Continue Pain Control. Will discharge with Norco 5-325 mg every 4 hours prn severe pain.    7: Keep 2 week f/u with Dr. Berrios Alt

## 2019-04-24 NOTE — DISCHARGE INSTR - COC
Continuity of Care Form    Patient Name: Tammie Staples   :  1940  MRN:  [id number]    Admit date:  2019  Discharge date:  19    Code Status Order: DNR-CCA   Advance Directives:       Admitting Physician:  Ida Chapman MD  PCP: Olivia Dai MD    Discharging Nurse: JESUS MANUEL [facility] Unit/Room#: 4121/0963-88  Discharging Unit Phone Number: [phone]    Emergency Contact:   Extended Emergency Contact Information  Primary Emergency Contact: Fredis Loev  Address: [address] [place] [address] Phone: [phone]  Work Phone: [phone]  Mobile Phone: [phone]  Relation: Spouse  Hearing or visual needs: None  Other needs: None  Preferred language: English   needed? No  Secondary Emergency Contact: [address] [place] [address] [address] [facility] Phone: [phone]  Work Phone: [phone]  Mobile Phone: [phone]  Relation: Child  Hearing or visual needs: None  Other needs: None  Preferred language: English   needed? No    Past Surgical History:  Past Surgical History:   Procedure Laterality Date    BRONCHOSCOPY  2017    BRONCHOSCOPY BRUSHINGS [facility] Jen [facility]erg[facility]    BRONCHOSCOPY  2017    BRONCHOSCOPY/TRANSBRONCHIAL LUNG BIOPSY performed by Jen Vergara DO at [place] [address]  2017    BRONCHOSCOPY FLUOROSCOPY performed by Jen Vergara DO at [facility]   [address] Right 2015    CATARACT REMOVAL WITH IMPLANT Right 2017    DR ROMERO    COLONOSCOPY  2005    DIAGNOSTIC CARDIAC CATH LAB PROCEDURE  06/17/15    EYE SURGERY      FRACTURE SURGERY  2004    Distal Radius Ulna    HIP FRACTURE SURGERY Right 2019    Dr. Ophelia Harden- hip pinning    HIP PINNING Right 2019    HIP PINNING performed by Lc Rodriguez MD at [address] [place] [address] Left 2017    MINI PORT ACCESS LEFT CHEST, X2 SPECIMENS. performed by Tony Hinson MD at [address] [place] [address] Drive  2years old    VOLVAR-ULCERATIVE LESION-CAUTERIZED    CA EGD TRANSORAL BIOPSY SINGLE/MULTIPLE Left 2/13/2018    EGD BIOPSY performed by Genoveva Mendez MD at [facility]         Immunization History:   Immunization History   Administered Date(s) Administered    Influenza Vaccine, unspecified formulation 11/05/2012, 11/04/2013, 11/11/2014    Influenza Virus Vaccine 10/05/2015    Influenza, High Dose (Fluzone 65 yrs and older) 10/10/2016, 10/16/2017, 10/26/2018    Pneumococcal 13-valent Conjugate (Pypbxqx35) 11/16/2015    Pneumococcal Conjugate 7-valent 01/09/2012    Pneumococcal Polysaccharide ([id number]) 01/09/2012    Zoster Live (Zostavax) 06/20/2016       Active Problems:  Patient Active Problem List   Diagnosis Code    Pulmonary HTN I27.20    Non-rheumatic mitral regurgitation I34.0    Essential hypertension I10    Obstructive sleep apnea G47.33    Gout M10.9    Type 2 diabetes mellitus without complication ([address] P06.7    Acute cystitis with hematuria N30.01    Ischemic cardiomyopathy I25.5    Hypokalemia E87.6    Acute pulmonary edema (HCC) J81.0    Ground glass opacity present on imaging of lung R91.8    Pulmonary fibrosis ([address] J84.10    ASHD (arteriosclerotic heart disease) J62.47    Uncomplicated asthma H30.967    E. coli UTI N39.0, B96.20    Anemia of chronic disease D63.8    Closed TBI (traumatic brain injury) ([address] S06. 9X9A    Interstitial lung disease ([address] J84.9    Long term current use of anticoagulant therapy Z79.01    Chronic lung disease J98.4    Leg fatigue R29.898    Acute on chronic congestive heart failure (HCC) I50.9    Vasodepressor syncope R55    Muscle fatigue M62.89    Paroxysmal A-fib (HCC) I48.0    Need for prophylactic vaccination and inoculation against influenza Z23    Decubitus ulcer of sacral region L89.159  Pneumonitis J18.9    General weakness R53.1    Chronic obstructive pulmonary disease (HCC) J44.9    Elevated LFTs R94.5    Calculus of gallbladder with acute on chronic cholecystitis K80.12    Asymptomatic cholelithiasis K80.20    Acute cholecystitis K81.0    Intractable vomiting with nausea R11.2    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, E03.2    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    Chronic diastolic CHF (congestive heart failure) (HCC) I50.32    Hypothyroidism E03.9    Dyspepsia R10.13    Chronic a-fib ([facility]) I48.2    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    Sick sinus syndrome ([facility]) I49.5    Hip fracture, right, closed, initial encounter ([facility] S72.001A    Hip fx, right, closed, initial encounter ([facility] S72.001A    Cerebrovascular accident (CVA) involving left cerebral hemisphere ([facility]) I63.9       Isolation/Infection:   Isolation          No Isolation            Nurse Assessment:  Last Vital Signs: BP (!) 143/83   Pulse 80   Temp 97.4 °F (36.3 °C) (Temporal)   Resp 16   Ht 5' 5\" (1.651 m)   Wt 161 lb 14.4 oz (73.4 kg)   SpO2 96%   BMI 26.94 kg/m²     Last documented pain score (0-10 scale): Pain Level: 4  Last Weight:   Wt Readings from Last 1 Encounters:   04/24/19 161 lb 14.4 oz (73.4 kg)     Mental Status:  oriented, alert, logical, thought processes intact and able to concentrate and follow conversation    IV Access:  - None    Nursing Mobility/ADLs:  Walking   Assisted  Transfer  Assisted  Bathing  Assisted  Dressing  Assisted  Toileting  Assisted  Feeding  Independent  Med Admin  Assisted  Med Delivery   whole    Wound Care Documentation and Therapy:  Wound Other (Comment) Buttocks Left; Inner Stage II (Active)   Number of days:         Elimination:  Continence:   · Bowel:  Yes  · Bladder: Yes, No, Incont at times  Urinary Catheter: None   Colostomy/Ileostomy/Ileal Conduit: No       Date of Last BM: 04/20/19    Intake/Output Summary (Last 24 hours) at 4/24/2019 6006  Last data filed at

## 2019-04-24 NOTE — PROGRESS NOTES
[facility] of [facility].     [address]  4/24/2019

## 2019-04-24 NOTE — PROGRESS NOTES
Nutrition Assessment    Type and Reason for Visit: Reassess    Nutrition Recommendations: Continue current diet, encourage protein and PO intake for wound healing,    Nutrition Assessment: Continued nutrient needs RT acute injury/trauma AEB presence of wounds, both surgical and a stage II pressure wound. Pt recovering from surgery. Appetite has returned and PO intakes of both supplments and meals are good, stated by pt saying \"she is clearing her plate\". Pt undestands importance of nutrition for wound healing. Nutrition Risk Level: Moderate    Nutrient Needs:  · Estimated Daily Total Kcal: 2723-6625(16-34EWDA/MW)  · Estimated Daily Protein (g): 80-91(1.4-1.6g/kg)  · Estimated Daily Total Fluid (ml/day): 1700(1 mL/kcal)    Objective Information:  · Nutrition-Focused Physical Findings: Dentures, loose fitting but has appointment scheduled in May.   · Wound Type: Stage II, Surgical Wound  · Current Nutrition Therapies:  · Oral Diet Orders: Cardiac   · Oral Diet intake: %  · Oral Nutrition Supplement (ONS) Orders: None  · ONS intake: %(stated)  · Anthropometric Measures:  · Ht: 5' 5\" (165.1 cm)   · Current Body Wt: 161 lb 14.4 oz (73.4 kg)  · Admission Body Wt: 157 lb (71.2 kg)  · Usual Body Wt: 158 lb (71.7 kg)(stated by pt)  · % Weight Change:  ,  +3% of UBW  · Ideal Body Wt: 125 lb (56.7 kg), % Ideal Body 129%  · BMI Classification: BMI 25.0 - 29.9 Overweight    Nutrition Interventions:   Continue current diet, Continue current ONS  Continued Inpatient Monitoring, Education not appropriate at this time, Coordination of Care    Nutrition Evaluation:   · Evaluation: Goals set   · Goals: PO >75% of meals and supplement    · Monitoring: Meal Intake, Supplement Intake, Wound Healing, Weight      Electronically signed by Pacheco Kumar on 4/24/19 at 10:19 AM    Contact Number: [phone]

## 2019-04-24 NOTE — PROGRESS NOTES
Occupational Therapy  Facility/Department: [facility] AT THE [facility] MED SURG  Daily Treatment Note  NAME: [place]  : 1940  MRN: [id number]    Date of Service: 2019    Discharge Recommendations:  Continue to assess pending progress, IP Rehab       Assessment      Safety Devices  Type of devices: Left in chair;Call light within reach;Nurse notified(RN reported she will get chair alarm and pt will be ok)         Patient Diagnosis(es): The encounter diagnosis was Post-op pain. has a past medical history of Allergic rhinitis, Arthritis, Asthma, Atrial fibrillation ([facility] CAD (coronary artery disease), Cerebrovascular accident (CVA) involving left cerebral hemisphere [facility]), CHF (congestive heart failure) ([facility] Clotting disorder ([facility] COPD (chronic obstructive pulmonary disease) ([facility] [address] [facility] DDD (degenerative disc disease), cervical, Degeneration of cervical intervertebral disc, Diverticulosis, Gout, Gout, unspecified, H/O cardiac catheterization, H/O echocardiogram, H/O echocardiogram, History of Holter monitoring, Hx of blood clots, Hyperlipidemia, Hyperlipidemia, Hypertension, Hypertension, Hypothyroidism due to amiodarone, Infectious hepatitis, Long term (current) use of anticoagulants, Movement disorder, Other abnormal glucose, Pacemaker, Phlebitis and thrombophlebitis of lower extremities, unspecified, Senile osteoporosis, Symptomatic menopausal or female climacteric states, Syncope and collapse, Unspecified hereditary and idiopathic peripheral neuropathy, and Unspecified sleep apnea. has a past surgical history that includes Tonsillectomy and adenoidectomy; eye surgery; skin biopsy; Cardiac catheterization (Right, 2015); Diagnostic Cardiac Cath Lab Procedure (06/17/15); fracture surgery (); Colonoscopy (2005); other surgical history (2years old); bronchoscopy (2017); bronchoscopy (2017); bronchoscopy (2017); lobectomy (Left, 2017);  Cataract removal with implant (Right, 08/14/2017); pr egd transoral biopsy single/multiple (Left, 2/13/2018); Hip fracture surgery (Right, 04/21/2019); and hip pinning (Right, 4/21/2019). Restrictions  Restrictions/Precautions  Restrictions/Precautions: Weight Bearing, General Precautions, Fall Risk  Lower Extremity Weight Bearing Restrictions  Right Lower Extremity Weight Bearing: Partial Weight Bearing  Partial Weight Bearing Percentage Or Pounds: 50% PWB RLE  Subjective   General  Chart Reviewed: Yes  Patient assessed for rehabilitation services?: Yes  Family / Caregiver Present: Yes  Referring Practitioner: Dr. Juliana Payan  Diagnosis: R hip fx  Subjective  Subjective: 3/10 pain in hip but reported that she recently got a pain pill and is feeling better. reported that she is going to [facility] after d/c. Orientation     Objective    ADL  Additional Comments: Pt cont to decline ed on d/c folder. Reported that she has a whole box of them. Pt reclined ADL and reported that she completed it this morning when she had an accident. Re-ed on use of sock aide and reacher. Declined use and reported that she understood how to use them. Pt reported that she has a tub with grab bars and shower seat but has to step over the tub. Ed on use of tub transfer bench. Ed on dressing tech. Pt verbalized understanding and reported no questions at this time. Balance  Sitting Balance: Independent  Standing Balance: Contact guard assistance  Standing Balance  Time: 4 minutes 43 seconds  Activity: ther ex   Comment: CGA for safety throughout and reported that her stadning is not great.  Pt requested to sit   Functional Mobility  Functional - Mobility Device: Rolling Walker  Activity: Other(bed/chair)  Assist Level: Contact guard assistance  Functional Mobility Comments: CGA for ambulation to/from chair  Bed mobility  Supine to Sit: (with HOB elevated )  Transfers  Sit to stand: Minimal assistance  Stand to sit: Minimal assistance  Transfer Comments: Min A to complete transfer and reported that her wrist and hand is feeling much better today. V/c x3 for transfer tech and hand placement                                            Type of ROM/Therapeutic Exercise  Type of ROM/Therapeutic Exercise: Free weights  Comment: B UE Strengthening to increase enduracne and strength for daily tasks and transfers. Ther ex x4 with use of 2# weight 15 reps each L hand and AROM R hand due to soreness. Good ability to complete after demo                    Plan   Plan  Times per week: 7x/week  Times per day: Daily  Current Treatment Recommendations: Strengthening, Endurance Training, Patient/Caregiver Education & Training, Self-Care / ADL, Functional Mobility Training, Safety Education & Training  G-Code     OutComes Score                                                  AM-PAC Score             Goals  Short term goals  Time Frame for Short term goals: 10 visits  Short term goal 1: Pt will tolerate 10 minutes of standing while engaging in therpeutic activity of choice to increase standing tolerance and balance for increased independence and safety with functional transfers and ADL. Short term goal 2: Pt will engage in 15 minutes of therex/theract to increase BUE strength for increased independence with ADL and functional transfers. Short term goal 3: Pt will complete toileting routine, including clothing management and toilet hygiene SBA, with use of grab bars for safety. Short term goal 4: Pt will complete LB bathing/dressing SBA c AE as needed to increase independence and safety with ADL.        Therapy Time   Individual Concurrent Group Co-treatment   Time In  730         Time Out  [address]

## 2019-04-24 NOTE — DISCHARGE SUMMARY
LEUKOCYTESUR NEGATIVE 04/20/2019    SPECGRAV 1.020 04/20/2019    GLUCOSEU NEGATIVE 04/20/2019    KETUA NEGATIVE 04/20/2019    PROTEINU NEGATIVE 04/20/2019    HGBUR NEGATIVE 04/20/2019    CASTUA NOT REPORTED 04/20/2019    CRYSTUA NOT REPORTED 04/20/2019    BACTERIA 1+ (A) 04/20/2019    YEAST NOT REPORTED 04/20/2019       Xr Hip Right (2-3 Views)    Result Date: 4/21/2019  EXAMINATION: SPOT FLUOROSCOPIC IMAGES 4/21/2019 1:12 pm TECHNIQUE: Fluoroscopy was provided by the radiology department for procedure. Radiologist was not present during examination. FLUOROSCOPY DOSE AND TYPE OR TIME AND EXPOSURES: DAP =  4438.1 mGy*cm2 COMPARISON: None HISTORY: Intraprocedural imaging. Initial encounter. FINDINGS: 2 spot images of the right hip were obtained. Spot images demonstrate open reduction internal fixation of the right femoral neck. Intraprocedural fluoroscopic spot images as above. See separate procedure report for more information. Xr Hip Right (2-3 Views)    Result Date: 4/20/2019  EXAMINATION: 2 XRAY VIEWS OF THE RIGHT HIP 4/20/2019 7:15 pm COMPARISON: September 24, 2013 HISTORY: ORDERING SYSTEM PROVIDED HISTORY: fall TECHNOLOGIST PROVIDED HISTORY: X-ray injured hip and pelvis - add femur if pain and/or swelling is distal to the hip fall FINDINGS: Nondisplaced impacted femoral neck fracture. Hip fracture as above     Ct Head Wo Contrast    Result Date: 4/20/2019  EXAMINATION: CT OF THE HEAD WITHOUT CONTRAST  4/20/2019 8:28 pm TECHNIQUE: CT of the head was performed without the administration of intravenous contrast. Dose modulation, iterative reconstruction, and/or weight based adjustment of the mA/kV was utilized to reduce the radiation dose to as low as reasonably achievable. COMPARISON: 07/11/2017 HISTORY: ORDERING SYSTEM PROVIDED HISTORY: head bleed TECHNOLOGIST PROVIDED HISTORY: Pain status post fall. FINDINGS: BRAIN/VENTRICLES: There is no acute intracranial hemorrhage, mass effect or midline shift. No abnormal extra-axial fluid collection. The gray-white differentiation is maintained without evidence of an acute infarct. There is no evidence of hydrocephalus. Remote lacunar infarction within the periventricular white matter. Scattered subcortical and periventricular white matter hypodensities are most compatible with chronic small vessel disease. ORBITS: Right periorbital soft tissue edema without underlying globe or facial bone fracture. SINUSES: The visualized paranasal sinuses and mastoid air cells demonstrate no acute abnormality. SOFT TISSUES/SKULL:  No acute abnormality of the visualized skull or soft tissues. No acute intracranial abnormality. No intracranial hemorrhage, mass effect, or midline shift. Chronic small vessel disease. Remote lacunar infarction within the left periventricular white matter. Ct Cervical Spine Wo Contrast    Result Date: 4/20/2019  EXAMINATION: CT OF THE CERVICAL SPINE WITHOUT CONTRAST 4/20/2019 8:27 pm TECHNIQUE: CT of the cervical spine was performed without the administration of intravenous contrast. Multiplanar reformatted images are provided for review. Dose modulation, iterative reconstruction, and/or weight based adjustment of the mA/kV was utilized to reduce the radiation dose to as low as reasonably achievable. COMPARISON: None. HISTORY: ORDERING SYSTEM PROVIDED HISTORY: fall FINDINGS: BONES/ALIGNMENT: There is no evidence of an acute cervical spine fracture. Atlantoaxial rotation. No listhesis. . DEGENERATIVE CHANGES: Multilevel degenerative disc disease, advanced at C4-5 and C5-6. Multilevel facet arthropathy is noted as well, most notable at C2-3 on the left. SOFT TISSUES: There is no prevertebral soft tissue swelling. No acute abnormality of the cervical spine.      Xr Chest Portable    Result Date: 4/20/2019  EXAMINATION: SINGLE XRAY VIEW OF THE CHEST 4/20/2019 8:25 pm COMPARISON: March 7, 2018 HISTORY: ORDERING SYSTEM PROVIDED HISTORY: fracture TECHNOLOGIST PROVIDED HISTORY: fracture FINDINGS: Dual lead AICD on the left. Heart and mediastinum normal.  Moderate increase in interstitial and vascular markings. Bony thorax intact. Interval progression of pulmonary fibrosis or edema     Ct Hip Right Wo Contrast    Result Date: 4/20/2019  EXAMINATION: CT OF THE RIGHT HIP WITHOUT CONTRAST 4/20/2019 8:26 pm TECHNIQUE: CT of the right hip was performed without the administration of intravenous contrast.  Multiplanar reformatted images are provided for review. Dose modulation, iterative reconstruction, and/or weight based adjustment of the mA/kV was utilized to reduce the radiation dose to as low as reasonably achievable. COMPARISON: Right hip radiographs 04/20/2018. HISTORY ORDERING SYSTEM PROVIDED HISTORY: FRACTURE, HIP Initial encounter. FINDINGS: Bones: There is an acute mildly displaced and impacted right femoral neck fracture. No other fracture identified. No dislocation. No suspicious lytic or blastic osseous lesion. Soft Tissue:  No abnormal soft tissue mass or fluid collection. No radiopaque foreign body. The visualized soft tissue contents of the pelvis are unremarkable. Joint:  Moderate right hip degenerative changes. Moderate pubic symphysis degenerative changes. Moderate degenerative changes of the right sacroiliac joint. Acute mildly impacted and displaced right femoral neck fracture. Ct Maxillofacial Wo Contrast    Result Date: 4/20/2019  EXAMINATION: CT OF THE FACE WITHOUT CONTRAST  4/20/2019 8:26 pm TECHNIQUE: CT of the face was performed without the administration of intravenous contrast. Multiplanar reformatted images are provided for review. Dose modulation, iterative reconstruction, and/or weight based adjustment of the mA/kV was utilized to reduce the radiation dose to as low as reasonably achievable.  COMPARISON: None HISTORY: ORDERING SYSTEM PROVIDED HISTORY: fall FINDINGS: FACIAL BONES:  The maxilla, pterygoid plates and zygomatic arches are intact. The mandible is intact. The mandibular condyles are normally situated. The nasal bones and maxillary nasal processes are intact. ORBITS:  The globes appear intact. The extraocular muscles, optic nerve sheath complexes and lacrimal glands appear unremarkable. No retrobulbar hematoma or mass is seen. The orbital walls and rims are intact. SINUSES/MASTOIDS:  The paranasal sinuses and mastoid air cells are well aerated. No acute fracture is seen. SOFT TISSUES:  Periorbital soft tissue swelling on the right. Right periorbital soft tissue swelling. No blow-out fracture. Discharge Diagnoses:    Principal Problem:    Hip fracture, right, closed, initial encounter [facility])  Active Problems:    Essential hypertension    Obstructive sleep apnea    Type 2 diabetes mellitus without complication (HCC)    ASHD (arteriosclerotic heart disease)    Chronic diastolic CHF (congestive heart failure) ([facility])    Hypothyroidism    Hip fx, right, closed, initial encounter [facility])    Cerebrovascular accident (CVA) involving left cerebral hemisphere [facility])  Resolved Problems:    * No resolved hospital problems.  *      Active Hospital Problems    Diagnosis Date Noted    Cerebrovascular accident (CVA) involving left cerebral hemisphere ([facility] [I63.9] 04/23/2019    Hip fracture, right, closed, initial encounter ([facility] 04/20/2019    Hip fx, right, closed, initial encounter ([facility] 04/20/2019    Hypothyroidism [E03.9] 03/14/2018    Chronic diastolic CHF (congestive heart failure) ([facility] [I50.32] 03/09/2018    ASHD (arteriosclerotic heart disease) [I25.10] 06/19/2017    Type 2 diabetes mellitus without complication [facility] [P72.6] 02/13/2017    Obstructive sleep apnea [G47.33] 08/31/2015    Essential hypertension [I10] 08/31/2015       Discharge Medications:       Nivia Roger   Home Medication Instructions [facility]:[id number]    Printed on:04/24/19 9827   Medication Information acetaminophen (TYLENOL) 325 MG tablet  Take 2 tablets by mouth every 4 hours as needed for Pain             Amantadine (SYMMETREL) 100 MG TABS tablet  TAKE 1 TABLET BY MOUTH TWICE DAILY             aspirin EC 81 MG EC tablet  Take 1 tablet by mouth daily             atorvastatin (LIPITOR) 40 MG tablet  TAKE ONE TABLET BY MOUTH NIGHTLY             enoxaparin (LOVENOX) 40 MG/0.4ML injection  Inject 0.4 mLs into the skin daily             HYDROcodone-acetaminophen (NORCO) 5-325 MG per tablet  Take 1 tablet by mouth every 4 hours as needed for Pain for up to 7 days. ipratropium (ATROVENT) 0.06 % nasal spray  2 sprays by Nasal route 2 times daily             levothyroxine (SYNTHROID) 112 MCG tablet  TAKE 1 TABLET BY MOUTH ONCE DAILY             melatonin 3 MG TABS tablet  Take 3 mg by mouth nightly as needed              metoprolol tartrate (LOPRESSOR) 25 MG tablet  TAKE ONE TABLET BY MOUTH NIGHTLY             MIDODRINE HCL PO  Take 5 mg by mouth 2 times daily              polyethylene glycol (GLYCOLAX) powder  Take 17 g by mouth as needed (for constipation)             ranolazine (RANEXA) 500 MG extended release tablet  Take 1 tablet by mouth 2 times daily             vitamin D (ERGOCALCIFEROL) 41910 units capsule  Take 1 capsule by mouth once a week                 Patient Instructions:    Activity: 50% weith bearing on right leg  PT directed  Diet: regular diet  Wound Care: keep wound clean and dry  Other:      Disposition:   [place] to [facility]    Follow up:  Patient will be followed by Erika Lanza MD in 1-2 weeks    CORE MEASURES on Discharge (if applicable)  ACE/ARB in CHF: NA  Statin in MI: NA  ASA in [place]: NA  Statin in CVA: Yes  Antiplatelet in CVA: Yes    Total time spent on discharge services: 30 minutes    Including the following activities:  Evaluation and Management of patient  Discussion with patient and/or surrogate about current care plan  Coordination with Case Management and/or   Coordination of care with Consultants (if applicable)   Coordination of care with Receiving Facility Physician (if applicable)  Completion of DME forms (if applicable)  Preparation of Discharge Summary  Preparation of Medication Reconciliation  Preparation of Discharge Prescriptions    Signed:   Onelia Mcghee MD  4/24/2019, 12:48 PM

## 2019-04-30 ENCOUNTER — HOSPITAL ENCOUNTER (OUTPATIENT)
Age: 79
Setting detail: SPECIMEN
Discharge: HOME | End: 2019-04-30
Payer: MEDICARE

## 2019-04-30 ENCOUNTER — NURSE ONLY (OUTPATIENT)
Dept: CARDIOLOGY | Age: 79
End: 2019-04-30
Payer: MEDICARE

## 2019-04-30 DIAGNOSIS — I50.9 ACUTE ON CHRONIC CONGESTIVE HEART FAILURE, UNSPECIFIED HEART FAILURE TYPE (HCC): ICD-10-CM

## 2019-04-30 LAB
ABSOLUTE EOS #: 0.24 K/UL (ref 0–0.44)
ABSOLUTE IMMATURE GRANULOCYTE: 0.06 K/UL (ref 0–0.3)
ABSOLUTE LYMPH #: 1.27 K/UL (ref 1.1–3.7)
ABSOLUTE MONO #: 0.57 K/UL (ref 0.1–1.2)
ANION GAP SERPL CALCULATED.3IONS-SCNC: 10 MMOL/L (ref 9–17)
BASOPHILS # BLD: 1 % (ref 0–2)
BASOPHILS ABSOLUTE: 0.04 K/UL (ref 0–0.2)
BUN BLDV-MCNC: 18 MG/DL (ref 8–23)
BUN/CREAT BLD: 22 (ref 9–20)
CALCIUM SERPL-MCNC: 9.5 MG/DL (ref 8.6–10.4)
CHLORIDE BLD-SCNC: 101 MMOL/L (ref 98–107)
CO2: 26 MMOL/L (ref 20–31)
CREAT SERPL-MCNC: 0.83 MG/DL (ref 0.5–0.9)
DIFFERENTIAL TYPE: ABNORMAL
EOSINOPHILS RELATIVE PERCENT: 5 % (ref 1–4)
GFR AFRICAN AMERICAN: >60 ML/MIN
GFR NON-AFRICAN AMERICAN: >60 ML/MIN
GFR SERPL CREATININE-BSD FRML MDRD: ABNORMAL ML/MIN/{1.73_M2}
GFR SERPL CREATININE-BSD FRML MDRD: ABNORMAL ML/MIN/{1.73_M2}
GLUCOSE BLD-MCNC: 108 MG/DL (ref 70–99)
HCT VFR BLD CALC: 41.1 % (ref 36.3–47.1)
HEMOGLOBIN: 13 G/DL (ref 11.9–15.1)
IMMATURE GRANULOCYTES: 1 %
LYMPHOCYTES # BLD: 28 % (ref 24–43)
MCH RBC QN AUTO: 29.3 PG (ref 25.2–33.5)
MCHC RBC AUTO-ENTMCNC: 31.6 G/DL (ref 28.4–34.8)
MCV RBC AUTO: 92.6 FL (ref 82.6–102.9)
MONOCYTES # BLD: 13 % (ref 3–12)
NRBC AUTOMATED: 0 PER 100 WBC
PDW BLD-RTO: 14.4 % (ref 11.8–14.4)
PLATELET # BLD: 238 K/UL (ref 138–453)
PLATELET ESTIMATE: ABNORMAL
PMV BLD AUTO: 9.7 FL (ref 8.1–13.5)
POTASSIUM SERPL-SCNC: 4.6 MMOL/L (ref 3.7–5.3)
RBC # BLD: 4.44 M/UL (ref 3.95–5.11)
RBC # BLD: ABNORMAL 10*6/UL
SEG NEUTROPHILS: 52 % (ref 36–65)
SEGMENTED NEUTROPHILS ABSOLUTE COUNT: 2.38 K/UL (ref 1.5–8.1)
SODIUM BLD-SCNC: 137 MMOL/L (ref 135–144)
WBC # BLD: 4.6 K/UL (ref 3.5–11.3)
WBC # BLD: ABNORMAL 10*3/UL

## 2019-04-30 PROCEDURE — P9604 ONE-WAY ALLOW PRORATED TRIP: HCPCS

## 2019-04-30 PROCEDURE — 36415 COLL VENOUS BLD VENIPUNCTURE: CPT

## 2019-04-30 PROCEDURE — 93297 REM INTERROG DEV EVAL ICPMS: CPT | Performed by: INTERNAL MEDICINE

## 2019-04-30 PROCEDURE — 80048 BASIC METABOLIC PNL TOTAL CA: CPT

## 2019-04-30 PROCEDURE — 85025 COMPLETE CBC W/AUTO DIFF WBC: CPT

## 2019-04-30 ASSESSMENT — ENCOUNTER SYMPTOMS
BACK PAIN: 0
RESPIRATORY NEGATIVE: 1

## 2019-04-30 NOTE — ED PROVIDER NOTES
unspecified     H/O cardiac catheterization 6/17/15    LMCA: Normal 0% stenosis. LAD: Lesion on 1st diag: Proximal subsection. 80% stenosis. Lesion plaque is ruptured. Tonya Noel LCx: Lesion on 1st Ob Leslie: Mid subsection. 85% stenosis. RCA:Lesion on R PDA: Mid subsection. 85% stenosis. Lesion on R PDA: Distal subsection. 70% stenosis. Lesion on Prox RCA: Mid subsection. 50% stenosis. EF 50%.  H/O echocardiogram 11/09/2016    EF 40-45%. Mild LV hypertrophy normal LV cavity size. Sigmoid interventricular septum without evidence of outflow tract obstruction. Left atrium is moderately dilated (34-39) left atrial volume index of 36 ml/m2. Mild mitral regurg. Diastology cannot be assessed due to A-fib.  H/O echocardiogram 03/09/2018    EF 45-50%. Apical hypokinesis noted. The left atrium is moderately dilated (34-39) with a left atrial volume index of 34ml/m2. Mild to moderate mitral regurg. Mild tricuspid regurg. Moderate diastolic dysfunction.  History of Holter monitoring 3/24/16    Atrial Fibrillation throughout,fairly controlled, HR  bpm 79% of the time. Occasional high ventricular rate episodes and multiple pauses suggestive of tachycardia/bradycardia syndrome  Msximum R-R interval 2.68 seconds.    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    Long term (current) use of anticoagulants 8/31/2015    Movement disorder     Other abnormal glucose 2004    Pacemaker 08/10/2017    Insertion of a biventricular pacemaker/ICD AVN ablation    Phlebitis and thrombophlebitis of lower extremities, unspecified 1961    L leg    Senile osteoporosis 2006    L/S    Symptomatic menopausal or female climacteric states 06/1995    Syncope and collapse     Unspecified hereditary and idiopathic peripheral neuropathy 2012    Unspecified sleep apnea 11/2009 aspirin EC 81 MG EC tablet Take 1 tablet by mouth daily, Disp-90 tablet, R-3OTC      metoprolol tartrate (LOPRESSOR) 25 MG tablet TAKE ONE TABLET BY MOUTH NIGHTLY, Disp-90 tablet, R-3Normal      MIDODRINE HCL PO Take 5 mg by mouth 2 times daily Historical Med      polyethylene glycol (GLYCOLAX) powder Take 17 g by mouth as needed (for constipation)Historical Med      acetaminophen (TYLENOL) 325 MG tablet Take 2 tablets by mouth every 4 hours as needed for Pain, Disp-120 tablet, R-3DC to SNF      melatonin 3 MG TABS tablet Take 3 mg by mouth nightly as needed Historical Med             ALLERGIES     Adhesive tape; Aspercreme [trolamine salicylate]; Erythromycin; Erythromycin; Guaifenesin & derivatives; Niacin and related; Niacin and related;  Tape Kyle Remy tape]; and Augmentin [amoxicillin-pot clavulanate]    FAMILY HISTORY       Family History   Problem Relation Age of Onset    Heart Disease Mother     Stroke Mother     High Blood Pressure Mother     Cancer Father         lung    Stroke Brother     Heart Disease Maternal Grandmother           SOCIAL HISTORY       Social History     Socioeconomic History    Marital status:      Spouse name: None    Number of children: None    Years of education: None    Highest education level: None   Occupational History    None   Social Needs    Financial resource strain: None    Food insecurity:     Worry: None     Inability: None    Transportation needs:     Medical: None     Non-medical: None   Tobacco Use    Smoking status: Never Smoker    Smokeless tobacco: Never Used   Substance and Sexual Activity    Alcohol use: No     Alcohol/week: 0.0 oz    Drug use: No    Sexual activity: None   Lifestyle    Physical activity:     Days per week: None     Minutes per session: None    Stress: None   Relationships    Social connections:     Talks on phone: None     Gets together: None     Attends Orthodox service: None     Active member of club or POCT GLUCOSE   POCT GLUCOSE   POCT GLUCOSE   POCT GLUCOSE   POCT GLUCOSE   TYPE AND SCREEN       All other labs were within normal range or not returned as of this dictation. EMERGENCY DEPARTMENT COURSE and DIFFERENTIAL DIAGNOSIS/MDM:   Vitals:    Vitals:    04/24/19 0010 04/24/19 0410 04/24/19 0600 04/24/19 0627   BP: 114/71   (!) 143/83   Pulse: 80  78 80   Resp: 18   16   Temp: 98.6 °F (37 °C)   97.4 °F (36.3 °C)   TempSrc: Temporal   Temporal   SpO2: 98%   96%   Weight:  161 lb 14.4 oz (73.4 kg)     Height:  5' 5\" (1.651 m)           MDM          Procedures    FINAL IMPRESSION      1. Post-op pain        DISPOSITION/PLAN   DISPOSITION Admitted 04/20/2019 09:33:03 PM      PATIENT REFERRED TO:  Leila Khanna MD  [facility]  [address] [place] [address] [address] [place] [address],[address]  [phone]      Post op follow up    [facility]  [address] [place] [address]  [phone]          DISCHARGE MEDICATIONS:  Discharge Medication List as of 4/24/2019  1:20 PM      START taking these medications    Details   vitamin D (ERGOCALCIFEROL) 02594 units capsule Take 1 capsule by mouth once a week, Disp-5 capsuleDC to [facility]      HYDROcodone-acetaminophen (NORCO) 5-325 MG per tablet Take 1 tablet by mouth every 4 hours as needed for Pain for up to 7 days. , Disp-42 tablet, R-0Print      enoxaparin (LOVENOX) 40 MG/0.4ML injection Inject 0.4 mLs into the skin daily, Disp-30 Syringe, R-0Print                    Summation      Patient Course:      ED Medications administered this visit:    Medications   HYDROcodone-acetaminophen (NORCO) 5-325 MG per tablet 1 tablet (1 tablet Oral Given 4/20/19 1856)   morphine (PF) injection 2 mg (2 mg Intravenous Given 4/20/19 1930)   clindamycin (CLEOCIN) 900 mg in dextrose 5 % 50 mL IVPB (0 mg Intravenous Stopped 4/21/19 1250)   clindamycin (CLEOCIN) 900 mg in dextrose 5 % 50 mL IVPB (0 mg Intravenous Stopped 4/22/19

## 2019-05-01 ENCOUNTER — TELEPHONE (OUTPATIENT)
Dept: CARDIOLOGY | Age: 79
End: 2019-05-01

## 2019-05-01 ENCOUNTER — OUTSIDE SERVICES (OUTPATIENT)
Dept: FAMILY MEDICINE CLINIC | Age: 79
End: 2019-05-01
Payer: MEDICARE

## 2019-05-01 DIAGNOSIS — E03.9 HYPOTHYROIDISM, UNSPECIFIED TYPE: ICD-10-CM

## 2019-05-01 DIAGNOSIS — S72.001D CLOSED FRACTURE OF RIGHT HIP WITH ROUTINE HEALING, SUBSEQUENT ENCOUNTER: ICD-10-CM

## 2019-05-01 DIAGNOSIS — E78.5 HYPERLIPIDEMIA, UNSPECIFIED HYPERLIPIDEMIA TYPE: ICD-10-CM

## 2019-05-01 DIAGNOSIS — I63.9 CEREBROVASCULAR ACCIDENT (CVA) INVOLVING LEFT CEREBRAL HEMISPHERE (HCC): Primary | ICD-10-CM

## 2019-05-01 DIAGNOSIS — I48.20 CHRONIC A-FIB (HCC): ICD-10-CM

## 2019-05-01 DIAGNOSIS — E11.9 TYPE 2 DIABETES MELLITUS WITHOUT COMPLICATION, UNSPECIFIED WHETHER LONG TERM INSULIN USE (HCC): ICD-10-CM

## 2019-05-01 DIAGNOSIS — G20 PARKINSONIAN TREMOR (HCC): ICD-10-CM

## 2019-05-01 PROCEDURE — 99306 1ST NF CARE HIGH MDM 50: CPT | Performed by: FAMILY MEDICINE

## 2019-05-01 NOTE — PROGRESS NOTES
[address] [address]  [phone]  Dept: [phone]    DONNY VENEGAS RMA, am scribing for and in the presence of Dr. Nathan Harris. 5/1/19/10:46am/SNP    Jordan Ferguson is a 77 y.o. female being seen for her initial H&P visit. Location of visit: [facility]. HPI:  Admission History:  Jordan Ferguson is a 77 y.o. female admitted with fall at home and sustaining a right hip fracture nondisplaced. She underwent percutaneous nail procedure to fix the fracture. She was placed on 50% weight bearing. This was difficult due to injury to her right wrist and elbow which limited her use of the arm for walker assisted ambulation. She had no other complication. She was noted to have left perivenricular white matter lacunar CVA which was not present on MRI in 2017. She is uncertain when this may have occurred. 04/24/19 Orders given for OT to eval and treat starting 04/24.   04/25/19 Signed Physician certification. New today:  50% weight baring and able to put enough weight on R hand to use walker. No pain  bowels starting to move again    ROS:  General Constitutional: Denies fever. Denies lightheadedness. Respiratory: Denies cough. Denies shortness of breath. Denies wheezing. Cardiovascular: Denies chest pain at rest.  Gastrointestinal: Denies abdominal pain. Denies blood in the stool. Denies constipation. Denies diarrhea. Denies nausea. Denies vomiting. Genitourinary: Denies blood in the urine. Denies painful urination. Skin: Denies rash. Neurologic: Denies falls. Denies dizziness. Len Ortiz Psychiatric: Denies sleep disturbance. Denies anxiety. Denies depressed mood. PHYSICAL EXAM:    General Appearance: in no acute distress, well nourished. Eyes: pupils equal, round reactive to light and accommodation. Oral Cavity: mucosa moist.  Neck/Thyroid:  no cervical lymphadenopathy, no thyromegaly  Skin: warm and dry  Heart: regular rate and rhythm. No murmurs.  S1, S2 normal, no gallops. Lungs: clear to auscultation bilaterally. Abdomen:soft, nontender, nondistended, no masses or organomegaly. Extremities: no cyanosis, 1+ edema BLE  Peripheral Pulses: 2+ throughout, symetric. Neurologic: verbally responsive, moves extremities. Resting tremor R hand  Psych: normal affect, speech fluent. ASSESSMENT:   Diagnosis Orders   1. Cerebrovascular accident (CVA) involving left cerebral hemisphere ([address]     2. Closed fracture of right hip with routine healing, subsequent encounter     3. Parkinsonian tremor ([address]     4. Chronic a-fib ([address]     5. Type 2 diabetes mellitus without complication, unspecified whether long term insulin use ([facility]     6. Hyperlipidemia, unspecified hyperlipidemia type     7. Hypothyroidism, unspecified type         PLAN:  During her hospital course, a small stroke on the L side was noted. They are talking about her discharging next week. She is doing well and I agree with this. I, Dr. French Mendieta, personally performed the services described in this documentation as scribed by DONNY Escobar in my presence, and is both accurate and complete.

## 2019-05-07 ENCOUNTER — HOSPITAL ENCOUNTER (OUTPATIENT)
Age: 79
Setting detail: SPECIMEN
Discharge: HOME | End: 2019-05-07
Payer: MEDICARE

## 2019-05-07 LAB
ABSOLUTE EOS #: 0.18 K/UL (ref 0–0.44)
ABSOLUTE IMMATURE GRANULOCYTE: 0.03 K/UL (ref 0–0.3)
ABSOLUTE LYMPH #: 1.38 K/UL (ref 1.1–3.7)
ABSOLUTE MONO #: 0.35 K/UL (ref 0.1–1.2)
ANION GAP SERPL CALCULATED.3IONS-SCNC: 13 MMOL/L (ref 9–17)
BASOPHILS # BLD: 0 % (ref 0–2)
BASOPHILS ABSOLUTE: 0.03 K/UL (ref 0–0.2)
BUN BLDV-MCNC: 17 MG/DL (ref 8–23)
BUN/CREAT BLD: 22 (ref 9–20)
CALCIUM SERPL-MCNC: 9.3 MG/DL (ref 8.6–10.4)
CHLORIDE BLD-SCNC: 102 MMOL/L (ref 98–107)
CO2: 25 MMOL/L (ref 20–31)
CREAT SERPL-MCNC: 0.79 MG/DL (ref 0.5–0.9)
DIFFERENTIAL TYPE: ABNORMAL
EOSINOPHILS RELATIVE PERCENT: 3 % (ref 1–4)
GFR AFRICAN AMERICAN: >60 ML/MIN
GFR NON-AFRICAN AMERICAN: >60 ML/MIN
GFR SERPL CREATININE-BSD FRML MDRD: ABNORMAL ML/MIN/{1.73_M2}
GFR SERPL CREATININE-BSD FRML MDRD: ABNORMAL ML/MIN/{1.73_M2}
GLUCOSE BLD-MCNC: 143 MG/DL (ref 70–99)
HCT VFR BLD CALC: 45.3 % (ref 36.3–47.1)
HEMOGLOBIN: 14 G/DL (ref 11.9–15.1)
IMMATURE GRANULOCYTES: 0 %
LYMPHOCYTES # BLD: 20 % (ref 24–43)
MCH RBC QN AUTO: 28.6 PG (ref 25.2–33.5)
MCHC RBC AUTO-ENTMCNC: 30.9 G/DL (ref 28.4–34.8)
MCV RBC AUTO: 92.4 FL (ref 82.6–102.9)
MONOCYTES # BLD: 5 % (ref 3–12)
NRBC AUTOMATED: 0 PER 100 WBC
PDW BLD-RTO: 14.7 % (ref 11.8–14.4)
PLATELET # BLD: 308 K/UL (ref 138–453)
PLATELET ESTIMATE: ABNORMAL
PMV BLD AUTO: 10.2 FL (ref 8.1–13.5)
POTASSIUM SERPL-SCNC: 4 MMOL/L (ref 3.7–5.3)
RBC # BLD: 4.9 M/UL (ref 3.95–5.11)
RBC # BLD: ABNORMAL 10*6/UL
SEG NEUTROPHILS: 72 % (ref 36–65)
SEGMENTED NEUTROPHILS ABSOLUTE COUNT: 4.99 K/UL (ref 1.5–8.1)
SODIUM BLD-SCNC: 140 MMOL/L (ref 135–144)
WBC # BLD: 7 K/UL (ref 3.5–11.3)
WBC # BLD: ABNORMAL 10*3/UL

## 2019-05-07 PROCEDURE — 80048 BASIC METABOLIC PNL TOTAL CA: CPT

## 2019-05-07 PROCEDURE — 36415 COLL VENOUS BLD VENIPUNCTURE: CPT

## 2019-05-07 PROCEDURE — P9604 ONE-WAY ALLOW PRORATED TRIP: HCPCS

## 2019-05-07 PROCEDURE — 85025 COMPLETE CBC W/AUTO DIFF WBC: CPT

## 2019-05-10 ENCOUNTER — TELEPHONE (OUTPATIENT)
Dept: FAMILY MEDICINE CLINIC | Age: 79
End: 2019-05-10

## 2019-05-10 ENCOUNTER — OUTSIDE SERVICES (OUTPATIENT)
Dept: FAMILY MEDICINE CLINIC | Age: 79
End: 2019-05-10
Payer: MEDICARE

## 2019-05-10 DIAGNOSIS — E03.9 HYPOTHYROIDISM, UNSPECIFIED TYPE: ICD-10-CM

## 2019-05-10 DIAGNOSIS — I63.9 CEREBROVASCULAR ACCIDENT (CVA) INVOLVING LEFT CEREBRAL HEMISPHERE (HCC): Primary | ICD-10-CM

## 2019-05-10 DIAGNOSIS — I48.20 CHRONIC A-FIB (HCC): ICD-10-CM

## 2019-05-10 DIAGNOSIS — E11.9 TYPE 2 DIABETES MELLITUS WITHOUT COMPLICATION, UNSPECIFIED WHETHER LONG TERM INSULIN USE (HCC): ICD-10-CM

## 2019-05-10 DIAGNOSIS — E78.5 HYPERLIPIDEMIA, UNSPECIFIED HYPERLIPIDEMIA TYPE: ICD-10-CM

## 2019-05-10 DIAGNOSIS — S63.501A SPRAIN OF RIGHT FOREARM, INITIAL ENCOUNTER: ICD-10-CM

## 2019-05-10 DIAGNOSIS — S72.001D CLOSED FRACTURE OF RIGHT HIP WITH ROUTINE HEALING, SUBSEQUENT ENCOUNTER: ICD-10-CM

## 2019-05-10 DIAGNOSIS — G20 PARKINSONIAN TREMOR (HCC): ICD-10-CM

## 2019-05-10 PROCEDURE — 99309 SBSQ NF CARE MODERATE MDM 30: CPT | Performed by: FAMILY MEDICINE

## 2019-05-10 NOTE — PROGRESS NOTES
NOTE WAS OPENED ON 05/10/19 BUT VISIT ACTUALLY TOOK PLACE ON 05/09/19. [address] [address] [facility]  [phone]  Dept: [phone]    Kwasi VENEGAS RMA, am scribing for and in the presence of Dr. Daria Boeck. 05/09/19 10:59 am [address] [place] [address] is a 77 y.o. female being seen for her weekly follow up. Location of visit: [address] [place] [address]    HPI:  Admission History:  Katiana Patel a 77 y.o. female admitted with fall at home and sustaining a right hip fracture nondisplaced. She underwent percutaneous nail procedure to fix the fracture. She was placed on 50% weight bearing. This was difficult due to injury to her right wrist and elbow which limited her use of the arm for walker assisted ambulation. She had no other complication. She was noted to have left perivenricular white matter lacunar CVA which was not present on MRI in 8104. XXW is uncertain when this may have occurred. Last visit:  No changes were made. Faxes since last seen:   71/16/75: Certification and recertification signed    New today:  [place] reports:  Pain in right forearm. Bowels are sluggish, but okay. ROS:  General Constitutional: Denies fever. Denies lightheadedness. Respiratory: Denies cough. Denies shortness of breath. Denies wheezing. Cardiovascular: Denies chest pain at rest.  Gastrointestinal: Denies abdominal pain. Denies blood in the stool. Admits sluggish bowels. Denies diarrhea. Denies nausea. Denies vomiting. Genitourinary: Denies blood in the urine. Denies painful urination. Musculoskeletal: admits pain in right forearm  Skin:  Denies rash. Neurologic: Denies falls. Denies dizziness. Psychiatric: Denies sleep disturbance. Denies anxiety. Denies depressed mood. PHYSICAL EXAM:    General Appearance: in no acute distress, well nourished. Eyes: pupils equal, round reactive to light and accommodation.   Oral Cavity: mucosa moist.  Neck/Thyroid:  no cervical lymphadenopathy, no thyromegaly  Skin: warm and dry  Heart: regular rate and rhythm. No murmurs. S1, S2 normal, no gallops. Lungs: clear to auscultation bilaterally. Abdomen:soft, nontender, nondistended, no masses or organomegaly. Musculoskeletal: difficulty supinating right forearm. Right distal radius tender. Extremities: no cyanosis or edema. Peripheral Pulses: 2+ throughout, symetric. Neurologic: verbally responsive, moves extremities. Resting tremor R hand  Psych: normal affect, speech fluent. ASSESSMENT:   Diagnosis Orders   1. Cerebrovascular accident (CVA) involving left cerebral hemisphere ([address]     2. Closed fracture of right hip with routine healing, subsequent encounter     3. Sprain of right forearm, initial encounter     4. Parkinsonian tremor ([facility]     5. Chronic a-fib ([facility]     6. Type 2 diabetes mellitus without complication, unspecified whether long term insulin use ([facility]     7. Hyperlipidemia, unspecified hyperlipidemia type     8. Hypothyroidism, unspecified type         PLAN:  [place] is doing well. She is now at 50% weight bearing and is having no difficulty transferring and ambulating with a walker. Her bowels are a little sluggish, but okay. She is not having and cardiac or respiratory troubles. She is scheduled to go home on 05/12 but does not want to give herself lovenox injection. I will call and speak with the ortho PA, Evan Hill to discuss alternative option of xarelto 10 mg daily, instead. I will see her back in the office after she discharges home. I, Dr. Mary Ann Crowder, personally performed the services described in this documentation as scribed by ADAM Landaverde in my presence, and is both accurate and complete.

## 2019-05-10 NOTE — TELEPHONE ENCOUNTER
Per Dr. Dalia Kanner, Patient did not want to discharge home from [facility] having to administer lovenox injections. Dr. Dalia Kanner spoke with ortho PA, Kelly Stoner and he agreed that she could d/c home with 10 mg xarelto daily, instead. TRC notified and orders given verbally.

## 2019-05-13 ENCOUNTER — HOSPITAL ENCOUNTER (OUTPATIENT)
Dept: NON INVASIVE DIAGNOSTICS | Age: 79
Discharge: HOME | End: 2019-05-13
Payer: MEDICARE

## 2019-05-13 ENCOUNTER — CARE COORDINATION (OUTPATIENT)
Dept: CASE MANAGEMENT | Age: 79
End: 2019-05-13

## 2019-05-13 DIAGNOSIS — I50.32 CHRONIC DIASTOLIC CHF (CONGESTIVE HEART FAILURE) (HCC): Primary | Chronic | ICD-10-CM

## 2019-05-13 DIAGNOSIS — I48.20 CHRONIC ATRIAL FIBRILLATION (HCC): ICD-10-CM

## 2019-05-13 DIAGNOSIS — I51.89 LEFT VENTRICULAR SYSTOLIC DYSFUNCTION, NYHA CLASS 3: ICD-10-CM

## 2019-05-13 LAB
LV EF: 48 %
LVEF MODALITY: NORMAL

## 2019-05-13 PROCEDURE — 1111F DSCHRG MED/CURRENT MED MERGE: CPT | Performed by: FAMILY MEDICINE

## 2019-05-13 PROCEDURE — 93306 TTE W/DOPPLER COMPLETE: CPT

## 2019-05-13 NOTE — CARE COORDINATION
Latricia 45 Transitions Initial Follow Up Call    Call within 2 business days of discharge: Yes    Patient: [place] Patient : 1940   MRN: <[id number]>    Discharge Date: 19 RARS: Readmission Risk Score: 14      Last Discharge [address] [address]       Complaint Diagnosis Description Type Department Provider    19 Fall Post-op pain . .. ED to Hosp-Admission (Discharged) (Jana Willingham) Janina Franco MD; [facility] . .. Spoke with: [address] [place] [address] [place]: [facility]    Non-face-to-face services provided:  Obtained and reviewed discharge summary and/or continuity of care documents    Care Transitions 24 Hour Call    Do you have any ongoing symptoms?:  No  Do you have a copy of your discharge instructions?:  Yes  Do you have all of your prescriptions and are they filled?:  Yes (Comment: issue straightened out by [facility] Pharmacist call)  Have you been contacted by a [address]?:  No  Have you scheduled your follow up appointment?:  No (Comment: Patient declined assistance)  How are you going to get to your appointment?:  Car - family or friend to transport  Were you discharged with any Home Care or Post Acute Services:  No  Post Acute Services:  Home Health (Comment: [facility])  Do you feel like you have everything you need to keep you well at home?:  Yes  Care Transitions Interventions     Patient states she is doing well. Patient denies pain,sob, cough, swelling or n/v. Patient takes Tylenol for any discomfort related to rt hip. Raji hose worn. Patient ambulates using a walker. Patient declined [facility] and assistance scheduling f/u appointment with PCP. Medications reviewed. Patient is no longer taking Lovenox injections. Patient started on Xarelto 10 mg daily. Patient progressing well. Post acute care coordinator signing off. LISA Burrell RN  Care Transition Coordinator  [phone]      Follow Up  Future Appointments   Date Time Provider Jr Perera 5/13/2019 10:00 AM [facility] ECHO ROOM [facility] ECHO [place]   5/20/2019  9:20 AM [facility]   6/25/2019  8:45 AM SCHEDULE, [facility] CARDI [facility]   7/5/2019 10:00 AM Yumiko Xavier MD [facility]   7/30/2019  8:00 AM SCHEDULE, [facility] CARDI [facility]   8/27/2019  8:00 AM SCHEDULE, [facility]IN [facility] CARDI [facility]   9/26/2019  9:45 AM DO Ania Baldwin [facility]       Chavez Manrique RN

## 2019-05-14 ENCOUNTER — TELEPHONE (OUTPATIENT)
Dept: FAMILY MEDICINE CLINIC | Age: 79
End: 2019-05-14

## 2019-05-14 ENCOUNTER — HOSPITAL ENCOUNTER (OUTPATIENT)
Age: 79
Setting detail: SPECIMEN
Discharge: HOME | End: 2019-05-14
Payer: MEDICARE

## 2019-05-14 NOTE — TELEPHONE ENCOUNTER
Pt lost her bottle of Synthroid, she is requesting to refill her medication but it is too soon  I contacted [address], they will approve the refill but insurance will not cover it, the cost will be $10 for 90 day supply

## 2019-05-20 ENCOUNTER — OFFICE VISIT (OUTPATIENT)
Dept: CARDIOLOGY | Age: 79
End: 2019-05-20
Payer: MEDICARE

## 2019-05-20 VITALS
SYSTOLIC BLOOD PRESSURE: 148 MMHG | DIASTOLIC BLOOD PRESSURE: 87 MMHG | WEIGHT: 159 LBS | HEART RATE: 80 BPM | BODY MASS INDEX: 26.49 KG/M2 | HEIGHT: 65 IN | OXYGEN SATURATION: 96 %

## 2019-05-20 DIAGNOSIS — I50.32 CHRONIC DIASTOLIC CONGESTIVE HEART FAILURE (HCC): ICD-10-CM

## 2019-05-20 DIAGNOSIS — Z45.02 ENCOUNTER FOR MANAGEMENT OF BIVENTRICULAR IMPLANTABLE CARDIOVERTER-DEFIBRILLATOR (ICD): Primary | ICD-10-CM

## 2019-05-20 DIAGNOSIS — I25.10 CORONARY ARTERY DISEASE INVOLVING NATIVE CORONARY ARTERY OF NATIVE HEART WITHOUT ANGINA PECTORIS: ICD-10-CM

## 2019-05-20 DIAGNOSIS — I48.20 CHRONIC ATRIAL FIBRILLATION (HCC): ICD-10-CM

## 2019-05-20 PROCEDURE — 99214 OFFICE O/P EST MOD 30 MIN: CPT | Performed by: INTERNAL MEDICINE

## 2019-05-20 NOTE — PROGRESS NOTES
orthostatic hypotension, discharged on 3/9/2018. Her metoprolol decreased from 50 to 25 mg twice a day and midodrine added. 9-ICD checked 3/7/18: Biventricular pacing 99% of the time. No significant ventricular arrhythmia. 10- Echo on 05/13/2019: Poor image quality. EF 45-50%. LA is mildly dilated w/ LA colume index of 30. Mild mitral rgeurg. Moderate diastolic dysfunction. Ms. Charmayne Root is here for a 6 month follow up. She did have a fall last month due to grabbing a hold of a cat and going down with it as she grabbed the tail. Due to this she had left hip surgery. She has had mildly increased shortness of breath since having her hip surgery but she thinks this is due to having to work a little harder now. She would say her shaking is about the same. She finished physical therapy and discharged from [place] [facility]. She denies any chest pain, pressure, or tightness. No orthopnea or PND. No lightheadedness/dizziness or palpitations. No abdominal pain, bleeding issues, medication problems, or any other concerns at this time. Past Medical History:    Past Medical History:   Diagnosis Date    Allergic rhinitis 10/1994    Arthritis     Asthma     Atrial fibrillation ([address] 12/2008    CAD (coronary artery disease)     Cerebrovascular accident (CVA) involving left cerebral hemisphere ([address] 04/23/2019    remote lacunar infarction within the L periventricular white matter    CH[facility]congestive heart failure) [facility]     Clotting disorder ([facility])     plebitis in L leg    COPD (chronic obstructive pulmonary disease) ([address]     DDD (degenerative disc disease), cervical     Degeneration of cervical intervertebral disc     Diverticulosis 2005    Gout     Gout, unspecified     H/O cardiac catheterization 6/17/15    LMCA: Normal 0% stenosis. LAD: Lesion on 1st diag: Proximal subsection. 80% stenosis. Lesion plaque is ruptured. Rusty Mack LCx: Lesion on 1st Ob Leslie: Mid subsection. 85% stenosis.  RCA:Lesion on R PDA: Mid reported) 180 tablet 1    ranolazine (RANEXA) 500 MG extended release tablet Take 1 tablet by mouth 2 times daily 180 tablet 3    atorvastatin (LIPITOR) 40 MG tablet TAKE ONE TABLET BY MOUTH NIGHTLY 90 tablet 3    aspirin EC 81 MG EC tablet Take 1 tablet by mouth daily 90 tablet 3    metoprolol tartrate (LOPRESSOR) 25 MG tablet TAKE ONE TABLET BY MOUTH NIGHTLY 90 tablet 3    MIDODRINE HCL PO Take 5 mg by mouth 2 times daily       acetaminophen (TYLENOL) 325 MG tablet Take 2 tablets by mouth every 4 hours as needed for Pain 120 tablet 3    polyethylene glycol (GLYCOLAX) powder Take 17 g by mouth as needed (for constipation)         REVIEW OF SYSTEMS:    CONSTITUTIONAL: No major weight gain or loss, fatigue, weakness, night sweats or fever. HEENT: No new vision difficulties or ringing in the ears. RESPIRATORY: See HPI   CARDIOVASCULAR: See HPI  GI: No nausea, vomiting, diarrhea, constipation, abdominal pain or changes in bowel habits. : No urinary frequency, urgency, incontinence hematuria or dysuria. SKIN: No cyanosis or skin lesions. MUSCULOSKELETAL: No new muscle or joint pain. NEUROLOGICAL: No syncope or TIA-like symptoms. PSYCHIATRIC: No anxiety, pain, insomnia or depression    Objective:     PHYSICAL EXAM:      VITALS:    BP (!) 148/87 (Site: Right Upper Arm, Position: Sitting, Cuff Size: Medium Adult)   Pulse 80   Ht 5' 5\" (1.651 m)   Wt 159 lb (72.1 kg)   SpO2 96%   BMI 26.46 kg/m²   CONSTITUTIONAL: Cooperative, no apparent distress. NEUROLOGIC:  Awake and orientated to person, place and time. PSYCH: Calm affect. SKIN: Warm and dry. HEENT: Sclera non-icteric, normocephalic, neck supple, no elevation of JVP, normal carotid pulses with no bruits and thyroid normal size. LUNGS:  No increased work of breathing and clear to auscultation, no crackles or wheezing. CARDIOVASCULAR:  Normal heart rate  With regular rhythm,  no murmurs, gallops, rubs, or abnormal heart sounds.  Apical systolic murmur without radiation. The carotid upstroke is normal in amplitude and contour without delay or bruit. JVP is not elevated. ABDOMEN:  Normal bowel sounds, non-distended and non-tender to palpation. EXT: No edema, Wearing compression no calf tenderness. Pulses are present bilaterally. DATA:    Lab Results   Component Value Date    ALT 25 04/20/2019    AST 30 04/20/2019    ALKPHOS 104 04/20/2019    BILITOT 0.48 04/20/2019     Lab Results   Component Value Date    CREATININE 0.79 05/07/2019    BUN 17 05/07/2019     05/07/2019    K 4.0 05/07/2019     05/07/2019    CO2 25 05/07/2019     Lab Results   Component Value Date    TSH 1.16 02/19/2019    M4PDXOS 8.4 02/19/2019     Lab Results   Component Value Date    WBC 7.0 05/07/2019    HGB 14.0 05/07/2019    HCT 45.3 05/07/2019    MCV 92.4 05/07/2019     05/07/2019       Lab Results   Component Value Date    TRIG 194 (H) 10/23/2018    TRIG 272 (H) 02/06/2017    TRIG 351 (H) 06/06/2016     Lab Results   Component Value Date    HDL 41 10/23/2018    HDL 40 (L) 02/06/2017    HDL 37 (L) 06/06/2016     Lab Results   Component Value Date    LDLCHOLESTEROL 37 10/23/2018    LDLCHOLESTEROL 55 02/06/2017    LDLCHOLESTEROL 43 06/06/2016         Assessment:      Diagnosis Orders   1. Encounter for management of biventricular implantable cardioverter-defibrillator (ICD)  ICD analysis dual with reprogram   2. Chronic diastolic congestive heart failure ([address]  ICD analysis dual with reprogram   3. Coronary artery disease involving native coronary artery of native heart without angina pectoris  ICD analysis dual with reprogram   4. Chronic atrial fibrillation (HCC)       Chronic Diastolic Heart Failure: Currently compensated  Beta Blocker: Continue Metoprolol tartrate (Lopressor) 25 mg daily.   Nonpharmacologic management of Heart Failure: I told her to continue wearing lower extremity compression stockings and I advised her to try and keep his legs up whenever possible and to limit salt in her diet. · Atherosclerotic Heart Disease:  · Antiplatelet Agent: Continue aspirin 81 mg daily  I also reminded her to watch for signs of blood in her stool or black tarry stools and stop the medication immediately if this develops as this could be life threatening. · Beta Blocker: Continue metoprolol tartrate (Lopressor) 25 mg     · Anti-anginal medications: Continue ranolazine (Ranexa) 500 mg 2 times/day. · Statin Therapy: Continue atorvastatin (Lipitor) 40 mg nightly. · Chronic Atrial Fibrillation: Rate Control  · Beta Blocker: Continue metoprolol tartrate (Lopressor) 25 mg  daily. · Stroke Risk: Her CHADS2 score is 5/9 (6.7% stroke risk)  · Anticoagulation: Watchman in place. Off Coumadin. · Hyperlipidemia: Mixed  · Statin Therapy: Continue atorvastatin (Lipitor) 40 mg.    · PCSK9 inhibitors: Not indicated at this time     Implantable Cardioverter Defibrillator (ICD): Indication for Device Placement: Biventricular ICD secondary to severe left ventricular systolic dysfunction and chronic atrial fibrillation  Interrogation Findings: Within normal limit. Changed LV to RV delay to 30 ms     FOLLOW UP:  I told Ms. Issac Dodd to call my office if she had any problems, but otherwise told her to Return in about 6 months (around 11/20/2019). However, I would be happy to see her sooner should the need arise. Sincerely,  Mani Dhaliwal MD, F.A.C.C. [facility] Cardiology Specialist    [address]  Phone: [phone], Fax: [phone]     I believe that the risk of significant morbidity and mortality related to the patient's current medical conditions are: intermediate-high. The documentation recorded by the scribe, accurately and completely reflects the services I personally performed and the decisions made by me. Ailyn Granado MD, F.A.C.C.  May 20, 2019

## 2019-05-21 ENCOUNTER — OFFICE VISIT (OUTPATIENT)
Dept: FAMILY MEDICINE CLINIC | Age: 79
End: 2019-05-21
Payer: MEDICARE

## 2019-05-21 VITALS
DIASTOLIC BLOOD PRESSURE: 78 MMHG | WEIGHT: 158.6 LBS | HEIGHT: 65 IN | BODY MASS INDEX: 26.42 KG/M2 | SYSTOLIC BLOOD PRESSURE: 122 MMHG

## 2019-05-21 DIAGNOSIS — Z87.81 HISTORY OF HIP FRACTURE: ICD-10-CM

## 2019-05-21 DIAGNOSIS — I50.42 CHRONIC COMBINED SYSTOLIC AND DIASTOLIC CONGESTIVE HEART FAILURE (HCC): ICD-10-CM

## 2019-05-21 DIAGNOSIS — I10 ESSENTIAL HYPERTENSION: ICD-10-CM

## 2019-05-21 DIAGNOSIS — I48.0 PAROXYSMAL A-FIB (HCC): ICD-10-CM

## 2019-05-21 DIAGNOSIS — Z86.73 STATUS POST CVA: Primary | ICD-10-CM

## 2019-05-21 PROCEDURE — 99214 OFFICE O/P EST MOD 30 MIN: CPT | Performed by: FAMILY MEDICINE

## 2019-05-21 RX ORDER — CARVEDILOL 6.25 MG/1
6.25 TABLET ORAL 2 TIMES DAILY
Qty: 180 TABLET | Refills: 1 | Status: SHIPPED | OUTPATIENT
Start: 2019-05-21 | End: 2019-12-01 | Stop reason: SDUPTHER

## 2019-05-21 NOTE — PROGRESS NOTES
ADAM Dickson, am scribing for and in the presence of Dr. Kevyn Horton. 05/21/2019    [address] [address]  [place] [address]  Dept: [phone]    HPI: Patient presents today for a TRC f/u. She was admitted to [facility] on 4/24/19 following a right hip fracture repair done on 4/21/19 with Dr. Gege Velasquez which was caused by a fall at home on 4/20/19. She was at [facility] for a short term rehab stay and discharged on 5/12/19. Today she states, she is feeling pretty good. She denies pain in the hip. Current Outpatient Medications   Medication Sig Dispense Refill    carvedilol (COREG) 6.25 MG tablet Take 1 tablet by mouth 2 times daily 180 tablet 1    Rivaroxaban (XARELTO PO) Take by mouth daily      Multiple Vitamins-Minerals (MULTIVITAL PO) Take by mouth daily      vitamin D (ERGOCALCIFEROL) 57928 units capsule Take 1 capsule by mouth once a week 5 capsule     levothyroxine (SYNTHROID) 112 MCG tablet TAKE 1 TABLET BY MOUTH ONCE DAILY 90 tablet 3    ipratropium (ATROVENT) 0.06 % nasal spray 2 sprays by Nasal route 2 times daily 1 Bottle 3    Amantadine (SYMMETREL) 100 MG TABS tablet TAKE 1 TABLET BY MOUTH TWICE DAILY (Patient taking differently: No sig reported) 180 tablet 1    ranolazine (RANEXA) 500 MG extended release tablet Take 1 tablet by mouth 2 times daily 180 tablet 3    atorvastatin (LIPITOR) 40 MG tablet TAKE ONE TABLET BY MOUTH NIGHTLY 90 tablet 3    aspirin EC 81 MG EC tablet Take 1 tablet by mouth daily 90 tablet 3    MIDODRINE HCL PO Take 5 mg by mouth 2 times daily       acetaminophen (TYLENOL) 325 MG tablet Take 2 tablets by mouth every 4 hours as needed for Pain 120 tablet 3    polyethylene glycol (GLYCOLAX) powder Take 17 g by mouth as needed (for constipation)       No current facility-administered medications for this visit. ROS:  General Constitutional: Denies chills. Denies fever. Denies headache.  Denies lightheadedness. Ophthalmologic: Denies blurred vision. ENT: Denies nasal congestion. Denies sore throat. Denies ear pain and pressure. Respiratory: Denies cough. Denies shortness of breath. Denies wheezing. Cardiovascular: Denies chest pain at rest. Denies irregular heartbeat. Denies palpitations. Gastrointestinal: Denies abdominal pain. Denies blood in the stool. Denies constipation. Denies diarrhea. Denies nausea. Denies vomiting. Genitourinary: Denies blood in the urine. Denies difficulty urinating. Denies frequent urination. Denies painful urination. Denies urinary incontinence  Musculoskeletal: Denies muscle aches. Denies painful joints. Denies swollen joints. Admits R thumb pain  Peripheral Vascular: Denies pain/cramping in legs after exertion. Skin: Denies dry skin. Denies itching. Denies rash. Neurologic: Denies falls. Denies dizziness. Denies fainting. Denies tingling/numbness. Psychiatric: Denies sleep disturbance. Denies anxiety. Denies depressed mood. EXAM:  /78 (Site: Left Upper Arm, Position: Sitting, Cuff Size: Medium Adult)   Ht 5' 5\" (1.651 m)   Wt 158 lb 9.6 oz (71.9 kg)   BMI 26.39 kg/m²   Wt Readings from Last 3 Encounters:   05/21/19 158 lb 9.6 oz (71.9 kg)   05/20/19 159 lb (72.1 kg)   04/24/19 161 lb 14.4 oz (73.4 kg)     BP Readings from Last 3 Encounters:   05/21/19 122/78   05/20/19 (!) 148/87   04/24/19 (!) 143/83     PHYSICAL EXAM:  General Appearance: in no acute distress, well developed, well nourished. Ambulating with wheelchair  Eyes: pupils equal, round reactive to light and accommodation. Ears: normal canal and TM's. Nose: nares patent, no lesions. Oral Cavity: mucosa moist.  Throat: clear. Neck/Thyroid: neck supple, full range of motion, no cervical lymphadenopathy, no thyromegaly or carotid bruits. Skin: warm and dry. No suspicious lesions. Heart: regular rate and rhythm. No murmurs. S1, S2 normal, no gallops.   Lungs: clear to auscultation

## 2019-05-21 NOTE — PATIENT INSTRUCTIONS
PLAN:  A lacunar infarction within the L periventricular white matter was noted during her hospitalization, affecting her R side. This raises my concern for a-fib for her. I will have her continue taking Aspirin 81 mg for the time being. I will not make any changes at this time to her medication. I will have her discontinue metoprolol and start her on Carvedilol 6.25 mg bid. I would like for her to call in an week with an update of blood pressure and heart rate. SURVEY:    You may be receiving a survey from [employer] regarding your visit today. Please complete the survey to enable us to provide the highest quality of care to you and your family. If you cannot score us a very good on any question, please call the office to discuss how we could have made your experience a very good one. Thank you.

## 2019-05-30 ENCOUNTER — HOSPITAL ENCOUNTER (OUTPATIENT)
Age: 79
Setting detail: SPECIMEN
Discharge: HOME | End: 2019-05-30
Payer: MEDICARE

## 2019-07-01 ENCOUNTER — HOSPITAL ENCOUNTER (OUTPATIENT)
Age: 79
Discharge: HOME | End: 2019-07-01
Payer: MEDICARE

## 2019-07-01 DIAGNOSIS — E78.5 HYPERLIPIDEMIA, UNSPECIFIED HYPERLIPIDEMIA TYPE: ICD-10-CM

## 2019-07-01 DIAGNOSIS — E03.9 HYPOTHYROIDISM, UNSPECIFIED TYPE: ICD-10-CM

## 2019-07-01 LAB
ALT SERPL-CCNC: 14 U/L (ref 5–33)
ANION GAP SERPL CALCULATED.3IONS-SCNC: 11 MMOL/L (ref 9–17)
AST SERPL-CCNC: 17 U/L
BUN BLDV-MCNC: 17 MG/DL (ref 8–23)
BUN/CREAT BLD: 20 (ref 9–20)
CALCIUM SERPL-MCNC: 9.8 MG/DL (ref 8.6–10.4)
CHLORIDE BLD-SCNC: 104 MMOL/L (ref 98–107)
CHOLESTEROL/HDL RATIO: 3.1
CHOLESTEROL: 137 MG/DL
CO2: 28 MMOL/L (ref 20–31)
CREAT SERPL-MCNC: 0.86 MG/DL (ref 0.5–0.9)
GFR AFRICAN AMERICAN: >60 ML/MIN
GFR NON-AFRICAN AMERICAN: >60 ML/MIN
GFR SERPL CREATININE-BSD FRML MDRD: NORMAL ML/MIN/{1.73_M2}
GFR SERPL CREATININE-BSD FRML MDRD: NORMAL ML/MIN/{1.73_M2}
GLUCOSE BLD-MCNC: 71 MG/DL (ref 70–99)
HCT VFR BLD CALC: 46.5 % (ref 36.3–47.1)
HDLC SERPL-MCNC: 44 MG/DL
HEMOGLOBIN: 14.5 G/DL (ref 11.9–15.1)
HIGH SENSITIVE C-REACTIVE PROTEIN: 1.1 MG/L
LDL CHOLESTEROL: 54 MG/DL (ref 0–130)
MCH RBC QN AUTO: 29 PG (ref 25.2–33.5)
MCHC RBC AUTO-ENTMCNC: 31.2 G/DL (ref 28.4–34.8)
MCV RBC AUTO: 93 FL (ref 82.6–102.9)
NRBC AUTOMATED: 0 PER 100 WBC
PDW BLD-RTO: 13.5 % (ref 11.8–14.4)
PLATELET # BLD: 234 K/UL (ref 138–453)
PMV BLD AUTO: 10.3 FL (ref 8.1–13.5)
POTASSIUM SERPL-SCNC: 4.1 MMOL/L (ref 3.7–5.3)
RBC # BLD: 5 M/UL (ref 3.95–5.11)
SODIUM BLD-SCNC: 143 MMOL/L (ref 135–144)
T4 TOTAL: 10.7 UG/DL (ref 4.5–12)
TRIGL SERPL-MCNC: 195 MG/DL
TSH SERPL DL<=0.05 MIU/L-ACNC: 0.42 MIU/L (ref 0.3–5)
VLDLC SERPL CALC-MCNC: ABNORMAL MG/DL (ref 1–30)
WBC # BLD: 6.1 K/UL (ref 3.5–11.3)

## 2019-07-01 PROCEDURE — 85027 COMPLETE CBC AUTOMATED: CPT

## 2019-07-01 PROCEDURE — 36415 COLL VENOUS BLD VENIPUNCTURE: CPT

## 2019-07-01 PROCEDURE — 80048 BASIC METABOLIC PNL TOTAL CA: CPT

## 2019-07-01 PROCEDURE — 84436 ASSAY OF TOTAL THYROXINE: CPT

## 2019-07-01 PROCEDURE — 84450 TRANSFERASE (AST) (SGOT): CPT

## 2019-07-01 PROCEDURE — 86141 C-REACTIVE PROTEIN HS: CPT

## 2019-07-01 PROCEDURE — 80061 LIPID PANEL: CPT

## 2019-07-01 PROCEDURE — 84443 ASSAY THYROID STIM HORMONE: CPT

## 2019-07-01 PROCEDURE — 84460 ALANINE AMINO (ALT) (SGPT): CPT

## 2019-07-05 ENCOUNTER — OFFICE VISIT (OUTPATIENT)
Dept: FAMILY MEDICINE CLINIC | Age: 79
End: 2019-07-05
Payer: MEDICARE

## 2019-07-05 VITALS
DIASTOLIC BLOOD PRESSURE: 50 MMHG | SYSTOLIC BLOOD PRESSURE: 98 MMHG | WEIGHT: 157 LBS | BODY MASS INDEX: 26.16 KG/M2 | HEIGHT: 65 IN

## 2019-07-05 DIAGNOSIS — R55 VASODEPRESSOR SYNCOPE: ICD-10-CM

## 2019-07-05 DIAGNOSIS — I10 ESSENTIAL HYPERTENSION: Chronic | ICD-10-CM

## 2019-07-05 DIAGNOSIS — E11.9 TYPE 2 DIABETES MELLITUS WITHOUT COMPLICATION, UNSPECIFIED WHETHER LONG TERM INSULIN USE (HCC): Chronic | ICD-10-CM

## 2019-07-05 DIAGNOSIS — G20 PARKINSONIAN TREMOR (HCC): ICD-10-CM

## 2019-07-05 DIAGNOSIS — E78.5 HYPERLIPIDEMIA, UNSPECIFIED HYPERLIPIDEMIA TYPE: ICD-10-CM

## 2019-07-05 DIAGNOSIS — Z86.73 STATUS POST CVA: ICD-10-CM

## 2019-07-05 DIAGNOSIS — I50.42 CHRONIC COMBINED SYSTOLIC AND DIASTOLIC CONGESTIVE HEART FAILURE (HCC): Primary | Chronic | ICD-10-CM

## 2019-07-05 PROCEDURE — 99214 OFFICE O/P EST MOD 30 MIN: CPT | Performed by: FAMILY MEDICINE

## 2019-07-05 NOTE — PROGRESS NOTES
I, Yandy Garcia [facility], am scribing for and in the presence of Dr. Riaz Marinelli. 7/5/19/9:32 am/JML    [address] [address]  [place] [phone]  Dept: [phone] is a 77 y.o. female here for Follow-up (4 month regular check); Hypertension; Hyperlipidemia; and Diabetes      HPI:  HYPERTENSION:  She is not exercising. She is not adherent to a low-salt diet.    Blood pressure is being monitored at home, running 120-130/80's     HYPERLIPIDEMIA:     Medication compliance: compliant all of the time. Patient is not following a low fat, low cholesterol diet.    She is not exercising regularly.     COPD Symptoms:   Patient is not currently experiencing symptoms. She  reports that she has never smoked. She has never used smokeless tobacco. Current treatment includes nothing.     DIABETES MELLITUS:  Medication compliance:  n/a  Diabetic diet compliance:  compliant all of the time  Current exercise: no regular exercise  Frequency of testing: none  Eye exam current (within one year): yes, 1/2019  Diabetic foot check in the past year No, pt states she doesn't need it because her A1C is down   reports that she has never smoked. She has never used smokeless tobacco.      Parkinson Tremor:  Taking Amantadine 100 mg QD    Prior to Admission medications    Medication Sig Start Date End Date Taking?  Authorizing Provider   carvedilol (COREG) 6.25 MG tablet Take 1 tablet by mouth 2 times daily 5/21/19  Yes Riaz Marinelli MD   Multiple Vitamins-Minerals (MULTIVITAL PO) Take by mouth daily   Yes Historical Provider, MD   vitamin D (ERGOCALCIFEROL) 43969 units capsule Take 1 capsule by mouth once a week 4/30/19  Yes Riaz Marinelli MD   levothyroxine (SYNTHROID) 112 MCG tablet TAKE 1 TABLET BY MOUTH ONCE DAILY 4/15/19  Yes Riaz Marinelli MD   ipratropium (ATROVENT) 0.06 % nasal spray 2 sprays by Nasal route 2 times daily 3/1/19 2/29/20 Yes Riaz Marinelli MD   Amantadine [facility] 06/1995    Syncope and collapse     Unspecified hereditary and idiopathic peripheral neuropathy 2012    Unspecified sleep apnea 11/2009      Social History     Tobacco Use    Smoking status: Never Smoker    Smokeless tobacco: Never Used   Substance Use Topics    Alcohol use: No     Alcohol/week: 0.0 oz      Current Outpatient Medications   Medication Sig Dispense Refill    carvedilol (COREG) 6.25 MG tablet Take 1 tablet by mouth 2 times daily 180 tablet 1    Multiple Vitamins-Minerals (MULTIVITAL PO) Take by mouth daily      vitamin D (ERGOCALCIFEROL) 83809 units capsule Take 1 capsule by mouth once a week 5 capsule     levothyroxine (SYNTHROID) 112 MCG tablet TAKE 1 TABLET BY MOUTH ONCE DAILY 90 tablet 3    ipratropium (ATROVENT) 0.06 % nasal spray 2 sprays by Nasal route 2 times daily 1 Bottle 3    Amantadine (SYMMETREL) 100 MG TABS tablet TAKE 1 TABLET BY MOUTH TWICE DAILY (Patient taking differently: TAKE 1 TABLET BY MOUTH DAILY) 180 tablet 1    ranolazine (RANEXA) 500 MG extended release tablet Take 1 tablet by mouth 2 times daily 180 tablet 3    atorvastatin (LIPITOR) 40 MG tablet TAKE ONE TABLET BY MOUTH NIGHTLY 90 tablet 3    aspirin EC 81 MG EC tablet Take 1 tablet by mouth daily 90 tablet 3    MIDODRINE HCL PO Take 5 mg by mouth 2 times daily       acetaminophen (TYLENOL) 325 MG tablet Take 2 tablets by mouth every 4 hours as needed for Pain 120 tablet 3    polyethylene glycol (GLYCOLAX) powder Take 17 g by mouth as needed (for constipation)      midodrine (PROAMATINE) 5 MG tablet TAKE 1 TABLET BY MOUTH THREE TIMES DAILY WITH MEALS 270 tablet 0     No current facility-administered medications for this visit.       Allergies   Allergen Reactions    Adhesive Tape     Aspercreme [Trolamine Salicylate]     Erythromycin Other (See Comments)     \"whole in stomach\"    Erythromycin Other (See Comments)     stomach pain    Guaifenesin & Derivatives     Niacin And Related     Niacin

## 2019-07-08 RX ORDER — MIDODRINE HYDROCHLORIDE 5 MG/1
TABLET ORAL
Qty: 270 TABLET | Refills: 0 | Status: SHIPPED | OUTPATIENT
Start: 2019-07-08 | End: 2019-08-19

## 2019-07-30 ENCOUNTER — NURSE ONLY (OUTPATIENT)
Dept: CARDIOLOGY | Age: 79
End: 2019-07-30
Payer: MEDICARE

## 2019-07-30 DIAGNOSIS — I50.9 CONGESTIVE HEART FAILURE, UNSPECIFIED HF CHRONICITY, UNSPECIFIED HEART FAILURE TYPE (HCC): ICD-10-CM

## 2019-07-30 PROCEDURE — 93297 REM INTERROG DEV EVAL ICPMS: CPT | Performed by: INTERNAL MEDICINE

## 2019-07-31 ENCOUNTER — TELEPHONE (OUTPATIENT)
Dept: CARDIOLOGY | Age: 79
End: 2019-07-31

## 2019-09-10 ENCOUNTER — NURSE ONLY (OUTPATIENT)
Dept: CARDIOLOGY | Age: 79
End: 2019-09-10
Payer: MEDICARE

## 2019-09-10 DIAGNOSIS — I25.5 ISCHEMIC CARDIOMYOPATHY: Chronic | ICD-10-CM

## 2019-09-10 DIAGNOSIS — Z95.810 ICD (IMPLANTABLE CARDIOVERTER-DEFIBRILLATOR) IN PLACE: Primary | ICD-10-CM

## 2019-09-10 PROCEDURE — 93295 DEV INTERROG REMOTE 1/2/MLT: CPT | Performed by: INTERNAL MEDICINE

## 2019-09-11 ENCOUNTER — TELEPHONE (OUTPATIENT)
Dept: CARDIOLOGY | Age: 79
End: 2019-09-11

## 2019-09-25 DIAGNOSIS — Z01.812 ENCOUNTER FOR PREPROCEDURAL LABORATORY EXAMINATION: Primary | ICD-10-CM

## 2019-09-26 ENCOUNTER — OFFICE VISIT (OUTPATIENT)
Dept: PULMONOLOGY | Age: 79
End: 2019-09-26
Payer: MEDICARE

## 2019-09-26 ENCOUNTER — HOSPITAL ENCOUNTER (OUTPATIENT)
Dept: CT IMAGING | Age: 79
Discharge: HOME | End: 2019-09-28
Payer: MEDICARE

## 2019-09-26 ENCOUNTER — HOSPITAL ENCOUNTER (OUTPATIENT)
Age: 79
Discharge: HOME | End: 2019-09-26
Payer: MEDICARE

## 2019-09-26 VITALS
DIASTOLIC BLOOD PRESSURE: 84 MMHG | SYSTOLIC BLOOD PRESSURE: 124 MMHG | TEMPERATURE: 97.8 F | WEIGHT: 143 LBS | HEIGHT: 65 IN | BODY MASS INDEX: 23.82 KG/M2 | RESPIRATION RATE: 16 BRPM | OXYGEN SATURATION: 98 % | HEART RATE: 80 BPM

## 2019-09-26 DIAGNOSIS — J67.9 HYPERSENSITIVITY PNEUMONITIS (HCC): Primary | ICD-10-CM

## 2019-09-26 DIAGNOSIS — J84.112 UIP (USUAL INTERSTITIAL PNEUMONITIS) (HCC): ICD-10-CM

## 2019-09-26 DIAGNOSIS — J84.10 PULMONARY FIBROSIS (HCC): ICD-10-CM

## 2019-09-26 DIAGNOSIS — Z01.812 ENCOUNTER FOR PREPROCEDURAL LABORATORY EXAMINATION: ICD-10-CM

## 2019-09-26 DIAGNOSIS — J67.9 HYPERSENSITIVITY PNEUMONITIS (HCC): ICD-10-CM

## 2019-09-26 LAB
CREAT SERPL-MCNC: 0.91 MG/DL (ref 0.5–0.9)
GFR AFRICAN AMERICAN: >60 ML/MIN
GFR NON-AFRICAN AMERICAN: 60 ML/MIN
GFR SERPL CREATININE-BSD FRML MDRD: ABNORMAL ML/MIN/{1.73_M2}
GFR SERPL CREATININE-BSD FRML MDRD: ABNORMAL ML/MIN/{1.73_M2}

## 2019-09-26 PROCEDURE — 71250 CT THORAX DX C-: CPT

## 2019-09-26 PROCEDURE — 36415 COLL VENOUS BLD VENIPUNCTURE: CPT

## 2019-09-26 PROCEDURE — 99213 OFFICE O/P EST LOW 20 MIN: CPT | Performed by: INTERNAL MEDICINE

## 2019-09-26 PROCEDURE — 82565 ASSAY OF CREATININE: CPT

## 2019-09-26 ASSESSMENT — ENCOUNTER SYMPTOMS
GASTROINTESTINAL NEGATIVE: 1
RESPIRATORY NEGATIVE: 1
BACK PAIN: 1
EYES NEGATIVE: 1

## 2019-09-26 NOTE — PROGRESS NOTES
imaging unless indicated by symptoms. 2. Yearly flu shot. 3. Follow-up with Dr. Eliza Lee for ongoing care. Return to the pulmonary clinic if new or advancing symptoms. Please call with questions or problems.      Electronically signed by Judy Chakraborty DO on 9/26/2019at 11:05 AM

## 2019-10-04 RX ORDER — IPRATROPIUM BROMIDE 42 UG/1
SPRAY, METERED NASAL
Qty: 3 BOTTLE | Refills: 3 | Status: SHIPPED | OUTPATIENT
Start: 2019-10-04 | End: 2021-03-23 | Stop reason: SDUPTHER

## 2019-10-04 RX ORDER — AMANTADINE HYDROCHLORIDE 100 MG/1
TABLET ORAL
Qty: 180 TABLET | Refills: 1 | Status: SHIPPED | OUTPATIENT
Start: 2019-10-04 | End: 2020-03-03

## 2019-11-04 ENCOUNTER — OFFICE VISIT (OUTPATIENT)
Dept: FAMILY MEDICINE CLINIC | Age: 79
End: 2019-11-04
Payer: MEDICARE

## 2019-11-04 VITALS
BODY MASS INDEX: 24.66 KG/M2 | SYSTOLIC BLOOD PRESSURE: 108 MMHG | HEIGHT: 65 IN | DIASTOLIC BLOOD PRESSURE: 70 MMHG | WEIGHT: 148 LBS

## 2019-11-04 DIAGNOSIS — G20 PARKINSONIAN TREMOR (HCC): Primary | ICD-10-CM

## 2019-11-04 DIAGNOSIS — E03.9 HYPOTHYROIDISM, UNSPECIFIED TYPE: ICD-10-CM

## 2019-11-04 DIAGNOSIS — I10 ESSENTIAL HYPERTENSION: Chronic | ICD-10-CM

## 2019-11-04 DIAGNOSIS — I25.10 ASHD (ARTERIOSCLEROTIC HEART DISEASE): ICD-10-CM

## 2019-11-04 DIAGNOSIS — E78.5 HYPERLIPIDEMIA, UNSPECIFIED HYPERLIPIDEMIA TYPE: ICD-10-CM

## 2019-11-04 DIAGNOSIS — I48.0 PAROXYSMAL A-FIB (HCC): Chronic | ICD-10-CM

## 2019-11-04 DIAGNOSIS — R73.9 HYPERGLYCEMIA: ICD-10-CM

## 2019-11-04 DIAGNOSIS — Z00.00 ROUTINE GENERAL MEDICAL EXAMINATION AT A HEALTH CARE FACILITY: ICD-10-CM

## 2019-11-04 PROCEDURE — G0008 ADMIN INFLUENZA VIRUS VAC: HCPCS | Performed by: FAMILY MEDICINE

## 2019-11-04 PROCEDURE — G0439 PPPS, SUBSEQ VISIT: HCPCS | Performed by: FAMILY MEDICINE

## 2019-11-04 PROCEDURE — 90653 IIV ADJUVANT VACCINE IM: CPT | Performed by: FAMILY MEDICINE

## 2019-11-04 RX ORDER — RANOLAZINE 500 MG/1
500 TABLET, EXTENDED RELEASE ORAL 2 TIMES DAILY
COMMUNITY
End: 2020-01-21

## 2019-11-04 ASSESSMENT — LIFESTYLE VARIABLES: HOW OFTEN DO YOU HAVE A DRINK CONTAINING ALCOHOL: 0

## 2019-11-04 ASSESSMENT — PATIENT HEALTH QUESTIONNAIRE - PHQ9
SUM OF ALL RESPONSES TO PHQ QUESTIONS 1-9: 0
2. FEELING DOWN, DEPRESSED OR HOPELESS: 0
SUM OF ALL RESPONSES TO PHQ QUESTIONS 1-9: 0
1. LITTLE INTEREST OR PLEASURE IN DOING THINGS: 0
SUM OF ALL RESPONSES TO PHQ9 QUESTIONS 1 & 2: 0

## 2019-11-19 ENCOUNTER — NURSE ONLY (OUTPATIENT)
Dept: CARDIOLOGY | Age: 79
End: 2019-11-19
Payer: MEDICARE

## 2019-11-19 DIAGNOSIS — I50.9 CONGESTIVE HEART FAILURE, UNSPECIFIED HF CHRONICITY, UNSPECIFIED HEART FAILURE TYPE (HCC): Primary | ICD-10-CM

## 2019-11-19 PROCEDURE — 93297 REM INTERROG DEV EVAL ICPMS: CPT | Performed by: INTERNAL MEDICINE

## 2019-11-20 ENCOUNTER — TELEPHONE (OUTPATIENT)
Dept: CARDIOLOGY | Age: 79
End: 2019-11-20

## 2019-11-21 ENCOUNTER — OFFICE VISIT (OUTPATIENT)
Dept: CARDIOLOGY | Age: 79
End: 2019-11-21
Payer: MEDICARE

## 2019-11-21 VITALS
SYSTOLIC BLOOD PRESSURE: 119 MMHG | DIASTOLIC BLOOD PRESSURE: 80 MMHG | OXYGEN SATURATION: 97 % | RESPIRATION RATE: 16 BRPM | HEIGHT: 65 IN | HEART RATE: 80 BPM | WEIGHT: 149.2 LBS | BODY MASS INDEX: 24.86 KG/M2

## 2019-11-21 DIAGNOSIS — E78.2 MIXED HYPERLIPIDEMIA: ICD-10-CM

## 2019-11-21 DIAGNOSIS — I50.32 CHRONIC DIASTOLIC CONGESTIVE HEART FAILURE (HCC): Primary | ICD-10-CM

## 2019-11-21 DIAGNOSIS — I48.20 CHRONIC A-FIB (HCC): ICD-10-CM

## 2019-11-21 DIAGNOSIS — I25.10 ASHD (ARTERIOSCLEROTIC HEART DISEASE): ICD-10-CM

## 2019-11-21 DIAGNOSIS — Z95.0 CARDIAC PACEMAKER IN SITU: ICD-10-CM

## 2019-11-21 PROCEDURE — 99214 OFFICE O/P EST MOD 30 MIN: CPT | Performed by: INTERNAL MEDICINE

## 2019-11-27 RX ORDER — MIDODRINE HYDROCHLORIDE 5 MG/1
TABLET ORAL
Qty: 270 TABLET | Refills: 0 | Status: SHIPPED | OUTPATIENT
Start: 2019-11-27 | End: 2020-03-03

## 2019-12-02 RX ORDER — MIDODRINE HYDROCHLORIDE 5 MG/1
TABLET ORAL
Qty: 270 TABLET | Refills: 0 | Status: SHIPPED | OUTPATIENT
Start: 2019-12-02 | End: 2020-03-03

## 2019-12-02 RX ORDER — CARVEDILOL 6.25 MG/1
TABLET ORAL
Qty: 180 TABLET | Refills: 3 | Status: SHIPPED | OUTPATIENT
Start: 2019-12-02 | End: 2020-11-30

## 2019-12-04 PROBLEM — Z00.00 ROUTINE GENERAL MEDICAL EXAMINATION AT A HEALTH CARE FACILITY: Status: RESOLVED | Noted: 2017-11-20 | Resolved: 2019-12-04

## 2019-12-06 ENCOUNTER — TELEPHONE (OUTPATIENT)
Dept: FAMILY MEDICINE CLINIC | Age: 79
End: 2019-12-06

## 2020-01-13 ENCOUNTER — TELEPHONE (OUTPATIENT)
Dept: CARDIOLOGY | Age: 80
End: 2020-01-13

## 2020-01-13 NOTE — TELEPHONE ENCOUNTER
Attempted to call patient to send over manual download for home check since it had never sent over last week but no answer and voicemail has not been set up so was unable to leave a message.

## 2020-01-14 ENCOUNTER — NURSE ONLY (OUTPATIENT)
Dept: CARDIOLOGY | Age: 80
End: 2020-01-14
Payer: MEDICARE

## 2020-01-14 PROCEDURE — 93295 DEV INTERROG REMOTE 1/2/MLT: CPT | Performed by: FAMILY MEDICINE

## 2020-01-16 ENCOUNTER — TELEPHONE (OUTPATIENT)
Dept: CARDIOLOGY | Age: 80
End: 2020-01-16

## 2020-01-21 RX ORDER — RANOLAZINE 500 MG/1
TABLET, EXTENDED RELEASE ORAL
Qty: 60 TABLET | Refills: 0 | Status: SHIPPED | OUTPATIENT
Start: 2020-01-21 | End: 2020-01-27

## 2020-01-27 RX ORDER — RANOLAZINE 500 MG/1
TABLET, EXTENDED RELEASE ORAL
Qty: 60 TABLET | Refills: 0 | Status: SHIPPED | OUTPATIENT
Start: 2020-01-27 | End: 2020-03-23

## 2020-02-11 ENCOUNTER — NURSE ONLY (OUTPATIENT)
Dept: CARDIOLOGY | Age: 80
End: 2020-02-11
Payer: MEDICARE

## 2020-02-12 ENCOUNTER — TELEPHONE (OUTPATIENT)
Dept: CARDIOLOGY | Age: 80
End: 2020-02-12

## 2020-02-12 PROCEDURE — 93297 REM INTERROG DEV EVAL ICPMS: CPT | Performed by: INTERNAL MEDICINE

## 2020-02-21 RX ORDER — LEVOTHYROXINE SODIUM 112 UG/1
TABLET ORAL
Qty: 90 TABLET | Refills: 0 | Status: SHIPPED
Start: 2020-02-21 | End: 2020-03-03 | Stop reason: DRUGHIGH

## 2020-02-25 ENCOUNTER — HOSPITAL ENCOUNTER (OUTPATIENT)
Age: 80
Discharge: HOME | End: 2020-02-25
Payer: MEDICARE

## 2020-02-25 LAB
ALT SERPL-CCNC: 8 U/L (ref 5–33)
ANION GAP SERPL CALCULATED.3IONS-SCNC: 11 MMOL/L (ref 9–17)
AST SERPL-CCNC: 15 U/L
BUN BLDV-MCNC: 17 MG/DL (ref 8–23)
BUN/CREAT BLD: 25 (ref 9–20)
CALCIUM SERPL-MCNC: 8.9 MG/DL (ref 8.6–10.4)
CHLORIDE BLD-SCNC: 104 MMOL/L (ref 98–107)
CHOLESTEROL/HDL RATIO: 2.4
CHOLESTEROL: 114 MG/DL
CO2: 26 MMOL/L (ref 20–31)
CREAT SERPL-MCNC: 0.67 MG/DL (ref 0.5–0.9)
ESTIMATED AVERAGE GLUCOSE: 111 MG/DL
GFR AFRICAN AMERICAN: >60 ML/MIN
GFR NON-AFRICAN AMERICAN: >60 ML/MIN
GFR SERPL CREATININE-BSD FRML MDRD: ABNORMAL ML/MIN/{1.73_M2}
GFR SERPL CREATININE-BSD FRML MDRD: ABNORMAL ML/MIN/{1.73_M2}
GLUCOSE BLD-MCNC: 94 MG/DL (ref 70–99)
HBA1C MFR BLD: 5.5 % (ref 4.8–5.9)
HCT VFR BLD CALC: 41.9 % (ref 36.3–47.1)
HDLC SERPL-MCNC: 48 MG/DL
HEMOGLOBIN: 12.6 G/DL (ref 11.9–15.1)
LDL CHOLESTEROL: 43 MG/DL (ref 0–130)
MCH RBC QN AUTO: 28.7 PG (ref 25.2–33.5)
MCHC RBC AUTO-ENTMCNC: 30.1 G/DL (ref 28.4–34.8)
MCV RBC AUTO: 95.4 FL (ref 82.6–102.9)
NRBC AUTOMATED: 0 PER 100 WBC
PDW BLD-RTO: 12.8 % (ref 11.8–14.4)
PLATELET # BLD: 209 K/UL (ref 138–453)
PMV BLD AUTO: 9.6 FL (ref 8.1–13.5)
POTASSIUM SERPL-SCNC: 4.4 MMOL/L (ref 3.7–5.3)
RBC # BLD: 4.39 M/UL (ref 3.95–5.11)
SODIUM BLD-SCNC: 141 MMOL/L (ref 135–144)
T4 TOTAL: 11.1 UG/DL (ref 4.5–12)
TRIGL SERPL-MCNC: 113 MG/DL
TSH SERPL DL<=0.05 MIU/L-ACNC: 0.02 MIU/L (ref 0.3–5)
VLDLC SERPL CALC-MCNC: NORMAL MG/DL (ref 1–30)
WBC # BLD: 6.3 K/UL (ref 3.5–11.3)

## 2020-02-25 PROCEDURE — 80048 BASIC METABOLIC PNL TOTAL CA: CPT

## 2020-02-25 PROCEDURE — 36415 COLL VENOUS BLD VENIPUNCTURE: CPT

## 2020-02-25 PROCEDURE — 84443 ASSAY THYROID STIM HORMONE: CPT

## 2020-02-25 PROCEDURE — 85027 COMPLETE CBC AUTOMATED: CPT

## 2020-02-25 PROCEDURE — 84450 TRANSFERASE (AST) (SGOT): CPT

## 2020-02-25 PROCEDURE — 84436 ASSAY OF TOTAL THYROXINE: CPT

## 2020-02-25 PROCEDURE — 80061 LIPID PANEL: CPT

## 2020-02-25 PROCEDURE — 84460 ALANINE AMINO (ALT) (SGPT): CPT

## 2020-02-25 PROCEDURE — 83036 HEMOGLOBIN GLYCOSYLATED A1C: CPT

## 2020-03-03 ENCOUNTER — OFFICE VISIT (OUTPATIENT)
Dept: FAMILY MEDICINE CLINIC | Age: 80
End: 2020-03-03
Payer: MEDICARE

## 2020-03-03 VITALS
SYSTOLIC BLOOD PRESSURE: 128 MMHG | DIASTOLIC BLOOD PRESSURE: 82 MMHG | HEIGHT: 65 IN | BODY MASS INDEX: 25.99 KG/M2 | WEIGHT: 156 LBS

## 2020-03-03 PROBLEM — J44.9 CHRONIC OBSTRUCTIVE PULMONARY DISEASE (HCC): Chronic | Status: RESOLVED | Noted: 2018-02-09 | Resolved: 2020-03-03

## 2020-03-03 PROCEDURE — 99214 OFFICE O/P EST MOD 30 MIN: CPT | Performed by: FAMILY MEDICINE

## 2020-03-03 RX ORDER — LEVOTHYROXINE SODIUM 0.1 MG/1
100 TABLET ORAL DAILY
Qty: 90 TABLET | Refills: 1 | Status: SHIPPED | OUTPATIENT
Start: 2020-03-03 | End: 2020-11-20 | Stop reason: ALTCHOICE

## 2020-03-03 SDOH — ECONOMIC STABILITY: FOOD INSECURITY: WITHIN THE PAST 12 MONTHS, THE FOOD YOU BOUGHT JUST DIDN'T LAST AND YOU DIDN'T HAVE MONEY TO GET MORE.: NEVER TRUE

## 2020-03-03 SDOH — ECONOMIC STABILITY: INCOME INSECURITY: HOW HARD IS IT FOR YOU TO PAY FOR THE VERY BASICS LIKE FOOD, HOUSING, MEDICAL CARE, AND HEATING?: NOT HARD AT ALL

## 2020-03-03 SDOH — ECONOMIC STABILITY: FOOD INSECURITY: WITHIN THE PAST 12 MONTHS, YOU WORRIED THAT YOUR FOOD WOULD RUN OUT BEFORE YOU GOT MONEY TO BUY MORE.: NEVER TRUE

## 2020-03-03 ASSESSMENT — PATIENT HEALTH QUESTIONNAIRE - PHQ9
1. LITTLE INTEREST OR PLEASURE IN DOING THINGS: 0
SUM OF ALL RESPONSES TO PHQ QUESTIONS 1-9: 0
SUM OF ALL RESPONSES TO PHQ QUESTIONS 1-9: 0
2. FEELING DOWN, DEPRESSED OR HOPELESS: 0
SUM OF ALL RESPONSES TO PHQ9 QUESTIONS 1 & 2: 0

## 2020-03-03 NOTE — PROGRESS NOTES
ADAM Stuart, am scribing for and in the presence of Dr. Lynn Michele. 3/3/20/10:55am/SNP    [address] [address]  [place] [phone]  Dept: [phone] Hayes is a [de-identified] y.o. female here for Follow-up (4 Months); Hypertension; Hyperlipidemia; COPD; and Diabetes    HPI:     HYPERTENSION:  She is not exercising. She is not adherent to a low-salt diet.    Blood pressure is being monitored at home 120-130s/80s     HYPERLIPIDEMIA:     Medication compliance: compliant all of the time. Patient is not following a low fat, low cholesterol diet.    She is not exercising regularly.     COPD Symptoms:   Patient is not currently experiencing symptoms. She  reports that she has never smoked. She has never used smokeless tobacco. Current treatment includes nothing.     DIABETES MELLITUS:  Medication compliance:  n/a  Diabetic diet compliance:  compliant all of the time  Current exercise: no regular exercise  Frequency of testing: none  Eye exam current (within one year): yes, 1/2019  Diabetic foot check in the past year: No,   pt states she doesn't need it because her A1C is down  reports that she has never smoked. She has never used smokeless tobacco.     Prior to Admission medications    Medication Sig Start Date End Date Taking?  Authorizing Provider   levothyroxine (SYNTHROID) 100 MCG tablet Take 1 tablet by mouth daily 3/3/20  Yes Lynn Michele MD   atorvastatin (LIPITOR) 40 MG tablet TAKE 1 TABLET BY MOUTH NIGHTLY 2/21/20  Yes Lynn Michele MD   ranolazine (RANEXA) 500 MG extended release tablet TAKE 1 TABLET BY MOUTH TWICE DAILY 1/27/20  Yes Jigar Mohan MD   carvedilol (COREG) 6.25 MG tablet TAKE 1 TABLET BY MOUTH TWICE DAILY 12/2/19  Yes Lynn Michele MD   ipratropium (ATROVENT) 0.06 % nasal spray USE 2 SPRAY(S) IN EACH NOSTRIL TWICE DAILY 10/4/19  Yes Lynn Michele MD   aspirin EC 81 MG EC tablet Take 1 tablet by mouth daily 11/28/18  Yes Mohit Garsia TWICE DAILY 180 tablet 3    ipratropium (ATROVENT) 0.06 % nasal spray USE 2 SPRAY(S) IN EACH NOSTRIL TWICE DAILY 3 Bottle 3    aspirin EC 81 MG EC tablet Take 1 tablet by mouth daily 90 tablet 3    acetaminophen (TYLENOL) 325 MG tablet Take 2 tablets by mouth every 4 hours as needed for Pain 120 tablet 3    polyethylene glycol (GLYCOLAX) powder Take 17 g by mouth as needed (for constipation)       No current facility-administered medications for this visit. Allergies   Allergen Reactions    Adhesive Tape     Aspercreme [Trolamine Salicylate]     Erythromycin Other (See Comments)     \"whole in stomach\"    Erythromycin Other (See Comments)     stomach pain    Guaifenesin & Derivatives     Niacin And Related     Niacin And Related Hives    Tape Climmie Case Tape] Dermatitis    Augmentin [Amoxicillin-Pot Clavulanate] Rash     PHYSICAL EXAM:    /82 (Site: Left Upper Arm, Position: Sitting, Cuff Size: Medium Adult)   Ht 5' 5\" (1.651 m)   Wt 156 lb (70.8 kg)   BMI 25.96 kg/m²   Wt Readings from Last 3 Encounters:   03/03/20 156 lb (70.8 kg)   11/21/19 149 lb 3.2 oz (67.7 kg)   11/04/19 148 lb (67.1 kg)     BP Readings from Last 3 Encounters:   03/03/20 128/82   11/21/19 119/80   11/04/19 108/70     General Appearance: in no acute distress, well developed, well nourished. Eyes: pupils equal, round reactive to light and accommodation. Ears: normal canal and TM's. Nose: nares patent, no lesions. Oral Cavity: mucosa moist.  Throat: clear. Neck/Thyroid: neck supple, full range of motion, no cervical lymphadenopathy, no thyromegaly or carotid bruits. Skin: warm and dry. No suspicious lesions. Heart: regular rate and rhythm. No murmurs. S1, S2 normal, no gallops. Rate 70  Lungs: clear to auscultation bilaterally. Abdomen: bowel sounds present, soft, nontender, nondistended, no masses or organomegaly. Musculoskeletal: normal, full range of motion in knees and hips, no swelling or tenderness. in my presence, and is both accurate and complete.

## 2020-03-03 NOTE — PATIENT INSTRUCTIONS
PLAN:  Labs reviewed, her TSH is suppressed. I will decrease her thyroid medication to 100 mcg Levothyroxine. She is doing well overall. I will see her back in 4 months with labs prior. I will check her thyroid at that time. SURVEY:    You may be receiving a survey from [employer] regarding your visit today. Please complete the survey to enable us to provide the highest quality of care to you and your family. If you cannot score us a very good on any question, please call the office to discuss how we could have made your experience a very good one. Thank you.

## 2020-03-23 RX ORDER — RANOLAZINE 500 MG/1
TABLET, EXTENDED RELEASE ORAL
Qty: 60 TABLET | Refills: 0 | Status: SHIPPED | OUTPATIENT
Start: 2020-03-23 | End: 2020-04-27

## 2020-04-27 RX ORDER — RANOLAZINE 500 MG/1
TABLET, EXTENDED RELEASE ORAL
Qty: 60 TABLET | Refills: 0 | Status: SHIPPED | OUTPATIENT
Start: 2020-04-27 | End: 2020-06-01

## 2020-05-20 ENCOUNTER — OFFICE VISIT (OUTPATIENT)
Dept: CARDIOLOGY | Age: 80
End: 2020-05-20
Payer: MEDICARE

## 2020-05-20 VITALS
WEIGHT: 164.2 LBS | HEART RATE: 80 BPM | SYSTOLIC BLOOD PRESSURE: 106 MMHG | OXYGEN SATURATION: 98 % | RESPIRATION RATE: 18 BRPM | HEIGHT: 65 IN | DIASTOLIC BLOOD PRESSURE: 70 MMHG | BODY MASS INDEX: 27.36 KG/M2

## 2020-05-20 PROCEDURE — 99214 OFFICE O/P EST MOD 30 MIN: CPT | Performed by: INTERNAL MEDICINE

## 2020-05-20 PROCEDURE — 93289 INTERROG DEVICE EVAL HEART: CPT | Performed by: INTERNAL MEDICINE

## 2020-05-20 NOTE — PATIENT INSTRUCTIONS
SURVEY:    You may be receiving a survey from [employer] regarding your visit today. Please complete the survey to enable us to provide the highest quality of care to you and your family. If you cannot score us a very good on any question, please call the office to discuss how we could have made your experience a very good one. Thank you.

## 2020-05-20 NOTE — PROGRESS NOTES
BRUSHINGS performed by José Sim DO at [address]  6/7/2017    BRONCHOSCOPY/TRANSBRONCHIAL LUNG BIOPSY performed by José Sim DO at [address]  6/7/2017    BRONCHOSCOPY FLUOROSCOPY performed by José Sim DO at [facility]   [address] [facility] [address] Right 06/17/2015    CATARACT REMOVAL WITH IMPLANT Right 08/14/2017    DR ROMERO    COLONOSCOPY  1/2005    DIAGNOSTIC CARDIAC CATH LAB PROCEDURE  06/17/15    EYE SURGERY      FRACTURE SURGERY  2004    Distal Radius Ulna    HIP FRACTURE SURGERY Right 04/21/2019    Dr. Ld Paula- hip pinning    HIP PINNING Right 4/21/2019    HIP PINNING performed by Guera Reeves MD at [address] [facility] Left 6/14/2017    MINI PORT ACCESS LEFT CHEST, X2 SPECIMENS.  performed by Breanna Brown MD at [address] [facility]  2years old    VOLVAR-ULCERATIVE LESION-CAUTERIZED    CA EGD TRANSORAL BIOPSY SINGLE/MULTIPLE Left 2/13/2018    EGD BIOPSY performed by Lolis Waters MD at [facility]    SKIN BIOPSY      TONSILLECTOMY AND ADENOIDECTOMY       Social History:    Social History     Tobacco Use   Smoking Status Never Smoker   Smokeless Tobacco Never Used     Family History   Problem Relation Age of Onset    Heart Disease Mother     Stroke Mother     High Blood Pressure Mother     Cancer Father         lung    Stroke Brother     Heart Disease Maternal Grandmother        Current Medications:  Outpatient Medications Marked as Taking for the 5/20/20 encounter (Office Visit) with Patt Covarrubias MD   Medication Sig Dispense Refill    ranolazine (RANEXA) 500 MG extended release tablet Take 1 tablet by mouth twice daily 60 tablet 0    levothyroxine (SYNTHROID) 100 MCG tablet Take 1 tablet by mouth daily 90 tablet 1    atorvastatin (LIPITOR) 40 MG tablet TAKE 1 TABLET BY MOUTH NIGHTLY 90 tablet 3    carvedilol (COREG) 6.25 MG tablet TAKE 1 TABLET BY MOUTH TWICE DAILY 180 tablet 3    ipratropium palpation. Extremities: No edema. No cyanosis or clubbing. 2+ radial and carotid pulses. Distal extremity pulses: 2+ bilaterally. DATA:    Lab Results   Component Value Date    ALT 8 02/25/2020    AST 15 02/25/2020    ALKPHOS 104 04/20/2019    BILITOT 0.48 04/20/2019     Lab Results   Component Value Date    CREATININE 0.67 02/25/2020    BUN 17 02/25/2020     02/25/2020    K 4.4 02/25/2020     02/25/2020    CO2 26 02/25/2020     Lab Results   Component Value Date    TSH 0.02 (L) 02/25/2020    B0KTMZV 11.1 02/25/2020     Lab Results   Component Value Date    WBC 6.3 02/25/2020    HGB 12.6 02/25/2020    HCT 41.9 02/25/2020    MCV 95.4 02/25/2020     02/25/2020       Lab Results   Component Value Date    TRIG 113 02/25/2020    TRIG 195 (H) 07/01/2019    TRIG 194 (H) 10/23/2018     Lab Results   Component Value Date    HDL 48 02/25/2020    HDL 44 07/01/2019    HDL 41 10/23/2018     Lab Results   Component Value Date    LDLCHOLESTEROL 43 02/25/2020    LDLCHOLESTEROL 54 07/01/2019    LDLCHOLESTEROL 37 10/23/2018         Assessment:      Diagnosis Orders   1. Ischemic cardiomyopathy     2. Chronic combined systolic and diastolic congestive heart failure ([address]     3. ASHD (arteriosclerotic heart disease)     4. Chronic a-fib  EKG 12 lead    RI INTERROG EVAL F2F 1/DUAL/MLT LEADS IMPLTBL DFB   5. ICD (implantable cardioverter-defibrillator) in place  EKG 12 lead    RI INTERROG EVAL F2F 1/DUAL/MLT LEADS IMPLTBL DFB   6. Pulmonary HTN     7. Presence of Watchman left atrial appendage closure device       PLAN:    Chronic Diastolic Heart Failure: Currently compensated  Beta Blocker: Continue Carvedilol 6.25 mg twice daily. Nonpharmacologic management of Heart Failure: I told her to continue wearing lower extremity compression stockings and I advised her to try and keep his legs up whenever possible and to limit salt in her diet.      I do not think she will tolerate low-dose ACE inhibitors or angiotensin receptor blocker. We struggled with this before and it has caused significant low blood pressure. Patient is telling me that she is feeling great, she did not feel that good for the past 3 years. Will plan on getting echo done at next visit. · Atherosclerotic Heart Disease:  · Antiplatelet Agent: Continue aspirin 81 mg daily  I also reminded her to watch for signs of blood in her stool or black tarry stools and stop the medication immediately if this develops as this could be life threatening. · Beta Blocker: Continue carvedilol (Coreg) 6.25 mg twice daily. · Anti-anginal medications: Continue ranolazine (Ranexa) 500 mg 2 times/day. · Statin Therapy: Continue atorvastatin (Lipitor) 40 mg nightly. · Chronic Atrial Fibrillation: Rate Control  · Beta Blocker: Continue carvedilol (Coreg) 6.25 mg twice daily. · Stroke Risk: Her CHADS2 score is 5/9 (6.7% stroke risk)  · Anticoagulation: Watchman in place. Off Coumadin. I did explain to her that watchman device does not prevent every single stroke. Patient said she had a CT scan done of the brain for her tremor and found to have a tiny stroke. · She continues to see neurology for her tremor. · Hyperlipidemia: Mixed  · Statin Therapy: Continue atorvastatin (Lipitor) 40 mg.    · Her most recent LDL is 54    Implantable Cardioverter Defibrillator (ICD): Indication for Device Placement: Biventricular ICD secondary to severe left ventricular systolic dysfunction and chronic atrial fibrillation  Interrogation Findings: Continue follow-up as previously scheduled. Finally, I recommended that she continue her other medications and follow up with you as previously scheduled. FOLLOW UP:  I told Ms. Jose Hardwick to call my office if she had any problems, but otherwise told her to Return in about 6 months (around 11/20/2020). However, I would be happy to see her sooner should the need arise. Sincerely,  Sully Castro MD, F.A.C.C.   [address] [place] [address]

## 2020-06-24 ENCOUNTER — TELEPHONE (OUTPATIENT)
Dept: CARDIOLOGY | Age: 80
End: 2020-06-24

## 2020-06-24 ENCOUNTER — NURSE ONLY (OUTPATIENT)
Dept: CARDIOLOGY | Age: 80
End: 2020-06-24
Payer: MEDICARE

## 2020-06-24 PROCEDURE — G2066 INTER DEVC REMOTE 30D: HCPCS | Performed by: INTERNAL MEDICINE

## 2020-06-24 PROCEDURE — 93297 REM INTERROG DEV EVAL ICPMS: CPT | Performed by: INTERNAL MEDICINE

## 2020-06-25 ENCOUNTER — TELEPHONE (OUTPATIENT)
Dept: CARDIOLOGY | Age: 80
End: 2020-06-25

## 2020-07-06 ENCOUNTER — HOSPITAL ENCOUNTER (OUTPATIENT)
Age: 80
Discharge: HOME | End: 2020-07-06
Payer: MEDICARE

## 2020-07-06 LAB
ALT SERPL-CCNC: 8 U/L (ref 5–33)
ANION GAP SERPL CALCULATED.3IONS-SCNC: 14 MMOL/L (ref 9–17)
AST SERPL-CCNC: 14 U/L
BUN BLDV-MCNC: 16 MG/DL (ref 8–23)
BUN/CREAT BLD: 18 (ref 9–20)
CALCIUM SERPL-MCNC: 9.3 MG/DL (ref 8.6–10.4)
CHLORIDE BLD-SCNC: 102 MMOL/L (ref 98–107)
CO2: 25 MMOL/L (ref 20–31)
CREAT SERPL-MCNC: 0.9 MG/DL (ref 0.5–0.9)
ESTIMATED AVERAGE GLUCOSE: 117 MG/DL
GFR AFRICAN AMERICAN: >60 ML/MIN
GFR NON-AFRICAN AMERICAN: >60 ML/MIN
GFR SERPL CREATININE-BSD FRML MDRD: ABNORMAL ML/MIN/{1.73_M2}
GFR SERPL CREATININE-BSD FRML MDRD: ABNORMAL ML/MIN/{1.73_M2}
GLUCOSE BLD-MCNC: 153 MG/DL (ref 70–99)
HBA1C MFR BLD: 5.7 % (ref 4.8–5.9)
HCT VFR BLD CALC: 44.5 % (ref 36.3–47.1)
HEMOGLOBIN: 13.4 G/DL (ref 11.9–15.1)
HIGH SENSITIVE C-REACTIVE PROTEIN: 4.1 MG/L
MCH RBC QN AUTO: 28 PG (ref 25.2–33.5)
MCHC RBC AUTO-ENTMCNC: 30.1 G/DL (ref 28.4–34.8)
MCV RBC AUTO: 92.9 FL (ref 82.6–102.9)
NRBC AUTOMATED: 0 PER 100 WBC
PDW BLD-RTO: 13.7 % (ref 11.8–14.4)
PLATELET # BLD: 246 K/UL (ref 138–453)
PMV BLD AUTO: 9.6 FL (ref 8.1–13.5)
POTASSIUM SERPL-SCNC: 4.7 MMOL/L (ref 3.7–5.3)
RBC # BLD: 4.79 M/UL (ref 3.95–5.11)
SODIUM BLD-SCNC: 141 MMOL/L (ref 135–144)
T4 TOTAL: 10.7 UG/DL (ref 4.5–10.9)
TSH SERPL DL<=0.05 MIU/L-ACNC: 0.04 MIU/L (ref 0.3–5)
WBC # BLD: 6.6 K/UL (ref 3.5–11.3)

## 2020-07-06 PROCEDURE — 83036 HEMOGLOBIN GLYCOSYLATED A1C: CPT

## 2020-07-06 PROCEDURE — 85027 COMPLETE CBC AUTOMATED: CPT

## 2020-07-06 PROCEDURE — 84443 ASSAY THYROID STIM HORMONE: CPT

## 2020-07-06 PROCEDURE — 84450 TRANSFERASE (AST) (SGOT): CPT

## 2020-07-06 PROCEDURE — 80048 BASIC METABOLIC PNL TOTAL CA: CPT

## 2020-07-06 PROCEDURE — 84436 ASSAY OF TOTAL THYROXINE: CPT

## 2020-07-06 PROCEDURE — 86141 C-REACTIVE PROTEIN HS: CPT

## 2020-07-06 PROCEDURE — 36415 COLL VENOUS BLD VENIPUNCTURE: CPT

## 2020-07-06 PROCEDURE — 84460 ALANINE AMINO (ALT) (SGPT): CPT

## 2020-07-13 ENCOUNTER — OFFICE VISIT (OUTPATIENT)
Dept: FAMILY MEDICINE CLINIC | Age: 80
End: 2020-07-13
Payer: MEDICARE

## 2020-07-13 VITALS
SYSTOLIC BLOOD PRESSURE: 116 MMHG | HEIGHT: 65 IN | BODY MASS INDEX: 27.16 KG/M2 | WEIGHT: 163 LBS | DIASTOLIC BLOOD PRESSURE: 72 MMHG

## 2020-07-13 PROBLEM — R25.1 LIMB TREMOR: Status: ACTIVE | Noted: 2018-10-26

## 2020-07-13 PROBLEM — I73.00 RAYNAUD'S PHENOMENON WITHOUT GANGRENE: Status: ACTIVE | Noted: 2020-07-13

## 2020-07-13 PROBLEM — G62.9 PERIPHERAL POLYNEUROPATHY: Status: ACTIVE | Noted: 2020-07-13

## 2020-07-13 PROBLEM — I87.2 VENOUS INSUFFICIENCY: Status: ACTIVE | Noted: 2020-07-13

## 2020-07-13 PROBLEM — B35.1 ONYCHOMYCOSIS: Status: ACTIVE | Noted: 2020-07-13

## 2020-07-13 PROCEDURE — 99214 OFFICE O/P EST MOD 30 MIN: CPT | Performed by: FAMILY MEDICINE

## 2020-07-13 RX ORDER — LEVOTHYROXINE SODIUM 0.07 MG/1
75 TABLET ORAL DAILY
Qty: 30 TABLET | Refills: 5 | Status: SHIPPED | OUTPATIENT
Start: 2020-07-13 | End: 2021-01-11

## 2020-07-13 NOTE — PATIENT INSTRUCTIONS
Folate       PLAN:  I reviewed her labs, her TSH is suppressed. I will change her dose to 75 mcg daily and I will recheck this with her next labs. She has some venous pooling and Raynaud's in her lower extremities. I recommend compression stockings, elevation and arch supports to help with the numbness/tingling. For her toe fungus I don't recommend treatment which could cause more issues than the fungus itself is right now  I will see her back in 4 months or sooner if needed      SURVEY:    You may be receiving a survey from [employer] regarding your visit today. Please complete the survey to enable us to provide the highest quality of care to you and your family. If you cannot score us a very good on any question, please call the office to discuss how we could have made your experience a very good one. Thank you.

## 2020-07-13 NOTE — PROGRESS NOTES
Kristopher VENEGAS [facility], am scribing for and in the presence of Dr. Anatoliy Ritchie. 7/13/20/11:07 am/JML     [address] [address]  [place] [id number]  Dept: [id number] Sterling Hayes is a [de-identified] y.o. female here for Follow-up (4 month regular check); Hypertension; Hyperlipidemia; and Diabetes      HPI:  HYPERTENSION:  She is not exercising. She is not adherent to a low-salt diet.    Blood pressure is not being monitored at home     HYPERLIPIDEMIA:     Medication compliance: compliant all of the time. Patient is not following a low fat, low cholesterol diet.    She is not exercising regularly.     COPD Symptoms:   Patient is not currently experiencing symptoms. She  reports that she has never smoked. She has never used smokeless tobacco. Current treatment includes nothing.     DIABETES MELLITUS:  Medication compliance:  n/a  Diabetic diet compliance:  compliant all of the time  Current exercise: no regular exercise  Frequency of testing: none  Eye exam current (within one year): yes, 1/2020  Diabetic foot check in the past year: Yes, today   reports that she has never smoked. She has never used smokeless tobacco.     Prior to Admission medications    Medication Sig Start Date End Date Taking?  Authorizing Provider   levothyroxine (SYNTHROID) 75 MCG tablet Take 1 tablet by mouth daily 7/13/20  Yes Anatoliy Ritchie MD   ranolazine (RANEXA) 500 MG extended release tablet Take 1 tablet by mouth twice daily 6/1/20  Yes Emely Samayoa MD   levothyroxine (SYNTHROID) 100 MCG tablet Take 1 tablet by mouth daily 3/3/20  Yes Anatoliy Ritchie MD   atorvastatin (LIPITOR) 40 MG tablet TAKE 1 TABLET BY MOUTH NIGHTLY 2/21/20  Yes Anatoliy Ritchie MD   carvedilol (COREG) 6.25 MG tablet TAKE 1 TABLET BY MOUTH TWICE DAILY 12/2/19  Yes Anatoliy Ritchie MD   ipratropium (ATROVENT) 0.06 % nasal spray USE 2 SPRAY(S) IN EACH NOSTRIL TWICE DAILY 10/4/19  Yes Anatoliy Ritchie MD   aspirin EC 81 MG EC tablet Take 1 tablet by mouth daily 11/28/18  Yes Sp Travis MD   acetaminophen (TYLENOL) 325 MG tablet Take 2 tablets by mouth every 4 hours as needed for Pain 2/20/18  Yes Juan Moeller MD   polyethylene glycol [facility]) powder Take 17 g by mouth as needed (for constipation)   Yes Historical Provider, MD       ROS:  General Constitutional: Denies chills. Denies fever. Denies headache. Denies lightheadedness. Ophthalmologic: Denies blurred vision. ENT: Denies nasal congestion. Denies sore throat. Denies ear pain and pressure. Respiratory: Denies cough. Denies shortness of breath. Denies wheezing. Cardiovascular: Denies chest pain at rest. Denies irregular heartbeat. Denies palpitations. Gastrointestinal: Denies abdominal pain. Denies blood in the stool. Denies constipation. Denies diarrhea. Denies nausea. Denies vomiting. Genitourinary: Denies blood in the urine. Denies difficulty urinating. Denies frequent urination. Denies painful urination. Denies urinary incontinence. Musculoskeletal: Denies muscle aches. Denies painful joints. Denies swollen joints. Peripheral Vascular: Denies pain/cramping in legs after exertion. Skin: Denies dry skin. Denies itching. Denies rash. Neurologic: Denies falls. Denies dizziness. Denies fainting. Admits tingling/numbness in both feet, L > R, states that her toenails look like they are falling off, worse at night  Psychiatric: Denies sleep disturbance. Denies anxiety. Denies depressed mood.     Past Surgical History:   Procedure Laterality Date    BRONCHOSCOPY  6/7/2017    BRONCHOSCOPY BRUSHINGS performed by Nancy Valladares DO at [facility]    BRONCHOSCOPY  6/7/2017    BRONCHOSCOPY/TRANSBRONCHIAL LUNG BIOPSY performed by Nancy Valladares DO at [facility]  6/7/2017    BRONCHOSCOPY FLUOROSCOPY performed by Nancy Valladares DO at [address] Right 06/17/2015    CATARACT REMOVAL WITH IMPLANT Right 08/14/2017    DR ROMERO  COLONOSCOPY  1/2005    DIAGNOSTIC CARDIAC CATH LAB PROCEDURE  06/17/15    EYE SURGERY      FRACTURE SURGERY  2004    Distal Radius Ulna    HIP FRACTURE SURGERY Right 04/21/2019    Dr. Elmo Montejo- hip pinning    HIP PINNING Right 4/21/2019    HIP PINNING performed by Lorie Grace MD at [address] [place] [address] Left 6/14/2017    MINI PORT ACCESS LEFT CHEST, X2 SPECIMENS. performed by Atif Vasquez MD at [place] [address]  2years old    VOLVAR-ULCERATIVE LESION-CAUTERIZED    [place] EGD TRANSORAL BIOPSY SINGLE/MULTIPLE Left 2/13/2018    EGD BIOPSY performed by Santo Jones MD at [facility]    SKIN BIOPSY      TONSILLECTOMY AND ADENOIDECTOMY         Family History   Problem Relation Age of Onset    Heart Disease Mother     Stroke Mother     High Blood Pressure Mother     Cancer Father         lung    Stroke Brother     Heart Disease Maternal Grandmother        Past Medical History:   Diagnosis Date    Allergic rhinitis 10/1994    Arthritis     Asthma     Atrial fibrillation ([address] 12/2008    CAD (coronary artery disease)     Cerebrovascular accident (CVA) involving left cerebral hemisphere ([address] 04/23/2019    remote lacunar infarction within the L periventricular white matter    CHF (congestive heart failure) ([address]     Clotting disorder (HCC)     plebitis in L leg    COPD (chronic obstructive pulmonary disease) ([address]     DDD (degenerative disc disease), cervical     Degeneration of cervical intervertebral disc     Diverticulosis 2005    Gout     Gout, unspecified     H/O cardiac catheterization 6/17/15    LMCA: Normal 0% stenosis. LAD: Lesion on 1st diag: Proximal subsection. 80% stenosis. Lesion plaque is ruptured. Ricka Distad LCx: Lesion on 1st Ob Leslie: Mid subsection. 85% stenosis. RCA:Lesion on R PDA: Mid subsection. 85% stenosis. Lesion on R PDA: Distal subsection. 70% stenosis. Lesion on Prox RCA: Mid subsection. 50% stenosis. EF 50%.     H/O echocardiogram 11/09/2016    EF 40-45%. Mild LV hypertrophy normal LV cavity size. Sigmoid interventricular septum without evidence of outflow tract obstruction. Left atrium is moderately dilated (34-39) left atrial volume index of 36 ml/m2. Mild mitral regurg. Diastology cannot be assessed due to A-fib.  H/O echocardiogram 03/09/2018    EF 45-50%. Apical hypokinesis noted. The left atrium is moderately dilated (34-39) with a left atrial volume index of 34ml/m2. Mild to moderate mitral regurg. Mild tricuspid regurg. Moderate diastolic dysfunction.  History of Holter monitoring 3/24/16    Atrial Fibrillation throughout,fairly controlled, HR  bpm 79% of the time. Occasional high ventricular rate episodes and multiple pauses suggestive of tachycardia/bradycardia syndrome  Msximum R-R interval 2.68 seconds.    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    Long term (current) use of anticoagulants 8/31/2015    Movement disorder     Other abnormal glucose 2004    Pacemaker 08/10/2017    Insertion of a biventricular pacemaker/ICD AVN ablation    Phlebitis and thrombophlebitis of lower extremities, unspecified 1961    L leg    Senile osteoporosis 2006    L/S    Symptomatic menopausal or female climacteric states 06/1995    Syncope and collapse     Unspecified hereditary and idiopathic peripheral neuropathy 2012    Unspecified sleep apnea 11/2009      Social History     Tobacco Use    Smoking status: Never Smoker    Smokeless tobacco: Never Used   Substance Use Topics    Alcohol use: No     Alcohol/week: 0.0 standard drinks      Current Outpatient Medications   Medication Sig Dispense Refill    levothyroxine (SYNTHROID) 75 MCG tablet Take 1 tablet by mouth daily 30 tablet 5    ranolazine (RANEXA) 500 MG extended release tablet Take 1 tablet by mouth twice daily 180 tablet 3    levothyroxine (SYNTHROID) 100 MCG tablet Take 1 tablet by mouth daily 90 tablet 1    atorvastatin (LIPITOR) 40 MG tablet TAKE 1 TABLET BY MOUTH NIGHTLY 90 tablet 3    carvedilol (COREG) 6.25 MG tablet TAKE 1 TABLET BY MOUTH TWICE DAILY 180 tablet 3    ipratropium (ATROVENT) 0.06 % nasal spray USE 2 SPRAY(S) IN EACH NOSTRIL TWICE DAILY 3 Bottle 3    aspirin EC 81 MG EC tablet Take 1 tablet by mouth daily 90 tablet 3    acetaminophen (TYLENOL) 325 MG tablet Take 2 tablets by mouth every 4 hours as needed for Pain 120 tablet 3    polyethylene glycol (GLYCOLAX) powder Take 17 g by mouth as needed (for constipation)       No current facility-administered medications for this visit. Allergies   Allergen Reactions    Adhesive Tape     Aspercreme [Trolamine Salicylate]     Erythromycin Other (See Comments)     \"whole in stomach\"    Erythromycin Other (See Comments)     stomach pain    Guaifenesin & Derivatives     Niacin And Related     Niacin And Related Hives    Tape [Adhesive Tape] Dermatitis    Augmentin [Amoxicillin-Pot Clavulanate] Rash       PHYSICAL EXAM:    /72   Ht 5' 5\" (1.651 m)   Wt 163 lb (73.9 kg)   BMI 27.12 kg/m²   Wt Readings from Last 3 Encounters:   07/13/20 163 lb (73.9 kg)   05/20/20 164 lb 3.2 oz (74.5 kg)   03/03/20 156 lb (70.8 kg)     BP Readings from Last 3 Encounters:   07/13/20 116/72   05/20/20 106/70   03/03/20 128/82       General Appearance: in no acute distress, well developed, well nourished, ambulates with cane  Eyes: pupils equal, round reactive to light and accommodation. Wearing glasses  Ears: normal canal and TM's. Nose: nares patent, no lesions. Oral Cavity: mucosa dry (used Atrovent nasal spray this AM)  Throat: clear. Neck/Thyroid: neck supple, full range of motion, no cervical lymphadenopathy, no thyromegaly or carotid bruits. Skin: warm and dry. No suspicious lesions. Heart: regular rate and rhythm. No murmurs.  S1, S2 normal, no gallops, supports to help with the numbness/tingling. For her toe fungus I don't recommend treatment which could cause more issues than the fungus itself is right now  I will see her back in 4 months or sooner if needed    Orders Placed This Encounter   Procedures    ALT     Standing Status:   Future     Standing Expiration Date:   7/13/2021    AST     Standing Status:   Future     Standing Expiration Date:   7/13/2021    Vitamin B12     Standing Status:   Future     Standing Expiration Date:   7/13/2021    Folate     Standing Status:   Future     Standing Expiration Date:   7/13/2021    CBC     Standing Status:   Future     Standing Expiration Date:   7/13/2021    Hemoglobin A1C     Standing Status:   Future     Standing Expiration Date:   7/13/2021    Lipid Panel     Standing Status:   Future     Standing Expiration Date:   7/13/2021     Order Specific Question:   Is Patient Fasting?/# of Hours     Answer:   no fasting    T4     Standing Status:   Future     Standing Expiration Date:   7/13/2021    TSH without Reflex     Standing Status:   Future     Standing Expiration Date:   7/13/2021    HM DIABETES FOOT EXAM     Orders Placed This Encounter   Medications    levothyroxine (SYNTHROID) 75 MCG tablet     Sig: Take 1 tablet by mouth daily     Dispense:  30 tablet     Refill:  5   I, Dr. Elise Mccann, personally performed the services described in this documentation as scribed by JYOTI Vasquez in my presence, and is both accurate and complete.

## 2020-07-28 ENCOUNTER — NURSE ONLY (OUTPATIENT)
Dept: CARDIOLOGY | Age: 80
End: 2020-07-28
Payer: MEDICARE

## 2020-07-28 ENCOUNTER — TELEPHONE (OUTPATIENT)
Dept: CARDIOLOGY | Age: 80
End: 2020-07-28

## 2020-07-28 PROCEDURE — G2066 INTER DEVC REMOTE 30D: HCPCS | Performed by: INTERNAL MEDICINE

## 2020-07-28 PROCEDURE — 93297 REM INTERROG DEV EVAL ICPMS: CPT | Performed by: INTERNAL MEDICINE

## 2020-07-28 NOTE — TELEPHONE ENCOUNTER
Called pt to ask her to send us over manual download when she has time today. Pt verbalized understanding. Thanks!

## 2020-07-29 ENCOUNTER — TELEPHONE (OUTPATIENT)
Dept: CARDIOLOGY | Age: 80
End: 2020-07-29

## 2020-09-01 ENCOUNTER — NURSE ONLY (OUTPATIENT)
Dept: CARDIOLOGY | Age: 80
End: 2020-09-01
Payer: MEDICARE

## 2020-09-01 ENCOUNTER — TELEPHONE (OUTPATIENT)
Dept: CARDIOLOGY | Age: 80
End: 2020-09-01

## 2020-09-01 PROCEDURE — G2066 INTER DEVC REMOTE 30D: HCPCS | Performed by: INTERNAL MEDICINE

## 2020-09-01 PROCEDURE — 93297 REM INTERROG DEV EVAL ICPMS: CPT | Performed by: INTERNAL MEDICINE

## 2020-10-06 ENCOUNTER — NURSE ONLY (OUTPATIENT)
Dept: CARDIOLOGY | Age: 80
End: 2020-10-06
Payer: MEDICARE

## 2020-10-06 ENCOUNTER — TELEPHONE (OUTPATIENT)
Dept: CARDIOLOGY | Age: 80
End: 2020-10-06

## 2020-10-06 PROCEDURE — 93296 REM INTERROG EVL PM/IDS: CPT | Performed by: INTERNAL MEDICINE

## 2020-10-06 PROCEDURE — 93295 DEV INTERROG REMOTE 1/2/MLT: CPT | Performed by: INTERNAL MEDICINE

## 2020-11-05 ENCOUNTER — HOSPITAL ENCOUNTER (OUTPATIENT)
Age: 80
Discharge: HOME | End: 2020-11-05
Payer: MEDICARE

## 2020-11-05 LAB
ALT SERPL-CCNC: 9 U/L (ref 5–33)
AST SERPL-CCNC: 15 U/L
CHOLESTEROL/HDL RATIO: 3.1
CHOLESTEROL: 144 MG/DL
ESTIMATED AVERAGE GLUCOSE: 117 MG/DL
FOLATE: 7.3 NG/ML
HBA1C MFR BLD: 5.7 % (ref 4–6)
HCT VFR BLD CALC: 43.8 % (ref 36.3–47.1)
HDLC SERPL-MCNC: 47 MG/DL
HEMOGLOBIN: 13.4 G/DL (ref 11.9–15.1)
LDL CHOLESTEROL: 60 MG/DL (ref 0–130)
MCH RBC QN AUTO: 29.1 PG (ref 25.2–33.5)
MCHC RBC AUTO-ENTMCNC: 30.6 G/DL (ref 28.4–34.8)
MCV RBC AUTO: 95 FL (ref 82.6–102.9)
NRBC AUTOMATED: 0 PER 100 WBC
PDW BLD-RTO: 14 % (ref 11.8–14.4)
PLATELET # BLD: 219 K/UL (ref 138–453)
PMV BLD AUTO: 9.6 FL (ref 8.1–13.5)
RBC # BLD: 4.61 M/UL (ref 3.95–5.11)
T4 TOTAL: 10.7 UG/DL (ref 4.5–10.9)
TRIGL SERPL-MCNC: 185 MG/DL
TSH SERPL DL<=0.05 MIU/L-ACNC: 0.38 MIU/L (ref 0.3–5)
VITAMIN B-12: 197 PG/ML (ref 232–1245)
VLDLC SERPL CALC-MCNC: ABNORMAL MG/DL (ref 1–30)
WBC # BLD: 8.6 K/UL (ref 3.5–11.3)

## 2020-11-05 PROCEDURE — 84450 TRANSFERASE (AST) (SGOT): CPT

## 2020-11-05 PROCEDURE — 83036 HEMOGLOBIN GLYCOSYLATED A1C: CPT

## 2020-11-05 PROCEDURE — 82607 VITAMIN B-12: CPT

## 2020-11-05 PROCEDURE — 82746 ASSAY OF FOLIC ACID SERUM: CPT

## 2020-11-05 PROCEDURE — 84443 ASSAY THYROID STIM HORMONE: CPT

## 2020-11-05 PROCEDURE — 80061 LIPID PANEL: CPT

## 2020-11-05 PROCEDURE — 36415 COLL VENOUS BLD VENIPUNCTURE: CPT

## 2020-11-05 PROCEDURE — 85027 COMPLETE CBC AUTOMATED: CPT

## 2020-11-05 PROCEDURE — 84460 ALANINE AMINO (ALT) (SGPT): CPT

## 2020-11-05 PROCEDURE — 84436 ASSAY OF TOTAL THYROXINE: CPT

## 2020-11-10 ENCOUNTER — NURSE ONLY (OUTPATIENT)
Dept: CARDIOLOGY | Age: 80
End: 2020-11-10
Payer: MEDICARE

## 2020-11-10 PROCEDURE — G2066 INTER DEVC REMOTE 30D: HCPCS | Performed by: INTERNAL MEDICINE

## 2020-11-10 PROCEDURE — 93297 REM INTERROG DEV EVAL ICPMS: CPT | Performed by: INTERNAL MEDICINE

## 2020-11-11 ENCOUNTER — TELEPHONE (OUTPATIENT)
Dept: CARDIOLOGY | Age: 80
End: 2020-11-11

## 2020-11-16 ENCOUNTER — OFFICE VISIT (OUTPATIENT)
Dept: FAMILY MEDICINE CLINIC | Age: 80
End: 2020-11-16
Payer: MEDICARE

## 2020-11-16 VITALS
BODY MASS INDEX: 27.99 KG/M2 | WEIGHT: 168 LBS | HEART RATE: 86 BPM | DIASTOLIC BLOOD PRESSURE: 80 MMHG | SYSTOLIC BLOOD PRESSURE: 132 MMHG | HEIGHT: 65 IN

## 2020-11-16 PROCEDURE — G0008 ADMIN INFLUENZA VIRUS VAC: HCPCS | Performed by: FAMILY MEDICINE

## 2020-11-16 PROCEDURE — 90694 VACC AIIV4 NO PRSRV 0.5ML IM: CPT | Performed by: FAMILY MEDICINE

## 2020-11-16 PROCEDURE — G0439 PPPS, SUBSEQ VISIT: HCPCS | Performed by: FAMILY MEDICINE

## 2020-11-16 ASSESSMENT — LIFESTYLE VARIABLES: HOW OFTEN DO YOU HAVE A DRINK CONTAINING ALCOHOL: 0

## 2020-11-16 ASSESSMENT — PATIENT HEALTH QUESTIONNAIRE - PHQ9
2. FEELING DOWN, DEPRESSED OR HOPELESS: 0
SUM OF ALL RESPONSES TO PHQ QUESTIONS 1-9: 0
1. LITTLE INTEREST OR PLEASURE IN DOING THINGS: 0
SUM OF ALL RESPONSES TO PHQ9 QUESTIONS 1 & 2: 0

## 2020-11-16 NOTE — PATIENT INSTRUCTIONS
plan:  She had some difficulty with the 3D cuboid drawing however, she reports that she is able to quilt at home routinely without any issues. Given that she is still able to do this, I am not going to get too concerned about one single drawing. For now, she is managing her lower extremity edema with elevation of the feet because the compression hose bother her. Her B12 dose is a little on the low side. I would like her to take 500 mcg daily to see if this will be enough to get this back in normal range. If this is not enough, I will have to have her get injections. I will re-check this level again in 4 months. The tremor is a parkinson's type tremor but as long as she is not developing any of the other signs of parkinson's, We will just watch this closely. I will see her back in 4 months. SURVEY:    You may be receiving a survey from [employer] regarding your visit today. Please complete the survey to enable us to provide the highest quality of care to you and your family. If you cannot score us a very good on any question, please call the office to discuss how we could of made your experience a very good one. Thank you. Patient Education        Influenza (Flu) Vaccine (Inactivated or Recombinant): What You Need to Know  Why get vaccinated? Influenza vaccine can prevent influenza (flu). Flu is a contagious disease that spreads around the [place] every year, usually between October and May. Anyone can get the flu, but it is more dangerous for some people. Infants and young children, people 72years of age and older, pregnant women, and people with certain health conditions or a weakened immune system are at greatest risk of flu complications. Pneumonia, bronchitis, sinus infections and ear infections are examples of flu-related complications. If you have a medical condition, such as heart disease, cancer or diabetes, flu can make it worse.   Flu can cause fever and chills, sore throat, muscle aches, fatigue, cough, headache, and runny or stuffy nose. Some people may have vomiting and diarrhea, though this is more common in children than adults. Each year, thousands of people in the [facility] die from flu, and many more are hospitalized. Flu vaccine prevents millions of illnesses and flu-related visits to the doctor each year. Influenza vaccine  CDC recommends everyone 10months of age and older get vaccinated every flu season. Children 6 months through 6years of age may need 2 doses during a single flu season. Everyone else needs only 1 dose each flu season. It takes about 2 weeks for protection to develop after vaccination. There are many flu viruses, and they are always changing. Each year a new flu vaccine is made to protect against three or four viruses that are likely to cause disease in the upcoming flu season. Even when the vaccine doesn't exactly match these viruses, it may still provide some protection. Influenza vaccine does not cause flu. Influenza vaccine may be given at the same time as other vaccines. Talk with your health care provider  Tell your vaccine provider if the person getting the vaccine:  · Has had an allergic reaction after a previous dose of influenza vaccine, or has any severe, life-threatening allergies. · Has ever had Guillain-Barré Syndrome (also called GBS). In some cases, your health care provider may decide to postpone influenza vaccination to a future visit. People with minor illnesses, such as a cold, may be vaccinated. People who are moderately or severely ill should usually wait until they recover before getting influenza vaccine. Your health care provider can give you more information. Risks of a vaccine reaction  · Soreness, redness, and swelling where shot is given, fever, muscle aches, and headache can happen after influenza vaccine.   · There may be a very small increased risk of Guillain-Barré Syndrome (GBS) after inactivated influenza vaccine (the flu shot). [facility] children who get the flu shot along with pneumococcal vaccine (PCV13), and/or DTaP vaccine at the same time might be slightly more likely to have a seizure caused by fever. Tell your health care provider if a child who is getting flu vaccine has ever had a seizure. People sometimes faint after medical procedures, including vaccination. Tell your provider if you feel dizzy or have vision changes or ringing in the ears. As with any medicine, there is a very remote chance of a vaccine causing a severe allergic reaction, other serious injury, or death. What if there is a serious problem? An allergic reaction could occur after the vaccinated person leaves the clinic. If you see signs of a severe allergic reaction (hives, swelling of the face and throat, difficulty breathing, a fast heartbeat, dizziness, or weakness), call 9-1-1 and get the person to the nearest hospital.  For other signs that concern you, call your health care provider. Adverse reactions should be reported to the Vaccine Adverse Event Reporting System (VAERS). Your health care provider will usually file this report, or you can do it yourself. Visit the VAERS website at www.vaers. hhs.gov or call 2-279.975.2305. VAERS is only for reporting reactions, and VAERS staff do not give medical advice. The National Vaccine Injury Compensation Program  The National Vaccine Injury Compensation Program (VICP) is a federal program that was created to compensate people who may have been injured by certain vaccines. Visit the VICP website at www.hrsa.gov/vaccinecompensation or call 4-364.508.3063 to learn about the program and about filing a claim. There is a time limit to file a claim for compensation. How can I learn more? · Ask your healthcare provider. · Call your local or state health department. · Contact the Centers for Disease Control and Prevention (CDC):  ? Call 6-732.338.4857 (2-068-HAK-INFO) or  ?  Visit CDC's website at www.cdc.gov/flu  Vaccine Information Statement (Interim)  Inactivated Influenza Vaccine  8/15/2019  42 ARMANDO Tavares [id number]-31  Department of Health and Human Services  Centers for Disease Control and Prevention  Many Vaccine Information Statements are available in Georgian and other languages. See www.immunize.org/vis. Muchas hojas de información sobre vacunas están disponibles en español y en otros idiomas. Visite www.immunize.org/vis. Care instructions adapted under license by [facility] [place] ([facility]). If you have questions about a medical condition or this instruction, always ask your healthcare professional. [address] any warranty or liability for your use of this information.

## 2020-11-16 NOTE — PROGRESS NOTES
ADAM Daniel am scribing for and in the presence of Dr. Mitchel Phelps MD. 2020 11:07 am [address]       Medicare Annual Wellness Visit  Name: Ramón Powell Date: 2020   MRN: [id number] Sex: Female   Age: 79 y.o. Ethnicity: Non-/Non    : 1940 Race: White      Marisel Rodriguez is here for Medicare AWV    HYPERTENSION:  She is not exercising. She is not adherent to a low-salt diet.    Blood pressure is not being monitored at home     HYPERLIPIDEMIA:     Medication compliance: compliant all of the time. Patient is not following a low fat, low cholesterol diet.    She is not exercising regularly.     COPD Symptoms:   Patient is not currently experiencing symptoms. She  reports that she has never smoked. She has never used smokeless tobacco. Current treatment includes nothing.     DIABETES MELLITUS:  Medication compliance:  n/a  Diabetic diet compliance:  compliant all of the time  Current exercise: no regular exercise  Frequency of testing: none  Eye exam current (within one year): yes, 2020  Diabetic foot check in the past year: Yes, 2020   reports that she has never smoked. She has never used smokeless tobacco.   Screenings for behavioral, psychosocial and functional/safety risks, and cognitive dysfunction are all negative except as indicated below. These results, as well as other patient data from the [address] form, are documented in Flowsheets linked to this Encounter. Allergies   Allergen Reactions    Adhesive Tape     Aspercreme [Trolamine Salicylate]     Erythromycin Other (See Comments)     \"whole in stomach\"    Erythromycin Other (See Comments)     stomach pain    Guaifenesin & Derivatives     Niacin And Related     Niacin And Related Hives    Tape [Adhesive Tape] Dermatitis    Augmentin [Amoxicillin-Pot Clavulanate] Rash         Prior to Visit Medications    Medication Sig Taking?  Authorizing Provider   levothyroxine (SYNTHROID) 75 MCG tablet Take 1 tablet by mouth daily Yes Jessica Brenner MD   ranolazine (RANEXA) 500 MG extended release tablet Take 1 tablet by mouth twice daily Yes Jim Almonte MD   levothyroxine (SYNTHROID) 100 MCG tablet Take 1 tablet by mouth daily Yes Jessica Brenner MD   atorvastatin (LIPITOR) 40 MG tablet TAKE 1 TABLET BY MOUTH NIGHTLY Yes Jessica Brenner MD   carvedilol (COREG) 6.25 MG tablet TAKE 1 TABLET BY MOUTH TWICE DAILY Yes Jessica Brenner MD   ipratropium (ATROVENT) 0.06 % nasal spray USE 2 SPRAY(S) IN EACH NOSTRIL TWICE DAILY Yes Jessica Brenner MD   aspirin EC 81 MG EC tablet Take 1 tablet by mouth daily Yes Jim Almonte MD   acetaminophen (TYLENOL) 325 MG tablet Take 2 tablets by mouth every 4 hours as needed for Pain Yes Annette Colvin MD   polyethylene glycol (GLYCOLAX) powder Take 17 g by mouth as needed (for constipation) Yes Historical Provider, MD         Past Medical History:   Diagnosis Date    Allergic rhinitis 10/1994    Arthritis     Asthma     Atrial fibrillation ([address] 12/2008    CAD (coronary artery disease)     Cerebrovascular accident (CVA) involving left cerebral hemisphere ([address] 04/23/2019    remote lacunar infarction within the L periventricular white matter    CHF (congestive heart failure) ([address]     Clotting disorder ([address]     plebitis in L leg    COPD (chronic obstructive pulmonary disease) ([address]     DDD (degenerative disc disease), cervical     Degeneration of cervical intervertebral disc     Diverticulosis 2005    Gout     Gout, unspecified     H/O cardiac catheterization 6/17/15    LMCA: Normal 0% stenosis. LAD: Lesion on 1st diag: Proximal subsection. 80% stenosis. Lesion plaque is ruptured. Debbie Dye LCx: Lesion on 1st Ob Leslie: Mid subsection. 85% stenosis. RCA:Lesion on R PDA: Mid subsection. 85% stenosis. Lesion on R PDA: Distal subsection. 70% stenosis. Lesion on Prox RCA: Mid subsection. 50% stenosis. EF 50%.  H/O echocardiogram 11/09/2016    EF 40-45%.  Mild LV hypertrophy normal LV cavity size. Sigmoid interventricular septum without evidence of outflow tract obstruction. Left atrium is moderately dilated (34-39) left atrial volume index of 36 ml/m2. Mild mitral regurg. Diastology cannot be assessed due to A-fib.  H/O echocardiogram 03/09/2018    EF 45-50%. Apical hypokinesis noted. The left atrium is moderately dilated (34-39) with a left atrial volume index of 34ml/m2. Mild to moderate mitral regurg. Mild tricuspid regurg. Moderate diastolic dysfunction.  History of Holter monitoring 3/24/16    Atrial Fibrillation throughout,fairly controlled, HR  bpm 79% of the time. Occasional high ventricular rate episodes and multiple pauses suggestive of tachycardia/bradycardia syndrome  Msximum R-R interval 2.68 seconds.    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    Long term (current) use of anticoagulants 8/31/2015    Movement disorder     Other abnormal glucose 2004    Pacemaker 08/10/2017    Insertion of a biventricular pacemaker/ICD AVN ablation    Phlebitis and thrombophlebitis of lower extremities, unspecified 1961    L leg    Senile osteoporosis 2006    L/S    Symptomatic menopausal or female climacteric states 06/1995    Syncope and collapse     Unspecified hereditary and idiopathic peripheral neuropathy 2012    Unspecified sleep apnea 11/2009       Past Surgical History:   Procedure Laterality Date    BRONCHOSCOPY  6/7/2017    BRONCHOSCOPY BRUSHINGS performed by Maria Antonia Aguiar DO at [address] [place] [address]  6/7/2017    BRONCHOSCOPY/TRANSBRONCHIAL LUNG BIOPSY performed by Maria Antonia Aguiar DO at [address] [place] [address]  6/7/2017    BRONCHOSCOPY FLUOROSCOPY performed by Maria Antonia Aguiar DO at [facility] Right 06/17/2015    CATARACT REMOVAL WITH IMPLANT Right 08/14/2017    DR ROMERO    COLONOSCOPY  1/2005    DIAGNOSTIC CARDIAC CATH LAB PROCEDURE  06/17/15    EYE SURGERY      FRACTURE SURGERY  2004    Distal Radius Ulna    HIP FRACTURE SURGERY Right 04/21/2019    Dr. Karthik Simental- hip pinning    HIP PINNING Right 4/21/2019    HIP PINNING performed by Silke Fregoso MD at [address] [facility] Left 6/14/2017    MINI PORT ACCESS LEFT CHEST, X2 SPECIMENS. performed by Esa Andre MD at [address] [facility]  2years old    VOLVAR-ULCERATIVE LESION-CAUTERIZED    CA EGD TRANSORAL BIOPSY SINGLE/MULTIPLE Left 2/13/2018    EGD BIOPSY performed by Kelly Ashford MD at [facility]    SKIN BIOPSY      TONSILLECTOMY AND ADENOIDECTOMY           Family History   Problem Relation Age of Onset    Heart Disease Mother     Stroke Mother     High Blood Pressure Mother     Cancer Father         lung    Stroke Brother     Heart Disease Maternal Grandmother        CareTeam (Including outside providers/suppliers regularly involved in providing care):   Patient Care Team:  Dianna Macias MD as PCP - [facility]  Dianna Macias MD as PCP - [facility]aneled Provider    Wt Readings from Last 3 Encounters:   11/16/20 168 lb (76.2 kg)   07/13/20 163 lb (73.9 kg)   05/20/20 164 lb 3.2 oz (74.5 kg)     Vitals:    11/16/20 1052   BP: 132/80   Pulse: 86   Weight: 168 lb (76.2 kg)   Height: 5' 5\" (1.651 m)     Body mass index is 27.96 kg/m². Based upon direct observation of the patient, evaluation of cognition reveals recent and remote memory intact.     General Appearance: alert and oriented to person, place and time, well developed and well- nourished, in no acute distress  Skin: warm and dry, no rash or erythema  Head: normocephalic and atraumatic  Eyes: pupils equal, round, and reactive to light, extraocular eye movements intact, conjunctivae normal  ENT: tympanic membrane, external ear and ear canal normal bilaterally, nose without deformity, nasal mucosa and turbinates 05/29/2019    Shingles Vaccine (2 of 3) 03/03/2021 (Originally 8/15/2016)    Potassium monitoring  07/06/2021    Creatinine monitoring  07/06/2021    Lipid screen  11/05/2021    TSH testing  11/05/2021    DEXA (modify frequency per FRAX score)  Completed    Flu vaccine  Completed    Pneumococcal 65+ years Vaccine  Completed    Hepatitis A vaccine  Aged Out    Hib vaccine  Aged Out    Meningococcal (ACWY) vaccine  Aged Out     Recommendations for zkipster Due: see orders and patient instructions/AVS.  . Recommended screening schedule for the next 5-10 years is provided to the patient in written form: see Patient Jacqueline  was seen today for medicare awv. Diagnoses and all orders for this visit:    Flu vaccine need  -     INFLUENZA, QUADV, ADJUVANTED, 65 YRS =, IM, PF, PREFILL SYR, 0.5ML (FLUAD)    Essential hypertension  -     T4, Free; Future  -     Hemoglobin A1C; Future  -     CBC With Auto Differential; Future  -     ALT; Future  -     AST; Future  -     Vitamin B12; Future  -     Folate; Future    Hyperlipidemia, unspecified hyperlipidemia type  -     T4, Free; Future  -     Hemoglobin A1C; Future  -     CBC With Auto Differential; Future  -     ALT; Future  -     AST; Future  -     Vitamin B12; Future  -     Folate; Future    Hyperglycemia  -     T4, Free; Future  -     Hemoglobin A1C; Future  -     CBC With Auto Differential; Future  -     ALT; Future  -     AST; Future  -     Vitamin B12; Future  -     Folate; Future    Hypothyroidism, unspecified type  -     T4, Free; Future  -     TSH without Reflex; Future    B12 deficiency  -     Vitamin B12; Future  -     Folate;  Future    Paroxysmal A-fib (HCC)    Parkinsonian tremor (HCC)    Pulmonary fibrosis (HCC)    Asthma, unspecified asthma severity, unspecified whether complicated, unspecified whether persistent    Type 2 diabetes mellitus without complication, unspecified whether long term insulin use [facility])    Peripheral polyneuropathy    Encounter for annual wellness exam in Medicare patient        plan:  She had some difficulty with the 3D cuboid drawing however, she reports that she is able to quilt at home routinely without any issues. Given that she is still able to do this, I am not going to get too concerned about one single drawing. For now, she is managing her lower extremity edema with elevation of the feet because the compression hose bother her. Her B12 dose is a little on the low side. I would like her to take 500 mcg daily to see if this will be enough to get this back in normal range. If this is not enough, I will have to have her get injections. I will re-check this level again in 4 months. The tremor is a parkinson's type tremor but as long as she is not developing any of the other signs of parkinson's, We will just watch this closely. I will see her back in 4 months. I, Dr. Andrey Herbert, personally performed the services described in this documentation as scribed by ADAM Amaro in my presence, and is both accurate and complete.

## 2020-11-20 ENCOUNTER — OFFICE VISIT (OUTPATIENT)
Dept: CARDIOLOGY | Age: 80
End: 2020-11-20
Payer: MEDICARE

## 2020-11-20 VITALS
OXYGEN SATURATION: 98 % | BODY MASS INDEX: 28.29 KG/M2 | HEIGHT: 65 IN | SYSTOLIC BLOOD PRESSURE: 126 MMHG | RESPIRATION RATE: 16 BRPM | WEIGHT: 169.8 LBS | HEART RATE: 80 BPM | DIASTOLIC BLOOD PRESSURE: 84 MMHG

## 2020-11-20 PROCEDURE — 99214 OFFICE O/P EST MOD 30 MIN: CPT | Performed by: INTERNAL MEDICINE

## 2020-11-20 RX ORDER — CHOLECALCIFEROL (VITAMIN D3) 125 MCG
500 CAPSULE ORAL DAILY
COMMUNITY

## 2020-11-20 NOTE — PATIENT INSTRUCTIONS
SURVEY:    You may be receiving a survey from [employer] regarding your visit today. Please complete the survey to enable us to provide the highest quality of care to you and your family. If you cannot score us a very good on any question, please call the office to discuss how we could have made your experience a very good one. Thank you.

## 2020-11-20 NOTE — PROGRESS NOTES
Joette Blizzard am scribing for and in the presence of Clint Vasquez MD, F.A.C.C..    Subjective:     CHIEF COMPLAINT / HPI:   Chief Complaint   Patient presents with    6 Month Follow-Up     Hx:Isch. Cardiomyopathy,ASHD,Chr. AFib,Pulm HTN,Watchman. She has been doing good. Has been quilting again. Some SOB. Denies: CP, Lightheaded/dizziness,Palpitations. Dear Dr. Bucky Yu MD     I had the pleasure of seeing Ms. Loreta Hearn for follow-up. Kacie Lucas is 79 y.o.  with PMH of hypertension, hyperlipidemia, asthma and chronic atrial fibrillation. Her most recent cardiac catheterization showed moderate to severe three-vessel coronary artery disease as outlined below. 1-Patient's chronic atrial fibrillation is  Treated with rate control and anticoagulation with coumadin. Her Holter monitor on 4/9/2015 suggested tachycardia-bradycardia syndrome. 2-On 3/16/2016, patient was sent from cardiac rehab because of not feeling well. Imdur 30 mg daily was added as well as Ranexa 500 mg BID. 3-Patient admitted to [facility] from 6/3/2017 to 6/15/2017 with worsening shortness of breath diagnosed with pneumonitis, status post lung biopsy. Currently on prednisone. 4-Another admission to [facility] from 6/29/2017 to 7/6/2017 with A. Fib with RVR and acute on chronic diastolic CHF. Patient started on amiodarone during this admission in addition to her ratel control and diuresis. 5-Another admission to [facility] from 7/11/2017 to 7/14/2017 with recurrent fall and black eye. 3 falling episodes, 2 of them she hit her head. She is not sure if she lost consciousness. MRI of the brain was negative. 6-Patient underwent AV node ablation and insertion of biventricular ICD by Dr. Stacey Hudson on 8/10/2017. 7-On 9/18/2017 patient underwent insertion of a Watchman device.  currently off anticoagulation     8-An admission to [facility] ([place], [place]) with orthostatic hypotension, discharged on 3/9/2018. Her metoprolol decreased from 50 to 25 mg twice a day and midodrine added. 9-ICD checked 3/7/18: Biventricular pacing 99% of the time. No significant ventricular arrhythmia. 10- EKG done on 4/20/2019. 11- Echo on 05/13/2019: Poor image quality. EF 45-50%. LA is mildly dilated w/ LA colume index of 30. Mild mitral rgeurg. Moderate diastolic dysfunction. 12- ICD interrogation today 5/20/2020-battery 4.1 years. Ventricular pacing 99%, pacing mode VVIR. Chronic atrial fibrillation    13- EKG done today in office 5/20/2020-biventricular pacing. No change from prior. Ms. Natasha Hollingsworth is here for her 6 month follow up. She states she has been doing very well. She is able to walk fine with help with cane. She has been back to quilting and sewing. Her sleeping is better. She is not having any pain. She is trying to stay hydrated. She does have some leg swelling and she's tried compression stockings but they give her issues. She usually has her legs elevated. She said she has bilateral neuropathy and this is contributing to her inability to wear the compression stockings. She denies any chest pain, pressure, or tightness. No worsening shortness of breath, orthopnea or PND. No lightheadedness/dizziness or palpitations. No abdominal pain, bleeding issues, bowel issues, medication problems, or any other concerns at this time. No falls or near falls. No cough or fever. No nausea or vomiting. I reviewed her most recent blood work done that is done by Dr. Jessica Brenner MD and discuss the result with her. Also discussed her most recent OPTi volume transmission from her ICD. She is doing great from cardiac standpoint.     Past Medical History:    Past Medical History:   Diagnosis Date    Allergic rhinitis 10/1994    Arthritis     Asthma     Atrial fibrillation ([address] 12/2008    CAD (coronary artery disease)     Cerebrovascular accident (CVA) involving left cerebral hemisphere ([address] 04/23/2019 remote lacunar infarction within the L periventricular white matter    CHF (congestive heart failure) (HCC)     Clotting disorder (HCC)     plebitis in L leg    COPD (chronic obstructive pulmonary disease) ([address]     DDD (degenerative disc disease), cervical     Degeneration of cervical intervertebral disc     Diverticulosis 2005    Gout     Gout, unspecified     H/O cardiac catheterization 6/17/15    LMCA: Normal 0% stenosis. LAD: Lesion on 1st diag: Proximal subsection. 80% stenosis. Lesion plaque is ruptured. May Buddy LCx: Lesion on 1st Ob Leslie: Mid subsection. 85% stenosis. RCA:Lesion on R PDA: Mid subsection. 85% stenosis. Lesion on R PDA: Distal subsection. 70% stenosis. Lesion on Prox RCA: Mid subsection. 50% stenosis. EF 50%.  H/O echocardiogram 11/09/2016    EF 40-45%. Mild LV hypertrophy normal LV cavity size. Sigmoid interventricular septum without evidence of outflow tract obstruction. Left atrium is moderately dilated (34-39) left atrial volume index of 36 ml/m2. Mild mitral regurg. Diastology cannot be assessed due to A-fib.  H/O echocardiogram 03/09/2018    EF 45-50%. Apical hypokinesis noted. The left atrium is moderately dilated (34-39) with a left atrial volume index of 34ml/m2. Mild to moderate mitral regurg. Mild tricuspid regurg. Moderate diastolic dysfunction.  History of Holter monitoring 3/24/16    Atrial Fibrillation throughout,fairly controlled, HR  bpm 79% of the time. Occasional high ventricular rate episodes and multiple pauses suggestive of tachycardia/bradycardia syndrome  Msximum R-R interval 2.68 seconds.    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    Long term (current) use of anticoagulants 8/31/2015    Movement disorder     Other abnormal glucose 2004    Pacemaker 08/10/2017    Insertion of a biventricular pacemaker/ICD AVN ablation    Phlebitis and thrombophlebitis of lower extremities, unspecified 1961    L leg    Senile osteoporosis 2006    L/S    Symptomatic menopausal or female climacteric states 06/1995    Syncope and collapse     Unspecified hereditary and idiopathic peripheral neuropathy 2012    Unspecified sleep apnea 11/2009     Past Surgical History:  Past Surgical History:   Procedure Laterality Date    BRONCHOSCOPY  6/7/2017    BRONCHOSCOPY BRUSHINGS performed by Kristina Anders DO at [facility]  6/7/2017    BRONCHOSCOPY/TRANSBRONCHIAL LUNG BIOPSY performed by Kristina Anders DO at [facility]  6/7/2017    BRONCHOSCOPY FLUOROSCOPY performed by Kristina Anders, DO at [address] [facility] [address] Right 06/17/2015    CATARACT REMOVAL WITH IMPLANT Right 08/14/2017    DR ROMERO    COLONOSCOPY  1/2005    DIAGNOSTIC CARDIAC CATH LAB PROCEDURE  06/17/15    EYE SURGERY      FRACTURE SURGERY  2004    Distal Radius Ulna    HIP FRACTURE SURGERY Right 04/21/2019    Dr. Nelly Overton- hip pinning    HIP PINNING Right 4/21/2019    HIP PINNING performed by Amanda Hollingsworth MD at [address] [place] [address] Left 6/14/2017    MINI PORT ACCESS LEFT CHEST, X2 SPECIMENS.  performed by May Mitchell MD at [facility] [address]  2years old    VOLVAR-ULCERATIVE LESION-CAUTERIZED    CO EGD TRANSORAL BIOPSY SINGLE/MULTIPLE Left 2/13/2018    EGD BIOPSY performed by Luke rGoss MD at [facility]    SKIN BIOPSY      TONSILLECTOMY AND ADENOIDECTOMY       Social History:    Social History     Tobacco Use   Smoking Status Never Smoker   Smokeless Tobacco Never Used     Family History   Problem Relation Age of Onset    Heart Disease Mother     Stroke Mother     High Blood Pressure Mother     Cancer Father         lung    Stroke Brother     Heart Disease Maternal Grandmother        Current Medications:  Outpatient Medications Marked as Taking for the 11/20/20 encounter (Office Visit) with Aung Baker MD   Medication Sig Dispense Refill    vitamin B-12 (CYANOCOBALAMIN) 500 MCG tablet Take 500 mcg by mouth daily      levothyroxine (SYNTHROID) 75 MCG tablet Take 1 tablet by mouth daily 30 tablet 5    ranolazine (RANEXA) 500 MG extended release tablet Take 1 tablet by mouth twice daily 180 tablet 3    atorvastatin (LIPITOR) 40 MG tablet TAKE 1 TABLET BY MOUTH NIGHTLY 90 tablet 3    carvedilol (COREG) 6.25 MG tablet TAKE 1 TABLET BY MOUTH TWICE DAILY 180 tablet 3    ipratropium (ATROVENT) 0.06 % nasal spray USE 2 SPRAY(S) IN EACH NOSTRIL TWICE DAILY 3 Bottle 3    aspirin EC 81 MG EC tablet Take 1 tablet by mouth daily 90 tablet 3    acetaminophen (TYLENOL) 325 MG tablet Take 2 tablets by mouth every 4 hours as needed for Pain 120 tablet 3    polyethylene glycol (GLYCOLAX) powder Take 17 g by mouth as needed (for constipation)         REVIEW OF SYSTEMS:    CONSTITUTIONAL: No major weight gain or loss, fatigue, weakness, night sweats or fever. HEENT: No new vision difficulties or ringing in the ears. RESPIRATORY: See HPI   CARDIOVASCULAR: See HPI  GI: No nausea, vomiting, diarrhea, constipation, abdominal pain or changes in bowel habits. : No urinary frequency, urgency, incontinence hematuria or dysuria. SKIN: No cyanosis or skin lesions. MUSCULOSKELETAL: No new muscle or joint pain. NEUROLOGICAL: No syncope or TIA-like symptoms. PSYCHIATRIC: No anxiety, pain, insomnia or depression    Objective:     Physical Examination:     /84 (Site: Left Upper Arm, Position: Sitting, Cuff Size: Medium Adult)   Pulse 80   Resp 16   Ht 5' 5\" (1.651 m)   Wt 169 lb 12.8 oz (77 kg)   SpO2 98%   BMI 28.26 kg/m²  Body mass index is 28.26 kg/m². Constitutional: She appeared oriented to person and place. She appears well-developed and well-nourished. In no acute distress. HEENT: Normocephalic and atraumatic. No JVD present.  Carotid bruit is not present. No mass and no thyromegaly present. No lymphadenopathy noted. Cardiovascular: Normal rate, regular rhythm, normal heart sounds. Exam reveals no gallop and no friction rubs. 1/6 systolic murmur, 5th intercostal space on the LEFT in the mid-clavicular line (cardiac apex)  Pulmonary/Chest: Effort normal and breath sounds normal. No respiratory distress. She has no wheezes, rhonchi or rales. Abdominal: Soft, non-tender. She exhibits no organomegaly, mass or bruit. Extremities: Trace-1+ at the ankles bilaterally. No cyanosis or clubbing. 2+ radial and carotid pulses. Distal extremity pulses: 2+ bilaterally. Neurological: Alertness and orientation as per Constitutional exam. No evidence of gross cranial nerve deficit. Coordination appeared normal.   Skin: Skin is warm and dry. There is no rash or diaphoresis. Psychiatric: She has a normal mood and affect. Her speech is normal and behavior is normal.        DATA:    Lab Results   Component Value Date    ALT 9 11/05/2020    AST 15 11/05/2020    ALKPHOS 104 04/20/2019    BILITOT 0.48 04/20/2019     Lab Results   Component Value Date    CREATININE 0.90 07/06/2020    BUN 16 07/06/2020     07/06/2020    K 4.7 07/06/2020     07/06/2020    CO2 25 07/06/2020     Lab Results   Component Value Date    TSH 0.38 11/05/2020    [id number] 10.7 11/05/2020     Lab Results   Component Value Date    WBC 8.6 11/05/2020    HGB 13.4 11/05/2020    HCT 43.8 11/05/2020    MCV 95.0 11/05/2020     11/05/2020       Lab Results   Component Value Date    TRIG 185 (H) 11/05/2020    TRIG 113 02/25/2020    TRIG 195 (H) 07/01/2019     Lab Results   Component Value Date    HDL 47 11/05/2020    HDL 48 02/25/2020    HDL 44 07/01/2019     Lab Results   Component Value Date    LDLCHOLESTEROL 60 11/05/2020    LDLCHOLESTEROL 43 02/25/2020    LDLCHOLESTEROL 54 07/01/2019         Assessment:      Diagnosis Orders   1. Chronic diastolic congestive heart failure ([address]     2.  ASHD (arteriosclerotic heart disease)     3. Chronic a-fib ([address]     4. Mixed hyperlipidemia     5. ICD (implantable cardioverter-defibrillator) in place     6. Pulmonary HTN     7. Left ventricular systolic dysfunction       PLAN:    Chronic Diastolic Heart Failure: Currently Compensated  Beta Blocker: Continue Carvedilol (Coreg) 6.25 mg twice daily. Nonpharmacologic management of Heart Failure: I told her to continue wearing lower extremity compression stockings and I advised her to try and keep his legs up whenever possible and to limit salt in her diet. Will plan on getting echo done at next visit. Atherosclerotic Heart Disease:  Antiplatelet Agent: Continue aspirin 81 mg daily  I also reminded her to watch for signs of blood in her stool or black tarry stools and stop the medication immediately if this develops as this could be life threatening. Beta Blocker: Continue Carvedilol (Coreg) 6.25 mg twice daily. Anti-anginal medications: Continue ranolazine (Ranexa) 500 mg 2 times/day. Statin Therapy: Continue atorvastatin (Lipitor) 40 mg nightly. Chronic atrial fibrillation status post AV node ablation and watchman device placement. Beta Blocker: Continue Carvedilol (Coreg) 6.25 mg twice daily. Stroke Risk: Her CHADS2 score is 5/9 (6.7% stroke risk)  Anticoagulation: Watchman in place. Off Coumadin. I did explain to her that watchman device does not prevent every single stroke. Patient said she had a CT scan done of the brain for her tremor and found to have a tiny stroke. She continues to see neurology for her tremor. Hyperlipidemia: Mixed - Last LDL on 11/5/2020 was 60 mg/dL  Cholesterol Reduction Therapy: Continue Atorvastatin (Lipitor) 40 mg daily. Implantable Cardioverter Defibrillator (ICD):   Indication for Device Placement: Biventricular ICD secondary to severe left ventricular systolic dysfunction and chronic atrial fibrillation  Interrogation Findings: Continue follow-up as previously scheduled. Finally, I recommended that she continue her other medications and follow up with you as previously scheduled. FOLLOW UP:  I told Ms. Janene Vasques to call my office if she had any problems, but otherwise told her to Return in about 6 months (around 5/20/2021). However, I would be happy to see her sooner should the need arise. Sincerely,  Magdi Middleton MD, F.A.C.C. [facility]ist    [address] [place] [address], [address] [place] [address]  Phone: [phone], Fax: [phone]     I believe that the risk of significant morbidity and mortality related to the patient's current medical conditions are: intermediate-high. The documentation recorded by the scribe, accurately and completely reflects the services I personally performed and the decisions made by me. Magdi Middleton MD, F.A.C.C.  November 20, 2020

## 2020-11-30 RX ORDER — CARVEDILOL 6.25 MG/1
TABLET ORAL
Qty: 180 TABLET | Refills: 3 | Status: SHIPPED | OUTPATIENT
Start: 2020-11-30 | End: 2021-09-10

## 2020-12-23 ENCOUNTER — TELEPHONE (OUTPATIENT)
Dept: CARDIOLOGY | Age: 80
End: 2020-12-23

## 2020-12-23 ENCOUNTER — NURSE ONLY (OUTPATIENT)
Dept: CARDIOLOGY | Age: 80
End: 2020-12-23
Payer: MEDICARE

## 2020-12-23 NOTE — TELEPHONE ENCOUNTER
Continue the liver antioxidants;    Repeat Liver Ultrasound in on year;                  As a friendly reminder to all our patients, the Gastroenterology department respects all our patients' time and we do our best to see you at your scheduled appointment time. Therefore, please arrive 15 minutes prior to your appointment in order to complete check-in and the rooming process. Please also note, if you are more than 5 minutes late for your appointment, you may be rescheduled.      Normal Bi-Ventricular pacemaker function.  Thank you

## 2020-12-28 ENCOUNTER — TELEPHONE (OUTPATIENT)
Dept: CARDIOLOGY | Age: 80
End: 2020-12-28

## 2020-12-28 PROCEDURE — 93297 REM INTERROG DEV EVAL ICPMS: CPT | Performed by: INTERNAL MEDICINE

## 2020-12-28 PROCEDURE — G2066 INTER DEVC REMOTE 30D: HCPCS | Performed by: INTERNAL MEDICINE

## 2021-01-09 ENCOUNTER — HOSPITAL ENCOUNTER (EMERGENCY)
Age: 81
Discharge: HOME | End: 2021-01-09
Attending: EMERGENCY MEDICINE
Payer: MEDICARE

## 2021-01-09 VITALS
SYSTOLIC BLOOD PRESSURE: 145 MMHG | DIASTOLIC BLOOD PRESSURE: 92 MMHG | HEART RATE: 83 BPM | HEIGHT: 65 IN | WEIGHT: 163 LBS | BODY MASS INDEX: 27.16 KG/M2 | TEMPERATURE: 98.1 F | RESPIRATION RATE: 14 BRPM | OXYGEN SATURATION: 96 %

## 2021-01-09 DIAGNOSIS — M26.622 ARTHRALGIA OF LEFT TEMPOROMANDIBULAR JOINT: Primary | ICD-10-CM

## 2021-01-09 LAB
ABSOLUTE EOS #: 0.11 K/UL (ref 0–0.44)
ABSOLUTE IMMATURE GRANULOCYTE: <0.03 K/UL (ref 0–0.3)
ABSOLUTE LYMPH #: 1.27 K/UL (ref 1.1–3.7)
ABSOLUTE MONO #: 0.2 K/UL (ref 0.1–1.2)
ANION GAP SERPL CALCULATED.3IONS-SCNC: 11 MMOL/L (ref 9–17)
BASOPHILS # BLD: 1 % (ref 0–2)
BASOPHILS ABSOLUTE: 0.03 K/UL (ref 0–0.2)
BUN BLDV-MCNC: 13 MG/DL (ref 8–23)
BUN/CREAT BLD: 14 (ref 9–20)
CALCIUM SERPL-MCNC: 9.2 MG/DL (ref 8.6–10.4)
CHLORIDE BLD-SCNC: 100 MMOL/L (ref 98–107)
CO2: 24 MMOL/L (ref 20–31)
CREAT SERPL-MCNC: 0.96 MG/DL (ref 0.5–0.9)
DIFFERENTIAL TYPE: ABNORMAL
EKG ATRIAL RATE: 72 BPM
EKG Q-T INTERVAL: 422 MS
EKG QRS DURATION: 142 MS
EKG QTC CALCULATION (BAZETT): 486 MS
EKG R AXIS: -108 DEGREES
EKG T AXIS: 38 DEGREES
EKG VENTRICULAR RATE: 80 BPM
EOSINOPHILS RELATIVE PERCENT: 2 % (ref 1–4)
GFR AFRICAN AMERICAN: >60 ML/MIN
GFR NON-AFRICAN AMERICAN: 56 ML/MIN
GFR SERPL CREATININE-BSD FRML MDRD: ABNORMAL ML/MIN/{1.73_M2}
GFR SERPL CREATININE-BSD FRML MDRD: ABNORMAL ML/MIN/{1.73_M2}
GLUCOSE BLD-MCNC: 147 MG/DL (ref 70–99)
HCT VFR BLD CALC: 46 % (ref 36.3–47.1)
HEMOGLOBIN: 14.6 G/DL (ref 11.9–15.1)
IMMATURE GRANULOCYTES: 0 %
LYMPHOCYTES # BLD: 22 % (ref 24–43)
MCH RBC QN AUTO: 29.3 PG (ref 25.2–33.5)
MCHC RBC AUTO-ENTMCNC: 31.7 G/DL (ref 28.4–34.8)
MCV RBC AUTO: 92.2 FL (ref 82.6–102.9)
MONOCYTES # BLD: 3 % (ref 3–12)
NRBC AUTOMATED: 0 PER 100 WBC
PDW BLD-RTO: 13.2 % (ref 11.8–14.4)
PLATELET # BLD: 219 K/UL (ref 138–453)
PLATELET ESTIMATE: ABNORMAL
PMV BLD AUTO: 9.9 FL (ref 8.1–13.5)
POTASSIUM SERPL-SCNC: 4.3 MMOL/L (ref 3.7–5.3)
RBC # BLD: 4.99 M/UL (ref 3.95–5.11)
RBC # BLD: ABNORMAL 10*6/UL
SEDIMENTATION RATE, ERYTHROCYTE: 12 MM (ref 0–20)
SEG NEUTROPHILS: 72 % (ref 36–65)
SEGMENTED NEUTROPHILS ABSOLUTE COUNT: 4.26 K/UL (ref 1.5–8.1)
SODIUM BLD-SCNC: 135 MMOL/L (ref 135–144)
WBC # BLD: 5.9 K/UL (ref 3.5–11.3)
WBC # BLD: ABNORMAL 10*3/UL

## 2021-01-09 PROCEDURE — 85651 RBC SED RATE NONAUTOMATED: CPT

## 2021-01-09 PROCEDURE — 96374 THER/PROPH/DIAG INJ IV PUSH: CPT

## 2021-01-09 PROCEDURE — 93010 ELECTROCARDIOGRAM REPORT: CPT | Performed by: INTERNAL MEDICINE

## 2021-01-09 PROCEDURE — 85025 COMPLETE CBC W/AUTO DIFF WBC: CPT

## 2021-01-09 PROCEDURE — 6360000002 HC RX W HCPCS: Performed by: EMERGENCY MEDICINE

## 2021-01-09 PROCEDURE — 99284 EMERGENCY DEPT VISIT MOD MDM: CPT

## 2021-01-09 PROCEDURE — 93005 ELECTROCARDIOGRAM TRACING: CPT | Performed by: EMERGENCY MEDICINE

## 2021-01-09 PROCEDURE — 80048 BASIC METABOLIC PNL TOTAL CA: CPT

## 2021-01-09 RX ORDER — KETOROLAC TROMETHAMINE 15 MG/ML
15 INJECTION, SOLUTION INTRAMUSCULAR; INTRAVENOUS ONCE
Status: COMPLETED | OUTPATIENT
Start: 2021-01-09 | End: 2021-01-09

## 2021-01-09 RX ADMIN — KETOROLAC TROMETHAMINE 15 MG: 15 INJECTION, SOLUTION INTRAMUSCULAR; INTRAVENOUS at 09:54

## 2021-01-09 ASSESSMENT — ENCOUNTER SYMPTOMS
EYES NEGATIVE: 1
ALLERGIC/IMMUNOLOGIC NEGATIVE: 1
GASTROINTESTINAL NEGATIVE: 1

## 2021-01-09 ASSESSMENT — PAIN SCALES - GENERAL
PAINLEVEL_OUTOF10: 8
PAINLEVEL_OUTOF10: 2
PAINLEVEL_OUTOF10: 6

## 2021-01-09 ASSESSMENT — PAIN DESCRIPTION - LOCATION: LOCATION: EAR

## 2021-01-09 ASSESSMENT — PAIN DESCRIPTION - ORIENTATION: ORIENTATION: LEFT

## 2021-01-11 ENCOUNTER — OFFICE VISIT (OUTPATIENT)
Dept: FAMILY MEDICINE CLINIC | Age: 81
End: 2021-01-11
Payer: MEDICARE

## 2021-01-11 VITALS — HEIGHT: 65 IN | SYSTOLIC BLOOD PRESSURE: 136 MMHG | DIASTOLIC BLOOD PRESSURE: 84 MMHG | BODY MASS INDEX: 27.12 KG/M2

## 2021-01-11 DIAGNOSIS — B02.9 HERPES ZOSTER WITHOUT COMPLICATION: Primary | ICD-10-CM

## 2021-01-11 PROCEDURE — 99213 OFFICE O/P EST LOW 20 MIN: CPT | Performed by: FAMILY MEDICINE

## 2021-01-11 RX ORDER — LEVOTHYROXINE SODIUM 75 UG/1
TABLET ORAL
Qty: 90 TABLET | Refills: 3 | Status: SHIPPED | OUTPATIENT
Start: 2021-01-11 | End: 2021-12-30

## 2021-01-11 RX ORDER — GABAPENTIN 100 MG/1
100 CAPSULE ORAL 3 TIMES DAILY
Qty: 30 CAPSULE | Refills: 0 | Status: SHIPPED | OUTPATIENT
Start: 2021-01-11 | End: 2021-01-19 | Stop reason: SDUPTHER

## 2021-01-11 RX ORDER — VALACYCLOVIR HYDROCHLORIDE 1 G/1
1000 TABLET, FILM COATED ORAL 3 TIMES DAILY
Qty: 21 TABLET | Refills: 0 | Status: SHIPPED | OUTPATIENT
Start: 2021-01-11 | End: 2021-01-18

## 2021-01-11 NOTE — PROGRESS NOTES
ADAM Potts, leah scribing for and in the presence of Dr. Leni Fuentes. 1/11/21/2:05pm/SNP    [address] [address]  [place] [id number]  Dept: [phone]    HPI: Patient presents today for an ED f/u. She was seen on 1/9/21 for L jaw discomfort which started on 1/7/21. EKG showed no acute changes, labs drawn. She was given pain meds through the IV which were helpful at the time. She is using 2 ES Tylenol q4h and a heating pad. Current Outpatient Medications   Medication Sig Dispense Refill    EUTHYROX 75 MCG tablet Take 1 tablet by mouth once daily 90 tablet 3    valACYclovir (VALTREX) 1 g tablet Take 1 tablet by mouth 3 times daily for 7 days 21 tablet 0    gabapentin (NEURONTIN) 100 MG capsule Take 1 capsule by mouth 3 times daily for 10 days. Intended supply: 30 days 30 capsule 0    carvedilol (COREG) 6.25 MG tablet Take 1 tablet by mouth twice daily 180 tablet 3    vitamin B-12 (CYANOCOBALAMIN) 500 MCG tablet Take 500 mcg by mouth daily      ranolazine (RANEXA) 500 MG extended release tablet Take 1 tablet by mouth twice daily 180 tablet 3    atorvastatin (LIPITOR) 40 MG tablet TAKE 1 TABLET BY MOUTH NIGHTLY 90 tablet 3    ipratropium (ATROVENT) 0.06 % nasal spray USE 2 SPRAY(S) IN EACH NOSTRIL TWICE DAILY 3 Bottle 3    aspirin EC 81 MG EC tablet Take 1 tablet by mouth daily 90 tablet 3    acetaminophen (TYLENOL) 325 MG tablet Take 2 tablets by mouth every 4 hours as needed for Pain 120 tablet 3    polyethylene glycol (GLYCOLAX) powder Take 17 g by mouth as needed (for constipation)       No current facility-administered medications for this visit. ROS:  Admits L sided jaw pain, points to just in front of her L ear. States the pain shoots up into her head and into her neck, pain in her neck feels like a spasm.   Admits lightheadedness when she stands up    EXAM:  /84 (Site: Left Upper Arm, Position: Sitting, Cuff Size: Medium Adult)   Ht 5' 5\" (1.651 m)   BMI 27.12 kg/m²   Wt Readings from Last 3 Encounters:   01/09/21 163 lb (73.9 kg)   11/20/20 169 lb 12.8 oz (77 kg)   11/16/20 168 lb (76.2 kg)     BP Readings from Last 3 Encounters:   01/11/21 136/84   01/09/21 (!) 145/92   11/20/20 126/84     PHYSICAL EXAM:  General Appearance: in no acute distress, well developed, well nourished. TMJ crepitus bilaterally  Eyes: pupils equal, round reactive to light and accommodation. Ears: normal canal and TM's. Nose: nares patent, no lesions. Oral Cavity: mucosa moist.  Throat: clear. Neck/Thyroid: neck supple, full range of motion, no cervical lymphadenopathy, no thyromegaly or carotid bruits. Skin: warm and dry. Cluster of rythematous papular lesions, single vesicular lesion along mandible near chin appears vesicular  Neurologic: nonfocal, motor strength normal upper and lower extremities, sensory exam intact. Light touch elicits shooting pain  Psych: normal affect, speech fluent. ASSESSMENT:   Diagnosis Orders   1. Herpes zoster without complication      trigeminal nerve mandibular division       PLAN:  I suspect this is shingles, I explain the nerves with diagrams for better understanding. I will have her take Gabapentin 100 mg three times a day and increase to 200mg tid  and Valtrex 1 gram three times a day for 7 days. If the Gabapentin is not helpful, she can contact the office. No orders of the defined types were placed in this encounter. Orders Placed This Encounter   Medications    valACYclovir (VALTREX) 1 g tablet     Sig: Take 1 tablet by mouth 3 times daily for 7 days     Dispense:  21 tablet     Refill:  0    gabapentin (NEURONTIN) 100 MG capsule     Sig: Take 1 capsule by mouth 3 times daily for 10 days. Intended supply: 30 days     Dispense:  30 capsule     Refill:  0   I, Dr. Chad Schmitt, personally performed the services described in this documentation as scribed by DONNY Carvalho in my presence, and is both accurate and complete.

## 2021-01-11 NOTE — PATIENT INSTRUCTIONS
SURVEY:    You may be receiving a survey from [employer] regarding your visit today. Please complete the survey to enable us to provide the highest quality of care to you and your family. If you cannot score us a very good on any question, please call the office to discuss how we could of made your experience a very good one. Thank you. PLAN:  I suspect this is shingles, I explain the nerves with diagrams for better understanding. I will have her take Gabapentin 100 mg three times a day and Valtrex 1 gram three times a day for 7 days. If the Gabapentin is not helpful, she can contact the office.

## 2021-01-19 RX ORDER — GABAPENTIN 100 MG/1
100 CAPSULE ORAL 3 TIMES DAILY
Qty: 30 CAPSULE | Refills: 0 | Status: SHIPPED | OUTPATIENT
Start: 2021-01-19 | End: 2021-07-27 | Stop reason: CLARIF

## 2021-01-19 NOTE — TELEPHONE ENCOUNTER
Patient states she is still having pain from having shingles and was wanting a refill on her Gabapentin.

## 2021-01-27 ENCOUNTER — NURSE ONLY (OUTPATIENT)
Dept: CARDIOLOGY | Age: 81
End: 2021-01-27
Payer: MEDICARE

## 2021-01-27 ENCOUNTER — TELEPHONE (OUTPATIENT)
Dept: CARDIOLOGY | Age: 81
End: 2021-01-27

## 2021-01-27 DIAGNOSIS — I50.9 CONGESTIVE HEART FAILURE, UNSPECIFIED HF CHRONICITY, UNSPECIFIED HEART FAILURE TYPE (HCC): Primary | ICD-10-CM

## 2021-01-27 DIAGNOSIS — I25.5 ISCHEMIC CARDIOMYOPATHY: ICD-10-CM

## 2021-01-27 DIAGNOSIS — Z95.810 ICD (IMPLANTABLE CARDIOVERTER-DEFIBRILLATOR) IN PLACE: ICD-10-CM

## 2021-01-27 PROCEDURE — 93297 REM INTERROG DEV EVAL ICPMS: CPT | Performed by: INTERNAL MEDICINE

## 2021-01-27 PROCEDURE — G2066 INTER DEVC REMOTE 30D: HCPCS | Performed by: INTERNAL MEDICINE

## 2021-02-25 LAB — DIABETIC RETINOPATHY: NEGATIVE

## 2021-03-01 RX ORDER — ATORVASTATIN CALCIUM 40 MG/1
TABLET, FILM COATED ORAL
Qty: 90 TABLET | Refills: 0 | Status: SHIPPED | OUTPATIENT
Start: 2021-03-01 | End: 2021-06-02

## 2021-03-02 ENCOUNTER — NURSE ONLY (OUTPATIENT)
Dept: CARDIOLOGY | Age: 81
End: 2021-03-02
Payer: MEDICARE

## 2021-03-02 DIAGNOSIS — I50.9 CONGESTIVE HEART FAILURE, UNSPECIFIED HF CHRONICITY, UNSPECIFIED HEART FAILURE TYPE (HCC): Primary | ICD-10-CM

## 2021-03-02 PROCEDURE — 93295 DEV INTERROG REMOTE 1/2/MLT: CPT | Performed by: INTERNAL MEDICINE

## 2021-03-09 ENCOUNTER — HOSPITAL ENCOUNTER (OUTPATIENT)
Age: 81
Discharge: HOME | End: 2021-03-09
Payer: MEDICARE

## 2021-03-09 DIAGNOSIS — I10 ESSENTIAL HYPERTENSION: Chronic | ICD-10-CM

## 2021-03-09 DIAGNOSIS — R73.9 HYPERGLYCEMIA: ICD-10-CM

## 2021-03-09 DIAGNOSIS — E78.5 HYPERLIPIDEMIA, UNSPECIFIED HYPERLIPIDEMIA TYPE: ICD-10-CM

## 2021-03-09 DIAGNOSIS — E53.8 B12 DEFICIENCY: ICD-10-CM

## 2021-03-09 DIAGNOSIS — E03.9 HYPOTHYROIDISM, UNSPECIFIED TYPE: ICD-10-CM

## 2021-03-09 LAB
ABSOLUTE EOS #: 0.21 K/UL (ref 0–0.44)
ABSOLUTE IMMATURE GRANULOCYTE: <0.03 K/UL (ref 0–0.3)
ABSOLUTE LYMPH #: 1.36 K/UL (ref 1.1–3.7)
ABSOLUTE MONO #: 0.57 K/UL (ref 0.1–1.2)
ALT SERPL-CCNC: 9 U/L (ref 5–33)
AST SERPL-CCNC: 14 U/L
BASOPHILS # BLD: 0 % (ref 0–2)
BASOPHILS ABSOLUTE: 0.03 K/UL (ref 0–0.2)
DIFFERENTIAL TYPE: ABNORMAL
EOSINOPHILS RELATIVE PERCENT: 3 % (ref 1–4)
ESTIMATED AVERAGE GLUCOSE: 120 MG/DL
FOLATE: 8.3 NG/ML
HBA1C MFR BLD: 5.8 % (ref 4–6)
HCT VFR BLD CALC: 43.7 % (ref 36.3–47.1)
HEMOGLOBIN: 13.7 G/DL (ref 11.9–15.1)
IMMATURE GRANULOCYTES: 0 %
LYMPHOCYTES # BLD: 19 % (ref 24–43)
MCH RBC QN AUTO: 30.2 PG (ref 25.2–33.5)
MCHC RBC AUTO-ENTMCNC: 31.4 G/DL (ref 28.4–34.8)
MCV RBC AUTO: 96.3 FL (ref 82.6–102.9)
MONOCYTES # BLD: 8 % (ref 3–12)
NRBC AUTOMATED: 0 PER 100 WBC
PDW BLD-RTO: 13.9 % (ref 11.8–14.4)
PLATELET # BLD: 277 K/UL (ref 138–453)
PLATELET ESTIMATE: ABNORMAL
PMV BLD AUTO: 10.2 FL (ref 8.1–13.5)
RBC # BLD: 4.54 M/UL (ref 3.95–5.11)
RBC # BLD: ABNORMAL 10*6/UL
SEG NEUTROPHILS: 70 % (ref 36–65)
SEGMENTED NEUTROPHILS ABSOLUTE COUNT: 4.96 K/UL (ref 1.5–8.1)
THYROXINE, FREE: 1.53 NG/DL (ref 0.93–1.7)
TSH SERPL DL<=0.05 MIU/L-ACNC: 0.51 MIU/L (ref 0.3–5)
VITAMIN B-12: 903 PG/ML (ref 232–1245)
WBC # BLD: 7.2 K/UL (ref 3.5–11.3)
WBC # BLD: ABNORMAL 10*3/UL

## 2021-03-09 PROCEDURE — 83036 HEMOGLOBIN GLYCOSYLATED A1C: CPT

## 2021-03-09 PROCEDURE — 84450 TRANSFERASE (AST) (SGOT): CPT

## 2021-03-09 PROCEDURE — 84439 ASSAY OF FREE THYROXINE: CPT

## 2021-03-09 PROCEDURE — 84443 ASSAY THYROID STIM HORMONE: CPT

## 2021-03-09 PROCEDURE — 82607 VITAMIN B-12: CPT

## 2021-03-09 PROCEDURE — 85025 COMPLETE CBC W/AUTO DIFF WBC: CPT

## 2021-03-09 PROCEDURE — 36415 COLL VENOUS BLD VENIPUNCTURE: CPT

## 2021-03-09 PROCEDURE — 84460 ALANINE AMINO (ALT) (SGPT): CPT

## 2021-03-09 PROCEDURE — 82746 ASSAY OF FOLIC ACID SERUM: CPT

## 2021-03-23 ENCOUNTER — OFFICE VISIT (OUTPATIENT)
Dept: FAMILY MEDICINE CLINIC | Age: 81
End: 2021-03-23
Payer: MEDICARE

## 2021-03-23 VITALS
WEIGHT: 172 LBS | SYSTOLIC BLOOD PRESSURE: 136 MMHG | BODY MASS INDEX: 28.66 KG/M2 | DIASTOLIC BLOOD PRESSURE: 76 MMHG | HEART RATE: 80 BPM | HEIGHT: 65 IN | OXYGEN SATURATION: 97 %

## 2021-03-23 DIAGNOSIS — R55 VASODEPRESSOR SYNCOPE: ICD-10-CM

## 2021-03-23 DIAGNOSIS — E78.5 HYPERLIPIDEMIA, UNSPECIFIED HYPERLIPIDEMIA TYPE: ICD-10-CM

## 2021-03-23 DIAGNOSIS — E03.9 HYPOTHYROIDISM, UNSPECIFIED TYPE: ICD-10-CM

## 2021-03-23 DIAGNOSIS — G62.9 PERIPHERAL POLYNEUROPATHY: ICD-10-CM

## 2021-03-23 DIAGNOSIS — E11.9 TYPE 2 DIABETES MELLITUS WITHOUT COMPLICATION, UNSPECIFIED WHETHER LONG TERM INSULIN USE (HCC): Primary | ICD-10-CM

## 2021-03-23 DIAGNOSIS — I10 ESSENTIAL HYPERTENSION: ICD-10-CM

## 2021-03-23 DIAGNOSIS — E53.8 B12 DEFICIENCY: ICD-10-CM

## 2021-03-23 DIAGNOSIS — R73.9 HYPERGLYCEMIA: ICD-10-CM

## 2021-03-23 DIAGNOSIS — J30.2 SEASONAL ALLERGIC RHINITIS, UNSPECIFIED TRIGGER: ICD-10-CM

## 2021-03-23 PROCEDURE — 99214 OFFICE O/P EST MOD 30 MIN: CPT | Performed by: FAMILY MEDICINE

## 2021-03-23 RX ORDER — IPRATROPIUM BROMIDE 42 UG/1
SPRAY, METERED NASAL
Qty: 3 BOTTLE | Refills: 3 | Status: SHIPPED | OUTPATIENT
Start: 2021-03-23 | End: 2021-03-23

## 2021-03-23 ASSESSMENT — PATIENT HEALTH QUESTIONNAIRE - PHQ9
2. FEELING DOWN, DEPRESSED OR HOPELESS: 0
SUM OF ALL RESPONSES TO PHQ QUESTIONS 1-9: 0

## 2021-03-23 NOTE — PATIENT INSTRUCTIONS
SURVEY:    You may be receiving a survey from [employer] regarding your visit today. Please complete the survey to enable us to provide the highest quality of care to you and your family. If you cannot score us a very good on any question, please call the office to discuss how we could of made your experience a very good one. Thank you. PLAN:  I discuss her recent shingles from January. I review her labs and am overall pleased. I will see her back in 4 months.

## 2021-03-23 NOTE — PROGRESS NOTES
I, Kyle Ordonez [facility], am scribing for and in the presence of Dr. Riley Santana. 3/23/21/11:20/CRF    [address] [address]  [place] [phone]  Dept: [phone] [address] Priscilla Lopez is a 79 y.o. female here for Other (4 month f/u), Hypertension, Hyperlipidemia, COPD, and Diabetes    Had shingles 3 weeks ago , L side of face down to the back of scalp and down the left arm. HPI:  HYPERTENSION:  She is not exercising. She is not adherent to a low-salt diet.    Blood pressure is not being monitored at home     HYPERLIPIDEMIA:     Medication compliance: compliant all of the time. Patient is not following a low fat, low cholesterol diet.    She is not exercising regularly.     COPD Symptoms:   Patient is not currently experiencing symptoms. She  reports that she has never smoked. She has never used smokeless tobacco. Current treatment includes nothing.     DIABETES MELLITUS:  Medication compliance:  n/a  Diabetic diet compliance:  compliant all of the time  Current exercise: no regular exercise  Frequency of testing: none  Eye exam current (within one year): yes, 1/2020  Diabetic foot check in the past year: Yes, 07/2020   reports that she has never smoked. She has never used smokeless tobacco.   Screenings for behavioral, psychosocial and functional/safety risks, and cognitive dysfunction are all negative except as indicated below. These results, as well as other patient data from the [address] [facility] form, are documented in Flowsheets linked to this Encounter. Prior to Admission medications    Medication Sig Start Date End Date Taking?  Authorizing Provider   ipratropium (ATROVENT) 0.06 % nasal spray USE 2 SPRAY(S) IN EACH NOSTRIL TWICE DAILY 3/23/21  Yes Riley Santana MD   atorvastatin (LIPITOR) 40 MG tablet Take 1 tablet by mouth nightly 3/1/21  Yes Riley Santana MD   EUTHYROX 75 MCG tablet Take 1 tablet by mouth once daily 1/11/21  Yes Riley Santana MD  H/O cardiac catheterization 6/17/15    LMCA: Normal 0% stenosis. LAD: Lesion on 1st diag: Proximal subsection. 80% stenosis. Lesion plaque is ruptured. Puneet Moseley LCx: Lesion on 1st Ob Leslie: Mid subsection. 85% stenosis. RCA:Lesion on R PDA: Mid subsection. 85% stenosis. Lesion on R PDA: Distal subsection. 70% stenosis. Lesion on Prox RCA: Mid subsection. 50% stenosis. EF 50%.  H/O echocardiogram 11/09/2016    EF 40-45%. Mild LV hypertrophy normal LV cavity size. Sigmoid interventricular septum without evidence of outflow tract obstruction. Left atrium is moderately dilated (34-39) left atrial volume index of 36 ml/m2. Mild mitral regurg. Diastology cannot be assessed due to A-fib.  H/O echocardiogram 03/09/2018    EF 45-50%. Apical hypokinesis noted. The left atrium is moderately dilated (34-39) with a left atrial volume index of 34ml/m2. Mild to moderate mitral regurg. Mild tricuspid regurg. Moderate diastolic dysfunction.  History of Holter monitoring 3/24/16    Atrial Fibrillation throughout,fairly controlled, HR  bpm 79% of the time. Occasional high ventricular rate episodes and multiple pauses suggestive of tachycardia/bradycardia syndrome  Msximum R-R interval 2.68 seconds.    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    Long term (current) use of anticoagulants 8/31/2015    Movement disorder     Other abnormal glucose 2004    Pacemaker 08/10/2017    Insertion of a biventricular pacemaker/ICD AVN ablation    Phlebitis and thrombophlebitis of lower extremities, unspecified 1961    L leg    Senile osteoporosis 2006    L/S    Symptomatic menopausal or female climacteric states 06/1995    Syncope and collapse     Unspecified hereditary and idiopathic peripheral neuropathy 2012    Unspecified sleep apnea 11/2009      Social History Tobacco Use    Smoking status: Never Smoker    Smokeless tobacco: Never Used   Substance Use Topics    Alcohol use: No     Alcohol/week: 0.0 standard drinks      Current Outpatient Medications   Medication Sig Dispense Refill    ipratropium (ATROVENT) 0.06 % nasal spray USE 2 SPRAY(S) IN EACH NOSTRIL TWICE DAILY 3 Bottle 3    atorvastatin (LIPITOR) 40 MG tablet Take 1 tablet by mouth nightly 90 tablet 0    EUTHYROX 75 MCG tablet Take 1 tablet by mouth once daily 90 tablet 3    carvedilol (COREG) 6.25 MG tablet Take 1 tablet by mouth twice daily 180 tablet 3    vitamin B-12 (CYANOCOBALAMIN) 500 MCG tablet Take 500 mcg by mouth daily      ranolazine (RANEXA) 500 MG extended release tablet Take 1 tablet by mouth twice daily 180 tablet 3    aspirin EC 81 MG EC tablet Take 1 tablet by mouth daily 90 tablet 3    acetaminophen (TYLENOL) 325 MG tablet Take 2 tablets by mouth every 4 hours as needed for Pain 120 tablet 3    polyethylene glycol (GLYCOLAX) powder Take 17 g by mouth as needed (for constipation)      gabapentin (NEURONTIN) 100 MG capsule Take 1 capsule by mouth 3 times daily for 10 days. Intended supply: 30 days 30 capsule 0     No current facility-administered medications for this visit.       Allergies   Allergen Reactions    Adhesive Tape     Aspercreme [Trolamine Salicylate]     Erythromycin Other (See Comments)     \"whole in stomach\"    Erythromycin Other (See Comments)     stomach pain    Grandpas Thylox Soap [Sulfur]      itch    Guaifenesin & Derivatives     Niacin And Related     Niacin And Related Hives    Tape [Adhesive Tape] Dermatitis    Augmentin [Amoxicillin-Pot Clavulanate] Rash       PHYSICAL EXAM:    /76   Pulse 80   Ht 5' 5\" (1.651 m)   Wt 172 lb (78 kg)   SpO2 97%   BMI 28.62 kg/m²   Wt Readings from Last 3 Encounters:   03/23/21 172 lb (78 kg)   01/09/21 163 lb (73.9 kg)   11/20/20 169 lb 12.8 oz (77 kg)     BP Readings from Last 3 Encounters:   03/23/21 136/76   01/11/21 136/84   01/09/21 (!) 145/92       General Appearance: in no acute distress, well developed, well nourished. Eyes: pupils equal, round reactive to light and accommodation. Ears: normal canal and TM's. Nose: nares patent, no lesions. Oral Cavity: mucosa moist.  Throat: clear. Neck/Thyroid: neck supple, full range of motion, no cervical lymphadenopathy, no thyromegaly or carotid bruits. Skin: warm and dry. No suspicious lesions. Heart: regular rate and rhythm. No murmurs. S1, S2 normal, no gallops. Rate 75  Lungs: clear to auscultation bilaterally. Abdomen: bowel sounds present, soft, nontender, nondistended, no masses or organomegaly. Musculoskeletal: normal, full range of motion in knees and hips, no swelling or tenderness. Extremities: no cyanosis or edema. Peripheral Pulses: 2+ throughout, symetric. Neurologic: nonfocal, motor strength normal upper and lower extremities, sensory exam intact. Psych: normal affect, speech fluent. ASSESSMENT:   Diagnosis Orders   1. Type 2 diabetes mellitus without complication, unspecified whether long term insulin use (HCC)  Hemoglobin A1C   2. Hyperlipidemia, unspecified hyperlipidemia type  CBC Auto Differential    ALT    AST   3. Hyperglycemia  ALT   4. Essential hypertension  CBC Auto Differential    ALT    AST    Sedimentation Rate   5. Hypothyroidism, unspecified type  T4   6. B12 deficiency  Vitamin B12    Folate   7. Seasonal allergic rhinitis, unspecified trigger  ipratropium (ATROVENT) 0.06 % nasal spray   8. Vasodepressor syncope     9. Peripheral polyneuropathy         PLAN:  I discuss her recent shingles from January. I review her labs and am overall pleased. I will see her back in 4 months.     Orders Placed This Encounter   Procedures    T4     Standing Status:   Future     Standing Expiration Date:   3/23/2022    Hemoglobin A1C     Standing Status:   Future     Standing Expiration Date:   3/23/2022    CBC Auto Differential     Standing Status:   Future     Standing Expiration Date:   3/23/2022    ALT     Standing Status:   Future     Standing Expiration Date:   3/23/2022    AST     Standing Status:   Future     Standing Expiration Date:   3/23/2022    Vitamin B12     Standing Status:   Future     Standing Expiration Date:   3/23/2022    Folate     Standing Status:   Future     Standing Expiration Date:   3/23/2022    Sedimentation Rate     Standing Status:   Future     Standing Expiration Date:   3/23/2022     Orders Placed This Encounter   Medications    ipratropium (ATROVENT) 0.06 % nasal spray     Sig: USE 2 SPRAY(S) IN EACH NOSTRIL TWICE DAILY     Dispense:  3 Bottle     Refill:  3     Please consider 90 day supplies to promote better adherence   I, Dr. Daljit Harris, personally performed the services described in this documentation as scribed by KODI River in my presence, and is both accurate and complete.

## 2021-04-06 ENCOUNTER — NURSE ONLY (OUTPATIENT)
Dept: CARDIOLOGY | Age: 81
End: 2021-04-06
Payer: MEDICARE

## 2021-04-06 DIAGNOSIS — I50.9 CONGESTIVE HEART FAILURE, UNSPECIFIED HF CHRONICITY, UNSPECIFIED HEART FAILURE TYPE (HCC): Primary | ICD-10-CM

## 2021-04-06 PROCEDURE — 93297 REM INTERROG DEV EVAL ICPMS: CPT | Performed by: INTERNAL MEDICINE

## 2021-04-24 ENCOUNTER — HOSPITAL ENCOUNTER (EMERGENCY)
Age: 81
Discharge: HOME | End: 2021-04-24
Attending: EMERGENCY MEDICINE
Payer: MEDICARE

## 2021-04-24 VITALS
RESPIRATION RATE: 16 BRPM | HEART RATE: 80 BPM | BODY MASS INDEX: 28.79 KG/M2 | OXYGEN SATURATION: 95 % | DIASTOLIC BLOOD PRESSURE: 89 MMHG | WEIGHT: 173 LBS | SYSTOLIC BLOOD PRESSURE: 128 MMHG | TEMPERATURE: 99.8 F

## 2021-04-24 DIAGNOSIS — N30.01 ACUTE CYSTITIS WITH HEMATURIA: Primary | ICD-10-CM

## 2021-04-24 LAB
-: ABNORMAL
AMORPHOUS: ABNORMAL
BACTERIA: ABNORMAL
BILIRUBIN URINE: NEGATIVE
CASTS UA: ABNORMAL /LPF
COLOR: YELLOW
COMMENT UA: ABNORMAL
CRYSTALS, UA: ABNORMAL /HPF
EPITHELIAL CELLS UA: ABNORMAL /HPF (ref 0–25)
GLUCOSE URINE: NEGATIVE
KETONES, URINE: NEGATIVE
LEUKOCYTE ESTERASE, URINE: ABNORMAL
MUCUS: ABNORMAL
NITRITE, URINE: NEGATIVE
OTHER OBSERVATIONS UA: ABNORMAL
PH UA: 6 (ref 5–9)
PROTEIN UA: ABNORMAL
RBC UA: ABNORMAL /HPF (ref 0–2)
RENAL EPITHELIAL, UA: ABNORMAL /HPF
SPECIFIC GRAVITY UA: 1.01 (ref 1.01–1.02)
TRICHOMONAS: ABNORMAL
TURBIDITY: CLEAR
URINE HGB: ABNORMAL
UROBILINOGEN, URINE: NORMAL
WBC UA: ABNORMAL /HPF (ref 0–5)
YEAST: ABNORMAL

## 2021-04-24 PROCEDURE — 99283 EMERGENCY DEPT VISIT LOW MDM: CPT

## 2021-04-24 PROCEDURE — 81001 URINALYSIS AUTO W/SCOPE: CPT

## 2021-04-24 RX ORDER — PHENAZOPYRIDINE HYDROCHLORIDE 100 MG/1
100 TABLET, FILM COATED ORAL 3 TIMES DAILY PRN
Qty: 15 TABLET | Refills: 0 | Status: SHIPPED | OUTPATIENT
Start: 2021-04-24 | End: 2021-04-29

## 2021-04-24 RX ORDER — NITROFURANTOIN 25; 75 MG/1; MG/1
100 CAPSULE ORAL 2 TIMES DAILY
Qty: 20 CAPSULE | Refills: 0 | Status: SHIPPED | OUTPATIENT
Start: 2021-04-24 | End: 2021-05-04

## 2021-04-24 ASSESSMENT — ENCOUNTER SYMPTOMS
DIARRHEA: 0
COUGH: 0
BACK PAIN: 0
SHORTNESS OF BREATH: 0
ABDOMINAL PAIN: 0
NAUSEA: 0
WHEEZING: 0
ABDOMINAL DISTENTION: 0
CONSTIPATION: 0
VOMITING: 0

## 2021-04-24 NOTE — ED PROVIDER NOTES
[facility] ED  EMERGENCY DEPARTMENT ENCOUNTER      Pt Name:Marisel Clemons  MRN: [id number]  [place] 1940  Date of evaluation: 4/24/2021  Provider: Manfred Akers MD    [address] [place] [address]     Chief Complaint   Patient presents with    Hematuria     pt states onset this am    Dysuria         HISTORY OF PRESENT ILLNESS   (Location/Symptom, Timing/Onset, Context/Setting, Quality, Duration, Modifying Factors, Severity)  Note limiting factors. HPI the patient is an 79-year-old female who presents with hematuria and dysuria that started this morning. She does not have pain until she starts to urinate and then she has a burning. She has not had fever or chills. She has had no nausea or vomiting. She has a level 5 pain in the hypogastric area. Nursing Notes were reviewed. REVIEW OF SYSTEMS    (2-9 systems for level 4, 10 or more for level 5)     Review of Systems   Constitutional: Positive for activity change. Negative for fever. HENT: Negative for congestion. Respiratory: Negative for cough, shortness of breath and wheezing. Cardiovascular: Negative for chest pain, palpitations and leg swelling. Gastrointestinal: Negative for abdominal distention, abdominal pain, constipation, diarrhea, nausea and vomiting. Genitourinary: Positive for dysuria and hematuria. Negative for flank pain and frequency. Musculoskeletal: Negative for back pain and neck pain. Skin: Negative. Neurological: Negative for dizziness, light-headedness and headaches. Psychiatric/Behavioral: Negative for confusion, decreased concentration and dysphoric mood.               MEDICAL HISTORY     Past Medical History:   Diagnosis Date    Allergic rhinitis 10/1994    Arthritis     Asthma     Atrial fibrillation ([facility] [address] 12/2008    CAD (coronary artery disease)     Cerebrovascular accident (CVA) involving left cerebral hemisphere ([address] 04/23/2019    remote lacunar infarction within the L periventricular white matter    CHF (congestive heart failure) (HCC)     Clotting disorder (HCC)     plebitis in L leg    COPD (chronic obstructive pulmonary disease) ([facility] [address]     DDD (degenerative disc disease), cervical     Degeneration of cervical intervertebral disc     Diverticulosis 2005    Gout     Gout, unspecified     H/O cardiac catheterization 6/17/15    LMCA: Normal 0% stenosis. LAD: Lesion on 1st diag: Proximal subsection. 80% stenosis. Lesion plaque is ruptured. Basilio Cifuentes LCx: Lesion on 1st Ob Leslie: Mid subsection. 85% stenosis. RCA:Lesion on R PDA: Mid subsection. 85% stenosis. Lesion on R PDA: Distal subsection. 70% stenosis. Lesion on Prox RCA: Mid subsection. 50% stenosis. EF 50%.  H/O echocardiogram 11/09/2016    EF 40-45%. Mild LV hypertrophy normal LV cavity size. Sigmoid interventricular septum without evidence of outflow tract obstruction. Left atrium is moderately dilated (34-39) left atrial volume index of 36 ml/m2. Mild mitral regurg. Diastology cannot be assessed due to A-fib.  H/O echocardiogram 03/09/2018    EF 45-50%. Apical hypokinesis noted. The left atrium is moderately dilated (34-39) with a left atrial volume index of 34ml/m2. Mild to moderate mitral regurg. Mild tricuspid regurg. Moderate diastolic dysfunction.  History of Holter monitoring 3/24/16    Atrial Fibrillation throughout,fairly controlled, HR  bpm 79% of the time. Occasional high ventricular rate episodes and multiple pauses suggestive of tachycardia/bradycardia syndrome  Msximum R-R interval 2.68 seconds.    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  Intended supply: 30 days    POLYETHYLENE GLYCOL (GLYCOLAX) POWDER    Take 17 g by mouth as needed (for constipation)    RANOLAZINE (RANEXA) 500 MG EXTENDED RELEASE TABLET    Take 1 tablet by mouth twice daily    VITAMIN B-12 (CYANOCOBALAMIN) 500 MCG TABLET    Take 500 mcg by mouth daily       ALLERGIES     Adhesive tape, Aspercreme [trolamine salicylate], Erythromycin, Erythromycin, Grandpas thylox soap [sulfur], Guaifenesin & derivatives, Niacin and related, Niacin and related, Tape [adhesive tape], and Augmentin [amoxicillin-pot clavulanate]    FAMILY HISTORY       Family History   Problem Relation Age of Onset    Heart Disease Mother     Stroke Mother     High Blood Pressure Mother     Cancer Father         lung    Stroke Brother     Heart Disease Maternal Grandmother           SOCIAL HISTORY       Social History     Socioeconomic History    Marital status:      Spouse name: None    Number of children: None    Years of education: None    Highest education level: None   Occupational History    None   Social Needs    Financial resource strain: Not hard at all   [employer] insecurity     Worry: Never true     Inability: Never true   Qian Xiaoâ€™er needs     Medical: None     Non-medical: None   Tobacco Use    Smoking status: Never Smoker    Smokeless tobacco: Never Used   Substance and Sexual Activity    Alcohol use: No     Alcohol/week: 0.0 standard drinks    Drug use: No    Sexual activity: None   Lifestyle    Physical activity     Days per week: None     Minutes per session: None    Stress: None   Relationships    Social connections     Talks on phone: None     Gets together: None     Attends [employer] service: None     Active member of club or organization: None     Attends meetings of clubs or organizations: None     Relationship status: None    Intimate partner violence     Fear of current or ex partner: None     Emotionally abused: None     Physically abused: None     Forced sexual activity: None   Other Topics Concern    None   Social History Narrative    ** Merged History Encounter **            SCREENINGS             PHYSICAL EXAM  (up to 7 for level 4, 8 or more for level 5)     ED Triage Vitals [04/24/21 1412]   BP Temp Temp Source Pulse Resp SpO2 Height Weight   128/89 99.8 °F (37.7 °C) Tympanic 80 16 95 % -- 173 lb (78.5 kg)       Physical Exam  Constitutional:       Appearance: Normal appearance. She is normal weight. HENT:      Head: Normocephalic and atraumatic. Mouth/Throat:      Mouth: Mucous membranes are moist.      Pharynx: Oropharynx is clear. Eyes:      Extraocular Movements: Extraocular movements intact. Conjunctiva/sclera: Conjunctivae normal.      Pupils: Pupils are equal, round, and reactive to light. Neck:      Musculoskeletal: Normal range of motion and neck supple. Cardiovascular:      Rate and Rhythm: Normal rate and regular rhythm. Pulses: Normal pulses. Heart sounds: Normal heart sounds. Pulmonary:      Effort: Pulmonary effort is normal.      Breath sounds: Normal breath sounds. Abdominal:      General: Abdomen is flat. Bowel sounds are normal.      Palpations: Abdomen is soft. Tenderness: There is abdominal tenderness. Comments: Patient has some mild tenderness in the hypogastric area. Musculoskeletal: Normal range of motion. Skin:     General: Skin is warm and dry. Neurological:      General: No focal deficit present. Mental Status: She is alert and oriented to person, place, and time. Mental status is at baseline. Psychiatric:         Mood and Affect: Mood normal.         Behavior: Behavior normal.         Thought Content:  Thought content normal.         Judgment: Judgment normal.         DIAGNOSTIC RESULTS     EKG: All EKG's are interpreted by the Emergency Department Physician whoeither signs or Co-signs this chart in the absence of a cardiologist.      RADIOLOGY:   Non-plain film images such as CT, Ultrasound and MRI are read by the radiologist. Plain radiographic images are visualized and preliminarily interpreted by the emergency physician     Interpretation per the Radiologist below, if available at the time of this note:    No orders to display         ED BEDSIDE ULTRASOUND:   Performed by ED Physician - none    LABS:  Labs Reviewed   URINALYSIS WITH MICROSCOPIC - Abnormal; Notable for the following components:       Result Value    Urine Hgb 3+ (*)     Protein, UA 1+ (*)     Leukocyte Esterase, Urine SMALL (*)     Bacteria, UA 1+ (*)     All other components within normal limits       EMERGENCY DEPARTMENT COURSE and DIFFERENTIAL DIAGNOSIS/MDM:   Vitals:    Vitals:    04/24/21 1412   BP: 128/89   Pulse: 80   Resp: 16   Temp: 99.8 °F (37.7 °C)   TempSrc: Tympanic   SpO2: 95%   Weight: 173 lb (78.5 kg)           MDM patient is found to have cystitis with hematuria. She is putting on Macrobid and Pyridium. Advised to follow-up with her physician in 7 to 10 days. CONSULTS:  None    PROCEDURES:  Unless otherwise noted below, none     Procedures    FINAL IMPRESSION      1. Acute cystitis with hematuria          DISPOSITION/PLAN   DISPOSITION Decision To Discharge 04/24/2021 03:52:56 PM      PATIENT REFERRED TO:  Jessica Cheung MD  [facility] [phone]      Recheck in 7 to 10 days.       DISCHARGE MEDICATIONS:  New Prescriptions    NITROFURANTOIN, MACROCRYSTAL-MONOHYDRATE, (MACROBID) 100 MG CAPSULE    Take 1 capsule by mouth 2 times daily for 10 days    PHENAZOPYRIDINE (PYRIDIUM) 100 MG TABLET    Take 1 tablet by mouth 3 times daily as needed for Pain              (Please note that portions ofthis note were completed with a voice recognition program.  Efforts were made to edit the dictations but occasionally words are mis-transcribed.)      Ramone Rocha MD (electronically signed)  Attending Emergency Physician          King Nimesh MD  04/24/21 [id number]

## 2021-04-24 NOTE — ED NOTES
Dr Zhou Bloom at [address] [facility], [address] [place] [address]  04/24/21 5269

## 2021-04-24 NOTE — ED NOTES
This writer accompanied Dr. Liliane Carmona to assess rigid area.       Marry Shay RN  04/24/21 [id number]

## 2021-04-24 NOTE — ED NOTES
This writer entered room to straight cath pt. After pt lowered her pants, this writer noticed rigid swelling in the bladder area. This writer completed a bladder scan which showed 40-47 ml of urine in bladder. Pt was straight cathed with approximately 60ml of urine obtained.  Dr. Cosme Gallo made aware of rigidness of bladder area and asked if he would look at this     Yennifer Bustillo RN  04/24/21 [id number]

## 2021-04-24 NOTE — ED NOTES
[facility] Pt states understanding.       Lorena Hartman RN  04/24/21 [id number]

## 2021-04-28 ENCOUNTER — OFFICE VISIT (OUTPATIENT)
Dept: FAMILY MEDICINE CLINIC | Age: 81
End: 2021-04-28
Payer: MEDICARE

## 2021-04-28 VITALS
WEIGHT: 174 LBS | SYSTOLIC BLOOD PRESSURE: 130 MMHG | HEIGHT: 65 IN | DIASTOLIC BLOOD PRESSURE: 82 MMHG | BODY MASS INDEX: 28.99 KG/M2

## 2021-04-28 DIAGNOSIS — N39.0 URINARY TRACT INFECTION WITH HEMATURIA, SITE UNSPECIFIED: Primary | ICD-10-CM

## 2021-04-28 DIAGNOSIS — R31.9 URINARY TRACT INFECTION WITH HEMATURIA, SITE UNSPECIFIED: Primary | ICD-10-CM

## 2021-04-28 LAB
BILIRUBIN, POC: 0
BLOOD URINE, POC: NEGATIVE
CLARITY, POC: CLEAR
COLOR, POC: NORMAL
GLUCOSE URINE, POC: NEGATIVE
KETONES, POC: NEGATIVE
LEUKOCYTE EST, POC: NORMAL
NITRITE, POC: NORMAL
PH, POC: 6
PROTEIN, POC: NEGATIVE
SPECIFIC GRAVITY, POC: 1.02
UROBILINOGEN, POC: NORMAL

## 2021-04-28 PROCEDURE — 99213 OFFICE O/P EST LOW 20 MIN: CPT | Performed by: FAMILY MEDICINE

## 2021-04-28 PROCEDURE — 81003 URINALYSIS AUTO W/O SCOPE: CPT | Performed by: FAMILY MEDICINE

## 2021-04-28 NOTE — PATIENT INSTRUCTIONS
SURVEY:    You may be receiving a survey from [email or web address] regarding your visit today. Please complete the survey to enable us to provide the highest quality of care to you and your family. If you cannot score us a very good on any question, please call the office to discuss how we could of made your experience a very good one. Thank you. PLAN:  ED visit reviewed. Her urine specimen today looks pretty clear. I suggest for her to finish the antibiotics as prescribed.

## 2021-04-28 NOTE — PROGRESS NOTES
ADAM Cruz, am scribing for and in the presence of Dr. Manuelito Bowen. 4/28/21/1:50pm/SNP    [address] [address]  [place] [address]  Dept: [phone]    HPI: Patient presents today for an ED f/u. She was seen on 4/24/21 with hematuria and dysuria. She was treated with Macrobid and Pyridium. She is taking the macrobid as directed. Current Outpatient Medications   Medication Sig Dispense Refill    nitrofurantoin, macrocrystal-monohydrate, (MACROBID) 100 MG capsule Take 1 capsule by mouth 2 times daily for 10 days 20 capsule 0    atorvastatin (LIPITOR) 40 MG tablet Take 1 tablet by mouth nightly 90 tablet 0    EUTHYROX 75 MCG tablet Take 1 tablet by mouth once daily 90 tablet 3    carvedilol (COREG) 6.25 MG tablet Take 1 tablet by mouth twice daily 180 tablet 3    vitamin B-12 (CYANOCOBALAMIN) 500 MCG tablet Take 500 mcg by mouth daily      ranolazine (RANEXA) 500 MG extended release tablet Take 1 tablet by mouth twice daily 180 tablet 3    aspirin EC 81 MG EC tablet Take 1 tablet by mouth daily 90 tablet 3    acetaminophen (TYLENOL) 325 MG tablet Take 2 tablets by mouth every 4 hours as needed for Pain 120 tablet 3    polyethylene glycol (GLYCOLAX) powder Take 17 g by mouth as needed (for constipation)      phenazopyridine (PYRIDIUM) 100 MG tablet Take 1 tablet by mouth 3 times daily as needed for Pain (Patient not taking: Reported on 4/28/2021) 15 tablet 0    gabapentin (NEURONTIN) 100 MG capsule Take 1 capsule by mouth 3 times daily for 10 days. Intended supply: 30 days 30 capsule 0     No current facility-administered medications for this visit.       ROS:  Denies hematuria  Denies dysuria     EXAM:  /82 (Site: Left Upper Arm, Position: Sitting, Cuff Size: Large Adult)   Ht 5' 5\" (1.651 m)   Wt 174 lb (78.9 kg)   BMI 28.96 kg/m²   Wt Readings from Last 3 Encounters:   04/28/21 174 lb (78.9 kg)   04/24/21 173 lb (78.5 kg) 03/23/21 172 lb (78 kg)     BP Readings from Last 3 Encounters:   04/28/21 130/82   04/24/21 128/89   03/23/21 136/76     PHYSICAL EXAM:  General Appearance: in no acute distress, well developed, well nourished. Eyes: pupils equal, round reactive to light and accommodation. Ears: normal canal and TM's. Nose: nares patent, no lesions. Oral Cavity: mucosa moist.  Throat: clear. Neck/Thyroid: neck supple, full range of motion, no cervical lymphadenopathy, no thyromegaly or carotid bruits. Skin: warm and dry. No suspicious lesions. Heart: regular rate and rhythm. No murmurs. S1, S2 normal, no gallops. Lungs: clear to auscultation bilaterally. Abdomen: bowel sounds present, soft, nontender, nondistended, no masses or organomegaly. Musculoskeletal: normal, full range of motion in knees and hips, no swelling or tenderness. Extremities: no cyanosis or edema. Peripheral Pulses: 2+ throughout, symetric. Neurologic: nonfocal, motor strength normal upper and lower extremities, sensory exam intact. Resting tremor  Psych: normal affect, speech fluent. ASSESSMENT:   Diagnosis Orders   1. Urinary tract infection with hematuria, site unspecified  POCT Urinalysis No Micro (Auto)     PLAN:  ED visit reviewed. Her urine specimen today looks pretty clear. I suggest for her to finish the antibiotics as prescribed. We did not get a culture for this episode  In past she has had e coli not resistant  We discussed topical estrogens for prevntion if recurs      Orders Placed This Encounter   Procedures    POCT Urinalysis No Micro (Auto)     No orders of the defined types were placed in this encounter. I, Dr. Elias Toledo, personally performed the services described in this documentation as scribed by DONNY Beltran in my presence, and is both accurate and complete.

## 2021-05-24 ENCOUNTER — OFFICE VISIT (OUTPATIENT)
Dept: CARDIOLOGY | Age: 81
End: 2021-05-24
Payer: MEDICARE

## 2021-05-24 ENCOUNTER — HOSPITAL ENCOUNTER (OUTPATIENT)
Dept: NON INVASIVE DIAGNOSTICS | Age: 81
Discharge: HOME | End: 2021-05-24
Payer: MEDICARE

## 2021-05-24 VITALS
OXYGEN SATURATION: 97 % | WEIGHT: 173.2 LBS | DIASTOLIC BLOOD PRESSURE: 71 MMHG | SYSTOLIC BLOOD PRESSURE: 110 MMHG | HEART RATE: 80 BPM | HEIGHT: 65 IN | RESPIRATION RATE: 18 BRPM | BODY MASS INDEX: 28.86 KG/M2

## 2021-05-24 DIAGNOSIS — I50.32 CHRONIC DIASTOLIC CONGESTIVE HEART FAILURE (HCC): Primary | ICD-10-CM

## 2021-05-24 DIAGNOSIS — E78.2 MIXED HYPERLIPIDEMIA: ICD-10-CM

## 2021-05-24 DIAGNOSIS — I25.10 ASHD (ARTERIOSCLEROTIC HEART DISEASE): ICD-10-CM

## 2021-05-24 DIAGNOSIS — I50.32 CHRONIC DIASTOLIC CONGESTIVE HEART FAILURE (HCC): ICD-10-CM

## 2021-05-24 DIAGNOSIS — Z95.810 BIVENTRICULAR ICD (IMPLANTABLE CARDIOVERTER-DEFIBRILLATOR) IN PLACE: ICD-10-CM

## 2021-05-24 DIAGNOSIS — I48.20 CHRONIC A-FIB (HCC): ICD-10-CM

## 2021-05-24 DIAGNOSIS — R06.02 SOB (SHORTNESS OF BREATH): ICD-10-CM

## 2021-05-24 LAB
LV EF: 50 %
LVEF MODALITY: NORMAL

## 2021-05-24 PROCEDURE — 93306 TTE W/DOPPLER COMPLETE: CPT

## 2021-05-24 PROCEDURE — 99214 OFFICE O/P EST MOD 30 MIN: CPT | Performed by: INTERNAL MEDICINE

## 2021-05-24 PROCEDURE — 93289 INTERROG DEVICE EVAL HEART: CPT | Performed by: INTERNAL MEDICINE

## 2021-05-24 NOTE — PATIENT INSTRUCTIONS
SURVEY:    You may be receiving a survey from [facility] regarding your visit today. Please complete the survey to enable us to provide the highest quality of care to you and your family. If you cannot score us a very good on any question, please call the office to discuss how we could have made your experience a very good one. Thank you.

## 2021-05-24 NOTE — PROGRESS NOTES
Nicole Hunter am scribing for and in the presence of Evelyne Vega MD, F.A.C.C..    Subjective:     CHIEF COMPLAINT / HPI:   Chief Complaint   Patient presents with    1 Year Follow Up     Hx:CHF,ASHD,Chr. Anticoag, HLD,ICD,HTN,LV Systolic Dysfunction. She has been having some shortness of breath that is worsening when she gets up too quick. no lightheaded/dizziness. Denies: CP, Palpitations. Dear Dr. Marlon Meyer MD     I had the pleasure of seeing Ms. Luis Meehan for follow-up. Sanna Quinn is 79 y.o.  with PMH of hypertension, hyperlipidemia, asthma and chronic atrial fibrillation. Her most recent cardiac catheterization showed moderate to severe three-vessel coronary artery disease as outlined below. 1-Patient's chronic atrial fibrillation is  Treated with rate control and anticoagulation with coumadin. Her Holter monitor on 4/9/2015 suggested tachycardiabradycardia syndrome. 2-On 3/16/2016, patient was sent from cardiac rehab because of not feeling well. Imdur 30 mg daily was added as well as Ranexa 500 mg BID. 3-Patient admitted to [facility] from 6/3/2017 to 6/15/2017 with worsening shortness of breath diagnosed with pneumonitis, status post lung biopsy. Currently on prednisone. 4-Another admission to [facility] from 6/29/2017 to 7/6/2017 with A. Fib with RVR and acute on chronic diastolic CHF. Patient started on amiodarone during this admission in addition to her ratel control and diuresis. 5-Another admission to [facility] from 7/11/2017 to 7/14/2017 with recurrent fall and black eye. 3 falling episodes, 2 of them she hit her head. She is not sure if she lost consciousness. MRI of the brain was negative. 6-Patient underwent AV node ablation and insertion of biventricular ICD by Dr. Patel Almodovar on 8/10/2017. 7-On 9/18/2017 patient underwent insertion of a Watchman device.  currently off anticoagulation     8-An admission to [facility] with orthostatic hypotension, discharged on 3/9/2018. Her metoprolol decreased from 50 to 25 mg twice a day and midodrine added. 9-ICD checked 3/7/18: Biventricular pacing 99% of the time. No significant ventricular arrhythmia. 10- EKG done on 4/20/2019. 11- Echo on 05/13/2019: Poor image quality. EF 45-50%. LA is mildly dilated w/ LA colume index of 30. Mild mitral rgeurg. Moderate diastolic dysfunction. 12- ICD interrogation today 5/20/2020-battery 4.1 years. Ventricular pacing 99%, pacing mode VVIR. Chronic atrial fibrillation    13- EKG done in office on 5/20/2020-biventricular pacing. No change from prior. Ms. Luis Meehan is here for her for follow up. She states she had shingles and that really set her back. She came to the emergency room twice since I saw her last time, 1 with a right-sided jaw pain and the other was with UTI/hematuria. She feels her breathing has worsened. She cannot walk long distances anymore. When she walks from her living room to her kitchen which is about 16 feet and she will be gasping for air. She doesn't use her cane or walker while at home - only when out and about. She continues to sew for an hour almost every day and her right hand shaking does not interfere with this. She has been trying to drink plenty of fluids. No significant change in her weight. No leg swelling. She is unable to use compression stockings because it is hard for her to put them on. She eats fast food about once a week but most of the time she is eating frozen food that is prepared. She is unable to cook. She does not eating much and she does not think that her sodium intake is a factor. No problems sleeping at night. She denies any chest pain, pressure, or tightness. No lightheadedness/dizziness or palpitations. No abdominal pain, nausea or vomiting. No cough, fever or chills. No bleeding problems, bowel issues, problems with medications, or any other concerns at this time.  No falls or near falls. Past Medical History:    Past Medical History:   Diagnosis Date    Allergic rhinitis 10/1994    Arthritis     Asthma     Atrial fibrillation ([facility] 12/2008    CAD (coronary artery disease)     Cerebrovascular accident (CVA) involving left cerebral hemisphere ([facility] [facility] 04/23/2019    remote lacunar infarction within the L periventricular white matter    CHF (congestive heart failure) (HCC)     Clotting disorder (HCC)     plebitis in L leg    COPD (chronic obstructive pulmonary disease) ([facility]     DDD (degenerative disc disease), cervical     Degeneration of cervical intervertebral disc     Diverticulosis 2005    Gout     Gout, unspecified     H/O cardiac catheterization 6/17/15    LMCA: Normal 0% stenosis. LAD: Lesion on 1st diag: Proximal subsection. 80% stenosis. Lesion plaque is ruptured. Tressa Shaheed LCx: Lesion on 1st Ob Leslie: Mid subsection. 85% stenosis. RCA:Lesion on R PDA: Mid subsection. 85% stenosis. Lesion on R PDA: Distal subsection. 70% stenosis. Lesion on Prox RCA: Mid subsection. 50% stenosis. EF 50%.  H/O echocardiogram 11/09/2016    EF 40-45%. Mild LV hypertrophy normal LV cavity size. Sigmoid interventricular septum without evidence of outflow tract obstruction. Left atrium is moderately dilated (34-39) left atrial volume index of 36 ml/m2. Mild mitral regurg. Diastology cannot be assessed due to A-fib.  H/O echocardiogram 03/09/2018    EF 45-50%. Apical hypokinesis noted. The left atrium is moderately dilated (34-39) with a left atrial volume index of 34ml/m2. Mild to moderate mitral regurg. Mild tricuspid regurg. Moderate diastolic dysfunction.  History of Holter monitoring 3/24/16    Atrial Fibrillation throughout,fairly controlled, HR  bpm 79% of the time. Occasional high ventricular rate episodes and multiple pauses suggestive of tachycardia/bradycardia syndrome  Msximum R-R interval 2.68 seconds.    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    Long term (current) use of anticoagulants 8/31/2015    Movement disorder     Other abnormal glucose 2004    Pacemaker 08/10/2017    Insertion of a biventricular pacemaker/ICD AVN ablation    Phlebitis and thrombophlebitis of lower extremities, unspecified 1961    L leg    Senile osteoporosis 2006    L/S    Symptomatic menopausal or female climacteric states 06/1995    Syncope and collapse     Unspecified hereditary and idiopathic peripheral neuropathy 2012    Unspecified sleep apnea 11/2009     Past Surgical History:  Past Surgical History:   Procedure Laterality Date    BRONCHOSCOPY  6/7/2017    [facility] at [place] [address]  6/7/2017    BRONCHOSCOPY/TRANS[facility]G [facility]SY [facility] at [place] [address]  6/7/2017    [facility] Right 06/17/2015    CATARACT REMOVAL WITH IMPLANT Right 08/14/2017    DR ROMERO    COLONOSCOPY  1/2005    DIAGNOSTIC CARDIAC CATH LAB PROCEDURE  06/17/15    EYE SURGERY      FRACTURE SURGERY  2004    Distal Radius Ulna    HIP FRACTURE SURGERY Right 04/21/2019    Dr. Leopoldo Bryant- hip pinning    HIP PINNING Right 4/21/2019    HIP PINNING performed by Zach Jj MD at [address] [place] [address] Left 6/14/2017    MINI PORT ACCESS LEFT CHEST, X2 SPECIMENS.  performed by Roque Byrd MD at [address] [place] [address]  2years old    VOLVAR-ULCERATIVE LESION-CAUTERIZED    CT EGD TRANSORAL BIOPSY SINGLE/MULTIPLE Left 2/13/2018    EGD BIOPSY performed by Brandi Blake MD at [facility]    SKIN BIOPSY      TONSILLECTOMY AND ADENOIDECTOMY       Social History:    Social History     Tobacco Use   Smoking Status Never Smoker   Smokeless Tobacco Never Used Family History   Problem Relation Age of Onset    Heart Disease Mother     Stroke Mother     High Blood Pressure Mother     Cancer Father         lung    Stroke Brother     Heart Disease Maternal Grandmother        Current Medications:  Outpatient Medications Marked as Taking for the 5/24/21 encounter (Office Visit) with Hadley Thomas MD   Medication Sig Dispense Refill    atorvastatin (LIPITOR) 40 MG tablet Take 1 tablet by mouth nightly 90 tablet 0    EUTHYROX 75 MCG tablet Take 1 tablet by mouth once daily 90 tablet 3    vitamin B-12 (CYANOCOBALAMIN) 500 MCG tablet Take 500 mcg by mouth daily      ranolazine (RANEXA) 500 MG extended release tablet Take 1 tablet by mouth twice daily 180 tablet 3    aspirin EC 81 MG EC tablet Take 1 tablet by mouth daily 90 tablet 3    acetaminophen (TYLENOL) 325 MG tablet Take 2 tablets by mouth every 4 hours as needed for Pain 120 tablet 3    polyethylene glycol (GLYCOLAX) powder Take 17 g by mouth as needed (for constipation)         REVIEW OF SYSTEMS:    CONSTITUTIONAL: No major weight gain or loss, fatigue, weakness, night sweats or fever. HEENT: No new vision difficulties or ringing in the ears. RESPIRATORY: See HPI   CARDIOVASCULAR: See HPI  GI: No nausea, vomiting, diarrhea, constipation, abdominal pain or changes in bowel habits. : No urinary frequency, urgency, incontinence hematuria or dysuria. SKIN: No cyanosis or skin lesions. MUSCULOSKELETAL: No new muscle or joint pain. NEUROLOGICAL: No syncope or TIA-like symptoms. PSYCHIATRIC: No anxiety, pain, insomnia or depression    Objective:     Physical Examination:     /71   Pulse 80   Resp 18   Ht 5' 5\" (1.651 m)   Wt 173 lb 3.2 oz (78.6 kg)   SpO2 97%   BMI 28.82 kg/m²  Body mass index is 28.82 kg/m². Constitutional: She appeared oriented to person and place. She appears well-developed and well-nourished. In no acute distress. HEENT: Normocephalic and atraumatic. No JVD present. Carotid bruit is not present. No mass and no thyromegaly present. No lymphadenopathy noted. Cardiovascular: Normal rate, regular rhythm, normal heart sounds. Exam reveals no gallop and no friction rubs. 1/6 systolic murmur, 5th intercostal space on the LEFT in the mid-clavicular line (cardiac apex)  Pulmonary/Chest: Effort normal and breath sounds normal. No respiratory distress. She has no wheezes, rhonchi or rales. Abdominal: Soft, non-tender. She exhibits no organomegaly, mass or bruit. Extremities: Trace-1+ at the ankles bilaterally. No cyanosis or clubbing. 2+ radial and carotid pulses. Distal extremity pulses: 2+ bilaterally. Neurological: Alertness and orientation as per Constitutional exam. No evidence of gross cranial nerve deficit. Coordination appeared normal.   Skin: Skin is warm and dry. There is no rash or diaphoresis. Psychiatric: She has a normal mood and affect. Her speech is normal and behavior is normal.        DATA:    Lab Results   Component Value Date    ALT 9 03/09/2021    AST 14 03/09/2021    ALKPHOS 104 04/20/2019    BILITOT 0.48 04/20/2019     Lab Results   Component Value Date    CREATININE 0.96 (H) 01/09/2021    BUN 13 01/09/2021     01/09/2021    K 4.3 01/09/2021     01/09/2021    CO2 24 01/09/2021     Lab Results   Component Value Date    TSH 0.51 03/09/2021    S4TUMTQ 10.7 11/05/2020     Lab Results   Component Value Date    WBC 7.2 03/09/2021    HGB 13.7 03/09/2021    HCT 43.7 03/09/2021    MCV 96.3 03/09/2021     03/09/2021       Lab Results   Component Value Date    TRIG 185 (H) 11/05/2020    TRIG 113 02/25/2020    TRIG 195 (H) 07/01/2019     Lab Results   Component Value Date    HDL 47 11/05/2020    HDL 48 02/25/2020    HDL 44 07/01/2019     Lab Results   Component Value Date    LDLCHOLESTEROL 60 11/05/2020    LDLCHOLESTEROL 43 02/25/2020    LDLCHOLESTEROL 54 07/01/2019         Assessment:      Diagnosis Orders   1.  Chronic diastolic congestive heart failure ([address]     2. SOB (shortness of breath)     3. ASHD (arteriosclerotic heart disease)     4. Chronic a-fib ([address]     5. Mixed hyperlipidemia       PLAN:    Chronic Diastolic Heart Failure: Worsening Shortness of Breath   · No clinical evidence of volume overload. · No significant lower extremity edema. · Beta Blocker: Continue Carvedilol (Coreg) 6.25 mg twice daily. Nonpharmacologic management of Heart Failure: I told her to continue wearing lower extremity compression stockings and I advised her to try and keep his legs up whenever possible and to limit salt in her diet. Additional Testing List: I ordered a echocardiogram to better assess for the etiology of this problem and to help guide future management I also ordered a chest xray to assess her lungs. I reviewed the blood work from her most recent emergency room visit. · Atherosclerotic Heart Disease:  · Antiplatelet Agent: Continue aspirin 81 mg daily  I also reminded her to watch for signs of blood in her stool or black tarry stools and stop the medication immediately if this develops as this could be life threatening. · Beta Blocker: Continue Carvedilol (Coreg) 6.25 mg twice daily. · Anti-anginal medications: Continue ranolazine (Ranexa) 500 mg 2 times/day. · Statin Therapy: Continue atorvastatin (Lipitor) 40 mg nightly. · Chronic atrial fibrillation status post AV node ablation and watchman device placement:   · Beta Blocker: Continue Carvedilol (Coreg) 6.25 mg twice daily. · Stroke Risk: Her CHADS2 score is 5/9 (6.7% stroke risk)  · Anticoagulation: Watchman in place. I did explain to her that watchman device does not prevent every single stroke. Patient said she had a CT scan done of the brain for her tremor and found to have a tiny stroke. · She continues to see neurology for her tremor. · Hyperlipidemia: Mixed - Last LDL at goal.  · Cholesterol Reduction Therapy: Continue Atorvastatin (Lipitor) 40 mg daily. Implantable Cardioverter Defibrillator (ICD): Indication for Device Placement: Biventricular ICD secondary to severe left ventricular systolic dysfunction and chronic atrial fibrillation  Interrogation Findings: Normal biventricular ICD function. Biventricular pacing is more than 99%. She has history of AV piotr ablation and watchman device insertion. Expected battery 2.8 years. Finally, I recommended that she continue her other medications and follow up with you as previously scheduled. FOLLOW UP:  I told Ms. Luis Meehan to call my office if she had any problems, but otherwise told her to Return in about 6 months (around 11/24/2021). However, I would be happy to see her sooner should the need arise. Sincerely,  Evelyne Vega MD, F.A.C.C. [facility] Cardiology Specialist    [address]  Phone: [phone], Fax: [phone]     I believe that the risk of significant morbidity and mortality related to the patient's current medical conditions are: intermediate-high. >30 minutes were spent during prep work, discussion and exam of the patient, and follow up documentation and all of their questions were answered. The documentation recorded by the scribe, accurately and completely reflects the services I personally performed and the decisions made by me. Evelyne Vega MD, F.A.C.C.  May 24, 2021

## 2021-05-25 ENCOUNTER — TELEPHONE (OUTPATIENT)
Dept: CARDIOLOGY | Age: 81
End: 2021-05-25

## 2021-05-25 NOTE — TELEPHONE ENCOUNTER
----- Message from Dwight Thomson MD sent at 5/24/2021 10:08 PM EDT -----  Echo didn't change from before.

## 2021-05-25 NOTE — RESULT ENCOUNTER NOTE
Echo didn't change from before. Reason for call:  Patient reporting a symptom    Symptom or request: sx    Duration (how long have symptoms been present): since jan 5th    Have you been treated for this before? Yes    Additional comments: Sri would like a call for some homecare advice She is having sore throat, coughing and sinus.      Phone Number patient can be reached at:  Cell number on file:    Telephone Information:   Mobile [phone]       Best Time:  any    Can we leave a detailed message on this number:  YES    Call taken on 2/12/2018 at 10:40 AM by Miryam Torre

## 2021-06-02 RX ORDER — ATORVASTATIN CALCIUM 40 MG/1
TABLET, FILM COATED ORAL
Qty: 90 TABLET | Refills: 3 | Status: SHIPPED | OUTPATIENT
Start: 2021-06-02 | End: 2022-05-24

## 2021-06-29 ENCOUNTER — NURSE ONLY (OUTPATIENT)
Dept: CARDIOLOGY | Age: 81
End: 2021-06-29
Payer: MEDICARE

## 2021-06-29 DIAGNOSIS — I50.32 CHRONIC DIASTOLIC CONGESTIVE HEART FAILURE (HCC): Primary | ICD-10-CM

## 2021-07-02 PROCEDURE — 93297 REM INTERROG DEV EVAL ICPMS: CPT | Performed by: INTERNAL MEDICINE

## 2021-07-21 ENCOUNTER — HOSPITAL ENCOUNTER (OUTPATIENT)
Age: 81
Discharge: HOME | End: 2021-07-21
Payer: MEDICARE

## 2021-07-21 DIAGNOSIS — E78.5 HYPERLIPIDEMIA, UNSPECIFIED HYPERLIPIDEMIA TYPE: ICD-10-CM

## 2021-07-21 DIAGNOSIS — E11.9 TYPE 2 DIABETES MELLITUS WITHOUT COMPLICATION, UNSPECIFIED WHETHER LONG TERM INSULIN USE (HCC): ICD-10-CM

## 2021-07-21 DIAGNOSIS — E03.9 HYPOTHYROIDISM, UNSPECIFIED TYPE: ICD-10-CM

## 2021-07-21 DIAGNOSIS — I10 ESSENTIAL HYPERTENSION: ICD-10-CM

## 2021-07-21 DIAGNOSIS — E53.8 B12 DEFICIENCY: ICD-10-CM

## 2021-07-21 DIAGNOSIS — R73.9 HYPERGLYCEMIA: ICD-10-CM

## 2021-07-21 LAB
ABSOLUTE EOS #: 0.29 K/UL (ref 0–0.44)
ABSOLUTE IMMATURE GRANULOCYTE: 0.03 K/UL (ref 0–0.3)
ABSOLUTE LYMPH #: 1.6 K/UL (ref 1.1–3.7)
ABSOLUTE MONO #: 0.66 K/UL (ref 0.1–1.2)
ALT SERPL-CCNC: 13 U/L (ref 5–33)
AST SERPL-CCNC: 21 U/L
BASOPHILS # BLD: 1 % (ref 0–2)
BASOPHILS ABSOLUTE: 0.04 K/UL (ref 0–0.2)
DIFFERENTIAL TYPE: ABNORMAL
EOSINOPHILS RELATIVE PERCENT: 4 % (ref 1–4)
HCT VFR BLD CALC: 46.2 % (ref 36.3–47.1)
HEMOGLOBIN: 14 G/DL (ref 11.9–15.1)
IMMATURE GRANULOCYTES: 0 %
LYMPHOCYTES # BLD: 20 % (ref 24–43)
MCH RBC QN AUTO: 28.1 PG (ref 25.2–33.5)
MCHC RBC AUTO-ENTMCNC: 30.3 G/DL (ref 28.4–34.8)
MCV RBC AUTO: 92.8 FL (ref 82.6–102.9)
MONOCYTES # BLD: 8 % (ref 3–12)
NRBC AUTOMATED: 0 PER 100 WBC
PDW BLD-RTO: 14.2 % (ref 11.8–14.4)
PLATELET # BLD: 297 K/UL (ref 138–453)
PLATELET ESTIMATE: ABNORMAL
PMV BLD AUTO: 10.1 FL (ref 8.1–13.5)
RBC # BLD: 4.98 M/UL (ref 3.95–5.11)
RBC # BLD: ABNORMAL 10*6/UL
SEDIMENTATION RATE, ERYTHROCYTE: 23 MM (ref 0–20)
SEG NEUTROPHILS: 67 % (ref 36–65)
SEGMENTED NEUTROPHILS ABSOLUTE COUNT: 5.28 K/UL (ref 1.5–8.1)
WBC # BLD: 7.9 K/UL (ref 3.5–11.3)
WBC # BLD: ABNORMAL 10*3/UL

## 2021-07-21 PROCEDURE — 84450 TRANSFERASE (AST) (SGOT): CPT

## 2021-07-21 PROCEDURE — 84460 ALANINE AMINO (ALT) (SGPT): CPT

## 2021-07-21 PROCEDURE — 36415 COLL VENOUS BLD VENIPUNCTURE: CPT

## 2021-07-21 PROCEDURE — 82607 VITAMIN B-12: CPT

## 2021-07-21 PROCEDURE — 83036 HEMOGLOBIN GLYCOSYLATED A1C: CPT

## 2021-07-21 PROCEDURE — 84436 ASSAY OF TOTAL THYROXINE: CPT

## 2021-07-21 PROCEDURE — 85652 RBC SED RATE AUTOMATED: CPT

## 2021-07-21 PROCEDURE — 85025 COMPLETE CBC W/AUTO DIFF WBC: CPT

## 2021-07-21 PROCEDURE — 82746 ASSAY OF FOLIC ACID SERUM: CPT

## 2021-07-22 LAB
ESTIMATED AVERAGE GLUCOSE: 123 MG/DL
FOLATE: 18.2 NG/ML
HBA1C MFR BLD: 5.9 % (ref 4–6)
T4 TOTAL: 11.9 UG/DL (ref 4.5–10.9)
VITAMIN B-12: 1184 PG/ML (ref 232–1245)

## 2021-07-27 ENCOUNTER — OFFICE VISIT (OUTPATIENT)
Dept: FAMILY MEDICINE CLINIC | Age: 81
End: 2021-07-27
Payer: MEDICARE

## 2021-07-27 VITALS
SYSTOLIC BLOOD PRESSURE: 134 MMHG | BODY MASS INDEX: 29.32 KG/M2 | HEIGHT: 65 IN | WEIGHT: 176 LBS | DIASTOLIC BLOOD PRESSURE: 78 MMHG

## 2021-07-27 DIAGNOSIS — I50.22 CHRONIC SYSTOLIC CONGESTIVE HEART FAILURE (HCC): ICD-10-CM

## 2021-07-27 DIAGNOSIS — I10 ESSENTIAL HYPERTENSION: ICD-10-CM

## 2021-07-27 DIAGNOSIS — R55 VASODEPRESSOR SYNCOPE: ICD-10-CM

## 2021-07-27 DIAGNOSIS — E53.8 B12 DEFICIENCY: ICD-10-CM

## 2021-07-27 DIAGNOSIS — G20 PARKINSONIAN TREMOR (HCC): ICD-10-CM

## 2021-07-27 DIAGNOSIS — R73.9 HYPERGLYCEMIA: ICD-10-CM

## 2021-07-27 DIAGNOSIS — E11.9 TYPE 2 DIABETES MELLITUS WITHOUT COMPLICATION, UNSPECIFIED WHETHER LONG TERM INSULIN USE (HCC): Primary | ICD-10-CM

## 2021-07-27 DIAGNOSIS — E78.5 HYPERLIPIDEMIA, UNSPECIFIED HYPERLIPIDEMIA TYPE: ICD-10-CM

## 2021-07-27 DIAGNOSIS — G62.9 PERIPHERAL POLYNEUROPATHY: ICD-10-CM

## 2021-07-27 DIAGNOSIS — J84.10 PULMONARY FIBROSIS (HCC): ICD-10-CM

## 2021-07-27 DIAGNOSIS — L84 CALLUS OF FOOT: ICD-10-CM

## 2021-07-27 DIAGNOSIS — E11.40 TYPE 2 DIABETES MELLITUS WITH DIABETIC NEUROPATHY, WITHOUT LONG-TERM CURRENT USE OF INSULIN (HCC): ICD-10-CM

## 2021-07-27 DIAGNOSIS — E03.9 HYPOTHYROIDISM, UNSPECIFIED TYPE: ICD-10-CM

## 2021-07-27 PROCEDURE — 99214 OFFICE O/P EST MOD 30 MIN: CPT | Performed by: FAMILY MEDICINE

## 2021-07-27 NOTE — PATIENT INSTRUCTIONS
PLAN:  I examine her R great toe as she has a callus with tenderness ,I use a 10 blade to lightly shave the top surface of the callus off without success of removing the dark center which I suspect to be a scab with blood underneath of it. I would like her to keep a Band-Aid on it. I will see her back in 4 months with labs      SURVEY:    You may be receiving a survey from [employer] regarding your visit today. Please complete the survey to enable us to provide the highest quality of care to you and your family. If you cannot score us a very good on any question, please call the office to discuss how we could of made your experience a very good one. Thank you.

## 2021-07-27 NOTE — PROGRESS NOTES
I, Jade Cifuentes [facility], am scribing for and in the presence of Dr. Bucky Yu. 7/27/21/1:45/CRF    [address] [address]  [id number]  Dept: [id number] Sterling Hayes is a 79 y.o. female here for Follow-up (4 mo)  pt complains of R big toe as last week when her granddaughter was giving her a pedicure, noticed a black spot, she states it is tender      HPI:  HYPERTENSION:  She is not exercising. She is not adherent to a low-salt diet.    Blood pressure is not being monitored at home     HYPERLIPIDEMIA:     Medication compliance: compliant all of the time. Patient is not following a low fat, low cholesterol diet.    She is not exercising regularly.     COPD Symptoms:   Patient is not currently experiencing symptoms. She  reports that she has never smoked. She has never used smokeless tobacco. Current treatment includes nothing.     DIABETES MELLITUS:  Medication compliance:  n/a  Diabetic diet compliance:  compliant all of the time  Current exercise: no regular exercise  Frequency of testing: none  Eye exam current (within one year): yes, 1/2020  Diabetic foot check in the past year: Yes, 07/2020   reports that she has never smoked. She has never used smokeless tobacco.   Screenings for behavioral, psychosocial and functional/safety risks, and cognitive dysfunction are all negative except as indicated below. These results, as well as other patient data from the Health Risk Assessment form, are documented in Flowsheets linked to this Encounter.     Prior to Admission medications    Medication Sig Start Date End Date Taking?  Authorizing Provider   atorvastatin (LIPITOR) 40 MG tablet Take 1 tablet by mouth nightly 6/2/21  Yes Bucky Yu MD   ranolazine (RANEXA) 500 MG extended release tablet Take 1 tablet by mouth twice daily 6/2/21  Yes Susan Hermosillo MD   EUTHYROX 75 MCG tablet Take 1 tablet by mouth once daily 1/11/21  Yes Bucky Yu MD carvedilol (COREG) 6.25 MG tablet Take 1 tablet by mouth twice daily 11/30/20  Yes Mariana Abdullahi MD   aspirin EC 81 MG EC tablet Take 1 tablet by mouth daily 11/28/18  Yes Shania Cruz MD   acetaminophen (TYLENOL) 325 MG tablet Take 2 tablets by mouth every 4 hours as needed for Pain 2/20/18  Yes Jessica Lang MD   polyethylene glycol Russella Homes) powder Take 17 g by mouth as needed (for constipation)   Yes Historical Provider, MD   vitamin B-12 (CYANOCOBALAMIN) 500 MCG tablet Take 500 mcg by mouth daily  Patient not taking: Reported on 7/27/2021    Historical Provider, MD       ROS:  General Constitutional: Denies chills. Denies fever. Denies headache. Denies lightheadedness. Ophthalmologic: Denies blurred vision. ENT: Denies nasal congestion. Denies sore throat. Denies ear pain and pressure. Respiratory: Denies cough. Denies shortness of breath. Denies wheezing. Cardiovascular: Denies chest pain at rest. Denies irregular heartbeat. Denies palpitations. Gastrointestinal: Denies abdominal pain. Denies blood in the stool. Denies constipation. Denies diarrhea. Denies nausea. Denies vomiting. Genitourinary: Denies blood in the urine. Denies difficulty urinating. Denies frequent urination. Denies painful urination. Denies urinary incontinence. Musculoskeletal: Denies muscle aches. Denies painful joints. Denies swollen joints. Peripheral Vascular: Denies pain/cramping in legs after exertion. Skin: Denies dry skin. Denies itching. Denies rash. Neurologic: Denies falls. Denies dizziness. Denies fainting. Denies tingling/numbness. Psychiatric: Denies sleep disturbance. Denies anxiety. Denies depressed mood.     Past Surgical History:   Procedure Laterality Date    BRONCHOSCOPY  6/7/2017    BRONCHOSCOPY BRUSHINGS performed by Tammie Li DO at [facility]    BRONCHOSCOPY  6/7/2017    BRONCHOSCOPY/TRANSBRONCHIAL LUNG BIOPSY performed by Tammie Li DO at [place] [address]  6/7/2017 BRONCHOSCOPY FLUOROSCOPY performed by Cisco Marcelino DO at [address] Right 06/17/2015    CATARACT REMOVAL WITH IMPLANT Right 08/14/2017    DR ROMERO    COLONOSCOPY  1/2005    DIAGNOSTIC CARDIAC CATH LAB PROCEDURE  06/17/15    EYE SURGERY      FRACTURE SURGERY  2004    Distal Radius Ulna    HIP FRACTURE SURGERY Right 04/21/2019    Dr. Kody Sequeira- hip pinning    HIP PINNING Right 4/21/2019    HIP PINNING performed by Kehinde Hirsch MD at [facility] Left 6/14/2017    MINI PORT ACCESS LEFT CHEST, X2 SPECIMENS. performed by Romana Garre, MD at [address]  2years old    VOLVAR-ULCERATIVE LESION-CAUTERIZED    CA EGD TRANSORAL BIOPSY SINGLE/MULTIPLE Left 2/13/2018    EGD BIOPSY performed by Iain Smith MD at [facility]    SKIN BIOPSY      TONSILLECTOMY AND ADENOIDECTOMY         Family History   Problem Relation Age of Onset    Heart Disease Mother     Stroke Mother     High Blood Pressure Mother     Cancer Father         lung    Stroke Brother     Heart Disease Maternal Grandmother        Past Medical History:   Diagnosis Date    Allergic rhinitis 10/1994    Arthritis     Asthma     Atrial fibrillation ([address] 12/2008    CAD (coronary artery disease)     Cerebrovascular accident (CVA) involving left cerebral hemisphere ([address] 04/23/2019    remote lacunar infarction within the L periventricular white matter    CHF (congestive heart failure) ([address]     Clotting disorder (HCC)     plebitis in L leg    COPD (chronic obstructive pulmonary disease) ([address]     DDD (degenerative disc disease), cervical     Degeneration of cervical intervertebral disc     Diverticulosis 2005    Gout     Gout, unspecified     H/O cardiac catheterization 6/17/15    LMCA: Normal 0% stenosis. LAD: Lesion on 1st diag: Proximal subsection. 80% stenosis. Lesion plaque is ruptured. [facility] LCx: Lesion on 1st Ob Leslie: Mid subsection. 85% stenosis.  RCA:Lesion on R PDA: Mid subsection. 85% stenosis. Lesion on R PDA: Distal subsection. 70% stenosis. Lesion on Prox RCA: Mid subsection. 50% stenosis. EF 50%.  H/O echocardiogram 11/09/2016    EF 40-45%. Mild LV hypertrophy normal LV cavity size. Sigmoid interventricular septum without evidence of outflow tract obstruction. Left atrium is moderately dilated (34-39) left atrial volume index of 36 ml/m2. Mild mitral regurg. Diastology cannot be assessed due to A-fib.  H/O echocardiogram 03/09/2018    EF 45-50%. Apical hypokinesis noted. The left atrium is moderately dilated (34-39) with a left atrial volume index of 34ml/m2. Mild to moderate mitral regurg. Mild tricuspid regurg. Moderate diastolic dysfunction.  History of Holter monitoring 3/24/16    Atrial Fibrillation throughout,fairly controlled, HR  bpm 79% of the time. Occasional high ventricular rate episodes and multiple pauses suggestive of tachycardia/bradycardia syndrome  Msximum R-R interval 2.68 seconds.    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    Long term (current) use of anticoagulants 8/31/2015    Movement disorder     Other abnormal glucose 2004    Pacemaker 08/10/2017    Insertion of a biventricular pacemaker/ICD AVN ablation    Phlebitis and thrombophlebitis of lower extremities, unspecified 1961    L leg    Senile osteoporosis 2006    L/S    Symptomatic menopausal or female climacteric states 06/1995    Syncope and collapse     Unspecified hereditary and idiopathic peripheral neuropathy 2012    Unspecified sleep apnea 11/2009      Social History     Tobacco Use    Smoking status: Never Smoker    Smokeless tobacco: Never Used   Substance Use Topics    Alcohol use: No     Alcohol/week: 0.0 standard drinks      Current Outpatient Medications   Medication Sig Dispense Refill    atorvastatin (LIPITOR) 40 MG tablet Take 1 tablet by mouth nightly 90 tablet 3    ranolazine (RANEXA) 500 MG extended release tablet Take 1 tablet by mouth twice daily 180 tablet 3    EUTHYROX 75 MCG tablet Take 1 tablet by mouth once daily 90 tablet 3    carvedilol (COREG) 6.25 MG tablet Take 1 tablet by mouth twice daily 180 tablet 3    aspirin EC 81 MG EC tablet Take 1 tablet by mouth daily 90 tablet 3    acetaminophen (TYLENOL) 325 MG tablet Take 2 tablets by mouth every 4 hours as needed for Pain 120 tablet 3    polyethylene glycol (GLYCOLAX) powder Take 17 g by mouth as needed (for constipation)      vitamin B-12 (CYANOCOBALAMIN) 500 MCG tablet Take 500 mcg by mouth daily (Patient not taking: Reported on 7/27/2021)       No current facility-administered medications for this visit. Allergies   Allergen Reactions    Adhesive Tape     Aspercreme [Trolamine Salicylate]     Erythromycin Other (See Comments)     \"whole in stomach\"    Erythromycin Other (See Comments)     stomach pain    Grandpas Thylox Soap [Sulfur]      itch    Guaifenesin & Derivatives     Niacin And Related     Niacin And Related Hives    Soap     Tape [Adhesive Tape] Dermatitis    Augmentin [Amoxicillin-Pot Clavulanate] Rash       PHYSICAL EXAM:    /78   Ht 5' 5\" (1.651 m)   Wt 176 lb (79.8 kg)   BMI 29.29 kg/m²   Wt Readings from Last 3 Encounters:   07/27/21 176 lb (79.8 kg)   05/24/21 173 lb 3.2 oz (78.6 kg)   04/28/21 174 lb (78.9 kg)     BP Readings from Last 3 Encounters:   07/27/21 134/78   05/24/21 110/71   04/28/21 130/82       General Appearance: in no acute distress, well developed, well nourished. Wearing glasses  Eyes: pupils equal, round reactive to light and accommodation. Ears: normal canal and TM's. Nose: nares patent, no lesions. Oral Cavity: mucosa moist.  Throat: clear. Neck/Thyroid: neck supple, full range of motion, no cervical lymphadenopathy, no thyromegaly or carotid bruits.   Skin: warm and dry. No suspicious lesions. Heart: regular rate and rhythm. No murmurs. S1, S2 normal, no gallops. Rate 75 occasional atopic beat  Lungs: clear to auscultation bilaterally. Abdomen: bowel sounds present, soft, nontender, nondistended, no masses or organomegaly. Musculoskeletal: normal, full range of motion in knees and hips, no swelling or tenderness. Extremities: no cyanosis or edema. 1+ edema  R great toe callus with dark center  Peripheral Pulses: 2+ throughout, symetric. Neurologic: nonfocal, motor strength normal upper and lower extremities, sensory exam intact. Psych: normal affect, speech fluent. ASSESSMENT:   Diagnosis Orders   1. Type 2 diabetes mellitus without complication, unspecified whether long term insulin use (HCC)  Hemoglobin A1C    ALT    AST    Vitamin B12    CBC Auto Differential    Sedimentation Rate    Folate    T4   2. Parkinsonian tremor ([address]     3. Pulmonary fibrosis ([address]     4. Hyperlipidemia, unspecified hyperlipidemia type  Hemoglobin A1C    ALT    AST    Vitamin B12    CBC Auto Differential    Sedimentation Rate    Folate    T4   5. Hyperglycemia  Hemoglobin A1C    ALT    AST    Vitamin B12    CBC Auto Differential    Sedimentation Rate    Folate    T4   6. Essential hypertension  Hemoglobin A1C    ALT    AST    Vitamin B12    CBC Auto Differential    Sedimentation Rate    Folate    T4   7. Hypothyroidism, unspecified type  T4   8. B12 deficiency  Vitamin B12   9. Chronic systolic congestive heart failure ([address]     10. Vasodepressor syncope     11. Peripheral polyneuropathy     12. Type 2 diabetes mellitus with diabetic neuropathy, without long-term current use of insulin ([address]     13. Callus of foot         PLAN:  I examine her R great toe as she has a callus with tenderness ,I use a 10 blade to lightly shave the top surface of the callus off without success of removing the dark center which I suspect to be a scab with blood underneath of it.  I would like her to keep a Band-Aid on it. I will see her back in 4 months with labs        Orders Placed This Encounter   Procedures    Hemoglobin A1C     Standing Status:   Future     Standing Expiration Date:   7/27/2022    ALT     Standing Status:   Future     Standing Expiration Date:   7/27/2022    AST     Standing Status:   Future     Standing Expiration Date:   7/27/2022    Vitamin B12     Standing Status:   Future     Standing Expiration Date:   7/27/2022    CBC Auto Differential     Standing Status:   Future     Standing Expiration Date:   7/27/2022    Sedimentation Rate     Standing Status:   Future     Standing Expiration Date:   7/27/2022    Folate     Standing Status:   Future     Standing Expiration Date:   7/27/2022    T4     Standing Status:   Future     Standing Expiration Date:   7/27/2022     No orders of the defined types were placed in this encounter. I, Dr. Elen Watson, personally performed the services described in this documentation as scribed by KODI Valenzuela in my presence, and is both accurate and complete.

## 2021-08-18 ENCOUNTER — TELEPHONE (OUTPATIENT)
Dept: CARDIOLOGY | Age: 81
End: 2021-08-18

## 2021-08-18 ENCOUNTER — NURSE ONLY (OUTPATIENT)
Dept: CARDIOLOGY | Age: 81
End: 2021-08-18
Payer: MEDICARE

## 2021-08-18 DIAGNOSIS — I50.32 CHRONIC DIASTOLIC CONGESTIVE HEART FAILURE (HCC): Primary | ICD-10-CM

## 2021-08-18 PROCEDURE — 93297 REM INTERROG DEV EVAL ICPMS: CPT | Performed by: INTERNAL MEDICINE

## 2021-08-18 PROCEDURE — G2066 INTER DEVC REMOTE 30D: HCPCS | Performed by: INTERNAL MEDICINE

## 2021-09-10 RX ORDER — CARVEDILOL 6.25 MG/1
TABLET ORAL
Qty: 180 TABLET | Refills: 0 | Status: SHIPPED | OUTPATIENT
Start: 2021-09-10 | End: 2021-12-08 | Stop reason: SDUPTHER

## 2021-09-21 ENCOUNTER — NURSE ONLY (OUTPATIENT)
Dept: CARDIOLOGY | Age: 81
End: 2021-09-21
Payer: MEDICARE

## 2021-09-21 DIAGNOSIS — I50.32 CHRONIC DIASTOLIC CONGESTIVE HEART FAILURE (HCC): Primary | ICD-10-CM

## 2021-09-21 PROCEDURE — 93297 REM INTERROG DEV EVAL ICPMS: CPT | Performed by: INTERNAL MEDICINE

## 2021-11-24 ENCOUNTER — HOSPITAL ENCOUNTER (OUTPATIENT)
Age: 81
Discharge: HOME | End: 2021-11-24
Payer: MEDICARE

## 2021-11-24 DIAGNOSIS — R73.9 HYPERGLYCEMIA: ICD-10-CM

## 2021-11-24 DIAGNOSIS — E03.9 HYPOTHYROIDISM, UNSPECIFIED TYPE: ICD-10-CM

## 2021-11-24 DIAGNOSIS — E53.8 B12 DEFICIENCY: ICD-10-CM

## 2021-11-24 DIAGNOSIS — I10 ESSENTIAL HYPERTENSION: ICD-10-CM

## 2021-11-24 DIAGNOSIS — E11.9 TYPE 2 DIABETES MELLITUS WITHOUT COMPLICATION, UNSPECIFIED WHETHER LONG TERM INSULIN USE (HCC): ICD-10-CM

## 2021-11-24 DIAGNOSIS — E78.5 HYPERLIPIDEMIA, UNSPECIFIED HYPERLIPIDEMIA TYPE: ICD-10-CM

## 2021-11-24 LAB
ABSOLUTE EOS #: 0.23 K/UL (ref 0–0.44)
ABSOLUTE IMMATURE GRANULOCYTE: <0.03 K/UL (ref 0–0.3)
ABSOLUTE LYMPH #: 1.63 K/UL (ref 1.1–3.7)
ABSOLUTE MONO #: 0.5 K/UL (ref 0.1–1.2)
ALT SERPL-CCNC: 14 U/L (ref 5–33)
AST SERPL-CCNC: 19 U/L
BASOPHILS # BLD: 0 % (ref 0–2)
BASOPHILS ABSOLUTE: 0.03 K/UL (ref 0–0.2)
DIFFERENTIAL TYPE: ABNORMAL
EOSINOPHILS RELATIVE PERCENT: 3 % (ref 1–4)
FOLATE: >20 NG/ML
HCT VFR BLD CALC: 42 % (ref 36.3–47.1)
HEMOGLOBIN: 12.9 G/DL (ref 11.9–15.1)
IMMATURE GRANULOCYTES: 0 %
LYMPHOCYTES # BLD: 22 % (ref 24–43)
MCH RBC QN AUTO: 28.9 PG (ref 25.2–33.5)
MCHC RBC AUTO-ENTMCNC: 30.7 G/DL (ref 28.4–34.8)
MCV RBC AUTO: 94.2 FL (ref 82.6–102.9)
MONOCYTES # BLD: 7 % (ref 3–12)
NRBC AUTOMATED: 0 PER 100 WBC
PDW BLD-RTO: 13.9 % (ref 11.8–14.4)
PLATELET # BLD: 228 K/UL (ref 138–453)
PLATELET ESTIMATE: ABNORMAL
PMV BLD AUTO: 9.6 FL (ref 8.1–13.5)
RBC # BLD: 4.46 M/UL (ref 3.95–5.11)
RBC # BLD: ABNORMAL 10*6/UL
SEDIMENTATION RATE, ERYTHROCYTE: 18 MM (ref 0–20)
SEG NEUTROPHILS: 68 % (ref 36–65)
SEGMENTED NEUTROPHILS ABSOLUTE COUNT: 4.87 K/UL (ref 1.5–8.1)
VITAMIN B-12: 937 PG/ML (ref 232–1245)
WBC # BLD: 7.3 K/UL (ref 3.5–11.3)
WBC # BLD: ABNORMAL 10*3/UL

## 2021-11-24 PROCEDURE — 85652 RBC SED RATE AUTOMATED: CPT

## 2021-11-24 PROCEDURE — 84436 ASSAY OF TOTAL THYROXINE: CPT

## 2021-11-24 PROCEDURE — 36415 COLL VENOUS BLD VENIPUNCTURE: CPT

## 2021-11-24 PROCEDURE — 82746 ASSAY OF FOLIC ACID SERUM: CPT

## 2021-11-24 PROCEDURE — 82607 VITAMIN B-12: CPT

## 2021-11-24 PROCEDURE — 84460 ALANINE AMINO (ALT) (SGPT): CPT

## 2021-11-24 PROCEDURE — 83036 HEMOGLOBIN GLYCOSYLATED A1C: CPT

## 2021-11-24 PROCEDURE — 84450 TRANSFERASE (AST) (SGOT): CPT

## 2021-11-24 PROCEDURE — 85025 COMPLETE CBC W/AUTO DIFF WBC: CPT

## 2021-11-26 LAB — T4 TOTAL: 8.5 UG/DL (ref 4.5–10.9)

## 2021-11-29 ENCOUNTER — OFFICE VISIT (OUTPATIENT)
Dept: CARDIOLOGY | Age: 81
End: 2021-11-29
Payer: MEDICARE

## 2021-11-29 VITALS
HEIGHT: 65 IN | OXYGEN SATURATION: 99 % | SYSTOLIC BLOOD PRESSURE: 137 MMHG | BODY MASS INDEX: 29.36 KG/M2 | DIASTOLIC BLOOD PRESSURE: 81 MMHG | RESPIRATION RATE: 16 BRPM | WEIGHT: 176.2 LBS | HEART RATE: 80 BPM

## 2021-11-29 DIAGNOSIS — E78.2 MIXED HYPERLIPIDEMIA: ICD-10-CM

## 2021-11-29 DIAGNOSIS — I48.20 CHRONIC A-FIB (HCC): ICD-10-CM

## 2021-11-29 DIAGNOSIS — Z95.810 ICD (IMPLANTABLE CARDIOVERTER-DEFIBRILLATOR) IN PLACE: ICD-10-CM

## 2021-11-29 DIAGNOSIS — I25.10 ASHD (ARTERIOSCLEROTIC HEART DISEASE): ICD-10-CM

## 2021-11-29 DIAGNOSIS — I50.32 CHRONIC DIASTOLIC CONGESTIVE HEART FAILURE (HCC): Primary | ICD-10-CM

## 2021-11-29 LAB
ESTIMATED AVERAGE GLUCOSE: 120 MG/DL
HBA1C MFR BLD: 5.8 % (ref 4–6)

## 2021-11-29 PROCEDURE — 99214 OFFICE O/P EST MOD 30 MIN: CPT | Performed by: INTERNAL MEDICINE

## 2021-11-29 RX ORDER — M-VIT,TX,IRON,MINS/CALC/FOLIC 27MG-0.4MG
1 TABLET ORAL DAILY
COMMUNITY

## 2021-11-29 NOTE — PROGRESS NOTES
metoprolol decreased from 50 to 25 mg twice a day and midodrine added. 9-ICD checked 3/7/18: Biventricular pacing 99% of the time. No significant ventricular arrhythmia. 10- EKG done on 4/20/2019. 11- Echo on 05/13/2019: Poor image quality. EF 45-50%. LA is mildly dilated w/ LA colume index of 30. Mild mitral rgeurg. Moderate diastolic dysfunction. 12- ICD interrogation today 5/20/2020-battery 4.1 years. Ventricular pacing 99%, pacing mode VVIR. Chronic atrial fibrillation    13- EKG done in office on 5/20/2020-biventricular pacing. No change from prior. 14- Echocardiogram done on 5/24/2021: EF of 50% Left atrium is severely dilated. Ms. Kieran Roberson is here for her for her 6 month follow up. She reports she has been doing very well lately. Her breathing has been stable and hasn't gave her many issues. It only really gets worse with her getting up too fast. Her appetite is good, no issues. She denies any chest pain, pressure, or tightness. She denies having any lightheadedness/dizziness or palpitations. No abdominal pain, nausea or vomiting. No cough, fever or chills. No bleeding problems, bowel issues, problems with medications, or any other concerns at this time. No falls or near falls.        Past Medical History:    Past Medical History:   Diagnosis Date    Allergic rhinitis 10/1994    Arthritis     Asthma     Atrial fibrillation ([facility] 12/2008    CAD (coronary artery disease)     Cerebrovascular accident (CVA) involving left cerebral hemisphere ([address] 04/23/2019    remote lacunar infarction within the L periventricular white matter    CHF (congestive heart failure) (HCC)     Clotting disorder (HCC)     plebitis in L leg    COPD (chronic obstructive pulmonary disease) ([address]     DDD (degenerative disc disease), cervical     Degeneration of cervical intervertebral disc     Diverticulosis 2005    Gout     Gout, unspecified     H/O cardiac catheterization 6/17/15    LMCA: Normal 0% stenosis. LAD: Lesion on 1st diag: Proximal subsection. 80% stenosis. Lesion plaque is ruptured. Jennifer Trinidad LCx: Lesion on 1st Ob Leslie: Mid subsection. 85% stenosis. RCA:Lesion on R PDA: Mid subsection. 85% stenosis. Lesion on R PDA: Distal subsection. 70% stenosis. Lesion on Prox RCA: Mid subsection. 50% stenosis. EF 50%.  H/O echocardiogram 11/09/2016    EF 40-45%. Mild LV hypertrophy normal LV cavity size. Sigmoid interventricular septum without evidence of outflow tract obstruction. Left atrium is moderately dilated (34-39) left atrial volume index of 36 ml/m2. Mild mitral regurg. Diastology cannot be assessed due to A-fib.  H/O echocardiogram 03/09/2018    EF 45-50%. Apical hypokinesis noted. The left atrium is moderately dilated (34-39) with a left atrial volume index of 34ml/m2. Mild to moderate mitral regurg. Mild tricuspid regurg. Moderate diastolic dysfunction.  History of Holter monitoring 3/24/16    Atrial Fibrillation throughout,fairly controlled, HR  bpm 79% of the time. Occasional high ventricular rate episodes and multiple pauses suggestive of tachycardia/bradycardia syndrome  Msximum R-R interval 2.68 seconds.    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    Long term (current) use of anticoagulants 8/31/2015    Movement disorder     Other abnormal glucose 2004    Pacemaker 08/10/2017    Insertion of a biventricular pacemaker/ICD AVN ablation    Phlebitis and thrombophlebitis of lower extremities, unspecified 1961    L leg    Senile osteoporosis 2006    L/S    Symptomatic menopausal or female climacteric states 06/1995    Syncope and collapse     Unspecified hereditary and idiopathic peripheral neuropathy 2012    Unspecified sleep apnea 11/2009     Past Surgical History:  Past Surgical History:   Procedure Laterality Date    BRONCHOSCOPY  6/7/2017    BRONCHOSCOPY BRUSHINGS performed by Shahram Mattson DO at [place] [address]  6/7/2017    BRONCHOSCOPY/TRANSBRONCHIAL LUNG BIOPSY performed by Shahram Mattson DO at [place] [address]  6/7/2017    BRONCHOSCOPY FLUOROSCOPY performed by Shahram Mattson DO at [facility]   [address] Right 06/17/2015    CATARACT REMOVAL WITH IMPLANT Right 08/14/2017    DR ROMERO    COLONOSCOPY  1/2005    DIAGNOSTIC CARDIAC CATH LAB PROCEDURE  06/17/15    EYE SURGERY      FRACTURE SURGERY  2004    Distal Radius Ulna    HIP FRACTURE SURGERY Right 04/21/2019    Dr. Reveles Blunt- hip pinning    HIP PINNING Right 4/21/2019    HIP PINNING performed by Sekou Lyons MD at [facility] Left 6/14/2017    MINI PORT ACCESS LEFT CHEST, X2 SPECIMENS.  performed by Helena Pallas, MD at [address]  2years old    VOLVAR-ULCERATIVE LESION-CAUTERIZED    NC EGD TRANSORAL BIOPSY SINGLE/MULTIPLE Left 2/13/2018    EGD BIOPSY performed by Chza Ely MD at [facility]    SKIN BIOPSY      TONSILLECTOMY AND ADENOIDECTOMY       Social History:    Social History     Tobacco Use   Smoking Status Never Smoker   Smokeless Tobacco Never Used     Family History   Problem Relation Age of Onset    Heart Disease Mother     Stroke Mother     High Blood Pressure Mother     Cancer Father         lung    Stroke Brother     Heart Disease Maternal Grandmother        Current Medications:  Outpatient Medications Marked as Taking for the 11/29/21 encounter (Office Visit) with Greg Ramirez MD   Medication Sig Dispense Refill    Multiple Vitamins-Minerals (THERAPEUTIC MULTIVITAMIN-MINERALS) tablet Take 1 tablet by mouth daily      carvedilol (COREG) 6.25 MG tablet Take 1 tablet by mouth twice daily 180 tablet 0    atorvastatin (LIPITOR) 40 MG tablet Take 1 tablet by mouth nightly 90 tablet 3    ranolazine (RANEXA) 500 MG extended release tablet Take 1 tablet by mouth twice daily 180 tablet 3    EUTHYROX 75 MCG tablet Take 1 tablet by mouth once daily 90 tablet 3    vitamin B-12 (CYANOCOBALAMIN) 500 MCG tablet Take 500 mcg by mouth daily       aspirin EC 81 MG EC tablet Take 1 tablet by mouth daily 90 tablet 3    acetaminophen (TYLENOL) 325 MG tablet Take 2 tablets by mouth every 4 hours as needed for Pain 120 tablet 3    polyethylene glycol (GLYCOLAX) powder Take 17 g by mouth as needed (for constipation)         REVIEW OF SYSTEMS:    CONSTITUTIONAL: No major weight gain or loss, fatigue, weakness, night sweats or fever. HEENT: No new vision difficulties or ringing in the ears. RESPIRATORY: See HPI   CARDIOVASCULAR: See HPI  GI: No nausea, vomiting, diarrhea, constipation, abdominal pain or changes in bowel habits. : No urinary frequency, urgency, incontinence hematuria or dysuria. SKIN: No cyanosis or skin lesions. MUSCULOSKELETAL: No new muscle or joint pain. NEUROLOGICAL: No syncope or TIA-like symptoms. PSYCHIATRIC: No anxiety, pain, insomnia or depression    Objective:     Physical Examination:     /81 (Site: Left Upper Arm, Position: Sitting, Cuff Size: Large Adult)   Pulse 80   Resp 16   Ht 5' 5\" (1.651 m)   Wt 176 lb 3.2 oz (79.9 kg)   SpO2 99%   BMI 29.32 kg/m²  Body mass index is 29.32 kg/m². Constitutional: She appeared oriented to person and place. She appears well-developed and well-nourished. In no acute distress. HEENT: Normocephalic and atraumatic. No JVD present. Carotid bruit is not present. No mass and no thyromegaly present. No lymphadenopathy noted. Cardiovascular: Normal rate, regular rhythm, normal heart sounds. Exam reveals no gallop and no friction rubs. 1/6 systolic murmur, 5th intercostal space on the LEFT in the mid-clavicular line (cardiac apex)  Pulmonary/Chest: Effort normal and breath sounds normal. No respiratory distress. She has no wheezes, rhonchi or rales. Abdominal: Soft, non-tender.  She exhibits no organomegaly, mass or bruit. Extremities: No significant edema. No cyanosis or clubbing. 2+ radial and carotid pulses. Distal extremity pulses: 2+ bilaterally. Neurological: Alertness and orientation as per Constitutional exam. No evidence of gross cranial nerve deficit. Coordination appeared normal.   Skin: Skin is warm and dry. There is no rash or diaphoresis. Psychiatric: She has a normal mood and affect. Her speech is normal and behavior is normal.        DATA:    Lab Results   Component Value Date    ALT 14 11/24/2021    AST 19 11/24/2021    ALKPHOS 104 04/20/2019    BILITOT 0.48 04/20/2019     Lab Results   Component Value Date    CREATININE 0.96 (H) 01/09/2021    BUN 13 01/09/2021     01/09/2021    K 4.3 01/09/2021     01/09/2021    CO2 24 01/09/2021     Lab Results   Component Value Date    TSH 0.51 03/09/2021    H0HDUFX 8.5 11/24/2021     Lab Results   Component Value Date    WBC 7.3 11/24/2021    HGB 12.9 11/24/2021    HCT 42.0 11/24/2021    MCV 94.2 11/24/2021     11/24/2021       Lab Results   Component Value Date    TRIG 185 (H) 11/05/2020    TRIG 113 02/25/2020    TRIG 195 (H) 07/01/2019     Lab Results   Component Value Date    HDL 47 11/05/2020    HDL 48 02/25/2020    HDL 44 07/01/2019     Lab Results   Component Value Date    LDLCHOLESTEROL 60 11/05/2020    LDLCHOLESTEROL 43 02/25/2020    LDLCHOLESTEROL 54 07/01/2019         Assessment:      Diagnosis Orders   1. Chronic diastolic congestive heart failure ([address]     2. ASHD (arteriosclerotic heart disease)     3. Chronic a-fib ([address]     4. Mixed hyperlipidemia     5. ICD (implantable cardioverter-defibrillator) in place       PLAN:    Chronic Diastolic Heart Failure:   · No clinical evidence of volume overload. · No significant lower extremity edema. · OPTi volume as of 9/21/2021 showed no evidence of fluid retention. · Beta Blocker: Continue Carvedilol (Coreg) 6.25 mg twice daily.   Nonpharmacologic management of Heart Failure: I told her to continue wearing lower extremity compression stockings and I advised her to try and keep his legs up whenever possible and to limit salt in her diet. I reviewed her most recent blood work and everything is stable with that   Additional Testing List: None     · Atherosclerotic Heart Disease:  · Antiplatelet Agent: Continue aspirin 81 mg daily  I also reminded her to watch for signs of blood in her stool or black tarry stools and stop the medication immediately if this develops as this could be life threatening. · Beta Blocker: Continue Carvedilol (Coreg) 6.25 mg twice daily. · Anti-anginal medications: Continue ranolazine (Ranexa) 500 mg 2 times/day. · Statin Therapy: Continue atorvastatin (Lipitor) 40 mg nightly. · Chronic atrial fibrillation status post AV node ablation and watchman device placement:   · Beta Blocker: Continue Carvedilol (Coreg) 6.25 mg twice daily. · Stroke Risk: Her CHADS2 score is 5/9 (6.7% stroke risk)  · Anticoagulation: Watchman in place. I did explain to her that watchman device does not prevent every single stroke. Patient said she had a CT scan done of the brain for her tremor and found to have a tiny stroke. · She continues to see neurology for her tremor. · Hyperlipidemia: Mixed - Last LDL at goal.  · Cholesterol Reduction Therapy: Continue Atorvastatin (Lipitor) 40 mg daily. Implantable Cardioverter Defibrillator (ICD): Indication for Device Placement: Biventricular ICD secondary to severe left ventricular systolic dysfunction and chronic atrial fibrillation    Interrogation Findings: I reviewed the results of their most recent home interrogation from 9/21/2021. Battery 3.4 years. Finally, I recommended that she continue her other medications and follow up with you as previously scheduled. FOLLOW UP:  I told Ms. Nella Landau to call my office if she had any problems, but otherwise told her to Return in about 6 months (around 5/29/2022). However, I would be happy to see her sooner should the need arise. Sincerely,  Jesusita Alvarez MD, F.A.C.C. [facility] Specialist     [address]  [address], [place], [address] [place] [address]  Phone: [phone], Fax: [phone]     I believe that the risk of significant morbidity and mortality related to the patient's current medical conditions are: intermediate-high. >30 minutes were spent during prep work, discussion and exam of the patient, and follow up documentation and all of their questions were answered. The documentation recorded by the scribe, accurately and completely reflects the services I personally performed and the decisions made by me. Jesusita Alvarez MD, F.A.C.C.  November 29, 2021

## 2021-11-29 NOTE — PATIENT INSTRUCTIONS
SURVEY:    You may be receiving a survey from [employer] regarding your visit today. Please complete the survey to enable us to provide the highest quality of care to you and your family. If you cannot score us a very good on any question, please call the office to discuss how we could have made your experience a very good one. Thank you.

## 2021-11-30 ENCOUNTER — OFFICE VISIT (OUTPATIENT)
Dept: FAMILY MEDICINE CLINIC | Age: 81
End: 2021-11-30
Payer: MEDICARE

## 2021-11-30 VITALS
SYSTOLIC BLOOD PRESSURE: 136 MMHG | HEIGHT: 65 IN | DIASTOLIC BLOOD PRESSURE: 82 MMHG | WEIGHT: 174 LBS | BODY MASS INDEX: 28.99 KG/M2

## 2021-11-30 DIAGNOSIS — I27.20 PULMONARY HTN (HCC): ICD-10-CM

## 2021-11-30 DIAGNOSIS — R55 VASODEPRESSOR SYNCOPE: ICD-10-CM

## 2021-11-30 DIAGNOSIS — G20 PARKINSONIAN TREMOR (HCC): ICD-10-CM

## 2021-11-30 DIAGNOSIS — L57.0 ACTINIC KERATOSIS: ICD-10-CM

## 2021-11-30 DIAGNOSIS — I10 ESSENTIAL HYPERTENSION: ICD-10-CM

## 2021-11-30 DIAGNOSIS — R73.9 HYPERGLYCEMIA: ICD-10-CM

## 2021-11-30 DIAGNOSIS — E78.5 HYPERLIPIDEMIA, UNSPECIFIED HYPERLIPIDEMIA TYPE: ICD-10-CM

## 2021-11-30 DIAGNOSIS — E11.9 TYPE 2 DIABETES MELLITUS WITHOUT COMPLICATION, UNSPECIFIED WHETHER LONG TERM INSULIN USE (HCC): Primary | ICD-10-CM

## 2021-11-30 DIAGNOSIS — I48.0 PAROXYSMAL A-FIB (HCC): ICD-10-CM

## 2021-11-30 DIAGNOSIS — Z23 NEEDS FLU SHOT: ICD-10-CM

## 2021-11-30 DIAGNOSIS — E03.9 HYPOTHYROIDISM, UNSPECIFIED TYPE: ICD-10-CM

## 2021-11-30 PROCEDURE — 99214 OFFICE O/P EST MOD 30 MIN: CPT | Performed by: FAMILY MEDICINE

## 2021-11-30 PROCEDURE — 90694 VACC AIIV4 NO PRSRV 0.5ML IM: CPT | Performed by: FAMILY MEDICINE

## 2021-11-30 PROCEDURE — G0008 ADMIN INFLUENZA VIRUS VAC: HCPCS | Performed by: FAMILY MEDICINE

## 2021-11-30 SDOH — ECONOMIC STABILITY: FOOD INSECURITY: WITHIN THE PAST 12 MONTHS, YOU WORRIED THAT YOUR FOOD WOULD RUN OUT BEFORE YOU GOT MONEY TO BUY MORE.: NEVER TRUE

## 2021-11-30 SDOH — ECONOMIC STABILITY: FOOD INSECURITY: WITHIN THE PAST 12 MONTHS, THE FOOD YOU BOUGHT JUST DIDN'T LAST AND YOU DIDN'T HAVE MONEY TO GET MORE.: NEVER TRUE

## 2021-11-30 ASSESSMENT — PATIENT HEALTH QUESTIONNAIRE - PHQ9
SUM OF ALL RESPONSES TO PHQ9 QUESTIONS 1 & 2: 0
1. LITTLE INTEREST OR PLEASURE IN DOING THINGS: 0
SUM OF ALL RESPONSES TO PHQ QUESTIONS 1-9: 0
2. FEELING DOWN, DEPRESSED OR HOPELESS: 0
SUM OF ALL RESPONSES TO PHQ QUESTIONS 1-9: 0
SUM OF ALL RESPONSES TO PHQ QUESTIONS 1-9: 0

## 2021-11-30 ASSESSMENT — SOCIAL DETERMINANTS OF HEALTH (SDOH): HOW HARD IS IT FOR YOU TO PAY FOR THE VERY BASICS LIKE FOOD, HOUSING, MEDICAL CARE, AND HEATING?: NOT HARD AT ALL

## 2021-11-30 NOTE — PATIENT INSTRUCTIONS
PLAN:  The history listed above was reviewed today and is accurate. Her labs are reviewed. I am pleased with these numbers. She continues to follow up with Dr. Jessee Lamar and saw him yesterday. Her Watchman device is in place and she got news that it's good for another 3-4 years. I will perform cryotherapy on the lesion on her right chin. She will get a flu shot, today. I will see her back in 4 months. SURVEY:    You may be receiving a survey from [employer] regarding your visit today. Please complete the survey to enable us to provide the highest quality of care to you and your family. If you cannot score us a very good on any question, please call the office to discuss how we could of made your experience a very good one. Thank you.

## 2021-11-30 NOTE — PROGRESS NOTES
I, ADAM Dejesus, am scribing for and in the presence of Dr. Mary Lou Dong. 11/30/2021 1:36 pm [facility]  [address]  [id number]  Dept: Edmond Hayes is a 79 y.o. female here for Follow-up (4 month )      HPI:  HYPERTENSION:  She is not exercising. She is not adherent to a low-salt diet.    Blood pressure is not being monitored at home     HYPERLIPIDEMIA:     Medication compliance: compliant all of the time. Patient is not following a low fat, low cholesterol diet.    She is not exercising regularly.     COPD Symptoms:   Patient is not currently experiencing symptoms. She  reports that she has never smoked. She has never used smokeless tobacco. Current treatment includes nothing.     DIABETES MELLITUS:  Medication compliance:  n/a  Diabetic diet compliance:  compliant all of the time  Current exercise: no regular exercise  Frequency of testing: none  Eye exam current (within one year): yes, 1/2020    Accompanied by: . Prior to Admission medications    Medication Sig Start Date End Date Taking?  Authorizing Provider   Multiple Vitamins-Minerals (THERAPEUTIC MULTIVITAMIN-MINERALS) tablet Take 1 tablet by mouth daily   Yes Historical Provider, MD   carvedilol (COREG) 6.25 MG tablet Take 1 tablet by mouth twice daily 9/10/21  Yes Mary Lou Dong MD   atorvastatin (LIPITOR) 40 MG tablet Take 1 tablet by mouth nightly 6/2/21  Yes Mary Lou Dong MD   ranolazine (RANEXA) 500 MG extended release tablet Take 1 tablet by mouth twice daily 6/2/21  Yes Alicia Reyes MD   EUTHYROX 75 MCG tablet Take 1 tablet by mouth once daily 1/11/21  Yes Mary Lou Dong MD   vitamin B-12 (CYANOCOBALAMIN) 500 MCG tablet Take 500 mcg by mouth daily    Yes Historical Provider, MD   aspirin EC 81 MG EC tablet Take 1 tablet by mouth daily 11/28/18  Yes Alicia Reyes MD   acetaminophen (TYLENOL) 325 MG tablet Take 2 tablets by mouth every 4 hours as needed for Pain 2/20/18  Yes Zayra Sandhu MD   polyethylene glycol [facility]) powder Take 17 g by mouth as needed (for constipation)   Yes Historical Provider, MD       ROS:  General Constitutional: Denies chills. Denies fever. Denies headache. Denies lightheadedness. Ophthalmologic: Denies blurred vision. ENT: Denies nasal congestion. Denies sore throat. Denies ear pain and pressure. Respiratory: Denies cough. Denies shortness of breath. Denies wheezing. Cardiovascular: Denies chest pain at rest. Denies irregular heartbeat. Denies palpitations. Gastrointestinal: Denies abdominal pain. Denies blood in the stool. Denies constipation. Denies diarrhea. Denies nausea. Denies vomiting. Genitourinary: Denies blood in the urine. Denies difficulty urinating. Denies frequent urination. Denies painful urination. Denies urinary incontinence. Musculoskeletal: Denies muscle aches. Denies painful joints. Denies swollen joints. L foot starts to bother her at night while in bed. States her feet are normally cold but then they get hot and the Big toe on the left then will start to ache  Peripheral Vascular: Denies pain/cramping in legs after exertion. Skin: Denies dry skin. Denies itching. Denies rash. Admits lesion on chin below lip on right x1 year. Get irritated and comes off then comes back. Neurologic: Denies falls. Denies dizziness. Denies fainting. Denies tingling/numbness. Psychiatric: Denies sleep disturbance. Denies anxiety. Denies depressed mood.     Past Surgical History:   Procedure Laterality Date    BRONCHOSCOPY  6/7/2017    BRONCHOSCOPY BRUSHINGS performed by Monique Hill DO at \[facility]"" Endoscopy    BRONCHOSCOPY  6/7/2017    BRONCHOSCOPY/TRANSBRONCHIAL LUNG BIOPSY performed by Monique Hill DO at [place] [id number]  6/7/2017    BRONCHOSCOPY FLUOROSCOPY performed by Monique Hill DO at \[facility]"" Endoscopy    CARDIAC CATHETERIZATION Right 06/17/2015    CATARACT REMOVAL WITH IMPLANT Right 08/14/2017 DR ROMERO    COLONOSCOPY  1/2005    DIAGNOSTIC CARDIAC CATH LAB PROCEDURE  06/17/15    EYE SURGERY      FRACTURE SURGERY  2004    Distal Radius Ulna    HIP FRACTURE SURGERY Right 04/21/2019    Dr. Viry Velarde- hip pinning    HIP PINNING Right 4/21/2019    HIP PINNING performed by Anel Rodarte MD at [facility] Left 6/14/2017    MINI PORT ACCESS LEFT CHEST, X2 SPECIMENS. performed by Gunjan Thorpe MD at [address]  2years old    VOLVAR-ULCERATIVE LESION-CAUTERIZED    NM EGD TRANSORAL BIOPSY SINGLE/MULTIPLE Left 2/13/2018    EGD BIOPSY performed by Shalini Atkinson MD at [facility]    SKIN BIOPSY      TONSILLECTOMY AND ADENOIDECTOMY         Family History   Problem Relation Age of Onset    Heart Disease Mother     Stroke Mother     High Blood Pressure Mother     Cancer Father         lung    Stroke Brother     Heart Disease Maternal Grandmother        Past Medical History:   Diagnosis Date    Allergic rhinitis 10/1994    Arthritis     Asthma     Atrial fibrillation ([address] 12/2008    CAD (coronary artery disease)     Cerebrovascular accident (CVA) involving left cerebral hemisphere ([address] 04/23/2019    remote lacunar infarction within the L periventricular white matter    CHF (congestive heart failure) ([address]     Clotting disorder (HCC)     plebitis in L leg    COPD (chronic obstructive pulmonary disease) ([address]     DDD (degenerative disc disease), cervical     Degeneration of cervical intervertebral disc     Diverticulosis 2005    Gout     Gout, unspecified     H/O cardiac catheterization 6/17/15    LMCA: Normal 0% stenosis. LAD: Lesion on 1st diag: Proximal subsection. 80% stenosis. Lesion plaque is ruptured. Ale Fanning LCx: Lesion on 1st Ob Leslie: Mid subsection. 85% stenosis. RCA:Lesion on R PDA: Mid subsection. 85% stenosis. Lesion on R PDA: Distal subsection. 70% stenosis. Lesion on Prox RCA: Mid subsection. 50% stenosis. EF 50%.     H/O echocardiogram 11/09/2016    EF 40-45%. Mild LV hypertrophy normal LV cavity size. Sigmoid interventricular septum without evidence of outflow tract obstruction. Left atrium is moderately dilated (34-39) left atrial volume index of 36 ml/m2. Mild mitral regurg. Diastology cannot be assessed due to A-fib.  H/O echocardiogram 03/09/2018    EF 45-50%. Apical hypokinesis noted. The left atrium is moderately dilated (34-39) with a left atrial volume index of 34ml/m2. Mild to moderate mitral regurg. Mild tricuspid regurg. Moderate diastolic dysfunction.  History of Holter monitoring 3/24/16    Atrial Fibrillation throughout,fairly controlled, HR  bpm 79% of the time. Occasional high ventricular rate episodes and multiple pauses suggestive of tachycardia/bradycardia syndrome  Msximum R-R interval 2.68 seconds.    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    Long term (current) use of anticoagulants 8/31/2015    Movement disorder     Other abnormal glucose 2004    Pacemaker 08/10/2017    Insertion of a biventricular pacemaker/ICD AVN ablation    Phlebitis and thrombophlebitis of lower extremities, unspecified 1961    L leg    Senile osteoporosis 2006    L/S    Symptomatic menopausal or female climacteric states 06/1995    Syncope and collapse     Unspecified hereditary and idiopathic peripheral neuropathy 2012    Unspecified sleep apnea 11/2009      Social History     Tobacco Use    Smoking status: Never Smoker    Smokeless tobacco: Never Used   Substance Use Topics    Alcohol use: No     Alcohol/week: 0.0 standard drinks      Current Outpatient Medications   Medication Sig Dispense Refill    Multiple Vitamins-Minerals (THERAPEUTIC MULTIVITAMIN-MINERALS) tablet Take 1 tablet by mouth daily      carvedilol (COREG) 6.25 MG tablet Take 1 tablet by mouth twice daily 180 tablet 0    atorvastatin (LIPITOR) 40 MG tablet Take 1 tablet by mouth nightly 90 tablet 3    ranolazine (RANEXA) 500 MG extended release tablet Take 1 tablet by mouth twice daily 180 tablet 3    EUTHYROX 75 MCG tablet Take 1 tablet by mouth once daily 90 tablet 3    vitamin B-12 (CYANOCOBALAMIN) 500 MCG tablet Take 500 mcg by mouth daily       aspirin EC 81 MG EC tablet Take 1 tablet by mouth daily 90 tablet 3    acetaminophen (TYLENOL) 325 MG tablet Take 2 tablets by mouth every 4 hours as needed for Pain 120 tablet 3    polyethylene glycol (GLYCOLAX) powder Take 17 g by mouth as needed (for constipation)       No current facility-administered medications for this visit. Allergies   Allergen Reactions    Adhesive Tape     Aspercreme [Trolamine Salicylate]     Erythromycin Other (See Comments)     \"whole in stomach\"    Erythromycin Other (See Comments)     stomach pain    Grandpas Thylox Soap [Sulfur]      itch    Guaifenesin & Derivatives     Niacin And Related     Niacin And Related Hives    Soap     Tape [Adhesive Tape] Dermatitis    Augmentin [Amoxicillin-Pot Clavulanate] Rash       PHYSICAL EXAM:    /82   Ht 5' 5\" (1.651 m)   Wt 174 lb (78.9 kg)   BMI 28.96 kg/m²   Wt Readings from Last 3 Encounters:   11/30/21 174 lb (78.9 kg)   11/29/21 176 lb 3.2 oz (79.9 kg)   07/27/21 176 lb (79.8 kg)     BP Readings from Last 3 Encounters:   11/30/21 136/82   11/29/21 137/81   07/27/21 134/78       General Appearance: in no acute distress, well developed, well nourished. Eyes: pupils equal, round reactive to light and accommodation. Raccoon pattern erythema around eyebrows and under eyes. Ears: normal canal and TM's. Nose: nares patent, no lesions. Oral Cavity: mucosa moist.  Throat: clear. Neck/Thyroid: neck supple, full range of motion, no cervical lymphadenopathy, no thyromegaly or carotid bruits. Skin: warm and dry. Keratotic lesion right chin.    Heart: regular rate and rhythm. No murmurs. S1, S2 normal, no gallops. Rate: 90.   Lungs: clear to auscultation bilaterally. Abdomen: bowel sounds present, soft, nontender, nondistended, no masses or organomegaly. Musculoskeletal: normal, full range of motion in knees and hips, no swelling or tenderness. Swelling third finger MCPs bilaterally. Able to make a fist  Extremities: no cyanosis trace edema. Peripheral Pulses: 2+ throughout, symetric. Neurologic: nonfocal, motor strength normal upper and lower extremities, sensory exam intact. Resting pill rolling tremor right hand, minor cogwheeling  Psych: normal affect, speech fluent. ASSESSMENT:   Diagnosis Orders   1. Type 2 diabetes mellitus without complication, unspecified whether long term insulin use (HCC)  Lipid Panel    Hemoglobin A1C    ALT    AST    CBC With Auto Differential    Sedimentation Rate    Vitamin B12 & Folate    T4, Free   2. Essential hypertension  Lipid Panel    Hemoglobin A1C    ALT    AST    CBC With Auto Differential    Sedimentation Rate    Vitamin B12 & Folate    T4, Free   3. Hyperglycemia  Lipid Panel    Hemoglobin A1C    ALT    AST    CBC With Auto Differential    Sedimentation Rate    Vitamin B12 & Folate    T4, Free   4. Hyperlipidemia, unspecified hyperlipidemia type  Lipid Panel    Hemoglobin A1C    ALT    AST    CBC With Auto Differential    Sedimentation Rate    Vitamin B12 & Folate    T4, Free   5. Hypothyroidism, unspecified type  Lipid Panel    Hemoglobin A1C    ALT    AST    CBC With Auto Differential    Sedimentation Rate    Vitamin B12 & Folate    T4, Free   6. Needs flu shot  INFLUENZA, QUADV, ADJUVANTED, 65 YRS =, IM, PF, PREFILL SYR, 0.5ML (FLUAD)    OR ADMIN INFLUENZA VIRUS VAC   7. Parkinsonian tremor ([address]     8. Pulmonary HTN     9. Paroxysmal A-fib ([address]     10. Vasodepressor syncope     11. Actinic keratosis      right chin       PLAN:  The history listed above was reviewed today and is accurate. Her labs are reviewed.  I am pleased with these numbers. She continues to follow up with Dr. Ya Hanna and saw him yesterday. Her Watchman device is in place and she got news that it's good for another 3-4 years. I will perform cryotherapy on the lesion on her right chin. She will get a flu shot, today. I will see her back in 4 months. Orders Placed This Encounter   Procedures    INFLUENZA, QUADV, ADJUVANTED, 72 YRS =, IM, PF, PREFILL SYR, 0.5ML (FLUAD)    Lipid Panel     Standing Status:   Future     Standing Expiration Date:   11/30/2022     Order Specific Question:   Is Patient Fasting?/# of Hours     Answer:   none    Hemoglobin A1C     Standing Status:   Future     Standing Expiration Date:   11/30/2022    ALT     Standing Status:   Future     Standing Expiration Date:   11/30/2022    AST     Standing Status:   Future     Standing Expiration Date:   11/30/2022    CBC With Auto Differential     Standing Status:   Future     Standing Expiration Date:   11/30/2022    Sedimentation Rate     Standing Status:   Future     Standing Expiration Date:   11/30/2022    Vitamin B12 & Folate     Standing Status:   Future     Standing Expiration Date:   11/30/2022    T4, Free     Standing Status:   Future     Standing Expiration Date:   11/30/2022    ID ADMIN INFLUENZA VIRUS VAC     No orders of the defined types were placed in this encounter. I, Dr. Elysia Zhong, personally performed the services described in this documentation as scribed/transcribed by ADAM Thacker in my presence, and is both accurate and complete.

## 2021-12-08 RX ORDER — CARVEDILOL 6.25 MG/1
6.25 TABLET ORAL 2 TIMES DAILY
Qty: 180 TABLET | Refills: 0 | Status: SHIPPED | OUTPATIENT
Start: 2021-12-08 | End: 2022-03-25

## 2021-12-30 RX ORDER — LEVOTHYROXINE SODIUM 75 UG/1
TABLET ORAL
Qty: 90 TABLET | Refills: 0 | Status: SHIPPED | OUTPATIENT
Start: 2021-12-30 | End: 2022-04-26

## 2022-02-25 ENCOUNTER — HOSPITAL ENCOUNTER (OUTPATIENT)
Age: 82
Discharge: HOME | End: 2022-02-25
Payer: MEDICARE

## 2022-02-25 DIAGNOSIS — E03.9 HYPOTHYROIDISM, UNSPECIFIED TYPE: ICD-10-CM

## 2022-02-25 DIAGNOSIS — E11.9 TYPE 2 DIABETES MELLITUS WITHOUT COMPLICATION, UNSPECIFIED WHETHER LONG TERM INSULIN USE (HCC): ICD-10-CM

## 2022-02-25 DIAGNOSIS — R73.9 HYPERGLYCEMIA: ICD-10-CM

## 2022-02-25 DIAGNOSIS — E78.5 HYPERLIPIDEMIA, UNSPECIFIED HYPERLIPIDEMIA TYPE: ICD-10-CM

## 2022-02-25 DIAGNOSIS — I10 ESSENTIAL HYPERTENSION: ICD-10-CM

## 2022-02-25 LAB
ABSOLUTE EOS #: 0.25 K/UL (ref 0–0.44)
ABSOLUTE IMMATURE GRANULOCYTE: <0.03 K/UL (ref 0–0.3)
ABSOLUTE LYMPH #: 1.68 K/UL (ref 1.1–3.7)
ABSOLUTE MONO #: 0.46 K/UL (ref 0.1–1.2)
ALT SERPL-CCNC: 12 U/L (ref 5–33)
AST SERPL-CCNC: 24 U/L
BASOPHILS # BLD: 1 % (ref 0–2)
BASOPHILS ABSOLUTE: 0.04 K/UL (ref 0–0.2)
CHOLESTEROL/HDL RATIO: 3.2
CHOLESTEROL: 135 MG/DL
EOSINOPHILS RELATIVE PERCENT: 4 % (ref 1–4)
ESTIMATED AVERAGE GLUCOSE: 128 MG/DL
FOLATE: >20 NG/ML
HBA1C MFR BLD: 6.1 % (ref 4–6)
HCT VFR BLD CALC: 41.5 % (ref 36.3–47.1)
HDLC SERPL-MCNC: 42 MG/DL
HEMOGLOBIN: 13.4 G/DL (ref 11.9–15.1)
IMMATURE GRANULOCYTES: 0 %
LDL CHOLESTEROL: 52 MG/DL (ref 0–130)
LYMPHOCYTES # BLD: 26 % (ref 24–43)
MCH RBC QN AUTO: 30.1 PG (ref 25.2–33.5)
MCHC RBC AUTO-ENTMCNC: 32.3 G/DL (ref 28.4–34.8)
MCV RBC AUTO: 93.3 FL (ref 82.6–102.9)
MONOCYTES # BLD: 7 % (ref 3–12)
NRBC AUTOMATED: 0 PER 100 WBC
PDW BLD-RTO: 13.9 % (ref 11.8–14.4)
PLATELET # BLD: 227 K/UL (ref 138–453)
PMV BLD AUTO: 9.9 FL (ref 8.1–13.5)
RBC # BLD: 4.45 M/UL (ref 3.95–5.11)
SEDIMENTATION RATE, ERYTHROCYTE: 26 MM (ref 0–30)
SEG NEUTROPHILS: 62 % (ref 36–65)
SEGMENTED NEUTROPHILS ABSOLUTE COUNT: 4.07 K/UL (ref 1.5–8.1)
THYROXINE, FREE: 1.3 NG/DL (ref 0.93–1.7)
TRIGL SERPL-MCNC: 204 MG/DL
VITAMIN B-12: 543 PG/ML (ref 232–1245)
WBC # BLD: 6.5 K/UL (ref 3.5–11.3)

## 2022-02-25 PROCEDURE — 82746 ASSAY OF FOLIC ACID SERUM: CPT

## 2022-02-25 PROCEDURE — 84460 ALANINE AMINO (ALT) (SGPT): CPT

## 2022-02-25 PROCEDURE — 83036 HEMOGLOBIN GLYCOSYLATED A1C: CPT

## 2022-02-25 PROCEDURE — 84450 TRANSFERASE (AST) (SGOT): CPT

## 2022-02-25 PROCEDURE — 36415 COLL VENOUS BLD VENIPUNCTURE: CPT

## 2022-02-25 PROCEDURE — 84439 ASSAY OF FREE THYROXINE: CPT

## 2022-02-25 PROCEDURE — 85652 RBC SED RATE AUTOMATED: CPT

## 2022-02-25 PROCEDURE — 80061 LIPID PANEL: CPT

## 2022-02-25 PROCEDURE — 85025 COMPLETE CBC W/AUTO DIFF WBC: CPT

## 2022-02-25 PROCEDURE — 82607 VITAMIN B-12: CPT

## 2022-03-18 ENCOUNTER — TELEPHONE (OUTPATIENT)
Dept: FAMILY MEDICINE CLINIC | Age: 82
End: 2022-03-18

## 2022-03-18 RX ORDER — NITROFURANTOIN 25; 75 MG/1; MG/1
100 CAPSULE ORAL 2 TIMES DAILY
Qty: 14 CAPSULE | Refills: 0 | Status: SHIPPED | OUTPATIENT
Start: 2022-03-18 | End: 2022-03-25

## 2022-03-18 NOTE — TELEPHONE ENCOUNTER
Pt. Called stating that she thinks she has another UTI. Symptoms started yesterday with urgency, inability to empty bladder, and tinge of blood with wiping and spasms. She is requesting abx without coming in if possible. VO per Dr. Deena Gomez, submit E-visit    She tried the E-visit but said it told her that the  was down. She would like Rx to go to [facility].

## 2022-03-25 RX ORDER — CARVEDILOL 6.25 MG/1
TABLET ORAL
Qty: 180 TABLET | Refills: 0 | Status: SHIPPED | OUTPATIENT
Start: 2022-03-25 | End: 2022-07-06

## 2022-04-05 ENCOUNTER — OFFICE VISIT (OUTPATIENT)
Dept: FAMILY MEDICINE CLINIC | Age: 82
End: 2022-04-05
Payer: MEDICARE

## 2022-04-05 VITALS
OXYGEN SATURATION: 98 % | HEIGHT: 65 IN | BODY MASS INDEX: 28.66 KG/M2 | DIASTOLIC BLOOD PRESSURE: 76 MMHG | SYSTOLIC BLOOD PRESSURE: 124 MMHG | WEIGHT: 172 LBS

## 2022-04-05 DIAGNOSIS — E11.40 TYPE 2 DIABETES MELLITUS WITH DIABETIC NEUROPATHY, WITHOUT LONG-TERM CURRENT USE OF INSULIN (HCC): ICD-10-CM

## 2022-04-05 DIAGNOSIS — I27.20 PULMONARY HTN (HCC): ICD-10-CM

## 2022-04-05 DIAGNOSIS — I10 ESSENTIAL HYPERTENSION: Primary | ICD-10-CM

## 2022-04-05 DIAGNOSIS — E03.9 HYPOTHYROIDISM, UNSPECIFIED TYPE: ICD-10-CM

## 2022-04-05 DIAGNOSIS — G20 PARKINSONIAN TREMOR (HCC): ICD-10-CM

## 2022-04-05 DIAGNOSIS — I48.0 PAROXYSMAL A-FIB (HCC): ICD-10-CM

## 2022-04-05 DIAGNOSIS — E78.5 HYPERLIPIDEMIA, UNSPECIFIED HYPERLIPIDEMIA TYPE: ICD-10-CM

## 2022-04-05 PROCEDURE — 99214 OFFICE O/P EST MOD 30 MIN: CPT | Performed by: FAMILY MEDICINE

## 2022-04-05 PROCEDURE — 3044F HG A1C LEVEL LT 7.0%: CPT | Performed by: FAMILY MEDICINE

## 2022-04-05 NOTE — PROGRESS NOTES
I, [facility], am scribing for and in the presence of Dr. Yue Guy. 4/5/22/1:55/CRF    [address] [address]  [place] [id number]  Dept: [id number] [address] Shatna Lockhart is a 79 y.o. female here for Follow-up, Hypertension, Hyperlipidemia, COPD, and Diabetes      HPI:  HYPERTENSION:  She is not exercising. She is not adherent to a low-salt diet.    Blood pressure is not being monitored at home     HYPERLIPIDEMIA:     Medication compliance: compliant all of the time. Patient is not following a low fat, low cholesterol diet.    She is not exercising regularly.     COPD Symptoms:   Patient is not currently experiencing symptoms. She  reports that she has never smoked. She has never used smokeless tobacco. Current treatment includes nothing.     DIABETES MELLITUS:  Medication compliance:  n/a  Diabetic diet compliance:  compliant all of the time  Current exercise: no regular exercise  Frequency of testing: none  Eye exam current (within one year): yes, 1/2020     Accompanied by: . Prior to Admission medications    Medication Sig Start Date End Date Taking?  Authorizing Provider   carvedilol (COREG) 6.25 MG tablet Take 1 tablet by mouth twice daily 3/25/22  Yes Yue Guy MD   EUTHYROX 75 MCG tablet Take 1 tablet by mouth once daily 12/30/21  Yes Yue Guy MD   Multiple Vitamins-Minerals (THERAPEUTIC MULTIVITAMIN-MINERALS) tablet Take 1 tablet by mouth daily   Yes Historical Provider, MD   atorvastatin (LIPITOR) 40 MG tablet Take 1 tablet by mouth nightly 6/2/21  Yes Yue Guy MD   ranolazine (RANEXA) 500 MG extended release tablet Take 1 tablet by mouth twice daily 6/2/21  Yes Alyssia Tenorio MD   vitamin B-12 (CYANOCOBALAMIN) 500 MCG tablet Take 500 mcg by mouth daily    Yes Historical Provider, MD   aspirin EC 81 MG EC tablet Take 1 tablet by mouth daily 11/28/18  Yes Alyssia Tenorio MD   acetaminophen (TYLENOL) 325 MG tablet Take 2 tablets by mouth every 4 hours as needed for Pain 2/20/18  Yes Wally Ron MD   polyethylene glycol [facility]) powder Take 17 g by mouth as needed (for constipation)   Yes Historical Provider, MD       ROS:  General Constitutional: Denies chills. Denies fever. Denies headache. Denies lightheadedness. Ophthalmologic: Denies blurred vision. ENT: Denies nasal congestion. Denies sore throat. Denies ear pain and pressure. Respiratory: Denies cough. Denies shortness of breath. Denies wheezing. Cardiovascular: Denies chest pain at rest. Denies irregular heartbeat. Denies palpitations. Gastrointestinal: Denies abdominal pain. Denies blood in the stool. Denies constipation. Denies diarrhea. Denies nausea. Denies vomiting. Genitourinary: Denies blood in the urine. Denies difficulty urinating. Denies frequent urination. Denies painful urination. Denies urinary incontinence. Musculoskeletal: Denies muscle aches. Denies painful joints. Denies swollen joints. Peripheral Vascular: Denies pain/cramping in legs after exertion. Skin: Denies dry skin. Denies itching. Denies rash. Neurologic: Denies falls. Denies dizziness. Denies fainting. Denies tingling/numbness. Psychiatric: Denies sleep disturbance. Denies anxiety. Denies depressed mood.     Past Surgical History:   Procedure Laterality Date    BRONCHOSCOPY  6/7/2017    BRONCHOSCOPY BRUSHINGS performed by Adilson Brasher DO at [facility]    BRONCHOSCOPY  6/7/2017    BRONCHOSCOPY/TRANSBRONCHIAL LUNG BIOPSY performed by Adilson Brasher DO at [place] [address]  6/7/2017    BRONCHOSCOPY FLUOROSCOPY performed by Adilson Brasher DO at [facility]    CARDIAC CATHETERIZATION Right 06/17/2015    CATARACT REMOVAL WITH IMPLANT Right 08/14/2017    DR ROMERO    COLONOSCOPY  1/2005    DIAGNOSTIC CARDIAC CATH LAB PROCEDURE  06/17/15    EYE SURGERY      FRACTURE SURGERY  2004    Distal Radius Ulna    HIP FRACTURE SURGERY Right 04/21/2019    Dr. Lyssa Hamptonus- hip tay  HIP PINNING Right 4/21/2019    HIP PINNING performed by Anabel Carcamo MD at [facility] Left 6/14/2017    MINI PORT ACCESS LEFT CHEST, X2 SPECIMENS. performed by Festus Sanchez MD at [address] [facility]  2years old    VOLVAR-ULCERATIVE LESION-CAUTERIZED    [place] EGD TRANSORAL BIOPSY SINGLE/MULTIPLE Left 2/13/2018    EGD BIOPSY performed by Jada Armas MD at [email or web address]    SKIN BIOPSY      TONSILLECTOMY AND ADENOIDECTOMY         Family History   Problem Relation Age of Onset    Heart Disease Mother     Stroke Mother     High Blood Pressure Mother     Cancer Father         lung    Stroke Brother     Heart Disease Maternal Grandmother        Past Medical History:   Diagnosis Date    Allergic rhinitis 10/1994    Arthritis     Asthma     Atrial fibrillation ([address] 12/2008    CAD (coronary artery disease)     Cerebrovascular accident (CVA) involving left cerebral hemisphere ([address] 04/23/2019    remote lacunar infarction within the L periventricular white matter    CHF (congestive heart failure) ([address]     Clotting disorder (HCC)     plebitis in L leg    COPD (chronic obstructive pulmonary disease) ([address]     DDD (degenerative disc disease), cervical     Degeneration of cervical intervertebral disc     Diverticulosis 2005    Gout     Gout, unspecified     H/O cardiac catheterization 6/17/15    LMCA: Normal 0% stenosis. LAD: Lesion on 1st diag: Proximal subsection. 80% stenosis. Lesion plaque is ruptured. Tien Ruiz LCx: Lesion on 1st Ob Leslie: Mid subsection. 85% stenosis. RCA:Lesion on R PDA: Mid subsection. 85% stenosis. Lesion on R PDA: Distal subsection. 70% stenosis. Lesion on Prox RCA: Mid subsection. 50% stenosis. EF 50%.  H/O echocardiogram 11/09/2016    EF 40-45%. Mild LV hypertrophy normal LV cavity size. Sigmoid interventricular septum without evidence of outflow tract obstruction.   Left atrium is moderately dilated (34-39) left atrial volume index of 36 ml/m2. Mild mitral regurg. Diastology cannot be assessed due to A-fib.  H/O echocardiogram 03/09/2018    EF 45-50%. Apical hypokinesis noted. The left atrium is moderately dilated (34-39) with a left atrial volume index of 34ml/m2. Mild to moderate mitral regurg. Mild tricuspid regurg. Moderate diastolic dysfunction.  History of Holter monitoring 3/24/16    Atrial Fibrillation throughout,fairly controlled, HR  bpm 79% of the time. Occasional high ventricular rate episodes and multiple pauses suggestive of tachycardia/bradycardia syndrome  Msximum R-R interval 2.68 seconds.    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    Long term (current) use of anticoagulants 8/31/2015    Movement disorder     Other abnormal glucose 2004    Pacemaker 08/10/2017    Insertion of a biventricular pacemaker/ICD AVN ablation    Phlebitis and thrombophlebitis of lower extremities, unspecified 1961    L leg    Senile osteoporosis 2006    L/S    Symptomatic menopausal or female climacteric states 06/1995    Syncope and collapse     Unspecified hereditary and idiopathic peripheral neuropathy 2012    Unspecified sleep apnea 11/2009      Social History     Tobacco Use    Smoking status: Never Smoker    Smokeless tobacco: Never Used   Substance Use Topics    Alcohol use: No     Alcohol/week: 0.0 standard drinks      Current Outpatient Medications   Medication Sig Dispense Refill    carvedilol (COREG) 6.25 MG tablet Take 1 tablet by mouth twice daily 180 tablet 0    EUTHYROX 75 MCG tablet Take 1 tablet by mouth once daily 90 tablet 0    Multiple Vitamins-Minerals (THERAPEUTIC MULTIVITAMIN-MINERALS) tablet Take 1 tablet by mouth daily      atorvastatin (LIPITOR) 40 MG tablet Take 1 tablet by mouth nightly 90 tablet 3    ranolazine (RANEXA) 500 MG extended release tablet Take 1 tablet by mouth twice daily 180 tablet 3    vitamin B-12 (CYANOCOBALAMIN) 500 MCG tablet Take 500 mcg by mouth daily       aspirin EC 81 MG EC tablet Take 1 tablet by mouth daily 90 tablet 3    acetaminophen (TYLENOL) 325 MG tablet Take 2 tablets by mouth every 4 hours as needed for Pain 120 tablet 3    polyethylene glycol (GLYCOLAX) powder Take 17 g by mouth as needed (for constipation)       No current facility-administered medications for this visit. Allergies   Allergen Reactions    Adhesive Tape     Aspercreme [Trolamine Salicylate]     Erythromycin Other (See Comments)     \"whole in stomach\"    Erythromycin Other (See Comments)     stomach pain    Grandpas Thylox Soap [Elemental Sulfur]      itch    Guaifenesin & Derivatives     Niacin And Related     Niacin And Related Hives    Soap     Tape [Adhesive Tape] Dermatitis    Augmentin [Amoxicillin-Pot Clavulanate] Rash       PHYSICAL EXAM:    /76   Ht 5' 5\" (1.651 m)   Wt 172 lb (78 kg)   SpO2 98%   BMI 28.62 kg/m²   Wt Readings from Last 3 Encounters:   04/05/22 172 lb (78 kg)   11/30/21 174 lb (78.9 kg)   11/29/21 176 lb 3.2 oz (79.9 kg)     BP Readings from Last 3 Encounters:   04/05/22 124/76   11/30/21 136/82   11/29/21 137/81       General Appearance: in no acute distress, well developed, well nourished. Eyes: pupils equal, round reactive to light and accommodation. Ears: normal canal and TM's. Nose: nares patent, no lesions. Oral Cavity: mucosa moist.  Throat: clear. Neck/Thyroid: neck supple, full range of motion, no cervical lymphadenopathy, no thyromegaly or carotid bruits. Skin: warm and dry. No suspicious lesions. Heart: regular rate and rhythm. No murmurs. S1, S2 normal, no gallops. rreg 75  Lungs: clear to auscultation bilaterally. Abdomen: bowel sounds present, soft, nontender, nondistended, no masses or organomegaly.   Musculoskeletal: normal, full range of motion in knees and hips, no swelling or tenderness. Predominately  R sided resting tremor cogwheeling upper extremities   Extremities: no cyanosis or edema. Pitting edema  Peripheral Pulses: 2+ throughout, symetric. Neurologic: nonfocal, motor strength normal upper and lower extremities, sensory exam intact. Psych: normal affect, speech fluent. ASSESSMENT:   Diagnosis Orders   1. Essential hypertension  TSH    T4    Potassium    CBC with Auto Differential    ALT    AST    Basic Metabolic Panel    Hemoglobin A1C   2. Hyperlipidemia, unspecified hyperlipidemia type  TSH    T4    Potassium    CBC with Auto Differential    ALT    AST    Basic Metabolic Panel    Hemoglobin A1C   3. Hypothyroidism, unspecified type  TSH    T4    Potassium    CBC with Auto Differential    ALT    AST    Basic Metabolic Panel    Hemoglobin A1C   4. Parkinsonian tremor ([address]     5. Pulmonary HTN ([address]     6. Type 2 diabetes mellitus with diabetic neuropathy, without long-term current use of insulin (HCC)     7. Paroxysmal A-fib (HCC)         PLAN:  We discuss her resting tremor as well as cogwheeling in the upper extremities, we go over other signs of Parkinson's. Such as a stiff gait or swaying with standing. She says her writing is getting smaller. I review Dr. Stephania Blount note from nearly 3 years ago. She says she sleeps on the cough as she goes to bed late and her  goes to bed early and is up several times. Overall I am pleased we will continue to watch for Parkinson's.  I will see her in 4 months with labs     Orders Placed This Encounter   Procedures    TSH     Standing Status:   Future     Standing Expiration Date:   4/5/2023    T4     Standing Status:   Future     Standing Expiration Date:   4/5/2023    Potassium     Standing Status:   Future     Standing Expiration Date:   4/5/2023    CBC with Auto Differential     Standing Status:   Future     Standing Expiration Date:   4/5/2023    ALT     Standing Status:   Future     Standing

## 2022-04-05 NOTE — PATIENT INSTRUCTIONS
SURVEY:    You may be receiving a survey from [employer] regarding your visit today. Please complete the survey to enable us to provide the highest quality of care to you and your family. If you cannot score us a very good on any question, please call the office to discuss how we could of made your experience a very good one. Thank you.

## 2022-04-26 RX ORDER — LEVOTHYROXINE SODIUM 75 UG/1
TABLET ORAL
Qty: 90 TABLET | Refills: 0 | Status: SHIPPED | OUTPATIENT
Start: 2022-04-26 | End: 2022-07-11

## 2022-05-13 NOTE — PROGRESS NOTES
Dr. Smith,    Is this patient ok to be scheduled for a loop removal or would you like her scheduled to a loop replacement?    Thank You,  Beatrice   of devices:  All fall risk precautions in place, Left in chair, Call light within reach, Nurse notified (No alarm upon entry. )     Therapy Time   Individual Concurrent Group Co-treatment   Time In 1350         Time Out 1422         Minutes [address] , [place]

## 2022-05-24 RX ORDER — ATORVASTATIN CALCIUM 40 MG/1
TABLET, FILM COATED ORAL
Qty: 30 TABLET | Refills: 0 | Status: SHIPPED | OUTPATIENT
Start: 2022-05-24 | End: 2022-07-11

## 2022-05-25 ENCOUNTER — OFFICE VISIT (OUTPATIENT)
Dept: CARDIOLOGY | Age: 82
End: 2022-05-25
Payer: MEDICARE

## 2022-05-25 VITALS
OXYGEN SATURATION: 98 % | WEIGHT: 175 LBS | SYSTOLIC BLOOD PRESSURE: 121 MMHG | HEIGHT: 65 IN | HEART RATE: 80 BPM | RESPIRATION RATE: 16 BRPM | DIASTOLIC BLOOD PRESSURE: 79 MMHG | BODY MASS INDEX: 29.16 KG/M2

## 2022-05-25 DIAGNOSIS — I10 ESSENTIAL HYPERTENSION: Chronic | ICD-10-CM

## 2022-05-25 DIAGNOSIS — E78.2 MIXED HYPERLIPIDEMIA: ICD-10-CM

## 2022-05-25 DIAGNOSIS — I48.20 CHRONIC A-FIB (HCC): ICD-10-CM

## 2022-05-25 DIAGNOSIS — I49.5 SICK SINUS SYNDROME (HCC): ICD-10-CM

## 2022-05-25 DIAGNOSIS — Z95.810 ICD (IMPLANTABLE CARDIOVERTER-DEFIBRILLATOR) IN PLACE: ICD-10-CM

## 2022-05-25 DIAGNOSIS — I25.10 ASHD (ARTERIOSCLEROTIC HEART DISEASE): ICD-10-CM

## 2022-05-25 DIAGNOSIS — I50.42 CHRONIC COMBINED SYSTOLIC AND DIASTOLIC CONGESTIVE HEART FAILURE (HCC): Chronic | ICD-10-CM

## 2022-05-25 PROCEDURE — 99214 OFFICE O/P EST MOD 30 MIN: CPT | Performed by: PHYSICIAN ASSISTANT

## 2022-05-25 PROCEDURE — 93289 INTERROG DEVICE EVAL HEART: CPT | Performed by: PHYSICIAN ASSISTANT

## 2022-05-25 PROCEDURE — 93000 ELECTROCARDIOGRAM COMPLETE: CPT | Performed by: PHYSICIAN ASSISTANT

## 2022-05-25 PROCEDURE — 1123F ACP DISCUSS/DSCN MKR DOCD: CPT | Performed by: PHYSICIAN ASSISTANT

## 2022-05-25 NOTE — PATIENT INSTRUCTIONS
SURVEY:    You may be receiving a survey from [employer] regarding your visit today. Please complete the survey to enable us to provide the highest quality of care to you and your family. If you cannot score us a very good on any question, please call the office to discuss how we could have made your experience a very good one. Thank you.

## 2022-05-25 NOTE — PROGRESS NOTES
Alo Yoder RN am scribing for and in the presence of [employer]. Subjective:     CHIEF COMPLAINT / HPI:   Chief Complaint   Patient presents with    Follow-up     icd check today. Hx: CHF, CAD, AFib  Pt reports doing about the same since last viist. SOB but is only when getting up and moving around. lightheadedness, dizziness sometimes when she standing up too quickly. denies cp. she did fall by losing her balance back in april        Dear Dr. Myles Cat MD     I had the pleasure of seeing Ms. Raghu Noel for follow-up. Meredith Cunha is 79 y.o.  with PMH of hypertension, hyperlipidemia, asthma and chronic atrial fibrillation. Her most recent cardiac catheterization showed moderate to severe three-vessel coronary artery disease as outlined below. 1-Patient's chronic atrial fibrillation is  Treated with rate control and anticoagulation with coumadin. Her Holter monitor on 4/9/2015 suggested tachycardia-bradycardia syndrome. 2-On 3/16/2016, patient was sent from cardiac rehab because of not feeling well. Imdur 30 mg daily was added as well as Ranexa 500 mg BID. 3-Patient admitted to [address] [place] [address] from 6/3/2017 to 6/15/2017 with worsening shortness of breath diagnosed with pneumonitis, status post lung biopsy. Currently on prednisone. 4-Another admission to [address] [place] [address] from 6/29/2017 to 7/6/2017 with A. Fib with RVR and acute on chronic diastolic CHF. Patient started on amiodarone during this admission in addition to her ratel control and diuresis. 5-Another admission to [facility] from 7/11/2017 to 7/14/2017 with recurrent fall and black eye. 3 falling episodes, 2 of them she hit her head. She is not sure if she lost consciousness. MRI of the brain was negative. 6-Patient underwent AV node ablation and insertion of biventricular ICD by Dr. Kyle Contreras on 8/10/2017.     7-On 9/18/2017 patient underwent insertion of a Watchman device. currently off anticoagulation     8-An admission to [facility] with orthostatic hypotension, discharged on 3/9/2018. Her metoprolol decreased from 50 to 25 mg twice a day and midodrine added. 9-ICD checked 3/7/18: Biventricular pacing 99% of the time. No significant ventricular arrhythmia. 10- EKG done on 4/20/2019. 11- Echo on 05/13/2019: Poor image quality. EF 45-50%. LA is mildly dilated w/ LA colume index of 30. Mild mitral rgeurg. Moderate diastolic dysfunction. 12- ICD interrogation today 5/20/2020-battery 4.1 years. Ventricular pacing 99%, pacing mode VVIR. Chronic atrial fibrillation    13- EKG done in office on 5/20/2020-biventricular pacing. No change from prior. 14- Echocardiogram done on 5/24/2021: EF of 50% Left atrium is severely dilated. 15-ICD Interrogation Findings on 5/25/2022: Within normal limits and therefore no changes were made. Corvue stable-no signs of fluid. Ms. Raghu Noel is here today for follow up. She says she is doing fairly well since last visit without really any complaints. She does she gets shortness of breath but this is nothing new for her is a bit worse than before and notices even getting up to go to the bathroom she gets short of breath. She did fall back in April but this was from turning to fast and losing her balance. She does a lot of sewing but does not do any yard work or get outside much but this is something that she has not done for years. She does have tremors in her right hand and it does get a little worse at times. She states she no longer follows up with neurology. She denies any lightheadedness, dizziness, chest pain, pressure, or tightness. She denies any palpitations. No abdominal pain, nausea or vomiting. No cough, fever or chills. No bleeding problems, bowel issues, problems with medications, or any other concerns at this time. No falls or near falls. Weight has remained the same since last visit-no signs of fluid overload. Corvue check looked good today. Wt Readings from Last 3 Encounters:   05/25/22 175 lb (79.4 kg)   04/05/22 172 lb (78 kg)   11/30/21 174 lb (78.9 kg)         Past Medical History:    Past Medical History:   Diagnosis Date    Allergic rhinitis 10/1994    Arthritis     Asthma     Atrial fibrillation ([address] 12/2008    CAD (coronary artery disease)     Cerebrovascular accident (CVA) involving left cerebral hemisphere ([address] 04/23/2019    remote lacunar infarction within the L periventricular white matter    CHF (congestive heart failure) ([address]     Clotting disorder ([address]     plebitis in L leg    COPD (chronic obstructive pulmonary disease) ([address]     DDD (degenerative disc disease), cervical     Degeneration of cervical intervertebral disc     Diverticulosis 2005    Gout     Gout, unspecified     H/O cardiac catheterization 6/17/15    LMCA: Normal 0% stenosis. LAD: Lesion on 1st diag: Proximal subsection. 80% stenosis. Lesion plaque is ruptured. Elliott Glow LCx: Lesion on 1st Ob Leslie: Mid subsection. 85% stenosis. RCA:Lesion on R PDA: Mid subsection. 85% stenosis. Lesion on R PDA: Distal subsection. 70% stenosis. Lesion on Prox RCA: Mid subsection. 50% stenosis. EF 50%.  H/O echocardiogram 11/09/2016    EF 40-45%. Mild LV hypertrophy normal LV cavity size. Sigmoid interventricular septum without evidence of outflow tract obstruction. Left atrium is moderately dilated (34-39) left atrial volume index of 36 ml/m2. Mild mitral regurg. Diastology cannot be assessed due to A-fib.  H/O echocardiogram 03/09/2018    EF 45-50%. Apical hypokinesis noted. The left atrium is moderately dilated (34-39) with a left atrial volume index of 34ml/m2. Mild to moderate mitral regurg. Mild tricuspid regurg. Moderate diastolic dysfunction.  History of Holter monitoring 3/24/16    Atrial Fibrillation throughout,fairly controlled, HR  bpm 79% of the time.  Occasional high ventricular rate episodes and multiple pauses suggestive of tachycardia/bradycardia syndrome  Msximum R-R interval 2.68 seconds. 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    Long term (current) use of anticoagulants 8/31/2015    Movement disorder     Other abnormal glucose 2004    Pacemaker 08/10/2017    Insertion of a biventricular pacemaker/ICD AVN ablation    Phlebitis and thrombophlebitis of lower extremities, unspecified 1961    L leg    Senile osteoporosis 2006    L/S    Symptomatic menopausal or female climacteric states 06/1995    Syncope and collapse     Unspecified hereditary and idiopathic peripheral neuropathy 2012    Unspecified sleep apnea 11/2009     Past Surgical History:  Past Surgical History:   Procedure Laterality Date    BRONCHOSCOPY  6/7/2017    BRONCHOSCOPY BRUSHINGS performed by Kin Magdaleno DO at [place] [address]  6/7/2017    BRONCHOSCOPY/TRANSBRONCHIAL LUNG BIOPSY performed by Kin Magdaleno DO at [place] [address]  6/7/2017    BRONCHOSCOPY FLUOROSCOPY performed by Kin Magdaleno DO at [address] Right 06/17/2015    CATARACT REMOVAL WITH IMPLANT Right 08/14/2017    DR ROMERO    COLONOSCOPY  1/2005    DIAGNOSTIC CARDIAC CATH LAB PROCEDURE  06/17/15    EYE SURGERY      FRACTURE SURGERY  2004    Distal Radius Ulna    HIP FRACTURE SURGERY Right 04/21/2019    Dr. Noemy Pineda- hip pinning    HIP PINNING Right 4/21/2019    HIP PINNING performed by Wing Lawson MD at [facility] Left 6/14/2017    MINI PORT ACCESS LEFT CHEST, X2 SPECIMENS.  performed by Ramses Leggett MD at [facility]  2years old    VOLVAR-ULCERATIVE LESION-CAUTERIZED    NM EGD TRANSORAL BIOPSY SINGLE/MULTIPLE Left 2/13/2018    EGD BIOPSY performed by Florentin Gregorio MD at [address] TONSILLECTOMY AND ADENOIDECTOMY       Social History:    Social History     Tobacco Use   Smoking Status Never Smoker   Smokeless Tobacco Never Used     Family History   Problem Relation Age of Onset    Heart Disease Mother     Stroke Mother     High Blood Pressure Mother     Cancer Father         lung    Stroke Brother     Heart Disease Maternal Grandmother        Current Medications:  Outpatient Medications Marked as Taking for the 5/25/22 encounter (Office Visit) with Guerline Esparza PA-C   Medication Sig Dispense Refill    atorvastatin (LIPITOR) 40 MG tablet Take 1 tablet by mouth nightly 30 tablet 0    EUTHYROX 75 MCG tablet Take 1 tablet by mouth once daily 90 tablet 0    carvedilol (COREG) 6.25 MG tablet Take 1 tablet by mouth twice daily 180 tablet 0    Multiple Vitamins-Minerals (THERAPEUTIC MULTIVITAMIN-MINERALS) tablet Take 1 tablet by mouth daily      ranolazine (RANEXA) 500 MG extended release tablet Take 1 tablet by mouth twice daily 180 tablet 3    vitamin B-12 (CYANOCOBALAMIN) 500 MCG tablet Take 500 mcg by mouth daily       aspirin EC 81 MG EC tablet Take 1 tablet by mouth daily 90 tablet 3    acetaminophen (TYLENOL) 325 MG tablet Take 2 tablets by mouth every 4 hours as needed for Pain 120 tablet 3    polyethylene glycol (GLYCOLAX) powder Take 17 g by mouth as needed (for constipation)         REVIEW OF SYSTEMS:    CONSTITUTIONAL: No major weight gain or loss, fatigue, weakness, night sweats or fever. HEENT: No new vision difficulties or ringing in the ears. RESPIRATORY: See HPI   CARDIOVASCULAR: See HPI  GI: No nausea, vomiting, diarrhea, constipation, abdominal pain or changes in bowel habits. : No urinary frequency, urgency, incontinence hematuria or dysuria. SKIN: No cyanosis or skin lesions. MUSCULOSKELETAL: No new muscle or joint pain. NEUROLOGICAL: No syncope or TIA-like symptoms.   PSYCHIATRIC: No anxiety, pain, insomnia or depression    Objective:     Physical Examination:     /79 (Site: Right Upper Arm, Position: Sitting, Cuff Size: Large Adult)   Pulse 80   Resp 16   Ht 5' 5\" (1.651 m)   Wt 175 lb (79.4 kg)   SpO2 98%   BMI 29.12 kg/m²  Body mass index is 29.12 kg/m². Constitutional: She appeared oriented to person and place. She appears well-developed and well-nourished. In no acute distress. HEENT: Normocephalic and atraumatic. No JVD present. Carotid bruit is not present. No mass and no thyromegaly present. No lymphadenopathy noted. Cardiovascular: Normal rate, regular rhythm, normal heart sounds. Exam reveals no gallop and no friction rubs. 1/6 systolic murmur, 5th intercostal space on the LEFT in the mid-clavicular line (cardiac apex)  Pulmonary/Chest: Effort normal and breath sounds normal. No respiratory distress. She has no wheezes, rhonchi or rales. Abdominal: Soft, non-tender. She exhibits no organomegaly, mass or bruit. Extremities: No significant edema. No cyanosis or clubbing. 2+ radial and carotid pulses. Distal extremity pulses: 2+ bilaterally. Neurological: Alertness and orientation as per Constitutional exam. No evidence of gross cranial nerve deficit. Coordination appeared normal.   Skin: Skin is warm and dry. There is no rash or diaphoresis. Psychiatric: She has a normal mood and affect.  Her speech is normal and behavior is normal.        DATA:    Lab Results   Component Value Date    ALT 12 02/25/2022    AST 24 02/25/2022    ALKPHOS 104 04/20/2019    BILITOT 0.48 04/20/2019     Lab Results   Component Value Date    CREATININE 0.96 (H) 01/09/2021    BUN 13 01/09/2021     01/09/2021    K 4.3 01/09/2021     01/09/2021    CO2 24 01/09/2021     Lab Results   Component Value Date    TSH 0.51 03/09/2021    T1RMOJG 8.5 11/24/2021     Lab Results   Component Value Date    WBC 6.5 02/25/2022    HGB 13.4 02/25/2022    HCT 41.5 02/25/2022    MCV 93.3 02/25/2022     02/25/2022       Lab Results   Component Value Date TRIG 204 (H) 02/25/2022    TRIG 185 (H) 11/05/2020    TRIG 113 02/25/2020     Lab Results   Component Value Date    HDL 42 02/25/2022    HDL 47 11/05/2020    HDL 48 02/25/2020     Lab Results   Component Value Date    LDLCHOLESTEROL 52 02/25/2022    LDLCHOLESTEROL 60 11/05/2020    LDLCHOLESTEROL 43 02/25/2020         Assessment:      Diagnosis Orders   1. Chronic combined systolic and diastolic congestive heart failure (HCC)  HI INTERROG EVAL F2F 1/DUAL/MLT LEADS IMPLTBL DFB    EKG 12 lead    Stress test (Lexiscan)   2. ASHD (arteriosclerotic heart disease)  HI INTERROG EVAL F2F 1/DUAL/MLT LEADS IMPLTBL DFB    EKG 12 lead    Stress test (Lexiscan)   3. Chronic a-fib (HCC)  HI INTERROG EVAL F2F 1/DUAL/MLT LEADS IMPLTBL DFB    EKG 12 lead    Stress test (Lexiscan)   4. Essential hypertension  HI INTERROG EVAL F2F 1/DUAL/MLT LEADS IMPLTBL DFB    EKG 12 lead    Stress test (Lexiscan)   5. Mixed hyperlipidemia     6. ICD (implantable cardioverter-defibrillator) in place  HI INTERROG EVAL F2F 1/DUAL/MLT LEADS IMPLTBL DFB    EKG 12 lead    Stress test (Lexiscan)   7. Sick sinus syndrome (HCC)  HI INTERROG EVAL F2F 1/DUAL/MLT LEADS IMPLTBL DFB    EKG 12 lead    Stress test (Lexiscan)     PLAN:    Chronic Diastolic Heart Failure:   · No clinical evidence of volume overload. · No significant lower extremity edema. · Corvue checked on interrogation today, no evidence of fluid retention. · Beta Blocker: Continue Carvedilol (Coreg) 6.25 mg twice daily. Nonpharmacologic management of Heart Failure: I told her to continue wearing lower extremity compression stockings and I advised her to try and keep his legs up whenever possible and to limit salt in her diet. I reviewed her most recent blood work and everything is stable with that   Additional Testing List: None     · Atherosclerotic Heart Disease:  Worsening shortness of breath   · Antiplatelet Agent: Continue aspirin 81 mg daily  I also reminded her to watch for signs of blood in her stool or black tarry stools and stop the medication immediately if this develops as this could be life threatening. · Beta Blocker: Continue Carvedilol (Coreg) 6.25 mg twice daily. · Anti-anginal medications: Continue ranolazine (Ranexa) 500 mg 2 times/day. · Statin Therapy: Continue atorvastatin (Lipitor) 40 mg nightly. · Additional Testing List: Because current signs and symptoms can certainly be caused by significant coronary artery disease, I ordered a Regadenoson (Lexiscan) stress test with SPECT imaging to try and rule out this possibility. · Chronic atrial fibrillation status post AV node ablation and watchman device placement:   · Beta Blocker: Continue Carvedilol (Coreg) 6.25 mg twice daily. · Stroke Risk: Her CHADS2 score is 5/9 (6.7% stroke risk)  · Anticoagulation: Watchman in place. I did explain to her that watchman device does not prevent every single stroke. Patient said she had a CT scan done of the brain for her tremor and found to have a tiny stroke. · She no longer see's neurology for her tremor. Essential Hypertension: Controlled  Beta Blocker: Continue Carvedilol (Coreg) 6.25 mg twice daily. ACE Inibitor/ARB: Not indicated at this time. Calcium Channel Blocker: Not indicated at this time. Diuretics: Not indicated at this time. Additional Testing List: None    · Hyperlipidemia: Mixed - Last LDL at goal.  · Cholesterol Reduction Therapy: Continue Atorvastatin (Lipitor) 40 mg daily. Implantable Cardioverter Defibrillator (ICD): Indication for Device Placement: Biventricular ICD secondary to severe left ventricular systolic dysfunction and chronic atrial fibrillation   · Interrogation Findings: Within normal limits and therefore no changes were made. Corvue stable-no signs of fluid. Finally, I recommended that she continue her other medications and follow up with you as previously scheduled. FOLLOW UP:  I told Ms. De Leon Likes to call my office if she had any problems, but otherwise told her to Return in about 6 months (around 11/25/2022). However, I would be happy to see her sooner should the need arise. Sincerely,  Prince PHAN  [facility] Cardiology Specialist    [address], [place], [address] [place] [address]  Phone: [phone], Fax: [phone]     I believe that the risk of significant morbidity and mortality related to the patient's current medical conditions are: intermediate-high. Approximately 35 minutes were spent during prep work, discussion and exam of the patient, and follow up documentation and all of their questions were answered. The documentation recorded by the scribe, accurately and completely reflects the services I personally performed and the decisions made by me.  Prince Barfield PA-C May 25, 2022

## 2022-06-07 ENCOUNTER — HOSPITAL ENCOUNTER (OUTPATIENT)
Dept: NON INVASIVE DIAGNOSTICS | Age: 82
Discharge: HOME | End: 2022-06-07
Payer: MEDICARE

## 2022-06-07 DIAGNOSIS — I50.42 CHRONIC COMBINED SYSTOLIC AND DIASTOLIC CONGESTIVE HEART FAILURE (HCC): Chronic | ICD-10-CM

## 2022-06-07 DIAGNOSIS — I25.10 ASHD (ARTERIOSCLEROTIC HEART DISEASE): ICD-10-CM

## 2022-06-07 DIAGNOSIS — Z95.810 ICD (IMPLANTABLE CARDIOVERTER-DEFIBRILLATOR) IN PLACE: ICD-10-CM

## 2022-06-07 DIAGNOSIS — I48.20 CHRONIC A-FIB (HCC): ICD-10-CM

## 2022-06-07 DIAGNOSIS — I10 ESSENTIAL HYPERTENSION: Chronic | ICD-10-CM

## 2022-06-07 DIAGNOSIS — I49.5 SICK SINUS SYNDROME (HCC): ICD-10-CM

## 2022-06-07 PROCEDURE — A9500 TC99M SESTAMIBI: HCPCS | Performed by: PHYSICIAN ASSISTANT

## 2022-06-07 PROCEDURE — 6360000002 HC RX W HCPCS: Performed by: FAMILY MEDICINE

## 2022-06-07 PROCEDURE — 3430000000 HC RX DIAGNOSTIC RADIOPHARMACEUTICAL: Performed by: PHYSICIAN ASSISTANT

## 2022-06-07 PROCEDURE — 93017 CV STRESS TEST TRACING ONLY: CPT

## 2022-06-07 RX ADMIN — Medication 30 MILLICURIE: at 11:12

## 2022-06-07 RX ADMIN — REGADENOSON 0.4 MG: 0.08 INJECTION, SOLUTION INTRAVENOUS at 11:24

## 2022-06-09 ENCOUNTER — HOSPITAL ENCOUNTER (OUTPATIENT)
Dept: NON INVASIVE DIAGNOSTICS | Age: 82
Discharge: HOME | End: 2022-06-09
Payer: MEDICARE

## 2022-06-09 PROCEDURE — A9500 TC99M SESTAMIBI: HCPCS | Performed by: PHYSICIAN ASSISTANT

## 2022-06-09 PROCEDURE — 78452 HT MUSCLE IMAGE SPECT MULT: CPT

## 2022-06-09 PROCEDURE — 3430000000 HC RX DIAGNOSTIC RADIOPHARMACEUTICAL: Performed by: PHYSICIAN ASSISTANT

## 2022-06-09 RX ADMIN — Medication 30 MILLICURIE: at 14:07

## 2022-06-10 ENCOUNTER — TELEPHONE (OUTPATIENT)
Dept: CARDIOLOGY | Age: 82
End: 2022-06-10

## 2022-06-10 NOTE — TELEPHONE ENCOUNTER
----- Message from Richy Caraballo PA-C sent at 6/10/2022 10:32 AM EDT -----  Please let them know that their stress test was abnromal. Please make follow up in next 1 to 2 weeks. Thanks.

## 2022-06-10 NOTE — PROCEDURES
[address], [place] [address]                              CARDIAC STRESS TEST    PATIENT NAME: Rigoberto Beltran                  :        1940  MED REC NO:   [id number]                              ROOM:  ACCOUNT NO:   [de-identified]                           ADMIT DATE: 2022  PROVIDER:     Melissa Valenzuela MD    CARDIOVASCULAR DIAGNOSTIC DEPARTMENT    DATE OF STUDY:  2022    ORDERING PROVIDER:  Gisele Perez PA-C    PRIMARY CARE PROVIDER:  Dimple David MD    INTERPRETING PHYSICIAN:  Melissa Valenzuela MD    PHARMACOLOGIC MYOCARDIAL PERFUSION STRESS TESTING    Stress/rest single isotope SPECT imaging with exercise stress and gated  SPECT imaging. INDICATIONS:  Assessment of a cardiac cause of dyspnea, abnormal ECG. CLINICAL HISTORY:  The patient is an 25-year-old woman with known  coronary artery disease. Previous cardiac history includes stress test, cardiac catheterization. Other previous history includes dyspnea, cerebrovascular accident,  caffeine, hypertension, family history of coronary artery disease in  mother under age 61. Symptoms just prior to testing:  None. Relevant medications:  Carvedilol (Coreg). PROCEDURE:  The heart rate was 82 at baseline and decreased to 80 beats  per minute during the regadenoson infusion. The rest blood pressure was  126/80 mm/Hg and decreased to 120/80 mm/Hg. The patient did not  complain of any significant symptoms following infusion. Pharmacologic stress testing was performed with regadenoson at a dose of  0.4 mg. Additionally, low level exercise using hand compressions was  performed along with vasodilator infusion. MYOCARDIAL PERFUSION IMAGING:  Imaging was performed at rest 30-45  minutes following the injection of 30 mCi of sestamibi. Approximately  10 seconds after Lexiscan injection, the patient was injected with 30  mCi of sestamibi.   Gating post-stress tomographic imaging was performed  30-45 minutes after stress. STRESS ECG RESULTS:  The resting electrocardiogram demonstrated a  ventricular paced rhythm without significant ST-segment abnormalities  that may impair accurate ECG detection of stress induced cardiac  ischemia. During vasodilator infusion and during recovery, the patient developed:    No significant ST segment changes suggestive of myocardial ischemia with  no premature atrial contractions (PACs) and no premature ventricular  contractions (PVCs). NUCLEAR IMAGING RESULTS:  The overall quality of the study is good. Mild attenuation artifact was seen. There is no evidence of abnormal  lung uptake. Additionally, the right ventricle appears normal.  The  left ventricular cavity is noted to be normal in size on the stress  images. There is evidence of transient ischemic dilatation (TID) of the  left ventricle. Gated SPECT imaging reveals normal myocardial thickening and wall motion  with a calculated left ventricular ejection fraction of 66%. The rest images demonstrated a small perfusion abnormality of mild  intensity in the anteroseptal region(s), which is most likely due to  artifact. On stress imaging, a small/moderate perfusion abnormality of mild  intensity in the anterolateral and lateral region(s), which may be due  to artifact. IMPRESSION:  1. Abnormal myocardial perfusion study. There is a small/moderate  perfusion defect of mild intensity in the anterolateral and lateral  region(s) during stress imaging which is most consistent with ischemia,  but may be due to artifact. In addition, transient ischemic dilatation (TID) of the left ventricle  is seen which can be seen with uncontrolled hypertension, severe left  main coronary artery disease, or severe 3-vessel coronary artery  disease. (TID: 1.23)    2.   Global left ventricular systolic function was normal without  regional wall motion abnormalities. 3.  No significant electrocardiographic evidence of myocardial ischemia  during EKG monitoring without significant associated arrhythmias. Overall, these results are most consistent with an intermediate/high  risk for significant coronary artery disease. Additional testing, including cardiac catheterization, may be indicated. The sensitivity for detecting ischemia on this test may have been  reduced due to the patient being on a beta blocker.         Jose Andres MD    D: 06/10/2022 9:36:00       T: 06/10/2022 9:37:08     [email or web address]  Job#: [id number]     Doc#: Unknown    CC:  Maria Luisa Henderson PA-C

## 2022-06-10 NOTE — RESULT ENCOUNTER NOTE
Please let them know that their stress test was abnromal. Please make follow up in next 1 to 2 weeks. Thanks.

## 2022-06-13 ENCOUNTER — OFFICE VISIT (OUTPATIENT)
Dept: CARDIOLOGY | Age: 82
End: 2022-06-13
Payer: MEDICARE

## 2022-06-13 VITALS
WEIGHT: 176 LBS | HEIGHT: 65 IN | RESPIRATION RATE: 18 BRPM | SYSTOLIC BLOOD PRESSURE: 131 MMHG | OXYGEN SATURATION: 94 % | HEART RATE: 82 BPM | DIASTOLIC BLOOD PRESSURE: 83 MMHG | BODY MASS INDEX: 29.32 KG/M2

## 2022-06-13 DIAGNOSIS — I25.10 ASHD (ARTERIOSCLEROTIC HEART DISEASE): ICD-10-CM

## 2022-06-13 DIAGNOSIS — Z95.818 PRESENCE OF WATCHMAN LEFT ATRIAL APPENDAGE CLOSURE DEVICE: ICD-10-CM

## 2022-06-13 DIAGNOSIS — E78.2 MIXED HYPERLIPIDEMIA: ICD-10-CM

## 2022-06-13 DIAGNOSIS — I48.20 CHRONIC A-FIB (HCC): ICD-10-CM

## 2022-06-13 DIAGNOSIS — Z95.810 ICD (IMPLANTABLE CARDIOVERTER-DEFIBRILLATOR) IN PLACE: ICD-10-CM

## 2022-06-13 DIAGNOSIS — I10 ESSENTIAL HYPERTENSION: ICD-10-CM

## 2022-06-13 DIAGNOSIS — R94.39 ABNORMAL CARDIOVASCULAR STRESS TEST: Primary | ICD-10-CM

## 2022-06-13 DIAGNOSIS — I50.32 CHRONIC DIASTOLIC CONGESTIVE HEART FAILURE (HCC): ICD-10-CM

## 2022-06-13 PROCEDURE — 1123F ACP DISCUSS/DSCN MKR DOCD: CPT | Performed by: PHYSICIAN ASSISTANT

## 2022-06-13 PROCEDURE — 99214 OFFICE O/P EST MOD 30 MIN: CPT | Performed by: PHYSICIAN ASSISTANT

## 2022-06-13 RX ORDER — RANOLAZINE 500 MG/1
TABLET, EXTENDED RELEASE ORAL
Qty: 180 TABLET | Refills: 3 | Status: ON HOLD | OUTPATIENT
Start: 2022-06-13 | End: 2022-06-24 | Stop reason: HOSPADM

## 2022-06-13 NOTE — PROGRESS NOTES
Subjective:     CHIEF COMPLAINT / HPI:   Chief Complaint   Patient presents with    Congestive Heart Failure     Hx:CHF, ASHD,Chronic AFIB, HTN,HLD,ICD, SSS. Stress test done on 6/7/2022. She has been doing about the same. SOB at times. Denies: CP, Lightheaded/dizziness, Palpitations. Dear Dr. Renetta Davis MD     I had the pleasure of seeing Ms. Philip Bob for follow-up. Janny Aguirre is 79 y.o.  with PMH of hypertension, hyperlipidemia, asthma and chronic atrial fibrillation. Her most recent cardiac catheterization showed moderate to severe three-vessel coronary artery disease as outlined below. 1-Patient's chronic atrial fibrillation is  Treated with rate control and anticoagulation with coumadin. Her Holter monitor on 4/9/2015 suggested tachycardia-bradycardia syndrome. 2-On 3/16/2016, patient was sent from cardiac rehab because of not feeling well. Imdur 30 mg daily was added as well as Ranexa 500 mg BID. 3-Patient admitted to [facility] from 6/3/2017 to 6/15/2017 with worsening shortness of breath diagnosed with pneumonitis, status post lung biopsy. Currently on prednisone. 4-Another admission to [facility] from 6/29/2017 to 7/6/2017 with A. Fib with RVR and acute on chronic diastolic CHF. Patient started on amiodarone during this admission in addition to her ratel control and diuresis. 5-Another admission to [facility] from 7/11/2017 to 7/14/2017 with recurrent fall and black eye. 3 falling episodes, 2 of them she hit her head. She is not sure if she lost consciousness. MRI of the brain was negative. 6-Patient underwent AV node ablation and insertion of biventricular ICD by Dr. Lainey Cano on 8/10/2017. 7-On 9/18/2017 patient underwent insertion of a Watchman device. currently off anticoagulation     8-An admission to [facility] with orthostatic hypotension, discharged on 3/9/2018.  Her metoprolol decreased from 50 to 25 mg twice a day and midodrine added. 9-ICD checked 3/7/18: Biventricular pacing 99% of the time. No significant ventricular arrhythmia. 10- EKG done on 4/20/2019. 11- Echo on 05/13/2019: Poor image quality. EF 45-50%. LA is mildly dilated w/ LA colume index of 30. Mild mitral rgeurg. Moderate diastolic dysfunction. 12- ICD interrogation today 5/20/2020-battery 4.1 years. Ventricular pacing 99%, pacing mode VVIR. Chronic atrial fibrillation    13- EKG done in office on 5/20/2020-biventricular pacing. No change from prior. 14- Echocardiogram done on 5/24/2021: EF of 50% Left atrium is severely dilated. 15-ICD Interrogation Findings on 5/25/2022: Within normal limits and therefore no changes were made. Corvue stable-no signs of fluid. 16- Stress test done 6/7/2022: Abnormal myocardial perfusion study. There is a small/moderate perfusion defect of mild intensity in the anterolateral and lateral region(s) during stress imaging which is most consistent with ischemia, but may be due to artifact. In addition, transient ischemic dilatation (TID) of the left ventricle is seen which can be seen with uncontrolled hypertension, severe left main coronary artery disease, or severe 3-vessel coronary artery disease. (TID: 1.23) Global left ventricular systolic function was normal without regional wall motion abnormalities. No significant electrocardiographic evidence of myocardial ischemia during EKG monitoring without significant associated arrhythmias. Overall, these results are most consistent with an intermediate/high risk for significant coronary artery disease. Ms. Otilia Chang is here today for follow up for abdnormal stress test results. She continues to have shortness of breath but this is nothing new for her is a bit worse than before and notices even getting up to go to the bathroom she gets short of breath. She states her shortness of breath is worse at times and other times she does not notice it.  Typically she walks 15 feet and notices she is shortness of breath. She denies any lightheadedness, dizziness, chest pain, pressure, or tightness. She denies any palpitations. No abdominal pain, nausea or vomiting. No cough, fever or chills. No bleeding problems, bowel issues, problems with medications, or any other concerns at this time. No falls or near falls. Weight has remained the stable since last visit-no signs of fluid overload. Wt Readings from Last 3 Encounters:   06/13/22 176 lb (79.8 kg)   05/25/22 175 lb (79.4 kg)   04/05/22 172 lb (78 kg)     Past Medical History:    Past Medical History:   Diagnosis Date    Allergic rhinitis 10/1994    Arthritis     Asthma     Atrial fibrillation ([facility] 12/2008    CAD (coronary artery disease)     Cerebrovascular accident (CVA) involving left cerebral hemisphere ([facility] 04/23/2019    remote lacunar infarction within the L periventricular white matter    CHF (congestive heart failure) ([facility])     Clotting disorder ([facility])     plebitis in L leg    COPD (chronic obstructive pulmonary disease) ([facility]     DDD (degenerative disc disease), cervical     Degeneration of cervical intervertebral disc     Diverticulosis 2005    Gout     Gout, unspecified     H/O cardiac catheterization 6/17/15    LMCA: Normal 0% stenosis. LAD: Lesion on 1st diag: Proximal subsection. 80% stenosis. Lesion plaque is ruptured. Cherl Spinner LCx: Lesion on 1st Ob Leslie: Mid subsection. 85% stenosis. RCA:Lesion on R PDA: Mid subsection. 85% stenosis. Lesion on R PDA: Distal subsection. 70% stenosis. Lesion on Prox RCA: Mid subsection. 50% stenosis. EF 50%.  H/O echocardiogram 11/09/2016    EF 40-45%. Mild LV hypertrophy normal LV cavity size. Sigmoid interventricular septum without evidence of outflow tract obstruction. Left atrium is moderately dilated (34-39) left atrial volume index of 36 ml/m2. Mild mitral regurg. Diastology cannot be assessed due to A-fib.  H/O echocardiogram 03/09/2018    EF 45-50%. Apical hypokinesis noted. The left atrium is moderately dilated (34-39) with a left atrial volume index of 34ml/m2. Mild to moderate mitral regurg. Mild tricuspid regurg. Moderate diastolic dysfunction.  History of Holter monitoring 3/24/16    Atrial Fibrillation throughout,fairly controlled, HR  bpm 79% of the time. Occasional high ventricular rate episodes and multiple pauses suggestive of tachycardia/bradycardia syndrome  Msximum R-R interval 2.68 seconds.    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    Long term (current) use of anticoagulants 8/31/2015    Movement disorder     Other abnormal glucose 2004    Pacemaker 08/10/2017    Insertion of a biventricular pacemaker/ICD AVN ablation    Phlebitis and thrombophlebitis of lower extremities, unspecified 1961    L leg    Senile osteoporosis 2006    L/S    Symptomatic menopausal or female climacteric states 06/1995    Syncope and collapse     Unspecified hereditary and idiopathic peripheral neuropathy 2012    Unspecified sleep apnea 11/2009     Past Surgical History:  Past Surgical History:   Procedure Laterality Date    BRONCHOSCOPY  6/7/2017    BRONCHOSCOPY BRUSHINGS performed by Zachery Dorsey DO at [place] [address]  6/7/2017    BRONCHOSCOPY/TRANSBRONCHIAL LUNG BIOPSY performed by Zachery Dorsey DO at [place] [address]  6/7/2017    BRONCHOSCOPY FLUOROSCOPY performed by Zachery Dorsey DO at [address] [place] [address] Road Right 06/17/2015    CATARACT REMOVAL WITH IMPLANT Right 08/14/2017    DR ROMERO    COLONOSCOPY  1/2005    DIAGNOSTIC CARDIAC CATH LAB PROCEDURE  06/17/15    EYE SURGERY      FRACTURE SURGERY  2004    Distal Radius Ulna    HIP FRACTURE SURGERY Right 04/21/2019    Dr. Vinay Norris- hip pinning    HIP PINNING Right 4/21/2019    HIP PINNING performed by Reina Doyle MD at [address] Left 6/14/2017    MINI PORT ACCESS LEFT CHEST, X2 SPECIMENS. performed by Yara Houston MD at [address]  2years old    VOLVAR-ULCERATIVE LESION-CAUTERIZED    NC EGD TRANSORAL BIOPSY SINGLE/MULTIPLE Left 2/13/2018    EGD BIOPSY performed by Vahid Schmidt MD at [facility]    SKIN BIOPSY      TONSILLECTOMY AND ADENOIDECTOMY       Social History:    Social History     Tobacco Use   Smoking Status Never Smoker   Smokeless Tobacco Never Used     Family History   Problem Relation Age of Onset    Heart Disease Mother     Stroke Mother     High Blood Pressure Mother     Cancer Father         lung    Stroke Brother     Heart Disease Maternal Grandmother        Current Medications:  Outpatient Medications Marked as Taking for the 6/13/22 encounter (Office Visit) with Rubi Ozuna PA-C   Medication Sig Dispense Refill    ranolazine (RANEXA) 500 MG extended release tablet Take 1 tablet by mouth twice daily 180 tablet 3    atorvastatin (LIPITOR) 40 MG tablet Take 1 tablet by mouth nightly 30 tablet 0    EUTHYROX 75 MCG tablet Take 1 tablet by mouth once daily 90 tablet 0    carvedilol (COREG) 6.25 MG tablet Take 1 tablet by mouth twice daily 180 tablet 0    Multiple Vitamins-Minerals (THERAPEUTIC MULTIVITAMIN-MINERALS) tablet Take 1 tablet by mouth daily      vitamin B-12 (CYANOCOBALAMIN) 500 MCG tablet Take 500 mcg by mouth daily       aspirin EC 81 MG EC tablet Take 1 tablet by mouth daily 90 tablet 3    acetaminophen (TYLENOL) 325 MG tablet Take 2 tablets by mouth every 4 hours as needed for Pain 120 tablet 3    polyethylene glycol (GLYCOLAX) powder Take 17 g by mouth as needed (for constipation)         REVIEW OF SYSTEMS:    CONSTITUTIONAL: No major weight gain or loss, fatigue, weakness, night sweats or fever. HEENT: No new vision difficulties or ringing in the ears.   RESPIRATORY: See HPI CARDIOVASCULAR: See HPI  GI: No nausea, vomiting, diarrhea, constipation, abdominal pain or changes in bowel habits. : No urinary frequency, urgency, incontinence hematuria or dysuria. SKIN: No cyanosis or skin lesions. MUSCULOSKELETAL: No new muscle or joint pain. NEUROLOGICAL: No syncope or TIA-like symptoms. PSYCHIATRIC: No anxiety, pain, insomnia or depression    Objective:     Physical Examination:     /83 (Site: Left Upper Arm, Position: Sitting, Cuff Size: Large Adult)   Pulse 82   Resp 18   Ht 5' 5\" (1.651 m)   Wt 176 lb (79.8 kg)   SpO2 94%   BMI 29.29 kg/m²  Body mass index is 29.29 kg/m². Constitutional: She appeared oriented to person and place. She appears well-developed and well-nourished. In no acute distress. HEENT: Normocephalic and atraumatic. No JVD present. Carotid bruit is not present. No mass and no thyromegaly present. No lymphadenopathy noted. Cardiovascular: Normal rate, regular rhythm, normal heart sounds. Exam reveals no gallop and no friction rubs. 1/6 systolic murmur, 5th intercostal space on the LEFT in the mid-clavicular line (cardiac apex)  Pulmonary/Chest: Effort normal and breath sounds normal. No respiratory distress. She has no wheezes, rhonchi or rales. Abdominal: Soft, non-tender. She exhibits no organomegaly, mass or bruit. Extremities: No significant edema. No cyanosis or clubbing. 2+ radial and carotid pulses. Distal extremity pulses: 2+ bilaterally. Neurological: Alertness and orientation as per Constitutional exam. No evidence of gross cranial nerve deficit. Coordination appeared normal.   Skin: Skin is warm and dry. There is no rash or diaphoresis. Psychiatric: She has a normal mood and affect.  Her speech is normal and behavior is normal.      DATA:    Lab Results   Component Value Date    ALT 12 02/25/2022    AST 24 02/25/2022    ALKPHOS 104 04/20/2019    BILITOT 0.48 04/20/2019     Lab Results   Component Value Date    CREATININE 0.96 (H) 01/09/2021    BUN 13 01/09/2021     01/09/2021    K 4.3 01/09/2021     01/09/2021    CO2 24 01/09/2021     Lab Results   Component Value Date    TSH 0.51 03/09/2021    J6BVXVM 8.5 11/24/2021     Lab Results   Component Value Date    WBC 6.5 02/25/2022    HGB 13.4 02/25/2022    HCT 41.5 02/25/2022    MCV 93.3 02/25/2022     02/25/2022       Lab Results   Component Value Date    TRIG 204 (H) 02/25/2022    TRIG 185 (H) 11/05/2020    TRIG 113 02/25/2020     Lab Results   Component Value Date    HDL 42 02/25/2022    HDL 47 11/05/2020    HDL 48 02/25/2020     Lab Results   Component Value Date    LDLCHOLESTEROL 52 02/25/2022    LDLCHOLESTEROL 60 11/05/2020    LDLCHOLESTEROL 43 02/25/2020         Assessment:      Diagnosis Orders   1. Abnormal cardiovascular stress test  CBC    Basic Metabolic Panel    Referral to Cardiac Cath   2. Chronic diastolic congestive heart failure ([facility] [address]  CBC    Basic Metabolic Panel    Referral to Cardiac Cath   3. ASHD (arteriosclerotic heart disease)  CBC    Basic Metabolic Panel    Referral to Cardiac Cath   4. Chronic a-fib (HCC)  CBC    Basic Metabolic Panel    Referral to Cardiac Cath   5. Presence of Watchman left atrial appendage closure device  CBC    Basic Metabolic Panel    Referral to Cardiac Cath   6. Essential hypertension  CBC    Basic Metabolic Panel    Referral to Cardiac Cath   7. Mixed hyperlipidemia  CBC    Basic Metabolic Panel    Referral to Cardiac Cath   8. ICD (implantable cardioverter-defibrillator) in place  CBC    Basic Metabolic Panel    Referral to Cardiac Cath     PLAN:   Abnormal Stress Test: Reviewed her previous stress test and heart cath from 2015 with Dr Nereyda Gross, stress test was abnormal but different from 2015.  For these reasons, I recommended that the patient consider undergoing a cardiac catheterization with coronary angiography to assess their coronary anatomy and facilitate better treatment recommendations.  I discussed the risks, benefits, and alternatives to the procedure including the risk of bleeding, contrast induced allergy and/or kidney damage, arrythmia, stroke, heart attack, death, or the need for further procedures. I also discussed the fact that although treatment with simple medical management is a potential treatment option in place of cardiac catheterization, I expressed my opinion that cardiac catheterization in order to define their coronary anatomy and rule out severe 3 vessel or left main coronary artery disease would significant help guide the most appropriate treatment strategy ranging from no treatment, to medications, stents, to even coronary bypass surgery. The patient verbalized understanding of the risks benefits and alternatives and stated that they would like to undergo the procedure. We will plan to schedule the procedure here at [facility] on 6/23/2022.  Risk factors: dyslipidemia, sedentary life style, hypertension, family history      Medical Management for suspected Coronary artery disease and myocardial ischemia:   Antiplatelet Agent: Continue Aspirin 81 mg daily.  Beta Blocker: Continue Carvedilol (Coreg) 6.25 mg twice daily.  Anti-anginal medications: Not indicated at this time.  Cholesterol Reduction Therapy: Continue Atorvastatin (Lipitor) 40 mg daily.  Additional Testing List: I took the liberty of ordering a BMP for today to assess their potassium and renal function. I told them that they could get their lab work performed at the location of their choosing, unfortunately, if the lab work was not performed at a [facility]) facility I could not guarantee my ability to follow up with them on their results. and  I took the liberty of ordering a CBC.  I told them that they could get their lab work performed at the location of their choosing, unfortunately, if the lab work was not performed at a [facility]) facility I could not guarantee my ability to follow up with them on their results. Chronic Diastolic Heart Failure:   · No clinical evidence of volume overload. · No significant lower extremity edema. · Corvue checked on interrogation today, no evidence of fluid retention. · Beta Blocker: Continue Carvedilol (Coreg) 6.25 mg twice daily. Nonpharmacologic management of Heart Failure: I told her to continue wearing lower extremity compression stockings and I advised her to try and keep his legs up whenever possible and to limit salt in her diet. I reviewed her most recent blood work and everything is stable with that   Additional Testing List: None     · Atherosclerotic Heart Disease: Worsening shortness of breath with an abnormal stress test, recommended heart catheterization at today's appointment. · Antiplatelet Agent: Continue aspirin 81 mg daily  I also reminded her to watch for signs of blood in her stool or black tarry stools and stop the medication immediately if this develops as this could be life threatening. · Beta Blocker: Continue Carvedilol (Coreg) 6.25 mg twice daily. · Anti-anginal medications: Continue ranolazine (Ranexa) 500 mg 2 times/day. · Statin Therapy: Continue atorvastatin (Lipitor) 40 mg nightly. · Additional Testing List: None    · Chronic atrial fibrillation status post AV node ablation and watchman device placement:   · Beta Blocker: Continue Carvedilol (Coreg) 6.25 mg twice daily. · Stroke Risk: Her CHADS2 score is 5/9 (6.7% stroke risk)  · Anticoagulation: Watchman in place. I did explain to her that watchman device does not prevent every single stroke. Patient said she had a CT scan done of the brain for her tremor and found to have a tiny stroke. · She no longer see's neurology for her tremor. Essential Hypertension: Controlled  Beta Blocker: Continue Carvedilol (Coreg) 6.25 mg twice daily. ACE Inibitor/ARB: Not indicated at this time. Calcium Channel Blocker: Not indicated at this time.    Diuretics: Not indicated at this time. Additional Testing List: None    · Hyperlipidemia: Mixed - Last LDL at goal.  · Cholesterol Reduction Therapy: Continue Atorvastatin (Lipitor) 40 mg daily. Implantable Cardioverter Defibrillator (ICD): Indication for Device Placement: Biventricular ICD secondary to severe left ventricular systolic dysfunction and chronic atrial fibrillation   · Interrogation Findings: I reviewed the results of their most recent home interrogation from 5/25/2022. Finally, I recommended that she continue her other medications and follow up with you as previously scheduled. I discussed patient's symptoms and treatment plan with Dr Misael Lynch, he was in agreement with the plan and follow up. FOLLOW UP:  I told Ms. Bradford Mensah to call my office if she had any problems, but otherwise told her to Return in about 4 weeks (around 7/11/2022). However, I would be happy to see her sooner should the need arise. Sincerely,  Torsten PHAN  [facility] Cardiology Specialist    [address], [place], [address] [place] [address]  Phone: [phone], Fax: [phone]     I believe that the risk of significant morbidity and mortality related to the patient's current medical conditions are: intermediate-high. Approximately 45 minutes were spent during prep work, discussion and exam of the patient, and follow up documentation and all of their questions were answered.       June 13, 2022

## 2022-06-13 NOTE — PATIENT INSTRUCTIONS
SURVEY:    You may be receiving a survey from [employer] regarding your visit today. Please complete the survey to enable us to provide the highest quality of care to you and your family. If you cannot score us a very good on any question, please call the office to discuss how we could have made your experience a very good one. Thank you.

## 2022-06-16 ENCOUNTER — TELEPHONE (OUTPATIENT)
Dept: CARDIOLOGY | Age: 82
End: 2022-06-16

## 2022-06-16 ENCOUNTER — HOSPITAL ENCOUNTER (OUTPATIENT)
Age: 82
Discharge: HOME | End: 2022-06-16
Payer: MEDICARE

## 2022-06-16 LAB
ANION GAP SERPL CALCULATED.3IONS-SCNC: 10 MMOL/L (ref 9–17)
BUN BLDV-MCNC: 22 MG/DL (ref 8–23)
BUN/CREAT BLD: 22 (ref 9–20)
CALCIUM SERPL-MCNC: 9.9 MG/DL (ref 8.6–10.4)
CHLORIDE BLD-SCNC: 103 MMOL/L (ref 98–107)
CO2: 27 MMOL/L (ref 20–31)
CREAT SERPL-MCNC: 0.98 MG/DL (ref 0.5–0.9)
GFR AFRICAN AMERICAN: >60 ML/MIN
GFR NON-AFRICAN AMERICAN: 54 ML/MIN
GFR SERPL CREATININE-BSD FRML MDRD: ABNORMAL ML/MIN/{1.73_M2}
GFR SERPL CREATININE-BSD FRML MDRD: ABNORMAL ML/MIN/{1.73_M2}
GLUCOSE BLD-MCNC: 104 MG/DL (ref 70–99)
HCT VFR BLD CALC: 45.3 % (ref 36.3–47.1)
HEMOGLOBIN: 14.1 G/DL (ref 11.9–15.1)
MCH RBC QN AUTO: 29.8 PG (ref 25.2–33.5)
MCHC RBC AUTO-ENTMCNC: 31.1 G/DL (ref 28.4–34.8)
MCV RBC AUTO: 95.8 FL (ref 82.6–102.9)
NRBC AUTOMATED: 0 PER 100 WBC
PDW BLD-RTO: 13.4 % (ref 11.8–14.4)
PLATELET # BLD: 240 K/UL (ref 138–453)
PMV BLD AUTO: 9.5 FL (ref 8.1–13.5)
POTASSIUM SERPL-SCNC: 5 MMOL/L (ref 3.7–5.3)
RBC # BLD: 4.73 M/UL (ref 3.95–5.11)
SODIUM BLD-SCNC: 140 MMOL/L (ref 135–144)
WBC # BLD: 7 K/UL (ref 3.5–11.3)

## 2022-06-16 PROCEDURE — 85027 COMPLETE CBC AUTOMATED: CPT

## 2022-06-16 PROCEDURE — 36415 COLL VENOUS BLD VENIPUNCTURE: CPT

## 2022-06-16 PROCEDURE — 80048 BASIC METABOLIC PNL TOTAL CA: CPT

## 2022-06-16 NOTE — RESULT ENCOUNTER NOTE
Please notify patient that their lab results are normal.   Please continue current treatment and follow up.

## 2022-06-16 NOTE — TELEPHONE ENCOUNTER
----- Message from Ana Bowden PA-C sent at 6/16/2022 11:03 AM EDT -----  Please notify patient that their lab results are normal.   Please continue current treatment and follow up.

## 2022-06-23 ENCOUNTER — APPOINTMENT (OUTPATIENT)
Dept: CARDIAC CATH/INVASIVE PROCEDURES | Age: 82
End: 2022-06-23
Attending: INTERNAL MEDICINE
Payer: MEDICARE

## 2022-06-23 ENCOUNTER — HOSPITAL ENCOUNTER (OUTPATIENT)
Age: 82
Discharge: HOME | End: 2022-06-24
Attending: INTERNAL MEDICINE | Admitting: INTERNAL MEDICINE
Payer: MEDICARE

## 2022-06-23 ENCOUNTER — HOSPITAL ENCOUNTER (OUTPATIENT)
Dept: CARDIAC CATH/INVASIVE PROCEDURES | Age: 82
Discharge: HOME | End: 2022-06-23
Attending: FAMILY MEDICINE | Admitting: FAMILY MEDICINE
Payer: MEDICARE

## 2022-06-23 VITALS
OXYGEN SATURATION: 97 % | HEIGHT: 65 IN | HEART RATE: 83 BPM | SYSTOLIC BLOOD PRESSURE: 132 MMHG | DIASTOLIC BLOOD PRESSURE: 60 MMHG | TEMPERATURE: 98.5 F | WEIGHT: 175 LBS | RESPIRATION RATE: 17 BRPM | BODY MASS INDEX: 29.16 KG/M2

## 2022-06-23 PROBLEM — R94.39 ABNORMAL RESULT OF OTHER CARDIOVASCULAR FUNCTION STUDY: Status: ACTIVE | Noted: 2022-06-23

## 2022-06-23 PROBLEM — I25.10 CAD IN NATIVE ARTERY: Status: ACTIVE | Noted: 2022-06-23

## 2022-06-23 PROBLEM — Z03.89 CORONARY ARTERY DISEASE (CAD) EXCLUDED: Status: ACTIVE | Noted: 2022-06-23

## 2022-06-23 PROBLEM — I25.10 CORONARY ARTERY DISEASE: Status: ACTIVE | Noted: 2022-06-23

## 2022-06-23 LAB
ACTIVATED CLOTTING TIME: 312 SECONDS (ref 1–150)
EKG ATRIAL RATE: 102 BPM
EKG Q-T INTERVAL: 424 MS
EKG QRS DURATION: 138 MS
EKG QTC CALCULATION (BAZETT): 489 MS
EKG R AXIS: -124 DEGREES
EKG T AXIS: 50 DEGREES
EKG VENTRICULAR RATE: 80 BPM

## 2022-06-23 PROCEDURE — 93454 CORONARY ARTERY ANGIO S&I: CPT

## 2022-06-23 PROCEDURE — C1887 CATHETER, GUIDING: HCPCS

## 2022-06-23 PROCEDURE — 2709999900 HC NON-CHARGEABLE SUPPLY

## 2022-06-23 PROCEDURE — 92928 PRQ TCAT PLMT NTRAC ST 1 LES: CPT | Performed by: INTERNAL MEDICINE

## 2022-06-23 PROCEDURE — 2500000003 HC RX 250 WO HCPCS

## 2022-06-23 PROCEDURE — 93458 L HRT ARTERY/VENTRICLE ANGIO: CPT | Performed by: FAMILY MEDICINE

## 2022-06-23 PROCEDURE — 93571 IV DOP VEL&/PRESS C FLO 1ST: CPT | Performed by: INTERNAL MEDICINE

## 2022-06-23 PROCEDURE — 2580000003 HC RX 258: Performed by: FAMILY MEDICINE

## 2022-06-23 PROCEDURE — C1894 INTRO/SHEATH, NON-LASER: HCPCS

## 2022-06-23 PROCEDURE — 6370000000 HC RX 637 (ALT 250 FOR IP)

## 2022-06-23 PROCEDURE — 93571 IV DOP VEL&/PRESS C FLO 1ST: CPT

## 2022-06-23 PROCEDURE — C1874 STENT, COATED/COV W/DEL SYS: HCPCS

## 2022-06-23 PROCEDURE — 6360000004 HC RX CONTRAST MEDICATION: Performed by: FAMILY MEDICINE

## 2022-06-23 PROCEDURE — 85347 COAGULATION TIME ACTIVATED: CPT

## 2022-06-23 PROCEDURE — 6370000000 HC RX 637 (ALT 250 FOR IP): Performed by: INTERNAL MEDICINE

## 2022-06-23 PROCEDURE — C1769 GUIDE WIRE: HCPCS

## 2022-06-23 PROCEDURE — 93010 ELECTROCARDIOGRAM REPORT: CPT | Performed by: NUCLEAR MEDICINE

## 2022-06-23 PROCEDURE — 93458 L HRT ARTERY/VENTRICLE ANGIO: CPT

## 2022-06-23 PROCEDURE — 2580000003 HC RX 258: Performed by: INTERNAL MEDICINE

## 2022-06-23 PROCEDURE — C1725 CATH, TRANSLUMIN NON-LASER: HCPCS

## 2022-06-23 PROCEDURE — 93005 ELECTROCARDIOGRAM TRACING: CPT | Performed by: INTERNAL MEDICINE

## 2022-06-23 PROCEDURE — 6360000002 HC RX W HCPCS

## 2022-06-23 PROCEDURE — C9600 PERC DRUG-EL COR STENT SING: HCPCS

## 2022-06-23 RX ORDER — SODIUM CHLORIDE 9 MG/ML
INJECTION, SOLUTION INTRAVENOUS CONTINUOUS
Status: DISCONTINUED | OUTPATIENT
Start: 2022-06-23 | End: 2022-06-23 | Stop reason: HOSPADM

## 2022-06-23 RX ORDER — SODIUM CHLORIDE 0.9 % (FLUSH) 0.9 %
5-40 SYRINGE (ML) INJECTION EVERY 12 HOURS SCHEDULED
Status: DISCONTINUED | OUTPATIENT
Start: 2022-06-23 | End: 2022-06-24 | Stop reason: HOSPADM

## 2022-06-23 RX ORDER — ATORVASTATIN CALCIUM 40 MG/1
40 TABLET, FILM COATED ORAL NIGHTLY
Status: DISCONTINUED | OUTPATIENT
Start: 2022-06-23 | End: 2022-06-24 | Stop reason: HOSPADM

## 2022-06-23 RX ORDER — DIPHENHYDRAMINE HCL 50 MG
50 CAPSULE ORAL ONCE
Status: DISCONTINUED | OUTPATIENT
Start: 2022-06-23 | End: 2022-06-23 | Stop reason: HOSPADM

## 2022-06-23 RX ORDER — NITROGLYCERIN 0.4 MG/1
0.4 TABLET SUBLINGUAL EVERY 5 MIN PRN
Status: DISCONTINUED | OUTPATIENT
Start: 2022-06-23 | End: 2022-06-23 | Stop reason: HOSPADM

## 2022-06-23 RX ORDER — SODIUM CHLORIDE 0.9 % (FLUSH) 0.9 %
5-40 SYRINGE (ML) INJECTION PRN
Status: DISCONTINUED | OUTPATIENT
Start: 2022-06-23 | End: 2022-06-23 | Stop reason: HOSPADM

## 2022-06-23 RX ORDER — ACETAMINOPHEN 325 MG/1
650 TABLET ORAL EVERY 4 HOURS PRN
Status: DISCONTINUED | OUTPATIENT
Start: 2022-06-23 | End: 2022-06-23 | Stop reason: HOSPADM

## 2022-06-23 RX ORDER — SODIUM CHLORIDE 0.9 % (FLUSH) 0.9 %
5-40 SYRINGE (ML) INJECTION EVERY 12 HOURS SCHEDULED
Status: DISCONTINUED | OUTPATIENT
Start: 2022-06-23 | End: 2022-06-23 | Stop reason: HOSPADM

## 2022-06-23 RX ORDER — SODIUM CHLORIDE 9 MG/ML
INJECTION, SOLUTION INTRAVENOUS CONTINUOUS
Status: DISCONTINUED | OUTPATIENT
Start: 2022-06-23 | End: 2022-06-24 | Stop reason: HOSPADM

## 2022-06-23 RX ORDER — RANOLAZINE 500 MG/1
500 TABLET, EXTENDED RELEASE ORAL 2 TIMES DAILY
Status: DISCONTINUED | OUTPATIENT
Start: 2022-06-23 | End: 2022-06-24 | Stop reason: HOSPADM

## 2022-06-23 RX ORDER — ACETAMINOPHEN 325 MG/1
650 TABLET ORAL EVERY 4 HOURS PRN
Status: DISCONTINUED | OUTPATIENT
Start: 2022-06-23 | End: 2022-06-24 | Stop reason: HOSPADM

## 2022-06-23 RX ORDER — CLOPIDOGREL BISULFATE 75 MG/1
75 TABLET ORAL DAILY
Status: DISCONTINUED | OUTPATIENT
Start: 2022-06-24 | End: 2022-06-24 | Stop reason: HOSPADM

## 2022-06-23 RX ORDER — SODIUM CHLORIDE 9 MG/ML
INJECTION, SOLUTION INTRAVENOUS PRN
Status: DISCONTINUED | OUTPATIENT
Start: 2022-06-23 | End: 2022-06-23 | Stop reason: HOSPADM

## 2022-06-23 RX ORDER — LEVOTHYROXINE SODIUM 0.07 MG/1
75 TABLET ORAL DAILY
Status: DISCONTINUED | OUTPATIENT
Start: 2022-06-24 | End: 2022-06-24 | Stop reason: HOSPADM

## 2022-06-23 RX ORDER — CARVEDILOL 6.25 MG/1
6.25 TABLET ORAL 2 TIMES DAILY
Status: DISCONTINUED | OUTPATIENT
Start: 2022-06-23 | End: 2022-06-24 | Stop reason: HOSPADM

## 2022-06-23 RX ORDER — ASPIRIN 81 MG/1
81 TABLET ORAL DAILY
Status: DISCONTINUED | OUTPATIENT
Start: 2022-06-24 | End: 2022-06-24 | Stop reason: HOSPADM

## 2022-06-23 RX ORDER — SODIUM CHLORIDE 9 MG/ML
INJECTION, SOLUTION INTRAVENOUS PRN
Status: DISCONTINUED | OUTPATIENT
Start: 2022-06-23 | End: 2022-06-24 | Stop reason: HOSPADM

## 2022-06-23 RX ORDER — SODIUM CHLORIDE 0.9 % (FLUSH) 0.9 %
5-40 SYRINGE (ML) INJECTION PRN
Status: DISCONTINUED | OUTPATIENT
Start: 2022-06-23 | End: 2022-06-24 | Stop reason: HOSPADM

## 2022-06-23 RX ADMIN — SODIUM CHLORIDE: 9 INJECTION, SOLUTION INTRAVENOUS at 17:15

## 2022-06-23 RX ADMIN — CARVEDILOL 6.25 MG: 6.25 TABLET, FILM COATED ORAL at 20:05

## 2022-06-23 RX ADMIN — SODIUM CHLORIDE: 9 INJECTION, SOLUTION INTRAVENOUS at 08:44

## 2022-06-23 RX ADMIN — SODIUM CHLORIDE, PRESERVATIVE FREE 10 ML: 5 INJECTION INTRAVENOUS at 08:44

## 2022-06-23 RX ADMIN — IOPAMIDOL 70 ML: 755 INJECTION, SOLUTION INTRAVENOUS at 09:47

## 2022-06-23 RX ADMIN — RANOLAZINE 500 MG: 500 TABLET, EXTENDED RELEASE ORAL at 20:05

## 2022-06-23 RX ADMIN — ATORVASTATIN CALCIUM 40 MG: 40 TABLET, FILM COATED ORAL at 20:05

## 2022-06-23 NOTE — H&P
[address] [address]  Sedation/Analgesia History & Physical    Pt Name: Allen Conner  Account number: [de-identified]  MRN: [id number]  YOB: 1940  Provider Performing Procedure: Lacey Ott MD MD  Referring Provider: Lacey Ott MD   Primary Care Physician: Mariana Abdullahi MD  Date: 6/23/2022    PRE-PROCEDURE    Code Status: FULL CODE  Brief History/Pre-Procedure Diagnosis:      Angina and angina equivalent symptoms    Abnormal stress test   CHF   Cardiomyopathy   CAD   90% stenosis of OM1   S/p WATCHMAN (LAAO) Device placement in 2017. Patient states that procedure was done in setting of falls at that time, now resolved (denies any recent falls). She denies GI bleeding. R/B/I/A of DAPT were d/w patient, she agrees      Consent: : I have discussed with the patient risks, benefits, and alternatives (and relevant risks, benefits, and side effects related to alternatives or not receiving care), and likelihood of the success. The patient and/or representative appear to understand and agree to proceed. The discussion encompasses risks, benefits, and side effects related to the alternatives and the risks related to not receiving the proposed care, treatment, and services. The indication, risks and benefits of the procedure and possible therapeutic consequences and alternatives were discussed with the patient. The patient was given the opportunity to ask questions and to have them answered to his/her satisfaction.  Risks of the procedure include but are not limited to the following: Bleeding, hematoma including retroperitoneal hemmorhage, infection, pain and discomfort, injury to the aorta and other blood vessels, rhythm disturbance, low blood pressure, myocardial infarction, stroke, kidney damage/failure, myocardial perforation, allergic reactions to sedatives/contrast material, loss of pulse/vascular compromise requiring surgery, aneurysm/pseudoaneurysm formation, possible loss of a limb/hand/leg, needing blood transfusion, requiring emergent open heart surgery or emergent coronary intervention, the need for intubation/respiratory support, the requirement for defibrillation/cardioversion, and death. Alternatives to and omission of the suggested procedure were discussed. The patient had no further questions and wished to proceed; the consent form was signed. MEDICAL HISTORY  []ASHD/ANGINA/MI/CHF   []Hypertension  []Diabetes  []Hyperlipidemia  []Smoking  []Family Hx of ASHD  []Additional information:       has a past medical history of Allergic rhinitis, Arthritis, Asthma, Atrial fibrillation ([facility] [address] CAD (coronary artery disease), Cerebrovascular accident (CVA) involving left cerebral hemisphere [facility]), CHF (congestive heart failure) ([facility] [address] Clotting disorder ([facility] [address] COPD (chronic obstructive pulmonary disease) ([facility] [address] DDD (degenerative disc disease), cervical, Degeneration of cervical intervertebral disc, Diverticulosis, Gout, Gout, unspecified, H/O cardiac catheterization, H/O echocardiogram, H/O echocardiogram, History of Holter monitoring, Hx of blood clots, Hyperlipidemia, Hyperlipidemia, Hypertension, Hypertension, Hypothyroidism due to amiodarone, Infectious hepatitis, Long term (current) use of anticoagulants, Movement disorder, Other abnormal glucose, Pacemaker, Phlebitis and thrombophlebitis of lower extremities, unspecified, Senile osteoporosis, Symptomatic menopausal or female climacteric states, Syncope and collapse, Unspecified hereditary and idiopathic peripheral neuropathy, and Unspecified sleep apnea. SURGICAL HISTORY   has a past surgical history that includes Tonsillectomy and adenoidectomy; eye surgery; skin biopsy; Cardiac catheterization (Right, 06/17/2015); Diagnostic Cardiac Cath Lab Procedure (06/17/15); fracture surgery (2004);  Colonoscopy (1/2005); other surgical history (2years old); bronchoscopy (6/7/2017); bronchoscopy (6/7/2017); bronchoscopy (6/7/2017); Lung lobectomy (Left, 6/14/2017); Cataract removal with implant (Right, 08/14/2017); pr egd transoral biopsy single/multiple (Left, 2/13/2018); Hip fracture surgery (Right, 04/21/2019); and hip pinning (Right, 4/21/2019). Additional information:       ALLERGIES   Allergies as of 06/23/2022 - Fully Reviewed 06/23/2022   Allergen Reaction Noted    Adhesive tape  04/14/2015    Aspercreme [trolamine salicylate]  09/13/9582    Erythromycin Other (See Comments) 02/13/2015    Erythromycin Other (See Comments) 03/30/2018    Jennifer Feller thylox soap [elemental sulfur]  03/23/2021    Guaifenesin & derivatives  08/31/2015    Niacin and related  04/14/2015    Niacin and related Hives 03/30/2018    Soap  07/27/2021    Tape [adhesive tape] Dermatitis 03/30/2018    Augmentin [amoxicillin-pot clavulanate] Rash 04/14/2015     Additional information:       MEDICATIONS   Aspirin  [] 81 mg  [] 325 mg  [] None  Antiplatelet drug therapy use last 5 days  []No []Yes  Coumadin Use Last 5 Days []No []Yes  Other anticoagulant use last 5 days  []No []Yes  No current facility-administered medications for this encounter. Prior to Admission medications    Medication Sig Start Date End Date Taking?  Authorizing Provider   ranolazine (RANEXA) 500 MG extended release tablet Take 1 tablet by mouth twice daily 6/13/22   Jaime Lara PA-C   atorvastatin (LIPITOR) 40 MG tablet Take 1 tablet by mouth nightly 5/24/22   Eri Villegas MD   EUTHYROX 75 MCG tablet Take 1 tablet by mouth once daily 4/26/22   Eri Villegas MD   carvedilol (COREG) 6.25 MG tablet Take 1 tablet by mouth twice daily 3/25/22   rEi Villegas MD   Multiple Vitamins-Minerals (THERAPEUTIC MULTIVITAMIN-MINERALS) tablet Take 1 tablet by mouth daily    Historical Provider, MD   vitamin B-12 (CYANOCOBALAMIN) 500 MCG tablet Take 500 mcg by mouth daily     Historical Provider, MD   aspirin EC 81 MG EC tablet Take 1 tablet by mouth daily 11/28/18   Cezar Pruitt MD acetaminophen (TYLENOL) 325 MG tablet Take 2 tablets by mouth every 4 hours as needed for Pain 2/20/18   Rell Marie MD   polyethylene glycol [facility]) powder Take 17 g by mouth as needed (for constipation)    Historical Provider, MD     Additional information:       VITAL SIGNS   Vitals:    06/23/22 1434   BP: (!) 170/76   Pulse: 80   Resp: 18   SpO2: 98%       PHYSICAL:   General: No acute distress  HEENT:  Unremarkable for age  Neck: without increased JVD, carotid pulses 2+ bilaterally without bruits  Heart: RRR, S1 & S2 WNL. No murmurs   Lungs: Clear to auscultation    Abdomen: BS present, without HSM, masses, or tenderness    Extremities: without C,C,E.  Pulses 2+ bilaterally   Mental Status: Alert & Oriented        PLANNED PROCEDURE   [x]Cath  [x]PCI                []Pacemaker/AICD  []NASIMA             []Cardioversion []Peripheral angiography/PTA  []Other:      SEDATION  Planned agent:[x]Midazolam []Meperidine []Sublimaze []Morphine  []Diazepam  []Other:     ASA Classification:  []1 []2 [x]3 []4 []5   Class 1: A normal healthy patient  Class 2: Pt with mild to moderate systemic disease  Class 3: Severe systemic disease or disturbance  Class 4: Severe systemic disorders that are already life threatening. Class 5: Moribund pt with little chances of survival, for more than 24 hours. Mallampati I Airway Classification:   []1 [x]2 []3 []4     [x]Pre-procedure diagnostic studies complete and results available. Comment:    [x]Previous sedation/anesthesia experiences assessed. Comment:    [x]The patient is an appropriate candidate to undergo the planned procedure sedation and anesthesia. (Refer to nursing sedation/analgesia documentation record)  [x]Formulation and discussion of sedation/procedure plan, risks, and expectations with patient and/or responsible adult completed. [x]Patient examined immediately prior to the procedure.  (Refer to nursing sedation/analgesia documentation record)      Oscar Pennington Nati PEREZ    Electronically signed 6/23/2022 at 3:11 PM

## 2022-06-23 NOTE — PLAN OF CARE
Dr Tianna Moncada talking with pt and family about plan of care, plan to go to [facility]. [facility] today for intervention

## 2022-06-23 NOTE — PROGRESS NOTES
Patient discharged, patient ambulatory and has all belongings. Patient is being transported by family in their personal vehical directly to [address] [place] [address]. [facility] cath lab. Peripheral IV remains in place d/t pt being a hard stick. Education/care of IV given to patient and family, all voice understanding.

## 2022-06-23 NOTE — PROCEDURES
[address] [id number]                            CARDIAC CATHETERIZATION    PATIENT NAME: Romilda Hodgkin                  :        1940  MED REC NO:   [id number]                           ROOM:       0030  ACCOUNT NO:   [de-identified]                           ADMIT DATE: 2022  PROVIDER:     Alexadnria Flanagan MD    DATE OF PROCEDURE:  2022    INDICATIONS FOR PROCEDURE:  1. Angina, angina equivalent symptoms. 2.  Abnormal stress test.  3.  Cardiomyopathy. 4.  Congestive heart failure. 5.  Coronary disease. In summary, this is an 70-year-old female patient who has been evaluated  as outpatient by her cardiologist for angina and angina equivalent  symptoms. Stress test was abnormal.  Today, she underwent left cardiac  catheterization by Dr. Jasbir Abreu which revealed evidence of severely  stenosis 90% involving the first obtuse marginal branch. She was  referred for PCI and stenting. PROCEDURES PERFORMED:  1. Successful PCI and stenting of the first obtuse marginal branch. 2.  Fractional flow reserve measurement of the left anterior descending  artery. DESCRIPTION OF PROCEDURE/INTERVENTION DETAILS:  After informed consent,  the patient was brought to the cardiac catheterization room. She was  prepped and draped in a sterile fashion. 2% lidocaine was injected in  the skin and subcutaneous tissue overlying the right groin area. Under  ultrasound and fluoroscopy guidance, access was obtained in the right  common femoral artery. A 5-Sami sheath was inserted after common  femoral artery angiogram confirmed good stick. Sheath was flushed with  normal saline. I used 5-Sami EBU 3.5 guide catheter to cannulate the  left main coronary artery. Runthrough wire was used to cross the lesion  into OM1. Predilation using 2.0 x 12 mm PTCA balloon was completed. This was followed by stenting.   Resolute Cheo 2.25 mm x 15 mm  drug-eluting stent was successfully deployed. On attempts for  postdilation initially, I could not deliver the postdilation balloon. Eventually, kathie wire was utilized. A second Runthrough wire was used  to cross the obtuse marginal branch. This allowed the delivery of the  postdilation balloon. A 2.25 mm x 8 mm PTCA balloon was utilized for  postdilation. Wire was removed. Repeat angiogram revealed  well-expanded stent, 0% residual stenosis, TAYO-3 flow. No  complications including no dissection, distal embolization or  perforation. The fractional flow reserve wire was prepared outside the  patient's body per 's medications, then was advanced to the  left main coronary artery. Equalization of pressures were performed. The FFR wire was advanced into the left anterior descending artery. On  the angiogram, there was evidence of ostial left anterior descending  artery stenosis estimated at around 50%. Based on those findings, I  elected to proceed with FFR. At baseline, FFR was 1. Adenosine at 140  mcg/kg per minute was initiated and was continued for 3 minutes. At  maximal hyperemia, FFR was measured at 0.94. FFR wire was removed. Repeat angiogram revealed no complications. MEDICATIONS:  See MAR. COMPLICATIONS:  None. ESTIMATED BLOOD LOSS:  Less than 50 mL. ACCESS:  Right common femoral artery access. Sheath was secured in  place, will be removed with subsequent manual compression once ACT is  below 180. RECOMMENDATIONS AND PLAN OF CARE:  Access site care. Bedrest for 4  hours post sheath removal.  Monitor on telemetry. Aggressive risk  factor modification. Aspirin 81 mg p.o. daily, Plavix 75 mg p.o. daily. High intensity statin therapy. Start IV fluids. Repeat labs in the  morning. Outpatient followup with primary cardiologist in 2 weeks post  discharge.         Bridger Kim MD    D: 06/23/2022 16:34:45       T: 06/23/2022 16:37:37 [email or web address]  Job#: [id number]     Doc#: [id number]    CC:

## 2022-06-23 NOTE — PROGRESS NOTES
Patient returned to pre/post area. Alert and oriented, denies pain. Right radial palpable distal to puncture site. TR band in place no bleeding/bruising/swelling noted. Will continue to monitor.

## 2022-06-23 NOTE — BRIEF OP NOTE
[address] [address]  Sedation/Analgesia Post Sedation Record    Pt Name: Noel Sanchez  Account number: [de-identified]  MRN: [id number]  YOB: 1940  Procedure Performed By: Kasia Mclaughlin MD MD   Primary Care Physician: Kira Chowdary MD  Date: 6/23/2022    POST-PROCEDURE    Physicians/Assistants: Kasia Mclaughlin MD MD     Procedure Performed:Cath/ PCI      Sedation/Anesthesia: Versed/ Fentanyl and 2% xylocaine local anesthesia. Estimated Blood Loss: < 50 ml. Specimens Removed: None         Disposition of Specimen: N/A        Complications: No Immediate Complications. Post-procedure Diagnosis/Findings:        Successful PCI/FILOMENA of OM1   Negative FFR of LAD (1>0.94)    Recommendations:  · Access site care  · Once ACT is below 180, may remove right groin sheath with subsequent manual compression for 15 minutes  Bed rest for 4 hours post sheath removal   Admit overnight for observation post PCI   Monitor on Telemetry   Optimize medical therapy for Coronary Artery Disease  Aggressive risk factor modification   Antiplatelet therapy: ASA 81 mg PO daily and Plavix 75 mg po daily    High intensity statin therapy  IVF: NS at 75 cc/h  AM Labs: BMP, CBC  Outpatient follow up in office in 2 weeks with primary cardiologist      Above findings and plan of care were discussed with patient and her family, questions were answered, agreeable with plan.        Kasia Mclaughlin MD, Evelyn Retort   Electronically signed 6/23/2022 at 4:19 PM  Interventional Cardiology

## 2022-06-23 NOTE — OP NOTE
-  Coronary Angiography Brief Post Operative Note:    Severe single vessel coronary artery disease involving a 90% stenosis in the OM1 branch of the circumflex coronary artery. Normal left ventricular end diastolic pressure. Consult to interventional cardiology for consideration of angioplasty and/or stenting of the patients severe stenosis.      Electronically signed by Layla Buck MD on 6/23/2022 at 11:49 AM

## 2022-06-24 VITALS
OXYGEN SATURATION: 100 % | HEART RATE: 80 BPM | BODY MASS INDEX: 29.29 KG/M2 | TEMPERATURE: 98.2 F | WEIGHT: 175.8 LBS | RESPIRATION RATE: 16 BRPM | HEIGHT: 65 IN | DIASTOLIC BLOOD PRESSURE: 61 MMHG | SYSTOLIC BLOOD PRESSURE: 119 MMHG

## 2022-06-24 LAB
ANION GAP SERPL CALCULATED.3IONS-SCNC: 7 MEQ/L (ref 8–16)
BUN BLDV-MCNC: 13 MG/DL (ref 7–22)
CALCIUM SERPL-MCNC: 8.7 MG/DL (ref 8.5–10.5)
CHLORIDE BLD-SCNC: 106 MEQ/L (ref 98–111)
CO2: 23 MEQ/L (ref 23–33)
CREAT SERPL-MCNC: 0.7 MG/DL (ref 0.4–1.2)
ERYTHROCYTE [DISTWIDTH] IN BLOOD BY AUTOMATED COUNT: 13.8 % (ref 11.5–14.5)
ERYTHROCYTE [DISTWIDTH] IN BLOOD BY AUTOMATED COUNT: 48.6 FL (ref 35–45)
GFR SERPL CREATININE-BSD FRML MDRD: 80 ML/MIN/1.73M2
GLUCOSE BLD-MCNC: 99 MG/DL (ref 70–108)
HCT VFR BLD CALC: 38.9 % (ref 37–47)
HEMOGLOBIN: 12.2 GM/DL (ref 12–16)
MCH RBC QN AUTO: 30 PG (ref 26–33)
MCHC RBC AUTO-ENTMCNC: 31.4 GM/DL (ref 32.2–35.5)
MCV RBC AUTO: 95.6 FL (ref 81–99)
PLATELET # BLD: 194 THOU/MM3 (ref 130–400)
PMV BLD AUTO: 10.1 FL (ref 9.4–12.4)
POC ACTIVATED CLOTTING TIME KAOLIN: 155 SECONDS (ref 1–150)
POTASSIUM SERPL-SCNC: 4 MEQ/L (ref 3.5–5.2)
RBC # BLD: 4.07 MILL/MM3 (ref 4.2–5.4)
SODIUM BLD-SCNC: 136 MEQ/L (ref 135–145)
WBC # BLD: 6.5 THOU/MM3 (ref 4.8–10.8)

## 2022-06-24 PROCEDURE — 2580000003 HC RX 258: Performed by: INTERNAL MEDICINE

## 2022-06-24 PROCEDURE — 80048 BASIC METABOLIC PNL TOTAL CA: CPT

## 2022-06-24 PROCEDURE — 85027 COMPLETE CBC AUTOMATED: CPT

## 2022-06-24 PROCEDURE — 94760 N-INVAS EAR/PLS OXIMETRY 1: CPT

## 2022-06-24 PROCEDURE — 6370000000 HC RX 637 (ALT 250 FOR IP): Performed by: INTERNAL MEDICINE

## 2022-06-24 PROCEDURE — 99238 HOSP IP/OBS DSCHRG MGMT 30/<: CPT | Performed by: NURSE PRACTITIONER

## 2022-06-24 PROCEDURE — 36415 COLL VENOUS BLD VENIPUNCTURE: CPT

## 2022-06-24 PROCEDURE — 93307 TTE W/O DOPPLER COMPLETE: CPT

## 2022-06-24 RX ORDER — CLOPIDOGREL BISULFATE 75 MG/1
75 TABLET ORAL DAILY
Qty: 30 TABLET | Refills: 3 | Status: SHIPPED | OUTPATIENT
Start: 2022-06-25 | End: 2022-10-31 | Stop reason: SDUPTHER

## 2022-06-24 RX ORDER — NITROGLYCERIN 0.4 MG/1
0.4 TABLET SUBLINGUAL EVERY 5 MIN PRN
Qty: 25 TABLET | Refills: 1 | Status: SHIPPED | OUTPATIENT
Start: 2022-06-24

## 2022-06-24 RX ADMIN — RANOLAZINE 500 MG: 500 TABLET, EXTENDED RELEASE ORAL at 08:43

## 2022-06-24 RX ADMIN — ASPIRIN 81 MG: 81 TABLET, COATED ORAL at 08:43

## 2022-06-24 RX ADMIN — CARVEDILOL 6.25 MG: 6.25 TABLET, FILM COATED ORAL at 08:43

## 2022-06-24 RX ADMIN — CLOPIDOGREL BISULFATE 75 MG: 75 TABLET ORAL at 08:44

## 2022-06-24 RX ADMIN — LEVOTHYROXINE SODIUM 75 MCG: 0.07 TABLET ORAL at 05:36

## 2022-06-24 RX ADMIN — SODIUM CHLORIDE, PRESERVATIVE FREE 10 ML: 5 INJECTION INTRAVENOUS at 08:43

## 2022-06-24 NOTE — CARE COORDINATION
6/24/22, 8:24 AM EDT  DISCHARGE PLANNING EVALUATION:    Yohan Johnson       Admitted: 6/23/2022/ [facility] day: 0   Location: -30/030-A Reason for admit: Coronary artery disease [I25.10]  Coronary artery disease (CAD) excluded [Z03.89]  Coronary artery disease, unspecified vessel or lesion type, unspecified whether angina present, unspecified whether native or transplanted heart [I25.10]  CAD in native artery [I25.10]   PMH:  has a past medical history of Allergic rhinitis, Arthritis, Asthma, Atrial fibrillation ([address] CAD (coronary artery disease), Cerebrovascular accident (CVA) involving left cerebral hemisphere ([address] CHF (congestive heart failure) ([address] Clotting disorder ([address] COPD (chronic obstructive pulmonary disease) ([address] DDD (degenerative disc disease), cervical, Degeneration of cervical intervertebral disc, Diverticulosis, Gout, Gout, unspecified, H/O cardiac catheterization, H/O echocardiogram, H/O echocardiogram, History of Holter monitoring, Hx of blood clots, Hyperlipidemia, Hyperlipidemia, Hypertension, Hypertension, Hypothyroidism due to amiodarone, Infectious hepatitis, Long term (current) use of anticoagulants, Movement disorder, Other abnormal glucose, Pacemaker, Phlebitis and thrombophlebitis of lower extremities, unspecified, Senile osteoporosis, Symptomatic menopausal or female climacteric states, Syncope and collapse, Unspecified hereditary and idiopathic peripheral neuropathy, and Unspecified sleep apnea. Procedure:   6/23 Cardiac cath: Successful PCI and stenting of the first obtuse marginal branch. Fractional flow reserve measurement of the left anterior descending  Artery. Barriers to Discharge:  Pt transferred from [facility] after having cardiac cath there that showed 90% stenosis of OM1. Taken to cath lab upon arrival and stenting completed. IVF.      PCP: Kehinde Parker MD  Readmission Risk Score: 10.8 ( )%  Patient's Healthcare Decision Maker: Legal Next of Kin    Patient Goals/Plan/Treatment Preferences: Spoke with [place], she lives at home with her , daughter and granddaughter. Pt is independent but does not drive, her family gets her to all her appts. She has a cane, Rollator, wheelchair, standard walker and grab bars installed. Denies HH needs. Plans home with family. Verified PCP and insurance. Transportation/Food Security/Housekeeping Addressed:  No issues identified. Missael Ovalle

## 2022-06-24 NOTE — PROGRESS NOTES
Discussed discharge summary with patient. Instructed patient about medications & follow up appointments. Patient denies any additional questions. Patient was discharged with all belongings. No distress noted. Patient discharged to home. Taken down to the vehicle by transporter per wheelchair.

## 2022-06-24 NOTE — PLAN OF CARE
Problem: Discharge Planning  Goal: Discharge to home or other facility with appropriate resources  Outcome: Progressing     Problem: Safety - Adult  Goal: Free from fall injury  Outcome: Progressing  Flowsheets (Taken 6/24/2022 0205)  Free From Fall Injury:   Instruct family/caregiver on patient safety   Based on caregiver fall risk screen, instruct family/caregiver to ask for assistance with transferring infant if caregiver noted to have fall risk factors     Problem: ABCDS Injury Assessment  Goal: Absence of physical injury  Outcome: Progressing  Flowsheets (Taken 6/24/2022 0205)  Absence of Physical Injury: Implement safety measures based on patient assessment

## 2022-06-24 NOTE — PLAN OF CARE
Problem: Discharge Planning  Goal: Discharge to home or other facility with appropriate resources  6/24/2022 1245 by Nathan Ruiz RN  Outcome: Adequate for Discharge  Flowsheets (Taken 6/24/2022 1245)  Discharge to home or other facility with appropriate resources:   Identify barriers to discharge with patient and caregiver   Identify discharge learning needs (meds, wound care, etc)  Note: Reviewed with and given post cath radial/femoral papers. New medication information given.    6/24/2022 0207 by Delonte Rizo RN  Outcome: Progressing     Problem: Safety - Adult  Goal: Free from fall injury  6/24/2022 1245 by Nathan Ruiz RN  Outcome: Adequate for Discharge  Flowsheets (Taken 6/24/2022 0205 by Delonte Rizo RN)  Free From Fall Injury:   Instruct family/caregiver on patient safety   Based on caregiver fall risk screen, instruct family/caregiver to ask for assistance with transferring infant if caregiver noted to have fall risk factors  6/24/2022 0207 by Delonte Rizo RN  Outcome: Progressing  Flowsheets (Taken 6/24/2022 0205)  Free From Fall Injury:   Instruct family/caregiver on patient safety   Based on caregiver fall risk screen, instruct family/caregiver to ask for assistance with transferring infant if caregiver noted to have fall risk factors     Problem: ABCDS Injury Assessment  Goal: Absence of physical injury  6/24/2022 1245 by Nathan Ruiz RN  Outcome: Adequate for Discharge  Flowsheets (Taken 6/24/2022 1245)  Absence of Physical Injury: Implement safety measures based on patient assessment  6/24/2022 0207 by Delonte Rizo RN  Outcome: Progressing  Flowsheets (Taken 6/24/2022 0205)  Absence of Physical Injury: Implement safety measures based on patient assessment     Problem: Chronic Conditions and Co-morbidities  Goal: Patient's chronic conditions and co-morbidity symptoms are monitored and maintained or improved  Outcome: Adequate for Discharge  Flowsheets (Taken 6/24/2022 1245)  Care Plan - Patient's Chronic Conditions and Co-Morbidity Symptoms are Monitored and Maintained or Improved: Monitor and assess patient's chronic conditions and comorbid symptoms for stability, deterioration, or improvement

## 2022-06-24 NOTE — PROGRESS NOTES
Cardiology Progress Note      Patient:  Angelica Lockhart  YOB: 1940  MRN: [id number]   Acct: [de-identified]  Admit Date:  6/23/2022  Primary Cardiologist: Dr. Miguelito Sarkar  Seen by dr. Laly Wade     Chief Complaint:   Pt admitted from [facility] post cath in need of PCI / stenting     Subjective (Events in last 24 hours):     Pt in bed   No CP   She states her problem mas SOB - SOB feels much improved per pt after PCI / stent     VSS  Tele paced   Has LAAO device       RT radial cath site - dressing is dry and intact - small ecchymosis noted - no hematoma noted - Pulses present - neurovascular check WNL       Objective:   /64   Pulse 80   Temp 97.7 °F (36.5 °C) (Oral)   Resp 16   Ht 5' 5\" (1.651 m)   Wt 175 lb 12.8 oz (79.7 kg)   SpO2 98%   BMI 29.25 kg/m²        TELEMETRY: Paced     Physical Exam:  General Appearance: alert and oriented to person, place and time, in no acute distress  Cardiovascular: normal rate, regular rhythm, normal S1 and S2, no murmurs, rubs, clicks, or gallops, distal pulses intact,   Pulmonary/Chest: clear to auscultation bilaterally- no wheezes, rales or rhonchi, normal air movement, no respiratory distress  Abdomen: soft, non-tender, non-distended, normal bowel sounds, no masses Extremities: no cyanosis, clubbing or edema, pulses present    Skin: warm and dry, no rash or erythema   Musculoskeletal: normal range of motion, no joint swelling, deformity or tenderness  Neurological: alert, oriented, normal speech, no focal findings or movement disorder noted    Medications:    aspirin EC  81 mg Oral Daily    atorvastatin  40 mg Oral Nightly    carvedilol  6.25 mg Oral BID    levothyroxine  75 mcg Oral Daily    ranolazine  500 mg Oral BID    sodium chloride flush  5-40 mL IntraVENous 2 times per day    clopidogrel  75 mg Oral Daily      sodium chloride      sodium chloride 75 mL/hr at 06/23/22 1715     sodium chloride flush, 5-40 mL, PRN  sodium chloride, , PRN  acetaminophen, 650 mg, Q4H PRN        Diagnostics:    Echo:   pending     Electronically signed by Fernando Borja(Interpreting physician) on   05/24/2021 12:38 PM  ----------------------------------------------------------------------------  FINDINGS  Left Atrium  The left atrium is severely dilated (>40) with a left atrial volume index of  41 ml/m2. Left Ventricle  Global left ventricular systolic function appears mildly reduced with an  estimated ejection fraction of 50%. The left ventricular cavity size is within normal limits and the left  ventricular wall thickness is within normal limits. .  Right Atrium  Right atrium is normal in size. What appears to be a pacing lead seen in the right atrium and right  ventricle. Right Ventricle  Normal right ventricular size and function. Mitral Valve  Normal mitral valve structure with moderate mitral regurgitation. Aortic Valve  Normal aortic valve structure and function without stenosis or  regurgitation. Tricuspid Valve  Normal tricuspid valve structure with trivial tricuspid regurgitation. Pulmonic Valve  The pulmonic valve is normal in structure. Pericardial Effusion  No significant pericardial effusion is seen.     Miscellaneous  Diastology cannot be assessed due to the patients rhythm. Normal aortic root dimension        Left Heart Cath:   LMCA: Mild irregularities 10-20%.     LAD:       Lesion on Prox LAD: Ostial.40% stenosis. LCx:       Lesion on 1st Ob Leslie: Proximal subsection. 85% stenosis. RCA:       Lesion on Prox RCA: Mid subsection. 50% stenosis. Coronary Tree             1.  Successful PCI and stenting of the first obtuse marginal branch. 2.  Fractional flow reserve measurement of the left anterior descending  Artery. RECOMMENDATIONS AND PLAN OF CARE:  Access site care. Bedrest for 4  hours post sheath removal.  Monitor on telemetry. Aggressive risk  factor modification. Aspirin 81 mg p.o. daily, Plavix 75 mg p.o. daily.   High intensity statin therapy. Start IV fluids. Repeat labs in the  morning. Outpatient followup with primary cardiologist in 2 weeks post  discharge.           Kyle Blanchard MD  D: 06/23/2022     Lab Data:    Cardiac Enzymes:  No results for input(s): CKTOTAL, CKMB, CKMBINDEX, TROPONINI in the last 72 hours. CBC:   Lab Results   Component Value Date    WBC 6.5 06/24/2022    RBC 4.07 06/24/2022    RBC 4.98 05/04/2012    HGB 12.2 06/24/2022    HCT 38.9 06/24/2022     06/24/2022     05/04/2012       CMP:    Lab Results   Component Value Date     06/24/2022    K 4.0 06/24/2022    K 3.8 03/30/2018     06/24/2022    CO2 23 06/24/2022    BUN 13 06/24/2022    CREATININE 0.7 06/24/2022    GFRAA >60 06/16/2022    LABGLOM 80 06/24/2022    GLUCOSE 99 06/24/2022    GLUCOSE 117 05/04/2012    CALCIUM 8.7 06/24/2022       Hepatic Function Panel:    Lab Results   Component Value Date    ALKPHOS 104 04/20/2019    ALT 12 02/25/2022    AST 24 02/25/2022    PROT 6.9 04/20/2019    BILITOT 0.48 04/20/2019    BILIDIR <0.2 02/13/2018    IBILI 1.23 06/30/2017    LABALBU 4.1 04/20/2019       Magnesium:    Lab Results   Component Value Date    MG 2.1 03/01/2018       PT/INR:    Lab Results   Component Value Date    PROTIME 10.7 04/21/2019    PROTIME  08/14/2017      Comment:      Continue dose of 7.5mg daily    INR 1.0 04/21/2019       HgBA1c:    Lab Results   Component Value Date    LABA1C 6.1 02/25/2022       FLP:    Lab Results   Component Value Date    TRIG 204 02/25/2022    HDL 42 02/25/2022    LDLDIRECT 84 09/11/2014       TSH:    Lab Results   Component Value Date    TSH 0.51 03/09/2021         Assessment:    S\p cardiac cath 6/23/22  1. Successful PCI and stenting of the first obtuse marginal branch. 2.  Fractional flow reserve measurement of the left anterior descending  Artery.       HTN  Stable   HLP   LDL 52  2/2022    Hx AFB --> pt paced at present    Has Watchman Device       Hx PPM --> Paced rhythm - no ectopy     Hx CVA  Hx COPD   Hx RAS         Plan:  · Home today   · Echo today - can be DC and doesn't have to be read before DC   · Follow with Dr. Velazquez Situ   · ASA / plavix for now         Cardiac Rehab: Yes    Discharge condition: stable  Disposition: Home  Time spent on discharge: less than 30 minutes       Discharge Medications for PCI/MI (performed or attempted):   · ASA: yes  · Statin: yes  · P2Y12 Inhibitor: yes  · Beta Blocker: yes  · ACE / ARB: no  Normal EF   · Nitro SL: yes      Discharge Medications for ICD, Cardiomyopathy, CHF:  · Beta Blocker: yes  · ACE Inhibitor/ARB: no   Normal EF          Electronically signed by OSCAR Miller CNP on 6/24/2022 at 10:39 AM

## 2022-06-24 NOTE — PROGRESS NOTES
Inpatient Cardiac Rehabilitation Consult    Received consult for Phase II Cardiac Rehabilitation. Patient needs cardiac rehab due to PCI on 6/23/22. Importance of Cardiac Rehab discussed with patient. Reviewed cardiac rehab class times. Patient questions answered. We will contact patient at home to schedule evaluation appointment. Will send referral to [facility] per patient request.  Cardiac Rehab brochure given.

## 2022-06-28 ENCOUNTER — NURSE ONLY (OUTPATIENT)
Dept: CARDIOLOGY | Age: 82
End: 2022-06-28
Payer: MEDICARE

## 2022-06-28 DIAGNOSIS — I50.32 CHRONIC DIASTOLIC CONGESTIVE HEART FAILURE (HCC): Primary | ICD-10-CM

## 2022-06-28 PROCEDURE — 93297 REM INTERROG DEV EVAL ICPMS: CPT | Performed by: INTERNAL MEDICINE

## 2022-07-05 PROBLEM — Z03.89 CORONARY ARTERY DISEASE (CAD) EXCLUDED: Status: RESOLVED | Noted: 2022-06-23 | Resolved: 2022-07-05

## 2022-07-05 PROBLEM — N18.30 CHRONIC RENAL DISEASE, STAGE III (HCC): Status: ACTIVE | Noted: 2022-07-05

## 2022-07-06 ENCOUNTER — OFFICE VISIT (OUTPATIENT)
Dept: CARDIOLOGY | Age: 82
End: 2022-07-06
Payer: MEDICARE

## 2022-07-06 ENCOUNTER — HOSPITAL ENCOUNTER (OUTPATIENT)
Age: 82
Discharge: HOME | End: 2022-07-06
Payer: MEDICARE

## 2022-07-06 VITALS
SYSTOLIC BLOOD PRESSURE: 119 MMHG | HEIGHT: 65 IN | OXYGEN SATURATION: 96 % | DIASTOLIC BLOOD PRESSURE: 79 MMHG | WEIGHT: 177.8 LBS | HEART RATE: 80 BPM | RESPIRATION RATE: 18 BRPM | BODY MASS INDEX: 29.62 KG/M2

## 2022-07-06 DIAGNOSIS — Z95.810 ICD (IMPLANTABLE CARDIOVERTER-DEFIBRILLATOR) IN PLACE: ICD-10-CM

## 2022-07-06 DIAGNOSIS — I48.20 CHRONIC A-FIB (HCC): ICD-10-CM

## 2022-07-06 DIAGNOSIS — I25.10 ASHD (ARTERIOSCLEROTIC HEART DISEASE): ICD-10-CM

## 2022-07-06 DIAGNOSIS — I10 ESSENTIAL HYPERTENSION: ICD-10-CM

## 2022-07-06 DIAGNOSIS — I49.5 SICK SINUS SYNDROME (HCC): ICD-10-CM

## 2022-07-06 DIAGNOSIS — E87.5 HYPERKALEMIA: ICD-10-CM

## 2022-07-06 DIAGNOSIS — I50.32 CHRONIC DIASTOLIC CONGESTIVE HEART FAILURE (HCC): ICD-10-CM

## 2022-07-06 DIAGNOSIS — I50.32 CHRONIC DIASTOLIC CONGESTIVE HEART FAILURE (HCC): Primary | ICD-10-CM

## 2022-07-06 DIAGNOSIS — E78.2 MIXED HYPERLIPIDEMIA: ICD-10-CM

## 2022-07-06 DIAGNOSIS — Z95.818 PRESENCE OF WATCHMAN LEFT ATRIAL APPENDAGE CLOSURE DEVICE: ICD-10-CM

## 2022-07-06 DIAGNOSIS — Z98.61 HISTORY OF PERCUTANEOUS CORONARY INTERVENTION: ICD-10-CM

## 2022-07-06 LAB
ANION GAP SERPL CALCULATED.3IONS-SCNC: 14 MMOL/L (ref 9–17)
BUN BLDV-MCNC: 15 MG/DL (ref 8–23)
BUN/CREAT BLD: 19 (ref 9–20)
CALCIUM SERPL-MCNC: 9.9 MG/DL (ref 8.6–10.4)
CHLORIDE BLD-SCNC: 102 MMOL/L (ref 98–107)
CO2: 26 MMOL/L (ref 20–31)
CREAT SERPL-MCNC: 0.77 MG/DL (ref 0.5–0.9)
GFR AFRICAN AMERICAN: >60 ML/MIN
GFR NON-AFRICAN AMERICAN: >60 ML/MIN
GFR SERPL CREATININE-BSD FRML MDRD: ABNORMAL ML/MIN/{1.73_M2}
GFR SERPL CREATININE-BSD FRML MDRD: ABNORMAL ML/MIN/{1.73_M2}
GLUCOSE BLD-MCNC: 106 MG/DL (ref 70–99)
POTASSIUM SERPL-SCNC: 4.7 MMOL/L (ref 3.7–5.3)
SODIUM BLD-SCNC: 142 MMOL/L (ref 135–144)

## 2022-07-06 PROCEDURE — 80048 BASIC METABOLIC PNL TOTAL CA: CPT

## 2022-07-06 PROCEDURE — 1123F ACP DISCUSS/DSCN MKR DOCD: CPT | Performed by: PHYSICIAN ASSISTANT

## 2022-07-06 PROCEDURE — 99214 OFFICE O/P EST MOD 30 MIN: CPT | Performed by: PHYSICIAN ASSISTANT

## 2022-07-06 PROCEDURE — 36415 COLL VENOUS BLD VENIPUNCTURE: CPT

## 2022-07-06 NOTE — PATIENT INSTRUCTIONS
SURVEY:    You may be receiving a survey from [employer] regarding your visit today. Please complete the survey to enable us to provide the highest quality of care to you and your family. If you cannot score us a very good on any question, please call the office to discuss how we could have made your experience a very good one. Thank you.

## 2022-07-06 NOTE — PROGRESS NOTES
Jessi Omalley am scribing for and in the presence of Misha Padgett. Subjective:     CHIEF COMPLAINT / HPI:   Chief Complaint   Patient presents with    Follow-up     Hx: CAD s/p stent, Chr afib, watchman, HTN, HLD, ICD. stent by Dr Sara Carrion. she is not gasping for breath. She has had pinches in her back. Denies: dizziness, lightheaded, palps       Dear Dr. Jayesh Moya MD     I had the pleasure of seeing Ms. Claude Pouch for follow-up. Taz Garsia is 79 y.o.  with PMH of hypertension, hyperlipidemia, asthma and chronic atrial fibrillation. Her most recent cardiac catheterization showed moderate to severe three-vessel coronary artery disease as outlined below. 1-Patient's chronic atrial fibrillation is  Treated with rate control and anticoagulation with coumadin. Her Holter monitor on 4/9/2015 suggested tachycardia-bradycardia syndrome. 2-On 3/16/2016, patient was sent from cardiac rehab because of not feeling well. Imdur 30 mg daily was added as well as Ranexa 500 mg BID. 3-Patient admitted to [facility] from 6/3/2017 to 6/15/2017 with worsening shortness of breath diagnosed with pneumonitis, status post lung biopsy. Currently on prednisone. 4-Another admission to [facility] from 6/29/2017 to 7/6/2017 with A. Fib with RVR and acute on chronic diastolic CHF. Patient started on amiodarone during this admission in addition to her ratel control and diuresis. 5-Another admission to [facility] from 7/11/2017 to 7/14/2017 with recurrent fall and black eye. 3 falling episodes, 2 of them she hit her head. She is not sure if she lost consciousness. MRI of the brain was negative. 6-Patient underwent AV node ablation and insertion of biventricular ICD by Dr. Vipul Barber on 8/10/2017. 7-On 9/18/2017 patient underwent insertion of a Watchman device.  currently off anticoagulation     8-An admission to [facility] with orthostatic hypotension, discharged on 3/9/2018. Her metoprolol decreased from 50 to 25 mg twice a day and midodrine added. 9-ICD checked 3/7/18: Biventricular pacing 99% of the time. No significant ventricular arrhythmia. 10- EKG done on 4/20/2019. 11- Echo on 05/13/2019: Poor image quality. EF 45-50%. LA is mildly dilated w/ LA colume index of 30. Mild mitral rgeurg. Moderate diastolic dysfunction. 12- ICD interrogation today 5/20/2020-battery 4.1 years. Ventricular pacing 99%, pacing mode VVIR. Chronic atrial fibrillation    13- EKG done in office on 5/20/2020-biventricular pacing. No change from prior. 14- Echocardiogram done on 5/24/2021: EF of 50% Left atrium is severely dilated. 15-ICD Interrogation Findings on 5/25/2022: Within normal limits and therefore no changes were made. Corvue stable-no signs of fluid. 16- Stress test done 6/7/2022: Abnormal myocardial perfusion study. There is a small/moderate perfusion defect of mild intensity in the anterolateral and lateral region(s) during stress imaging which is most consistent with ischemia, but may be due to artifact. In addition, transient ischemic dilatation (TID) of the left ventricle is seen which can be seen with uncontrolled hypertension, severe left main coronary artery disease, or severe 3-vessel coronary artery disease. (TID: 1.23) Global left ventricular systolic function was normal without regional wall motion abnormalities. No significant electrocardiographic evidence of myocardial ischemia during EKG monitoring without significant associated arrhythmias. Overall, these results are most consistent with an intermediate/high risk for significant coronary artery disease. 16- Cath on 6/23/2022- Severe single vessel disease involving the OM1 branch of the circumflex coronary artery. Normal left ventricular end diastolic pressure. 18- 6/23/2022- Successful PCI and stenting of the first obtuse marginal branch.  Fractional flow reserve measurement of the 1/2005    DIAGNOSTIC CARDIAC CATH LAB PROCEDURE  06/17/15    EYE SURGERY      FRACTURE SURGERY  2004    Distal Radius Ulna    HIP FRACTURE SURGERY Right 04/21/2019    Dr. Alphonso Drummond- hip pinning    HIP PINNING Right 4/21/2019    HIP PINNING performed by Liliana Alfaro MD at [facility] Left 6/14/2017    MINI PORT ACCESS LEFT CHEST, X2 SPECIMENS. performed by Sue Lamas MD at [place] [address]  2years old    VOLVAR-ULCERATIVE LESION-CAUTERIZED    [facility] EGD TRANSORAL BIOPSY SINGLE/MULTIPLE Left 2/13/2018    EGD BIOPSY [facility] Elsa Beverly [facility]    SKIN BIOPSY      TONSILLECTOMY AND ADENOIDECTOMY       Social History:    Social History     Tobacco Use   Smoking Status Never Smoker   Smokeless Tobacco Never Used     Family History   Problem Relation Age of Onset    Heart Disease Mother     Stroke Mother     High Blood Pressure Mother     Cancer Father         lung    Stroke Brother     Heart Disease Maternal Grandmother        Current Medications:  Outpatient Medications Marked as Taking for the 7/6/22 encounter (Office Visit) with Arsalan Crain PA-C   Medication Sig Dispense Refill    carvedilol (COREG) 6.25 MG tablet Take 1 tablet by mouth twice daily 180 tablet 0    cromolyn (OPTICROM) 4 % ophthalmic solution Place 1 drop into both eyes 4 times daily 1 each 1    clopidogrel (PLAVIX) 75 MG tablet Take 1 tablet by mouth daily 30 tablet 3    nitroGLYCERIN (NITROSTAT) 0.4 MG SL tablet Place 1 tablet under the tongue every 5 minutes as needed for Chest pain up to max of 3 total doses.  If no relief after 1 dose, call 911. 25 tablet 1    atorvastatin (LIPITOR) 40 MG tablet Take 1 tablet by mouth nightly 30 tablet 0    EUTHYROX 75 MCG tablet Take 1 tablet by mouth once daily 90 tablet 0    Multiple Vitamins-Minerals (THERAPEUTIC MULTIVITAMIN-MINERALS) tablet Take 1 tablet by mouth daily      vitamin B-12 (CYANOCOBALAMIN) 500 MCG tablet Take 500 Neurological: Alertness and orientation as per Constitutional exam. No evidence of gross cranial nerve deficit. Coordination appeared normal.   Skin: Skin is warm and dry. There is no rash or diaphoresis. Psychiatric: She has a normal mood and affect. Her speech is normal and behavior is normal.      DATA:    Lab Results   Component Value Date    ALT 12 02/25/2022    AST 24 02/25/2022    ALKPHOS 104 04/20/2019    BILITOT 0.48 04/20/2019     Lab Results   Component Value Date    CREATININE 0.80 07/05/2022    BUN 18 (H) 07/05/2022     07/05/2022    K 5.3 Specimen Not Visibly Hemolyzed (H) 07/05/2022     07/05/2022    CO2 28 07/05/2022     Lab Results   Component Value Date    TSH 1.470 07/05/2022    [id number] 8.5 11/24/2021     Lab Results   Component Value Date    WBC 6.5 06/24/2022    HGB 12.2 06/24/2022    HCT 38.9 06/24/2022    MCV 95.6 06/24/2022     06/24/2022       Lab Results   Component Value Date    TRIG 204 (H) 02/25/2022    TRIG 185 (H) 11/05/2020    TRIG 113 02/25/2020     Lab Results   Component Value Date    HDL 42 02/25/2022    HDL 47 11/05/2020    HDL 48 02/25/2020     Lab Results   Component Value Date    LDLCHOLESTEROL 52 02/25/2022    LDLCHOLESTEROL 60 11/05/2020    LDLCHOLESTEROL 43 02/25/2020         Assessment:      Diagnosis Orders   1. Chronic diastolic congestive heart failure [facility])  [facility]    Basic Metabolic Panel   2. ASHD (arteriosclerotic heart disease)  [facility]    Basic Metabolic Panel   3. History of percutaneous coronary intervention     4. Chronic a-fib [facility])  [facility]    Basic Metabolic Panel   5. Presence of Watchman left atrial appendage closure device  [facility]    Basic Metabolic Panel   6. Essential hypertension  [facility]    Basic Metabolic Panel   7. Mixed hyperlipidemia  [facility]    Basic Metabolic Panel   8.  ICD (implantable cardioverter-defibrillator) in place  [facility] Metabolic Panel   9. Sick sinus syndrome [facility])  [facility] [place]    Basic Metabolic Panel   10. Hyperkalemia  [facility]    Basic Metabolic Panel   bmp    PLAN:  Chronic Diastolic Heart Failure:   · No clinical evidence of volume overload. · No significant lower extremity edema. · Beta Blocker: Continue Carvedilol (Coreg) 6.25 mg twice daily. Nonpharmacologic management of Heart Failure: I told her to continue wearing lower extremity compression stockings and I advised her to try and keep his legs up whenever possible and to limit salt in her diet. Additional Testing List: None     · Atherosclerotic Heart Disease: 6/23/2022 Successful PCI and stenting of the first obtuse marginal branch. Fractional flow reserve measurement of the left anterior descending artery  Antiplatelet Agent: Continue aspirin 81 mg daily and Plavix 75 mg daily. · Beta Blocker: Continue Carvedilol (Coreg) 6.25 mg twice daily. · Anti-anginal medications: Continue nitroglycerin 0.4 mg tablets as needed for chest pain. · Statin Therapy: Continue atorvastatin (Lipitor) 40 mg nightly. · We discussed the potential benefits of cardiac rehab to improve both her cardiac condition as well as improve her exercise tolerance and overall quality of life. She was very agreeable with this and therefore I made the referral for Phase II cardiac rehab. · Chronic atrial fibrillation status post AV node ablation and watchman device placement:   · Beta Blocker: Continue Carvedilol (Coreg) 6.25 mg twice daily. · Stroke Risk: Her CHADS2 score is 5/9 (6.7% stroke risk)  · Anticoagulation: Watchman in place. I did explain to her that watchman device does not prevent every single stroke. Patient said she had a CT scan done of the brain for her tremor and found to have a tiny stroke. · She no longer see's neurology for her tremor.     Essential Hypertension: Controlled  · Beta Blocker: Continue Carvedilol (Coreg) 6.25 mg twice daily. · ACE Inibitor/ARB: Not indicated at this time. · Calcium Channel Blocker: Not indicated at this time. · Diuretics: Not indicated at this time. Additional Testing List: None    · Hyperlipidemia: Mixed - Last LDL at goal.  · Cholesterol Reduction Therapy: Continue Atorvastatin (Lipitor) 40 mg daily. Implantable Cardioverter Defibrillator (ICD): Indication for Device Placement: Biventricular ICD secondary to severe left ventricular systolic dysfunction and chronic atrial fibrillation   · Interrogation Findings: We will plan to recheck their device at their next scheduled appointment date. · Hyperkalemia: 5.3 on 7/5/2022  · I do want a repeat BMP today to re-assess potassium level due it being elevated on 7/5/2022    Finally, I recommended that she continue her other medications and follow up with you as previously scheduled. FOLLOW UP:  I told Ms. Rashaun Garcia to call my office if she had any problems, but otherwise told her to Return in about 6 weeks (around 8/17/2022). However, I would be happy to see her sooner should the need arise. Sincerely,  Mell PHAN  [facility] Cardiology Specialist    [address] [facility], [address] [place] [address]  Phone: [phone], Fax: [phone]     I believe that the risk of significant morbidity and mortality related to the patient's current medical conditions are: intermediate-high. Approximately 45 minutes were spent during prep work, discussion and exam of the patient, and follow up documentation and all of their questions were answered. The documentation recorded by the scribe, accurately and completely reflects the services I personally performed and the decisions made by me.  Mell Alexander PA-C July 6, 2022

## 2022-07-11 ENCOUNTER — TELEPHONE (OUTPATIENT)
Dept: CARDIOLOGY | Age: 82
End: 2022-07-11

## 2022-07-12 ENCOUNTER — HOSPITAL ENCOUNTER (OUTPATIENT)
Dept: CARDIAC REHAB | Age: 82
Discharge: HOME | End: 2022-07-12

## 2022-07-12 NOTE — PROGRESS NOTES
Phase II Cardiac Rehab Individualized Treatment Plan-Initial     Patient Name: Genoveva Balbuena  Date of Initial Assessment: 7/12/2022  ACCOUNT #: [de-identified]  Diagnosis: PCI w/ FILOMENA  Onset Date: 06/23/2022  Referring Physician: / Lupillo Paz  Session Number: 1   Risk Stratification (of untoward event during exercise):  [x] HIGH RISK  LVEF < 40%  Survivor of cardiac arrest or sudden cardiac death  Complex ventricular arrythmias (VT >6 beats, multifocal PVCs at rest or with exercise)  Presence of angina or other significant symptoms (shortness of breath, light-headedness, or dizziness at <5 METs or during recovery  MI or cardiac surgery complicated by cardiogenic shock, CHF, and/or s/s of post-procedure ischemia  Abnormal hemodynamics with exercise, especially flat or decreasing systolic BP or chronotropic incompetence with ^ workload  Significant silent ischemia (ST depression >/= 2mm without symptoms with exercise or in recovery  Signs/symptoms including angina, dizziness, light-headedness or dyspnea with low exercise levels or in recovery  Clinically significant depression   Physically Inactive - <= 5 METs  Implantable cardioverter defibrillator (ICD)  At least 4 of below:   Lipids LDL >130 / Cholesterol > 200  BP >160/100  BMI >30  Smoker who continues to smoke  DM - HgbA1C >=7  [] MODERATE RISK  LVEF 40-49%  Mild to moderate level of silent ischemia during exercise testing or recovery (ST depression <2mm from baseline)  Presence of stable angina or other significant symptoms (unusual shortness of breath, light-headedness, or dizziness occurring only at high level of exertion [>=7 METs])  Functional capacity 5.1-8.9 METs  At least 2-3 of below:  Lipids -129 / Cholesterol 871-796  BP - systolic [id number] 49-69  BMI >71  DM - HgbA1C 5.8-7.0  [] LOW RISK  Rest LVEF >= 50%  No resting or exercise induced complex dysrhythmias  Uncomplicate MI, CABG, angioplasty, atherectomy, or stent  Normal hemodynamic and EGC response with exercise and in recovery (appropriate increases and decreases in HR and SBP with increasing workloads and recovery  Functional capacity >9 METs  Absence of angina or other significant symptoms (unusual shortness of breath, light-headedness, or dizziness during exercise and recovery)  Absence of complicated ventricular arrhythmias at rest  Absence of HF  Absence of signs/symptoms of post-event or post-procedure ischemia  Absence of clinical depression   Non-smoker   0-1 Uncontrolled risk factors    EXERCISE    Stages of Change:   [] pre-contemplation  [] Action   [] Contemplate    [] Maintainence   [x] Prep   [] Relapse          Exercise Prescription:  Mode: [x] TM    [x] B    [x] STP   [] R   [] UBE    Frequency: 3 days per week  Duration: 31-60 minutes  Intensity: 2-3 METs               THRR: 115-124   Progression:   Based on risk stratification, may increase duration per F.I.T.T. protocol parameters on average 5-10 minutes every 1-2 weeks for the first 4-6 weeks. After 3-4 weeks completed, continue to gradually increase F.I.T.T. parameters gradually at the established duration on average 0.5-1.0 METs per 30 days over the course of the remaining program, as established by patient centered goals and guidelines, maintaining RPE 12-16. [x] Resistance Training  Introduce 8-15 bilateral upper extremity resistance exercise at 1-3 sets per lift, on 2-3 non-consecutive days using TheraBand/Free Weights/Weight machine to 8-15 reps on at lease 2 occasions, maintaining RPE 12-16. Once repetition maximum has been achieved, additional weight sets may be added. [] Angina with Exertion    Hypertension:  [x] Yes  [] No  Resting BP: 118/64  BP Meds: Coreg    Intervention:  Home Exercise:  Current Exercise -    [] Yes - type/frequency/intensity/duration   [x] No   [] Resistance Training  Progression:  20-60 minutes of aerobic exercise on at lease 2 non-rehab days.   8-12 bilateral upper extremity resistance exercise at 1-3 sets per lift, on 2 non-consecutive days using TheraBand/Free Weights/Weight Machine to 8-15 reps on at lease 2 occasions. Once repetition maximum has been achieved, additional weight sets may be added. Education:   [x] Equipment [place]  [] Understanding BP   [x] S/S to report  [] Sodium     [x] Warm up/ Cool down  [] Exercise Prescription   [x] RPE Scale   [] Hot/Cold weather guidelines   [x] Ex Safety   [] Home Exercise     [] Proper use weights/bands    Target Goal:   -Individual Exercise Plan  -BP<120/80 or <130/80 if DM   -Aerobic active 30 + minutes 5-7 days per week    Nutrition    Stages of Change:   [] pre-contemplation  [] Action   [] Contemplate    [] Maintainence   [x] Prep    [] Relapse    Hyperlipidemia:  [x] Yes  [] No  Lipids:     Total Cholesterol: 135  Triglycerides: 204  HDL: 42  LDL: 52  Lipid Meds: Lipitor    Diabetes:  [] Yes  [x] No  FBS: 106  HbA1c: 5.7  [] Monitor BS at home   Frequency?:   Diabetes Medication:    Weight Management:  Height: 5'5  Weight: 177.1  BMI: 29.59    Wt Goal: BMI <25  Alcohol:   Social History     Substance and Sexual Activity   Alcohol Use No    Alcohol/week: 0.0 standard drinks     Diet Assessment Tool:   [x]  Food Diary  Rate My Plate Score: 62  Special Diet:  2 gram NA+, 30% total fat, <10% saturated fat, 25-35 grams fiber, reduced cholesterol, balanced nutrition    Intervention:   [] Dietitian Consult       [] Nurse/Patient Discussion     [] Referred to Diabetes Education     Education:  [] Heart Healthy Nutrition  [] Weight Management  [] Portions  [] Fat/Cholesterol  [] Food Labels       [] Food Packaging Claims  [] Snacks and Substitutes      [] Relate Diabetes/CAD  [] S/S hypo/hyperglycemia    Target Goal:  -LDL-C<100 if triglycerides are > 200  -LDL-C < 70 for high risk patients  -HbA1c < 7%  -BMI < 25   -Waist Circumference < 35 inches women/40 inches men  Education    Stages of Change:    [] pre-contemplation  [] Action   [] Contemplate    [] Maintainence   [x] Prep    [] Relapse    Learning Barriers:   [] Speech   [] Cognitive   [] Literacy   [] Vision   [] Hearing    [] Readiness to Learn    Knowledge test score: 73%      Family support: [x] Yes  [] No    Tobacco use:   Social History     Tobacco Use   Smoking Status Never Smoker   Smokeless Tobacco Never Used     Current smokers:  Longest quit attempt:  Tobacco triggers:  Barriers to successful cessation:  [] Smoking cessation medication:    Intervention:  [] Referred to physician for smoking cessation    [] Individual education and counseling  [] Tobacco adjunct      Education:   [] Cardiac A and P  [] Interventions  [] Risk Factors     [] Angina      [] Medications  [] CHF     Target Goal:  -Complete cessation of tobacco use (if applicable)  -Continued risk factor modifications  -Recognizing signs/symptoms to report  -Proper use of meds    Psychosocial  Stages of Change:    [] pre-contemplation  [] Action   [] Contemplate    [] Maintainence   [x] Prep    [] Relapse    Psychosocial Test:  Tool Used:   Bridget Burden Quality of Life Score:  22/22/22/23/22  PHQ9 score: 2    Intervention:   [] Psych Consult/   [] Uses stress management skills    [] Physician Referral     Medications:     Education:    [] Stress Management   [] Depression    Target Goal:  -Assess presence or absence of depression using a valid screening tool. -Maximize coping skills.  -Positive support system.     Preventative Medication:   [x] Aspirin       [x] Beta Blockade      [x] Statin or other lipid lowering agent     [x] Antiplatelet   [] ACE Inhibitor/ARB   [] Anticoagulant   Medication Compliance (stated):   [x] 100%  [] 75%           [] 50%  [] 25%      [] 0%         Target Goal:  -100% medication adherence    Fall Risk Assessment:  History of falls with or without injury  [x] Yes   [] No   Use of ambulatory aid  [x] Yes  [] No   Difficulty walking/impaired gait  [x] Yes  [] No   Numbness in feet  [x] Yes   [] No   Vision changes  [] Yes  [x] No   Dizziness  [] Yes  [x] No   Shortness of breath  [x] Yes  [] No   Medications  [x] Anticoagulant/Antiplatelet  [x] Betablocker /ACE/ARB  [] Antidepressant  [] Seizure medication   [] NA  Risk of fall  [] Low (none of above)  [] Medium (less than 3 above)  [x] High (3 or more above)    Patient 90-Day Goals: (Specific, Measurable, Achievable, Relevant, and Time-Bound)  Increase strength and functional capacity. Program Goals: Increase stamina, strength, and flexibility by exercising 31-60 total minutes by engaging in aerobic, resistance, and flexibility workout modalities with the goal of progressively achieving at least 0.5 to 1.0 metabolic equivalant improvement in the next 30 days as evidenced by daily session reports. Achieve and progress prescribed exercise frequency, intensity, time, and type based upon initial evaluation and submaximal graded exercise results as evidenced by the attaining and maintaining the prescribed target heart rate range, a Chandrakant rating of perceived exertion between 11 and 16, duration of >30 - 60 minutes using multiple exercise modes for at least 3 - 5 days per week to accumulate a minimum total of 2.5 hours per week of moderate aerobic intensity exercise, as tolerated, evidenced by daily session reports and home workout log. Establish and maintain individualized heart healthy eating plan, and gradually lose 10-15 lb of body weight over the program, utilizing dietician recommendations, moderating nutrional intake, and performing regular aerobic and strength training exercises as prescribed, as evidenced by pre- and post-program nutriton survey and routine rounding with patient to ascertain progression toward goal per patient's self report, food diary, and Rate-your-Plate screening survey.     Strive for a lower daily fasting blood glucose measures to <131 and random after meal measures to <181, if diabetic, at home via lifestyle educaton, behavior modification, and medication compliance as tracked per patient's self-report. Introduce and progress 8-10 different bilateral UE and LE progressive resistance exercises focused on major muscle groups using 1-3 sets each per lift, on 2-3 non-consecutive days implementing free and machine weights and/or TheraBands, as appropriate, with a resistance of 40-60% 1-repetition maximum or 10 -15 repetitions to progressive overload and increasing resistance once repetitions have progressed to 15 reps and feel fairly light on 2 prior occasions. Achieve and maintain an optimal average resting blood pressure of <120 / 80 (130/80 DM) mmHg, or as indicated by this patient's referring provider. Strive for blood lipid optimization with an LDL-C of <100 mg/dL or  LDL 70 mg/dL, an HDL-C of > or = 40 mg/dL for men and > or = 50 mg/dL for women, and a triglyceride level of <150 mg/dL via lifestyle education, behavioral modification, and self-reported medication compliance. Develop regular home aerobic exercise program for 20 - 60 minutes at least 2 non-rehab days per week, excluding  5 - 10 minutes warm-up and cool-down periods, being tracked on home workout log. Minimize self-reported psycho-social feelings of stress in the next 30 days as evidenced by pre- and post- surveys and by routine rounding with patient to ascertain subjective improvements. Demonstrate knowledge about risk factor reduction, lifestyle modification, and heart health strategies with with an increase of >=5% accuracy via pre- and post-program education assessment screening tool. Complete abstinence from tobacco products over the next 30 days as evidenced by routine rounding with patient.     Electronically signed by CASSIDY Mcclelladn on 7/12/22 at 3:09 PM EDT    Physician Changes/Comments:

## 2022-07-12 NOTE — PROGRESS NOTES
Cardiac Rehab Initial History and Assessment    [place]   1940  [id number]  7/12/2022    Primary Diagnosis: PCI w/FILOMENA  Date: 06/23/2022    Living Will: [x] Yes   [] No  On File: [] Yes   [] No   [x] N/A  Durable Power of : [x] Yes   [] No    Medical History  Past Medical History:   Diagnosis Date    Allergic rhinitis 10/1994    Arthritis     Asthma     Atrial fibrillation ([facility] 12/2008    CAD (coronary artery disease)     Cerebrovascular accident (CVA) involving left cerebral hemisphere ([facility] 04/23/2019    remote lacunar infarction within the L periventricular white matter    [facility]ive [facility] failure[facility])     Clotting disorder (HCC)     plebitis in L leg    COPD (chronic obstructive pulmonary disease) ([facility]     DDD (degenerative disc disease), cervical     Degeneration of cervical intervertebral disc     Diverticulosis 2005    Gout     Gout, unspecified     H/O cardiac catheterization 6/17/15    LMCA: Normal 0% stenosis. LAD: Lesion on 1st diag: Proximal subsection. 80% stenosis. Lesion plaque is ruptured. Juan Mosley LCx: Lesion on 1st Ob Leslie: Mid subsection. 85% stenosis. RCA:Lesion on R PDA: Mid subsection. 85% stenosis. Lesion on R PDA: Distal subsection. 70% stenosis. Lesion on Prox RCA: Mid subsection. 50% stenosis. EF 50%.  H/O echocardiogram 11/09/2016    EF 40-45%. Mild LV hypertrophy normal LV cavity size. Sigmoid interventricular septum without evidence of outflow tract obstruction. Left atrium is moderately dilated (34-39) left atrial volume index of 36 ml/m2. Mild mitral regurg. Diastology cannot be assessed due to A-fib.  H/O echocardiogram 03/09/2018    EF 45-50%. Apical hypokinesis noted. The left atrium is moderately dilated (34-39) with a left atrial volume index of 34ml/m2. Mild to moderate mitral regurg. Mild tricuspid regurg. Moderate diastolic dysfunction.      History of Holter monitoring 3/24/16    Atrial Fibrillation throughout,fairly controlled, HR  bpm 79% of the time. Occasional high ventricular rate episodes and multiple pauses suggestive of tachycardia/bradycardia syndrome  Msximum R-R interval 2.68 seconds. 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    Long term (current) use of anticoagulants 8/31/2015    Movement disorder     Other abnormal glucose 2004    Pacemaker 08/10/2017    Insertion of a biventricular pacemaker/ICD AVN ablation    Phlebitis and thrombophlebitis of lower extremities, unspecified 1961    L leg    Senile osteoporosis 2006    L/S    Symptomatic menopausal or female climacteric states 06/1995    Syncope and collapse     Unspecified hereditary and idiopathic peripheral neuropathy 2012    Unspecified sleep apnea 11/2009       Family History  Family History   Problem Relation Age of Onset    Heart Disease Mother     Stroke Mother     High Blood Pressure Mother     Cancer Father         lung    Stroke Brother     Heart Disease Maternal Grandmother        Symptoms:  1. Angina   [] None   [] Tightness   [x] Shortness of Breath   [] Pressure    [] Nausea   [] Sharp, Stabbing  [] Pallor   [] Indigestion, Heartburn [] Sweaty    Where was discomfort located? Precipitating Factors? Exertion  Relieved by: Rest    2. Arrhythmia   [] None   [] Irregular Beats (skips) [x] Pacer    [x] Atrial Fibrillation  [x] AICD    Arrhythmia Medications:    3. Congestive Heart Failure   [] None   [x] Pedal Edema  [] Unusual weight gain   [x] SOB with mild exertion [x] Fatigue    4. Vascular   [] None   [] Peripheral claudication [] R [] L  PAD History:  Claudication History:                Location:  Pain Scale (1-5):  [x] Carotid Narrowing  [] R [x] L    5. Musculoskeletal    [] None   [x] Back Pain  Where? Middle back   [] Joint discomfort Where?      Nutrition  Appetite:  []  Too Good    []  Good  [x]  Poor  Diet: Regular Diet  Eating out once times/wk  Dietary Screening:  Rate Your Plate:  58  Daily Food Diary completed:       [x] Yes        [] No  Lipids:  Date 2022         TC: 135         HDL: 42         LDL: 52         Tri  Lipid Meds: Lipitor    Alcohol:   Social History     Substance and Sexual Activity   Alcohol Use No    Alcohol/week: 0.0 standard drinks     Caffeine: [x] Yes [] No  Type: One cup coffee  Amount:  Water intake per day: 64oz  Vitamins/Natural herbal products:     Psychological  [] Depression    [] Anxiety    [x] None  Treatment:     Tobacco Use  Social History     Tobacco Use   Smoking Status Never Smoker   Smokeless Tobacco Never Used     Current smokers:  Longest quit attempt:  Tobacco triggers:  Barriers to successful cessation:  [] Smoking cessation medication:    Diabetes     [] Yes  [x] No  How Long:  How do you manage your diabetes:    Do you check your blood sugar?   [] Yes  [] No  How often:  Have you seen a dietician or diabetic educator        [] Yes  [] No      HbA1c: 5.7     FBS: 106                       Diabetic Medication:     Socio-Economic  Marital Status:     Medication Compliance (stated):   [x] 100%  [] 75%           [] 50%  [] 25%      [] 0%         Stress  Source:   Relaxation techniques/hobbies: Sewing    Level of Education  [] 8th Grade  [] Associates  [] Masters  [x] High School [] Bachelor  []  Other:    Depression Screening:  [] PHQ9 completed - score: 2    Psychosocial Screening:  [] Amber Quality of Life Index scores: //    Cardiac Rehab Pre - Test  [] Score: 73    Physical Findings  Height: 5'5  Weight:177.1  BMI: 29.59  BP Sittin/64  BP Standing:  Current Exercise -  [] Yes - type/frequency/intensity/duration  [x] No    Cardiovascular- No edema, apical pulse regular to auscultation, strong bilateral upper and lower extremity pulses   12-Lead EKG  VR- 80  FL-   QRS- 138  QT/QTc- 387/171  Cardiac Cath- LMCA mild irregularities 10-20%, Lesion on 1st OB Leslie Prox Subsection 85% stenosis, Lesion on prox RCA mid subsection 50% stenosis  Ejection Fraction- 50  Pulmonary-   Breath Sounds:   SpO2: 98  [] 02 use:   [] CPAP  [] BiPAP   [] Other    Neurological- No deficit noted or reported. Peripheral Vascular- No deficit noted or reported. Muscular Skeletal- No deficit noted or reported. Pain Assessment- Location/Frequency/ Duration-   Pain Level 8/10 on 10 point scale  Endocrine- No deficit noted or reported. Gastrointestinal- No deficit noted or reported. Genitourinary- No deficit noted or reported.   Other-    Barriers to Program Adherence:  [x] Transportation   [] Travel distance  [x] Insurance/copay    [] Motivation  [] Other  [] NA    Risk Stratification (of untoward event during exercise):  [x] HIGH RISK  LVEF < 40%  Survivor of cardiac arrest or sudden cardiac death  Complex ventricular arrythmias (VT >6 beats, multifocal PVCs at rest or with exercise)  Presence of angina or other significant symptoms (shortness of breath, light-headedness, or dizziness at <5 METs or during recovery  MI or cardiac surgery complicated by cardiogenic shock, CHF, and/or s/s of post-procedure ischemia  Abnormal hemodynamics with exercise, especially flat or decreasing systolic BP or chronotropic incompetence with ^ workload  Significant silent ischemia (ST depression >/= 2mm without symptoms with exercise or in recovery  Signs/symptoms including angina, dizziness, light-headedness or dyspnea with low exercise levels or in recovery  Clinically significant depression   Physically Inactive - <= 5 METs  Implantable cardioverter defibrillator (ICD)  At least 4 of below:   Lipids LDL >130 / Cholesterol > 200  BP >160/100  BMI >30  Smoker who continues to smoke  DM - HgbA1C >=7  [] MODERATE RISK  LVEF 40-49%  Mild to moderate level of silent ischemia during exercise testing or recovery (ST depression <2mm from baseline)  Presence of stable angina or other significant symptoms (unusual shortness of breath, light-headedness, or dizziness occurring only at high level of exertion [>=7 METs])  Functional capacity 5.1-8.9 METs  At least 2-3 of below:  Lipids -129 / Cholesterol 758-722  BP - systolic 061-418/[id number] 19-95  BMI >78  DM - HgbA1C 5.8-7.0  [] LOW RISK  Rest LVEF >= 50%  No resting or exercise induced complex dysrhythmias  Uncomplicate MI, CABG, angioplasty, atherectomy, or stent  Normal hemodynamic and EGC response with exercise and in recovery (appropriate increases and decreases in HR and SBP with increasing workloads and recovery  Functional capacity >9 METs  Absence of angina or other significant symptoms (unusual shortness of breath, light-headedness, or dizziness during exercise and recovery)  Absence of complicated ventricular arrhythmias at rest  Absence of HF  Absence of signs/symptoms of post-event or post-procedure ischemia  Absence of clinical depression   Non-smoker   0-1 Uncontrolled risk factors    Fall Risk Assessment:  History of falls with or without injury  [x] Yes   [] No   Use of ambulatory aid  [x] Yes  [] No   Difficulty walking/impaired gait  [x] Yes  [] No   Numbness in feet  [x] Yes   [] No   Vision changes  [] Yes  [x] No   Dizziness  [] Yes  [x] No   Shortness of breath  [x] Yes  [] No   Medications  [x] Anticoagulant/Antiplatelet  [x] Betablocker /ACE/ARB  [] Antidepressant  [] Seizure medication   [] NA  Risk of fall  [] Low (none of above)  [] Medium (less than 3 above)  [x] High (3 or more above)       Patient 90-Day Goals: (Specific, Measurable, Achievable, Relevant, and Time-Bound)  Increase strength and functional capacity    Program Goals:    Increase stamina, strength, and flexibility by exercising 31-60 total minutes by engaging in aerobic, resistance, and flexibility workout modalities with the goal of progressively achieving at least 0.5 to 1.0 metabolic equivalant improvement as evidenced by daily session reports and pre/post MET levels. Achieve and progress prescribed exercise frequency, intensity, time, and type based upon initial evaluation and submaximal graded exercise results as evidenced by the attaining and maintaining the prescribed target heart rate range, a Chandrakant rating of perceived exertion between 11 and 16, duration of >30 - 60 minutes using multiple exercise modes for at least 3 - 5 days per week to accumulate a minimum total of 2.5 hours per week of moderate aerobic intensity exercise, as tolerated, evidenced by daily session reports and home workout log. Establish and maintain individualized heart healthy eating plan, and gradually lose 10-15 lb of body weight over the program, utilizing dietician recommendations, moderating nutrional intake, and performing regular aerobic and strength training exercises as prescribed, as evidenced by pre- and post-program nutriton survey and routine rounding with patient to ascertain progression toward goal per patient's self report, food diary, and Rate-your-Plate screening survey. Strive for a lower daily fasting blood glucose measures to <131 and random after meal measures to <181, if diabetic, at home via lifestyle educaton, behavior modification, and medication compliance as tracked per patient's self-report. Introduce and progress 8-10 different bilateral UE and LE progressive resistance exercises focused on major muscle groups using 1-3 sets each per lift, on 2-3 non-consecutive days implementing free and machine weights and/or TheraBands, as appropriate, with a resistance of 40-60% 1-repetition maximum or 10 -15 repetitions to progressive overload and increasing resistance once repetitions have progressed to 15 reps and feel fairly light on 2 prior occasions.      Achieve and maintain an optimal average resting blood pressure of <120 / 80 mmHg (130/80 for DM) over the course of the program by educating patient, monitoring medication compliance, introducing and maintaining regular cardiovascular exercise program.     Strive for blood lipid optimization with an LDL-C of <100 mg/dL or  LDL 70 mg/dL, an HDL-C of > or = 40 mg/dL for men and > or = 50 mg/dL for women, and a triglyceride level of <150 mg/dL via lifestyle education, behavioral modification, and medication compliance. Develop regular home aerobic exercise program for 20 - 60 minutes at least 2 non-rehab days per week, excluding  5 - 10 minutes warm-up and cool-down periods, as tracked on home workout log. Minimize self-reported psycho-social feelings of stress in the next 30 days as evidenced by pre- and post- surveys and by routine rounding with patient to ascertain subjective improvements. Demonstrate knowledge about risk factor reduction, lifestyle modification, and heart health strategies with an increase of >=5% accuracy via pre- and post-program education assessment screening tool. Complete abstinence from tobacco products over the program as evidenced by routine rounding with patient.     Electronically signed by Nelwyn Schirmer, PT on 7/12/22 at 2:17 PM EDT

## 2022-07-12 NOTE — PROGRESS NOTES
Phase II Cardiac Rehabilitation   Physician Order Form    Aarti Cast  1940  [id number]  7/12/2022    [x] Phase 2 ECG Monitored Cardiac Rehabilitation    [] MI     [x] PTCA with/without stents  [] CABG    [] Heart Valve Repair/Replaced   [] Stable Angina:      [] CHF:      Onset Date: 06/23/2022    Cardiac Education Goals: (see individualized treatment plan for specific goals, progression & compliance)    Hypertension      Dietary sodium  Exercise      Home exercise     Cardiac anatomy and physiology   CAD/Interventions  Angina       Risk factors  Medications       Depression   Stress       Diabetes and HD  Heart healthy diet     Weight management       Prescribed Exercise Plan:    Submaximal exercise evaluation at program beginning and completion  Target Hr: 115-124  Initial MET Level: 2-3 METs     Duration: 31 - 60 Minutes  Frequency: 3 Days per week  Limitations: Walking, back pain    Modalities:  Treadmill   Bicycle ergometer/UBE  Seated Stepper  Rowing Machine  Weights/therabands    · May increase duration per F.I.T.T. protocol parameters on average 5-10 minutes every 1-2 weeks for the first 4-6 weeks. After 3-4 weeks completed, continue to gradually increase F.I.T.T. parameters gradually at the established duration on average 0.5-1.0 METs per 30 days over the course of the remaining program, as established by patient centered goals and guidelines, maintaining RPE 12-16. · Introduce 8-15 bilateral upper /lower extremity resistance exercise at 1-3 sets per lift, on 2-3 non-consecutive days using TheraBand/weights to 8-15 reps on at lease 2 occasions, maintaining RPE 12-16. Once repetition maximum has been achieved, additional weight sets may be added.       Standing Orders:  · Initiate ACLS for cardiac events  · Nitroglycerine 0.4mg SL every 5 minutes X 3 for angina pain  · 12 lead EKG for chest pain or dysrhythmia  · O2 per nasal cannula as needed for SpO2 <90% with symptoms  · Blood sugar monitoring for hyper/hypoglycemia symptoms  · Lipid panel pre/post program as needed.

## 2022-07-18 ENCOUNTER — HOSPITAL ENCOUNTER (OUTPATIENT)
Dept: CARDIAC REHAB | Age: 82
Setting detail: THERAPIES SERIES
Discharge: HOME | End: 2022-07-18
Payer: MEDICARE

## 2022-07-18 PROCEDURE — 93798 PHYS/QHP OP CAR RHAB W/ECG: CPT

## 2022-07-20 ENCOUNTER — HOSPITAL ENCOUNTER (OUTPATIENT)
Dept: CARDIAC REHAB | Age: 82
Setting detail: THERAPIES SERIES
Discharge: HOME | End: 2022-07-20
Payer: MEDICARE

## 2022-07-20 PROCEDURE — 93798 PHYS/QHP OP CAR RHAB W/ECG: CPT

## 2022-07-21 ENCOUNTER — HOSPITAL ENCOUNTER (OUTPATIENT)
Dept: CARDIAC REHAB | Age: 82
Setting detail: THERAPIES SERIES
Discharge: HOME | End: 2022-07-21
Payer: MEDICARE

## 2022-07-21 PROCEDURE — 93798 PHYS/QHP OP CAR RHAB W/ECG: CPT

## 2022-07-25 ENCOUNTER — HOSPITAL ENCOUNTER (OUTPATIENT)
Dept: CARDIAC REHAB | Age: 82
Setting detail: THERAPIES SERIES
Discharge: HOME | End: 2022-07-25
Payer: MEDICARE

## 2022-07-25 PROCEDURE — 93798 PHYS/QHP OP CAR RHAB W/ECG: CPT

## 2022-07-27 ENCOUNTER — HOSPITAL ENCOUNTER (OUTPATIENT)
Dept: CARDIAC REHAB | Age: 82
Setting detail: THERAPIES SERIES
Discharge: HOME | End: 2022-07-27
Payer: MEDICARE

## 2022-07-27 PROCEDURE — 93798 PHYS/QHP OP CAR RHAB W/ECG: CPT

## 2022-07-28 ENCOUNTER — HOSPITAL ENCOUNTER (OUTPATIENT)
Dept: CARDIAC REHAB | Age: 82
Setting detail: THERAPIES SERIES
Discharge: HOME | End: 2022-07-28
Payer: MEDICARE

## 2022-07-28 PROCEDURE — 93798 PHYS/QHP OP CAR RHAB W/ECG: CPT

## 2022-08-01 ENCOUNTER — HOSPITAL ENCOUNTER (OUTPATIENT)
Dept: CARDIAC REHAB | Age: 82
Setting detail: THERAPIES SERIES
Discharge: HOME | End: 2022-08-01
Payer: MEDICARE

## 2022-08-01 PROCEDURE — 93798 PHYS/QHP OP CAR RHAB W/ECG: CPT

## 2022-08-02 ENCOUNTER — NURSE ONLY (OUTPATIENT)
Dept: CARDIOLOGY | Age: 82
End: 2022-08-02
Payer: MEDICARE

## 2022-08-02 DIAGNOSIS — Z95.810 ICD (IMPLANTABLE CARDIOVERTER-DEFIBRILLATOR) IN PLACE: Primary | ICD-10-CM

## 2022-08-02 DIAGNOSIS — I25.5 ISCHEMIC CARDIOMYOPATHY: ICD-10-CM

## 2022-08-02 DIAGNOSIS — Z95.810 BIVENTRICULAR ICD (IMPLANTABLE CARDIOVERTER-DEFIBRILLATOR) IN PLACE: ICD-10-CM

## 2022-08-02 PROCEDURE — 93295 DEV INTERROG REMOTE 1/2/MLT: CPT | Performed by: INTERNAL MEDICINE

## 2022-08-03 ENCOUNTER — HOSPITAL ENCOUNTER (OUTPATIENT)
Dept: CARDIAC REHAB | Age: 82
Setting detail: THERAPIES SERIES
Discharge: HOME | End: 2022-08-03
Payer: MEDICARE

## 2022-08-03 PROCEDURE — 93798 PHYS/QHP OP CAR RHAB W/ECG: CPT

## 2022-08-04 ENCOUNTER — HOSPITAL ENCOUNTER (OUTPATIENT)
Dept: CARDIAC REHAB | Age: 82
Setting detail: THERAPIES SERIES
Discharge: HOME | End: 2022-08-04
Payer: MEDICARE

## 2022-08-04 PROCEDURE — 93798 PHYS/QHP OP CAR RHAB W/ECG: CPT

## 2022-08-08 ENCOUNTER — HOSPITAL ENCOUNTER (OUTPATIENT)
Dept: CARDIAC REHAB | Age: 82
Setting detail: THERAPIES SERIES
Discharge: HOME | End: 2022-08-08
Payer: MEDICARE

## 2022-08-08 PROCEDURE — 93798 PHYS/QHP OP CAR RHAB W/ECG: CPT

## 2022-08-09 NOTE — PROGRESS NOTES
S/S to report  [] Sodium     [x] Warm up/ Cool down  [x] Exercise Prescription   [x] RPE Scale   [] Hot/Cold weather guidelines   [x] Ex Safety   [x] Home Exercise     [] Proper use weights/bands       Target Goal:   -Individual Exercise Plan  -BP<120/80 or <130/80 if DM   -Aerobic active 30 + minutes 5-7 days per week    Nutrition    Stages of Change:   [] pre-contemplation  [x] Action   [] Contemplate    [] Maintainence   [] Prep    [] Relapse    Hyperlipidemia:  [x] Yes  [] No    [] Repeated - Date:     [x] Not repeated  Total Cholesterol:  Triglycerides:   HDL:   LDL:   Lipid Meds: Lipitor    Diabetes:  [] Yes  [x] No  FBS:   HbA1c:  [] Monitor BS at home   Frequency?:   Diabetes Medication: NA    Weight Management:  Height: 65 inches  Weight:  176.4 pounds  BMI: 29.28  Wt Goal: <150 pounds  Alcohol:   Social History     Substance and Sexual Activity   Alcohol Use No    Alcohol/week: 0.0 standard drinks     Diet Assessment Tool: Food Diary/Rate Your Plate  Special Diet:  2 gram NA+, 30% total fat, <10% saturated fat, 25-35 grams fiber, reduced cholesterol, balanced nutrition    Intervention:   [] Dietitian Consult       [] Staff/Patient Discussion     [] Referred to Diabetes Education     Education:  [x] Heart Healthy Nutrition  [] Weight Management  [x] Portions  [] Fat/Cholesterol  [] Food Labels       [] Food Packaging Claims  [] Snacks and Substitutes      [] Relate Diabetes/CAD    Target Goal:  -LDL-C<100 if triglycerides are > 200  -LDL-C < 70 for high risk patients  -HbA1c < 7%  -BMI < 25   -Waist Circumference < 35 inches women/40 inches men  Education    Stages of Change:    [] pre-contemplation  [x] Action   [] Contemplate    [] Maintainence   [] Prep    [] Relapse    Learning Barriers:   [] Speech   [] Cognitive   [] Literacy   [] Vision   [] Hearing    [] Readiness to Learn   [x] None    Family support: [x] Yes  [] No    Tobacco use:   Social History     Tobacco Use   Smoking Status Never   Smokeless Tobacco Never     Current smokers:  Longest quit attempt:  Tobacco triggers:  Barriers to successful cessation:  [] Smoking cessation medication:    Intervention:  [] Referred to physician for smoking cessation    [] Individual education and counseling  [] Tobacco adjunct      Education:   [] Cardiac A and P  [x] CAD/Interventions  [x] Risk Factors     [] Angina      [] Medications  [] CHF     Target Goal:  -Complete cessation of tobacco use (if applicable)  -Continued risk factor modifications  -Recognizing signs/symptoms to report  -Proper use of meds    Psychosocial  Stages of Change:    [] pre-contemplation  [x] Action   [] Contemplate    [] Maintainence   [] Prep    [] Relapse    Psychosocial Test:  Tool Used: Bridget Burden Quality of Life/PHQ9    Intervention:   [] Psych Consult/   [] Uses stress management skills    [] Physician Referral     Medications:     Education:    [] Stress Management   [] Depression    Target Goal:  -Assess presence or absence of depression using a valid screening tool. -Maximize coping skills.  -Positive support system.     Preventative Medication:   [x] Aspirin       [x] Beta Blockade      [x] Statin or other lipid lowering agent     [x] Antiplatelet   [] ACE Inhibitor/ARB   [] Anticoagulant  Medication Compliance (stated):    [x] 100%  [] 75%           [] 50%  [] 25%      [] 0%       Target Goal:  -100% medication adherence      Risk Stratification (of untoward event during exercise):  [x] HIGH RISK  LVEF < 40%  Survivor of cardiac arrest or sudden cardiac death  Complex ventricular arrythmias (VT >6 beats, multifocal PVCs at rest or with exercise)  Presence of angina or other significant symptoms (shortness of breath, light-headedness, or dizziness at <5 METs or during recovery  MI or cardiac surgery complicated by cardiogenic shock, CHF, and/or s/s of post-procedure ischemia  Abnormal hemodynamics with exercise, especially flat or decreasing systolic BP or chronotropic incompetence with ^ workload  Significant silent ischemia (ST depression >/= 2mm without symptoms with exercise or in recovery  Signs/symptoms including angina, dizziness, light-headedness or dyspnea with low exercise levels or in recovery  Clinically significant depression   Physically Inactive - <= 5 METs  Implantable cardioverter defibrillator (ICD)  At least 4 of below:   Lipids LDL >130 / Cholesterol > 200  BP >160/100  BMI >30  Smoker who continues to smoke  DM - HgbA1C >=7  [] MODERATE RISK  LVEF 40-49%  Mild to moderate level of silent ischemia during exercise testing or recovery (ST depression <2mm from baseline)  Presence of stable angina or other significant symptoms (unusual shortness of breath, light-headedness, or dizziness occurring only at high level of exertion [>=7 METs])  Functional capacity 5.1-8.9 METs  At least 2-3 of below:  Lipids -129 / Cholesterol 402-429  BP - systolic 755-916/SCERLALYD 10-11  BMI >66  DM - HgbA1C 5.8-7.0  [] LOW RISK  Rest LVEF >= 50%  No resting or exercise induced complex dysrhythmias  Uncomplicate MI, CABG, angioplasty, atherectomy, or stent  Normal hemodynamic and EGC response with exercise and in recovery (appropriate increases and decreases in HR and SBP with increasing workloads and recovery  Functional capacity >9 METs  Absence of angina or other significant symptoms (unusual shortness of breath, light-headedness, or dizziness during exercise and recovery)  Absence of complicated ventricular arrhythmias at rest  Absence of HF  Absence of signs/symptoms of post-event or post-procedure ischemia  Absence of clinical depression   Non-smoker   0-1 Uncontrolled risk factors    Fall Risk Assessment:  History of falls with or without injury  [] Yes   [x] No   Use of ambulatory aid  [x] Yes  [] No   Difficulty walking/impaired gait  [x] Yes  [] No   Numbness in feet  [] Yes   [x] No   Vision changes  [] Yes  [x] No   Dizziness  [] Yes  [x] No Shortness of breath  [] Yes  [x] No   Medications  [x] Anticoagulant/Antiplatelet  [x] Betablocker /ACE/ARB  [] Antidepressant  [] Seizure medication   [] NA  Risk of fall  [] Low (none of above)  [] Medium (less than 3 above)  [x] High (3 or more above)    Patient 30-Day Goals: (Specific, Measurable, Achievable, Relevant, and Time-Bound)  \"Increase strength and functional capacity over 90 days. \"    30-Day Program Goals:   Achieve and progress prescribed exercise frequency, intensity, time, and type based upon initial evaluation and submaximal graded exercise results as evidenced by the attaining and maintaining the prescribed target heart rate range, a Chandrakant rating of perceived exertion between 11 and 16, duration of >30 - 60 minutes using multiple exercise modes for at least 3 days/week, progressing at least 0.5-1.0 METs/week as tolerated, as evidenced by daily session reports and pre/post MET levels. Increased duration of exercise by 10 minutes over past 30 days. Introduce and progress 8-10 different bilateral UE and/or LE progressive resistance exercises focused on major muscle groups using 1-3 sets each per lift, on 2-3 non-consecutive days implementing free and machine weights and/or TheraBands, as appropriate, with a resistance of 40-60% 1-repetition maximum or 10 -15 repetitions to progressive overload and increasing resistance once repetitions have progressed to 15 reps and feel fairly light on 2 prior occasions. Resistance training not yet introduced. Develop regular home aerobic exercise program for 20 - 60 minutes at least 2 non-rehab days per week, excluding  5 - 10 minutes warm-up and cool-down periods, as tracked on home workout log. No home exercise yet.     Establish and maintain individualized heart healthy eating plan, and gradually lose 2-4 lb of body weight over the next 30 days, utilizing dietician recommendations, moderating nutrional intake, and performing regular aerobic and strength training exercises as prescribed, as evidenced by pre- and post-program nutriton survey and routine rounding with patient, food diary, and Rate-your-Plate screening survey. Has maintained a weight of ~ 175 over past month. Strive for blood lipid optimization with an LDL-C of <100 mg/dL or  LDL 70 mg/dL, an HDL-C of > or = 50 mg/dL for women, and a triglyceride level of <150 mg/dL via lifestyle education, behavioral modification, and medication compliance, as evidenced by improved post-program lipid results. Lipids not repeated. Achieve and maintain an optimal average resting blood pressure of <120 / 80 mmHg over the course of the program by educating patient, monitoring medication compliance, introducing and maintaining regular cardiovascular exercise program as evidenced by routine BP monitoring. Maintained -120s/50-60s over past 30 days. Minimize self-reported psycho-social feelings of stress over the program by educating patient, monitoring medication compliance, introducing and maintaining regular cardiovascular exercise as evidenced by pre- and post- surveys and by routine rounding with patient. Denies depression/ ^ stress over the past 30 days. Demonstrate knowledge about risk factor reduction, lifestyle modification, and heart health strategies with an increase of >=5% accuracy via pre- and post-program education assessment screening tool. Participated in all education classes offered over the past 30 days. Complete abstinence from tobacco products over the cardiac rehab program through intensive counseling, behavior modification, and medication compliance as evidenced by routine rounding with patient. Has remained tobacco free over past 30 days.     Electronically signed by Elder Rivera RN on 8/9/22 at 8:29 AM EDT    Physician Changes/Comments:

## 2022-08-10 ENCOUNTER — HOSPITAL ENCOUNTER (OUTPATIENT)
Dept: CARDIAC REHAB | Age: 82
Setting detail: THERAPIES SERIES
Discharge: HOME | End: 2022-08-10
Payer: MEDICARE

## 2022-08-10 PROCEDURE — 93798 PHYS/QHP OP CAR RHAB W/ECG: CPT

## 2022-08-11 ENCOUNTER — HOSPITAL ENCOUNTER (OUTPATIENT)
Dept: CARDIAC REHAB | Age: 82
Setting detail: THERAPIES SERIES
Discharge: HOME | End: 2022-08-11
Payer: MEDICARE

## 2022-08-11 PROCEDURE — 93798 PHYS/QHP OP CAR RHAB W/ECG: CPT

## 2022-08-15 ENCOUNTER — HOSPITAL ENCOUNTER (OUTPATIENT)
Dept: CARDIAC REHAB | Age: 82
Setting detail: THERAPIES SERIES
Discharge: HOME | End: 2022-08-15
Payer: MEDICARE

## 2022-08-15 PROCEDURE — 93798 PHYS/QHP OP CAR RHAB W/ECG: CPT

## 2022-08-16 NOTE — PROGRESS NOTES
Cardiopulmonary Rehab   Medical Nutrition Therapy Food Diary Evaluation    Patient Name: Jordan Bell  Registered Dietitian:  Leilani Somers RD, PRASHANT, BARRERA, LD  Date:  8/16/2022    Dear [place],    Thank you for sharing your information about your eating patterns, it serves not only to help me see what you are eating, but you as well. Eating 3 meals a day is great way to start, I am pleased to see that you are doing that. Whole grains, fruits and vegetables, along with lean meats and low fat dairy are the cornerstones of your health. With that in mind, look below for some suggestions. Your Rate Your Plate (RYP) Score was: 58, which means: you are making many healthy choices    Recommendations from the Dietitian based on your RYP and Food Diary / activity record to improve health and decrease risk factors    Include a minimum of 3 servings of fruit every day: fresh, frozen, or canned (in lite syrup or own juice). Eat at least 2 servings of non starchy vegetables such as green beans, broccoli, carrots, etc.   Include whole grain sources at meals every day: old fashioned oatmeal, whole wheat toast, buns, or english muffins, brown rice, etc.   Limit sodium to < 2,000 mg every day (hidden and added salt). Avoid packaged foods, frozen entrees, canned soups, and fast food. Choose lower fat foods: bake, broil, grill, limit serving sizes of sour cream, butter, mayonnaise, salad dressings, etc.   Drink at least 48 oz of water every day . Include a minimum of 150 minutes of physical activity every day. If there are many things to change, try making change with one recommendation, then move on from there. Recommendations are based on guidelines from the American Heart Association, American Diabetes Association and current literature.     Feel free to call with questions or concerns [phone] or [phone]          Leilani Somers RD, PRASHANT RUST, PRASHANT

## 2022-08-17 ENCOUNTER — HOSPITAL ENCOUNTER (OUTPATIENT)
Dept: CARDIAC REHAB | Age: 82
Setting detail: THERAPIES SERIES
Discharge: HOME | End: 2022-08-17
Payer: MEDICARE

## 2022-08-17 PROCEDURE — 93798 PHYS/QHP OP CAR RHAB W/ECG: CPT

## 2022-08-18 ENCOUNTER — HOSPITAL ENCOUNTER (OUTPATIENT)
Dept: CARDIAC REHAB | Age: 82
Setting detail: THERAPIES SERIES
Discharge: HOME | End: 2022-08-18
Payer: MEDICARE

## 2022-08-18 PROCEDURE — 93798 PHYS/QHP OP CAR RHAB W/ECG: CPT

## 2022-08-22 ENCOUNTER — HOSPITAL ENCOUNTER (OUTPATIENT)
Dept: CARDIAC REHAB | Age: 82
Setting detail: THERAPIES SERIES
Discharge: HOME | End: 2022-08-22
Payer: MEDICARE

## 2022-08-22 PROCEDURE — 93798 PHYS/QHP OP CAR RHAB W/ECG: CPT

## 2022-08-24 ENCOUNTER — HOSPITAL ENCOUNTER (OUTPATIENT)
Age: 82
Discharge: HOME | End: 2022-08-24
Payer: MEDICARE

## 2022-08-24 ENCOUNTER — OFFICE VISIT (OUTPATIENT)
Dept: CARDIOLOGY | Age: 82
End: 2022-08-24
Payer: MEDICARE

## 2022-08-24 ENCOUNTER — APPOINTMENT (OUTPATIENT)
Dept: CARDIAC REHAB | Age: 82
End: 2022-08-24
Payer: MEDICARE

## 2022-08-24 ENCOUNTER — TELEPHONE (OUTPATIENT)
Dept: CARDIOLOGY | Age: 82
End: 2022-08-24

## 2022-08-24 VITALS
WEIGHT: 173.8 LBS | HEART RATE: 80 BPM | RESPIRATION RATE: 18 BRPM | SYSTOLIC BLOOD PRESSURE: 100 MMHG | OXYGEN SATURATION: 98 % | DIASTOLIC BLOOD PRESSURE: 70 MMHG | HEIGHT: 65 IN | BODY MASS INDEX: 28.96 KG/M2

## 2022-08-24 DIAGNOSIS — I50.32 CHRONIC DIASTOLIC CONGESTIVE HEART FAILURE (HCC): ICD-10-CM

## 2022-08-24 DIAGNOSIS — M79.672 FOOT PAIN, LEFT: ICD-10-CM

## 2022-08-24 DIAGNOSIS — E78.2 MIXED HYPERLIPIDEMIA: ICD-10-CM

## 2022-08-24 DIAGNOSIS — M21.612 BUNION OF GREAT TOE OF LEFT FOOT: ICD-10-CM

## 2022-08-24 DIAGNOSIS — I10 ESSENTIAL HYPERTENSION: ICD-10-CM

## 2022-08-24 DIAGNOSIS — I48.20 CHRONIC A-FIB (HCC): ICD-10-CM

## 2022-08-24 DIAGNOSIS — E87.5 HYPERKALEMIA: ICD-10-CM

## 2022-08-24 DIAGNOSIS — I25.10 ASHD (ARTERIOSCLEROTIC HEART DISEASE): ICD-10-CM

## 2022-08-24 DIAGNOSIS — Z95.810 ICD (IMPLANTABLE CARDIOVERTER-DEFIBRILLATOR) IN PLACE: ICD-10-CM

## 2022-08-24 DIAGNOSIS — I50.32 CHRONIC DIASTOLIC CONGESTIVE HEART FAILURE (HCC): Primary | ICD-10-CM

## 2022-08-24 LAB
ALBUMIN SERPL-MCNC: 4.5 G/DL (ref 3.5–5.2)
ALBUMIN/GLOBULIN RATIO: 1.6 (ref 1–2.5)
ALP BLD-CCNC: 91 U/L (ref 35–104)
ALT SERPL-CCNC: 14 U/L (ref 5–33)
ANION GAP SERPL CALCULATED.3IONS-SCNC: 11 MMOL/L (ref 9–17)
AST SERPL-CCNC: 20 U/L
BILIRUB SERPL-MCNC: 0.59 MG/DL (ref 0.3–1.2)
BUN BLDV-MCNC: 21 MG/DL (ref 8–23)
BUN/CREAT BLD: 24 (ref 9–20)
CALCIUM SERPL-MCNC: 9.9 MG/DL (ref 8.6–10.4)
CHLORIDE BLD-SCNC: 103 MMOL/L (ref 98–107)
CO2: 27 MMOL/L (ref 20–31)
CREAT SERPL-MCNC: 0.86 MG/DL (ref 0.5–0.9)
GFR AFRICAN AMERICAN: >60 ML/MIN
GFR NON-AFRICAN AMERICAN: >60 ML/MIN
GFR SERPL CREATININE-BSD FRML MDRD: ABNORMAL ML/MIN/{1.73_M2}
GFR SERPL CREATININE-BSD FRML MDRD: ABNORMAL ML/MIN/{1.73_M2}
GLUCOSE BLD-MCNC: 116 MG/DL (ref 70–99)
POTASSIUM SERPL-SCNC: 4.7 MMOL/L (ref 3.7–5.3)
SODIUM BLD-SCNC: 141 MMOL/L (ref 135–144)
TOTAL PROTEIN: 7.3 G/DL (ref 6.4–8.3)

## 2022-08-24 PROCEDURE — 1123F ACP DISCUSS/DSCN MKR DOCD: CPT | Performed by: PHYSICIAN ASSISTANT

## 2022-08-24 PROCEDURE — 36415 COLL VENOUS BLD VENIPUNCTURE: CPT

## 2022-08-24 PROCEDURE — 99214 OFFICE O/P EST MOD 30 MIN: CPT | Performed by: PHYSICIAN ASSISTANT

## 2022-08-24 PROCEDURE — 80053 COMPREHEN METABOLIC PANEL: CPT

## 2022-08-24 NOTE — TELEPHONE ENCOUNTER
----- Message from Loulou Tucker PA-C sent at 8/24/2022  1:18 PM EDT -----  Please notify patient that their lab results are normal.   Please continue current treatment and follow up.

## 2022-08-24 NOTE — PROGRESS NOTES
reserve measurement of the left anterior descending artery    19- Remote interrogation 8/2/2022- 2.7 years battery life remaining, no new events. Ms. Kieran Roberson is here today for a follow up. She states cardiac wise she is doing well although she is complaining of left foot pain. She was told at rehab she could not come back until it is resolved. She reports she has a bunion and midfoot pain. She denies any lightheadedness, dizziness, chest pain, pressure, or tightness. She denies any palpitations. No abdominal pain, nausea or vomiting. No cough, fever or chills. No bleeding problems, bowel issues, problems with medications, or any other concerns at this time. No falls or near falls. Past Medical History:    Past Medical History:   Diagnosis Date    Allergic rhinitis 10/1994    Arthritis     Asthma     Atrial fibrillation ([facility] [address] 12/2008    CAD (coronary artery disease)     Cerebrovascular accident (CVA) involving left cerebral hemisphere ([facility] [address] 04/23/2019    remote lacunar infarction within the L periventricular white matter    CHF (congestive heart failure) ([facility])     Clotting disorder ([facility])     plebitis in L leg    COPD (chronic obstructive pulmonary disease) ([facility])     DDD (degenerative disc disease), cervical     Degeneration of cervical intervertebral disc     Diverticulosis 2005    Gout     Gout, unspecified     H/O cardiac catheterization 6/17/15    LMCA: Normal 0% stenosis. LAD: Lesion on 1st diag: Proximal subsection. 80% stenosis. Lesion plaque is ruptured. Leona Angelo LCx: Lesion on 1st Ob Leslie: Mid subsection. 85% stenosis. RCA:Lesion on R PDA: Mid subsection. 85% stenosis. Lesion on R PDA: Distal subsection. 70% stenosis. Lesion on Prox RCA: Mid subsection. 50% stenosis. EF 50%. H/O echocardiogram 11/09/2016    EF 40-45%. Mild LV hypertrophy normal LV cavity size. Sigmoid interventricular septum without evidence of outflow tract obstruction.   Left atrium is moderately dilated (34-39) left atrial volume index of 2004    Distal Radius Ulna    HIP FRACTURE SURGERY Right 04/21/2019    Dr. Nathanael Molina- hip pinning    HIP PINNING Right 4/21/2019    HIP PINNING performed by Shantel Patricio MD at [facility] Left 6/14/2017    MINI PORT ACCESS LEFT CHEST, X2 SPECIMENS. performed by Yaneth Walker MD at [facility]  2years old    VOLVAR-ULCERATIVE LESION-CAUTERIZED    ME EGD TRANSORAL BIOPSY SINGLE/MULTIPLE Left 2/13/2018    EGD BIOPSY performed by Selvin Cole MD at [facility]    SKIN BIOPSY      TONSILLECTOMY AND ADENOIDECTOMY       Social History:    Social History     Tobacco Use   Smoking Status Never   Smokeless Tobacco Never     Family History   Problem Relation Age of Onset    Heart Disease Mother     Stroke Mother     High Blood Pressure Mother     Cancer Father         lung    Stroke Brother     Heart Disease Maternal Grandmother        Current Medications:  Outpatient Medications Marked as Taking for the 8/24/22 encounter (Office Visit) with Cinthia Atwood PA-C   Medication Sig Dispense Refill    ipratropium (ATROVENT) 0.06 % nasal spray USE 2 SPRAY(S) IN EACH NOSTRIL TWICE DAILY 45 mL 0    EUTHYROX 75 MCG tablet Take 1 tablet by mouth once daily 90 tablet 1    atorvastatin (LIPITOR) 40 MG tablet Take 1 tablet by mouth nightly 90 tablet 1    carvedilol (COREG) 6.25 MG tablet Take 1 tablet by mouth twice daily 180 tablet 0    clopidogrel (PLAVIX) 75 MG tablet Take 1 tablet by mouth daily 30 tablet 3    nitroGLYCERIN (NITROSTAT) 0.4 MG SL tablet Place 1 tablet under the tongue every 5 minutes as needed for Chest pain up to max of 3 total doses.  If no relief after 1 dose, call 911. 25 tablet 1    Multiple Vitamins-Minerals (THERAPEUTIC MULTIVITAMIN-MINERALS) tablet Take 1 tablet by mouth daily      vitamin B-12 (CYANOCOBALAMIN) 500 MCG tablet Take 500 mcg by mouth daily       aspirin EC 81 MG EC tablet Take 1 tablet by mouth daily 90 tablet 3    acetaminophen (TYLENOL) 325 MG tablet Take 2 tablets by mouth every 4 hours as needed for Pain 120 tablet 3    polyethylene glycol (GLYCOLAX) powder Take 17 g by mouth as needed (for constipation)         REVIEW OF SYSTEMS:    CONSTITUTIONAL: No major weight gain or loss, fatigue, weakness, night sweats or fever. HEENT: No new vision difficulties or ringing in the ears. RESPIRATORY: See HPI   CARDIOVASCULAR: See HPI  GI: No nausea, vomiting, diarrhea, constipation, abdominal pain or changes in bowel habits. : No urinary frequency, urgency, incontinence hematuria or dysuria. SKIN: No cyanosis or skin lesions. MUSCULOSKELETAL: No new muscle or joint pain. NEUROLOGICAL: No syncope or TIA-like symptoms. PSYCHIATRIC: No anxiety, pain, insomnia or depression    Objective:     Physical Examination:     /70 (Site: Left Upper Arm, Position: Sitting, Cuff Size: Large Adult)   Pulse 80   Resp 18   Ht 5' 5\" (1.651 m)   Wt 173 lb 12.8 oz (78.8 kg)   SpO2 98%   BMI 28.92 kg/m²  Body mass index is 28.92 kg/m². Constitutional: She appeared oriented to person and place. She appears well-developed and well-nourished. In no acute distress. HEENT: Normocephalic and atraumatic. No JVD present. Carotid bruit is not present. No mass and no thyromegaly present. No lymphadenopathy noted. Cardiovascular: Normal rate, regular rhythm, normal heart sounds. Exam reveals no gallop and no friction rubs. 1/6 systolic murmur, 5th intercostal space on the LEFT in the mid-clavicular line (cardiac apex). Pulmonary/Chest: Effort normal and breath sounds normal. No respiratory distress. She has no wheezes, rhonchi or rales. Abdominal: Soft, non-tender. She exhibits no organomegaly, mass or bruit. Extremities: No significant edema. No cyanosis or clubbing. 2+ radial and carotid pulses. Distal extremity pulses: 2+ bilaterally. Neurological: Alertness and orientation as per Constitutional exam. No evidence of gross cranial nerve deficit.  Coordination appeared normal.   Skin: Skin is warm and dry. There is no rash or diaphoresis. Psychiatric: She has a normal mood and affect. Her speech is normal and behavior is normal.      DATA:    Lab Results   Component Value Date    ALT 12 02/25/2022    AST 24 02/25/2022    ALKPHOS 104 04/20/2019    BILITOT 0.48 04/20/2019     Lab Results   Component Value Date    CREATININE 0.77 07/06/2022    BUN 15 07/06/2022     07/06/2022    K 4.7 07/06/2022     07/06/2022    CO2 26 07/06/2022     Lab Results   Component Value Date    TSH 1.470 07/05/2022    [id number] 8.5 11/24/2021     Lab Results   Component Value Date    WBC 6.5 06/24/2022    HGB 12.2 06/24/2022    HCT 38.9 06/24/2022    MCV 95.6 06/24/2022     06/24/2022       Lab Results   Component Value Date    TRIG 204 (H) 02/25/2022    TRIG 185 (H) 11/05/2020    TRIG 113 02/25/2020     Lab Results   Component Value Date    HDL 42 02/25/2022    HDL 47 11/05/2020    HDL 48 02/25/2020     Lab Results   Component Value Date    LDLCHOLESTEROL 52 02/25/2022    LDLCHOLESTEROL 60 11/05/2020    LDLCHOLESTEROL 43 02/25/2020         Assessment:      Diagnosis Orders   1. Chronic diastolic congestive heart failure ([facility] [address]        2. ASHD (arteriosclerotic heart disease)        3. Chronic a-fib ([facility] [address]        4. Essential hypertension        5. Mixed hyperlipidemia        6. ICD (implantable cardioverter-defibrillator) in place        7. Hyperkalemia            PLAN:  Chronic Diastolic Heart Failure:   No clinical evidence of volume overload. No significant lower extremity edema. Beta Blocker: Continue Carvedilol (Coreg) 6.25 mg twice daily. Nonpharmacologic management of Heart Failure: I told her to continue wearing lower extremity compression stockings and I advised her to try and keep his legs up whenever possible and to limit salt in her diet.      Additional Testing List: None     Atherosclerotic Heart Disease: 6/23/2022 Successful PCI and stenting of the first obtuse marginal branch. Fractional flow reserve measurement of the left anterior descending artery  Antiplatelet Agent: Continue aspirin 81 mg daily and Plavix 75 mg daily. Beta Blocker: Continue Carvedilol (Coreg) 6.25 mg twice daily. Anti-anginal medications: Continue nitroglycerin 0.4 mg tablets as needed for chest pain. Statin Therapy: Continue atorvastatin (Lipitor) 40 mg nightly. Chronic atrial fibrillation status post AV node ablation and watchman device placement:   Beta Blocker: Continue Carvedilol (Coreg) 6.25 mg twice daily. Stroke Risk: Her CHADS2 score is 5/9 (6.7% stroke risk)  Anticoagulation: Watchman in place. I did explain to her that watchman device does not prevent every single stroke. Patient said she had a CT scan done of the brain for her tremor and found to have a tiny stroke. She no longer see's neurology for her tremor. Essential Hypertension: Controlled  Beta Blocker: Continue Carvedilol (Coreg) 6.25 mg twice daily. ACE Inibitor/ARB: Not indicated at this time. Calcium Channel Blocker: Not indicated at this time. Diuretics: Not indicated at this time. Additional Testing List: None    Hyperlipidemia: Mixed - Last LDL at goal. 2/25/22 was 52mg/dL  Cholesterol Reduction Therapy: Continue Atorvastatin (Lipitor) 40 mg daily. Implantable Cardioverter Defibrillator (ICD): Indication for Device Placement:  Biventricular ICD secondary to severe left ventricular systolic dysfunction and chronic atrial fibrillation    Interrogation Findings: We will plan to recheck their device at their next scheduled appointment date. Reviewed recent download from 8/2/22    Hyperkalemia: 4.7 on 7/6/2022  I do want a repeat CMP to make sure potassium is staying stable. Left Foot pain/left great toe bunion:  I placed referral to Ortho for further evaluation and treatment. Finally, I recommended that she continue her other medications and follow up with you as previously scheduled.     FOLLOW UP:  I told Ms. Kieran Roberosn to call my office if she had any problems, but otherwise told her to Return in about 6 months (around 2/24/2023). However, I would be happy to see her sooner should the need arise. Sincerely,  Odilia PHAN  [facility] Card[facility] Specialist    [address], [place], [address] [place] [address]  Phone: [phone], Fax: [phone]     I believe that the risk of significant morbidity and mortality related to the patient's current medical conditions are: Intermediate. Approximately 35 minutes were spent during prep work, discussion and exam of the patient, and follow up documentation and all of their questions were answered. The documentation recorded by the scribe, accurately and completely reflects the services I personally performed and the decisions made by me.  Odilia Lawson PA-C August 24, 2022

## 2022-08-24 NOTE — PATIENT INSTRUCTIONS
SURVEY:    You may be receiving a survey from "[employer]" regarding your visit today. Please complete the survey to enable us to provide the highest quality of care to you and your family. If you cannot score us a very good on any question, please call the office to discuss how we could have made your experience a very good one. Thank you.

## 2022-08-25 ENCOUNTER — APPOINTMENT (OUTPATIENT)
Dept: CARDIAC REHAB | Age: 82
End: 2022-08-25
Payer: MEDICARE

## 2022-08-29 ENCOUNTER — APPOINTMENT (OUTPATIENT)
Dept: CARDIAC REHAB | Age: 82
End: 2022-08-29
Payer: MEDICARE

## 2022-08-31 ENCOUNTER — APPOINTMENT (OUTPATIENT)
Dept: CARDIAC REHAB | Age: 82
End: 2022-08-31
Payer: MEDICARE

## 2022-09-06 ENCOUNTER — NURSE ONLY (OUTPATIENT)
Dept: CARDIOLOGY | Age: 82
End: 2022-09-06
Payer: MEDICARE

## 2022-09-06 DIAGNOSIS — I50.32 CHRONIC DIASTOLIC CONGESTIVE HEART FAILURE (HCC): Primary | ICD-10-CM

## 2022-09-06 PROCEDURE — 93297 REM INTERROG DEV EVAL ICPMS: CPT | Performed by: INTERNAL MEDICINE

## 2022-09-07 ENCOUNTER — HOSPITAL ENCOUNTER (OUTPATIENT)
Dept: CARDIAC REHAB | Age: 82
Discharge: HOME | End: 2022-09-07

## 2022-09-07 NOTE — PROGRESS NOTES
Phase II Cardiac Rehab Individualized Treatment Plan - 61 Day    Patient Name: Shereen Montoya  Date of Initial Assessment: 9/7/2022  ACCOUNT #:   Diagnosis: PCI w/ FILOMENA   Onset Date: 6/23/22  Referring Physician: Dr. Ruth Ann Mccarty  Session Number: 12   EXERCISE    Stages of Change:   [] pre-contemplation  [] Action   [] Contemplate    [] Maintainence   [] Prep   [x] Relapse          Exercise Prescription:  Mode: [x] TM [x] B [x] STP  [] R   [x] UBE     Frequency: 3 days per week  Duration: 31-60 minutes  Intensity: 2.5 METs             THRR: 115-124  Progression:   Based on risk stratification, may increase duration per F.I.T.T. protocol parameters on average 5-10 minutes every 1-2 weeks for the first 4-6 weeks. After 3-4 weeks completed, continue to gradually increase F.I.T.T. parameters gradually at the established duration on average 0.5-1.0 METs per 30 days over the course of the remaining program, as established by patient centered goals and guidelines, maintaining RPE 12-16. [x] Resistance Training  Introduce 8-15 bilateral upper extremity resistance exercise at 1-3 sets per lift, on 2-3 non-consecutive days using TheraBand/Weights to 8-15 reps on at lease 2 occasions, maintaining RPE 12-16. Once repetition maximum has been achieved, additional weight sets may be added. Hypertension:  [x] Yes  [] No  Resting BP: 114/62  Peak Exercise BP: 118/64  BP Meds: Coreg    Intervention:  Home Exercise:  Current Exercise -    [] Yes - type/frequency/intensity/duration   [x] No   [] Resistance Training  Progression:  20-60 minutes of aerobic exercise on at lease 2 non-rehab days. 8-12 bilateral upper extremity resistance exercise at 1-3 sets per lift, on 2 non-consecutive days using TheraBand/Free Weights/Weight Machine to 8-15 reps on at lease 2 occasions. Once repetition maximum has been achieved, additional weight sets may be added.     Education:   [x]  Equipment [place]  [] Understanding BP   [x] S/S to report  [x] Sodium     [x] Warm up/ Cool down  [x] Exercise Prescription   [x] RPE Scale   [] Hot/Cold weather guidelines   [x] Ex Safety   [x] Home Exercise     [] Proper use weights/bands       Target Goal:   -Individual Exercise Plan  -BP<120/80 or <130/80 if DM   -Aerobic active 30 + minutes 5-7 days per week    Nutrition    Stages of Change:   [] pre-contemplation  [] Action   [] Contemplate    [] Maintainence   [] Prep    [x] Relapse    Hyperlipidemia:  [x] Yes  [] No   Lipid Meds: Lipitor     Diabetes:  [] Yes  [x] No  FBS:   HbA1c:  [] Monitor BS at home   Frequency?:   Diabetes Medication:    Weight Management:  Height: 5'5\"  Weight: 175.2  BMI: 29.2  Wt Goal:   Alcohol:   Social History     Substance and Sexual Activity   Alcohol Use No    Alcohol/week: 0.0 standard drinks     Diet Assessment Tool: Food Diary/Rate Your Plate  Special Diet:  2 gram NA+, 30% total fat, <10% saturated fat, 25-35 grams fiber, reduced cholesterol, balanced nutrition    Intervention:   [] Dietitian Consult       [] Staff/Patient Discussion     [] Referred to Diabetes Education     Education:  [x] Heart Healthy Nutrition  [x] Weight Management  [x] Portions  [] Fat/Cholesterol  [] Food Labels       [] Food Packaging Claims  [] Snacks and Substitutes      [] Relate Diabetes/CAD    Target Goal:  -LDL-C<100 if triglycerides are > 200  -LDL-C < 70 for high risk patients  -HbA1c < 7%  -BMI < 25     Education    Stages of Change:    [] pre-contemplation  [] Action   [] Contemplate    [] Maintainence   [] Prep    [x] Relapse    Learning Barriers:   [] Speech   [] Cognitive   [] Literacy   [] Vision   [] Hearing    [] Readiness to Learn   [] None    Family support: [x] Yes  [] No    Tobacco use:   Social History     Tobacco Use   Smoking Status Never   Smokeless Tobacco Never     Current smokers:  Longest quit attempt:  Tobacco triggers:  Barriers to successful cessation:  [] Smoking cessation medication:    Intervention:  [] Referred to physician for smoking cessation    [] Individual education and counseling  [] Tobacco adjunct    Education:   [] Cardiac A and P  [x] CAD/Interventions  [x] Risk Factors     [x] Angina      [] Medications  [] CHF     Target Goal:  -Complete cessation of tobacco use (if applicable)  -Continued risk factor modifications  -Recognizing signs/symptoms to report  -Proper use of meds    Psychosocial  Stages of Change:    [] pre-contemplation  [] Action   [] Contemplate    [] Maintainence   [] Prep    [x] Relapse    Psychosocial Test:  Tool Used: Bridget Burden Quality of Life/PHQ9    Intervention:   [] Psych Consult/   [] Uses stress management skills    [] Physician Referral     Medications:     Education:    [] Stress Management   [] Depression    Target Goal:  -Assess presence or absence of depression using a valid screening tool. -Maximize coping skills.  -Positive support system.     Preventative Medication:   [x] Aspirin       [x] Beta Blockade      [x] Statin or other lipid lowering agent     [x] Antiplatelet   [] ACE Inhibitor/ARB   [] Anticoagulant  Medication Compliance (stated):    [x] 100%  [] 75%           [] 50%  [] 25%      [] 0%       Target Goal:  -100% medication adherence      Risk Stratification (of untoward event during exercise):  [x] HIGH RISK  LVEF < 40%  Survivor of cardiac arrest or sudden cardiac death  Complex ventricular arrythmias (VT >6 beats, multifocal PVCs at rest or with exercise)  Presence of angina or other significant symptoms (shortness of breath, light-headedness, or dizziness at <5 METs or during recovery  MI or cardiac surgery complicated by cardiogenic shock, CHF, and/or s/s of post-procedure ischemia  Abnormal hemodynamics with exercise, especially flat or decreasing systolic BP or chronotropic incompetence with ^ workload  Significant silent ischemia (ST depression >/= 2mm without symptoms with exercise or in recovery  Signs/symptoms including angina, dizziness, light-headedness or dyspnea with low exercise levels or in recovery  Clinically significant depression   Physically Inactive - <= 5 METs  Implantable cardioverter defibrillator (ICD)  At least 4 of below:   Lipids LDL >130 / Cholesterol > 200  BP >160/100  BMI >30  Smoker who continues to smoke  DM - HgbA1C >=7  [] MODERATE RISK  LVEF 40-49%  Mild to moderate level of silent ischemia during exercise testing or recovery (ST depression <2mm from baseline)  Presence of stable angina or other significant symptoms (unusual shortness of breath, light-headedness, or dizziness occurring only at high level of exertion [>=7 METs])  Functional capacity 5.1-8.9 METs  At least 2-3 of below:  Lipids -129 / Cholesterol 990-562  BP - systolic 632-372/TTBHFGAAE 69-93  BMI >45  DM - HgbA1C 5.8-7.0  [] LOW RISK  Rest LVEF >= 50%  No resting or exercise induced complex dysrhythmias  Uncomplicate MI, CABG, angioplasty, atherectomy, or stent  Normal hemodynamic and EGC response with exercise and in recovery (appropriate increases and decreases in HR and SBP with increasing workloads and recovery  Functional capacity >9 METs  Absence of angina or other significant symptoms (unusual shortness of breath, light-headedness, or dizziness during exercise and recovery)  Absence of complicated ventricular arrhythmias at rest  Absence of HF  Absence of signs/symptoms of post-event or post-procedure ischemia  Absence of clinical depression   Non-smoker   0-1 Uncontrolled risk factors    Fall Risk Assessment:  History of falls with or without injury  [] Yes   [x] No   Use of ambulatory aid  [x] Yes  [] No   Difficulty walking/impaired gait  [x] Yes  [] No   Numbness in feet  [] Yes   [x] No   Vision changes  [] Yes  [x] No   Dizziness  [] Yes  [x] No   Shortness of breath  [] Yes  [x] No   Medications  [x] Anticoagulant/Antiplatelet  [x] Betablocker /ACE/ARB  [] Antidepressant  [] Seizure medication   [] NA  Risk of fall  [] Low (none of men and > or = 50 mg/dL for women, and a triglyceride level of <150 mg/dL via lifestyle education, behavioral modification, and medication compliance, as evidenced by improved post-program lipid results. Strive for HbA1C <= 7.0% over the next 30 days, via lifestyle education, behavioral modification, and medication compliance as evidenced by post program HbA1C lab results. Achieve and maintain an optimal average resting blood pressure of <120 / 80 mmHg (130/80 for DM) over the next 30 days by educating patient, monitoring medication compliance, introducing and maintaining regular cardiovascular exercise program as evidenced by routine BP monitoring. Minimize self-reported psycho-social feelings of stress over the next 30 days by educating patient, monitoring medication compliance, introducing and maintaining regular cardiovascular exercise as evidenced by pre- and post- surveys and by routine rounding with patient. Demonstrate knowledge about risk factor reduction, lifestyle modification, and heart health strategies over the next 30 days, with an increase of >=5% accuracy via pre- and post-program education assessment screening tool. Complete abstinence from tobacco products over the next 30 days through intensive counseling, behavior modification, and medication compliance as evidenced by routine rounding with patient. Electronically signed by Zaida Jimenez CEP on 9/7/22 at 7:52 AM EDT    Patient referred to ortho for foot on 8/24, will call when ready to return.     Physician Changes/Comments:

## 2022-09-20 ENCOUNTER — TELEPHONE (OUTPATIENT)
Dept: CARDIAC REHAB | Age: 82
End: 2022-09-20

## 2022-09-20 NOTE — TELEPHONE ENCOUNTER
Phoned patient to check on return to exercise date. States her foot is still bothering her and \"there's nothing else they can do\". Wants to check with  and will call us back.

## 2022-10-07 ENCOUNTER — HOSPITAL ENCOUNTER (OUTPATIENT)
Dept: CARDIAC REHAB | Age: 82
Discharge: HOME | End: 2022-10-07

## 2022-10-07 NOTE — PROGRESS NOTES
Phase II Cardiac Rehab Individualized Treatment Plan-Final    Patient Name: [place]  Date: 10/7/2022  ACCOUNT #: [de-identified]  Diagnosis: PCI  Onset Date: 6/23/2022  Referring Physician: Dr. Naima Ye  Session Number: 12  EXERCISE    Stages of Change:   [] pre-contemplation  [] Action   [] Contemplate    [] Maintainence   [] Prep   [x] Relapse          Exercise Prescription:  Mode: [] TM   [] B   [] STP  [] R   [] UBE    Frequency: 3 days per week  Duration: 31-60 minutes  Intensity:             THRR:   Progression:   Per F.I.T.T. protocol parameters on average 5-10 minutes every 1-2 weeks for the first 4-6 weeks. After 3-4 weeks completed, continue to gradually increase F.I.T.T. parameters gradually at the established duration on average 0.5-1.0 METs per 30 days over the course of the remaining program, as established by patient centered goals and guidelines, maintaining RPE 12-16. [] Resistance Training  8-15 bilateral upper extremity resistance exercise at 1-3 sets per lift, on 2-3 non-consecutive days using TheraBand/Free Weights/Weight Machine to 8-15 reps on at lease 2 occasions, maintaining RPE 12-16. Once repetition maximum has been achieved, additional weight sets may be added. Hypertension:  [] Yes  [] No  Resting BP:   Peak Exercise BP:   BP Meds:     Intervention:  Home Exercise:  Current Exercise -    [] Yes - type/frequency/intensity/duration   [] No   [] Resistance Training  Progression:  8-12 bilateral upper extremity resistance exercise at 1-3 sets per lift, on 2 non-consecutive days using TheraBand/Free Weights/Weight Machine to 8-15 reps on at lease 2 occasions. Once repetition maximum has been achieved, additional weight sets may be added.     Education:   [] Equipment [place]  [] Understanding BP   [] S/S to report  [] Sodium     [] Warm up/ Cool down  [] Exercise Prescription   [] RPE Scale   [] Hot/Cold weather guidelines   [] Ex Safety   [] Home Exercise     [] Proper use weights/bands        Target Goal:   -Individual Exercise Plan  -BP<120/80 or <130/80 if DM   -Aerobic active 30 + minutes 5-7 days per week    Nutrition    Stages of Change:   [] pre-contemplation  [] Action   [] Contemplate    [] Maintainence   [] Prep    [] Relapse    Hyperlipidemia:  [] Yes  [] No  Lipids:   Lab Results   Component Value Date    CHOL 135 02/25/2022    CHOL 144 11/05/2020    CHOL 114 02/25/2020     Lab Results   Component Value Date    TRIG 204 (H) 02/25/2022    TRIG 185 (H) 11/05/2020    TRIG 113 02/25/2020     Lab Results   Component Value Date    HDL 42 02/25/2022    HDL 47 11/05/2020    HDL 48 02/25/2020     Lab Results   Component Value Date    LDLCHOLESTEROL 52 02/25/2022    LDLCHOLESTEROL 60 11/05/2020    LDLCHOLESTEROL 43 02/25/2020     Lab Results   Component Value Date    VLDL NOT REPORTED (H) 11/05/2020    VLDL NOT REPORTED 02/25/2020    VLDL NOT REPORTED (H) 07/01/2019     Lab Results   Component Value Date    CHOLHDLRATIO 3.2 02/25/2022    CHOLHDLRATIO 3.1 11/05/2020    CHOLHDLRATIO 2.4 02/25/2020    Lipid Medications:     Diabetes:  [] Yes  [] No  FBS:   HbA1c:  [] Monitor BS at home   Frequency?:   Diabetes Medication:    Weight Management:  Height:   Weight:   BMI:   Wt Goal:   Alcohol:   Social History     Substance and Sexual Activity   Alcohol Use No    Alcohol/week: 0.0 standard drinks     Diet Assessment Tool:   []  Food Diary  []  Rate My Plate Score:     Special Diet:  2 gram NA+, 30% total fat, <10% saturated fat, 25-35 grams fiber, reduced cholesterol, balanced nutrition    Intervention:   [] Dietitian Consult       [] Referred to Diabetes Education     Education:  [] Heart Healthy Nutrition  [] Weight Management  [] Portions  [] Fat/Cholesterol  [] Food Labels       [] Food Packaging Claims  [] Snacks and Substitutes      [] Relate Diabetes/CAD    Target Goal:  -LDL-C<100 if triglycerides are > 200  -LDL-C < 70 for high risk patients  -HbA1c < 7%  -BMI < 25 Education    Stages of Change:    [] pre-contemplation  [] Action   [] Contemplate    [] Maintainence   [] Prep    [] Relapse    Learning Barriers:   [] Speech   [] Cognitive   [] Literacy   [] Vision   [] Hearing    [] Readiness to Learn   [] None    Knowledge test score:       Family support: [] Yes  [] No    Tobacco use:   Social History     Tobacco Use   Smoking Status Never   Smokeless Tobacco Never     Current smokers:  Longest quit attempt:  Tobacco triggers:  Barriers to successful cessation:  [] Smoking cessation medication:    Intervention:  [] Referred to physician for smoking cessation    [] Individual education and counseling  [] Tobacco adjunct      Education:   [] Cardiac A and P  [] CAD/Interventions  [] Risk Factors     [] Angina      [] Medications  [] CHF          Target Goal:  -Complete cessation of tobacco use (if applicable)  -Continued risk factor modifications  -Recognizing signs/symptoms to report  -Proper use of meds    Psychosocial  Stages of Change:    [] pre-contemplation  [] Action   [] Contemplate    [] Maintainence   [] Prep    [] Relapse    Psychosocial Test:  Tool Used:   Bridget Burden Quality of Life Score:   PHQ9 score:     Intervention:   [] Psych Consult/   [] Physician Referral     Medications:     Education:    [] Stress Management   [] Depression    Target Goal:  -Assess presence or absence of depression using a valid screening tool. -Maximize coping skills.  -Positive support system.     Preventative Medication:    [] Aspirin       [] Beta Blockade      [] Statin or other lipid lowering agent     [] Antiplatelet   [] ACE Inhibitor/ARB   [] Anticoagulant   Medication Compliance (stated):    [] 100%  [] 75%           [] 50%  [] 25%      [] 0%         Target Goal:  -100% medication adherence    Risk Stratification (of untoward event during exercise):  [x] HIGH RISK  LVEF < 40%  Survivor of cardiac arrest or sudden cardiac death  Complex ventricular arrythmias (VT >6 beats, multifocal PVCs at rest or with exercise)  Presence of angina or other significant symptoms (shortness of breath, light-headedness, or dizziness at <5 METs or during recovery  MI or cardiac surgery complicated by cardiogenic shock, CHF, and/or s/s of post-procedure ischemia  Abnormal hemodynamics with exercise, especially flat or decreasing systolic BP or chronotropic incompetence with ^ workload  Significant silent ischemia (ST depression >/= 2mm without symptoms with exercise or in recovery  Signs/symptoms including angina, dizziness, light-headedness or dyspnea with low exercise levels or in recovery  Clinically significant depression   Physically Inactive - <= 5 METs  Implantable cardioverter defibrillator (ICD)  At least 4 of below:   Lipids LDL >130 / Cholesterol > 200  BP >160/100  BMI >30  Smoker who continues to smoke  DM - HgbA1C >=7  [] MODERATE RISK  LVEF 40-49%  Mild to moderate level of silent ischemia during exercise testing or recovery (ST depression <2mm from baseline)  Presence of stable angina or other significant symptoms (unusual shortness of breath, light-headedness, or dizziness occurring only at high level of exertion [>=7 METs])  Functional capacity 5.1-8.9 METs  At least 2-3 of below:  Lipids -129 / Cholesterol 933-620  BP - systolic 121-713/ZHVWUAKSG 29-19  BMI >73  DM - HgbA1C 5.8-7.0  [] LOW RISK  Rest LVEF >= 50%  No resting or exercise induced complex dysrhythmias  Uncomplicate MI, CABG, angioplasty, atherectomy, or stent  Normal hemodynamic and EGC response with exercise and in recovery (appropriate increases and decreases in HR and SBP with increasing workloads and recovery  Functional capacity >9 METs  Absence of angina or other significant symptoms (unusual shortness of breath, light-headedness, or dizziness during exercise and recovery)  Absence of complicated ventricular arrhythmias at rest  Absence of HF  Absence of signs/symptoms of post-event or post-procedure ischemia  Absence of clinical depression   Non-smoker   0-1 Uncontrolled risk factors    Fall Risk Assessment:  History of falls with or without injury  [x] Yes   [] No   Use of ambulatory aid  [x] Yes  [] No   Difficulty walking/impaired gait  [x] Yes  [] No   Numbness in feet  [] Yes   [x] No   Vision changes  [] Yes  [x] No   Dizziness  [] Yes  [x] No   Shortness of breath  [] Yes  [x] No   Medications  [] Anticoagulant/Antiplatelet  [] Betablocker /ACE/ARB  [] Antidepressant  [] Seizure medication   [] NA  Risk of fall  [] Low (none of above)  [] Medium (less than 3 above)  [] High (3 or more above)    Patient 90-Day Goals: (Specific, Measurable, Achievable, Relevant, and Time-Bound)  \"Increase strength and functional capacity over 90 days. \"  Goal not met - patient opted for early discharge due to foot pain. Program Goals:   Achieve and progress prescribed exercise frequency, intensity, time, and type based upon initial evaluation and submaximal graded exercise results as evidenced by the attaining and maintaining the prescribed target heart rate range, a Chandrakant rating of perceived exertion between 11 and 16, duration of >30 - 60 minutes using multiple exercise modes for at least 3 days/week, progressing at least 0.5-1.0 METs/week as tolerated, as evidenced by daily session reports and pre/post MET levels. Introduce and progress 8-10 different bilateral UE and/or LE progressive resistance exercises focused on major muscle groups using 1-3 sets each per lift, on 2-3 non-consecutive days implementing free and machine weights and/or TheraBands, as appropriate, with a resistance of 40-60% 1-repetition maximum or 10 -15 repetitions to progressive overload and increasing resistance once repetitions have progressed to 15 reps and feel fairly light on 2 prior occasions.      Develop regular home aerobic exercise program for 20 - 60 minutes at least 2 non-rehab days per week, excluding  5 - 10 minutes warm-up and cool-down periods, as tracked on home workout log. Establish and maintain individualized heart healthy eating plan, and gradually lose 10-15 lb of body weight over the program, utilizing dietician recommendations, moderating nutrional intake, and performing regular aerobic and strength training exercises as prescribed, as evidenced by pre- and post-program nutriton survey and routine rounding with patient, food diary, and Rate-your-Plate screening survey. Strive for blood lipid optimization with an LDL-C of <100 mg/dL or  LDL 70 mg/dL, an HDL-C of > or = 40 mg/dL for men and > or = 50 mg/dL for women, and a triglyceride level of <150 mg/dL via lifestyle education, behavioral modification, and medication compliance, as evidenced by improved post-program lipid results. Strive for HbA1C <= 7.0% via lifestyle education, behavioral modification, and medication compliance as evidenced by post program HbA1C lab results. Achieve and maintain an optimal average resting blood pressure of <120 / 80 mmHg (130/80 for DM) over the course of the program by educating patient, monitoring medication compliance, introducing and maintaining regular cardiovascular exercise program as evidenced by routine BP monitoring. Minimize self-reported psycho-social feelings of stress over the program by educating patient, monitoring medication compliance, introducing and maintaining regular cardiovascular exercise as evidenced by pre- and post- surveys and by routine rounding with patient. Demonstrate knowledge about risk factor reduction, lifestyle modification, and heart health strategies with an increase of >=5% accuracy via pre- and post-program education assessment screening tool. Complete abstinence from tobacco products over the cardiac rehab program through intensive counseling, behavior modification, and medication compliance as evidenced by routine rounding with patient.     Electronically signed by Milta Severe, RN on 10/7/22 at 9:34 AM EDT

## 2022-10-11 ENCOUNTER — NURSE ONLY (OUTPATIENT)
Dept: CARDIOLOGY | Age: 82
End: 2022-10-11
Payer: MEDICARE

## 2022-10-11 DIAGNOSIS — I50.32 CHRONIC DIASTOLIC CONGESTIVE HEART FAILURE (HCC): Primary | ICD-10-CM

## 2022-10-11 PROCEDURE — 93297 REM INTERROG DEV EVAL ICPMS: CPT | Performed by: INTERNAL MEDICINE

## 2022-10-18 ENCOUNTER — APPOINTMENT (OUTPATIENT)
Dept: GENERAL RADIOLOGY | Age: 82
DRG: 872 | End: 2022-10-18
Payer: MEDICARE

## 2022-10-18 ENCOUNTER — HOSPITAL ENCOUNTER (INPATIENT)
Age: 82
LOS: 3 days | Discharge: HOME HEALTH | DRG: 872 | End: 2022-10-21
Attending: EMERGENCY MEDICINE | Admitting: INTERNAL MEDICINE
Payer: MEDICARE

## 2022-10-18 DIAGNOSIS — A41.51 SEPSIS DUE TO ESCHERICHIA COLI WITHOUT ACUTE ORGAN DYSFUNCTION (HCC): ICD-10-CM

## 2022-10-18 DIAGNOSIS — N39.0 URINARY TRACT INFECTION WITHOUT HEMATURIA, SITE UNSPECIFIED: Primary | ICD-10-CM

## 2022-10-18 DIAGNOSIS — A41.9 SEPTICEMIA (HCC): ICD-10-CM

## 2022-10-18 PROBLEM — R65.10 SIRS (SYSTEMIC INFLAMMATORY RESPONSE SYNDROME) (HCC): Status: ACTIVE | Noted: 2022-10-18

## 2022-10-18 LAB
ABSOLUTE EOS #: <0.03 K/UL (ref 0–0.44)
ABSOLUTE IMMATURE GRANULOCYTE: 0.07 K/UL (ref 0–0.3)
ABSOLUTE LYMPH #: 0.54 K/UL (ref 1.1–3.7)
ABSOLUTE MONO #: 0.7 K/UL (ref 0.1–1.2)
ALBUMIN SERPL-MCNC: 3.2 G/DL (ref 3.5–5.2)
ALBUMIN/GLOBULIN RATIO: 0.9 (ref 1–2.5)
ALP BLD-CCNC: 63 U/L (ref 35–104)
ALT SERPL-CCNC: 10 U/L (ref 5–33)
ANION GAP SERPL CALCULATED.3IONS-SCNC: 14 MMOL/L (ref 9–17)
AST SERPL-CCNC: 18 U/L
BACTERIA: ABNORMAL
BASOPHILS # BLD: 0 % (ref 0–2)
BASOPHILS ABSOLUTE: <0.03 K/UL (ref 0–0.2)
BILIRUB SERPL-MCNC: 1.1 MG/DL (ref 0.3–1.2)
BILIRUBIN URINE: NEGATIVE
BUN BLDV-MCNC: 18 MG/DL (ref 8–23)
BUN/CREAT BLD: 17 (ref 9–20)
CALCIUM SERPL-MCNC: 8.7 MG/DL (ref 8.6–10.4)
CHLORIDE BLD-SCNC: 104 MMOL/L (ref 98–107)
CO2: 22 MMOL/L (ref 20–31)
COLOR: YELLOW
CREAT SERPL-MCNC: 1.04 MG/DL (ref 0.5–0.9)
EOSINOPHILS RELATIVE PERCENT: 0 % (ref 1–4)
EPITHELIAL CELLS UA: ABNORMAL /HPF (ref 0–25)
GFR SERPL CREATININE-BSD FRML MDRD: 54 ML/MIN/1.73M2
GLUCOSE BLD-MCNC: 143 MG/DL (ref 70–99)
GLUCOSE URINE: NEGATIVE
HCT VFR BLD CALC: 39.6 % (ref 36.3–47.1)
HEMOGLOBIN: 12.8 G/DL (ref 11.9–15.1)
IMMATURE GRANULOCYTES: 1 %
KETONES, URINE: ABNORMAL
LACTIC ACID, SEPSIS: 0.9 MMOL/L (ref 0.5–1.9)
LACTIC ACID, SEPSIS: 1.6 MMOL/L (ref 0.5–1.9)
LEUKOCYTE ESTERASE, URINE: ABNORMAL
LYMPHOCYTES # BLD: 4 % (ref 24–43)
MCH RBC QN AUTO: 29.6 PG (ref 25.2–33.5)
MCHC RBC AUTO-ENTMCNC: 32.3 G/DL (ref 28.4–34.8)
MCV RBC AUTO: 91.5 FL (ref 82.6–102.9)
MONOCYTES # BLD: 6 % (ref 3–12)
NITRITE, URINE: POSITIVE
NRBC AUTOMATED: 0 PER 100 WBC
PDW BLD-RTO: 14.5 % (ref 11.8–14.4)
PH UA: 6 (ref 5–9)
PLATELET # BLD: 181 K/UL (ref 138–453)
PMV BLD AUTO: 10.2 FL (ref 8.1–13.5)
POTASSIUM SERPL-SCNC: 4 MMOL/L (ref 3.7–5.3)
PROTEIN UA: ABNORMAL
RBC # BLD: 4.33 M/UL (ref 3.95–5.11)
RBC UA: ABNORMAL /HPF (ref 0–2)
SEG NEUTROPHILS: 89 % (ref 36–65)
SEGMENTED NEUTROPHILS ABSOLUTE COUNT: 11.09 K/UL (ref 1.5–8.1)
SODIUM BLD-SCNC: 140 MMOL/L (ref 135–144)
SPECIFIC GRAVITY UA: 1.02 (ref 1.01–1.02)
TOTAL PROTEIN: 6.9 G/DL (ref 6.4–8.3)
TURBIDITY: ABNORMAL
URINE HGB: ABNORMAL
UROBILINOGEN, URINE: ABNORMAL
WBC # BLD: 12.4 K/UL (ref 3.5–11.3)
WBC UA: ABNORMAL /HPF (ref 0–5)

## 2022-10-18 PROCEDURE — 2580000003 HC RX 258: Performed by: NURSE PRACTITIONER

## 2022-10-18 PROCEDURE — 94761 N-INVAS EAR/PLS OXIMETRY MLT: CPT

## 2022-10-18 PROCEDURE — 87186 SC STD MICRODIL/AGAR DIL: CPT

## 2022-10-18 PROCEDURE — 81001 URINALYSIS AUTO W/SCOPE: CPT

## 2022-10-18 PROCEDURE — 93005 ELECTROCARDIOGRAM TRACING: CPT | Performed by: EMERGENCY MEDICINE

## 2022-10-18 PROCEDURE — 85025 COMPLETE CBC W/AUTO DIFF WBC: CPT

## 2022-10-18 PROCEDURE — 2580000003 HC RX 258: Performed by: EMERGENCY MEDICINE

## 2022-10-18 PROCEDURE — 87205 SMEAR GRAM STAIN: CPT

## 2022-10-18 PROCEDURE — 96374 THER/PROPH/DIAG INJ IV PUSH: CPT

## 2022-10-18 PROCEDURE — 6370000000 HC RX 637 (ALT 250 FOR IP): Performed by: NURSE PRACTITIONER

## 2022-10-18 PROCEDURE — 80053 COMPREHEN METABOLIC PANEL: CPT

## 2022-10-18 PROCEDURE — 87088 URINE BACTERIA CULTURE: CPT

## 2022-10-18 PROCEDURE — 6360000002 HC RX W HCPCS: Performed by: NURSE PRACTITIONER

## 2022-10-18 PROCEDURE — 97166 OT EVAL MOD COMPLEX 45 MIN: CPT

## 2022-10-18 PROCEDURE — 87077 CULTURE AEROBIC IDENTIFY: CPT

## 2022-10-18 PROCEDURE — 87086 URINE CULTURE/COLONY COUNT: CPT

## 2022-10-18 PROCEDURE — 99285 EMERGENCY DEPT VISIT HI MDM: CPT

## 2022-10-18 PROCEDURE — 83605 ASSAY OF LACTIC ACID: CPT

## 2022-10-18 PROCEDURE — 87154 CUL TYP ID BLD PTHGN 6+ TRGT: CPT

## 2022-10-18 PROCEDURE — 36415 COLL VENOUS BLD VENIPUNCTURE: CPT

## 2022-10-18 PROCEDURE — 6360000002 HC RX W HCPCS: Performed by: EMERGENCY MEDICINE

## 2022-10-18 PROCEDURE — 87040 BLOOD CULTURE FOR BACTERIA: CPT

## 2022-10-18 PROCEDURE — 6370000000 HC RX 637 (ALT 250 FOR IP): Performed by: EMERGENCY MEDICINE

## 2022-10-18 PROCEDURE — 51702 INSERT TEMP BLADDER CATH: CPT

## 2022-10-18 PROCEDURE — 71045 X-RAY EXAM CHEST 1 VIEW: CPT

## 2022-10-18 PROCEDURE — 1200000000 HC SEMI PRIVATE

## 2022-10-18 PROCEDURE — 2700000000 HC OXYGEN THERAPY PER DAY

## 2022-10-18 RX ORDER — ACETAMINOPHEN 650 MG/1
650 SUPPOSITORY RECTAL EVERY 6 HOURS PRN
Status: DISCONTINUED | OUTPATIENT
Start: 2022-10-18 | End: 2022-10-21 | Stop reason: HOSPADM

## 2022-10-18 RX ORDER — SODIUM CHLORIDE 0.9 % (FLUSH) 0.9 %
5-40 SYRINGE (ML) INJECTION PRN
Status: DISCONTINUED | OUTPATIENT
Start: 2022-10-18 | End: 2022-10-21 | Stop reason: HOSPADM

## 2022-10-18 RX ORDER — CLOPIDOGREL BISULFATE 75 MG/1
75 TABLET ORAL DAILY
Status: DISCONTINUED | OUTPATIENT
Start: 2022-10-18 | End: 2022-10-21 | Stop reason: HOSPADM

## 2022-10-18 RX ORDER — ASPIRIN 81 MG/1
81 TABLET ORAL DAILY
Status: DISCONTINUED | OUTPATIENT
Start: 2022-10-18 | End: 2022-10-21 | Stop reason: HOSPADM

## 2022-10-18 RX ORDER — 0.9 % SODIUM CHLORIDE 0.9 %
30 INTRAVENOUS SOLUTION INTRAVENOUS ONCE
Status: COMPLETED | OUTPATIENT
Start: 2022-10-18 | End: 2022-10-18

## 2022-10-18 RX ORDER — FAMOTIDINE 20 MG/1
20 TABLET, FILM COATED ORAL 2 TIMES DAILY
Status: DISCONTINUED | OUTPATIENT
Start: 2022-10-18 | End: 2022-10-18

## 2022-10-18 RX ORDER — ONDANSETRON 2 MG/ML
4 INJECTION INTRAMUSCULAR; INTRAVENOUS ONCE
Status: COMPLETED | OUTPATIENT
Start: 2022-10-18 | End: 2022-10-18

## 2022-10-18 RX ORDER — PHENAZOPYRIDINE HYDROCHLORIDE 100 MG/1
200 TABLET, FILM COATED ORAL 3 TIMES DAILY PRN
Status: DISCONTINUED | OUTPATIENT
Start: 2022-10-18 | End: 2022-10-18

## 2022-10-18 RX ORDER — FAMOTIDINE 20 MG/1
20 TABLET, FILM COATED ORAL DAILY
Status: DISCONTINUED | OUTPATIENT
Start: 2022-10-18 | End: 2022-10-21 | Stop reason: HOSPADM

## 2022-10-18 RX ORDER — 0.9 % SODIUM CHLORIDE 0.9 %
1000 INTRAVENOUS SOLUTION INTRAVENOUS ONCE
Status: COMPLETED | OUTPATIENT
Start: 2022-10-18 | End: 2022-10-18

## 2022-10-18 RX ORDER — ATORVASTATIN CALCIUM 40 MG/1
40 TABLET, FILM COATED ORAL NIGHTLY
Status: DISCONTINUED | OUTPATIENT
Start: 2022-10-18 | End: 2022-10-21 | Stop reason: HOSPADM

## 2022-10-18 RX ORDER — SODIUM CHLORIDE 0.9 % (FLUSH) 0.9 %
5-40 SYRINGE (ML) INJECTION EVERY 12 HOURS SCHEDULED
Status: DISCONTINUED | OUTPATIENT
Start: 2022-10-18 | End: 2022-10-21 | Stop reason: HOSPADM

## 2022-10-18 RX ORDER — ACETAMINOPHEN 325 MG/1
650 TABLET ORAL EVERY 6 HOURS PRN
Status: DISCONTINUED | OUTPATIENT
Start: 2022-10-18 | End: 2022-10-21 | Stop reason: HOSPADM

## 2022-10-18 RX ORDER — ACETAMINOPHEN 325 MG/1
650 TABLET ORAL ONCE
Status: COMPLETED | OUTPATIENT
Start: 2022-10-18 | End: 2022-10-18

## 2022-10-18 RX ORDER — M-VIT,TX,IRON,MINS/CALC/FOLIC 27MG-0.4MG
1 TABLET ORAL DAILY
Status: DISCONTINUED | OUTPATIENT
Start: 2022-10-18 | End: 2022-10-21 | Stop reason: HOSPADM

## 2022-10-18 RX ORDER — ENOXAPARIN SODIUM 100 MG/ML
40 INJECTION SUBCUTANEOUS DAILY
Status: DISCONTINUED | OUTPATIENT
Start: 2022-10-18 | End: 2022-10-21 | Stop reason: HOSPADM

## 2022-10-18 RX ORDER — SODIUM CHLORIDE 9 MG/ML
25 INJECTION, SOLUTION INTRAVENOUS PRN
Status: DISCONTINUED | OUTPATIENT
Start: 2022-10-18 | End: 2022-10-21 | Stop reason: HOSPADM

## 2022-10-18 RX ORDER — ACETAMINOPHEN 325 MG/1
650 TABLET ORAL EVERY 4 HOURS PRN
Status: DISCONTINUED | OUTPATIENT
Start: 2022-10-18 | End: 2022-10-18 | Stop reason: SDUPTHER

## 2022-10-18 RX ORDER — LEVOTHYROXINE SODIUM 0.07 MG/1
75 TABLET ORAL DAILY
Status: DISCONTINUED | OUTPATIENT
Start: 2022-10-19 | End: 2022-10-21 | Stop reason: HOSPADM

## 2022-10-18 RX ORDER — SODIUM CHLORIDE 9 MG/ML
INJECTION, SOLUTION INTRAVENOUS CONTINUOUS
Status: DISCONTINUED | OUTPATIENT
Start: 2022-10-18 | End: 2022-10-20

## 2022-10-18 RX ORDER — POLYETHYLENE GLYCOL 3350 17 G/17G
17 POWDER, FOR SOLUTION ORAL DAILY PRN
Status: DISCONTINUED | OUTPATIENT
Start: 2022-10-18 | End: 2022-10-21 | Stop reason: HOSPADM

## 2022-10-18 RX ORDER — CARVEDILOL 6.25 MG/1
6.25 TABLET ORAL 2 TIMES DAILY WITH MEALS
Status: DISCONTINUED | OUTPATIENT
Start: 2022-10-18 | End: 2022-10-21 | Stop reason: HOSPADM

## 2022-10-18 RX ORDER — PHENAZOPYRIDINE HYDROCHLORIDE 100 MG/1
100 TABLET, FILM COATED ORAL 3 TIMES DAILY PRN
Status: DISCONTINUED | OUTPATIENT
Start: 2022-10-18 | End: 2022-10-21 | Stop reason: HOSPADM

## 2022-10-18 RX ORDER — LANOLIN ALCOHOL/MO/W.PET/CERES
500 CREAM (GRAM) TOPICAL DAILY
Status: DISCONTINUED | OUTPATIENT
Start: 2022-10-18 | End: 2022-10-21 | Stop reason: HOSPADM

## 2022-10-18 RX ADMIN — ASPIRIN 81 MG: 81 TABLET, COATED ORAL at 13:46

## 2022-10-18 RX ADMIN — MULTIPLE VITAMINS W/ MINERALS TAB 1 TABLET: TAB at 13:46

## 2022-10-18 RX ADMIN — ENOXAPARIN SODIUM 40 MG: 100 INJECTION SUBCUTANEOUS at 13:47

## 2022-10-18 RX ADMIN — FAMOTIDINE 20 MG: 20 TABLET ORAL at 13:45

## 2022-10-18 RX ADMIN — ACETAMINOPHEN 650 MG: 325 TABLET ORAL at 16:44

## 2022-10-18 RX ADMIN — SODIUM CHLORIDE: 9 INJECTION, SOLUTION INTRAVENOUS at 13:03

## 2022-10-18 RX ADMIN — CEFTRIAXONE 1000 MG: 1 INJECTION, POWDER, FOR SOLUTION INTRAMUSCULAR; INTRAVENOUS at 13:40

## 2022-10-18 RX ADMIN — CLOPIDOGREL BISULFATE 75 MG: 75 TABLET ORAL at 13:45

## 2022-10-18 RX ADMIN — SODIUM CHLORIDE 2328 ML: 9 INJECTION, SOLUTION INTRAVENOUS at 10:00

## 2022-10-18 RX ADMIN — ATORVASTATIN CALCIUM 40 MG: 40 TABLET, FILM COATED ORAL at 20:21

## 2022-10-18 RX ADMIN — CYANOCOBALAMIN TAB 1000 MCG 500 MCG: 1000 TAB at 13:46

## 2022-10-18 RX ADMIN — ACETAMINOPHEN 650 MG: 325 TABLET ORAL at 10:30

## 2022-10-18 RX ADMIN — SODIUM CHLORIDE 1000 ML: 9 INJECTION, SOLUTION INTRAVENOUS at 18:01

## 2022-10-18 RX ADMIN — ONDANSETRON 4 MG: 2 INJECTION INTRAMUSCULAR; INTRAVENOUS at 10:30

## 2022-10-18 ASSESSMENT — PAIN SCALES - GENERAL
PAINLEVEL_OUTOF10: 3
PAINLEVEL_OUTOF10: 0

## 2022-10-18 ASSESSMENT — PAIN - FUNCTIONAL ASSESSMENT
PAIN_FUNCTIONAL_ASSESSMENT: ACTIVITIES ARE NOT PREVENTED
PAIN_FUNCTIONAL_ASSESSMENT: NONE - DENIES PAIN

## 2022-10-18 ASSESSMENT — PAIN DESCRIPTION - LOCATION: LOCATION: BACK

## 2022-10-18 ASSESSMENT — PAIN DESCRIPTION - DESCRIPTORS: DESCRIPTORS: ACHING

## 2022-10-18 ASSESSMENT — PAIN DESCRIPTION - ORIENTATION: ORIENTATION: LOWER

## 2022-10-18 NOTE — ED PROVIDER NOTES
[facility] MED SURG  EMERGENCY DEPARTMENT ENCOUNTER      Pt Name: Emperatriz Tobias  MRN: [id number]  [place] 1940  Date of evaluation: 10/18/2022  Provider: Seymour Perez MD    CHIEF COMPLAINT       Chief Complaint   Patient presents with    Female  Problem    Emesis     Pt to ED with increased bladder spasms, suspecting UTI  Also c/o N/VD since Wednesday. Called PCP and advised to come to ED         HISTORY OF PRESENT ILLNESS   (Location/Symptom, Timing/Onset, Context/Setting, Quality, Duration, Modifying Factors, Severity)  Note limiting factors. Emperatriz Tobias is a 79 y.o. female who presents to the emergency department      78 yo female with fever, weakness, nausea. Since last Wednesday. Progressively worsening. Patient does have a history of recurrent UTIs and has been septic in the past.  Patient has been having increasing bladder spasms. No flank pain, no hematuria. Patient very anxious on arrival.      Nursing Notes were reviewed. REVIEW OF SYSTEMS    (2-9 systems for level 4, 10 or more for level 5)     Review of Systems   All other systems reviewed and are negative. Except as noted above the remainder of the review of systems was reviewed and negative. PAST MEDICAL HISTORY     Past Medical History:   Diagnosis Date    Allergic rhinitis 10/1994    Arthritis     Asthma     Atrial fibrillation ([facility] [address] 12/2008    CAD (coronary artery disease)     Cerebrovascular accident (CVA) involving left cerebral hemisphere ([facility] [address] 04/23/2019    remote lacunar infarction within the L periventricular white matter    CHF (congestive heart failure) (HCC)     Clotting disorder (HCC)     plebitis in L leg    COPD (chronic obstructive pulmonary disease) ([facility])     DDD (degenerative disc disease), cervical     Degeneration of cervical intervertebral disc     Diverticulosis 2005    Gout     Gout, unspecified     H/O cardiac catheterization 6/17/15    LMCA: Normal 0% stenosis. LAD: Lesion on 1st diag: Proximal subsection. 80% stenosis. Lesion plaque is ruptured. Jaffe Hidden LCx: Lesion on 1st Ob Leslie: Mid subsection. 85% stenosis. RCA:Lesion on R PDA: Mid subsection. 85% stenosis. Lesion on R PDA: Distal subsection. 70% stenosis. Lesion on Prox RCA: Mid subsection. 50% stenosis. EF 50%. H/O echocardiogram 11/09/2016    EF 40-45%. Mild LV hypertrophy normal LV cavity size. Sigmoid interventricular septum without evidence of outflow tract obstruction. Left atrium is moderately dilated (34-39) left atrial volume index of 36 ml/m2. Mild mitral regurg. Diastology cannot be assessed due to A-fib. H/O echocardiogram 03/09/2018    EF 45-50%. Apical hypokinesis noted. The left atrium is moderately dilated (34-39) with a left atrial volume index of 34ml/m2. Mild to moderate mitral regurg. Mild tricuspid regurg. Moderate diastolic dysfunction. History of Holter monitoring 3/24/16    Atrial Fibrillation throughout,fairly controlled, HR  bpm 79% of the time. Occasional high ventricular rate episodes and multiple pauses suggestive of tachycardia/bradycardia syndrome  Msximum R-R interval 2.68 seconds.     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    Infectious hepatitis Age 14    Food Borne    Long term (current) use of anticoagulants 8/31/2015    Movement disorder     Other abnormal glucose 2004    Pacemaker 08/10/2017    Insertion of a biventricular pacemaker/ICD AVN ablation    Phlebitis and thrombophlebitis of lower extremities, unspecified 1961    L leg    Senile osteoporosis 2006    L/S    Symptomatic menopausal or female climacteric states 06/1995    Syncope and collapse     Unspecified hereditary and idiopathic peripheral neuropathy 2012    Unspecified sleep apnea 11/2009         SURGICAL HISTORY       Past Surgical History:   Procedure Laterality Date    BRONCHOSCOPY  6/7/2017    BRONCHOSCOPY BRUSHINGS performed by Anne-Marie Dunaway DO at [facility]    BRONCHOSCOPY  6/7/2017    BRONCHOSCOPY/TRANSBRONCHIAL LUNG BIOPSY performed by Anne-Marie Dunaway DO at [facility]    BRONCHOSCOPY  6/7/2017    BRONCHOSCOPY FLUOROSCOPY performed by Anne-Marie Dunaway DO at [address] [place] [address] Right 06/17/2015    CARDIAC CATHETERIZATION Left 06/23/2022    CATARACT REMOVAL WITH IMPLANT Right 08/14/2017    DR ROMERO    COLONOSCOPY  1/2005    DIAGNOSTIC CARDIAC CATH LAB PROCEDURE  06/17/15    EYE SURGERY      FRACTURE SURGERY  2004    Distal Radius Ulna    HIP FRACTURE SURGERY Right 04/21/2019    Dr. Nate Marie- hip pinning    HIP PINNING Right 4/21/2019    HIP PINNING performed by Silver Morgan MD at [facility] Left 6/14/2017    MINI PORT ACCESS LEFT CHEST, X2 SPECIMENS.  performed by Willian Hunt MD at [address] [place] [address]  2years old    VOLVAR-ULCERATIVE LESION-CAUTERIZED    IN EGD TRANSORAL BIOPSY SINGLE/MULTIPLE Left 2/13/2018    EGD BIOPSY performed by Darci Onofre MD at [place] [address]       Current Discharge Medication List        CONTINUE these medications which have NOT CHANGED    Details   phenazopyridine (PYRIDIUM) 200 MG tablet Take 1 tablet by mouth 3 times daily as needed for Pain  Qty: 6 tablet, Refills: 0      carvedilol (COREG) 6.25 MG tablet Take 1 tablet by mouth twice daily  Qty: 180 tablet, Refills: 0      ipratropium (ATROVENT) 0.06 % nasal spray USE 2 SPRAY(S) IN EACH NOSTRIL TWICE DAILY  Qty: 45 mL, Refills: 0    Associated Diagnoses: Seasonal allergic rhinitis, unspecified trigger      EUTHYROX 75 MCG tablet Take 1 tablet by mouth once daily  Qty: 90 tablet, Refills: 1    Associated Diagnoses: Hypothyroidism, unspecified type      atorvastatin (LIPITOR) 40 MG tablet Take 1 tablet by mouth nightly  Qty: 90 tablet, Refills: 1    Associated Diagnoses: Hyperlipidemia, unspecified hyperlipidemia type clopidogrel (PLAVIX) 75 MG tablet Take 1 tablet by mouth daily  Qty: 30 tablet, Refills: 3      nitroGLYCERIN (NITROSTAT) 0.4 MG SL tablet Place 1 tablet under the tongue every 5 minutes as needed for Chest pain up to max of 3 total doses. If no relief after 1 dose, call 911.   Qty: 25 tablet, Refills: 1      Multiple Vitamins-Minerals (THERAPEUTIC MULTIVITAMIN-MINERALS) tablet Take 1 tablet by mouth daily      vitamin B-12 (CYANOCOBALAMIN) 500 MCG tablet Take 500 mcg by mouth daily       aspirin EC 81 MG EC tablet Take 1 tablet by mouth daily  Qty: 90 tablet, Refills: 3      acetaminophen (TYLENOL) 325 MG tablet Take 2 tablets by mouth every 4 hours as needed for Pain  Qty: 120 tablet, Refills: 3      polyethylene glycol (GLYCOLAX) powder Take 17 g by mouth as needed (for constipation)             ALLERGIES     Adhesive tape, Aspercreme [trolamine salicylate], Erythromycin, Erythromycin, Grandpas thylox soap [elemental sulfur], Guaifenesin & derivatives, Niacin and related, Niacin and related, Soap, Tape [adhesive tape], and Augmentin [amoxicillin-pot clavulanate]    FAMILY HISTORY       Family History   Problem Relation Age of Onset    Heart Disease Mother     Stroke Mother     High Blood Pressure Mother     Cancer Father         lung    Stroke Brother     Heart Disease Maternal Grandmother           SOCIAL HISTORY       Social History     Socioeconomic History    Marital status:    Tobacco Use    Smoking status: Never    Smokeless tobacco: Never   Vaping Use    Vaping Use: Never used   Substance and Sexual Activity    Alcohol use: No     Alcohol/week: 0.0 standard drinks    Drug use: No   Social History Narrative    ** Merged History Encounter **          Social Determinants of Health     Financial Resource Strain: Low Risk     Difficulty of Paying Living Expenses: Not hard at all   Food Insecurity: No Food Insecurity    Worried About Running Out of Food in the Last Year: Never true    Ran Out of Food in the Last Year: Never true       SCREENINGS        Nathan Coma Scale  Eye Opening: Spontaneous  Best Verbal Response: Oriented  Best Motor Response: Obeys commands  Ralls Coma Scale Score: 15               PHYSICAL EXAM    (up to 7 for level 4, 8 or more for level 5)     ED Triage Vitals [10/18/22 0933]   BP Temp Temp Source Heart Rate Resp SpO2 Height Weight   (!) 153/61 (!) 102.3 °F (39.1 °C) Tympanic 83 18 93 % 5' 5\" (1.651 m) 171 lb (77.6 kg)       Physical Exam  Vitals and nursing note reviewed. Constitutional:       Comments: Febrile temp 39.1  Ill and frail apearing   HENT:      Head: Normocephalic and atraumatic. Mouth/Throat:      Mouth: Mucous membranes are dry. Eyes:      Conjunctiva/sclera: Conjunctivae normal.   Cardiovascular:      Rate and Rhythm: Normal rate and regular rhythm. Pulmonary:      Effort: Pulmonary effort is normal. No respiratory distress. Breath sounds: Normal breath sounds. Abdominal:      General: There is no distension. Tenderness: There is no abdominal tenderness. Skin:     General: Skin is warm and dry. Findings: No rash. Neurological:      General: No focal deficit present. Mental Status: She is alert and oriented to person, place, and time. DIAGNOSTIC RESULTS     EKG: All EKG's are interpreted by the Emergency Department Physician who either signs or Co-signs this chart in the absence of a cardiologist.        RADIOLOGY:   Non-plain film images such as CT, Ultrasound and MRI are read by the radiologist. Plain radiographic images are visualized and preliminarily interpreted by the emergency physician with the below findings:        Interpretation per the Radiologist below, if available at the time of this note:    XR CHEST PORTABLE   Final Result   Chronic findings in the chest without acute airspace disease identified.                ED BEDSIDE ULTRASOUND:   Performed by ED Physician - none    LABS:  Labs Reviewed   CBC WITH AUTO DIFFERENTIAL - Abnormal; Notable for the following components:       Result Value    WBC 12.4 (*)     RDW 14.5 (*)     Seg Neutrophils 89 (*)     Lymphocytes 4 (*)     Eosinophils % 0 (*)     Immature Granulocytes 1 (*)     Segs Absolute 11.09 (*)     Absolute Lymph # 0.54 (*)     All other components within normal limits   COMPREHENSIVE METABOLIC PANEL - Abnormal; Notable for the following components:    Glucose 143 (*)     Creatinine 1.04 (*)     Est, Glom Filt Rate 54 (*)     Albumin 3.2 (*)     Albumin/Globulin Ratio 0.9 (*)     All other components within normal limits   URINALYSIS WITH REFLEX TO CULTURE - Abnormal; Notable for the following components:    Turbidity UA Cloudy (*)     Ketones, Urine 1+ (*)     Specific Gravity, UA 1.025 (*)     Urine Hgb 3+ (*)     Protein, UA 2+ (*)     Urobilinogen, Urine ELEVATED (*)     Nitrite, Urine POSITIVE (*)     Leukocyte Esterase, Urine MODERATE (*)     All other components within normal limits   MICROSCOPIC URINALYSIS - Abnormal; Notable for the following components:    Bacteria, UA 2+ (*)     All other components within normal limits   CULTURE, BLOOD 1   CULTURE, BLOOD 2   CULTURE, URINE   LACTATE, SEPSIS   LACTATE, SEPSIS       All other labs were within normal range or not returned as of this dictation. EMERGENCY DEPARTMENT COURSE and DIFFERENTIAL DIAGNOSIS/MDM:   Vitals:    Vitals:    10/18/22 1100 10/18/22 1130 10/18/22 1152 10/18/22 1200   BP: (!) 105/24 (!) 101/43  (!) 92/42   Pulse: 80 80  80   Resp: 22 22 24   Temp:   99.8 °F (37.7 °C)    TempSrc:   Oral    SpO2: 98% 97%  99%   Weight:       Height:               MDM  Number of Diagnoses or Management Options  Diagnosis management comments: 80 yo female with uti. IV fluids given. Patient initially anxious and BP elevated. Feeling better now. BP now 92/42. IV fluids continued. Iv rocephin ordered. Patient continues to improve. Admitted to Dr Wilfrid Boggs.         [facility]       REASSESSMENT          CRITICAL CARE TIME   Total Critical Care time was  minutes, excluding separately reportable procedures. There was a high probability of clinically significant/life threatening deterioration in the patient's condition which required my urgent intervention. CONSULTS:  None    PROCEDURES:  Unless otherwise noted below, none     Procedures      FINAL IMPRESSION      1. Urinary tract infection without hematuria, site unspecified    2. Septicemia [facility])          DISPOSITION/PLAN   DISPOSITION Admitted 10/18/2022 12:22:16 PM      PATIENT REFERRED TO:  No follow-up provider specified. DISCHARGE MEDICATIONS:  Current Discharge Medication List        Controlled Substances Monitoring:     No flowsheet data found.     (Please note that portions of this note were completed with a voice recognition program.  Efforts were made to edit the dictations but occasionally words are mis-transcribed.)    Dionna Paredes MD (electronically signed)  Attending Emergency Physician             Dionna Paredes MD  10/18/22 [phone]

## 2022-10-18 NOTE — PROGRESS NOTES
Pharmacy Note - Renal dose adjustment made per P/T protocol    Original order:  Famotidine 20mg BID    Estimated Creatinine Clearance: 44 mL/min (A) (based on SCr of 1.04 mg/dL (H)). Recent Labs     10/18/22  0955   BUN 18   CREATININE 1.04*          Renally adjusted order:  Famotidine 20mg daily    Please call pharmacy with any questions.     Thank you,  RAFAT Sands [facility]  10/18/2022 12:54 PM

## 2022-10-18 NOTE — PROGRESS NOTES
Vitals and assessment are complete at this time. Writer walked patient through the medications that would be administered tonight and encouraged patient to ask questions about the medications and therapies. Patient denies questions. Gale is in place and draining. Patient has crackles in bilateral bases. Blood pressure is hypotensive. Will monitor and inform on call doctor if symptoms become worse. Call light is within reach and bed alarm set. Patient denies needs at this time. Will continue to monitor and assess.

## 2022-10-18 NOTE — PROGRESS NOTES
Occupational Therapy  Facility/Department: [facility] MED SURG  Occupational Therapy Initial Assessment    Name: Leon Dietrich  : 1940  MRN: [id number]  Date of Service: 10/18/2022    Discharge Recommendations:  Continue to assess pending progress, Home with assist PRN          Patient Diagnosis(es): The primary encounter diagnosis was Urinary tract infection without hematuria, site unspecified. A diagnosis of Septicemia ([address] was also pertinent to this visit. Past Medical History:  has a past medical history of Allergic rhinitis, Arthritis, Asthma, Atrial fibrillation ([address] CAD (coronary artery disease), Cerebrovascular accident (CVA) involving left cerebral hemisphere [facility]), CHF (congestive heart failure) ([address] Clotting disorder ([address] COPD (chronic obstructive pulmonary disease) ([address] DDD (degenerative disc disease), cervical, Degeneration of cervical intervertebral disc, Diverticulosis, Gout, Gout, unspecified, H/O cardiac catheterization, H/O echocardiogram, H/O echocardiogram, History of Holter monitoring, Hx of blood clots, Hyperlipidemia, Hyperlipidemia, Hypertension, Hypertension, Hypothyroidism due to amiodarone, Infectious hepatitis, Long term (current) use of anticoagulants, Movement disorder, Other abnormal glucose, Pacemaker, Phlebitis and thrombophlebitis of lower extremities, unspecified, Senile osteoporosis, SIRS (systemic inflammatory response syndrome) ([address] Symptomatic menopausal or female climacteric states, Syncope and collapse, Unspecified hereditary and idiopathic peripheral neuropathy, and Unspecified sleep apnea. Past Surgical History:  has a past surgical history that includes Tonsillectomy and adenoidectomy; eye surgery; skin biopsy; Diagnostic Cardiac Cath Lab Procedure (06/17/15); fracture surgery (); Colonoscopy (2005); other surgical history (2years old); bronchoscopy (2017); bronchoscopy (2017); bronchoscopy (2017);  Lung lobectomy (Left, 2017); Cataract removal with implant (Right, 08/14/2017); pr egd transoral biopsy single/multiple (Left, 2/13/2018); Hip fracture surgery (Right, 04/21/2019); hip pinning (Right, 4/21/2019); Cardiac catheterization (Right, 06/17/2015); and Cardiac catheterization (Left, 06/23/2022). Treatment Diagnosis: UTI, weakness      Assessment   Performance deficits / Impairments: Decreased functional mobility ; Decreased ADL status; Decreased strength;Decreased endurance;Decreased balance  Assessment: Patient is an 79year old female who presented to the ER with complaints of worsening bladder spasms and a fever. She has a hx of UTI's. She was positive for UTI and admitted to hospital. She lives with family and is independent with ADLs at home. She gets assistance from family with IADLs and uses AD outside the home but is I with functional mobility with no AD inside the home. She is presenting wtih weakness, decreased endurance and need for assistance with all ADLs and functional mobility at this time. She may benefit from OT services to return home safely at [facility]. Treatment Diagnosis: UTI, weakness  Prognosis: Good  Decision Making: Medium Complexity  REQUIRES OT FOLLOW-UP: Yes  Activity Tolerance  Activity Tolerance: Patient limited by fatigue  Activity Tolerance Comments: Limited due to shaking, nausea, and reports of weakness        Plan   Occupational Therapy Plan  Times Per Week: 7  Times Per Day:  Once a day  Current Treatment Recommendations: Strengthening, Functional mobility training, Endurance training, Patient/Caregiver education & training, Self-Care / ADL, Balance training  Additional Comments: therex, theract, and self-care training     Restrictions  Restrictions/Precautions  Restrictions/Precautions: Fall Risk, General Precautions    Subjective   General  Chart Reviewed: Yes  Patient assessed for rehabilitation services?: Yes  Family / Caregiver Present: Yes  Referring Practitioner: Anna  Diagnosis: UTI  Subjective  Subjective: Patient denies pain but reports \"I'm freezing and I can't stop shaking. \"  General Comment  Comments: Nurse present in room upon entry. Patient agreeable to OT eval. Patient shaking uncontrollably but nurse reporting she doesn't have a fever. Nurse also present to get extended O2 tubing and assist with managing catheter during eval.     Social/Functional History  Social/Functional History  Lives With: Spouse, Family  Type of Home: House  Bathroom Shower/Tub: Tub/Shower unit  Bathroom Toilet: Standard  Bathroom Equipment: Grab bars in shower  Bathroom Accessibility: Accessible  Home Equipment: [place]a [place], 4 wheeled, [employer]  Has the patient had two or more falls in the past year or any fall with injury in the past year?: No  Receives Help From: Family  ADL Assistance: Independent  Homemaking Assistance: Needs assistance  Ambulation Assistance: Independent  Transfer Assistance: Independent  Active : No  Patient's  Info: , daughter, granddaughter  IADL Comments: Patient's daughter and granddaughter live with her. They complete IADL tasks such as cleaning, laundry, cooking sometimes, and groceries/errands       Objective   Heart Rate: 80  Heart Rate Source: Monitor  BP: 108/81  BP Location: Left lower arm  BP Method: Automatic  Patient Position: Semi fowlers  MAP (Calculated): 90  Resp: 18  SpO2: 96 %  O2 Device: Nasal cannula             Safety Devices  Type of Devices: All fall risk precautions in place;Call light within reach; Left in bed (Nurse present in room when therapist left)  Bed Mobility Training  Bed Mobility Training: Yes  Overall Level of Assistance: Minimum assistance  Interventions: Verbal cues  Rolling: Stand-by assistance  Supine to Sit: Minimum assistance  Sit to Supine: Minimum assistance  Scooting: Minimum assistance  Balance  Sitting: Intact  Standing: Impaired  Standing - Static: Constant support  Standing - Dynamic: Constant support  Transfer Training  Transfer Training: Yes  Overall Level of Assistance: Minimum assistance  Interventions: Verbal cues  Sit to Stand: Minimum assistance  Stand to Sit: Minimum assistance  Gait  Assistive Device: Walker, rolling        ADL  Feeding: Setup  Grooming: Stand by assistance  UE Bathing: Minimal assistance  LE Bathing: Moderate assistance  UE Dressing: Minimal assistance  LE Dressing: Moderate assistance  Toileting:  Moderate assistance                 Orientation  Overall Orientation Status: Within Normal Limits                  Education Given To: Patient  Education Provided: Role of Therapy;Plan of Care  Education Provided Comments: Edu on safe transfer technique  Education Method: Verbal  Barriers to Learning: None  Education Outcome: Verbalized understanding  LUE AROM (degrees)  LUE AROM : WFL  RUE AROM (degrees)  RUE AROM : WFL                     G-Code     OutComes Score                                                  AM-PAC Score        AM-PAC Inpatient Daily Activity Raw Score: 19 (10/18/22 1741)  AM-PAC Inpatient ADL T-Scale Score : 40.22 (10/18/22 1741)  ADL Inpatient CMS 0-100% Score: 42.8 (10/18/22 1741)  ADL Inpatient CMS G-Code Modifier : CK (10/18/22 1741)    Tinneti Score       Goals  Short Term Goals  Time Frame for Short Term Goals: 21 days  Short Term Goal 1: Patient will complete toileting tasks with mod I  Short Term Goal 2: Patient will complete ADL routine with mod I to return to PLOF  Short Term Goal 3: Patient will complete functional mobility/functional transfers with mod I with use of AD as needed  Short Term Goal 4: Patient will demonstrate/verbalize 100% understanding of discharge recommendations  Patient Goals   Patient goals : feel better, go home       Therapy Time   Individual Concurrent Group Co-treatment   Time In 1630         Time Out 1650         Minutes 20         Timed Code Treatment Minutes: 20 Minutes       Tariq Alcazar OT

## 2022-10-18 NOTE — H&P
History and Physical    Patient:  Dave Del Angel  MRN: [id number]    Chief Complaint: Nausea vomiting, dysuria    History Obtained From:  patient, electronic medical record    PCP: Kalyan Braden MD    History of Present Illness: The patient is a 79 y.o. female who presented to the emergency room with complaints of nausea, vomiting and dysuria. She stated symptom onset was Wednesday, October 12. She stated that she was having fever and chills as well. She complained of decreased appetite although she states she does not normally eat large meals. She denied dizziness or syncope. She denied falls or any injury. Patient stated she has history of UTIs in the past including sepsis. She complained of bladder spasms and had been on Pyridium as prescribed by her PCP. Patient was febrile upon arrival at T-max 102. 3. No hypotension, tachycardia or tachypnea were seen. Chest x-ray was negative. WBC count was slightly elevated at 12.4. Urinalysis was positive for UTI. Patient was hypotensive with systolic blood pressure in the 90s and was given IV fluids and IV Rocephin was initiated. Past Medical History:        Diagnosis Date    Allergic rhinitis 10/1994    Arthritis     Asthma     Atrial fibrillation ([facility] 12/2008    CAD (coronary artery disease)     Cerebrovascular accident (CVA) involving left cerebral hemisphere ([facility] 04/23/2019    remote lacunar infarction within the L periventricular white matter    CHF (congestive heart failure) (HCC)     Clotting disorder (HCC)     plebitis in L leg    COPD (chronic obstructive pulmonary disease) ([facility])     DDD (degenerative disc disease), cervical     Degeneration of cervical intervertebral disc     Diverticulosis 2005    Gout     Gout, unspecified     H/O cardiac catheterization 6/17/15    LMCA: Normal 0% stenosis. LAD: Lesion on 1st diag: Proximal subsection. 80% stenosis. Lesion plaque is ruptured. Medina Stubbs LCx: Lesion on 1st Ob Leslie: Mid subsection. 85% stenosis.  Nadege Guzman on R PDA: Mid subsection. 85% stenosis. Lesion on R PDA: Distal subsection. 70% stenosis. Lesion on Prox RCA: Mid subsection. 50% stenosis. EF 50%. H/O echocardiogram 11/09/2016    EF 40-45%. Mild LV hypertrophy normal LV cavity size. Sigmoid interventricular septum without evidence of outflow tract obstruction. Left atrium is moderately dilated (34-39) left atrial volume index of 36 ml/m2. Mild mitral regurg. Diastology cannot be assessed due to A-fib. H/O echocardiogram 03/09/2018    EF 45-50%. Apical hypokinesis noted. The left atrium is moderately dilated (34-39) with a left atrial volume index of 34ml/m2. Mild to moderate mitral regurg. Mild tricuspid regurg. Moderate diastolic dysfunction. History of Holter monitoring 3/24/16    Atrial Fibrillation throughout,fairly controlled, HR  bpm 79% of the time. Occasional high ventricular rate episodes and multiple pauses suggestive of tachycardia/bradycardia syndrome  Msximum R-R interval 2.68 seconds.     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    Infectious hepatitis Age 14    Food Borne    Long term (current) use of anticoagulants 8/31/2015    Movement disorder     Other abnormal glucose 2004    Pacemaker 08/10/2017    Insertion of a biventricular pacemaker/ICD AVN ablation    Phlebitis and thrombophlebitis of lower extremities, unspecified 1961    L leg    Senile osteoporosis 2006    L/S    SIRS (systemic inflammatory response syndrome) ([facility] 10/18/2022    Symptomatic menopausal or female climacteric states 06/1995    Syncope and collapse     Unspecified hereditary and idiopathic peripheral neuropathy 2012    Unspecified sleep apnea 11/2009       Past Surgical History:        Procedure Laterality Date    BRONCHOSCOPY  6/7/2017    BRONCHOSCOPY BRUSHINGS performed by Nita Fierro DO at [facility] [place]  6/7/2017 BRONCHOSCOPY/TRANSBRONCHIAL LUNG BIOPSY performed by Durga Blanco DO at [facility]    BRONCHOSCOPY  6/7/2017    BRONCHOSCOPY FLUOROSCOPY performed by Durga Blanco DO at [address] [facility] Right 06/17/2015    CARDIAC CATHETERIZATION Left 06/23/2022    CATARACT REMOVAL WITH IMPLANT Right 08/14/2017    DR ROMERO    COLONOSCOPY  1/2005    DIAGNOSTIC CARDIAC CATH LAB PROCEDURE  06/17/15    EYE SURGERY      FRACTURE SURGERY  2004    Distal Radius Ulna    HIP FRACTURE SURGERY Right 04/21/2019    Dr. Tiera Sheehan- hip pinning    HIP PINNING Right 4/21/2019    HIP PINNING performed by Chayo Wilkins MD at [facility] Left 6/14/2017    MINI PORT ACCESS LEFT CHEST, X2 SPECIMENS. performed by Ricardo Brennan MD at [address]  2years old    VOLVAR-ULCERATIVE LESION-CAUTERIZED    OR EGD TRANSORAL BIOPSY SINGLE/MULTIPLE Left 2/13/2018    EGD BIOPSY performed by Felix Gutiérrez MD at [address] [facility]         Medications Prior to Admission:    Prior to Admission medications    Medication Sig Start Date End Date Taking?  Authorizing Provider   phenazopyridine (PYRIDIUM) 200 MG tablet Take 1 tablet by mouth 3 times daily as needed for Pain 10/17/22   Ferne Angelucci, MD   carvedilol (COREG) 6.25 MG tablet Take 1 tablet by mouth twice daily 9/22/22   Ferne Angelucci, MD   ipratropium (ATROVENT) 0.06 % nasal spray USE 2 SPRAY(S) IN EACH NOSTRIL TWICE DAILY  Patient taking differently: 2 sprays by Nasal route 2 times daily as needed USE 2 SPRAY(S) IN EACH NOSTRIL TWICE DAILY 8/15/22   Ferne Angelucci, MD   EUTHYROX 75 MCG tablet Take 1 tablet by mouth once daily 7/11/22   Ferne Angelucci, MD   atorvastatin (LIPITOR) 40 MG tablet Take 1 tablet by mouth nightly 7/11/22   Ferne Angelucci, MD   clopidogrel (PLAVIX) 75 MG tablet Take 1 tablet by mouth daily 6/25/22   Leilani Subramanian, APRN - CNP   nitroGLYCERIN (NITROSTAT) 0.4 MG SL tablet Place 1 tablet under the tongue every 5 minutes as needed for Chest pain up to max of 3 total doses. If no relief after 1 dose, call 911. 6/24/22   OSCAR Vargas - CNP   Multiple Vitamins-Minerals (THERAPEUTIC MULTIVITAMIN-MINERALS) tablet Take 1 tablet by mouth daily    Historical Provider, MD   vitamin B-12 (CYANOCOBALAMIN) 500 MCG tablet Take 500 mcg by mouth daily     Historical Provider, MD   aspirin EC 81 MG EC tablet Take 1 tablet by mouth daily 11/28/18   Ryan Lim MD   acetaminophen (TYLENOL) 325 MG tablet Take 2 tablets by mouth every 4 hours as needed for Pain 2/20/18   Anika Fuchs MD   polyethylene glycol (GLYCOLAX) powder Take 17 g by mouth as needed (for constipation)    Historical Provider, MD       Allergies:  Adhesive tape, Aspercreme [trolamine salicylate], Erythromycin, Erythromycin, Grandpas thylox soap [elemental sulfur], Guaifenesin & derivatives, Niacin and related, Niacin and related, Soap, Tape [adhesive tape], and Augmentin [amoxicillin-pot clavulanate]    Social History:   TOBACCO:   reports that she has never smoked. She has never used smokeless tobacco.  ETOH:   reports no history of alcohol use. Family History:       Problem Relation Age of Onset    Heart Disease Mother     Stroke Mother     High Blood Pressure Mother     Cancer Father         lung    Stroke Brother     Heart Disease Maternal Grandmother        Allergies:  Adhesive tape, Aspercreme [trolamine salicylate], Erythromycin, Erythromycin, Grandpas thylox soap [elemental sulfur], Guaifenesin & derivatives, Niacin and related, Niacin and related, Soap, Tape [adhesive tape], and Augmentin [amoxicillin-pot clavulanate]    Medications Prior to Admission:    Prior to Admission medications    Medication Sig Start Date End Date Taking?  Authorizing Provider   phenazopyridine (PYRIDIUM) 200 MG tablet Take 1 tablet by mouth 3 times daily as needed for Pain 10/17/22   Alec Sena MD   carvedilol (COREG) 6.25 MG tablet Take 1 tablet by mouth twice daily 9/22/22   Ashley Smith MD   ipratropium (ATROVENT) 0.06 % nasal spray USE 2 SPRAY(S) IN EACH NOSTRIL TWICE DAILY  Patient taking differently: 2 sprays by Nasal route 2 times daily as needed USE 2 SPRAY(S) IN EACH NOSTRIL TWICE DAILY 8/15/22   Ashley Smith MD   EUTHYROX 75 MCG tablet Take 1 tablet by mouth once daily 7/11/22   Ashley Smith MD   atorvastatin (LIPITOR) 40 MG tablet Take 1 tablet by mouth nightly 7/11/22   Ashley Smith MD   clopidogrel (PLAVIX) 75 MG tablet Take 1 tablet by mouth daily 6/25/22   OSCAR Strong CNP   nitroGLYCERIN (NITROSTAT) 0.4 MG SL tablet Place 1 tablet under the tongue every 5 minutes as needed for Chest pain up to max of 3 total doses. If no relief after 1 dose, call 911. 6/24/22   OSCAR Strong CNP   Multiple Vitamins-Minerals (THERAPEUTIC MULTIVITAMIN-MINERALS) tablet Take 1 tablet by mouth daily    Historical Provider, MD   vitamin B-12 (CYANOCOBALAMIN) 500 MCG tablet Take 500 mcg by mouth daily     Historical Provider, MD   aspirin EC 81 MG EC tablet Take 1 tablet by mouth daily 11/28/18   Morelia Vega MD   acetaminophen (TYLENOL) 325 MG tablet Take 2 tablets by mouth every 4 hours as needed for Pain 2/20/18   Shereen Lee MD   polyethylene glycol (GLYCOLAX) powder Take 17 g by mouth as needed (for constipation)    Historical Provider, MD       Review of Systems:  Constitutional:positive  for fevers, and positive for chills.   Eyes: negative for visual disturbance   ENT: negative for sore throat, negative nasal congestion, and negative for earache  Respiratory: negative for shortness of breath, negative for cough, and negative for wheezing  Cardiovascular: negative for chest pain, negative for palpitations, and negative for syncope  Gastrointestinal: negative for abdominal pain, positive for nausea,positive for vomiting, positive for diarrhea, negative for constipation, and negative for hematochezia or melena  Genitourinary: positive for dysuria, negative for urinary urgency, positive for urinary frequency, and negative for hematuria  Skin: negative for skin rash, and negative for skin lesions  Neurological: negative for unilateral weakness, numbness or tingling. Physical Exam:    Vitals:   Temp: 98.2 °F (36.8 °C)  BP: (!) 94/48  Resp: 18  Heart Rate: 88  SpO2: 96 %  24HR INTAKE/OUTPUT:  No intake or output data in the 24 hours ending 10/18/22 1446    Weight    Body mass index is 29.5 kg/m². Exam:  GEN:    Awake, alert and oriented x3. EYES:  EOMI, pupils equal   MOUTH: Mucous membranes dry  NECK: Supple. No lymphadenopathy. No carotid bruit  CVS:    regular rate and rhythm, no audible murmur  PULM:  CTA, no wheezes, rales or rhonchi, no acute respiratory distress  ABD:    Bowels sounds normal.  Abdomen is soft. No distention. no tenderness to palpation. EXT:   no edema bilaterally . No calf tenderness. NEURO: Moves all extremities. Motor and sensory are grossly intact tremors noted bilateral hands  SKIN:  No rashes. No skin lesions.       Sepsis Times and Checklist  Vital Signs: BP: (!) 94/48  Heart Rate: 88  Resp: 18  Temp: 98.2 °F (36.8 °C) SpO2: 96 %  SIRS (>2)   Temp > 38.0C or < 36C   HR > 90   RR > 20   WBC > 12 or < 4 or >10% bands  SIRS (>2) and confirmed or suspected source of infection = Sepsis  Is Sepsis due to likely bacterial infection?: Yes    Severe Sepsis Identified:  Date: 10/18/2022   Time: 0923  Sepsis Orders:   CBC: Yes   CMP: Yes   PT/PTT: No   Blood Cultures x2: Yes   Urinalysis and Urine Culture: Yes   Lactate: Yes   Broad Spectrum Antibiotics Given (within 3 hours of sepsis identification,  after blood cultures):  Yes    (If unable to obtain IV access for IV antibiotics within 3 hours of  identification of sepsis, IM antibiotics is acceptable.)              If lactate >2.0 MUST repeat within 6 hours  Lactic acid was normal    If elevated, is elevated lactate from a likely infectious source?:  N/A . IV Fluid Bolus:  Is lactate > 4.0:  No  If lactate >  4.0 OR hypotension (MAP<65 mmHg) (with 2 BP readings) 30ml/kg crystalloid MUST be ordered. Fluids must be initiated within 3 hours of sepsis identification. These fluids must have a rate > 125 ml/hr. Is the patient Morbidly Obese (BMI > 30): No  IV Fluids given prior to arrival can be used in this calculation but the following information must be documented:  Start time: 1000, Type of fluid normal saline, Volume of fluid 2323 mL, and Rate (Duration or End time) 776 mL/h given over 180 minutes per ER. Does the patient or patient advocate refuse the entire 30 ml/kg IV fluid bolus? No      Septic Shock Identified (Initial lactate > 4.0 or 2 Hypotensive Blood Pressure readings within the first 6 hours): For septic shock sepsis focus physical exam must be completed AND documented (within 6 hours). Date: 10/18/2022 Time: 1411      sepsis focus exam completed.     For persistent hypotension after 30ml/kg fluid bolus vasopressors must be started (within 6 hours)    -----------------------------------------------------------------  Diagnostic Data:     DATA:    CBC:   Lab Results   Component Value Date    WBC 12.4 (H) 10/18/2022    RBC 4.33 10/18/2022    HGB 12.8 10/18/2022    HCT 39.6 10/18/2022    MCV 91.5 10/18/2022     10/18/2022        CMP:   Lab Results   Component Value Date    GLUCOSE 143 (H) 10/18/2022    BUN 18 10/18/2022    CREATININE 1.04 (H) 10/18/2022     10/18/2022    K 4.0 10/18/2022    CALCIUM 8.7 10/18/2022     10/18/2022    CO2 22 10/18/2022    PROT 6.9 10/18/2022    LABALBU 3.2 (L) 10/18/2022    BILITOT 1.1 10/18/2022    ALKPHOS 63 10/18/2022    ALT 10 10/18/2022    AST 18 10/18/2022       UA:   Lab Results   Component Value Date    COLORU Yellow 10/18/2022    CLARITYU clear 04/28/2021    SPECGRAV 1.025 (H) 10/18/2022    WBCUA GREATER THAN 100 10/18/2022    RBCUA 10 TO 20 10/18/2022    EPITHUA 0 TO 2 10/18/2022    LEUKOCYTESUR MODERATE (A) 10/18/2022    GLUCOSEU NEGATIVE 10/18/2022    BLOODU negative 04/28/2021    KETUA 1+ (A) 10/18/2022    PROTEINU 2+ (A) 10/18/2022    HGBUR 3+ (A) 10/18/2022    CASTUA NOT REPORTED 04/24/2021    CRYSTUA NOT REPORTED 04/24/2021    BACTERIA 2+ (A) 10/18/2022    YEAST NOT REPORTED 04/24/2021       Lactic Acid:   Lab Results   Component Value Date    LACTA 1.9 02/12/2018       D-Dimer:  No results found for: DDIMER    PT/INR:  Lab Results   Component Value Date/Time    PROTIME 10.7 04/21/2019 06:10 AM    PROTIME  08/14/2017 04:00 PM      Comment:      Continue dose of 7.5mg daily    INR 1.0 04/21/2019 06:10 AM       High Sensitivity Troponin:  No results for input(s): TROPHS in the last 72 hours. ABGs:   Lab Results   Component Value Date/Time    PHART 7.443 02/09/2018 12:15 AM    PH 7.43 09/15/2017 12:15 PM    MLP9ACK 30.9 02/09/2018 12:15 AM    PCO2 45 09/15/2017 12:15 PM    PO2ART 73.6 02/09/2018 12:15 AM    PO2 369 09/15/2017 12:15 PM    ESS0XPJ 20.7 02/09/2018 12:15 AM    HCO3 30 09/15/2017 12:15 PM    M6IXEUEW 95.5 02/09/2018 12:15 AM    O2SAT 100 09/15/2017 12:15 PM    FIO2 NOT REPORTED 02/09/2018 12:15 AM           XR CHEST PORTABLE   Final Result   Chronic findings in the chest without acute airspace disease identified. EKG reviewed    Assessment:    Principal Problem:    Complicated UTI (urinary tract infection)  Active Problems:    SIRS (systemic inflammatory response syndrome) (HCC)    Essential hypertension    Obstructive sleep apnea    Type 2 diabetes mellitus without complication (HCC)    Ischemic cardiomyopathy    Paroxysmal A-fib (HCC)    Chronic combined systolic and diastolic congestive heart failure ([address]  Resolved Problems:    * No resolved hospital problems.  *      Patient Active Problem List    Diagnosis Date Noted    Abnormal result of other cardiovascular function study 06/23/2022    Abnormal stress test Anginal equivalent ([address]     Pulmonary HTN 02/16/2015    Non-rheumatic mitral regurgitation 12/03/3224    Complicated UTI (urinary tract infection) 10/18/2022    SIRS (systemic inflammatory response syndrome) ([address] 10/18/2022    Chronic renal disease, stage III [facility]) [[id number]] 07/05/2022    Coronary artery disease 06/23/2022    CAD in native artery 06/23/2022    Type 2 diabetes mellitus with diabetic neuropathy 07/27/2021    Venous insufficiency 07/13/2020    Raynaud's phenomenon without gangrene 07/13/2020    Onychomycosis 07/13/2020    Peripheral polyneuropathy 07/13/2020    Status post CVA 05/21/2019    History of hip fracture 05/21/2019    Cerebrovascular accident (CVA) involving left cerebral hemisphere ([address] 04/23/2019    Hip fracture, right, closed, initial encounter ([address] 04/20/2019    Hip fx, right, closed, initial encounter ([address] 04/20/2019    Parkinsonian tremor ([address] 10/26/2018    Sick sinus syndrome ([address] 10/26/2018    Chronic a-fib ([address]     Dyspepsia     Hypothyroidism 03/14/2018    Chronic combined systolic and diastolic congestive heart failure ([address] 03/09/2018    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    Intractable vomiting with nausea 02/20/2018    Asymptomatic cholelithiasis 02/12/2018    Acute cholecystitis     Calculus of gallbladder with acute on chronic cholecystitis 02/10/2018    General weakness 02/09/2018    Elevated LFTs 02/09/2018    Pneumonitis 12/20/2017    Decubitus ulcer of sacral region 11/20/2017    Need for prophylactic vaccination and inoculation against influenza 10/16/2017    Paroxysmal A-fib ([address] 09/20/2017    Vasodepressor syncope 09/11/2017    Muscle fatigue 09/11/2017    Leg fatigue 09/05/2017    Acute on chronic congestive heart failure ([address] 09/05/2017    Chronic lung disease 08/30/2017    Interstitial lung disease ([address] 08/14/2017    Long term current use of anticoagulant therapy 08/14/2017    Chronic systolic congestive heart failure ([address] 08/14/2017    Closed TBI (traumatic brain injury) 07/11/2017    E. coli UTI 06/30/2017    Anemia of chronic disease 06/30/2017    Hyperglycemia 06/29/2017    Pulmonary fibrosis ([facility] 06/19/2017    ASHD (arteriosclerotic heart disease) 12/70/3747    Uncomplicated asthma 63/42/1199    Acute pulmonary edema ([facility] 06/06/2017    Ground glass opacity present on imaging of lung 06/06/2017    Ischemic cardiomyopathy 06/04/2017    Hypokalemia 06/04/2017    Acute cystitis with hematuria 05/31/2017    Type 2 diabetes mellitus without complication ([address] 06/82/7019    Essential hypertension 08/31/2015    Hyperlipidemia 08/31/2015    Obstructive sleep apnea 08/31/2015    Gout 08/31/2015       Plan: This patient requires inpatient admission because of complicated UTI  Factors affecting the medical complexity of this patient include SIRS, hypertension, type 2 diabetes, proximal atrial fib, chronic combined CHF  Estimated length of stay is 3 days  Complicated UTI  IV Rocephin  Blood cultures x2-pending  Urine culture-pending  SIRS  Fluid bolus per sepsis protocol given in ER for a total of 2328  Continue IV fluids  Lactic acid-normal  Antibiotics-IV Rocephin  Cultures-blood cultures x2, urine culture pending  Hypertension  Hold Coreg due to hypotension  Type 2 diabetes  Diet controlled  PAF  Hold Coreg  Chronic combined CHF  Caution with IV fluids  Daily weight  DVT prophylaxis: Lovenox  Peptic ulcer prophylaxis: Pepcid  High risk medications: none  Social Service and Case Management consults for DC planning  Dietician consult initiated    CORE MEASURES  DVT prophylaxis: Lovenox  Decubitus ulcer present on admission: No  CODE STATUS: FULL CODE  Nutrition Status: good   Physical therapy: Yes   Old Charts reviewed: Yes  EKG Reviewed:  Yes  Advance Directive Addressed: Yes    OSCAR Jama - CNP, APRN, NP-C  10/18/2022, 2:46 PM

## 2022-10-18 NOTE — ED NOTES
Urine sample obtained via straight cath, 25ML output.    Pure wick applied     Buzz Burnett RN  10/18/22 [id number]

## 2022-10-18 NOTE — PROGRESS NOTES
Patient arrived to floor via stretcher accompanied by rn. Vitals and assessment completed and charted. Patient denies needs/pain at this time.  Call light in reach

## 2022-10-19 LAB
ABSOLUTE EOS #: <0.03 K/UL (ref 0–0.44)
ABSOLUTE IMMATURE GRANULOCYTE: 0.03 K/UL (ref 0–0.3)
ABSOLUTE LYMPH #: 0.79 K/UL (ref 1.1–3.7)
ABSOLUTE MONO #: 0.51 K/UL (ref 0.1–1.2)
ANION GAP SERPL CALCULATED.3IONS-SCNC: 9 MMOL/L (ref 9–17)
BASOPHILS # BLD: 0 % (ref 0–2)
BASOPHILS ABSOLUTE: <0.03 K/UL (ref 0–0.2)
BUN BLDV-MCNC: 19 MG/DL (ref 8–23)
BUN/CREAT BLD: 19 (ref 9–20)
CALCIUM SERPL-MCNC: 7.2 MG/DL (ref 8.6–10.4)
CHLORIDE BLD-SCNC: 111 MMOL/L (ref 98–107)
CO2: 21 MMOL/L (ref 20–31)
CREAT SERPL-MCNC: 1.01 MG/DL (ref 0.5–0.9)
CULTURE: ABNORMAL
EKG ATRIAL RATE: 69 BPM
EKG Q-T INTERVAL: 436 MS
EKG QRS DURATION: 164 MS
EKG QTC CALCULATION (BAZETT): 502 MS
EKG R AXIS: -119 DEGREES
EKG T AXIS: 56 DEGREES
EKG VENTRICULAR RATE: 80 BPM
EOSINOPHILS RELATIVE PERCENT: 0 % (ref 1–4)
GFR SERPL CREATININE-BSD FRML MDRD: 56 ML/MIN/1.73M2
GLUCOSE BLD-MCNC: 127 MG/DL (ref 70–99)
HCT VFR BLD CALC: 30.2 % (ref 36.3–47.1)
HEMOGLOBIN: 9.7 G/DL (ref 11.9–15.1)
IMMATURE GRANULOCYTES: 0 %
LYMPHOCYTES # BLD: 8 % (ref 24–43)
MCH RBC QN AUTO: 29.2 PG (ref 25.2–33.5)
MCHC RBC AUTO-ENTMCNC: 32.1 G/DL (ref 28.4–34.8)
MCV RBC AUTO: 91 FL (ref 82.6–102.9)
MONOCYTES # BLD: 5 % (ref 3–12)
NRBC AUTOMATED: 0 PER 100 WBC
PDW BLD-RTO: 15 % (ref 11.8–14.4)
PLATELET # BLD: 146 K/UL (ref 138–453)
PMV BLD AUTO: 10.2 FL (ref 8.1–13.5)
POTASSIUM SERPL-SCNC: 3.8 MMOL/L (ref 3.7–5.3)
RBC # BLD: 3.32 M/UL (ref 3.95–5.11)
SEG NEUTROPHILS: 86 % (ref 36–65)
SEGMENTED NEUTROPHILS ABSOLUTE COUNT: 8.1 K/UL (ref 1.5–8.1)
SODIUM BLD-SCNC: 141 MMOL/L (ref 135–144)
SPECIMEN DESCRIPTION: ABNORMAL
WBC # BLD: 9.5 K/UL (ref 3.5–11.3)

## 2022-10-19 PROCEDURE — 97162 PT EVAL MOD COMPLEX 30 MIN: CPT

## 2022-10-19 PROCEDURE — 93010 ELECTROCARDIOGRAM REPORT: CPT | Performed by: INTERNAL MEDICINE

## 2022-10-19 PROCEDURE — 36415 COLL VENOUS BLD VENIPUNCTURE: CPT

## 2022-10-19 PROCEDURE — 1200000000 HC SEMI PRIVATE

## 2022-10-19 PROCEDURE — 2580000003 HC RX 258: Performed by: NURSE PRACTITIONER

## 2022-10-19 PROCEDURE — 85025 COMPLETE CBC W/AUTO DIFF WBC: CPT

## 2022-10-19 PROCEDURE — 97110 THERAPEUTIC EXERCISES: CPT

## 2022-10-19 PROCEDURE — 6360000002 HC RX W HCPCS: Performed by: NURSE PRACTITIONER

## 2022-10-19 PROCEDURE — 2700000000 HC OXYGEN THERAPY PER DAY

## 2022-10-19 PROCEDURE — 6370000000 HC RX 637 (ALT 250 FOR IP): Performed by: NURSE PRACTITIONER

## 2022-10-19 PROCEDURE — 94761 N-INVAS EAR/PLS OXIMETRY MLT: CPT

## 2022-10-19 PROCEDURE — 80048 BASIC METABOLIC PNL TOTAL CA: CPT

## 2022-10-19 RX ORDER — AMLODIPINE BESYLATE 5 MG/1
5 TABLET ORAL DAILY
Status: DISCONTINUED | OUTPATIENT
Start: 2022-10-19 | End: 2022-10-19

## 2022-10-19 RX ADMIN — SODIUM CHLORIDE: 9 INJECTION, SOLUTION INTRAVENOUS at 00:49

## 2022-10-19 RX ADMIN — ACETAMINOPHEN 650 MG: 325 TABLET ORAL at 20:29

## 2022-10-19 RX ADMIN — LEVOTHYROXINE SODIUM 75 MCG: 75 TABLET ORAL at 09:28

## 2022-10-19 RX ADMIN — CYANOCOBALAMIN TAB 1000 MCG 500 MCG: 1000 TAB at 09:27

## 2022-10-19 RX ADMIN — CLOPIDOGREL BISULFATE 75 MG: 75 TABLET ORAL at 09:28

## 2022-10-19 RX ADMIN — MULTIPLE VITAMINS W/ MINERALS TAB 1 TABLET: TAB at 09:28

## 2022-10-19 RX ADMIN — ENOXAPARIN SODIUM 40 MG: 100 INJECTION SUBCUTANEOUS at 09:26

## 2022-10-19 RX ADMIN — ATORVASTATIN CALCIUM 40 MG: 40 TABLET, FILM COATED ORAL at 20:29

## 2022-10-19 RX ADMIN — ACETAMINOPHEN 650 MG: 325 TABLET ORAL at 00:51

## 2022-10-19 RX ADMIN — CEFTRIAXONE 1000 MG: 1 INJECTION, POWDER, FOR SOLUTION INTRAMUSCULAR; INTRAVENOUS at 13:32

## 2022-10-19 RX ADMIN — SODIUM CHLORIDE: 9 INJECTION, SOLUTION INTRAVENOUS at 21:50

## 2022-10-19 RX ADMIN — FAMOTIDINE 20 MG: 20 TABLET ORAL at 09:27

## 2022-10-19 RX ADMIN — ASPIRIN 81 MG: 81 TABLET, COATED ORAL at 09:27

## 2022-10-19 ASSESSMENT — PAIN - FUNCTIONAL ASSESSMENT: PAIN_FUNCTIONAL_ASSESSMENT: ACTIVITIES ARE NOT PREVENTED

## 2022-10-19 ASSESSMENT — PAIN DESCRIPTION - ORIENTATION: ORIENTATION: RIGHT

## 2022-10-19 ASSESSMENT — PAIN DESCRIPTION - DESCRIPTORS
DESCRIPTORS: DULL;DISCOMFORT
DESCRIPTORS: DULL

## 2022-10-19 ASSESSMENT — PAIN SCALES - GENERAL
PAINLEVEL_OUTOF10: 2
PAINLEVEL_OUTOF10: 0
PAINLEVEL_OUTOF10: 3

## 2022-10-19 ASSESSMENT — PAIN DESCRIPTION - LOCATION
LOCATION: NECK
LOCATION: NECK

## 2022-10-19 NOTE — PROGRESS NOTES
Min emptied at this time with 100 ml outut since 0424 this morning. Pt checked to see if she is leaking around the min. There was some wetness on the pad but nurse cannot tell if it is urine or sweat. PT advised to let nurse know if she was feeling urine coming out or if her pad was getting wet. Pt educated on bladder spasms and how this can cause the leaking of urine around the min. Call light within reach. Will continue to monitor.

## 2022-10-19 NOTE — PROGRESS NOTES
Progress Note    SUBJECTIVE:    Patient seen for f/u of Complicated UTI (urinary tract infection). She resting in bed alert and no distress. Feels better. Off oxygen     ROS:   Constitutional: negative  for fevers, and negative for chills. Respiratory: negative for shortness of breath, negative for cough, and negative for wheezing  Cardiovascular: negative for chest pain, and negative for palpitations  Gastrointestinal: negative for abdominal pain, negative for nausea,negative for vomiting, negative for diarrhea, and negative for constipation     All other systems were reviewed with the patient and are negative unless otherwise stated in HPI      OBJECTIVE:      Vitals:   Vitals:    10/19/22 0642   BP: 112/65   Pulse: 80   Resp: 18   Temp: 98.7 °F (37.1 °C)   SpO2: 92%     Weight: 181 lb (82.1 kg)   Height: 5' 5\" (165.1 cm)     Weight  Wt Readings from Last 3 Encounters:   10/19/22 181 lb (82.1 kg)   08/24/22 173 lb 12.8 oz (78.8 kg)   07/06/22 177 lb 12.8 oz (80.6 kg)     Body mass index is 30.12 kg/m². 24HR INTAKE/OUTPUT:      Intake/Output Summary (Last 24 hours) at 10/19/2022 0732  Last data filed at 10/19/2022 5800  Gross per 24 hour   Intake 920 ml   Output 800 ml   Net 120 ml     -----------------------------------------------------------------  Exam:    GEN:    Awake, alert and oriented x3. EYES:  EOMI, pupils equal   NECK: Supple. No lymphadenopathy. No carotid bruit  CVS:    regular rate and rhythm, no audible murmur  PULM:   rales left base , no acute respiratory distress  ABD:    Bowels sounds normal.  Abdomen is soft. No distention. no tenderness to palpation. EXT:   no edema bilaterally . No calf tenderness. NEURO: Moves all extremities. Motor and sensory are grossly intact  SKIN:  No rashes.   No skin lesions.    -----------------------------------------------------------------    Diagnostic Data:      Complete Blood Count:   Recent Labs     10/18/22  0955 10/19/22  0540   WBC 12.4* 9. 5   RBC 4.33 3.32*   HGB 12.8 9.7*   HCT 39.6 30.2*   MCV 91.5 91.0   MCH 29.6 29.2   MCHC 32.3 32.1   RDW 14.5* 15.0*    146   MPV 10.2 10.2        Last 3 Blood Glucose:   Recent Labs     10/18/22  0955 10/19/22  0540   GLUCOSE 143* 127*        Comprehensive Metabolic Profile:   Recent Labs     10/18/22  0955 10/19/22  0540    141   K 4.0 3.8    111*   CO2 22 21   BUN 18 19   CREATININE 1.04* 1.01*   GLUCOSE 143* 127*   CALCIUM 8.7 7.2*   PROT 6.9  --    LABALBU 3.2*  --    BILITOT 1.1  --    ALKPHOS 63  --    AST 18  --    ALT 10  --         Urinalysis:   Lab Results   Component Value Date/Time    NITRU POSITIVE 10/18/2022 11:04 AM    COLORU Yellow 10/18/2022 11:04 AM    PHUR 6.0 10/18/2022 11:04 AM    WBCUA GREATER THAN 100 10/18/2022 11:04 AM    RBCUA 10 TO 20 10/18/2022 11:04 AM    RBCUA 5-10 02/12/2018 08:16 PM    MUCUS NOT REPORTED 04/24/2021 03:30 PM    TRICHOMONAS NOT REPORTED 04/24/2021 03:30 PM    YEAST NOT REPORTED 04/24/2021 03:30 PM    BACTERIA 2+ 10/18/2022 11:04 AM    CLARITYU clear 04/28/2021 01:45 PM    CLARITYU Clear 12/07/2016 12:00 AM    SPECGRAV 1.025 10/18/2022 11:04 AM    LEUKOCYTESUR MODERATE 10/18/2022 11:04 AM    UROBILINOGEN ELEVATED 10/18/2022 11:04 AM    BILIRUBINUR NEGATIVE 10/18/2022 11:04 AM    BILIRUBINUR 0 04/28/2021 01:45 PM    BLOODU negative 04/28/2021 01:45 PM    BLOODU SMALL 02/12/2018 08:16 PM    GLUCOSEU NEGATIVE 10/18/2022 11:04 AM    KETUA 1+ 10/18/2022 11:04 AM    AMORPHOUS NOT REPORTED 04/24/2021 03:30 PM       HgBA1c:    Lab Results   Component Value Date/Time    LABA1C 5.7 07/05/2022 02:25 PM       Lactic Acid:   Lab Results   Component Value Date/Time    LACTA 1.9 02/12/2018 03:53 AM    LACTA 3.9 02/11/2018 07:33 PM    LACTA 2.6 02/11/2018 05:14 AM        Troponin: No results for input(s): TROPONINI in the last 72 hours. CRP:  No results for input(s): CRP in the last 72 hours.       Radiology/Imaging:  XR CHEST PORTABLE   Final Result   Chronic findings in the chest without acute airspace disease identified. ASSESSMENT / PLAN:  Complicated UTI (urinary tract infection)  Continue current therapy   IV Rocephin  Blood cultures x2-gm neg  Urine culture-E. coli  SIRS  Fluid bolus per sepsis protocol given in ER for a total of 2328  2L Fluid bolus given on floor in addition to ER Bolus  Decrease  IV fluids  Lactic acid-normal  Antibiotics-IV Rocephin  Cultures-blood cultures x2-gm neg, urine culture pending  Hypertension  Hold Coreg due to hypotension  Type 2 diabetes  Diet controlled  PAF  Hold Coreg  Chronic combined CHF  Caution with IV fluids  Daily weight  Nutrition status:    Well developed, well nourished with no malnutrition  Dietician consult initiated  Hospital Prophylaxis:   DVT: Lovenox   Stress Ulcer: H2 Blocker   High risk medications: none   Disposition:    Discharge plan is home      OSCAR Watt - CNP , OSCAR, NP-C  Hospitalist Medicine        10/19/2022, 7:32 AM

## 2022-10-19 NOTE — PROGRESS NOTES
Pt resting in bed sitting up eating dinner, Pts IV pull kept going off while she was trying to eat due to her IV placement. Nurse delayed IV infusion just until pt is done eating. Call light within reach. Will continue to monitor.

## 2022-10-19 NOTE — ACP (ADVANCE CARE PLANNING)
Advance Care Planning     Advance Care Planning Activator (Inpatient)  Conversation Note      Date of ACP Conversation: 10/19/2022     Conversation Conducted with: Patient with Decision Making Capacity    ACP Activator: Marisa Felty, RN      Health Care Decision Maker:     Current Designated Health Care Decision Maker:     Primary Decision Maker: Honey 19 - [phone]      Care Preferences    Ventilation: \"If you were in your present state of health and suddenly became very ill and were unable to breathe on your own, what would your preference be about the use of a ventilator (breathing machine) if it were available to you? \"      Would the patient desire the use of ventilator (breathing machine)?: yes    \"If your health worsens and it becomes clear that your chance of recovery is unlikely, what would your preference be about the use of a ventilator (breathing machine) if it were available to you? \"     Would the patient desire the use of ventilator (breathing machine)?: No      Resuscitation  \"CPR works best to restart the heart when there is a sudden event, like a heart attack, in someone who is otherwise healthy. Unfortunately, CPR does not typically restart the heart for people who have serious health conditions or who are very sick. \"    \"In the event your heart stopped as a result of an underlying serious health condition, would you want attempts to be made to restart your heart (answer \"yes\" for attempt to resuscitate) or would you prefer a natural death (answer \"no\" for do not attempt to resuscitate)? \" yes       [x] Yes   [] No   Educated Patient / Alissa Ozuna regarding differences between Advance Directives and portable DNR orders.     Length of ACP Conversation in minutes:  20    Conversation Outcomes:  [x] ACP discussion completed  [] Existing advance directive reviewed with patient; no changes to patient's previously recorded wishes  [] New Advance Directive completed  [] Portable Do Not Rescitate prepared for Provider review and signature  [] POLST/POST/MOLST/MOST prepared for Provider review and signature      Follow-up plan:    [] Schedule follow-up conversation to continue planning  [x] Referred individual to Provider for additional questions/concerns   [] Advised patient/agent/surrogate to review completed ACP document and update if needed with changes in condition, patient preferences or care setting    [x] This note routed to one or more involved healthcare providers  [address] [place] [address] and [place] Nurse Coordinator  10/19/2022 11:14 AM

## 2022-10-19 NOTE — PLAN OF CARE
Problem: Discharge Planning  Goal: Discharge to home or other facility with appropriate resources  10/18/2022 2048 by Nohemi Randle RN  Outcome: Progressing     Problem: Safety - Adult  Goal: Free from fall injury  10/18/2022 2048 by Nohemi Randle RN  Outcome: Progressing

## 2022-10-19 NOTE — PROGRESS NOTES
Met with Patient this p.m. to discuss discharge planning. Patient is an 79year old , white female, admitted with a diagnosis of Complicated UTI. Patient is alert and oriented, polite and cooperative with this assessment. States that she plans to return home when deemed medically stable for discharge. Patient resides in [facility] with her spouse. She uses a rollator walker, wheelchair, cane, shower chair and grab bars for assistance at home. Patient currently utilizing no outside services or resources. Patient does not drive, relies on her  and other family members to provide for her transportation needs. Has involved family who assist with her care needs. PCP is Dr. Tiffani Junior. Patient has medical insurance and denies needing financial assistance with the cost of her prescription medications. Discharge plan is for Patient to return home with family when stable. Provided her with a list of home health agencies if needed upon discharge. Patient states that she has used [facility] in the past.  Will consider if needed. Patient remains a 'Full Code' status, no medical directives currently on file. Patient has designated her spouse to be her decision maker if needed. LSW to monitor and assist with discharge planning needs as appropriate.     Elicia. [address]  10/19/2022

## 2022-10-19 NOTE — PROGRESS NOTES
Patient's shivering has subsided greatly and she is resting in bed with eyes close, no signs of distress. Will continue to monitor and assess.

## 2022-10-19 NOTE — PROGRESS NOTES
Occupational Therapy  Facility/Department: [facility] AT THE [facility] MED SURG  Daily Treatment Note  NAME: [place]  : 1940  MRN: [id number]    Date of Service: 10/19/2022    Discharge Recommendations:  Continue to assess pending progress, Home with assist PRN         Patient Diagnosis(es): The primary encounter diagnosis was Urinary tract infection without hematuria, site unspecified. A diagnosis of Septicemia ([address] was also pertinent to this visit. Assessment    Activity Tolerance: Patient tolerated treatment well;Patient limited by fatigue  Discharge Recommendations: Continue to assess pending progress;Home with assist PRN      Plan   Occupational Therapy Plan  Times Per Week: 7  Times Per Day: Once a day  Current Treatment Recommendations: Strengthening; Functional mobility training; Endurance training;Patient/Caregiver education & training;Self-Care / ADL; Balance training     Restrictions  Restrictions/Precautions  Restrictions/Precautions: Fall Risk;General Precautions    Subjective   Subjective  Subjective: Pt getting back in bed upon arrival with nurse present. Pt agreed to participate in therapy session. Pain: Pt had no complaints of pain this date. Orientation  Overall Orientation Status: Within Normal Limits  Cognition  Overall Cognitive Status: WFL        Objective    Vitals     Bed Mobility Training  Bed Mobility Training: Yes  Overall Level of Assistance: Minimum assistance  Sit to Supine: Minimum assistance        OT Exercises  Exercise Treatment: Pt tolerated LUE ther ex with 1# dumbbell x 7 planes x 15 reps x 2 sets to increase UE strength and endurance in order to ease completion of ADL tasks. RUE ther ex not completed d/t IV placement. Pt required occ RBs as needed. Safety Devices  Type of Devices: Call light within reach; Left in bed     Patient Education  Education Given To: Patient  Education Provided: Role of Therapy;Plan of Care  Education Method: Verbal  Barriers to Learning: None  Education Outcome: Verbalized understanding    Goals  Short Term Goals  Time Frame for Short Term Goals: 21 days  Short Term Goal 1: Patient will complete toileting tasks with mod I  Short Term Goal 2: Patient will complete ADL routine with mod I to return to PLOF  Short Term Goal 3: Patient will complete functional mobility/functional transfers with mod I with use of AD as needed  Short Term Goal 4: Patient will demonstrate/verbalize 100% understanding of discharge recommendations  Patient Goals   Patient goals : feel better, go home       Therapy Time   Individual Concurrent Group Co-treatment   Time In 1024         Time Out 1048         Minutes 24                 YAMILETH Cerrato/STEVIE

## 2022-10-19 NOTE — PROGRESS NOTES
Patient's min output was 150 which is 30mls/hr of urine. Writer will continue to monitor and assess. Min is patent and draining.

## 2022-10-19 NOTE — PROGRESS NOTES
Vitals and assessment done at this time. See flow sheet for more details. Pt not running a fever. Pt is diaphoretic. Pt stated she was having some chills through the night. Night shift nurse gave pt a bed bath. Pt denied any pain at this time. Nurse received through night shift pt was having very little output through the min through the night. Pt had some crackles noted posterior in the bases that was raising up. Pt super puffy in BLE. Pt has normal saline going at 150 ml/hr. Call light within reach. Will continue to monitor.

## 2022-10-19 NOTE — PROGRESS NOTES
Physical Therapy  Facility/Department: [facility] AT THE [facility] MED SURG  Daily Treatment Note  NAME: [place]  : 1940  MRN: [id number]    Date of Service: 10/19/2022    Discharge Recommendations:  Continue to assess pending progress, Home with assist PRN, Home with Home health PT      Patient Diagnosis(es): The primary encounter diagnosis was Urinary tract infection without hematuria, site unspecified. A diagnosis of Septicemia ([address] was also pertinent to this visit. Assessment   Assessment: Supine B LE exercises 2 x 10-15 ea pumps, heel slides, Hip abd/add and LAQ's AROM to AAROM for increased ROM. No transfers or ambulation due to patient fatigue. Activity Tolerance: Patient tolerated treatment well;Patient limited by fatigue     Plan    Physcial Therapy Plan  General Plan: 2 times a day 7 days a week     Restrictions  Restrictions/Precautions  Restrictions/Precautions: Fall Risk, General Precautions     Subjective    Subjective  Subjective: Patient in bed upon arrival and agreeable to therapy. No c/o pain, just fatigued since lunch. Orientation  Overall Orientation Status: Within Normal Limits  Cognition  Overall Cognitive Status: WFL     Objective   Vitals     Bed Mobility Training  Bed Mobility Training: No  Overall Level of Assistance: Minimum assistance  Sit to Supine: Minimum assistance  Transfer Training  Transfer Training: No  Gait Training  Gait Training: No     PT Exercises  Exercise Treatment: Supine B LE exercises 2 x 10-15 ea pumps, heel slides, Hip abd/add and LAQ's AROM to AAROM for increased ROM. Safety Devices  Type of Devices: Call light within reach; Left in bed;Bed alarm in place     Goals  Short Term Goals  Time Frame for Short Term Goals: 20 days  Short Term Goal 1: Patient to complete all transfers with SUP and LRAD with no LOB to decrease fall risk. Short Term Goal 2: Patient to ambulate 100ft with LRAD and SUP with no LOB or fatigue to improve mobility.   Short Term Goal 3: Patient to tolerate 20-30min of ther ex/act to improve functional strength. Short Term Goal 4: Patient to ascend/descend 3 steps with HRx1 and CGA with no LOB to safely navigate her home.     Education  Patient Education  Education Given To: Patient  Education Provided Comments: HEP heel slides, pumps, hip abd/add with return demoe  Education Method: Verbal  Barriers to Learning: None  Education Outcome: Verbalized understanding    Therapy Time   Individual Concurrent Group Co-treatment   Time In 1402         Time Out 1426         Minutes [address] [place], [place]

## 2022-10-19 NOTE — PROGRESS NOTES
Patient was bathed with bath wipes as she reports being allergic to soap and deodorant. She was given a new gown, pillow cases, and sheet and repositioned for comfort.

## 2022-10-19 NOTE — PROGRESS NOTES
Vitals and assessment done at this time. See flow sheet for more details. Pt denied having any pain at this time. Pt stated she was tired and wanted to nap. Pt was still pretty sweaty. Emptied the min. Pts lungs were still crackly. Pts fluids were turned down this morning per Anna. Pt denied needing anything at this time. Call light within reach. Will continue to monitor.

## 2022-10-19 NOTE — PROGRESS NOTES
Physical Therapy  Facility/Department: [facility] AT THE [facility] MED SURG  Physical Therapy Initial Assessment    Name: Ofelia Massey  : 1940  MRN: [id number]  Date of Service: 10/19/2022    Discharge Recommendations:  Continue to assess pending progress, Home with assist PRN, Home with Home health PT          Patient Diagnosis(es): The primary encounter diagnosis was Urinary tract infection without hematuria, site unspecified. A diagnosis of Septicemia ([address] was also pertinent to this visit. Past Medical History:  has a past medical history of Allergic rhinitis, Arthritis, Asthma, Atrial fibrillation ([address] CAD (coronary artery disease), Cerebrovascular accident (CVA) involving left cerebral hemisphere [facility]), CHF (congestive heart failure) ([address] Clotting disorder ([address] COPD (chronic obstructive pulmonary disease) ([address] DDD (degenerative disc disease), cervical, Degeneration of cervical intervertebral disc, Diverticulosis, Gout, Gout, unspecified, H/O cardiac catheterization, H/O echocardiogram, H/O echocardiogram, History of Holter monitoring, Hx of blood clots, Hyperlipidemia, Hyperlipidemia, Hypertension, Hypertension, Hypothyroidism due to amiodarone, Infectious hepatitis, Long term (current) use of anticoagulants, Movement disorder, Other abnormal glucose, Pacemaker, Phlebitis and thrombophlebitis of lower extremities, unspecified, Senile osteoporosis, SIRS (systemic inflammatory response syndrome) ([address] Symptomatic menopausal or female climacteric states, Syncope and collapse, Unspecified hereditary and idiopathic peripheral neuropathy, and Unspecified sleep apnea. Past Surgical History:  has a past surgical history that includes Tonsillectomy and adenoidectomy; eye surgery; skin biopsy; Diagnostic Cardiac Cath Lab Procedure (06/17/15); fracture surgery (); Colonoscopy (2005); other surgical history (2years old); bronchoscopy (2017); bronchoscopy (2017); bronchoscopy (2017);  Lung lobectomy two or more falls in the past year or any fall with injury in the past year?: No  Receives Help From: Family  ADL Assistance: Independent  Homemaking Assistance: Needs assistance  Ambulation Assistance: Independent  Transfer Assistance: Independent  Active : No  Patient's  Info: , daughter, granddaughter  IADL Comments: Patient's daughter and granddaughter live with her. They complete IADL tasks such as cleaning, laundry, cooking sometimes, and groceries/errands  Additional Comments: Pt amb with no AD normally, has AD's prn for balance  Vision/Hearing  Vision  Vision: Within Functional Limits  Hearing  Hearing: Within functional limits    Cognition   Orientation  Overall Orientation Status: Within Normal Limits  Cognition  Overall Cognitive Status: WFL     Objective   Heart Rate: 80  Heart Rate Source: Monitor; Apical  BP: 112/65  BP Location: Right upper arm  BP Method: Automatic  Patient Position: Semi fowlers  MAP (Calculated): 80.67  Resp: 18  SpO2: 92 %  O2 Device: None (Room air)        Gross Assessment  AROM: Within functional limits  Strength: Generally decreased, functional                    Bed mobility  Bed Mobility Comments: Pt up in chair upon arrival  Transfers  Sit to Stand: Contact guard assistance  Stand to Sit: Contact guard assistance  Ambulation  Surface: Level tile  Device: Rolling Walker  Assistance: Contact guard assistance  Distance: Pt amb 5ft fwd/bwd with FWW and CGA     Balance  Sitting - Static: Good  Sitting - Dynamic: Fair;+  Standing - Static: Fair;-  Standing - Dynamic: Fair;-           Goals  Short Term Goals  Time Frame for Short Term Goals: 20 days  Short Term Goal 1: Patient to complete all transfers with SUP and LRAD with no LOB to decrease fall risk. Short Term Goal 2: Patient to ambulate 100ft with LRAD and SUP with no LOB or fatigue to improve mobility. Short Term Goal 3: Patient to tolerate 20-30min of ther ex/act to improve functional strength.   Short Term Goal 4: Patient to ascend/descend 3 steps with HRx1 and CGA with no LOB to safely navigate her home.        Education  Patient Education  Education Given To: Patient  Education Provided: Role of Therapy;Plan of Care  Education Method: Verbal  Barriers to Learning: None  Education Outcome: Verbalized understanding      Therapy Time   Individual Concurrent Group Co-treatment   Time In 0830         Time Out 0844         Minutes 14         Timed Code Treatment Minutes: [address] Pa[place], PT, DPT

## 2022-10-19 NOTE — PROGRESS NOTES
Dr. Vanessa Osler called back and aware of the crackles in the lung bases as well as continuing IV fluids at 150 after the bolus is finished to help with patient's hypotension. Writer will increase frequency BP checks throughout the night. No new orders received at this time.

## 2022-10-19 NOTE — PROGRESS NOTES
Writer called Dr. Malcolm Andrade to inform him of patient's hypotensive blood pressures and crackles in bilateral lung bases as patient is getting a bolus and will receive maintenance fluids at 150/hr. and Waiting for call back.

## 2022-10-19 NOTE — CONSULTS
Palliative Care Inpatient Consult    NAME:  Janee Licona [facility] RECORD NUMBER:  [id number]  AGE: 79 y.o. GENDER: female  : 1940  TODAY'S DATE:  10/19/2022    Reason for Consult:  ACP and code status    History of Present Illness     The patient is a 79 y.o. Non- / non  female who presents with Female  Problem and Emesis (Pt to ED with increased bladder spasms, suspecting UTI/Also c/o N/VD since Wednesday. Called PCP and advised to come to ED)      Referred to Palliative Care by  [] Physician   [x] Nursing  [] Family Request   [] Other:      She was admitted to the med/surg service for Complicated UTI (urinary tract infection) [N39.0]. The patient has a complicated medical history and has been hospitalized since 10/18/2022  9:26 AM.    Active Hospital Problems    Diagnosis Date Noted    Complicated UTI (urinary tract infection) [N39.0] 10/18/2022     Priority: Medium    SIRS (systemic inflammatory response syndrome) (HCC) [R65.10] 10/18/2022     Priority: Medium    Chronic combined systolic and diastolic congestive heart failure ([facility] [I50.42] 2018    Paroxysmal A-fib ([facility] [I48.0] 2017    Ischemic cardiomyopathy [I25.5] 2017    Type 2 diabetes mellitus without complication ([facility] [Y99.5] 2017    Essential hypertension [I10] 2015    Obstructive sleep apnea [G47.33] 2015       Data         /65   Pulse 80   Temp 98.7 °F (37.1 °C) (Oral)   Resp 18   Ht 5' 5\" (1.651 m)   Wt 181 lb (82.1 kg)   SpO2 92%   BMI 30.12 kg/m²     Wt Readings from Last 3 Encounters:   10/19/22 181 lb (82.1 kg)   22 173 lb 12.8 oz (78.8 kg)   22 177 lb 12.8 oz (80.6 kg)        Code Status: Full Code     ADVANCED CARE PLANNING:  Patient has capacity for medical decisions: yes  [facility]:  Pt states that she has completed the documents, but has them at home and has not brought them in. Pt asked to have her family bring them in.   Living Will: Pt states that she has completed the documents, but has them at home and has not brought them in. Pt asked to have her family bring them in. Personal, Social, and Family History  Marital Status:   Living situation:with family:  spouse  Importance of gabby/Mu-ism/spiritual beliefs: [] Very [x] Somewhat [] Not   Psychological Distress: denies  Does patient understand diagnosis/treatment? yes  Does caregiver understand diagnosis/treatment? not asked    Assessment        Symptom management/ pain control   Pain Assessment:  The patient is not having any pain.   Anxiety:  none  Dyspnea:  none  Fatigue:   since her UTI started  Other:    Palliative Performance Scale:     ___100% Full ambulation; normal activity and work; no evidence of disease; able to do own self care; normal intake; fully conscious  ___90% Full ambulation; normal activity and work; some evidence of disease; able to do own self care; normal intake; fully conscious  ___80% Full ambulation; normal activity with effort; some evidence of disease; able to do own self care; normal or reduced intake; fully conscious  __x_70% Ambulation reduced; unable to perform normal job/work; significant disease; able to do own self care; normal or reduced intake; fully conscious  ___60%  Ambulation reduced; cannot do hobbies/housework; significant disease; occasional assist; intake normal or reduced; fully conscious/some confusion  ___50%  Mainly sit/lie; can't do any work; extensive disease; considerable assist; intake normal or reduced; fully conscious/some confusion  ___40%  Mainly in bed; extensive disease; mainly assist; intake normal or reduced; fully conscious/ some confusion   ___30%  Bed bound; extensive disease; total care; intake reduced; fully conscious/some confusion  ___20%  Bed bound; extensive disease; total care; intake minimal; drowsy/coma  ___10%  Bed bound; extensive disease; total care; mouth care only; drowsy/coma  ___0       Death Readmission Risk Score: 14%    Plan      Palliative Interaction: I see the patient in her room to clarify her code status. Pt is alert and oriented at the time of our conversation. Pt tells me that she is feeling better today, but she remains very tired and \"just wants to take a nap\". I ask her about her DNR-CCA status and she said \"that was before my stent, and I don't really want it now. \"  I tell that I can revoke it if that is what she wants and she tells me yes. I have the ACP activator discussion with her she is \"pretty sure\" that she would want to be intubated but is definite on wanting to be extubated if the prognosis is poor. She would want CPR to be performed. I complete education on risks and percentages of survival with CPR with her and she remains unchanged that she would want it. I tell her to talk with her family about her wishes so that they are aware of her decisions. Pt denies any further questions or needs. Education/support to patient  Continue with current plan of care  Code status clarified: Full Code    Principle Problem/Diagnosis:  Complicated UTI (urinary tract infection)    Goals of care evaluation   The patient goals of care are live longer, improve or maintain function/quality of life, and preserve independence/autonomy/control   Goals of care discussed with:    [x] Patient independently    [] Patient and Family    [] Family or Healthcare DPOA independently    [] Unable to discuss with patient, family/DPOA not present    Code Status  Full Code     Palliative Care will continue to follow Ms. Love's care as needed. Thank you for allowing Palliative Care to participate in the care of Ms. Cuelloita Schirmer .      Electronically signed by   Mamta Barfield RN  Palliative Care Team  on 10/19/2022 at 11:14 AM    Palliative care office: [phone]

## 2022-10-19 NOTE — PLAN OF CARE
Problem: Discharge Planning  Goal: Discharge to home or other facility with appropriate resources  10/19/2022 1044 by Khushi Perez RN  Outcome: Progressing  Flowsheets (Taken 10/19/2022 0779)  Discharge to home or other facility with appropriate resources: Identify barriers to discharge with patient and caregiver  10/18/2022 2048 by Macarena Adrian RN  Outcome: Progressing     Problem: Safety - Adult  Goal: Free from fall injury  10/19/2022 1044 by Khsuhi Perez RN  Outcome: Progressing  Flowsheets (Taken 10/19/2022 1042)  Free From Fall Injury: Instruct family/caregiver on patient safety  10/18/2022 2048 by Macarena Adrian RN  Outcome: Progressing     Problem: Pain  Goal: Verbalizes/displays adequate comfort level or baseline comfort level  Outcome: Progressing  Flowsheets (Taken 10/19/2022 1044)  Verbalizes/displays adequate comfort level or baseline comfort level: Encourage patient to monitor pain and request assistance

## 2022-10-19 NOTE — PROGRESS NOTES
Comprehensive Nutrition Assessment    Type and Reason for Visit:  Initial    Nutrition Recommendations/Plan:   Continuecurrent diet  Add 4 oz Ensure Enlive TID  Monitor blood sugar     Malnutrition Assessment:  Malnutrition Status:  Mild malnutrition (10/19/22 1120)    Context:  Chronic Illness     Findings of the 6 clinical characteristics of malnutrition:  Energy Intake:   Mild decrease in energy intake (Comment)  Weight Loss:    Mild weight loss (specify amount and time period)    Body Fat Loss:      Mild muscle loss, orbital   Muscle Mass Loss:     Mild muscle loss, interosseus muscle  Fluid Accumulation:     Mild extremities   Strength:   Not performed    Nutrition Assessment:    Mild malnutrition related to inadequate protein-energy intake as evidenced by mild loss of subcutaneous fat, mild muscle loss, poor intake prior to admission poor appetite. Pt presents with UTI and septicemia. Pt states her appetite has been down for the past few years, with no desire to eat. Pt takes a multi vitamin at home. UBW is 180 (7/5/2022) and pt is admitted at 171#, indicating a 5% wt loss over the past 3 month, which is not considered significant. Labs reviewed. Glucose is acutely elevated. Creatine is noted to be slightly increased. Pt is receptive to taking Ensure Enlive. Pt is at moderate nutrition risk. , at Mild risk for malnutrition. Nutrition Related Findings:    subcutaneous fat and muscle losses         Current Nutrition Intake & Therapies:    Average Meal Intake: 1-25%  Average Supplements Intake: None Ordered  ADULT DIET; Regular    Anthropometric Measures:  Height: 5' 5\" (165.1 cm)  Ideal Body Weight (IBW): 125 lbs (57 kg)    Admission Body Weight: 171 lb (77.6 kg)  Current Body Weight: 181 lb (82.1 kg), 144.8 % IBW.  Weight Source: Bed Scale  Current BMI (kg/m2): 30.1  Usual Body Weight: 175 lb (79.4 kg)  % Weight Change (Calculated): 3.4  Weight Adjustment For: No Adjustment                 BMI Categories: Obese Class 1 (BMI 30.0-34. 9)    Estimated Daily Nutrient Needs:  Energy Requirements Based On: Kcal/kg  Weight Used for Energy Requirements: Current  Energy (kcal/day): 2584-5044 (16-21)  Weight Used for Protein Requirements: Ideal  Protein (g/day):    Method Used for Fluid Requirements: 1 ml/kcal  Fluid (ml/day): 1700+    Nutrition Diagnosis:    (Mild malnutrition) related to inadequate protein-energy intake as evidenced by mild loss of subcutaneous fat, mild muscle loss, poor intake prior to admission (poor appetite.)  Altered nutrition-related lab values related to endocrine dysfuntion as evidenced by lab values (glucose 143, 127)    Recent Labs     10/18/22  0955 10/19/22  0540    141   K 4.0 3.8    111*   CO2 22 21   BUN 18 19   CREATININE 1.04* 1.01*   GLUCOSE 143* 127*   ALT 10  --    ALKPHOS 63  --       Lab Results   Component Value Date/Time    LABALBU 3.2 10/18/2022 09:55 AM        Nutrition Interventions:   Food and/or Nutrient Delivery: Continue Current Diet, Start Oral Nutrition Supplement  Nutrition Education/Counseling: No recommendation at this time  Coordination of Nutrition Care: Continue to monitor while inpatient  Plan of Care discussed with: Patient    Goals:  Previous Goal Met: Progressing toward Goal(s)  Goals: Meet at least 75% of estimated needs       Nutrition Monitoring and Evaluation:   Behavioral-Environmental Outcomes: None Identified  Food/Nutrient Intake Outcomes: Food and Nutrient Intake, Supplement Intake  Physical Signs/Symptoms Outcomes: Biochemical Data, Weight, GI Status, Constipation    Discharge Planning:    Continue current diet     Demond Jean RD, LD  Contact: [phone]

## 2022-10-19 NOTE — PROGRESS NOTES
Tylenol was given for patient's neck pain. Patient's heart monitor had artifact around 0020 and became more pronounced over the next thirty minutes. Writer assessed patient at [address] but due to patient having had a drink of her water and the shivering, writer was unable to get an accurate blood pressure or temperature. Patient denied feeling unwell or worse then she had on admission. Writer left patient for 10 minutes after giving her a warm blanket  to relax enough for a blood pressure. When writer returned, patient's blood pressure was WNL and her oral temperature was 98.1. Patient was shivering from her waist up but denied feeling sick or any different than she had been all night, except that she was cold. Writer will continue to monitor and assess. Call light and bedside table remain within reach.

## 2022-10-20 ENCOUNTER — APPOINTMENT (OUTPATIENT)
Dept: GENERAL RADIOLOGY | Age: 82
DRG: 872 | End: 2022-10-20
Payer: MEDICARE

## 2022-10-20 PROBLEM — A41.51 SEPSIS DUE TO ESCHERICHIA COLI WITHOUT ACUTE ORGAN DYSFUNCTION (HCC): Status: ACTIVE | Noted: 2022-10-20

## 2022-10-20 LAB
ABSOLUTE EOS #: <0.03 K/UL (ref 0–0.44)
ABSOLUTE IMMATURE GRANULOCYTE: 0.03 K/UL (ref 0–0.3)
ABSOLUTE LYMPH #: 0.94 K/UL (ref 1.1–3.7)
ABSOLUTE MONO #: 0.47 K/UL (ref 0.1–1.2)
ANION GAP SERPL CALCULATED.3IONS-SCNC: 10 MMOL/L (ref 9–17)
BASOPHILS # BLD: 0 % (ref 0–2)
BASOPHILS ABSOLUTE: <0.03 K/UL (ref 0–0.2)
BUN BLDV-MCNC: 16 MG/DL (ref 8–23)
BUN/CREAT BLD: 19 (ref 9–20)
CALCIUM SERPL-MCNC: 7.7 MG/DL (ref 8.6–10.4)
CHLORIDE BLD-SCNC: 106 MMOL/L (ref 98–107)
CO2: 22 MMOL/L (ref 20–31)
CREAT SERPL-MCNC: 0.83 MG/DL (ref 0.5–0.9)
CULTURE: ABNORMAL
EOSINOPHILS RELATIVE PERCENT: 0 % (ref 1–4)
GFR SERPL CREATININE-BSD FRML MDRD: >60 ML/MIN/1.73M2
GLUCOSE BLD-MCNC: 107 MG/DL (ref 70–99)
HCT VFR BLD CALC: 29.5 % (ref 36.3–47.1)
HEMOGLOBIN: 9.7 G/DL (ref 11.9–15.1)
IMMATURE GRANULOCYTES: 1 %
LYMPHOCYTES # BLD: 15 % (ref 24–43)
Lab: ABNORMAL
Lab: ABNORMAL
MAGNESIUM: 1.9 MG/DL (ref 1.6–2.6)
MCH RBC QN AUTO: 29.6 PG (ref 25.2–33.5)
MCHC RBC AUTO-ENTMCNC: 32.9 G/DL (ref 28.4–34.8)
MCV RBC AUTO: 89.9 FL (ref 82.6–102.9)
MONOCYTES # BLD: 7 % (ref 3–12)
NRBC AUTOMATED: 0 PER 100 WBC
PDW BLD-RTO: 15 % (ref 11.8–14.4)
PLATELET # BLD: 157 K/UL (ref 138–453)
PMV BLD AUTO: 10.1 FL (ref 8.1–13.5)
POTASSIUM SERPL-SCNC: 3.5 MMOL/L (ref 3.7–5.3)
RBC # BLD: 3.28 M/UL (ref 3.95–5.11)
SEG NEUTROPHILS: 77 % (ref 36–65)
SEGMENTED NEUTROPHILS ABSOLUTE COUNT: 4.9 K/UL (ref 1.5–8.1)
SODIUM BLD-SCNC: 138 MMOL/L (ref 135–144)
SPECIMEN DESCRIPTION: ABNORMAL
SPECIMEN DESCRIPTION: ABNORMAL
WBC # BLD: 6.4 K/UL (ref 3.5–11.3)

## 2022-10-20 PROCEDURE — 2580000003 HC RX 258: Performed by: NURSE PRACTITIONER

## 2022-10-20 PROCEDURE — 97110 THERAPEUTIC EXERCISES: CPT

## 2022-10-20 PROCEDURE — 71046 X-RAY EXAM CHEST 2 VIEWS: CPT

## 2022-10-20 PROCEDURE — 6370000000 HC RX 637 (ALT 250 FOR IP): Performed by: NURSE PRACTITIONER

## 2022-10-20 PROCEDURE — 1200000000 HC SEMI PRIVATE

## 2022-10-20 PROCEDURE — 6360000002 HC RX W HCPCS: Performed by: NURSE PRACTITIONER

## 2022-10-20 PROCEDURE — 80048 BASIC METABOLIC PNL TOTAL CA: CPT

## 2022-10-20 PROCEDURE — 83735 ASSAY OF MAGNESIUM: CPT

## 2022-10-20 PROCEDURE — 85025 COMPLETE CBC W/AUTO DIFF WBC: CPT

## 2022-10-20 PROCEDURE — 97116 GAIT TRAINING THERAPY: CPT

## 2022-10-20 PROCEDURE — 36415 COLL VENOUS BLD VENIPUNCTURE: CPT

## 2022-10-20 RX ORDER — LEVOFLOXACIN 750 MG/1
750 TABLET ORAL DAILY
Status: DISCONTINUED | OUTPATIENT
Start: 2022-10-20 | End: 2022-10-21 | Stop reason: HOSPADM

## 2022-10-20 RX ADMIN — ATORVASTATIN CALCIUM 40 MG: 40 TABLET, FILM COATED ORAL at 22:39

## 2022-10-20 RX ADMIN — ENOXAPARIN SODIUM 40 MG: 100 INJECTION SUBCUTANEOUS at 08:40

## 2022-10-20 RX ADMIN — LEVOFLOXACIN 750 MG: 750 TABLET, FILM COATED ORAL at 08:40

## 2022-10-20 RX ADMIN — MULTIPLE VITAMINS W/ MINERALS TAB 1 TABLET: TAB at 13:30

## 2022-10-20 RX ADMIN — FAMOTIDINE 20 MG: 20 TABLET ORAL at 08:40

## 2022-10-20 RX ADMIN — CLOPIDOGREL BISULFATE 75 MG: 75 TABLET ORAL at 08:40

## 2022-10-20 RX ADMIN — ACETAMINOPHEN 650 MG: 325 TABLET ORAL at 19:23

## 2022-10-20 RX ADMIN — CYANOCOBALAMIN TAB 1000 MCG 500 MCG: 1000 TAB at 08:40

## 2022-10-20 RX ADMIN — POLYETHYLENE GLYCOL 3350 17 G: 17 POWDER, FOR SOLUTION ORAL at 22:50

## 2022-10-20 RX ADMIN — SODIUM CHLORIDE, PRESERVATIVE FREE 10 ML: 5 INJECTION INTRAVENOUS at 22:39

## 2022-10-20 RX ADMIN — LEVOTHYROXINE SODIUM 75 MCG: 75 TABLET ORAL at 08:40

## 2022-10-20 RX ADMIN — ASPIRIN 81 MG: 81 TABLET, COATED ORAL at 08:40

## 2022-10-20 ASSESSMENT — PAIN SCALES - GENERAL: PAINLEVEL_OUTOF10: 0

## 2022-10-20 NOTE — PROGRESS NOTES
Occupational Therapy  Facility/Department: formerly [facility] MED SURG  Daily Treatment Note  NAME: [place]  : 1940  MRN: [id number]    Date of Service: 10/20/2022    Discharge Recommendations:  Continue to assess pending progress, Home with assist PRN         Patient Diagnosis(es): The primary encounter diagnosis was Urinary tract infection without hematuria, site unspecified. A diagnosis of Septicemia ([address] was also pertinent to this visit. Assessment    Activity Tolerance: Patient tolerated treatment well  Discharge Recommendations: Continue to assess pending progress;Home with assist PRN      Plan   Occupational Therapy Plan  Times Per Week: 7  Times Per Day: Once a day  Current Treatment Recommendations: Strengthening; Functional mobility training; Endurance training;Patient/Caregiver education & training;Self-Care / ADL; Balance training  Additional Comments: therex, theract, and self-care training     Restrictions  Restrictions/Precautions  Restrictions/Precautions: Fall Risk;General Precautions    Subjective   Subjective  Subjective: Pt sitting up in bedside chair upon arrival. Pt agreed to participate in therapy session. Pain: Pt had no complaints of pain this date. Objective    Vitals              OT Exercises  Exercise Treatment: Pt tolerated RUE AROM and LUE AROM with 1# dumbbell x 7 planes x 15 reps x 1 set to increase UE strength and endurance in order to ease completion of ADL tasks. Pt required RBs as needed secondary to fatigue. Tremors noted in RUE this date. Safety Devices  Type of Devices: Call light within reach; Left in chair     Patient Education  Education Given To: Patient  Education Provided: Role of Therapy;Plan of Care  Education Method: Verbal  Barriers to Learning: None  Education Outcome: Verbalized understanding    Goals  Short Term Goals  Time Frame for Short Term Goals: 21 days  Short Term Goal 1: Patient will complete toileting tasks with mod I  Short Term Goal 2: Patient will complete ADL routine with mod I to return to PLOF  Short Term Goal 3: Patient will complete functional mobility/functional transfers with mod I with use of AD as needed  Short Term Goal 4: Patient will demonstrate/verbalize 100% understanding of discharge recommendations  Patient Goals   Patient goals : feel better, go home       Therapy Time   Individual Concurrent Group Co-treatment   Time In 0932         Time Out 0955         Minutes 23                 YAMILETH Cerrato/STEVIE

## 2022-10-20 NOTE — PROGRESS NOTES
Progress Note    SUBJECTIVE:    Patient seen for f/u of Sepsis due to Escherichia coli without acute organ dysfunction ([address] She resting in bed alert and no distress. Feels better. ROS:   Constitutional: negative  for fevers, and negative for chills. Respiratory: negative for shortness of breath, negative for cough, and negative for wheezing  Cardiovascular: negative for chest pain, and negative for palpitations  Gastrointestinal: negative for abdominal pain, negative for nausea,negative for vomiting, negative for diarrhea, and negative for constipation     All other systems were reviewed with the patient and are negative unless otherwise stated in HPI      OBJECTIVE:      Vitals:   Vitals:    10/20/22 0648   BP: 116/66   Pulse: 83   Resp: 18   Temp: 99 °F (37.2 °C)   SpO2: 91%     Weight: 185 lb 1.6 oz (84 kg)   Height: 5' 5\" (165.1 cm)     Weight  Wt Readings from Last 3 Encounters:   10/20/22 185 lb 1.6 oz (84 kg)   08/24/22 173 lb 12.8 oz (78.8 kg)   07/06/22 177 lb 12.8 oz (80.6 kg)     Body mass index is 30.8 kg/m². 24HR INTAKE/OUTPUT:      Intake/Output Summary (Last 24 hours) at 10/20/2022 0752  Last data filed at 10/20/2022 0508  Gross per 24 hour   Intake 3328.79 ml   Output 1000 ml   Net 2328.79 ml     -----------------------------------------------------------------  Exam:    GEN:    Awake, alert and oriented x3. EYES:  EOMI, pupils equal   NECK: Supple. No lymphadenopathy. No carotid bruit  CVS:    regular rate and rhythm, no audible murmur  PULM:   rales left base , no acute respiratory distress  ABD:    Bowels sounds normal.  Abdomen is soft. No distention. no tenderness to palpation. EXT:   no edema bilaterally . No calf tenderness. NEURO: Moves all extremities. Motor and sensory are grossly intact  SKIN:  No rashes.   No skin lesions.    -----------------------------------------------------------------    Diagnostic Data:      Complete Blood Count:   Recent Labs 10/18/22  0955 10/19/22  0540 10/20/22  0610   WBC 12.4* 9.5 6.4   RBC 4.33 3.32* 3.28*   HGB 12.8 9.7* 9.7*   HCT 39.6 30.2* 29.5*   MCV 91.5 91.0 89.9   MCH 29.6 29.2 29.6   MCHC 32.3 32.1 32.9   RDW 14.5* 15.0* 15.0*    146 157   MPV 10.2 10.2 10.1        Last 3 Blood Glucose:   Recent Labs     10/18/22  0955 10/19/22  0540 10/20/22  0610   GLUCOSE 143* 127* 107*        Comprehensive Metabolic Profile:   Recent Labs     10/18/22  0955 10/19/22  0540 10/20/22  0610    141 138   K 4.0 3.8 3.5*    111* 106   CO2 22 21 22   BUN 18 19 16   CREATININE 1.04* 1.01* 0.83   GLUCOSE 143* 127* 107*   CALCIUM 8.7 7.2* 7.7*   PROT 6.9  --   --    LABALBU 3.2*  --   --    BILITOT 1.1  --   --    ALKPHOS 63  --   --    AST 18  --   --    ALT 10  --   --         Urinalysis:   Lab Results   Component Value Date/Time    NITRU POSITIVE 10/18/2022 11:04 AM    COLORU Yellow 10/18/2022 11:04 AM    PHUR 6.0 10/18/2022 11:04 AM    WBCUA GREATER THAN 100 10/18/2022 11:04 AM    RBCUA 10 TO 20 10/18/2022 11:04 AM    RBCUA 5-10 02/12/2018 08:16 PM    MUCUS NOT REPORTED 04/24/2021 03:30 PM    TRICHOMONAS NOT REPORTED 04/24/2021 03:30 PM    YEAST NOT REPORTED 04/24/2021 03:30 PM    BACTERIA 2+ 10/18/2022 11:04 AM    CLARITYU clear 04/28/2021 01:45 PM    CLARITYU Clear 12/07/2016 12:00 AM    SPECGRAV 1.025 10/18/2022 11:04 AM    LEUKOCYTESUR MODERATE 10/18/2022 11:04 AM    UROBILINOGEN ELEVATED 10/18/2022 11:04 AM    BILIRUBINUR NEGATIVE 10/18/2022 11:04 AM    BILIRUBINUR 0 04/28/2021 01:45 PM    BLOODU negative 04/28/2021 01:45 PM    BLOODU SMALL 02/12/2018 08:16 PM    GLUCOSEU NEGATIVE 10/18/2022 11:04 AM    KETUA 1+ 10/18/2022 11:04 AM    AMORPHOUS NOT REPORTED 04/24/2021 03:30 PM       HgBA1c:    Lab Results   Component Value Date/Time    LABA1C 5.7 07/05/2022 02:25 PM       Lactic Acid:   Lab Results   Component Value Date/Time    LACTA 1.9 02/12/2018 03:53 AM    LACTA 3.9 02/11/2018 07:33 PM    LACTA 2.6 02/11/2018 05:14 AM        Troponin: No results for input(s): TROPONINI in the last 72 hours. CRP:  No results for input(s): CRP in the last 72 hours. Radiology/Imaging:  XR CHEST PORTABLE   Final Result   Chronic findings in the chest without acute airspace disease identified. ASSESSMENT / PLAN:  Sepsis due to Escherichia coli without acute organ dysfunction (HCC)  Continue current therapy   STop IV Rocephin  STart Levoquin  Blood cultures x2-Ecoli  Urine culture-E. coli  SIRS  Fluid bolus per sepsis protocol given in ER for a total of 2328  2L Fluid bolus given on floor in addition to ER Bolus  Decrease  IV fluids  Lactic acid-normal  Antibiotics-IV Rocephin  Cultures-blood cultures x2-Ecoli  Hypertension  Hold Coreg due to hypotension  Type 2 diabetes  Diet controlled  PAF  Hold Coreg  Chronic combined CHF  Stop IV fluids  Daily weight  Nutrition status:    Well developed, well nourished with no malnutrition  Dietician consult initiated  Hospital Prophylaxis:   DVT: Lovenox   Stress Ulcer: H2 Blocker   High risk medications: none   Disposition:    Discharge plan is home tomorrow      OSCAR Bradley - CNP , OSCAR, NP-C  Hospitalist Medicine        10/20/2022, 7:52 AM

## 2022-10-20 NOTE — PROGRESS NOTES
Reassessment completed at this time. Vitals taken and documented. Patient encouraged to ask questions. Patient denies pain or complaints. Call light and bed side table within reach. Side rails up times two.

## 2022-10-20 NOTE — PROGRESS NOTES
Patient in bed, resting with eyes closed. Assessment complete. Vitals taken and documented. Note temperature 100.4 orally. Sheets removed from patient. Patient denies of cold sweats, or SOB. Ice packs applied under bilateral knees, and in arm creases. Tylenol given for fever. Patient denies any other needs or concerns at this time. Call light and over bed table in reach. Side rails up times two.

## 2022-10-20 NOTE — PLAN OF CARE
Problem: Discharge Planning  Goal: Discharge to home or other facility with appropriate resources  10/20/2022 0936 by Christa Irby RN  Outcome: Progressing     Problem: Safety - Adult  Goal: Free from fall injury  10/20/2022 0936 by Christa Irby RN  Outcome: Progressing     Problem: Pain  Goal: Verbalizes/displays adequate comfort level or baseline comfort level  10/20/2022 0936 by Christa Irby RN  Outcome: Progressing

## 2022-10-20 NOTE — PROGRESS NOTES
Physical Therapy  Facility/Department: [address] [place] [address] MED SURG  Daily Treatment Note  NAME: Tiffanie Vásquez  : 1940  MRN: [id number]  Date of Service: 10/20/2022  Discharge Recommendations:  Continue to assess pending progress, Home with assist PRN, Home with Home health PT   Patient Diagnosis(es): The primary encounter diagnosis was Urinary tract infection without hematuria, site unspecified. A diagnosis of Septicemia ([address] was also pertinent to this visit. Assessment   Assessment: Gait 8ftx1 withWW,CGA/Min A, declined further ambulation at this time d/t fatigue. Bed mobility and transfers:CGA/Min A. Seated exercises b LE x20  Activity Tolerance: Patient tolerated treatment well   Plan    Physcial Therapy Plan  General Plan: 2 times a day 7 days a week   Restrictions  Restrictions/Precautions  Restrictions/Precautions: Fall Risk, General Precautions   Subjective    Subjective  Subjective: Pt. in bed upon arrival with call light on and wanting to get up to chair for breakfast. Agreeable to therapy at this itme. Reports fatigue. Pain: denies  Orientation  Overall Orientation Status: Within Normal Limits   Objective   Bed Mobility Training  Bed Mobility Training: Yes  Overall Level of Assistance: Contact-guard assistance;Minimum assistance  Interventions: Verbal cues  Rolling: Stand-by assistance  Supine to Sit: Contact-guard assistance;Minimum assistance  Scooting: Contact-guard assistance  Transfer Training  Transfer Training: Yes  Overall Level of Assistance: Contact-guard assistance;Minimum assistance  Interventions: Verbal cues  Sit to Stand: Contact-guard assistance;Minimum assistance  Stand to Sit: Contact-guard assistance;Minimum assistance  Gait Training  Gait Training: Yes  Gait  Overall Level of Assistance: Contact-guard assistance  Interventions: Verbal cues  Speed/Shaylee: Shuffled; Slow  Step Length: Left shortened;Right shortened  Distance (ft):  (8ft)  Assistive Device: Walker, rolling  PT Exercises  Exercise Treatment: Seated exercises B LE x20  Safety Devices  Type of Devices: Call light within reach; Left in chair;Nurse notified   Goals  Short Term Goals  Time Frame for Short Term Goals: 20 days  Short Term Goal 1: Patient to complete all transfers with SUP and LRAD with no LOB to decrease fall risk. Short Term Goal 2: Patient to ambulate 100ft with LRAD and SUP with no LOB or fatigue to improve mobility. Short Term Goal 3: Patient to tolerate 20-30min of ther ex/act to improve functional strength. Short Term Goal 4: Patient to ascend/descend 3 steps with HRx1 and CGA with no LOB to safely navigate her home.   Education  Patient Education  Education Given To: Patient  Education Provided: Role of Therapy;Plan of Care;Transfer Training  Education Provided Comments: hand placement for transfer training with fair carryover  Education Method: Verbal  Barriers to Learning: None  Education Outcome: Verbalized understanding  Therapy Time   Individual Concurrent Group Co-treatment   Time In 0743         Time Out 0810         Minutes [address] GLOVER, PTA

## 2022-10-20 NOTE — PROGRESS NOTES
SW met with pt to provide second IMM notice. Discussed plan for discharge to home tomorrow and pt reports that she feels fine with this and denies any further discharge needs or concerns.  Kathrin Gilliland MSW LSW 10/20/2022

## 2022-10-20 NOTE — PROGRESS NOTES
Pt awake in bed upon entering room. Denies pain or discomfort at this time. VS and assessment as charted. IV fluids infusing. Call light in reach, will continue to monitor.

## 2022-10-20 NOTE — PROGRESS NOTES
Physical Therapy  Facility/Department: [facility] AT THE [facility] MED SURG  Daily Treatment Note  NAME: [place]  : 1940  MRN: [id number]    Date of Service: 10/20/2022    Discharge Recommendations:  Continue to assess pending progress, Home with assist PRN, Home with Home health PT        Patient Diagnosis(es): The primary encounter diagnosis was Urinary tract infection without hematuria, site unspecified. A diagnosis of Septicemia ([address] was also pertinent to this visit. Assessment   Assessment: Progress gait 15ft x 2 with standing rest break between bouts ~1 minute d/t fatigue. CGA bed mobility and transfers. Good tolerance to therex. Continue to progress as tolerated. Activity Tolerance: Patient limited by fatigue     Plan    Physcial Therapy Plan  General Plan: 2 times a day 7 days a week (1x per day on weekends.)     Restrictions  Restrictions/Precautions  Restrictions/Precautions: Fall Risk, General Precautions     Subjective    Subjective  Subjective: Pt in bed upon arrival, reports being \"worn out\" but is agreeable to therapy  Pain: denies  Orientation  Overall Orientation Status: Within Functional Limits     Objective   Vitals     Bed Mobility Training  Bed Mobility Training: Yes  Overall Level of Assistance: Stand-by assistance  Interventions: Verbal cues (For optimal technique.  Extra time to complete d/t fatigue.)  Rolling: Stand-by assistance  Supine to Sit: Stand-by assistance  Sit to Supine: Stand-by assistance  Scooting: Stand-by assistance  Transfer Training  Transfer Training: Yes  Overall Level of Assistance: Contact-guard assistance  Interventions: Verbal cues;Demonstration (For hand placement, however pt reports she her arms are too short to reach the bed when standing.)  Sit to Stand: Contact-guard assistance  Stand to Sit: Contact-guard assistance  Stand Pivot Transfers: Contact-guard assistance  Gait Training  Gait Training: Yes  Gait  Overall Level of Assistance: Contact-guard assistance  Interventions: Verbal cues;Demonstration  Speed/Shaylee: Shuffled; Slow  Step Length: Left shortened;Right shortened  Distance (ft): 30 Feet (15ft x 2 with standing rest break between d/t fatigue. Pt rested with forearms on the walker for ~1 minute before progressing.)  Assistive Device: Walker, rolling     PT Exercises  Exercise Treatment: Seated B LE supine therex x 15 and seated EOB x 15 with minimal rest breaks. AAROM R LE SLR     Safety Devices  Type of Devices: All fall risk precautions in place;Call light within reach;Gait belt;Patient at risk for falls; Left in bed (No alarm present upon entering pt room.)       Goals  Short Term Goals  Time Frame for Short Term Goals: 20 days  Short Term Goal 1: Patient to complete all transfers with SUP and LRAD with no LOB to decrease fall risk. Short Term Goal 2: Patient to ambulate 100ft with LRAD and SUP with no LOB or fatigue to improve mobility. Short Term Goal 3: Patient to tolerate 20-30min of ther ex/act to improve functional strength. Short Term Goal 4: Patient to ascend/descend 3 steps with HRx1 and CGA with no LOB to safely navigate her home. Education  Patient Education  Education Given To: Patient  Education Provided Comments: hand placement for transfer training with poor carryover, safety with transfers including slow positional changes.   Education Method: Verbal;Demonstration  Barriers to Learning: None  Education Outcome: Verbalized understanding;Continued education needed    Therapy Time   Individual Concurrent Group Co-treatment   Time In [address] [place] [address]         Time Out 1114         Minutes [address]

## 2022-10-20 NOTE — PLAN OF CARE
Problem: Discharge Planning  Goal: Discharge to home or other facility with appropriate resources  10/19/2022 2156 by Aashish Beltran RN  Outcome: Progressing  Flowsheets (Taken 10/19/2022 2156)  Discharge to home or other facility with appropriate resources:   Identify barriers to discharge with patient and caregiver   Arrange for needed discharge resources and transportation as appropriate     Problem: Safety - Adult  Goal: Free from fall injury  10/19/2022 2156 by Aashish Beltran RN  Outcome: Progressing  Flowsheets (Taken 10/19/2022 2156)  Free From Fall Injury: Instruct family/caregiver on patient safety  Note: Patient educated on notifying staff of when wanting to get out of bed. Patient educated on staying in bed until staff is present.  socks applied. Call light and over bed table in reach. Side rails up times two. Problem: Pain  Goal: Verbalizes/displays adequate comfort level or baseline comfort level  10/19/2022 2156 by Aashish Beltran RN  Outcome: Progressing  Flowsheets  Taken 10/19/2022 2156 by Aashish Beltran RN  Verbalizes/displays adequate comfort level or baseline comfort level:   Encourage patient to monitor pain and request assistance   Administer analgesics based on type and severity of pain and evaluate response   Consider cultural and social influences on pain and pain management   Assess pain using appropriate pain scale   Implement non-pharmacological measures as appropriate and evaluate response   Notify Licensed Independent Practitioner if interventions unsuccessful or patient reports new pain  Taken 10/19/2022 1335 by Geoffrey Santacruz RN  Verbalizes/displays adequate comfort level or baseline comfort level: Encourage patient to monitor pain and request assistance  Note: Patient educated on notifying staff of when in pain. Patient educated on non pharmacological techniques to relieve pain. Educated patient on pain medication available per orders.

## 2022-10-21 VITALS
SYSTOLIC BLOOD PRESSURE: 140 MMHG | DIASTOLIC BLOOD PRESSURE: 76 MMHG | TEMPERATURE: 97.8 F | RESPIRATION RATE: 20 BRPM | BODY MASS INDEX: 30.74 KG/M2 | OXYGEN SATURATION: 93 % | HEIGHT: 65 IN | WEIGHT: 184.5 LBS | HEART RATE: 76 BPM

## 2022-10-21 LAB
ABSOLUTE EOS #: 0.03 K/UL (ref 0–0.44)
ABSOLUTE IMMATURE GRANULOCYTE: 0.05 K/UL (ref 0–0.3)
ABSOLUTE LYMPH #: 1.01 K/UL (ref 1.1–3.7)
ABSOLUTE MONO #: 0.66 K/UL (ref 0.1–1.2)
ANION GAP SERPL CALCULATED.3IONS-SCNC: 12 MMOL/L (ref 9–17)
BASOPHILS # BLD: 0 % (ref 0–2)
BASOPHILS ABSOLUTE: <0.03 K/UL (ref 0–0.2)
BUN BLDV-MCNC: 13 MG/DL (ref 8–23)
BUN/CREAT BLD: 19 (ref 9–20)
CALCIUM SERPL-MCNC: 8.2 MG/DL (ref 8.6–10.4)
CHLORIDE BLD-SCNC: 104 MMOL/L (ref 98–107)
CO2: 23 MMOL/L (ref 20–31)
CREAT SERPL-MCNC: 0.7 MG/DL (ref 0.5–0.9)
EOSINOPHILS RELATIVE PERCENT: 0 % (ref 1–4)
GFR SERPL CREATININE-BSD FRML MDRD: >60 ML/MIN/1.73M2
GLUCOSE BLD-MCNC: 131 MG/DL (ref 70–99)
HCT VFR BLD CALC: 31.8 % (ref 36.3–47.1)
HEMOGLOBIN: 10.2 G/DL (ref 11.9–15.1)
IMMATURE GRANULOCYTES: 1 %
LYMPHOCYTES # BLD: 11 % (ref 24–43)
MCH RBC QN AUTO: 28.1 PG (ref 25.2–33.5)
MCHC RBC AUTO-ENTMCNC: 32.1 G/DL (ref 28.4–34.8)
MCV RBC AUTO: 87.6 FL (ref 82.6–102.9)
MONOCYTES # BLD: 7 % (ref 3–12)
NRBC AUTOMATED: 0 PER 100 WBC
PDW BLD-RTO: 14.8 % (ref 11.8–14.4)
PLATELET # BLD: 170 K/UL (ref 138–453)
PMV BLD AUTO: 10 FL (ref 8.1–13.5)
POTASSIUM SERPL-SCNC: 3.7 MMOL/L (ref 3.7–5.3)
RBC # BLD: 3.63 M/UL (ref 3.95–5.11)
SEG NEUTROPHILS: 81 % (ref 36–65)
SEGMENTED NEUTROPHILS ABSOLUTE COUNT: 7.17 K/UL (ref 1.5–8.1)
SODIUM BLD-SCNC: 139 MMOL/L (ref 135–144)
WBC # BLD: 8.9 K/UL (ref 3.5–11.3)

## 2022-10-21 PROCEDURE — 92610 EVALUATE SWALLOWING FUNCTION: CPT

## 2022-10-21 PROCEDURE — 97530 THERAPEUTIC ACTIVITIES: CPT

## 2022-10-21 PROCEDURE — 97110 THERAPEUTIC EXERCISES: CPT

## 2022-10-21 PROCEDURE — 6360000002 HC RX W HCPCS: Performed by: NURSE PRACTITIONER

## 2022-10-21 PROCEDURE — 94761 N-INVAS EAR/PLS OXIMETRY MLT: CPT

## 2022-10-21 PROCEDURE — 36415 COLL VENOUS BLD VENIPUNCTURE: CPT

## 2022-10-21 PROCEDURE — 80048 BASIC METABOLIC PNL TOTAL CA: CPT

## 2022-10-21 PROCEDURE — 6370000000 HC RX 637 (ALT 250 FOR IP): Performed by: NURSE PRACTITIONER

## 2022-10-21 PROCEDURE — 97116 GAIT TRAINING THERAPY: CPT

## 2022-10-21 PROCEDURE — 2580000003 HC RX 258: Performed by: NURSE PRACTITIONER

## 2022-10-21 PROCEDURE — 85025 COMPLETE CBC W/AUTO DIFF WBC: CPT

## 2022-10-21 RX ORDER — FUROSEMIDE 10 MG/ML
20 INJECTION INTRAMUSCULAR; INTRAVENOUS ONCE
Status: COMPLETED | OUTPATIENT
Start: 2022-10-21 | End: 2022-10-21

## 2022-10-21 RX ORDER — LEVOFLOXACIN 750 MG/1
750 TABLET ORAL DAILY
Qty: 12 TABLET | Refills: 0 | Status: SHIPPED | OUTPATIENT
Start: 2022-10-22 | End: 2022-11-03

## 2022-10-21 RX ADMIN — LEVOFLOXACIN 750 MG: 750 TABLET, FILM COATED ORAL at 08:27

## 2022-10-21 RX ADMIN — ASPIRIN 81 MG: 81 TABLET, COATED ORAL at 08:27

## 2022-10-21 RX ADMIN — SODIUM CHLORIDE, PRESERVATIVE FREE 10 ML: 5 INJECTION INTRAVENOUS at 08:28

## 2022-10-21 RX ADMIN — FAMOTIDINE 20 MG: 20 TABLET ORAL at 08:27

## 2022-10-21 RX ADMIN — LEVOTHYROXINE SODIUM 75 MCG: 75 TABLET ORAL at 08:27

## 2022-10-21 RX ADMIN — FUROSEMIDE 20 MG: 10 INJECTION, SOLUTION INTRAMUSCULAR; INTRAVENOUS at 08:27

## 2022-10-21 RX ADMIN — CLOPIDOGREL BISULFATE 75 MG: 75 TABLET ORAL at 08:27

## 2022-10-21 RX ADMIN — MULTIPLE VITAMINS W/ MINERALS TAB 1 TABLET: TAB at 13:20

## 2022-10-21 RX ADMIN — CYANOCOBALAMIN TAB 1000 MCG 500 MCG: 1000 TAB at 08:27

## 2022-10-21 RX ADMIN — CARVEDILOL 6.25 MG: 6.25 TABLET, FILM COATED ORAL at 08:27

## 2022-10-21 ASSESSMENT — PAIN SCALES - GENERAL: PAINLEVEL_OUTOF10: 0

## 2022-10-21 NOTE — DISCHARGE INSTR - DIET

## 2022-10-21 NOTE — PROGRESS NOTES
[facility]    Facility/Department: [facility] AT THE [facility] MED SURG    Speech Language Pathology    Clinical Bedside Swallow Evaluation    NAME:Marisel Dinh    : 1940 (79 y.o.)    MRN: [id number]    ROOM: 0327/0327-01    ADMISSION DATE: 10/18/2022    PATIENT DIAGNOSIS(ES): Complicated UTI (urinary tract infection) [N39.0]    Chief Complaint   Patient presents with    Female  Problem    Emesis     Pt to ED with increased bladder spasms, suspecting UTI  Also c/o N/VD since Wednesday.  Called PCP and advised to come to ED       Patient Active Problem List    Diagnosis Date Noted    Abnormal result of other cardiovascular function study 2022    Abnormal stress test     Anginal equivalent ([address]     Pulmonary HTN 2015    Non-rheumatic mitral regurgitation 2015    Sepsis due to Escherichia coli without acute organ dysfunction ([address] 10/20/2022    SIRS (systemic inflammatory response syndrome) ([address] 10/18/2022    Chronic renal disease, stage III [facility]) [905530] 2022    Coronary artery disease 2022    CAD in native artery 2022    Type 2 diabetes mellitus with diabetic neuropathy 2021    Venous insufficiency 2020    Raynaud's phenomenon without gangrene 2020    Onychomycosis 2020    Peripheral polyneuropathy 2020    Status post CVA 2019    History of hip fracture 2019    Cerebrovascular accident (CVA) involving left cerebral hemisphere ([address] 2019    Hip fracture, right, closed, initial encounter ([address] 2019    Hip fx, right, closed, initial encounter ([address] 2019    Parkinsonian tremor ([address] 10/26/2018    Sick sinus syndrome ([address] 10/26/2018    Chronic a-fib ([address]     Dyspepsia     Hypothyroidism 2018    Chronic combined systolic and diastolic congestive heart failure ([address] 2018    Hypothyroidism due to amiodarone 2018    Amiodarone toxicity 2018    Intractable vomiting with nausea 2018 Asymptomatic cholelithiasis 02/12/2018    Acute cholecystitis     Calculus of gallbladder with acute on chronic cholecystitis 02/10/2018    General weakness 02/09/2018    Elevated LFTs 02/09/2018    Pneumonitis 12/20/2017    Decubitus ulcer of sacral region 11/20/2017    Need for prophylactic vaccination and inoculation against influenza 10/16/2017    Paroxysmal A-fib ([address] 09/20/2017    Vasodepressor syncope 09/11/2017    Muscle fatigue 09/11/2017    Leg fatigue 09/05/2017    Acute on chronic congestive heart failure ([address] 09/05/2017    Chronic lung disease 08/30/2017    Interstitial lung disease ([address] 08/14/2017    Long term current use of anticoagulant therapy 08/14/2017    Chronic systolic congestive heart failure ([address] 08/14/2017    Closed TBI (traumatic brain injury) 07/11/2017    E. coli UTI 06/30/2017    Anemia of chronic disease 06/30/2017    Hyperglycemia 06/29/2017    Pulmonary fibrosis ([address] 06/19/2017    ASHD (arteriosclerotic heart disease) 84/19/6459    Uncomplicated asthma 82/67/1554    Acute pulmonary edema ([address] 06/06/2017    Ground glass opacity present on imaging of lung 06/06/2017    Ischemic cardiomyopathy 06/04/2017    Hypokalemia 06/04/2017    Acute cystitis with hematuria 05/31/2017    Type 2 diabetes mellitus without complication ([address] 17/36/0057    Essential hypertension 08/31/2015    Hyperlipidemia 08/31/2015    Obstructive sleep apnea 08/31/2015    Gout 08/31/2015       Past Medical History:   Diagnosis Date    Allergic rhinitis 10/1994    Arthritis     Asthma     Atrial fibrillation ([address] 12/2008    CAD (coronary artery disease)     Cerebrovascular accident (CVA) involving left cerebral hemisphere ([address] 04/23/2019    remote lacunar infarction within the L periventricular white matter    CHF (congestive heart failure) (HCC)     Clotting disorder ([address]     plebitis in L leg    COPD (chronic obstructive pulmonary disease) ([address]     DDD (degenerative disc disease), cervical     Degeneration of cervical intervertebral disc     Diverticulosis 2005    Gout     Gout, unspecified     H/O cardiac catheterization 6/17/15    LMCA: Normal 0% stenosis. LAD: Lesion on 1st diag: Proximal subsection. 80% stenosis. Lesion plaque is ruptured. Daisy [place] LCx: Lesion on 1st Ob Leslie: Mid subsection. 85% stenosis. RCA:Lesion on R PDA: Mid subsection. 85% stenosis. Lesion on R PDA: Distal subsection. 70% stenosis. Lesion on Prox RCA: Mid subsection. 50% stenosis. EF 50%. H/O echocardiogram 11/09/2016    EF 40-45%. Mild LV hypertrophy normal LV cavity size. Sigmoid interventricular septum without evidence of outflow tract obstruction. Left atrium is moderately dilated (34-39) left atrial volume index of 36 ml/m2. Mild mitral regurg. Diastology cannot be assessed due to A-fib. H/O echocardiogram 03/09/2018    EF 45-50%. Apical hypokinesis noted. The left atrium is moderately dilated (34-39) with a left atrial volume index of 34ml/m2. Mild to moderate mitral regurg. Mild tricuspid regurg. Moderate diastolic dysfunction. History of Holter monitoring 3/24/16    Atrial Fibrillation throughout,fairly controlled, HR  bpm 79% of the time. Occasional high ventricular rate episodes and multiple pauses suggestive of tachycardia/bradycardia syndrome  Msximum R-R interval 2.68 seconds.     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    Infectious hepatitis Age 14    Food Borne    Long term (current) use of anticoagulants 8/31/2015    Movement disorder     Other abnormal glucose 2004    Pacemaker 08/10/2017    Insertion of a biventricular pacemaker/ICD AVN ablation    Phlebitis and thrombophlebitis of lower extremities, unspecified 1961    L leg    Senile osteoporosis 2006    L/S    SIRS (systemic inflammatory response syndrome) ([facility] 10/18/2022    Symptomatic menopausal or female climacteric states 06/1995    Syncope and collapse     Unspecified hereditary and idiopathic peripheral neuropathy 2012    Unspecified sleep apnea 11/2009       Past Surgical History:   Procedure Laterality Date    BRONCHOSCOPY  6/7/2017    BRONCHOSCOPY BRUSHINGS performed by Yany Guerin DO at [address] [facility] [place]  6/7/2017    BRONCHOSCOPY/TRANSBRONCHIAL LUNG BIOPSY performed by Yany Guerin DO at [address] [facility] [place]  6/7/2017    BRONCHOSCOPY FLUOROSCOPY performed by Yany Guerin DO at [address] [facility] Right 06/17/2015    CARDIAC CATHETERIZATION Left 06/23/2022    CATARACT REMOVAL WITH IMPLANT Right 08/14/2017    DR ROMERO    COLONOSCOPY  1/2005    DIAGNOSTIC CARDIAC CATH LAB PROCEDURE  06/17/15    EYE SURGERY      FRACTURE SURGERY  2004    Distal Radius Ulna    HIP FRACTURE SURGERY Right 04/21/2019    Dr. Maryanne John- hip pinning    HIP PINNING Right 4/21/2019    HIP PINNING performed by Jovita Mendoza MD at [facility] Left 6/14/2017    MINI PORT ACCESS LEFT CHEST, X2 SPECIMENS.  performed by Sobia Servin MD at [address] [place] [address]  2years old    VOLVAR-ULCERATIVE LESION-CAUTERIZED    [place] EGD TRANSORAL BIOPSY SINGLE/MULTIPLE Left 2/13/2018    EGD BIOPSY performed by Rohini Arriaza MD at [address] [place] [address] AND ADENOIDECTOMY         Allergies   Allergen Reactions    Adhesive Tape     Aspercreme [Trolamine Salicylate]     Erythromycin Other (See Comments)     \"whole in stomach\"    Erythromycin Other (See Comments)     stomach pain    Grandpas Thylox Soap [Elemental Sulfur]      itch    Guaifenesin & Derivatives     Niacin And Related     Niacin And Related Hives    Soap     Tape [Adhesive Tape] Dermatitis    Augmentin [Amoxicillin-Pot Clavulanate] Rash       DATE ONSET: 10/18/22    Date of Evaluation: 10/21/2022    Evaluating Therapist: Anand Manuel SLP    Dysphagia Diagnosis    Dysphagia Diagnosis: Swallow function appears [place] [facility]    Recommended Diet    Diet Solids Recommendation: Regular    Liquid Consistency Recommendation: Thin    Recommended Form of Meds: PO    Compensatory Swallowing Strategies : Alternate solids and liquids;Eat/Feed slowly;Upright as possible for all oral intake;Small bites/sips; Remain upright for 30-45 minutes after meals    Reason for Referral    Jordan Ferguson was referred for a bedside swallow evaluation to assess the efficiency of her swallow function, identify signs and symptoms of aspiration, identify risk factors, and make recommendations regarding safe dietary consistencies, effective compensatory strategies, and safe eating environment. Patient Complaint: Pt reports coughing after drinking water yesterday and is not sure if she aspirated or not. General    Chart Reviewed: Yes  Behavior/Cognition: Alert; Cooperative;Pleasant mood  Respiratory Status: Room air  O2 Device: None (Room air)  Communication Observation: Functional  Follows Directions: Simple  Dentition: Adequate  Patient Positioning: Upright in chair  Baseline Vocal Quality: Normal  Consistencies Administered: Regular;Pureed;Mildly Thick - straw; Thin    Vision and Hearing    Vision  Vision: Within Functional Limits  Hearing  Hearing: Within functional limits    Current Diet level    Current Diet : Regular    Oral Motor    Labial: No impairment  Oral Hygiene: Xerostomia  Oral Hygiene Comments: Pt reports mouth being dry and uses liquids to wet regular solids to help masticate food. Lingual: No impairment  Velum: No Impairment  Mandible: No impairment  Gag: No Impairment      Oral/Pharyngeal Phase    Oral Phase: Adequate manipulation of bolus and A/P transit in oral cavity. Pt did demonstrate prolonged mastication on regular solid due to xerostomia. However, pt was aware of this and independently took sips to clear oral residue from mouth. Pharyngeal Phase: No overt s/s of aspiration noted with consistencies tested.  No cough, throat clearing, or choking was observed with regular solids, mildly thick liquids, and thin liquids. Dysphagia Diagnosis    Dysphagia Diagnosis: Swallow function appears WF    Dysphagia Outcome Severity Scale: Level 7: Normal in all situations    Recommendations    Requires SLP Intervention: No  D/C Recommendations: No follow up therapy recommended post discharge  Diet Solids Recommendation: Regular  Liquid Consistency Recommendation: Thin  Compensatory Swallowing Strategies : Alternate solids and liquids;Eat/Feed slowly;Upright as possible for all oral intake;Small bites/sips; Remain upright for 30-45 minutes after meals  Recommended Form of Meds: PO  Therapeutic Interventions: Diet tolerance monitoring;Patient/Family education         Education    Individuals consulted  Consulted and agree with results and recommendations: Patient    Patient Education: Education provided on aspiration risks and precautions, however, no aspiration was observed in bedside evaluation. Safety Devices    Safety Devices  Safety Devices in place: Yes  Type of devices: All fall risk precautions in place; Left in chair;Bed alarm in place;Call light within reach; Chair alarm in place    Pain Assessment    Pain Assessment: Patient does not c/o pain       Therapy Time    SLP Individual Minutes  Time In: 1100  Time Out: 1120  Minutes: 20       Pt seen for BSE this date due to concerns of aspiration. No overt s/s of aspiration noted with consistencies tested. Pt was presented trials of puree, mildly thick liquids, thin liquids, and regular solids. Pt with prolonged mastication with regular solid due to xerostomia. However, pt was aware of this and independently took sips to clear oral residue from mouth. Adequate manipulation of bolus and A/P transit in oral cavity. Recommend continue regular diet with thin liquids. No further dysphagia therapy warranted. If concerns arise, contact SLP to further assess swallow function.         Electronically signed: Nivia Kc M.S., CF-SLP            10/21/2022

## 2022-10-21 NOTE — PROGRESS NOTES
The patient and daughter choose from the Freedom of choice list [facility] for her follow up care. Referral made to [facility] care and paper work fax.   DUGLAS Tobar

## 2022-10-21 NOTE — DISCHARGE SUMMARY
Discharge Summary    Tiffanie Vásquez  :  1940  MRN:  [id number]    Admit date:  10/18/2022      Discharge date: 10/21/2022     Admitting Physician:  Montrell Clement MD    Discharge Diagnoses:    Principal Problem:    Sepsis due to Escherichia coli without acute organ dysfunction [facility])  Active Problems:    SIRS (systemic inflammatory response syndrome) (HCC)    Essential hypertension    Obstructive sleep apnea    Type 2 diabetes mellitus without complication (HCC)    Ischemic cardiomyopathy    Paroxysmal A-fib (HCC)    Chronic combined systolic and diastolic congestive heart failure ([address]  Resolved Problems:    * No resolved hospital problems. *      Hospital Course:   Tiffanie Vásquez is a 79 y.o. female admitted with E. coli infection. She presented to the emergency room with complaints of nausea, vomiting and dysuria. She stated symptom onset was . She stated that she was having fever and chills as well. She complained of decreased appetite although she states she does not normally eat large meals. She denied dizziness or syncope. She denied falls or any injury. Patient stated she has history of UTIs in the past including sepsis. She complained of bladder spasms and had been on Pyridium as prescribed by her PCP. Patient was febrile upon arrival at T-max 102. 3. No hypotension, tachycardia or tachypnea were seen. Chest x-ray was negative. WBC count was slightly elevated at 12.4. Urinalysis was positive for UTI. Patient was hypotensive with systolic blood pressure in the 90s and was given IV fluids and IV Rocephin was initiated. Patient was admitted and placed on IV antibiotics and fluids. PT and OT were consulted and patient is tolerating well. No hypoxia seen. Patient is afebrile. Patient does continue to have some weakness and fatigue but denies the need for skilled. They are agreeable to home health with PT and OT.   Patient's blood cultures and urine cultures grew E. coli and patient was transitioned to oral Levaquin. Patient will continue this on discharge. Hemodynamically patient is stable. Patient will be discharged home today. Consultants:  none    Procedures: none    Complications: none    Discharge Condition: fair    Exam:  GEN:    Awake, alert and oriented x3. EYES:   EOMI, pupils equal   NECK: Supple. No lymphadenopathy. No carotid bruit  CVS:     regular rate and rhythm, no audible murmur  PULM:   rales left base , no acute respiratory distress  ABD:     Bowels sounds normal.  Abdomen is soft. No distention. no tenderness to palpation. EXT:     no edema bilaterally . No calf tenderness. NEURO: Moves all extremities. Motor and sensory are grossly intact  SKIN:    No rashes. No skin lesions. Significant Diagnostic Studies:   Lab Results   Component Value Date    WBC 8.9 10/21/2022    HGB 10.2 (L) 10/21/2022     10/21/2022       Lab Results   Component Value Date    BUN 13 10/21/2022    CREATININE 0.70 10/21/2022     10/21/2022    K 3.7 10/21/2022    CALCIUM 8.2 (L) 10/21/2022     10/21/2022    CO2 23 10/21/2022    LABGLOM >60 10/21/2022       Lab Results   Component Value Date    WBCUA GREATER THAN 100 10/18/2022    RBCUA 10 TO 20 10/18/2022    EPITHUA 0 TO 2 10/18/2022    LEUKOCYTESUR MODERATE (A) 10/18/2022    SPECGRAV 1.025 (H) 10/18/2022    GLUCOSEU NEGATIVE 10/18/2022    KETUA 1+ (A) 10/18/2022    PROTEINU 2+ (A) 10/18/2022    HGBUR 3+ (A) 10/18/2022    CASTUA NOT REPORTED 04/24/2021    CRYSTUA NOT REPORTED 04/24/2021    BACTERIA 2+ (A) 10/18/2022    YEAST NOT REPORTED 04/24/2021       XR CHEST PORTABLE    Result Date: 10/18/2022  EXAMINATION: ONE XRAY VIEW OF THE CHEST 10/18/2022 10:13 am COMPARISON: 04/20/2019 HISTORY: ORDERING SYSTEM PROVIDED HISTORY: fever, weakness TECHNOLOGIST PROVIDED HISTORY: fever, weakness FINDINGS: The cardiomediastinal silhouette is unchanged in appearance. Left subclavian ICD place. Occlusion device in the left atrial appendage again noted. There is no consolidation, pneumothorax, or evidence of edema. No effusion is appreciated. The osseous structures are unchanged in appearance. Chronic findings in the chest without acute airspace disease identified.        Assessment and Plan:  Patient Active Problem List    Diagnosis Date Noted    Abnormal result of other cardiovascular function study 06/23/2022    Abnormal stress test     Anginal equivalent ([address]     Pulmonary HTN 02/16/2015    Non-rheumatic mitral regurgitation 02/16/2015    Sepsis due to Escherichia coli without acute organ dysfunction ([address] 10/20/2022    SIRS (systemic inflammatory response syndrome) ([address] 10/18/2022    Chronic renal disease, stage III ([address] [124416] 07/05/2022    Coronary artery disease 06/23/2022    CAD in native artery 06/23/2022    Type 2 diabetes mellitus with diabetic neuropathy 07/27/2021    Venous insufficiency 07/13/2020    Raynaud's phenomenon without gangrene 07/13/2020    Onychomycosis 07/13/2020    Peripheral polyneuropathy 07/13/2020    Status post CVA 05/21/2019    History of hip fracture 05/21/2019    Cerebrovascular accident (CVA) involving left cerebral hemisphere ([address] 04/23/2019    Hip fracture, right, closed, initial encounter ([address] 04/20/2019    Hip fx, right, closed, initial encounter ([address] 04/20/2019    Parkinsonian tremor ([address] 10/26/2018    Sick sinus syndrome ([address] 10/26/2018    Chronic a-fib ([address]     Dyspepsia     Hypothyroidism 03/14/2018    Chronic combined systolic and diastolic congestive heart failure ([address] 03/09/2018    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    Intractable vomiting with nausea 02/20/2018    Asymptomatic cholelithiasis 02/12/2018    Acute cholecystitis     Calculus of gallbladder with acute on chronic cholecystitis 02/10/2018    General weakness 02/09/2018    Elevated LFTs 02/09/2018    Pneumonitis 12/20/2017    Decubitus ulcer of sacral region 11/20/2017    Need for prophylactic vaccination and inoculation against influenza 10/16/2017    Paroxysmal A-fib ([facility] 09/20/2017    Vasodepressor syncope 09/11/2017    Muscle fatigue 09/11/2017    Leg fatigue 09/05/2017    Acute on chronic congestive heart failure ([address] 09/05/2017    Chronic lung disease 08/30/2017    Interstitial lung disease ([address] 08/14/2017    Long term current use of anticoagulant therapy 08/14/2017    Chronic systolic congestive heart failure ([address] 08/14/2017    Closed TBI (traumatic brain injury) 07/11/2017    E. coli UTI 06/30/2017    Anemia of chronic disease 06/30/2017    Hyperglycemia 06/29/2017    Pulmonary fibrosis ([address] 06/19/2017    ASHD (arteriosclerotic heart disease) 44/08/2808    Uncomplicated asthma 04/04/5152    Acute pulmonary edema ([address] 06/06/2017    Ground glass opacity present on imaging of lung 06/06/2017    Ischemic cardiomyopathy 06/04/2017    Hypokalemia 06/04/2017    Acute cystitis with hematuria 05/31/2017    Type 2 diabetes mellitus without complication ([facility] 93/39/8833    Essential hypertension 08/31/2015    Hyperlipidemia 08/31/2015    Obstructive sleep apnea 08/31/2015    Gout 08/31/2015        Discharge Medications:         Medication List        START taking these medications      levoFLOXacin 750 MG tablet  Commonly known as: LEVAQUIN  Take 1 tablet by mouth daily for 12 doses  Start taking on: October 22, 2022            CHANGE how you take these medications      ipratropium 0.06 % nasal spray  Commonly known as: ATROVENT  USE 2 SPRAY(S) IN EACH NOSTRIL TWICE DAILY  What changed: See the new instructions.             CONTINUE taking these medications      acetaminophen 325 MG tablet  Commonly known as: TYLENOL  Take 2 tablets by mouth every 4 hours as needed for Pain     aspirin EC 81 MG EC tablet  Take 1 tablet by mouth daily     atorvastatin 40 MG tablet  Commonly known as: LIPITOR  Take 1 tablet by mouth nightly     carvedilol 6.25 MG tablet  Commonly known as: COREG  Take 1 tablet by mouth twice daily     clopidogrel 75 MG tablet  Commonly known as: PLAVIX  Take 1 tablet by mouth daily     Euthyrox 75 MCG tablet  Generic drug: levothyroxine  Take 1 tablet by mouth once daily     nitroGLYCERIN 0.4 MG SL tablet  Commonly known as: Nitrostat  Place 1 tablet under the tongue every 5 minutes as needed for Chest pain up to max of 3 total doses. If no relief after 1 dose, call 911. phenazopyridine 200 MG tablet  Commonly known as: Pyridium  Take 1 tablet by mouth 3 times daily as needed for Pain     polyethylene glycol 17 GM/SCOOP powder  Commonly known as: GLYCOLAX     therapeutic multivitamin-minerals tablet     vitamin B-12 500 MCG tablet  Commonly known as: CYANOCOBALAMIN               Where to Get Your Medications        These medications were sent to [address] [place] [address]      Phone: [phone]   levoFLOXacin 750 MG tablet         Patient Instructions:    Activity: activity as tolerated  Diet: cardiac diet  Wound Care: none needed  Other: CBC with differential and BMP in 1 week    Disposition:   Discharge to Home with home health    Follow up:  Patient will be followed by Hugo Thomas MD in 1-2 weeks    CORE MEASURES on Discharge (if applicable)  ACE/ARB in CHF: NA  Statin in MI: NA  ASA in MI: NA  Statin in CVA: NA  Antiplatelet in CVA: NA    Total time spent on discharge services: 40 minutes    Including the following activities:  Evaluation and Management of patient  Discussion with patient and/or surrogate about current care plan  Coordination with Case Management and/or   Coordination of care with Consultants (if applicable)   Coordination of care with Receiving Facility Physician (if applicable)  Completion of DME forms (if applicable)  Preparation of Discharge Summary  Preparation of Medication Reconciliation  Preparation of Discharge Prescriptions    Signed:  OSCAR Bradley - CNP, OSCAR, NP-C  10/21/2022, 11:29 AM

## 2022-10-21 NOTE — PROGRESS NOTES
Progress Note    SUBJECTIVE:    Patient seen for f/u of Sepsis due to Escherichia coli without acute organ dysfunction ([address] She resting in bed alert and no distress. Feels better. Stated she was just weak    ROS:   Constitutional: negative  for fevers, and negative for chills. Respiratory: negative for shortness of breath, negative for cough, and negative for wheezing  Cardiovascular: negative for chest pain, and negative for palpitations  Gastrointestinal: negative for abdominal pain, negative for nausea,negative for vomiting, negative for diarrhea, and negative for constipation     All other systems were reviewed with the patient and are negative unless otherwise stated in HPI      OBJECTIVE:      Vitals:   Vitals:    10/21/22 0210   BP: (!) 144/90   Pulse: 80   Resp: 18   Temp: 98.1 °F (36.7 °C)   SpO2: 96%     Weight: 184 lb 8 oz (83.7 kg)   Height: 5' 5\" (165.1 cm)     Weight  Wt Readings from Last 3 Encounters:   10/21/22 184 lb 8 oz (83.7 kg)   08/24/22 173 lb 12.8 oz (78.8 kg)   07/06/22 177 lb 12.8 oz (80.6 kg)     Body mass index is 30.7 kg/m². 24HR INTAKE/OUTPUT:      Intake/Output Summary (Last 24 hours) at 10/21/2022 0733  Last data filed at 10/21/2022 0532  Gross per 24 hour   Intake 1370 ml   Output 300 ml   Net 1070 ml     -----------------------------------------------------------------  Exam:    GEN:    Awake, alert and oriented x3. EYES:  EOMI, pupils equal   NECK: Supple. No lymphadenopathy. No carotid bruit  CVS:    regular rate and rhythm, no audible murmur  PULM:   rales left base , no acute respiratory distress  ABD:    Bowels sounds normal.  Abdomen is soft. No distention. no tenderness to palpation. EXT:   no edema bilaterally . No calf tenderness. NEURO: Moves all extremities. Motor and sensory are grossly intact  SKIN:  No rashes.   No skin lesions.    -----------------------------------------------------------------    Diagnostic Data:      Complete Blood Count: Recent Labs     10/19/22  0540 10/20/22  0610 10/21/22  0640   WBC 9.5 6.4 8.9   RBC 3.32* 3.28* 3.63*   HGB 9.7* 9.7* 10.2*   HCT 30.2* 29.5* 31.8*   MCV 91.0 89.9 87.6   MCH 29.2 29.6 28.1   MCHC 32.1 32.9 32.1   RDW 15.0* 15.0* 14.8*    157 170   MPV 10.2 10.1 10.0        Last 3 Blood Glucose:   Recent Labs     10/18/22  0955 10/19/22  0540 10/20/22  0610 10/21/22  0640   GLUCOSE 143* 127* 107* 131*        Comprehensive Metabolic Profile:   Recent Labs     10/18/22  0955 10/19/22  0540 10/20/22  0610 10/21/22  0640    141 138 139   K 4.0 3.8 3.5* 3.7    111* 106 104   CO2 22 21 22 23   BUN 18 19 16 13   CREATININE 1.04* 1.01* 0.83 0.70   GLUCOSE 143* 127* 107* 131*   CALCIUM 8.7 7.2* 7.7* 8.2*   PROT 6.9  --   --   --    LABALBU 3.2*  --   --   --    BILITOT 1.1  --   --   --    ALKPHOS 63  --   --   --    AST 18  --   --   --    ALT 10  --   --   --         Urinalysis:   Lab Results   Component Value Date/Time    NITRU POSITIVE 10/18/2022 11:04 AM    COLORU Yellow 10/18/2022 11:04 AM    PHUR 6.0 10/18/2022 11:04 AM    WBCUA GREATER THAN 100 10/18/2022 11:04 AM    RBCUA 10 TO 20 10/18/2022 11:04 AM    RBCUA 5-10 02/12/2018 08:16 PM    MUCUS NOT REPORTED 04/24/2021 03:30 PM    TRICHOMONAS NOT REPORTED 04/24/2021 03:30 PM    YEAST NOT REPORTED 04/24/2021 03:30 PM    BACTERIA 2+ 10/18/2022 11:04 AM    CLARITYU clear 04/28/2021 01:45 PM    CLARITYU Clear 12/07/2016 12:00 AM    SPECGRAV 1.025 10/18/2022 11:04 AM    LEUKOCYTESUR MODERATE 10/18/2022 11:04 AM    UROBILINOGEN ELEVATED 10/18/2022 11:04 AM    BILIRUBINUR NEGATIVE 10/18/2022 11:04 AM    BILIRUBINUR 0 04/28/2021 01:45 PM    BLOODU negative 04/28/2021 01:45 PM    BLOODU SMALL 02/12/2018 08:16 PM    GLUCOSEU NEGATIVE 10/18/2022 11:04 AM    KETUA 1+ 10/18/2022 11:04 AM    AMORPHOUS NOT REPORTED 04/24/2021 03:30 PM       HgBA1c:    Lab Results   Component Value Date/Time    LABA1C 5.7 07/05/2022 02:25 PM       Lactic Acid:   Lab Results Component Value Date/Time    LACTA 1.9 02/12/2018 03:53 AM    LACTA 3.9 02/11/2018 07:33 PM    LACTA 2.6 02/11/2018 05:14 AM        Troponin: No results for input(s): TROPONINI in the last 72 hours. CRP:  No results for input(s): CRP in the last 72 hours. Radiology/Imaging:  XR CHEST (2 VW)   Final Result   Patchy airspace disease is seen superimposed on a background of fibrosis,   suggesting pneumonia and/or aspiration. Pulmonary edema remains in the   differential as well. XR CHEST PORTABLE   Final Result   Chronic findings in the chest without acute airspace disease identified. ASSESSMENT / PLAN:  Sepsis due to Escherichia coli without acute organ dysfunction (HCC)  Continue current therapy   STop IV Rocephin  STart Levoquin  Blood cultures x2-Ecoli  Urine culture-E. coli  SIRS  Fluid bolus per sepsis protocol given in ER for a total of 2328  2L Fluid bolus given on floor in addition to ER Bolus  Decrease  IV fluids  Lactic acid-normal  Antibiotics-IV Rocephin  Cultures-blood cultures x2-Ecoli  Lasix 20 mg IV x 1 today  Hypertension  Resume Coreg   Type 2 diabetes  Diet controlled  PAF  Hold Coreg  Chronic combined CHF  Stop IV fluids  Daily weight  Nutrition status:    Well developed, well nourished with no malnutrition  Dietician consult initiated  Hospital Prophylaxis:   DVT: Lovenox   Stress Ulcer: H2 Blocker   High risk medications: none   Disposition:    Discharge plan is home today with [facility] today      OSCAR Burgos - CNP , OSCAR, NP-C  Hospitalist Medicine        10/21/2022, 7:33 AM

## 2022-10-21 NOTE — PROGRESS NOTES
Occupational Therapy  Facility/Department: [facility] AT THE [facility] MED SURG  Daily Treatment Note  NAME: [place]  : 1940  MRN: [id number]    Date of Service: 10/21/2022    Discharge Recommendations:  Continue to assess pending progress, Home with assist PRN         Patient Diagnosis(es): The primary encounter diagnosis was Urinary tract infection without hematuria, site unspecified. Diagnoses of Septicemia ([facility] and Sepsis due to Escherichia coli without acute organ dysfunction [facility]) were also pertinent to this visit. Assessment    Activity Tolerance: Patient tolerated treatment well  Discharge Recommendations: Continue to assess pending progress;Home with assist PRN      Plan   Occupational Therapy Plan  Times Per Week: 7  Times Per Day: Once a day  Current Treatment Recommendations: Strengthening; Functional mobility training; Endurance training;Patient/Caregiver education & training;Self-Care / ADL; Balance training  Additional Comments: therex, theract, and self-care training     Restrictions   none    Subjective   Subjective  Subjective: Pt in chair agreeable to therex in chair only. \"I am going home when my daughter gets off work at [employer]. I have her help and  another daughter to help. My  farms, hes no hekp right now. \"  Pain: Pt had no complaints of pain this date. Orientation  Overall Orientation Status: Within Functional Limits  Pain: denies  Cognition  Overall Cognitive Status: WFL        Objective              OT Exercises  Exercise Treatment: BUE therex to increase strength and enduance for ease off fxl tasks. green digilfex x 20 yellow flex bar bends (limited ROM d/t decreased strength) x 20 each, 1# free weight x 10 reps LUE 5 reps RUE d/t fatigue and limited ROM/endurance. Pt limited by Parkinsosn Dx and increasing RUE tremors noted throughout. Safety Devices  Type of Devices: All fall risk precautions in place; Chair alarm in place;Call light within reach; Left in chair     Patient Education  Education Given To: Patient  Education Provided: Role of Therapy;Home Exercise Program;Plan of Care;ADL Adaptive Strategies;Transfer Training;Energy Conservation; Fall Prevention Strategies; Equipment;IADL Safety  Education Provided Comments: d/c folder contents  Education Method: Demonstration;Verbal;Printed Information/Hand-outs  Barriers to Learning: None  Education Outcome: Verbalized understanding;Demonstrated understanding    Goals  Short Term Goals  Time Frame for Short Term Goals: 21 days  Short Term Goal 1: Patient will complete toileting tasks with mod I  Short Term Goal 2: Patient will complete ADL routine with mod I to return to PLOF  Short Term Goal 3: Patient will complete functional mobility/functional transfers with mod I with use of AD as needed  Short Term Goal 4: Patient will demonstrate/verbalize 100% understanding of discharge recommendations  Patient Goals   Patient goals : feel better, go home       Therapy Time   Individual Concurrent Group Co-treatment   Time In 1236         Time Out 1302         Minutes 26                 DEENA Garcia

## 2022-10-21 NOTE — PROGRESS NOTES
[facility]  SPEECH THERAPY  No Visit Note    [] ICU    [x] Acute   Patient: Wywm  Room: 6292/1091-38      [facility] at 10:38 AM due to pt in bathroom upon arrival.  [address] [place] [address]  will check back later this date.          Signature: Arnol Lagos M.S., CF-SLP

## 2022-10-21 NOTE — PROGRESS NOTES
Pt was resting in bed for assessment, is currently up in recliner. Assessment and VS charted. Denies pain or discomfort, Personal belongings and call light in reach. Will continue to monitor.

## 2022-10-21 NOTE — PROGRESS NOTES
Physical Therapy  Facility/Department: [facility] AT THE [facility] MED SURG  Daily Treatment Note  NAME: [place]  : 1940  MRN: [id number]    Date of Service: 10/21/2022    Discharge Recommendations:  Continue to assess pending progress, Home with assist PRN, Home with Home health PT        Patient Diagnosis(es): The primary encounter diagnosis was Urinary tract infection without hematuria, site unspecified. A diagnosis of Septicemia ([address] was also pertinent to this visit. Assessment   Assessment: Pt CGA for bed mobility, CGA for all transfers, BLE  seated x15 each, and ambulation 25 feet FWW CGA. limited by fatigue  Activity Tolerance: Patient limited by fatigue     Plan    Physcial Therapy Plan  General Plan: 2 times a day 7 days a week     Restrictions  Restrictions/Precautions  Restrictions/Precautions: Fall Risk, General Precautions     Subjective    Subjective  Subjective: Pt in bed and agreeable to therapy  Pain: denies  Orientation  Overall Orientation Status: Within Functional Limits  Cognition  Overall Cognitive Status: WFL     Objective   Vitals     Bed Mobility Training  Bed Mobility Training: Yes  Overall Level of Assistance: Stand-by assistance  Interventions: Verbal cues  Rolling: Stand-by assistance  Supine to Sit: Stand-by assistance  Scooting: Contact-guard assistance  Transfer Training  Transfer Training: Yes  Overall Level of Assistance: Contact-guard assistance  Interventions: Verbal cues;Demonstration  Sit to Stand: Contact-guard assistance  Stand to Sit: Contact-guard assistance  Stand Pivot Transfers: Contact-guard assistance  Toilet Transfer: Contact-guard assistance  Gait Training  Gait Training: Yes  Gait  Overall Level of Assistance: Contact-guard assistance  Interventions: Verbal cues  Distance (ft): 25 Feet  Assistive Device: Walker, rolling     PT Exercises  Exercise Treatment: seated BLE x15 in all planes     Safety Devices  Type of Devices: Call light within reach; Left in chair Goals  Short Term Goals  Time Frame for Short Term Goals: 20 days  Short Term Goal 1: Patient to complete all transfers with SUP and LRAD with no LOB to decrease fall risk. Short Term Goal 2: Patient to ambulate 100ft with LRAD and SUP with no LOB or fatigue to improve mobility. Short Term Goal 3: Patient to tolerate 20-30min of ther ex/act to improve functional strength. Short Term Goal 4: Patient to ascend/descend 3 steps with HRx1 and CGA with no LOB to safely navigate her home.     Education  Patient Education  Education Given To: Patient  Education Provided: Role of Therapy;Plan of Care;Transfer Training  Education Provided Comments: UE/LE for safe transfers, and competing exercises once daily  Education Method: Verbal  Barriers to Learning: None    Therapy Time   Individual Concurrent Group Co-treatment   Time In 0745         Time Out 0810         Minutes [address] [facility]

## 2022-10-21 NOTE — PROGRESS NOTES
Physical Therapy  Facility/Department: [facility] AT THE [facility] MED SURG  Daily Treatment Note  NAME: [place]  : 1940  MRN: [id number]    Date of Service: 10/21/2022    Discharge Recommendations:  Continue to assess pending progress, Home with assist PRN, Home with Home health PT        Patient Diagnosis(es): The primary encounter diagnosis was Urinary tract infection without hematuria, site unspecified. Diagnoses of Septicemia ([facility] [address] and Sepsis due to Escherichia coli without acute organ dysfunction [facility]) were also pertinent to this visit. Assessment   Assessment: CGA for all transfers, BLE  seated and supine/reclined x10 AAROM as needed, and ambulation 25 feet FWW CGA. limited by fatigue  Activity Tolerance: Patient limited by fatigue;Patient tolerated treatment well     Plan    Physcial Therapy Plan  General Plan: 2 times a day 7 days a week (1x per day on weekends)     Restrictions  Restrictions/Precautions  Restrictions/Precautions: Fall Risk, General Precautions     Subjective    Subjective  Subjective: Pt in chair and agreeable to therapy  Pain: denies  Orientation  Overall Orientation Status: Within Functional Limits  Cognition  Overall Cognitive Status: [facility]     Objective   Vitals    Transfer Training  Transfer Training: Yes  Overall Level of Assistance: Contact-guard assistance  Interventions: Verbal cues;Demonstration  Sit to Stand: Contact-guard assistance  Stand to Sit: Contact-guard assistance  Stand Pivot Transfers: Contact-guard assistance  Toilet Transfer: Contact-guard assistance  Gait Training  Gait Training: Yes  Gait  Overall Level of Assistance: Contact-guard assistance  Interventions: Verbal cues  Speed/Shaylee: Shuffled; Slow  Step Length: Left shortened;Right shortened  Distance (ft): 25 Feet  Assistive Device: Walker, rolling;Gait belt     PT Exercises  Exercise Treatment: seated B LE x15 reclined/supine therex B LE x10 AAROM as needed     Safety Devices  Type of Devices: Call light within reach; Left in chair (no alarms upon arrival)       Goals  Short Term Goals  Time Frame for Short Term Goals: 20 days  Short Term Goal 1: Patient to complete all transfers with SUP and LRAD with no LOB to decrease fall risk. Short Term Goal 2: Patient to ambulate 100ft with LRAD and SUP with no LOB or fatigue to improve mobility. Short Term Goal 3: Patient to tolerate 20-30min of ther ex/act to improve functional strength. Short Term Goal 4: Patient to ascend/descend 3 steps with HRx1 and CGA with no LOB to safely navigate her home.     Education  Patient Education  Education Given To: Patient  Education Provided Comments: safety with transfers  Education Method: Verbal  Barriers to Learning: None  Education Outcome: Verbalized understanding;Continued education needed    Therapy Time   Individual Concurrent Group Co-treatment   Time In 1125         Time Out 1151         Minutes [address]

## 2022-10-21 NOTE — PLAN OF CARE
Problem: Discharge Planning  Goal: Discharge to home or other facility with appropriate resources  10/21/2022 1655 by Carolann Hester RN  Outcome: Completed  10/21/2022 0944 by Matt Baugh RN  Outcome: Progressing     Problem: Safety - Adult  Goal: Free from fall injury  10/21/2022 1655 by Carolann Hester RN  Outcome: Completed  10/21/2022 0944 by Matt Baugh RN  Outcome: Progressing     Problem: Pain  Goal: Verbalizes/displays adequate comfort level or baseline comfort level  10/21/2022 1655 by Carolann Hester RN  Outcome: Completed  10/21/2022 0944 by Matt Baugh RN  Outcome: Progressing

## 2022-10-21 NOTE — PROGRESS NOTES
Patient in bed, awake watching tv. Assessment completed. Vitals taken and documented. Noted temperature of 100.0 oral. Patient denies of chills or sweating. Ice packs applied to patient's underarms and under knees. Covers removed. Temperature turned down in room. Tylenol given. Patient informed writer that she had a incident where she choked on water earlier today. Wheezes heard in left upper lobe. Diminished in bilateral lower lobes. Call to Dr Izzy Soto. See orders. Patient educated on physician's orders and medication to be given tonight. Patient encouraged to ask questions. Patient denies pain or complaints at this time. Brief and linens changed. Patient denies any further needs. Call light and over bed table within reach. Side rails up times two.

## 2022-10-24 ENCOUNTER — CARE COORDINATION (OUTPATIENT)
Dept: CARE COORDINATION | Age: 82
End: 2022-10-24

## 2022-10-24 DIAGNOSIS — A41.51 SEPSIS DUE TO ESCHERICHIA COLI WITHOUT ACUTE ORGAN DYSFUNCTION (HCC): Primary | ICD-10-CM

## 2022-10-24 PROCEDURE — 1111F DSCHRG MED/CURRENT MED MERGE: CPT | Performed by: INTERNAL MEDICINE

## 2022-10-24 RX ORDER — CLOPIDOGREL BISULFATE 75 MG/1
TABLET ORAL
Qty: 30 TABLET | Refills: 0 | OUTPATIENT
Start: 2022-10-24

## 2022-10-24 NOTE — CARE COORDINATION
[facility] Care Transitions Initial Follow Up Call    Call within 2 business days of discharge: Yes    Care Transition Nurse contacted the patient by telephone to perform post hospital discharge assessment. Verified name and  with patient as identifiers. Provided introduction to self, and explanation of the Care Transition Nurse role. Patient: Charley Katz Patient : 1940   MRN: [id number]  Reason for Admission: Sepsis due to E. Coli   Discharge Date: 10/21/22 RARS: Readmission Risk Score: 15.2      Last Discharge  Street       Date Complaint Diagnosis Description Type Department Provider    10/18/22 Female  Problem; Emesis Urinary tract infection without hematuria, site unspecified . .. ED to Hosp-Admission (Discharged) (Kika Bull) Khushboo Bhandari MD; Mejia Ritchie To. .. Was this an external facility discharge? No Discharge Facility: [facility]    Challenges to be reviewed by the provider   Additional needs identified to be addressed with provider: No  none               Method of communication with provider: none. Spoke with: Patient    Spoke with [place], states she is doing well. Patient denies any fever, chills, chest pain, SOB, nausea/vomiting, weakness, elimination issues or loss of appetite. She also denies any urinary sxs as well. She states [facility] has started care over the weekend. Med rec completed with patient and she has all meds on hand as ordered. She is scheduled to follow up with her PCP Dr Con Quinones on . She denies any needs/issues at this time. Care Transition Nurse reviewed discharge instructions with patient who verbalized understanding. The patient was given an opportunity to ask questions and does not have any further questions or concerns at this time. Were discharge instructions available to patient? Yes.  Reviewed appropriate site of care based on symptoms and resources available to patient including: PCP  When to call Antony Wright Messaging. The patient agrees to contact the PCP office for questions related to their healthcare. Advance Care Planning:   Does patient have an Advance Directive: reviewed and current. Medication reconciliation was performed with patient, who verbalizes understanding of administration of home medications. Medications reviewed. Non-face-to-face services provided:  Obtained and reviewed discharge summary and/or continuity of care documents  Education of patient/family/caregiver/guardian to support self-management-. Offered patient enrollment in the Remote Patient Monitoring (RPM) program for in-home monitoring: Patient is not eligible for RPM program.    Care Transitions 24 Hour Call    Schedule Follow Up Appointment with PCP: Completed  Do you have a copy of your discharge instructions?: Yes  Do you have all of your prescriptions and are they filled?: Yes (Comment: issue straightened out by [facility] Pharmacist call)  Have you been contacted by a [address] [place] [address] Avenue?: No  Have you scheduled your follow up appointment?: Yes  How are you going to get to your appointment?: Car - family or friend to transport  [facility]: [address] [facility] (Comment: [facility])  Do you feel like you have everything you need to keep you well at home?: Yes  Care Transitions Interventions   Home Care Waiver: Completed              Follow Up  Future Appointments   Date Time Provider Jr Perera   11/1/2022  3:00 PM Amanda Lutz MD [facility] [place]   11/15/2022  8:30 AM SCHEDULE, P TIFFIN CAR [facility]   3/1/2023 11:00 AM Pia Maguire MD TIFF CARD [facility]   6/5/2023  9:40 AM Pia Maguire [facility]       Care Transition Nurse provided contact information. No further follow-up call indicated based on severity of symptoms and risk factors.                  Nacho Cox, SILVIA  [facility]/ Care Transition Nurse  [phone]

## 2022-10-28 ENCOUNTER — HOSPITAL ENCOUNTER (OUTPATIENT)
Age: 82
Discharge: HOME | End: 2022-10-28
Payer: MEDICARE

## 2022-10-28 DIAGNOSIS — A41.51 SEPSIS DUE TO ESCHERICHIA COLI WITHOUT ACUTE ORGAN DYSFUNCTION (HCC): ICD-10-CM

## 2022-10-28 LAB
ABSOLUTE EOS #: 0.24 K/UL (ref 0–0.44)
ABSOLUTE IMMATURE GRANULOCYTE: 0.1 K/UL (ref 0–0.3)
ABSOLUTE LYMPH #: 1.53 K/UL (ref 1.1–3.7)
ABSOLUTE MONO #: 0.54 K/UL (ref 0.1–1.2)
ANION GAP SERPL CALCULATED.3IONS-SCNC: 11 MMOL/L (ref 9–17)
BASOPHILS # BLD: 0 % (ref 0–2)
BASOPHILS ABSOLUTE: 0.04 K/UL (ref 0–0.2)
BUN BLDV-MCNC: 14 MG/DL (ref 8–23)
BUN/CREAT BLD: 14 (ref 9–20)
CALCIUM SERPL-MCNC: 9.4 MG/DL (ref 8.6–10.4)
CHLORIDE BLD-SCNC: 104 MMOL/L (ref 98–107)
CO2: 27 MMOL/L (ref 20–31)
CREAT SERPL-MCNC: 0.97 MG/DL (ref 0.5–0.9)
EOSINOPHILS RELATIVE PERCENT: 2 % (ref 1–4)
GFR SERPL CREATININE-BSD FRML MDRD: 58 ML/MIN/1.73M2
GLUCOSE BLD-MCNC: 114 MG/DL (ref 70–99)
HCT VFR BLD CALC: 37.5 % (ref 36.3–47.1)
HEMOGLOBIN: 12 G/DL (ref 11.9–15.1)
IMMATURE GRANULOCYTES: 1 %
LYMPHOCYTES # BLD: 16 % (ref 24–43)
MCH RBC QN AUTO: 29 PG (ref 25.2–33.5)
MCHC RBC AUTO-ENTMCNC: 32 G/DL (ref 28.4–34.8)
MCV RBC AUTO: 90.6 FL (ref 82.6–102.9)
MONOCYTES # BLD: 6 % (ref 3–12)
NRBC AUTOMATED: 0 PER 100 WBC
PDW BLD-RTO: 15.3 % (ref 11.8–14.4)
PLATELET # BLD: 369 K/UL (ref 138–453)
PMV BLD AUTO: 9.3 FL (ref 8.1–13.5)
POTASSIUM SERPL-SCNC: 4.4 MMOL/L (ref 3.7–5.3)
RBC # BLD: 4.14 M/UL (ref 3.95–5.11)
SEG NEUTROPHILS: 75 % (ref 36–65)
SEGMENTED NEUTROPHILS ABSOLUTE COUNT: 7.42 K/UL (ref 1.5–8.1)
SODIUM BLD-SCNC: 142 MMOL/L (ref 135–144)
WBC # BLD: 9.9 K/UL (ref 3.5–11.3)

## 2022-10-28 PROCEDURE — 85025 COMPLETE CBC W/AUTO DIFF WBC: CPT

## 2022-10-28 PROCEDURE — 36415 COLL VENOUS BLD VENIPUNCTURE: CPT

## 2022-10-28 PROCEDURE — 80048 BASIC METABOLIC PNL TOTAL CA: CPT

## 2022-10-28 RX ORDER — CLOPIDOGREL BISULFATE 75 MG/1
TABLET ORAL
Qty: 30 TABLET | Refills: 0 | OUTPATIENT
Start: 2022-10-28

## 2022-10-31 RX ORDER — CLOPIDOGREL BISULFATE 75 MG/1
TABLET ORAL
Qty: 30 TABLET | Refills: 0 | OUTPATIENT
Start: 2022-10-31

## 2022-11-11 NOTE — PROCEDURES
EKG was handed to [facility]. Patient : Vannessa Cabrera Age: 77 year old Sex: female   MRN: [id number] Encounter Date: 11/10/2022      History     Chief Complaint   Patient presents with   • Leg Pain     Patient is 77-year-old female presenting to the emergency department for evaluation of left hip pain.  Patient has history of surgery to her left hip several years ago.  Patient indicates over the past 1 to 2 months she has been having some pain and discomfort in her left hip extending into her femur.  Patient states that the pain is worse with twisting, turning and ambulation.  Patient's son indicates that she has started to limp while using her walker secondary to the pain.  Pain is been persistent and while getting out of the car about 2 days ago she twisted the wrong way and had acute onset of worsening pain.  Family also indicates that patient has chronic headaches that she has had for many years.  These headaches are unchanged.  Patient also has some mild soreness over her left shoulder denies any midline neck pain.  No neck stiffness.  No focal weakness or numbness.  No difficulty with bowel or bladder.  No other neurological symptoms.          Allergies   Allergen Reactions   • Codeine VOMITING   • Ibuprofen GI UPSET       Current Discharge Medication List      Prior to Admission Medications    Details   lisinopril (ZESTRIL) 40 MG tablet TAKE 1 TABLET BY MOUTH DAILY. DO NOT. START BEFORE OCTOBER 14, 2021  Qty: 30 tablet, Refills: 0      hydrochlorothiazide (HYDRODIURIL) 12.5 MG tablet Take 1 tablet by mouth daily. Do not start before October 14, 2021.  Qty: 30 tablet, Refills: 0      alendronate (FOSAMAX) 70 MG tablet Take 70 mg by mouth every 7 days.       allopurinol (ZYLOPRIM) 100 MG tablet Take 100 mg by mouth daily.      Aspirin Low Dose 81 MG EC tablet Take 81 mg by mouth daily. Take with food.      Vitamin D, Ergocalciferol, 1.25 mg (50,000 units) capsule TAKE ONE CAPSULE BY MOUTH EVERY WEEK WITH MEALS      famotidine (PEPCID) 40  MG tablet Take 40 mg by mouth daily.      levothyroxine 75 MCG tablet Take 75 mcg by mouth every morning.      Tradjenta 5 MG tablet Take 5 mg by mouth daily. As directed.      metoPROLOL succinate (TOPROL-XL) 50 MG 24 hr tablet Take 50 mg by mouth every morning.      NIFEdipine XL (PROCARDIA XL) 60 MG 24 hr tablet Take 60 mg by mouth daily.      ondansetron (ZOFRAN ODT) 4 MG disintegrating tablet DISSOLVE 1 TABLET ON THE TONGUE EVERY DAY      HYDROcodone-acetaminophen (NORCO) 5-325 MG per tablet Take 1 tablet by mouth every 8 hours as needed for Pain.  Qty: 10 tablet, Refills: 0      LORazepam (ATIVAN) 1 MG tablet Take 1 mg by mouth every 6 hours as needed.             Past Medical History:   Diagnosis Date   • Anxiety    • Diabetes mellitus (CMS/[facility])    • Vitals Group      Temp 97.5 °F (36.4 °C)      Heart Rate 72      Resp 16      BP (!) 175/82      SpO2 99 %      EtCO2 mmHg       Height       Weight       Weight Scale Used       BMI (Calculated)       IBW/kg (Calculated)        Physical Exam  Constitutional:       General: She is not in acute distress.     Appearance: Normal appearance.   HENT:      Head: Atraumatic.      Right Ear: Tympanic membrane, ear canal and external ear normal.      Left Ear: Tympanic membrane, ear canal and external ear normal.      Nose: Nose normal.      Mouth/Throat:      Mouth: Mucous membranes are moist.      Pharynx: Oropharynx is clear.      Neck: Normal range of motion and neck supple.   Eyes:      Extraocular Movements: Extraocular movements intact.      Conjunctiva/sclera: Conjunctivae normal.      Pupils: Pupils are equal, round, and reactive to light.   Neck:      Comments: No midline cervical tenderness  Cardiovascular:      Rate and Rhythm: Normal rate and regular rhythm.      Pulses: Normal pulses.      Heart sounds: Normal heart sounds. No murmur heard.  Pulmonary:      Effort: Pulmonary effort is normal. No respiratory distress.      Breath sounds: Normal breath sounds. No wheezing, rhonchi or rales.   Chest:      Chest wall: No tenderness.   Abdominal:      General: Abdomen is flat. Bowel sounds are normal. There is no distension.      Palpations: Abdomen is soft.      Tenderness: There is no abdominal tenderness. There is no guarding.   Musculoskeletal:         General: No tenderness or deformity. Normal range of motion.      Comments: Mild tenderness of the proximal left femur and hip with rotation.  No deformities.  No erythema, warmth or swelling.   Skin:     General: Skin is warm and dry.      Capillary Refill: Capillary refill takes less than 2 seconds.      Findings: No rash.   Neurological:      General: No focal deficit present.      Mental Status: She is alert and oriented to person, place, and time.       FABIAN TAYLOR               [facility]  appropriate for discharge.  Family at bedside indicates that they can arrange follow-up with primary doctor for additional imaging and physical therapy if needed.  Patient and family given explicit return precautions and follow-up instructions, indicate that they understand.      Clinical Impression     ED Diagnosis   1. Left hip pain         Disposition        Discharge 11/10/2022 10:13 PM  Vannessa Cabrera discharge to home/self care.                         Philip Schaffer MD  11/10/22 3917

## 2022-11-15 ENCOUNTER — NURSE ONLY (OUTPATIENT)
Dept: CARDIOLOGY | Age: 82
End: 2022-11-15
Payer: MEDICARE

## 2022-11-15 DIAGNOSIS — I25.5 ISCHEMIC CARDIOMYOPATHY: ICD-10-CM

## 2022-11-15 DIAGNOSIS — Z95.810 BIVENTRICULAR ICD (IMPLANTABLE CARDIOVERTER-DEFIBRILLATOR) IN PLACE: ICD-10-CM

## 2022-11-15 DIAGNOSIS — Z95.810 ICD (IMPLANTABLE CARDIOVERTER-DEFIBRILLATOR) IN PLACE: Primary | ICD-10-CM

## 2022-11-15 PROCEDURE — 93295 DEV INTERROG REMOTE 1/2/MLT: CPT | Performed by: INTERNAL MEDICINE

## 2022-12-19 NOTE — ED NOTES
[place] went to see patient and patient had put on nasal clamp. Patient going to be watched for 20 minutes to watch nasal bleeding.      Nupur Kapoor RN  12/17/17 3144 Additional Notes: Right central buccal cheek \\nLesions Injected: 1\\nMedication Injected: 2.5 mg/cc of Kenalog\\nTotal Volume Injected: 1cc\\nLot Number: [id number]\\nExpiration Date: 01/2024\\nAdministered by: JDL\\n\\nThe risks of atrophy were reviewed with the patient. Additional Notes: Right central buccal cheek \\nLesions Injected: 1\\nMedication Injected: 2.5 mg/cc of Kenalog\\nTotal Volume Injected: 1cc\\nLot Number: [id number]\\nExpiration Date: 01/2024\\nAdministered by: JDL\\n\\nThe risks of atrophy were reviewed with the patient.

## 2022-12-20 ENCOUNTER — NURSE ONLY (OUTPATIENT)
Dept: CARDIOLOGY | Age: 82
End: 2022-12-20
Payer: MEDICARE

## 2022-12-20 DIAGNOSIS — I25.5 ISCHEMIC CARDIOMYOPATHY: ICD-10-CM

## 2022-12-20 DIAGNOSIS — Z95.810 ICD (IMPLANTABLE CARDIOVERTER-DEFIBRILLATOR) IN PLACE: Primary | ICD-10-CM

## 2022-12-20 DIAGNOSIS — I50.32 CHRONIC DIASTOLIC CONGESTIVE HEART FAILURE (HCC): ICD-10-CM

## 2022-12-20 PROCEDURE — 93297 REM INTERROG DEV EVAL ICPMS: CPT | Performed by: INTERNAL MEDICINE

## 2023-01-24 ENCOUNTER — NURSE ONLY (OUTPATIENT)
Dept: CARDIOLOGY | Age: 83
End: 2023-01-24

## 2023-01-24 DIAGNOSIS — I50.32 CHRONIC DIASTOLIC CONGESTIVE HEART FAILURE (HCC): ICD-10-CM

## 2023-01-24 DIAGNOSIS — Z95.810 ICD (IMPLANTABLE CARDIOVERTER-DEFIBRILLATOR) IN PLACE: Primary | ICD-10-CM

## 2023-01-24 DIAGNOSIS — I25.5 ISCHEMIC CARDIOMYOPATHY: ICD-10-CM

## 2023-02-03 ENCOUNTER — OFFICE VISIT (OUTPATIENT)
Dept: CARDIOLOGY | Age: 83
End: 2023-02-03

## 2023-02-03 ENCOUNTER — HOSPITAL ENCOUNTER (OUTPATIENT)
Dept: NON INVASIVE DIAGNOSTICS | Age: 83
Discharge: HOME | End: 2023-02-03
Payer: MEDICARE

## 2023-02-03 ENCOUNTER — HOSPITAL ENCOUNTER (OUTPATIENT)
Age: 83
Discharge: HOME | End: 2023-02-03
Payer: MEDICARE

## 2023-02-03 VITALS
WEIGHT: 167.8 LBS | DIASTOLIC BLOOD PRESSURE: 81 MMHG | HEART RATE: 80 BPM | SYSTOLIC BLOOD PRESSURE: 124 MMHG | HEIGHT: 65 IN | RESPIRATION RATE: 16 BRPM | OXYGEN SATURATION: 98 % | BODY MASS INDEX: 27.96 KG/M2

## 2023-02-03 DIAGNOSIS — Z95.810 ICD (IMPLANTABLE CARDIOVERTER-DEFIBRILLATOR) IN PLACE: ICD-10-CM

## 2023-02-03 DIAGNOSIS — I25.118 CORONARY ARTERY DISEASE INVOLVING NATIVE CORONARY ARTERY OF NATIVE HEART WITH OTHER FORM OF ANGINA PECTORIS (HCC): ICD-10-CM

## 2023-02-03 DIAGNOSIS — Z98.890 HX OF ATRIOVENTRICULAR NODE ABLATION: ICD-10-CM

## 2023-02-03 DIAGNOSIS — I25.5 ISCHEMIC CARDIOMYOPATHY: ICD-10-CM

## 2023-02-03 DIAGNOSIS — E78.2 MIXED HYPERLIPIDEMIA: ICD-10-CM

## 2023-02-03 DIAGNOSIS — I48.0 PAROXYSMAL A-FIB (HCC): ICD-10-CM

## 2023-02-03 DIAGNOSIS — I10 PRIMARY HYPERTENSION: ICD-10-CM

## 2023-02-03 DIAGNOSIS — I50.42 CHRONIC COMBINED SYSTOLIC AND DIASTOLIC CONGESTIVE HEART FAILURE (HCC): Primary | ICD-10-CM

## 2023-02-03 DIAGNOSIS — I50.42 CHRONIC COMBINED SYSTOLIC AND DIASTOLIC CONGESTIVE HEART FAILURE (HCC): ICD-10-CM

## 2023-02-03 DIAGNOSIS — Z95.820 S/P ANGIOPLASTY WITH STENT: ICD-10-CM

## 2023-02-03 DIAGNOSIS — Z95.818 PRESENCE OF WATCHMAN LEFT ATRIAL APPENDAGE CLOSURE DEVICE: ICD-10-CM

## 2023-02-03 LAB
ANION GAP SERPL CALCULATED.3IONS-SCNC: 9 MMOL/L (ref 9–17)
BNP SERPL-MCNC: 860 PG/ML
BUN SERPL-MCNC: 20 MG/DL (ref 8–23)
BUN/CREAT BLD: 21 (ref 9–20)
CALCIUM SERPL-MCNC: 10.1 MG/DL (ref 8.6–10.4)
CHLORIDE SERPL-SCNC: 104 MMOL/L (ref 98–107)
CO2 SERPL-SCNC: 29 MMOL/L (ref 20–31)
CREAT SERPL-MCNC: 0.95 MG/DL (ref 0.5–0.9)
GFR SERPL CREATININE-BSD FRML MDRD: 59 ML/MIN/1.73M2
GLUCOSE SERPL-MCNC: 93 MG/DL (ref 70–99)
HCT VFR BLD AUTO: 43.8 % (ref 36.3–47.1)
HGB BLD-MCNC: 13.7 G/DL (ref 11.9–15.1)
LV EF: 55 %
LVEF MODALITY: NORMAL
MCH RBC QN AUTO: 28.4 PG (ref 25.2–33.5)
MCHC RBC AUTO-ENTMCNC: 31.3 G/DL (ref 28.4–34.8)
MCV RBC AUTO: 90.9 FL (ref 82.6–102.9)
NRBC AUTOMATED: 0 PER 100 WBC
PDW BLD-RTO: 15 % (ref 11.8–14.4)
PLATELET # BLD AUTO: 226 K/UL (ref 138–453)
PMV BLD AUTO: 9.9 FL (ref 8.1–13.5)
POTASSIUM SERPL-SCNC: 4.6 MMOL/L (ref 3.7–5.3)
RBC # BLD: 4.82 M/UL (ref 3.95–5.11)
SODIUM SERPL-SCNC: 142 MMOL/L (ref 135–144)
WBC # BLD AUTO: 6.1 K/UL (ref 3.5–11.3)

## 2023-02-03 PROCEDURE — 80048 BASIC METABOLIC PNL TOTAL CA: CPT

## 2023-02-03 PROCEDURE — 85027 COMPLETE CBC AUTOMATED: CPT

## 2023-02-03 PROCEDURE — 93306 TTE W/DOPPLER COMPLETE: CPT

## 2023-02-03 PROCEDURE — 36415 COLL VENOUS BLD VENIPUNCTURE: CPT

## 2023-02-03 PROCEDURE — 83880 ASSAY OF NATRIURETIC PEPTIDE: CPT

## 2023-02-03 RX ORDER — METOPROLOL SUCCINATE 25 MG/1
25 TABLET, EXTENDED RELEASE ORAL DAILY
Qty: 90 TABLET | Refills: 3 | Status: SHIPPED | OUTPATIENT
Start: 2023-02-03 | End: 2023-02-03

## 2023-02-03 RX ORDER — METOPROLOL SUCCINATE 50 MG/1
50 TABLET, EXTENDED RELEASE ORAL DAILY
Qty: 90 TABLET | Refills: 1 | Status: SHIPPED | OUTPATIENT
Start: 2023-02-03

## 2023-02-03 NOTE — PROGRESS NOTES
Hue Guerrero am scribing for and in the presence of Kary Duckworth MD, F.A.C.C..    Subjective:     CHIEF COMPLAINT / HPI:   Chief Complaint   Patient presents with    Follow-up     Hx: CHF,CAD,Chronic Afib,HTN,HLD,ICD. Alert on Device on 1/26 for VT episode on 1/12. She hasn't been feeling as well as she normally has. SOB, worse than others some days. Denies: CP, Lightheaded/dizziness, Palpitations. Dear Dr. El Almeida MD     I had the pleasure of seeing Ms. Ksenia Hodges for follow-up. Irene Alvarado is 79 y.o.  with PMH of hypertension, hyperlipidemia, asthma and chronic atrial fibrillation. Her most recent cardiac catheterization showed moderate to severe three-vessel coronary artery disease as outlined below. 1-Patient's chronic atrial fibrillation is  Treated with rate control and anticoagulation with coumadin. Her Holter monitor on 4/9/2015 suggested tachycardia-bradycardia syndrome. 2-On 3/16/2016, patient was sent from cardiac rehab because of not feeling well. Imdur 30 mg daily was added as well as Ranexa 500 mg BID. 3-Patient admitted to [facility] from 6/3/2017 to 6/15/2017 with worsening shortness of breath diagnosed with pneumonitis, status post lung biopsy. Currently on prednisone. 4-Another admission to [facility] from 6/29/2017 to 7/6/2017 with A. Fib with RVR and acute on chronic diastolic CHF. Patient started on amiodarone during this admission in addition to her ratel control and diuresis. 5-Another admission to [facility] from 7/11/2017 to 7/14/2017 with recurrent fall and black eye. 3 falling episodes, 2 of them she hit her head. She is not sure if she lost consciousness. MRI of the brain was negative. 6-Patient underwent AV node ablation and insertion of biventricular ICD by Dr. Doreen Gross on 8/10/2017. 7-On 9/18/2017 patient underwent insertion of a Watchman device.  currently off anticoagulation     8-An admission to [facility] with orthostatic hypotension, discharged on 3/9/2018. Her metoprolol decreased from 50 to 25 mg twice a day and midodrine added. 9-ICD checked 3/7/18: Biventricular pacing 99% of the time. No significant ventricular arrhythmia. 10- EKG done on 4/20/2019. 11- Echo on 05/13/2019: Poor image quality. EF 45-50%. LA is mildly dilated w/ LA colume index of 30. Mild mitral rgeurg. Moderate diastolic dysfunction. 12- ICD interrogation today 5/20/2020-battery 4.1 years. Ventricular pacing 99%, pacing mode VVIR. Chronic atrial fibrillation    13- EKG done in office on 5/20/2020-biventricular pacing. No change from prior. 14- Echocardiogram done on 5/24/2021: EF of 50% Left atrium is severely dilated. 15-ICD Interrogation Findings on 5/25/2022: Within normal limits and therefore no changes were made. Corvue stable-no signs of fluid. 16- Stress test done 6/7/2022: Abnormal myocardial perfusion study. There is a small/moderate perfusion defect of mild intensity in the anterolateral and lateral region(s) during stress imaging which is most consistent with ischemia, but may be due to artifact. In addition, transient ischemic dilatation (TID) of the left ventricle is seen which can be seen with uncontrolled hypertension, severe left main coronary artery disease, or severe 3-vessel coronary artery disease. (TID: 1.23) Global left ventricular systolic function was normal without regional wall motion abnormalities. No significant electrocardiographic evidence of myocardial ischemia during EKG monitoring without significant associated arrhythmias. Overall, these results are most consistent with an intermediate/high risk for significant coronary artery disease. 16- Cath on 6/23/2022- Severe single vessel disease involving the OM1 branch of the circumflex coronary artery. Normal left ventricular end diastolic pressure. 18- 6/23/2022- Successful PCI and stenting of the first obtuse marginal branch. Fractional flow reserve measurement of the left anterior descending artery    19-Admission to the hospital with UTI in October 2022    20- ICD interrogated today in office on 2/3/2023 and I also reviewed her most recent transmission. Ventricular tachycardia episode on 1/12/2023 that is terminated by ATP. Ms. Linda Reese is here today for a follow up. She reports she hasn't been feeling as well as she typically does. She has a brace on her ankle to to walking on it, she reports she will eventually have to have one on her other ankle. She has good days and bad days when it comes to sleeping. She does have shortness of breath at times and some days are worse than others when it comes to that. She reports she had a UTI and then a yeast infection last year but this has since cleared up. She has lost a couple pounds since her last visit. She would like to get down to 165 lbs to help herself. She denies having any chest pain, pressure or tightness. She denies having any lightheaded/dizziness or feeling any palpitations. No nausea or vomiting. No cough, fever or chills. No abdominal pain, bleeding problems, bowel issues, problems with medications, or any other concerns at this time. No falls or near falls. Past Medical History:    Past Medical History:   Diagnosis Date    Allergic rhinitis 10/1994    Arthritis     Asthma     Atrial fibrillation ([facility] [address] 12/2008    CAD (coronary artery disease)     Cerebrovascular accident (CVA) involving left cerebral hemisphere ([facility] [address] 04/23/2019    remote lacunar infarction within the L periventricular white matter    CHF (congestive heart failure) ([facility])     Clotting disorder ([facility])     plebitis in L leg    COPD (chronic obstructive pulmonary disease) ([facility])     DDD (degenerative disc disease), cervical     Degeneration of cervical intervertebral disc     Diverticulosis 2005    Gout     Gout, unspecified     H/O cardiac catheterization 6/17/15    LMCA: Normal 0% stenosis. LAD: Lesion on 1st diag: Proximal subsection. 80% stenosis. Lesion plaque is ruptured.. LCx: Lesion on 1st Ob Leslie: Mid subsection.85% stenosis. RCA:Lesion on R PDA: Mid subsection. 85% stenosis. Lesion on R PDA: Distal subsection. 70% stenosis. Lesion on Prox RCA: Mid subsection.50% stenosis. EF 50%.    H/O echocardiogram 11/09/2016    EF 40-45%. Mild LV hypertrophy normal LV cavity size. Sigmoid interventricular septum without evidence of outflow tract obstruction.  Left atrium is moderately dilated (34-39) left atrial volume index of 36 ml/m2. Mild mitral regurg. Diastology cannot be assessed due to A-fib.    H/O echocardiogram 03/09/2018    EF 45-50%. Apical hypokinesis noted. The left atrium is moderately dilated (34-39) with a left atrial volume index of 34ml/m2. Mild to moderate mitral regurg. Mild tricuspid regurg. Moderate diastolic dysfunction.     History of Holter monitoring 3/24/16    Atrial Fibrillation throughout,fairly controlled, HR  bpm 79% of the time. Occasional high ventricular rate episodes and multiple pauses suggestive of tachycardia/bradycardia syndrome  Msximum R-R interval 2.68 seconds.    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    Infectious hepatitis Age 12    Food Borne    Long term (current) use of anticoagulants 8/31/2015    Movement disorder     Other abnormal glucose 2004    Pacemaker 08/10/2017    Insertion of a biventricular pacemaker/ICD AVN ablation    Phlebitis and thrombophlebitis of lower extremities, unspecified 1961    L leg    Senile osteoporosis 2006    L/S    SIRS (systemic inflammatory response syndrome) (HCC) 10/18/2022    Symptomatic menopausal or female climacteric states 06/1995    Syncope and collapse     Unspecified hereditary and idiopathic peripheral neuropathy 2012    Unspecified sleep apnea 11/2009     Past Surgical History:  Past Surgical History:  Procedure Laterality Date    BRONCHOSCOPY  6/7/2017    BRONCHOSCOPY BRUSHINGS performed by Carrington Lima DO at [facility]    BRONCHOSCOPY  6/7/2017    BRONCHOSCOPY/TRANSBRONCHIAL LUNG BIOPSY performed by Carrington Lima DO at [facility]    BRONCHOSCOPY  6/7/2017    BRONCHOSCOPY FLUOROSCOPY performed by Carrington Lima DO at [facility] Right 06/17/2015    CARDIAC CATHETERIZATION Left 06/23/2022    CATARACT REMOVAL WITH IMPLANT Right 08/14/2017    DR ROMERO    COLONOSCOPY  1/2005    DIAGNOSTIC CARDIAC CATH LAB PROCEDURE  06/17/15    EYE SURGERY      FRACTURE SURGERY  2004    Distal Radius Ulna    HIP FRACTURE SURGERY Right 04/21/2019    Dr. Doc Garcia- hip pinning    HIP PINNING Right 4/21/2019    HIP PINNING performed by Sea Sheehan MD at [facility] Left 6/14/2017    MINI PORT ACCESS LEFT CHEST, X2 SPECIMENS.  performed by Liza Pineda MD at [address]  2years old    VOLVAR-ULCERATIVE LESION-CAUTERIZED    VA EGD TRANSORAL BIOPSY SINGLE/MULTIPLE Left 2/13/2018    EGD BIOPSY performed by Salvador Aguirre MD at [facility]    SKIN BIOPSY      TONSILLECTOMY AND ADENOIDECTOMY       Social History:    Social History     Tobacco Use   Smoking Status Never   Smokeless Tobacco Never     Family History   Problem Relation Age of Onset    Heart Disease Mother     Stroke Mother     High Blood Pressure Mother     Cancer Father         lung    Stroke Brother     Heart Disease Maternal Grandmother        Current Medications:  Outpatient Medications Marked as Taking for the 2/3/23 encounter (Office Visit) with Paz Brown MD   Medication Sig Dispense Refill    levothyroxine (SYNTHROID) 75 MCG tablet Take 1 tablet by mouth once daily 90 tablet 0    carvedilol (COREG) 6.25 MG tablet Take 1 tablet by mouth 2 times daily 180 tablet 3    atorvastatin (LIPITOR) 40 MG tablet Take 1 tablet by mouth at bedtime 90 tablet 3    clopidogrel (PLAVIX) 75 MG tablet Take 1 tablet by mouth daily 30 tablet 3    ipratropium (ATROVENT) 0.06 % nasal spray USE 2 SPRAY(S) IN EACH NOSTRIL TWICE DAILY (Patient taking differently: 2 sprays by Nasal route 2 times daily as needed USE 2 SPRAY(S) IN EACH NOSTRIL TWICE DAILY) 45 mL 0    nitroGLYCERIN (NITROSTAT) 0.4 MG SL tablet Place 1 tablet under the tongue every 5 minutes as needed for Chest pain up to max of 3 total doses. If no relief after 1 dose, call 911. 25 tablet 1    Multiple Vitamins-Minerals (THERAPEUTIC MULTIVITAMIN-MINERALS) tablet Take 1 tablet by mouth daily      vitamin B-12 (CYANOCOBALAMIN) 500 MCG tablet Take 500 mcg by mouth daily       aspirin EC 81 MG EC tablet Take 1 tablet by mouth daily 90 tablet 3    acetaminophen (TYLENOL) 325 MG tablet Take 2 tablets by mouth every 4 hours as needed for Pain 120 tablet 3    polyethylene glycol (GLYCOLAX) powder Take 17 g by mouth as needed (for constipation)         REVIEW OF SYSTEMS:    CONSTITUTIONAL: No major weight gain or loss, fatigue, weakness, night sweats or fever. HEENT: No new vision difficulties or ringing in the ears. RESPIRATORY: See HPI   CARDIOVASCULAR: See HPI  GI: No nausea, vomiting, diarrhea, constipation, abdominal pain or changes in bowel habits. : No urinary frequency, urgency, incontinence hematuria or dysuria. SKIN: No cyanosis or skin lesions. MUSCULOSKELETAL: No new muscle or joint pain. NEUROLOGICAL: No syncope or TIA-like symptoms. PSYCHIATRIC: No anxiety, pain, insomnia or depression    Objective:     Physical Examination:     /81 (Site: Left Upper Arm, Position: Sitting, Cuff Size: Medium Adult)   Pulse 80   Resp 16   Ht 5' 5\" (1.651 m)   Wt 167 lb 12.8 oz (76.1 kg)   SpO2 98%   BMI 27.92 kg/m²  Body mass index is 27.92 kg/m². Constitutional: She appeared oriented to person and place. She appears well-developed and well-nourished. In no acute distress. HEENT: Normocephalic and atraumatic. No JVD present.  Carotid bruit is not present. No mass and no thyromegaly present. No lymphadenopathy noted. Cardiovascular: Normal rate, regular rhythm, normal heart sounds. Exam reveals no gallop and no friction rubs. 1/6 systolic murmur, 5th intercostal space on the LEFT in the mid-clavicular line (cardiac apex). Pulmonary/Chest: Effort normal and breath sounds normal. No respiratory distress. She has no wheezes, rhonchi or rales. Abdominal: Soft, non-tender. She exhibits no organomegaly, mass or bruit. Extremities: No significant edema. No cyanosis or clubbing. 2+ radial and carotid pulses. Distal extremity pulses: 2+ bilaterally. Neurological: Alertness and orientation as per Constitutional exam. No evidence of gross cranial nerve deficit. Coordination appeared normal.   Skin: Skin is warm and dry. There is no rash or diaphoresis. Psychiatric: She has a normal mood and affect. Her speech is normal and behavior is normal.      DATA:    Lab Results   Component Value Date    ALT 10 10/18/2022    AST 18 10/18/2022    ALKPHOS 63 10/18/2022    BILITOT 1.1 10/18/2022     Lab Results   Component Value Date    CREATININE 0.97 (H) 10/28/2022    BUN 14 10/28/2022     10/28/2022    K 4.4 10/28/2022     10/28/2022    CO2 27 10/28/2022     Lab Results   Component Value Date    TSH 1.470 07/05/2022    [id number] 8.5 11/24/2021     Lab Results   Component Value Date    WBC 9.9 10/28/2022    HGB 12.0 10/28/2022    HCT 37.5 10/28/2022    MCV 90.6 10/28/2022     10/28/2022       Lab Results   Component Value Date    TRIG 204 (H) 02/25/2022    TRIG 185 (H) 11/05/2020    TRIG 113 02/25/2020     Lab Results   Component Value Date    HDL 42 02/25/2022    HDL 47 11/05/2020    HDL 48 02/25/2020     Lab Results   Component Value Date    LDLCHOLESTEROL 52 02/25/2022    LDLCHOLESTEROL 60 11/05/2020    LDLCHOLESTEROL 43 02/25/2020         Assessment:      Diagnosis Orders   1. Ischemic cardiomyopathy        2.  Chronic combined systolic and diastolic congestive heart failure (HCC)        3. Paroxysmal A-fib ([facility] [address]        4. Coronary artery disease involving native coronary artery of native heart with other form of angina pectoris ([facility] [address]            PLAN:  Ventricular tachycardia:  V. tach episode on 1/12/2023, terminated by antitachycardia pacing. Patient has chronic atrial fibrillation, status post AV piotr ablation and Watchman device in place. Biventricular pacing at 80 bpm.  I do believe that switching her beta-blocker from carvedilol to Toprol-XL as needed. I will also get a repeat echo on Lasix therapy work to rule out electrolyte abnormalities. Chronic Systolic and Diastolic Heart Failure:   No clinical evidence of volume overload. No significant lower extremity edema. Beta Blocker: STOP Carvedilol (Coreg) and START Metoprolol succinate (Toprol XL) 50 mg daily. Nonpharmacologic management of Heart Failure: I told her to continue wearing lower extremity compression stockings and I advised her to try and keep his legs up whenever possible and to limit salt in her diet. Additional Testing List: None     Atherosclerotic Heart Disease: 6/23/2022 Successful PCI and stenting of the first obtuse marginal branch. Fractional flow reserve measurement of the left anterior descending artery  Antiplatelet Agent: Continue aspirin 81 mg daily and Plavix 75 mg daily. Beta Blocker: STOP Carvedilol (Coreg) and START Metoprolol succinate (Toprol XL) 50 mg daily. Anti-anginal medications: Continue nitroglycerin 0.4 mg tablets as needed for chest pain. Statin Therapy: Continue atorvastatin (Lipitor) 40 mg nightly. Paroxysmal Atrial Fibrillation status post AV node ablation and watchman device placement:   Episode of VT seen on 1/12/2023 - ICD terminated the VT before she was shocked   Beta Blocker: STOP Carvedilol (Coreg) and START Metoprolol succinate (Toprol XL) 50 mg daily.     Stroke Risk: Her CHADS2 score is 5/9 (6.7% stroke risk)  Anticoagulation: Watchman in place. I did explain to her that watchman device does not prevent every single stroke. Patient said she had a CT scan done of the brain for her tremor and found to have a tiny stroke. She no longer see's neurology for her tremor. Additional Testing List: I took the liberty of ordering a BMP for today to assess their potassium and renal function. I told them that they could get their lab work performed at the location of their choosing, unfortunately, if the lab work was not performed at a [facility]) facility I could not guarantee my ability to follow up with them on their results. ,  I took the liberty of ordering a CBC. I told them that they could get their lab work performed at the location of their choosing, unfortunately, if the lab work was not performed at a [facility]) facility I could not guarantee my ability to follow up with them on their results. , and I ordered a pro BNP level to better assess for the patients level of heart failure. I told them that they could get their lab work performed at the location of their choosing, unfortunately, if the lab work was not performed at a [facility]) facility I could not guarantee my ability to follow up with them on their results. and I ordered a echocardiogram to better assess for the etiology of this problem and to help guide future management    Essential Hypertension: Controlled  Beta Blocker: STOP Carvedilol (Coreg) and START Metoprolol succinate (Toprol XL) 25 mg daily. ACE Inibitor/ARB: Not indicated at this time. Calcium Channel Blocker: Not indicated at this time. Diuretics: Not indicated at this time. Hyperlipidemia: Mixed - Last LDL at goal. 2/25/22 was 52mg/dL  Cholesterol Reduction Therapy: Continue Atorvastatin (Lipitor) 40 mg daily. Implantable Cardioverter Defibrillator (ICD):   Indication for Device Placement:  Biventricular ICD secondary to severe left ventricular systolic dysfunction and chronic atrial fibrillation    Interrogation Findings: Within normal limits and therefore no changes were made. Biventricular pacing is more than 99%. No further ventricular arrhythmias detected. Finally, I recommended that she continue her other medications and follow up with you as previously scheduled. FOLLOW UP:  I told Ms. Nehal Painting to call my office if she had any problems, but otherwise told her to Return in about 3 months (around 5/3/2023). However, I would be happy to see her sooner should the need arise. Viraj Arauz MD, MS, F.A.C.C. [facility], [address] [place] [address], [address]  Phone: [phone], Fax: [phone]    I believe that the risk of significant morbidity and mortality related to the patient's current medical conditions are: Intermediate. Approximately 35 minutes were spent during prep work, discussion and exam of the patient, and follow up documentation and all of their questions were answered. The documentation recorded by the scribe, accurately and completely reflects the services I personally performed and the decisions made by me. Viraj Arauz MD, F.A.C.C.  February 3, 2023

## 2023-02-03 NOTE — PATIENT INSTRUCTIONS
SURVEY:    You may be receiving a survey from [employer] regarding your visit today. Please complete the survey to enable us to provide the highest quality of care to you and your family. If you cannot score us a very good on any question, please call the office to discuss how we could have made your experience a very good one. Thank you.

## 2023-02-09 ENCOUNTER — TELEPHONE (OUTPATIENT)
Dept: CARDIOLOGY | Age: 83
End: 2023-02-09

## 2023-02-09 NOTE — TELEPHONE ENCOUNTER
----- Message from Kvng Joe MD sent at 2/8/2023 11:20 PM EST -----  Echo and blood work are good. Continue current therapy and follow-up. Please call with questions and/or concerns.   Thank you

## 2023-02-21 ENCOUNTER — HOSPITAL ENCOUNTER (OUTPATIENT)
Age: 83
Discharge: HOME | End: 2023-02-21
Payer: MEDICARE

## 2023-02-21 DIAGNOSIS — E03.9 HYPOTHYROIDISM, UNSPECIFIED TYPE: ICD-10-CM

## 2023-02-21 DIAGNOSIS — N30.01 ACUTE CYSTITIS WITH HEMATURIA: ICD-10-CM

## 2023-02-21 DIAGNOSIS — A41.51 SEPSIS DUE TO ESCHERICHIA COLI WITHOUT ACUTE ORGAN DYSFUNCTION (HCC): ICD-10-CM

## 2023-02-21 DIAGNOSIS — I25.5 ISCHEMIC CARDIOMYOPATHY: ICD-10-CM

## 2023-02-21 DIAGNOSIS — E11.9 TYPE 2 DIABETES MELLITUS WITHOUT COMPLICATION, UNSPECIFIED WHETHER LONG TERM INSULIN USE (HCC): ICD-10-CM

## 2023-02-21 LAB
ABSOLUTE EOS #: 0.31 K/UL (ref 0–0.44)
ABSOLUTE IMMATURE GRANULOCYTE: 0.04 K/UL (ref 0–0.3)
ABSOLUTE LYMPH #: 2.45 K/UL (ref 1.1–3.7)
ABSOLUTE MONO #: 0.7 K/UL (ref 0.1–1.2)
ALBUMIN SERPL-MCNC: 4.3 G/DL (ref 3.5–5.2)
ALBUMIN/GLOBULIN RATIO: 1.2 (ref 1–2.5)
ALP SERPL-CCNC: 112 U/L (ref 35–104)
ALT SERPL-CCNC: 17 U/L (ref 5–33)
ANION GAP SERPL CALCULATED.3IONS-SCNC: 11 MMOL/L (ref 9–17)
AST SERPL-CCNC: 22 U/L
BACTERIA: ABNORMAL
BASOPHILS # BLD: 1 % (ref 0–2)
BASOPHILS ABSOLUTE: 0.05 K/UL (ref 0–0.2)
BILIRUB SERPL-MCNC: 0.5 MG/DL (ref 0.3–1.2)
BILIRUBIN URINE: NEGATIVE
BUN SERPL-MCNC: 19 MG/DL (ref 8–23)
BUN/CREAT BLD: 18 (ref 9–20)
CALCIUM SERPL-MCNC: 9.8 MG/DL (ref 8.6–10.4)
CHLORIDE SERPL-SCNC: 98 MMOL/L (ref 98–107)
CO2 SERPL-SCNC: 28 MMOL/L (ref 20–31)
COLOR: YELLOW
CREAT SERPL-MCNC: 1.05 MG/DL (ref 0.5–0.9)
EOSINOPHILS RELATIVE PERCENT: 3 % (ref 1–4)
EPITHELIAL CELLS UA: ABNORMAL /HPF (ref 0–25)
GFR SERPL CREATININE-BSD FRML MDRD: 53 ML/MIN/1.73M2
GLUCOSE SERPL-MCNC: 103 MG/DL (ref 70–99)
GLUCOSE UR STRIP.AUTO-MCNC: NEGATIVE MG/DL
HCT VFR BLD AUTO: 48.2 % (ref 36.3–47.1)
HGB BLD-MCNC: 15.3 G/DL (ref 11.9–15.1)
IMMATURE GRANULOCYTES: 0 %
KETONES UR STRIP.AUTO-MCNC: NEGATIVE MG/DL
LEUKOCYTE ESTERASE UR QL STRIP.AUTO: ABNORMAL
LYMPHOCYTES # BLD: 24 % (ref 24–43)
MCH RBC QN AUTO: 29.3 PG (ref 25.2–33.5)
MCHC RBC AUTO-ENTMCNC: 31.7 G/DL (ref 28.4–34.8)
MCV RBC AUTO: 92.3 FL (ref 82.6–102.9)
MONOCYTES # BLD: 7 % (ref 3–12)
NITRITE UR QL STRIP.AUTO: NEGATIVE
NRBC AUTOMATED: 0 PER 100 WBC
PDW BLD-RTO: 14.6 % (ref 11.8–14.4)
PLATELET # BLD AUTO: 259 K/UL (ref 138–453)
PMV BLD AUTO: 10 FL (ref 8.1–13.5)
POTASSIUM SERPL-SCNC: 4.4 MMOL/L (ref 3.7–5.3)
PROT SERPL-MCNC: 7.8 G/DL (ref 6.4–8.3)
PROT UR STRIP.AUTO-MCNC: 7 MG/DL (ref 5–9)
PROT UR STRIP.AUTO-MCNC: NEGATIVE MG/DL
RBC # BLD: 5.22 M/UL (ref 3.95–5.11)
RBC CLUMPS #/AREA URNS AUTO: ABNORMAL /HPF (ref 0–2)
SEG NEUTROPHILS: 65 % (ref 36–65)
SEGMENTED NEUTROPHILS ABSOLUTE COUNT: 6.56 K/UL (ref 1.5–8.1)
SODIUM SERPL-SCNC: 137 MMOL/L (ref 135–144)
SPECIFIC GRAVITY UA: 1.02 (ref 1.01–1.02)
TSH SERPL-ACNC: 2.44 UIU/ML (ref 0.3–5)
TURBIDITY: CLEAR
URINE HGB: NEGATIVE
UROBILINOGEN, URINE: NORMAL
WBC # BLD AUTO: 10.1 K/UL (ref 3.5–11.3)
WBC UA: ABNORMAL /HPF (ref 0–5)

## 2023-02-21 PROCEDURE — 85025 COMPLETE CBC W/AUTO DIFF WBC: CPT

## 2023-02-21 PROCEDURE — 87086 URINE CULTURE/COLONY COUNT: CPT

## 2023-02-21 PROCEDURE — 81001 URINALYSIS AUTO W/SCOPE: CPT

## 2023-02-21 PROCEDURE — 84443 ASSAY THYROID STIM HORMONE: CPT

## 2023-02-21 PROCEDURE — 80053 COMPREHEN METABOLIC PANEL: CPT

## 2023-02-21 PROCEDURE — 36415 COLL VENOUS BLD VENIPUNCTURE: CPT

## 2023-02-21 PROCEDURE — 83036 HEMOGLOBIN GLYCOSYLATED A1C: CPT

## 2023-02-22 LAB
EST. AVERAGE GLUCOSE BLD GHB EST-MCNC: 126 MG/DL
HBA1C MFR BLD: 6 % (ref 4–6)

## 2023-02-23 LAB
MICROORGANISM SPEC CULT: NORMAL
SPECIMEN DESCRIPTION: NORMAL

## 2023-02-28 ENCOUNTER — NURSE ONLY (OUTPATIENT)
Dept: CARDIOLOGY | Age: 83
End: 2023-02-28
Payer: MEDICARE

## 2023-02-28 DIAGNOSIS — I25.5 ISCHEMIC CARDIOMYOPATHY: Primary | ICD-10-CM

## 2023-02-28 DIAGNOSIS — Z95.810 ICD (IMPLANTABLE CARDIOVERTER-DEFIBRILLATOR) IN PLACE: ICD-10-CM

## 2023-02-28 DIAGNOSIS — I50.42 CHRONIC COMBINED SYSTOLIC AND DIASTOLIC CONGESTIVE HEART FAILURE (HCC): ICD-10-CM

## 2023-02-28 PROCEDURE — 93295 DEV INTERROG REMOTE 1/2/MLT: CPT | Performed by: INTERNAL MEDICINE

## 2023-03-06 ENCOUNTER — HOSPITAL ENCOUNTER (OUTPATIENT)
Dept: PULMONOLOGY | Age: 83
Discharge: HOME | End: 2023-03-06
Payer: MEDICARE

## 2023-03-06 DIAGNOSIS — D58.2 ELEVATED HEMOGLOBIN (HCC): ICD-10-CM

## 2023-03-06 PROCEDURE — 94762 N-INVAS EAR/PLS OXIMTRY CONT: CPT

## 2023-03-09 NOTE — PROCEDURES
[address], [place] [address]                               PULMONARY FUNCTION    PATIENT NAME: Mynor KWON                  :        1940  MED REC NO:   [id number]                              ROOM:  ACCOUNT NO:   [de-identified]                           ADMIT DATE: 2023  PROVIDER:     Ori Pulido    DATE OF PROCEDURE:  2023    NOCTURNAL OXYMETRY    Duration of the study was 8 hours and 3 minutes. This was done on room air. The patient spent 1 minute below 89% through the night. IMPRESSION:  Study does not indicate nocturnal hypoxemia on room air. Clinical correlation advised. Cat Mathis    D: 2023 16:02:31       T: 2023 1:17:51     /[email or web address]  Job#: [id number]     Doc#: [id number]    CC:   Danyel Peoples

## 2023-03-20 DIAGNOSIS — E78.2 MIXED HYPERLIPIDEMIA: ICD-10-CM

## 2023-03-20 DIAGNOSIS — Z95.820 S/P ANGIOPLASTY WITH STENT: ICD-10-CM

## 2023-03-20 DIAGNOSIS — I10 PRIMARY HYPERTENSION: ICD-10-CM

## 2023-03-20 DIAGNOSIS — I25.118 CORONARY ARTERY DISEASE INVOLVING NATIVE CORONARY ARTERY OF NATIVE HEART WITH OTHER FORM OF ANGINA PECTORIS (HCC): ICD-10-CM

## 2023-03-20 DIAGNOSIS — I50.42 CHRONIC COMBINED SYSTOLIC AND DIASTOLIC CONGESTIVE HEART FAILURE (HCC): ICD-10-CM

## 2023-03-20 DIAGNOSIS — I25.5 ISCHEMIC CARDIOMYOPATHY: ICD-10-CM

## 2023-03-20 DIAGNOSIS — Z95.818 PRESENCE OF WATCHMAN LEFT ATRIAL APPENDAGE CLOSURE DEVICE: ICD-10-CM

## 2023-03-20 DIAGNOSIS — Z98.890 HX OF ATRIOVENTRICULAR NODE ABLATION: ICD-10-CM

## 2023-03-20 DIAGNOSIS — I48.0 PAROXYSMAL A-FIB (HCC): ICD-10-CM

## 2023-03-20 DIAGNOSIS — Z95.810 ICD (IMPLANTABLE CARDIOVERTER-DEFIBRILLATOR) IN PLACE: ICD-10-CM

## 2023-03-20 RX ORDER — METOPROLOL SUCCINATE 50 MG/1
50 TABLET, EXTENDED RELEASE ORAL DAILY
Qty: 90 TABLET | Refills: 3 | Status: SHIPPED | OUTPATIENT
Start: 2023-03-20

## 2023-04-04 ENCOUNTER — NURSE ONLY (OUTPATIENT)
Dept: CARDIOLOGY | Age: 83
End: 2023-04-04
Payer: MEDICARE

## 2023-04-04 DIAGNOSIS — Z95.810 ICD (IMPLANTABLE CARDIOVERTER-DEFIBRILLATOR) IN PLACE: ICD-10-CM

## 2023-04-04 DIAGNOSIS — I50.42 CHRONIC COMBINED SYSTOLIC AND DIASTOLIC CONGESTIVE HEART FAILURE (HCC): ICD-10-CM

## 2023-04-04 DIAGNOSIS — I25.5 ISCHEMIC CARDIOMYOPATHY: Primary | ICD-10-CM

## 2023-04-04 PROCEDURE — 93297 REM INTERROG DEV EVAL ICPMS: CPT | Performed by: INTERNAL MEDICINE

## 2023-05-03 ENCOUNTER — OFFICE VISIT (OUTPATIENT)
Dept: CARDIOLOGY | Age: 83
End: 2023-05-03

## 2023-05-03 VITALS
HEIGHT: 65 IN | SYSTOLIC BLOOD PRESSURE: 116 MMHG | WEIGHT: 169 LBS | OXYGEN SATURATION: 93 % | DIASTOLIC BLOOD PRESSURE: 79 MMHG | RESPIRATION RATE: 16 BRPM | BODY MASS INDEX: 28.16 KG/M2 | HEART RATE: 80 BPM

## 2023-05-03 DIAGNOSIS — Z95.820 S/P ANGIOPLASTY WITH STENT: ICD-10-CM

## 2023-05-03 DIAGNOSIS — I50.42 CHRONIC COMBINED SYSTOLIC AND DIASTOLIC CONGESTIVE HEART FAILURE (HCC): ICD-10-CM

## 2023-05-03 DIAGNOSIS — E78.2 MIXED HYPERLIPIDEMIA: ICD-10-CM

## 2023-05-03 DIAGNOSIS — Z95.818 PRESENCE OF WATCHMAN LEFT ATRIAL APPENDAGE CLOSURE DEVICE: ICD-10-CM

## 2023-05-03 DIAGNOSIS — I25.5 ISCHEMIC CARDIOMYOPATHY: ICD-10-CM

## 2023-05-03 DIAGNOSIS — I48.0 PAROXYSMAL A-FIB (HCC): ICD-10-CM

## 2023-05-03 DIAGNOSIS — I10 ESSENTIAL HYPERTENSION: ICD-10-CM

## 2023-05-03 DIAGNOSIS — Z95.810 ICD (IMPLANTABLE CARDIOVERTER-DEFIBRILLATOR) IN PLACE: ICD-10-CM

## 2023-05-03 DIAGNOSIS — Z98.890 HX OF ATRIOVENTRICULAR NODE ABLATION: ICD-10-CM

## 2023-05-03 DIAGNOSIS — I25.10 CORONARY ARTERY DISEASE INVOLVING NATIVE CORONARY ARTERY OF NATIVE HEART WITHOUT ANGINA PECTORIS: ICD-10-CM

## 2023-05-03 DIAGNOSIS — I47.20 V-TACH (HCC): Primary | ICD-10-CM

## 2023-05-03 NOTE — PROGRESS NOTES
Normal 0% stenosis. LAD: Lesion on 1st diag: Proximal subsection. 80% stenosis. Lesion plaque is ruptured. Don Mealing LCx: Lesion on 1st Ob Leslie: Mid subsection. 85% stenosis. RCA:Lesion on R PDA: Mid subsection. 85% stenosis. Lesion on R PDA: Distal subsection. 70% stenosis. Lesion on Prox RCA: Mid subsection. 50% stenosis. EF 50%. H/O echocardiogram 11/09/2016    EF 40-45%. Mild LV hypertrophy normal LV cavity size. Sigmoid interventricular septum without evidence of outflow tract obstruction. Left atrium is moderately dilated (34-39) left atrial volume index of 36 ml/m2. Mild mitral regurg. Diastology cannot be assessed due to A-fib. H/O echocardiogram 03/09/2018    EF 45-50%. Apical hypokinesis noted. The left atrium is moderately dilated (34-39) with a left atrial volume index of 34ml/m2. Mild to moderate mitral regurg. Mild tricuspid regurg. Moderate diastolic dysfunction. History of Holter monitoring 3/24/16    Atrial Fibrillation throughout,fairly controlled, HR  bpm 79% of the time. Occasional high ventricular rate episodes and multiple pauses suggestive of tachycardia/bradycardia syndrome  Msximum R-R interval 2.68 seconds.     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    Infectious hepatitis Age 14    Food Borne    Long term (current) use of anticoagulants 8/31/2015    Movement disorder     Other abnormal glucose 2004    Pacemaker 08/10/2017    Insertion of a biventricular pacemaker/ICD AVN ablation    Phlebitis and thrombophlebitis of lower extremities, unspecified 1961    L leg    Senile osteoporosis 2006    L/S    SIRS (systemic inflammatory response syndrome) ([facility] [address] 10/18/2022    Symptomatic menopausal or female climacteric states 06/1995    Syncope and collapse     Unspecified hereditary and idiopathic peripheral neuropathy 2012    Unspecified sleep apnea 11/2009     Past Surgical

## 2023-05-03 NOTE — PATIENT INSTRUCTIONS
SURVEY:    You may be receiving a survey from [employer] regarding your visit today. Please complete the survey to enable us to provide the highest quality of care to you and your family. If you cannot score us a very good on any question, please call the office to discuss how we could have made your experience a very good one. Thank you.

## 2023-05-09 ENCOUNTER — NURSE ONLY (OUTPATIENT)
Dept: CARDIOLOGY | Age: 83
End: 2023-05-09
Payer: MEDICARE

## 2023-05-09 DIAGNOSIS — I50.42 CHRONIC COMBINED SYSTOLIC AND DIASTOLIC CONGESTIVE HEART FAILURE (HCC): Primary | ICD-10-CM

## 2023-05-09 PROCEDURE — 93297 REM INTERROG DEV EVAL ICPMS: CPT | Performed by: INTERNAL MEDICINE

## 2023-06-05 ENCOUNTER — NURSE ONLY (OUTPATIENT)
Dept: CARDIOLOGY | Age: 83
End: 2023-06-05
Payer: MEDICARE

## 2023-06-05 DIAGNOSIS — Z95.810 ICD (IMPLANTABLE CARDIOVERTER-DEFIBRILLATOR) IN PLACE: Primary | ICD-10-CM

## 2023-06-05 DIAGNOSIS — I47.20 V-TACH (HCC): ICD-10-CM

## 2023-06-05 DIAGNOSIS — I25.5 ISCHEMIC CARDIOMYOPATHY: ICD-10-CM

## 2023-06-05 PROCEDURE — 93289 INTERROG DEVICE EVAL HEART: CPT | Performed by: INTERNAL MEDICINE

## 2023-07-08 ENCOUNTER — HOSPITAL ENCOUNTER (EMERGENCY)
Age: 83
Discharge: HOME | End: 2023-07-08
Attending: EMERGENCY MEDICINE
Payer: MEDICARE

## 2023-07-08 VITALS
DIASTOLIC BLOOD PRESSURE: 51 MMHG | RESPIRATION RATE: 22 BRPM | HEART RATE: 80 BPM | SYSTOLIC BLOOD PRESSURE: 164 MMHG | HEIGHT: 65 IN | WEIGHT: 162 LBS | BODY MASS INDEX: 26.99 KG/M2 | OXYGEN SATURATION: 99 % | TEMPERATURE: 99.1 F

## 2023-07-08 DIAGNOSIS — N39.0 ACUTE UTI: Primary | ICD-10-CM

## 2023-07-08 LAB
ANION GAP SERPL CALCULATED.3IONS-SCNC: 11 MMOL/L (ref 9–17)
BACTERIA URNS QL MICRO: ABNORMAL
BASOPHILS # BLD: <0.03 K/UL (ref 0–0.2)
BASOPHILS NFR BLD: 0 % (ref 0–2)
BILIRUB UR QL STRIP: NEGATIVE
BUN SERPL-MCNC: 13 MG/DL (ref 8–23)
BUN/CREAT SERPL: 15 (ref 9–20)
CALCIUM SERPL-MCNC: 9.3 MG/DL (ref 8.6–10.4)
CHLORIDE SERPL-SCNC: 105 MMOL/L (ref 98–107)
CLARITY UR: CLEAR
CO2 SERPL-SCNC: 25 MMOL/L (ref 20–31)
COLOR UR: YELLOW
CREAT SERPL-MCNC: 0.84 MG/DL (ref 0.5–0.9)
EOSINOPHIL # BLD: 0.14 K/UL (ref 0–0.44)
EOSINOPHILS RELATIVE PERCENT: 3 % (ref 1–4)
EPI CELLS #/AREA URNS HPF: ABNORMAL /HPF (ref 0–25)
ERYTHROCYTE [DISTWIDTH] IN BLOOD BY AUTOMATED COUNT: 14 % (ref 11.8–14.4)
GFR SERPL CREATININE-BSD FRML MDRD: >60 ML/MIN/1.73M2
GLUCOSE SERPL-MCNC: 105 MG/DL (ref 70–99)
GLUCOSE UR STRIP-MCNC: NEGATIVE MG/DL
HCT VFR BLD AUTO: 42.4 % (ref 36.3–47.1)
HGB BLD-MCNC: 13.9 G/DL (ref 11.9–15.1)
HGB UR QL STRIP.AUTO: ABNORMAL
IMM GRANULOCYTES # BLD AUTO: <0.03 K/UL (ref 0–0.3)
IMM GRANULOCYTES NFR BLD: 0 %
KETONES UR STRIP-MCNC: ABNORMAL MG/DL
LEUKOCYTE ESTERASE UR QL STRIP: ABNORMAL
LYMPHOCYTES # BLD: 21 % (ref 24–43)
LYMPHOCYTES NFR BLD: 1.21 K/UL (ref 1.1–3.7)
MCH RBC QN AUTO: 29.3 PG (ref 25.2–33.5)
MCHC RBC AUTO-ENTMCNC: 32.8 G/DL (ref 28.4–34.8)
MCV RBC AUTO: 89.3 FL (ref 82.6–102.9)
MONOCYTES NFR BLD: 0.53 K/UL (ref 0.1–1.2)
MONOCYTES NFR BLD: 9 % (ref 3–12)
NEUTROPHILS NFR BLD: 67 % (ref 36–65)
NEUTS SEG NFR BLD: 3.79 K/UL (ref 1.5–8.1)
NITRITE UR QL STRIP: NEGATIVE
NRBC BLD-RTO: 0 PER 100 WBC
PH UR STRIP: 6 [PH] (ref 5–9)
PLATELET # BLD AUTO: 201 K/UL (ref 138–453)
PMV BLD AUTO: 9.6 FL (ref 8.1–13.5)
POTASSIUM SERPL-SCNC: 5.3 MMOL/L (ref 3.7–5.3)
PROT UR STRIP-MCNC: NEGATIVE MG/DL
RBC # BLD AUTO: 4.75 M/UL (ref 3.95–5.11)
RBC #/AREA URNS HPF: ABNORMAL /HPF (ref 0–2)
REASON FOR REJECTION: NORMAL
SODIUM SERPL-SCNC: 141 MMOL/L (ref 135–144)
SP GR UR STRIP: 1.02 (ref 1.01–1.02)
SPECIMEN SOURCE: NORMAL
UROBILINOGEN UR STRIP-ACNC: NORMAL
WBC #/AREA URNS HPF: ABNORMAL /HPF (ref 0–5)
WBC OTHER # BLD: 5.7 K/UL (ref 3.5–11.3)
ZZ NTE CLEAN UP: ORDERED TEST: NORMAL

## 2023-07-08 PROCEDURE — 80048 BASIC METABOLIC PNL TOTAL CA: CPT

## 2023-07-08 PROCEDURE — 85027 COMPLETE CBC AUTOMATED: CPT

## 2023-07-08 PROCEDURE — 36415 COLL VENOUS BLD VENIPUNCTURE: CPT

## 2023-07-08 PROCEDURE — 96365 THER/PROPH/DIAG IV INF INIT: CPT

## 2023-07-08 PROCEDURE — 87086 URINE CULTURE/COLONY COUNT: CPT

## 2023-07-08 PROCEDURE — 6360000002 HC RX W HCPCS: Performed by: EMERGENCY MEDICINE

## 2023-07-08 PROCEDURE — 81001 URINALYSIS AUTO W/SCOPE: CPT

## 2023-07-08 PROCEDURE — 2580000003 HC RX 258: Performed by: EMERGENCY MEDICINE

## 2023-07-08 PROCEDURE — 99284 EMERGENCY DEPT VISIT MOD MDM: CPT

## 2023-07-08 RX ORDER — CIPROFLOXACIN 500 MG/1
500 TABLET, FILM COATED ORAL 2 TIMES DAILY
Qty: 14 TABLET | Refills: 0 | Status: SHIPPED | OUTPATIENT
Start: 2023-07-08 | End: 2023-07-15

## 2023-07-08 RX ADMIN — CEFTRIAXONE SODIUM 1000 MG: 1 INJECTION, POWDER, FOR SOLUTION INTRAMUSCULAR; INTRAVENOUS at 16:49

## 2023-07-08 ASSESSMENT — ENCOUNTER SYMPTOMS
ABDOMINAL DISTENTION: 0
BACK PAIN: 0
DIARRHEA: 1
SHORTNESS OF BREATH: 0
SORE THROAT: 0

## 2023-07-08 ASSESSMENT — PAIN - FUNCTIONAL ASSESSMENT: PAIN_FUNCTIONAL_ASSESSMENT: NONE - DENIES PAIN

## 2023-07-08 NOTE — ED NOTES
Pt soiled brief. Brief changed at this time, pericare provided and new brief placed. Writer also attempted to draw ordered bloodwork from pt IV with no success. Writer called lab at this time to have a  come up and try.       Chico Christie RN  07/08/23 [id number]

## 2023-07-08 NOTE — DISCHARGE INSTRUCTIONS
Start taking the ciprofloxacin as prescribed for the next 1 week. Urine cultures are pending at this time. We will give you a call if your antibiotic needs to be switched to something different. In the meantime make sure to follow-up with your doctor later this week. Return if your symptoms get worse.

## 2023-07-09 LAB
MICROORGANISM SPEC CULT: NO GROWTH
SPECIMEN DESCRIPTION: NORMAL

## 2023-07-11 ENCOUNTER — NURSE ONLY (OUTPATIENT)
Dept: CARDIOLOGY | Age: 83
End: 2023-07-11
Payer: MEDICARE

## 2023-07-11 DIAGNOSIS — I50.42 CHRONIC COMBINED SYSTOLIC AND DIASTOLIC CONGESTIVE HEART FAILURE (HCC): Primary | ICD-10-CM

## 2023-07-11 PROCEDURE — 93297 REM INTERROG DEV EVAL ICPMS: CPT | Performed by: INTERNAL MEDICINE

## 2023-08-15 ENCOUNTER — NURSE ONLY (OUTPATIENT)
Dept: CARDIOLOGY | Age: 83
End: 2023-08-15
Payer: MEDICARE

## 2023-08-15 DIAGNOSIS — Z95.810 ICD (IMPLANTABLE CARDIOVERTER-DEFIBRILLATOR) IN PLACE: Primary | ICD-10-CM

## 2023-08-15 DIAGNOSIS — I47.20 V-TACH (HCC): ICD-10-CM

## 2023-08-15 DIAGNOSIS — I25.5 ISCHEMIC CARDIOMYOPATHY: ICD-10-CM

## 2023-08-15 PROCEDURE — 93295 DEV INTERROG REMOTE 1/2/MLT: CPT | Performed by: INTERNAL MEDICINE

## 2023-09-06 ENCOUNTER — HOSPITAL ENCOUNTER (OUTPATIENT)
Age: 83
Discharge: HOME | End: 2023-09-06
Payer: MEDICARE

## 2023-09-06 DIAGNOSIS — N30.01 ACUTE CYSTITIS WITH HEMATURIA: ICD-10-CM

## 2023-09-06 LAB
BACTERIA URNS QL MICRO: ABNORMAL
BILIRUB UR QL STRIP: NEGATIVE
CLARITY UR: CLEAR
COLOR UR: YELLOW
EPI CELLS #/AREA URNS HPF: ABNORMAL /HPF (ref 0–25)
GLUCOSE UR STRIP-MCNC: NEGATIVE MG/DL
HGB UR QL STRIP.AUTO: ABNORMAL
KETONES UR STRIP-MCNC: NEGATIVE MG/DL
LEUKOCYTE ESTERASE UR QL STRIP: ABNORMAL
MUCOUS THREADS URNS QL MICRO: ABNORMAL
NITRITE UR QL STRIP: NEGATIVE
PH UR STRIP: 6 [PH] (ref 5–9)
PROT UR STRIP-MCNC: ABNORMAL MG/DL
RBC #/AREA URNS HPF: ABNORMAL /HPF (ref 0–2)
SP GR UR STRIP: 1.01 (ref 1.01–1.02)
UROBILINOGEN UR STRIP-ACNC: NORMAL EU/DL (ref 0–1)
WBC #/AREA URNS HPF: ABNORMAL /HPF (ref 0–5)

## 2023-09-06 PROCEDURE — 81001 URINALYSIS AUTO W/SCOPE: CPT

## 2023-09-06 PROCEDURE — 87186 SC STD MICRODIL/AGAR DIL: CPT

## 2023-09-06 PROCEDURE — 87088 URINE BACTERIA CULTURE: CPT

## 2023-09-06 PROCEDURE — 87086 URINE CULTURE/COLONY COUNT: CPT

## 2023-09-08 LAB
MICROORGANISM SPEC CULT: ABNORMAL
SPECIMEN DESCRIPTION: ABNORMAL

## 2023-09-19 ENCOUNTER — NURSE ONLY (OUTPATIENT)
Dept: CARDIOLOGY | Age: 83
End: 2023-09-19
Payer: MEDICARE

## 2023-09-19 DIAGNOSIS — I50.42 CHRONIC COMBINED SYSTOLIC AND DIASTOLIC CONGESTIVE HEART FAILURE (HCC): Primary | ICD-10-CM

## 2023-09-19 PROCEDURE — 93297 REM INTERROG DEV EVAL ICPMS: CPT | Performed by: INTERNAL MEDICINE

## 2023-10-11 ENCOUNTER — HOSPITAL ENCOUNTER (OUTPATIENT)
Age: 83
Discharge: HOME | End: 2023-10-11
Payer: MEDICARE

## 2023-10-11 DIAGNOSIS — I10 ESSENTIAL HYPERTENSION: ICD-10-CM

## 2023-10-11 DIAGNOSIS — E11.42 TYPE 2 DIABETES MELLITUS WITH DIABETIC POLYNEUROPATHY, WITHOUT LONG-TERM CURRENT USE OF INSULIN (HCC): ICD-10-CM

## 2023-10-11 DIAGNOSIS — E03.9 HYPOTHYROIDISM, UNSPECIFIED TYPE: ICD-10-CM

## 2023-10-11 LAB
BASOPHILS # BLD: 0.03 K/UL (ref 0–0.2)
BASOPHILS NFR BLD: 0 % (ref 0–2)
EOSINOPHIL # BLD: 0.32 K/UL (ref 0–0.44)
EOSINOPHILS RELATIVE PERCENT: 4 % (ref 1–4)
ERYTHROCYTE [DISTWIDTH] IN BLOOD BY AUTOMATED COUNT: 14.3 % (ref 11.8–14.4)
HCT VFR BLD AUTO: 43.9 % (ref 36.3–47.1)
HGB BLD-MCNC: 13.8 G/DL (ref 11.9–15.1)
IMM GRANULOCYTES # BLD AUTO: 0.03 K/UL (ref 0–0.3)
IMM GRANULOCYTES NFR BLD: 0 %
LYMPHOCYTES NFR BLD: 1.92 K/UL (ref 1.1–3.7)
LYMPHOCYTES RELATIVE PERCENT: 22 % (ref 24–43)
MCH RBC QN AUTO: 28.8 PG (ref 25.2–33.5)
MCHC RBC AUTO-ENTMCNC: 31.4 G/DL (ref 28.4–34.8)
MCV RBC AUTO: 91.5 FL (ref 82.6–102.9)
MONOCYTES NFR BLD: 0.67 K/UL (ref 0.1–1.2)
MONOCYTES NFR BLD: 8 % (ref 3–12)
NEUTROPHILS NFR BLD: 66 % (ref 36–65)
NEUTS SEG NFR BLD: 5.66 K/UL (ref 1.5–8.1)
NRBC BLD-RTO: 0 PER 100 WBC
PLATELET # BLD AUTO: 259 K/UL (ref 138–453)
PMV BLD AUTO: 9.9 FL (ref 8.1–13.5)
RBC # BLD AUTO: 4.8 M/UL (ref 3.95–5.11)
TSH SERPL DL<=0.05 MIU/L-ACNC: 0.56 UIU/ML (ref 0.3–5)
WBC OTHER # BLD: 8.6 K/UL (ref 3.5–11.3)

## 2023-10-11 PROCEDURE — 84443 ASSAY THYROID STIM HORMONE: CPT

## 2023-10-11 PROCEDURE — 80053 COMPREHEN METABOLIC PANEL: CPT

## 2023-10-11 PROCEDURE — 83036 HEMOGLOBIN GLYCOSYLATED A1C: CPT

## 2023-10-11 PROCEDURE — 36415 COLL VENOUS BLD VENIPUNCTURE: CPT

## 2023-10-11 PROCEDURE — 85025 COMPLETE CBC W/AUTO DIFF WBC: CPT

## 2023-10-12 LAB
ALBUMIN SERPL-MCNC: 4.5 G/DL (ref 3.5–5.2)
ALBUMIN/GLOB SERPL: 1.3 {RATIO} (ref 1–2.5)
ALP SERPL-CCNC: 84 U/L (ref 35–104)
ALT SERPL-CCNC: 15 U/L (ref 5–33)
ANION GAP SERPL CALCULATED.3IONS-SCNC: 14 MMOL/L
AST SERPL-CCNC: 25 U/L
BILIRUB SERPL-MCNC: 0.5 MG/DL (ref 0.3–1.2)
BUN SERPL-MCNC: 18 MG/DL (ref 8–23)
BUN/CREAT SERPL: 16 (ref 9–20)
CALCIUM SERPL-MCNC: 10.1 MG/DL (ref 8.6–10.4)
CHLORIDE SERPL-SCNC: 103 MMOL/L (ref 98–107)
CO2 SERPL-SCNC: 25 MMOL/L (ref 20–31)
CREAT SERPL-MCNC: 1.1 MG/DL (ref 0.5–0.9)
EST. AVERAGE GLUCOSE BLD GHB EST-MCNC: 100 MG/DL
GFR SERPL CREATININE-BSD FRML MDRD: 50 ML/MIN/1.73M2
GLUCOSE SERPL-MCNC: 86 MG/DL (ref 70–99)
HBA1C MFR BLD: 5.1 % (ref 4–6)
POTASSIUM SERPL-SCNC: 4.7 MMOL/L (ref 3.7–5.3)
PROT SERPL-MCNC: 8.1 G/DL (ref 6.4–8.3)
SODIUM SERPL-SCNC: 142 MMOL/L (ref 135–144)

## 2023-10-24 ENCOUNTER — NURSE ONLY (OUTPATIENT)
Dept: CARDIOLOGY | Age: 83
End: 2023-10-24
Payer: MEDICARE

## 2023-10-24 DIAGNOSIS — I50.42 CHRONIC COMBINED SYSTOLIC AND DIASTOLIC CONGESTIVE HEART FAILURE (HCC): Primary | ICD-10-CM

## 2023-10-24 PROCEDURE — 93297 REM INTERROG DEV EVAL ICPMS: CPT | Performed by: INTERNAL MEDICINE

## 2023-11-02 ENCOUNTER — OFFICE VISIT (OUTPATIENT)
Dept: CARDIOLOGY | Age: 83
End: 2023-11-02
Payer: MEDICARE

## 2023-11-02 VITALS
BODY MASS INDEX: 26.66 KG/M2 | OXYGEN SATURATION: 99 % | DIASTOLIC BLOOD PRESSURE: 81 MMHG | WEIGHT: 160.2 LBS | HEART RATE: 80 BPM | RESPIRATION RATE: 16 BRPM | SYSTOLIC BLOOD PRESSURE: 119 MMHG

## 2023-11-02 DIAGNOSIS — Z95.820 S/P ANGIOPLASTY WITH STENT: ICD-10-CM

## 2023-11-02 DIAGNOSIS — I47.20 V-TACH (HCC): Primary | ICD-10-CM

## 2023-11-02 DIAGNOSIS — I25.5 ISCHEMIC CARDIOMYOPATHY: ICD-10-CM

## 2023-11-02 DIAGNOSIS — Z95.810 ICD (IMPLANTABLE CARDIOVERTER-DEFIBRILLATOR) IN PLACE: ICD-10-CM

## 2023-11-02 DIAGNOSIS — I25.10 ASHD (ARTERIOSCLEROTIC HEART DISEASE): ICD-10-CM

## 2023-11-02 DIAGNOSIS — Z98.890 HX OF ATRIOVENTRICULAR NODE ABLATION: ICD-10-CM

## 2023-11-02 DIAGNOSIS — I50.42 CHRONIC COMBINED SYSTOLIC AND DIASTOLIC CONGESTIVE HEART FAILURE (HCC): ICD-10-CM

## 2023-11-02 DIAGNOSIS — Z95.818 PRESENCE OF WATCHMAN LEFT ATRIAL APPENDAGE CLOSURE DEVICE: ICD-10-CM

## 2023-11-02 DIAGNOSIS — E78.2 MIXED HYPERLIPIDEMIA: ICD-10-CM

## 2023-11-02 DIAGNOSIS — I10 ESSENTIAL HYPERTENSION: ICD-10-CM

## 2023-11-02 DIAGNOSIS — I48.0 PAF (PAROXYSMAL ATRIAL FIBRILLATION) (HCC): ICD-10-CM

## 2023-11-02 PROCEDURE — 1123F ACP DISCUSS/DSCN MKR DOCD: CPT | Performed by: INTERNAL MEDICINE

## 2023-11-02 PROCEDURE — 99214 OFFICE O/P EST MOD 30 MIN: CPT | Performed by: INTERNAL MEDICINE

## 2023-11-02 PROCEDURE — 3079F DIAST BP 80-89 MM HG: CPT | Performed by: INTERNAL MEDICINE

## 2023-11-02 PROCEDURE — 93000 ELECTROCARDIOGRAM COMPLETE: CPT | Performed by: INTERNAL MEDICINE

## 2023-11-02 PROCEDURE — 3074F SYST BP LT 130 MM HG: CPT | Performed by: INTERNAL MEDICINE

## 2023-11-02 NOTE — PATIENT INSTRUCTIONS
SURVEY:    You may be receiving a survey from [employer] regarding your visit today. Please complete the survey to enable us to provide the highest quality of care to you and your family. If you cannot score us a very good on any question, please call the office to discuss how we could have made your experience a very good one. Thank you.

## 2023-11-02 NOTE — PROGRESS NOTES
Jennifer Palmer am scribing for and in the presence of Marv Kincaid MD, F.A.C.C..    Subjective:     CHIEF COMPLAINT / HPI:   Chief Complaint   Patient presents with    Congestive Heart Failure     Hx: CHF,Vtach, ICD, Ischemic Cardio, CAD,S/P Stent, PAF, Watchman, Ablation,HTN,HLD. 6 month follow up. She has been doing okay other than issues with her foot still. SOB, staying the same. Denies: CP, Lightheaded/dizziness, Palpitations. Dear Dr. Maribeth Palma MD     I had the pleasure of seeing Ms. Paul Son for follow-up. Vanessa Bowen is 79 y. o.  with PMH of hypertension, hyperlipidemia, asthma and chronic atrial fibrillation. Her most recent cardiac catheterization showed moderate to severe three-vessel coronary artery disease as outlined below. 1-Patient's chronic atrial fibrillation is  Treated with rate control and anticoagulation with coumadin. Her Holter monitor on 4/9/2015 suggested tachycardia-bradycardia syndrome. 2-On 3/16/2016, patient was sent from cardiac rehab because of not feeling well. Imdur 30 mg daily was added as well as Ranexa 500 mg BID. 3-Patient admitted to [address] [place] [address] from 6/3/2017 to 6/15/2017 with worsening shortness of breath diagnosed with pneumonitis, status post lung biopsy. Currently on prednisone. 4-Another admission to [address] [place] [address] from 6/29/2017 to 7/6/2017 with A. Fib with RVR and acute on chronic diastolic CHF. Patient started on amiodarone during this admission in addition to her rate control and diuresis. 5-Another admission to [facility] from 7/11/2017 to 7/14/2017 with recurrent fall and black eye. 3 falling episodes, 2 of them she hit her head. She is not sure if she lost consciousness. MRI of the brain was negative. 6-Patient underwent AV node ablation and insertion of biventricular ICD by Dr. Jonathan High on 8/10/2017. 7-On 9/18/2017 patient underwent insertion of a Watchman device.  currently

## 2023-11-28 ENCOUNTER — NURSE ONLY (OUTPATIENT)
Dept: CARDIOLOGY | Age: 83
End: 2023-11-28
Payer: MEDICARE

## 2023-11-28 DIAGNOSIS — I25.5 ISCHEMIC CARDIOMYOPATHY: ICD-10-CM

## 2023-11-28 DIAGNOSIS — Z95.810 ICD (IMPLANTABLE CARDIOVERTER-DEFIBRILLATOR) IN PLACE: ICD-10-CM

## 2023-11-28 DIAGNOSIS — I47.20 V-TACH (HCC): Primary | ICD-10-CM

## 2023-11-28 PROCEDURE — 93295 DEV INTERROG REMOTE 1/2/MLT: CPT | Performed by: INTERNAL MEDICINE

## 2024-01-02 ENCOUNTER — NURSE ONLY (OUTPATIENT)
Dept: CARDIOLOGY | Age: 84
End: 2024-01-02

## 2024-01-02 DIAGNOSIS — I50.42 CHRONIC COMBINED SYSTOLIC AND DIASTOLIC CONGESTIVE HEART FAILURE (HCC): Primary | ICD-10-CM

## 2024-01-09 ENCOUNTER — HOSPITAL ENCOUNTER (OUTPATIENT)
Age: 84
Discharge: HOME | End: 2024-01-09
Payer: MEDICARE

## 2024-01-09 DIAGNOSIS — R30.0 BURNING WITH URINATION: ICD-10-CM

## 2024-01-09 LAB
BACTERIA URNS QL MICRO: ABNORMAL
BILIRUB UR QL STRIP: NEGATIVE
CLARITY UR: ABNORMAL
COLOR UR: YELLOW
EPI CELLS #/AREA URNS HPF: ABNORMAL /HPF (ref 0–25)
GLUCOSE UR STRIP-MCNC: NEGATIVE MG/DL
HGB UR QL STRIP.AUTO: ABNORMAL
KETONES UR STRIP-MCNC: NEGATIVE MG/DL
LEUKOCYTE ESTERASE UR QL STRIP: ABNORMAL
NITRITE UR QL STRIP: NEGATIVE
PH UR STRIP: 6 [PH] (ref 5–9)
PROT UR STRIP-MCNC: NEGATIVE MG/DL
RBC #/AREA URNS HPF: ABNORMAL /HPF (ref 0–2)
SP GR UR STRIP: 1.01 (ref 1.01–1.02)
UROBILINOGEN UR STRIP-ACNC: NORMAL EU/DL (ref 0–1)
WBC #/AREA URNS HPF: ABNORMAL /HPF (ref 0–5)

## 2024-01-09 PROCEDURE — 81001 URINALYSIS AUTO W/SCOPE: CPT

## 2024-01-09 PROCEDURE — 87086 URINE CULTURE/COLONY COUNT: CPT

## 2024-01-10 LAB
MICROORGANISM SPEC CULT: NO GROWTH
SPECIMEN DESCRIPTION: NORMAL

## 2024-02-06 ENCOUNTER — NURSE ONLY (OUTPATIENT)
Dept: CARDIOLOGY | Age: 84
End: 2024-02-06
Payer: MEDICARE

## 2024-02-06 DIAGNOSIS — I50.42 CHRONIC COMBINED SYSTOLIC AND DIASTOLIC CONGESTIVE HEART FAILURE (HCC): Primary | ICD-10-CM

## 2024-02-06 PROCEDURE — 93297 REM INTERROG DEV EVAL ICPMS: CPT | Performed by: INTERNAL MEDICINE

## 2024-02-07 ENCOUNTER — HOSPITAL ENCOUNTER (OUTPATIENT)
Age: 84
Discharge: HOME | End: 2024-02-07
Payer: MEDICARE

## 2024-02-07 DIAGNOSIS — I10 ESSENTIAL HYPERTENSION: ICD-10-CM

## 2024-02-07 DIAGNOSIS — E03.9 HYPOTHYROIDISM, UNSPECIFIED TYPE: ICD-10-CM

## 2024-02-07 DIAGNOSIS — E11.42 TYPE 2 DIABETES MELLITUS WITH DIABETIC POLYNEUROPATHY, WITHOUT LONG-TERM CURRENT USE OF INSULIN (HCC): ICD-10-CM

## 2024-02-07 LAB
ALBUMIN SERPL-MCNC: 4.3 G/DL (ref 3.5–5.2)
ALBUMIN/GLOB SERPL: 1.4 {RATIO} (ref 1–2.5)
ALP SERPL-CCNC: 92 U/L (ref 35–104)
ALT SERPL-CCNC: 13 U/L (ref 5–33)
ANION GAP SERPL CALCULATED.3IONS-SCNC: 10 MMOL/L (ref 9–17)
AST SERPL-CCNC: 19 U/L
BASOPHILS # BLD: 0.05 K/UL (ref 0–0.2)
BASOPHILS NFR BLD: 1 % (ref 0–2)
BILIRUB SERPL-MCNC: 0.5 MG/DL (ref 0.3–1.2)
BUN SERPL-MCNC: 19 MG/DL (ref 8–23)
BUN/CREAT SERPL: 24 (ref 9–20)
CALCIUM SERPL-MCNC: 9.3 MG/DL (ref 8.6–10.4)
CHLORIDE SERPL-SCNC: 103 MMOL/L (ref 98–107)
CO2 SERPL-SCNC: 28 MMOL/L (ref 20–31)
CREAT SERPL-MCNC: 0.8 MG/DL (ref 0.5–0.9)
EOSINOPHIL # BLD: 0.32 K/UL (ref 0–0.44)
EOSINOPHILS RELATIVE PERCENT: 4 % (ref 1–4)
ERYTHROCYTE [DISTWIDTH] IN BLOOD BY AUTOMATED COUNT: 13.7 % (ref 11.8–14.4)
GFR SERPL CREATININE-BSD FRML MDRD: >60 ML/MIN/1.73M2
GLUCOSE SERPL-MCNC: 100 MG/DL (ref 70–99)
HCT VFR BLD AUTO: 46.7 % (ref 36.3–47.1)
HGB BLD-MCNC: 14.4 G/DL (ref 11.9–15.1)
IMM GRANULOCYTES # BLD AUTO: <0.03 K/UL (ref 0–0.3)
IMM GRANULOCYTES NFR BLD: 0 %
LYMPHOCYTES NFR BLD: 1.9 K/UL (ref 1.1–3.7)
LYMPHOCYTES RELATIVE PERCENT: 22 % (ref 24–43)
MCH RBC QN AUTO: 28.5 PG (ref 25.2–33.5)
MCHC RBC AUTO-ENTMCNC: 30.8 G/DL (ref 28.4–34.8)
MCV RBC AUTO: 92.5 FL (ref 82.6–102.9)
MONOCYTES NFR BLD: 0.6 K/UL (ref 0.1–1.2)
MONOCYTES NFR BLD: 7 % (ref 3–12)
NEUTROPHILS NFR BLD: 66 % (ref 36–65)
NEUTS SEG NFR BLD: 5.93 K/UL (ref 1.5–8.1)
NRBC BLD-RTO: 0 PER 100 WBC
PLATELET # BLD AUTO: 244 K/UL (ref 138–453)
PMV BLD AUTO: 10.2 FL (ref 8.1–13.5)
POTASSIUM SERPL-SCNC: 4.8 MMOL/L (ref 3.7–5.3)
PROT SERPL-MCNC: 7.3 G/DL (ref 6.4–8.3)
RBC # BLD AUTO: 5.05 M/UL (ref 3.95–5.11)
SODIUM SERPL-SCNC: 141 MMOL/L (ref 135–144)
TSH SERPL DL<=0.05 MIU/L-ACNC: 0.54 UIU/ML (ref 0.3–5)
WBC OTHER # BLD: 8.8 K/UL (ref 3.5–11.3)

## 2024-02-07 PROCEDURE — 85025 COMPLETE CBC W/AUTO DIFF WBC: CPT

## 2024-02-07 PROCEDURE — 84443 ASSAY THYROID STIM HORMONE: CPT

## 2024-02-07 PROCEDURE — 36415 COLL VENOUS BLD VENIPUNCTURE: CPT

## 2024-02-07 PROCEDURE — 83036 HEMOGLOBIN GLYCOSYLATED A1C: CPT

## 2024-02-07 PROCEDURE — 80053 COMPREHEN METABOLIC PANEL: CPT

## 2024-02-08 LAB
EST. AVERAGE GLUCOSE BLD GHB EST-MCNC: 114 MG/DL
HBA1C MFR BLD: 5.6 % (ref 4–6)

## 2024-02-17 ENCOUNTER — HOSPITAL ENCOUNTER (EMERGENCY)
Age: 84
Discharge: HOME | End: 2024-02-17
Payer: MEDICARE

## 2024-02-17 VITALS
DIASTOLIC BLOOD PRESSURE: 98 MMHG | SYSTOLIC BLOOD PRESSURE: 168 MMHG | HEART RATE: 84 BPM | OXYGEN SATURATION: 98 % | RESPIRATION RATE: 20 BRPM | TEMPERATURE: 98.5 F

## 2024-02-17 DIAGNOSIS — U07.1 COVID-19: Primary | ICD-10-CM

## 2024-02-17 LAB
FLUAV AG SPEC QL: NEGATIVE
FLUBV AG SPEC QL: NEGATIVE
SARS-COV-2 RDRP RESP QL NAA+PROBE: DETECTED
SPECIMEN DESCRIPTION: ABNORMAL

## 2024-02-17 PROCEDURE — 87804 INFLUENZA ASSAY W/OPTIC: CPT

## 2024-02-17 PROCEDURE — 99283 EMERGENCY DEPT VISIT LOW MDM: CPT

## 2024-02-17 PROCEDURE — 6370000000 HC RX 637 (ALT 250 FOR IP): Performed by: PHYSICIAN ASSISTANT

## 2024-02-17 PROCEDURE — 87635 SARS-COV-2 COVID-19 AMP PRB: CPT

## 2024-02-17 RX ADMIN — NIRMATRELVIR AND RITONAVIR 3 TABLET: KIT at 19:02

## 2024-02-17 ASSESSMENT — LIFESTYLE VARIABLES
HOW OFTEN DO YOU HAVE A DRINK CONTAINING ALCOHOL: NEVER
HOW MANY STANDARD DRINKS CONTAINING ALCOHOL DO YOU HAVE ON A TYPICAL DAY: PATIENT DOES NOT DRINK

## 2024-02-17 NOTE — ED PROVIDER NOTES
[facility] ED  EMERGENCY DEPARTMENT ENCOUNTER      Pt Name: Marisel Love  MRN: [id number]  Birthdate 1940  Date of evaluation: 2/17/2024  Provider: Chavez Guzman PA-C  6:51 PM    CHIEF COMPLAINT       Chief Complaint   Patient presents with    tested positive for covid at home,no symptoms,wants recheck         HISTORY OF PRESENT ILLNESS    Marisel Love is a 83 y.o. female who presents to the emergency department states that she had gone to Dr. Kruger for the first on Wednesday and complained of sore throat and was started on Keflex.  She states that her daughter also tested positive the same day, for COVID.  She states she does not feel bad otherwise and took a home test of COVID and was positive.  She is coming to the ER to verify that she had a good test at home.  She denies any chest pain shortness of breath she has essential tremor.      HPI    Nursing Notes were reviewed.    REVIEW OF SYSTEMS       Review of Systems    Except as noted above the remainder of the review of systems was reviewed and negative.       PAST MEDICAL HISTORY     Past Medical History:   Diagnosis Date    Allergic rhinitis 10/1994    Arthritis     Asthma     Atrial fibrillation (HCC) 12/2008    CAD (coronary artery disease)     Cerebrovascular accident (CVA) involving left cerebral hemisphere (HCC) 04/23/2019    remote lacunar infarction within the L periventricular white matter    CHF (congestive heart failure) (HCC)     Clotting disorder (HCC)     plebitis in L leg    COPD (chronic obstructive pulmonary disease) (HCC)     DDD (degenerative disc disease), cervical     Degeneration of cervical intervertebral disc     Diverticulosis 2005    Gout     Gout, unspecified     H/O cardiac catheterization 06/17/2015    LMCA: Normal 0% stenosis.LAD: Lesion on 1st diag: Proximal subsection. 80% stenosis. Lesion plaque is ruptured.. LCx: Lesion on 1st Ob Leslie: Mid subsection.85% stenosis. RCA:Lesion on R PDA: Mid subsection.

## 2024-03-12 ENCOUNTER — NURSE ONLY (OUTPATIENT)
Dept: CARDIOLOGY | Age: 84
End: 2024-03-12
Payer: MEDICARE

## 2024-03-12 DIAGNOSIS — I25.5 ISCHEMIC CARDIOMYOPATHY: ICD-10-CM

## 2024-03-12 DIAGNOSIS — Z95.810 BIVENTRICULAR ICD (IMPLANTABLE CARDIOVERTER-DEFIBRILLATOR) IN PLACE: ICD-10-CM

## 2024-03-12 DIAGNOSIS — Z95.810 ICD (IMPLANTABLE CARDIOVERTER-DEFIBRILLATOR) IN PLACE: Primary | ICD-10-CM

## 2024-03-12 PROCEDURE — 93295 DEV INTERROG REMOTE 1/2/MLT: CPT | Performed by: INTERNAL MEDICINE

## 2024-04-01 NOTE — PROGRESS NOTES
Pharmacy Note  Warfarin Consult follow-up      Recent Labs      07/02/17   1008   INR  2.6     Recent Labs      06/30/17   0630  07/01/17   0645  07/02/17   0716   HGB  11.5*  12.2  12.0   HCT  34.9*  36.6  36.2   PLT  148  176  176       Significant Drug-Drug Interactions:  New warfarin drug-drug interactions: Ciprofloxacin  Discontinued drug-drug interactions: Rocephin    Current warfarin drug-drug interactions: aspirin, ciprofloxacin, prednisone      Date             INR        Dose   6/30/2017         3.2       5 mg  7/1/17               2.6           7.5 mg  7/2/17               2.6            5 mg    Daily PT/INR while inpatient. Thank you for the consult. Will continue to follow.       Carolyn BusbyD, BCPS 7/2/2017 1:36 PM Verbal/written post procedure instructions were given to patient/caregiver

## 2024-04-16 ENCOUNTER — NURSE ONLY (OUTPATIENT)
Dept: CARDIOLOGY | Age: 84
End: 2024-04-16
Payer: MEDICARE

## 2024-04-16 ENCOUNTER — HOSPITAL ENCOUNTER (OUTPATIENT)
Age: 84
Discharge: HOME | End: 2024-04-16
Payer: MEDICARE

## 2024-04-16 DIAGNOSIS — N39.0 FREQUENT UTI: ICD-10-CM

## 2024-04-16 DIAGNOSIS — I50.42 CHRONIC COMBINED SYSTOLIC AND DIASTOLIC CONGESTIVE HEART FAILURE (HCC): Primary | ICD-10-CM

## 2024-04-16 LAB
BACTERIA URNS QL MICRO: ABNORMAL
BILIRUB UR QL STRIP: ABNORMAL
CLARITY UR: ABNORMAL
COLOR UR: YELLOW
EPI CELLS #/AREA URNS HPF: ABNORMAL /HPF (ref 0–25)
GLUCOSE UR STRIP-MCNC: NEGATIVE MG/DL
HGB UR QL STRIP.AUTO: ABNORMAL
KETONES UR STRIP-MCNC: ABNORMAL MG/DL
LEUKOCYTE ESTERASE UR QL STRIP: ABNORMAL
NITRITE UR QL STRIP: NEGATIVE
PH UR STRIP: 6 [PH] (ref 5–9)
PROT UR STRIP-MCNC: ABNORMAL MG/DL
RBC #/AREA URNS HPF: ABNORMAL /HPF (ref 0–2)
SP GR UR STRIP: >1.03 (ref 1.01–1.02)
UROBILINOGEN UR STRIP-ACNC: NORMAL EU/DL (ref 0–1)
WBC #/AREA URNS HPF: ABNORMAL /HPF (ref 0–5)

## 2024-04-16 PROCEDURE — 86403 PARTICLE AGGLUT ANTBDY SCRN: CPT

## 2024-04-16 PROCEDURE — 87086 URINE CULTURE/COLONY COUNT: CPT

## 2024-04-16 PROCEDURE — 93297 REM INTERROG DEV EVAL ICPMS: CPT | Performed by: INTERNAL MEDICINE

## 2024-04-16 PROCEDURE — 81001 URINALYSIS AUTO W/SCOPE: CPT

## 2024-04-16 PROCEDURE — 87186 SC STD MICRODIL/AGAR DIL: CPT

## 2024-04-18 LAB
MICROORGANISM SPEC CULT: ABNORMAL
SPECIMEN DESCRIPTION: ABNORMAL

## 2024-05-21 ENCOUNTER — NURSE ONLY (OUTPATIENT)
Dept: CARDIOLOGY | Age: 84
End: 2024-05-21

## 2024-05-21 DIAGNOSIS — I50.42 CHRONIC COMBINED SYSTOLIC AND DIASTOLIC CONGESTIVE HEART FAILURE (HCC): Primary | ICD-10-CM

## 2024-06-24 ENCOUNTER — OFFICE VISIT (OUTPATIENT)
Dept: CARDIOLOGY | Age: 84
End: 2024-06-24

## 2024-06-24 VITALS
WEIGHT: 144 LBS | DIASTOLIC BLOOD PRESSURE: 78 MMHG | OXYGEN SATURATION: 98 % | HEIGHT: 65 IN | SYSTOLIC BLOOD PRESSURE: 116 MMHG | HEART RATE: 81 BPM | RESPIRATION RATE: 18 BRPM | BODY MASS INDEX: 23.99 KG/M2

## 2024-06-24 DIAGNOSIS — E78.2 MIXED HYPERLIPIDEMIA: ICD-10-CM

## 2024-06-24 DIAGNOSIS — I10 ESSENTIAL HYPERTENSION: Chronic | ICD-10-CM

## 2024-06-24 DIAGNOSIS — Z95.810 ICD (IMPLANTABLE CARDIOVERTER-DEFIBRILLATOR) IN PLACE: ICD-10-CM

## 2024-06-24 DIAGNOSIS — Z95.810 BIVENTRICULAR ICD (IMPLANTABLE CARDIOVERTER-DEFIBRILLATOR) IN PLACE: ICD-10-CM

## 2024-06-24 DIAGNOSIS — I34.0 NON-RHEUMATIC MITRAL REGURGITATION: Chronic | ICD-10-CM

## 2024-06-24 DIAGNOSIS — Z95.820 S/P ANGIOPLASTY WITH STENT: ICD-10-CM

## 2024-06-24 DIAGNOSIS — I25.10 ASHD (ARTERIOSCLEROTIC HEART DISEASE): Primary | ICD-10-CM

## 2024-06-24 DIAGNOSIS — Z95.818 PRESENCE OF WATCHMAN LEFT ATRIAL APPENDAGE CLOSURE DEVICE: ICD-10-CM

## 2024-06-24 DIAGNOSIS — I25.5 ISCHEMIC CARDIOMYOPATHY: ICD-10-CM

## 2024-06-24 NOTE — PROGRESS NOTES
TONSILLECTOMY AND ADENOIDECTOMY       Social History:    Social History     Tobacco Use   Smoking Status Never   Smokeless Tobacco Never     Family History   Problem Relation Age of Onset    Heart Disease Mother     Stroke Mother     High Blood Pressure Mother     Cancer Father         lung    Stroke Brother     Heart Disease Maternal Grandmother        Current Medications:  Outpatient Medications Marked as Taking for the 6/24/24 encounter (Office Visit) with Flavia Dixon MD   Medication Sig Dispense Refill    ipratropium (ATROVENT) 0.06 % nasal spray 2 sprays by Nasal route in the morning and at bedtime 45 mL 0    ipratropium (ATROVENT) 0.06 % nasal spray 2 sprays by Each Nostril route in the morning and at bedtime 15 mL 3    clopidogrel (PLAVIX) 75 MG tablet Take 1 tablet by mouth daily 90 tablet 3    levothyroxine (SYNTHROID) 75 MCG tablet Take 1 tablet by mouth Daily 90 tablet 3    atorvastatin (LIPITOR) 40 MG tablet Take 1 tablet by mouth at bedtime 90 tablet 3    vitamin B-12 (CYANOCOBALAMIN) 500 MCG tablet Take 1 tablet by mouth daily      aspirin EC 81 MG EC tablet Take 1 tablet by mouth daily 90 tablet 3    acetaminophen (TYLENOL) 325 MG tablet Take 2 tablets by mouth every 4 hours as needed for Pain 120 tablet 3    polyethylene glycol (GLYCOLAX) powder Take 17 g by mouth as needed (for constipation)         REVIEW OF SYSTEMS:    CONSTITUTIONAL: No major weight gain or loss, fatigue, weakness, night sweats or fever.  HEENT: No new vision difficulties or ringing in the ears.  RESPIRATORY: See HPI   CARDIOVASCULAR: See HPI  GI: No nausea, vomiting, diarrhea, constipation, abdominal pain or changes in bowel habits.  : No urinary frequency, urgency, incontinence hematuria or dysuria.  SKIN: No cyanosis or skin lesions.  MUSCULOSKELETAL: No new muscle or joint pain.  NEUROLOGICAL: No syncope or TIA-like symptoms.  PSYCHIATRIC: No anxiety, pain, insomnia or depression    Objective:     Physical

## 2024-07-30 ENCOUNTER — NURSE ONLY (OUTPATIENT)
Dept: CARDIOLOGY | Age: 84
End: 2024-07-30

## 2024-07-30 DIAGNOSIS — I50.42 CHRONIC COMBINED SYSTOLIC AND DIASTOLIC CONGESTIVE HEART FAILURE (HCC): Primary | ICD-10-CM

## 2024-08-13 PROBLEM — G20.A1 PARKINSON DISEASE (HCC): Status: ACTIVE | Noted: 2024-05-01

## 2024-08-13 PROBLEM — Z86.73 PERSONAL HISTORY OF TRANSIENT ISCHEMIC ATTACK (TIA), AND CEREBRAL INFARCTION WITHOUT RESIDUAL DEFICITS: Status: ACTIVE | Noted: 2024-05-01

## 2024-08-26 ENCOUNTER — HOSPITAL ENCOUNTER (OUTPATIENT)
Age: 84
Discharge: HOME | End: 2024-08-26
Payer: MEDICARE

## 2024-08-26 DIAGNOSIS — R30.0 DYSURIA: ICD-10-CM

## 2024-08-26 LAB
BACTERIA URNS QL MICRO: ABNORMAL
BILIRUB UR QL STRIP: NEGATIVE
CLARITY UR: CLEAR
COLOR UR: YELLOW
EPI CELLS #/AREA URNS HPF: ABNORMAL /HPF (ref 0–25)
GLUCOSE UR STRIP-MCNC: NEGATIVE MG/DL
HGB UR QL STRIP.AUTO: ABNORMAL
KETONES UR STRIP-MCNC: NEGATIVE MG/DL
LEUKOCYTE ESTERASE UR QL STRIP: ABNORMAL
MUCOUS THREADS URNS QL MICRO: ABNORMAL
NITRITE UR QL STRIP: NEGATIVE
PH UR STRIP: 6 [PH] (ref 5–9)
PROT UR STRIP-MCNC: ABNORMAL MG/DL
RBC #/AREA URNS HPF: ABNORMAL /HPF (ref 0–2)
SP GR UR STRIP: 1.02 (ref 1.01–1.02)
UROBILINOGEN UR STRIP-ACNC: NORMAL EU/DL (ref 0–1)
WBC #/AREA URNS HPF: ABNORMAL /HPF (ref 0–5)

## 2024-08-26 PROCEDURE — 87086 URINE CULTURE/COLONY COUNT: CPT

## 2024-08-26 PROCEDURE — 81001 URINALYSIS AUTO W/SCOPE: CPT

## 2024-08-27 LAB
MICROORGANISM SPEC CULT: NORMAL
SPECIMEN DESCRIPTION: NORMAL

## 2024-09-03 ENCOUNTER — NURSE ONLY (OUTPATIENT)
Dept: CARDIOLOGY | Age: 84
End: 2024-09-03
Payer: MEDICARE

## 2024-09-03 DIAGNOSIS — I50.42 CHRONIC COMBINED SYSTOLIC AND DIASTOLIC CONGESTIVE HEART FAILURE (HCC): Primary | ICD-10-CM

## 2024-09-03 PROCEDURE — 93297 REM INTERROG DEV EVAL ICPMS: CPT | Performed by: INTERNAL MEDICINE

## 2024-10-08 ENCOUNTER — NURSE ONLY (OUTPATIENT)
Dept: CARDIOLOGY | Age: 84
End: 2024-10-08

## 2024-10-08 DIAGNOSIS — Z95.810 BIVENTRICULAR ICD (IMPLANTABLE CARDIOVERTER-DEFIBRILLATOR) IN PLACE: Primary | ICD-10-CM

## 2024-10-08 DIAGNOSIS — I25.5 ISCHEMIC CARDIOMYOPATHY: ICD-10-CM

## 2024-11-12 ENCOUNTER — NURSE ONLY (OUTPATIENT)
Dept: CARDIOLOGY | Age: 84
End: 2024-11-12

## 2024-11-12 DIAGNOSIS — I50.42 CHRONIC COMBINED SYSTOLIC AND DIASTOLIC CONGESTIVE HEART FAILURE (HCC): Primary | ICD-10-CM

## 2024-12-17 ENCOUNTER — NURSE ONLY (OUTPATIENT)
Dept: CARDIOLOGY | Age: 84
End: 2024-12-17
Payer: MEDICARE

## 2024-12-17 DIAGNOSIS — I50.32 CHRONIC DIASTOLIC CONGESTIVE HEART FAILURE (HCC): Primary | ICD-10-CM

## 2024-12-17 PROCEDURE — 93297 REM INTERROG DEV EVAL ICPMS: CPT | Performed by: INTERNAL MEDICINE

## 2025-01-21 ENCOUNTER — NURSE ONLY (OUTPATIENT)
Dept: CARDIOLOGY | Age: 85
End: 2025-01-21

## 2025-01-21 DIAGNOSIS — I25.5 ISCHEMIC CARDIOMYOPATHY: ICD-10-CM

## 2025-01-21 DIAGNOSIS — Z95.810 BIVENTRICULAR ICD (IMPLANTABLE CARDIOVERTER-DEFIBRILLATOR) IN PLACE: Primary | ICD-10-CM

## 2025-02-07 ENCOUNTER — HOSPITAL ENCOUNTER (OUTPATIENT)
Age: 85
Discharge: HOME | End: 2025-02-07
Payer: MEDICARE

## 2025-02-07 DIAGNOSIS — I10 ESSENTIAL HYPERTENSION: ICD-10-CM

## 2025-02-07 DIAGNOSIS — E03.9 HYPOTHYROIDISM, UNSPECIFIED TYPE: ICD-10-CM

## 2025-02-07 DIAGNOSIS — R25.1 TREMOR: ICD-10-CM

## 2025-02-07 DIAGNOSIS — E78.5 HYPERLIPIDEMIA, UNSPECIFIED HYPERLIPIDEMIA TYPE: ICD-10-CM

## 2025-02-07 LAB
ALBUMIN SERPL-MCNC: 4.2 G/DL (ref 3.5–5.2)
ALBUMIN/GLOB SERPL: 1.9 {RATIO} (ref 1–2.5)
ALP SERPL-CCNC: 85 U/L (ref 35–104)
ALT SERPL-CCNC: 18 U/L (ref 10–35)
ANION GAP SERPL CALCULATED.3IONS-SCNC: 10 MMOL/L (ref 9–16)
AST SERPL-CCNC: 25 U/L (ref 10–35)
BASOPHILS # BLD: 0.03 K/UL (ref 0–0.2)
BASOPHILS NFR BLD: 1 % (ref 0–2)
BILIRUB SERPL-MCNC: 0.4 MG/DL (ref 0–1.2)
BUN SERPL-MCNC: 22 MG/DL (ref 8–23)
BUN/CREAT SERPL: 28 (ref 9–20)
CALCIUM SERPL-MCNC: 9.4 MG/DL (ref 8.6–10.4)
CHLORIDE SERPL-SCNC: 104 MMOL/L (ref 98–107)
CHOLEST SERPL-MCNC: 159 MG/DL (ref 0–199)
CHOLESTEROL/HDL RATIO: 3.6
CO2 SERPL-SCNC: 27 MMOL/L (ref 20–31)
CREAT SERPL-MCNC: 0.8 MG/DL (ref 0.5–0.9)
EOSINOPHIL # BLD: 0.27 K/UL (ref 0–0.44)
EOSINOPHILS RELATIVE PERCENT: 4 % (ref 1–4)
ERYTHROCYTE [DISTWIDTH] IN BLOOD BY AUTOMATED COUNT: 13 % (ref 11.8–14.4)
GFR, ESTIMATED: 67 ML/MIN/1.73M2
GLUCOSE SERPL-MCNC: 103 MG/DL (ref 74–99)
HCT VFR BLD AUTO: 45.3 % (ref 36.3–47.1)
HDLC SERPL-MCNC: 44 MG/DL
HGB BLD-MCNC: 14.6 G/DL (ref 11.9–15.1)
IMM GRANULOCYTES # BLD AUTO: <0.03 K/UL (ref 0–0.3)
IMM GRANULOCYTES NFR BLD: 0 %
LDLC SERPL CALC-MCNC: 63 MG/DL (ref 0–100)
LYMPHOCYTES NFR BLD: 1.74 K/UL (ref 1.1–3.7)
LYMPHOCYTES RELATIVE PERCENT: 28 % (ref 24–43)
MCH RBC QN AUTO: 30.1 PG (ref 25.2–33.5)
MCHC RBC AUTO-ENTMCNC: 32.2 G/DL (ref 28.4–34.8)
MCV RBC AUTO: 93.4 FL (ref 82.6–102.9)
MONOCYTES NFR BLD: 0.41 K/UL (ref 0.1–1.2)
MONOCYTES NFR BLD: 7 % (ref 3–12)
NEUTROPHILS NFR BLD: 60 % (ref 36–65)
NEUTS SEG NFR BLD: 3.76 K/UL (ref 1.5–8.1)
NRBC BLD-RTO: 0 PER 100 WBC
PLATELET # BLD AUTO: 207 K/UL (ref 138–453)
PMV BLD AUTO: 10.4 FL (ref 8.1–13.5)
POTASSIUM SERPL-SCNC: 4.7 MMOL/L (ref 3.7–5.3)
PROT SERPL-MCNC: 6.5 G/DL (ref 6.6–8.7)
RBC # BLD AUTO: 4.85 M/UL (ref 3.95–5.11)
SODIUM SERPL-SCNC: 141 MMOL/L (ref 136–145)
TRIGL SERPL-MCNC: 259 MG/DL
TSH SERPL DL<=0.05 MIU/L-ACNC: 1.19 UIU/ML (ref 0.27–4.2)
VLDLC SERPL CALC-MCNC: 52 MG/DL (ref 1–30)
WBC OTHER # BLD: 6.2 K/UL (ref 3.5–11.3)

## 2025-02-07 PROCEDURE — 80053 COMPREHEN METABOLIC PANEL: CPT

## 2025-02-07 PROCEDURE — 84443 ASSAY THYROID STIM HORMONE: CPT

## 2025-02-07 PROCEDURE — 85025 COMPLETE CBC W/AUTO DIFF WBC: CPT

## 2025-02-07 PROCEDURE — 36415 COLL VENOUS BLD VENIPUNCTURE: CPT

## 2025-02-07 PROCEDURE — 80061 LIPID PANEL: CPT

## 2025-02-25 ENCOUNTER — NURSE ONLY (OUTPATIENT)
Dept: CARDIOLOGY | Age: 85
End: 2025-02-25

## 2025-02-25 DIAGNOSIS — I25.5 ISCHEMIC CARDIOMYOPATHY: Primary | ICD-10-CM

## 2025-02-25 DIAGNOSIS — I50.32 CHRONIC DIASTOLIC CONGESTIVE HEART FAILURE (HCC): ICD-10-CM

## 2025-02-25 DIAGNOSIS — I50.42 CHRONIC COMBINED SYSTOLIC AND DIASTOLIC CONGESTIVE HEART FAILURE (HCC): ICD-10-CM

## 2025-04-01 ENCOUNTER — CLINICAL SUPPORT (OUTPATIENT)
Dept: CARDIOLOGY | Age: 85
End: 2025-04-01

## 2025-04-01 DIAGNOSIS — I25.5 ISCHEMIC CARDIOMYOPATHY: Primary | ICD-10-CM

## 2025-04-01 DIAGNOSIS — Z95.810 BIVENTRICULAR ICD (IMPLANTABLE CARDIOVERTER-DEFIBRILLATOR) IN PLACE: ICD-10-CM

## 2025-05-13 ENCOUNTER — CLINICAL SUPPORT (OUTPATIENT)
Dept: CARDIOLOGY | Age: 85
End: 2025-05-13

## 2025-05-13 DIAGNOSIS — I25.5 ISCHEMIC CARDIOMYOPATHY: Primary | ICD-10-CM

## 2025-05-13 DIAGNOSIS — Z95.810 BIVENTRICULAR ICD (IMPLANTABLE CARDIOVERTER-DEFIBRILLATOR) IN PLACE: ICD-10-CM

## 2025-06-17 ENCOUNTER — CLINICAL SUPPORT (OUTPATIENT)
Dept: CARDIOLOGY | Age: 85
End: 2025-06-17
Payer: MEDICARE

## 2025-06-17 DIAGNOSIS — Z95.810 BIVENTRICULAR ICD (IMPLANTABLE CARDIOVERTER-DEFIBRILLATOR) IN PLACE: ICD-10-CM

## 2025-06-17 DIAGNOSIS — I25.5 ISCHEMIC CARDIOMYOPATHY: Primary | ICD-10-CM

## 2025-06-17 PROCEDURE — 93297 REM INTERROG DEV EVAL ICPMS: CPT | Performed by: INTERNAL MEDICINE

## 2025-08-07 ENCOUNTER — OFFICE VISIT (OUTPATIENT)
Dept: CARDIOLOGY | Age: 85
End: 2025-08-07

## 2025-08-07 VITALS
HEART RATE: 80 BPM | HEIGHT: 65 IN | SYSTOLIC BLOOD PRESSURE: 109 MMHG | DIASTOLIC BLOOD PRESSURE: 74 MMHG | BODY MASS INDEX: 21.46 KG/M2 | RESPIRATION RATE: 20 BRPM | OXYGEN SATURATION: 98 % | WEIGHT: 128.8 LBS

## 2025-08-07 DIAGNOSIS — I10 ESSENTIAL HYPERTENSION: ICD-10-CM

## 2025-08-07 DIAGNOSIS — I50.42 CHRONIC COMBINED SYSTOLIC AND DIASTOLIC CONGESTIVE HEART FAILURE (HCC): ICD-10-CM

## 2025-08-07 DIAGNOSIS — Z95.810 BIVENTRICULAR ICD (IMPLANTABLE CARDIOVERTER-DEFIBRILLATOR) IN PLACE: ICD-10-CM

## 2025-08-07 DIAGNOSIS — I47.20 V-TACH (HCC): ICD-10-CM

## 2025-08-07 DIAGNOSIS — I25.10 ASHD (ARTERIOSCLEROTIC HEART DISEASE): ICD-10-CM

## 2025-08-07 DIAGNOSIS — I48.0 PAF (PAROXYSMAL ATRIAL FIBRILLATION) (HCC): ICD-10-CM

## 2025-08-07 DIAGNOSIS — E78.2 MIXED HYPERLIPIDEMIA: ICD-10-CM

## 2025-08-07 DIAGNOSIS — I25.5 ISCHEMIC CARDIOMYOPATHY: Primary | ICD-10-CM

## 2025-08-08 PROBLEM — J84.10 PULMONARY FIBROSIS, UNSPECIFIED (HCC): Status: ACTIVE | Noted: 2018-03-07

## 2025-08-08 PROBLEM — M76.822 POSTERIOR TIBIAL TENDINITIS, LEFT LEG: Status: ACTIVE | Noted: 2025-08-08

## 2025-08-08 PROBLEM — M20.12 HALLUX VALGUS (ACQUIRED), LEFT FOOT: Status: ACTIVE | Noted: 2025-08-08

## 2025-08-08 PROBLEM — M19.072 PRIMARY OSTEOARTHRITIS, LEFT ANKLE AND FOOT: Status: ACTIVE | Noted: 2025-08-08

## 2025-08-08 PROBLEM — G47.33 OBSTRUCTIVE SLEEP APNEA (ADULT) (PEDIATRIC): Status: ACTIVE | Noted: 2022-10-18

## 2025-08-08 PROBLEM — J84.9 INTERSTITIAL PULMONARY DISEASE (HCC): Status: ACTIVE | Noted: 2018-03-07

## 2025-08-08 PROBLEM — I27.20 PULMONARY HYPERTENSION, UNSPECIFIED (HCC): Status: ACTIVE | Noted: 2018-03-07

## 2025-08-14 ENCOUNTER — HOSPITAL ENCOUNTER (OUTPATIENT)
Age: 85
Discharge: HOME | End: 2025-08-16
Attending: INTERNAL MEDICINE
Payer: MEDICARE

## 2025-08-14 VITALS
SYSTOLIC BLOOD PRESSURE: 116 MMHG | DIASTOLIC BLOOD PRESSURE: 79 MMHG | HEIGHT: 65 IN | BODY MASS INDEX: 21.34 KG/M2 | WEIGHT: 128.09 LBS

## 2025-08-14 DIAGNOSIS — I10 ESSENTIAL HYPERTENSION: ICD-10-CM

## 2025-08-14 DIAGNOSIS — I48.0 PAF (PAROXYSMAL ATRIAL FIBRILLATION) (HCC): ICD-10-CM

## 2025-08-14 DIAGNOSIS — I47.20 V-TACH (HCC): ICD-10-CM

## 2025-08-14 DIAGNOSIS — I50.42 CHRONIC COMBINED SYSTOLIC AND DIASTOLIC CONGESTIVE HEART FAILURE (HCC): ICD-10-CM

## 2025-08-14 DIAGNOSIS — I25.5 ISCHEMIC CARDIOMYOPATHY: ICD-10-CM

## 2025-08-14 DIAGNOSIS — E78.2 MIXED HYPERLIPIDEMIA: ICD-10-CM

## 2025-08-14 DIAGNOSIS — Z95.810 BIVENTRICULAR ICD (IMPLANTABLE CARDIOVERTER-DEFIBRILLATOR) IN PLACE: ICD-10-CM

## 2025-08-14 DIAGNOSIS — I25.10 ASHD (ARTERIOSCLEROTIC HEART DISEASE): ICD-10-CM

## 2025-08-14 LAB
ECHO AO SINUS VALSALVA DIAM: 3 CM
ECHO AO SINUS VALSALVA INDEX: 1.83 CM/M2
ECHO AO ST JNCT DIAM: 2.1 CM
ECHO AV CUSP MM: 1 CM
ECHO AV MEAN GRADIENT: 3 MMHG
ECHO AV MEAN VELOCITY: 0.8 M/S
ECHO AV PEAK GRADIENT: 6 MMHG
ECHO AV PEAK VELOCITY: 1.2 M/S
ECHO AV VELOCITY RATIO: 0.58
ECHO AV VTI: 20.2 CM
ECHO BSA: 1.63 M2
ECHO EST RA PRESSURE: 3 MMHG
ECHO LA AREA 2C: 21.9 CM2
ECHO LA AREA 4C: 18.7 CM2
ECHO LA MAJOR AXIS: 6.4 CM
ECHO LA MINOR AXIS: 6 CM
ECHO LA VOL BP: 55 ML (ref 22–52)
ECHO LA VOL MOD A2C: 67 ML (ref 22–52)
ECHO LA VOL MOD A4C: 44 ML (ref 22–52)
ECHO LA VOL/BSA BIPLANE: 34 ML/M2 (ref 16–34)
ECHO LA VOLUME INDEX MOD A2C: 41 ML/M2 (ref 16–34)
ECHO LA VOLUME INDEX MOD A4C: 27 ML/M2 (ref 16–34)
ECHO LV E' LATERAL VELOCITY: 10.2 CM/S
ECHO LV EDV A2C: 23 ML
ECHO LV EDV A4C: 23 ML
ECHO LV EDV INDEX A4C: 14 ML/M2
ECHO LV EDV NDEX A2C: 14 ML/M2
ECHO LV EF PHYSICIAN: 65 %
ECHO LV EJECTION FRACTION A2C: 69 %
ECHO LV EJECTION FRACTION A4C: 67 %
ECHO LV EJECTION FRACTION BIPLANE: 65 % (ref 55–100)
ECHO LV ESV A2C: 7 ML
ECHO LV ESV A4C: 8 ML
ECHO LV ESV INDEX A2C: 4 ML/M2
ECHO LV ESV INDEX A4C: 5 ML/M2
ECHO LV FRACTIONAL SHORTENING: 40 % (ref 28–44)
ECHO LV INTERNAL DIMENSION DIASTOLE INDEX: 2.56 CM/M2
ECHO LV INTERNAL DIMENSION DIASTOLIC: 4.2 CM (ref 3.9–5.3)
ECHO LV INTERNAL DIMENSION SYSTOLIC INDEX: 1.52 CM/M2
ECHO LV INTERNAL DIMENSION SYSTOLIC: 2.5 CM
ECHO LV IVSD: 1.2 CM (ref 0.6–0.9)
ECHO LV MASS 2D: 178.2 G (ref 67–162)
ECHO LV MASS INDEX 2D: 108.6 G/M2 (ref 43–95)
ECHO LV POSTERIOR WALL DIASTOLIC: 1.2 CM (ref 0.6–0.9)
ECHO LV RELATIVE WALL THICKNESS RATIO: 0.57
ECHO LVOT AV VTI INDEX: 0.61
ECHO LVOT MEAN GRADIENT: 1 MMHG
ECHO LVOT PEAK GRADIENT: 2 MMHG
ECHO LVOT PEAK VELOCITY: 0.7 M/S
ECHO LVOT VTI: 12.4 CM
ECHO MV A VELOCITY: 0.34 M/S
ECHO MV E DECELERATION TIME (DT): 206 MS
ECHO MV E VELOCITY: 1.15 M/S
ECHO MV E/A RATIO: 3.38
ECHO MV E/E' LATERAL: 11.27
ECHO PV MAX VELOCITY: 0.7 M/S
ECHO PV PEAK GRADIENT: 2 MMHG
ECHO RIGHT VENTRICULAR SYSTOLIC PRESSURE (RVSP): 16 MMHG
ECHO RV TAPSE: 2 CM (ref 1.7–?)
ECHO TV REGURGITANT MAX VELOCITY: 1.83 M/S
ECHO TV REGURGITANT PEAK GRADIENT: 13 MMHG

## 2025-08-14 PROCEDURE — 93306 TTE W/DOPPLER COMPLETE: CPT

## 2025-08-17 ENCOUNTER — RESULTS FOLLOW-UP (OUTPATIENT)
Dept: CARDIOLOGY | Age: 85
End: 2025-08-17

## (undated) DEVICE — GOWN,PREVENTION PLUS,2XL,ST,22/CS: Brand: MEDLINE

## (undated) DEVICE — SUTURE VCRL + SZ 2-0 L27IN ABSRB WHT SH 1/2 CIR TAPERCUT VCP417H

## (undated) DEVICE — BRUSH CYTO GI W/ 3 RNG HNDL 1.8MMX120MM

## (undated) DEVICE — 3M™ STERI-DRAPE™ U-DRAPE 1015: Brand: STERI-DRAPE™

## (undated) DEVICE — 3M™ TEGADERM™ TRANSPARENT FILM DRESSING FRAME STYLE, 1626W, 4 IN X 4-3/4 IN (10 CM X 12 CM), 50/CT 4CT/CASE: Brand: 3M™ TEGADERM™

## (undated) DEVICE — SPONGE GZ W4XL4IN COT 12 PLY TYP VII WVN C FLD DSGN

## (undated) DEVICE — DECANTER FLD 9IN ST BG FOR ASEP TRNSF OF FLD

## (undated) DEVICE — STAPLER SKIN H3.9MM WIRE DIA0.58MM CRWN 6.9MM 35 STPL FIX

## (undated) DEVICE — SET LNR RED GRN W/ BASE CLEANASCOPE

## (undated) DEVICE — Z DISCONTINUED BY MEDLINE USE 2711682 TRAY SKIN PREP DRY W/ PREM GLV

## (undated) DEVICE — RELOAD STPL 45MM THCK TISS GRN W/ GRIPPING SURF TECHNOLOGY

## (undated) DEVICE — GLOVE SURG SZ 75 L12IN FNGR THK87MIL WHT LTX FREE

## (undated) DEVICE — KENDALL SCD EXPRESS SLEEVES, KNEE LENGTH, MEDIUM: Brand: KENDALL SCD

## (undated) DEVICE — ELECTRODE ES AD CRD L15FT DISP FOR PT BELOW 30LB REM

## (undated) DEVICE — SUTURE VCRL + SZ 0 L27IN ABSRB UD CT-1 L36MM 1/2 CIR TAPR VCP260H

## (undated) DEVICE — 3M™ IOBAN™ 2 ANTIMICROBIAL INCISE DRAPE 6650EZ: Brand: IOBAN™ 2

## (undated) DEVICE — ENDO KIT: Brand: MEDLINE INDUSTRIES, INC.

## (undated) DEVICE — NEEDLE HYPO 22GA L1.5IN BLK S STL HUB POLYPR SHLD REG BVL

## (undated) DEVICE — COVER LT HNDL BLU PLAS

## (undated) DEVICE — Z DISCONTINUED USE 2275676 GLOVE SURG SZ 65 L12IN FNGR THK87MIL DK GRN LTX FREE ISOLEX

## (undated) DEVICE — THREE-QUARTER SHEET: Brand: CONVERTORS

## (undated) DEVICE — STAPLER INT 12MM 60MM CART SHT NEW KNF BLDE W/ EVERY FIRING

## (undated) DEVICE — 3M™ TEGADERM™ TRANSPARENT FILM DRESSING FRAME STYLE, 1624W, 2-3/8 IN X 2-3/4 IN (6 CM X 7 CM), 100/CT 4CT/CASE: Brand: 3M™ TEGADERM™

## (undated) DEVICE — CHLORAPREP 26ML ORANGE

## (undated) DEVICE — GLOVE SURG SZ 75 L12IN FNGR THK87MIL DK GRN LTX FREE ISOLEX

## (undated) DEVICE — GLOVE SURG SZ 7 L12IN FNGR THK87MIL WHT LTX FREE

## (undated) DEVICE — PAD,ABDOMINAL,8"X10",ST,LF: Brand: MEDLINE

## (undated) DEVICE — FORCEP RAD JAW W/NEEDLE 160CM

## (undated) DEVICE — INTENDED TO BE USED TO OCCLUDE, RETRACT AND IDENTIFY ARTERIES, VEINS, TENDONS AND NERVES IN SURGICAL PROCEDURES: Brand: STERION®  VESSEL LOOP

## (undated) DEVICE — Device

## (undated) DEVICE — SOLUTION IV IRRIG POUR BRL 0.9% SODIUM CHL 2F7124

## (undated) DEVICE — GUIDEWIRE ORTH L300MM DIA2.8MM S STL THRD SHRP TRCR TIP FOR

## (undated) DEVICE — NEEDLE HYPO 18GA L1.5IN PNK S STL HUB POLYPR SHLD REG BVL

## (undated) DEVICE — FORCEPS BX L100CM DIA1.8MM WRK CHN 2MM PULM S STL RAD JAW 4

## (undated) DEVICE — GOWN,AURORA,NONREINFORCED,LARGE: Brand: MEDLINE

## (undated) DEVICE — SUTURE VCRL + SZ 1 L36IN ABSRB UD L36MM CT-1 1/2 CIR VCP947H

## (undated) DEVICE — Z CONVERTED USE 2271043 CONTAINER SPEC COLL 4OZ SCR ON LID PEEL PCH

## (undated) DEVICE — SUTURE ETHBND 1 L30IN NONABSORBABLE GRN L70MM XLH 1/2 CIR X190G

## (undated) DEVICE — BLADE SURG NO10 C STL STR DISP GLASSVAN

## (undated) DEVICE — CATHETER THOR 20FR L22IN PVC 4 EYELET STR ATRAUM

## (undated) DEVICE — CONVERTED USE 297712 TOWELS OR BL X-RAY ST

## (undated) DEVICE — TOWEL SURG SM W12XL18IN CLR PLAS TEAR RESIST REINF ADH FRST

## (undated) DEVICE — 6617 IOBAN II PATIENT ISOLATION DRAPE 5/BX,4BX/CS: Brand: STERI-DRAPE™ IOBAN™ 2

## (undated) DEVICE — PADDING UNDERCAST W4INXL4YD COT FBR LO LINTING WYTEX

## (undated) DEVICE — Z DISCONTINUED PER MEDLINE USE 2741942 DRESSING AQUACEL 6 IN ALG W9XL15CM SIL CVR WTRPRF VIR BACT BARR ANTIMIC

## (undated) DEVICE — SYRINGE MED 20ML STD CLR PLAS LUERLOCK TIP N CTRL DISP

## (undated) DEVICE — DRAPE C ARM CLP FOR GE 9600 9800

## (undated) DEVICE — CANISTER, RIGID, 2000CC: Brand: MEDLINE INDUSTRIES, INC.

## (undated) DEVICE — PACK PROCEDURE SURG SVMMC THORACOTOMY

## (undated) DEVICE — TRAY CATHETER 16FR F INCLUDE BARDX IC COMPLT CARE URIN M STATLOK

## (undated) DEVICE — YANKAUER,FLEXIBLE HANDLE,REGLR CAPACITY: Brand: MEDLINE INDUSTRIES, INC.

## (undated) DEVICE — SUTURE VCRL + SZ 3-0 L27IN ABSRB WHT CT-1 1/2 CIR VCP258H

## (undated) DEVICE — CONMED SCOPE SAVER BITE BLOCK, 20X27 MM: Brand: SCOPE SAVER

## (undated) DEVICE — UNDERGLOVE SURG SZ 8 BLU LTX FREE SYN POLYISOPRENE POLYMER

## (undated) DEVICE — GLOVE SURG SZ 75 L12IN FNGR THK79MIL GRN LTX FREE

## (undated) DEVICE — BIT DRL L300MM DIA5MM CANN QUIK CPL ADJ STP REUSE